# Patient Record
Sex: FEMALE | Race: WHITE | Employment: OTHER | URBAN - NONMETROPOLITAN AREA
[De-identification: names, ages, dates, MRNs, and addresses within clinical notes are randomized per-mention and may not be internally consistent; named-entity substitution may affect disease eponyms.]

---

## 2016-10-21 LAB — HBA1C MFR BLD: 6 %

## 2017-04-17 VITALS
BODY MASS INDEX: 25.95 KG/M2 | SYSTOLIC BLOOD PRESSURE: 120 MMHG | WEIGHT: 152 LBS | HEIGHT: 64 IN | HEART RATE: 80 BPM | DIASTOLIC BLOOD PRESSURE: 78 MMHG

## 2017-04-17 RX ORDER — BUDESONIDE AND FORMOTEROL FUMARATE DIHYDRATE 160; 4.5 UG/1; UG/1
2 AEROSOL RESPIRATORY (INHALATION) 2 TIMES DAILY
COMMUNITY
End: 2017-09-07 | Stop reason: ALTCHOICE

## 2017-04-17 RX ORDER — SIMVASTATIN 40 MG
40 TABLET ORAL NIGHTLY
COMMUNITY
End: 2017-10-03 | Stop reason: SDUPTHER

## 2017-04-18 VITALS
HEIGHT: 64 IN | WEIGHT: 152 LBS | DIASTOLIC BLOOD PRESSURE: 78 MMHG | SYSTOLIC BLOOD PRESSURE: 120 MMHG | BODY MASS INDEX: 25.95 KG/M2 | HEART RATE: 80 BPM

## 2017-04-19 ENCOUNTER — OFFICE VISIT (OUTPATIENT)
Dept: FAMILY MEDICINE CLINIC | Age: 77
End: 2017-04-19
Payer: MEDICARE

## 2017-04-19 VITALS
WEIGHT: 153 LBS | HEIGHT: 64 IN | HEART RATE: 80 BPM | DIASTOLIC BLOOD PRESSURE: 90 MMHG | BODY MASS INDEX: 26.12 KG/M2 | SYSTOLIC BLOOD PRESSURE: 130 MMHG

## 2017-04-19 DIAGNOSIS — K51.90 ULCERATIVE COLITIS WITHOUT COMPLICATIONS, UNSPECIFIED LOCATION (HCC): ICD-10-CM

## 2017-04-19 DIAGNOSIS — L98.9 SKIN LESION OF FACE: ICD-10-CM

## 2017-04-19 DIAGNOSIS — G47.34 NOCTURNAL HYPOXEMIA: ICD-10-CM

## 2017-04-19 DIAGNOSIS — G47.30 SLEEP APNEA, UNSPECIFIED TYPE: ICD-10-CM

## 2017-04-19 DIAGNOSIS — Z78.0 POSTMENOPAUSAL: ICD-10-CM

## 2017-04-19 DIAGNOSIS — E78.2 MIXED HYPERLIPIDEMIA: ICD-10-CM

## 2017-04-19 DIAGNOSIS — K57.31 DIVERTICULOSIS LARGE INTESTINE W/O PERFORATION OR ABSCESS W/BLEEDING: ICD-10-CM

## 2017-04-19 DIAGNOSIS — Z01.818 PREOPERATIVE CLEARANCE: Primary | ICD-10-CM

## 2017-04-19 DIAGNOSIS — J41.0 SIMPLE CHRONIC BRONCHITIS (HCC): ICD-10-CM

## 2017-04-19 DIAGNOSIS — F17.210 CIGARETTE NICOTINE DEPENDENCE WITHOUT COMPLICATION: ICD-10-CM

## 2017-04-19 PROBLEM — H25.10 NUCLEAR SCLEROTIC CATARACT: Status: ACTIVE | Noted: 2017-02-08

## 2017-04-19 PROBLEM — H35.3190 NONEXUDATIVE AGE-RELATED MACULAR DEGENERATION: Status: ACTIVE | Noted: 2017-02-08

## 2017-04-19 PROCEDURE — 4040F PNEUMOC VAC/ADMIN/RCVD: CPT | Performed by: FAMILY MEDICINE

## 2017-04-19 PROCEDURE — 3023F SPIROM DOC REV: CPT | Performed by: FAMILY MEDICINE

## 2017-04-19 PROCEDURE — 1036F TOBACCO NON-USER: CPT | Performed by: FAMILY MEDICINE

## 2017-04-19 PROCEDURE — 1090F PRES/ABSN URINE INCON ASSESS: CPT | Performed by: FAMILY MEDICINE

## 2017-04-19 PROCEDURE — 99214 OFFICE O/P EST MOD 30 MIN: CPT | Performed by: FAMILY MEDICINE

## 2017-04-19 PROCEDURE — G8926 SPIRO NO PERF OR DOC: HCPCS | Performed by: FAMILY MEDICINE

## 2017-04-19 PROCEDURE — G8420 CALC BMI NORM PARAMETERS: HCPCS | Performed by: FAMILY MEDICINE

## 2017-04-19 PROCEDURE — G8427 DOCREV CUR MEDS BY ELIG CLIN: HCPCS | Performed by: FAMILY MEDICINE

## 2017-04-19 RX ORDER — LANOLIN ALCOHOL/MO/W.PET/CERES
1 CREAM (GRAM) TOPICAL
COMMUNITY
End: 2017-09-07

## 2017-04-19 RX ORDER — ASPIRIN 81 MG/1
81 TABLET, CHEWABLE ORAL
COMMUNITY
End: 2018-05-11 | Stop reason: SDUPTHER

## 2017-04-19 ASSESSMENT — ENCOUNTER SYMPTOMS
DIARRHEA: 0
CHEST TIGHTNESS: 0
BLOOD IN STOOL: 0
ABDOMINAL PAIN: 0
WHEEZING: 0
CONSTIPATION: 0
SHORTNESS OF BREATH: 0

## 2017-09-01 LAB
ALT SERPL-CCNC: 30 UNITS/L
AST SERPL-CCNC: 23 UNITS/L
CHOLESTEROL/HDL RATIO: 3.8 RATIO
CHOLESTEROL: 169 MG/DL
HDL, DIRECT: 45 MG/DL
LDL CHOLESTEROL CALCULATED: 91.6 MG/DL
TRIGL SERPL-MCNC: 162 MG/DL
VLDLC SERPL CALC-MCNC: 32 MG/DL

## 2017-09-07 ENCOUNTER — OFFICE VISIT (OUTPATIENT)
Dept: FAMILY MEDICINE CLINIC | Age: 77
End: 2017-09-07
Payer: MEDICARE

## 2017-09-07 VITALS
BODY MASS INDEX: 25.58 KG/M2 | HEART RATE: 88 BPM | WEIGHT: 149 LBS | SYSTOLIC BLOOD PRESSURE: 110 MMHG | DIASTOLIC BLOOD PRESSURE: 80 MMHG

## 2017-09-07 DIAGNOSIS — M16.0 PRIMARY OSTEOARTHRITIS OF BOTH HIPS: ICD-10-CM

## 2017-09-07 DIAGNOSIS — Z23 NEED FOR 23-POLYVALENT PNEUMOCOCCAL POLYSACCHARIDE VACCINE: ICD-10-CM

## 2017-09-07 DIAGNOSIS — G47.34 NOCTURNAL HYPOXEMIA: ICD-10-CM

## 2017-09-07 DIAGNOSIS — E78.2 MIXED HYPERLIPIDEMIA: ICD-10-CM

## 2017-09-07 DIAGNOSIS — Z23 NEED FOR PROPHYLACTIC VACCINATION AND INOCULATION AGAINST INFLUENZA: ICD-10-CM

## 2017-09-07 DIAGNOSIS — K57.31 DIVERTICULOSIS LARGE INTESTINE W/O PERFORATION OR ABSCESS W/BLEEDING: ICD-10-CM

## 2017-09-07 DIAGNOSIS — F17.210 CIGARETTE NICOTINE DEPENDENCE WITHOUT COMPLICATION: ICD-10-CM

## 2017-09-07 DIAGNOSIS — M81.0 AGE-RELATED OSTEOPOROSIS WITHOUT CURRENT PATHOLOGICAL FRACTURE: ICD-10-CM

## 2017-09-07 DIAGNOSIS — J41.0 SIMPLE CHRONIC BRONCHITIS (HCC): Primary | ICD-10-CM

## 2017-09-07 DIAGNOSIS — K51.90 ULCERATIVE COLITIS WITHOUT COMPLICATIONS, UNSPECIFIED LOCATION (HCC): ICD-10-CM

## 2017-09-07 DIAGNOSIS — Z12.31 SCREENING MAMMOGRAM, ENCOUNTER FOR: ICD-10-CM

## 2017-09-07 LAB — CALCIUM SERPL-MCNC: 9.9 MG/DL

## 2017-09-07 PROCEDURE — 3023F SPIROM DOC REV: CPT | Performed by: FAMILY MEDICINE

## 2017-09-07 PROCEDURE — G8419 CALC BMI OUT NRM PARAM NOF/U: HCPCS | Performed by: FAMILY MEDICINE

## 2017-09-07 PROCEDURE — G8400 PT W/DXA NO RESULTS DOC: HCPCS | Performed by: FAMILY MEDICINE

## 2017-09-07 PROCEDURE — 90732 PPSV23 VACC 2 YRS+ SUBQ/IM: CPT | Performed by: FAMILY MEDICINE

## 2017-09-07 PROCEDURE — 99214 OFFICE O/P EST MOD 30 MIN: CPT | Performed by: FAMILY MEDICINE

## 2017-09-07 PROCEDURE — 4040F PNEUMOC VAC/ADMIN/RCVD: CPT | Performed by: FAMILY MEDICINE

## 2017-09-07 PROCEDURE — 1123F ACP DISCUSS/DSCN MKR DOCD: CPT | Performed by: FAMILY MEDICINE

## 2017-09-07 PROCEDURE — G0009 ADMIN PNEUMOCOCCAL VACCINE: HCPCS | Performed by: FAMILY MEDICINE

## 2017-09-07 PROCEDURE — G0008 ADMIN INFLUENZA VIRUS VAC: HCPCS | Performed by: FAMILY MEDICINE

## 2017-09-07 PROCEDURE — 1036F TOBACCO NON-USER: CPT | Performed by: FAMILY MEDICINE

## 2017-09-07 PROCEDURE — 4005F PHARM THX FOR OP RXD: CPT | Performed by: FAMILY MEDICINE

## 2017-09-07 PROCEDURE — 1090F PRES/ABSN URINE INCON ASSESS: CPT | Performed by: FAMILY MEDICINE

## 2017-09-07 PROCEDURE — 90662 IIV NO PRSV INCREASED AG IM: CPT | Performed by: FAMILY MEDICINE

## 2017-09-07 PROCEDURE — G8926 SPIRO NO PERF OR DOC: HCPCS | Performed by: FAMILY MEDICINE

## 2017-09-07 PROCEDURE — G8427 DOCREV CUR MEDS BY ELIG CLIN: HCPCS | Performed by: FAMILY MEDICINE

## 2017-09-07 ASSESSMENT — ENCOUNTER SYMPTOMS
SHORTNESS OF BREATH: 0
SORE THROAT: 0
BLOOD IN STOOL: 0
ABDOMINAL PAIN: 0
CONSTIPATION: 0
DIARRHEA: 0
WHEEZING: 0

## 2017-09-07 ASSESSMENT — PATIENT HEALTH QUESTIONNAIRE - PHQ9
SUM OF ALL RESPONSES TO PHQ QUESTIONS 1-9: 0
1. LITTLE INTEREST OR PLEASURE IN DOING THINGS: 0
2. FEELING DOWN, DEPRESSED OR HOPELESS: 0
SUM OF ALL RESPONSES TO PHQ9 QUESTIONS 1 & 2: 0

## 2017-09-13 ENCOUNTER — TELEPHONE (OUTPATIENT)
Dept: FAMILY MEDICINE CLINIC | Age: 77
End: 2017-09-13

## 2017-09-13 DIAGNOSIS — K51.90 ULCERATIVE COLITIS WITHOUT COMPLICATIONS, UNSPECIFIED LOCATION (HCC): Primary | ICD-10-CM

## 2017-09-13 RX ORDER — PREDNISONE 20 MG/1
20 TABLET ORAL DAILY
Qty: 7 TABLET | Refills: 0 | Status: SHIPPED | OUTPATIENT
Start: 2017-09-13 | End: 2017-09-20

## 2017-09-13 RX ORDER — MESALAMINE 500 MG/1
500 CAPSULE, EXTENDED RELEASE ORAL 4 TIMES DAILY
Qty: 120 CAPSULE | Refills: 3 | Status: SHIPPED | OUTPATIENT
Start: 2017-09-13 | End: 2018-05-11 | Stop reason: ALTCHOICE

## 2017-09-27 ENCOUNTER — OFFICE VISIT (OUTPATIENT)
Dept: FAMILY MEDICINE CLINIC | Age: 77
End: 2017-09-27
Payer: MEDICARE

## 2017-09-27 VITALS
DIASTOLIC BLOOD PRESSURE: 70 MMHG | HEART RATE: 68 BPM | WEIGHT: 146 LBS | SYSTOLIC BLOOD PRESSURE: 132 MMHG | BODY MASS INDEX: 25.06 KG/M2

## 2017-09-27 DIAGNOSIS — K51.90 ULCERATIVE COLITIS WITHOUT COMPLICATIONS, UNSPECIFIED LOCATION (HCC): Primary | ICD-10-CM

## 2017-09-27 DIAGNOSIS — M81.0 AGE-RELATED OSTEOPOROSIS WITHOUT CURRENT PATHOLOGICAL FRACTURE: ICD-10-CM

## 2017-09-27 DIAGNOSIS — K57.31 DIVERTICULOSIS LARGE INTESTINE W/O PERFORATION OR ABSCESS W/BLEEDING: ICD-10-CM

## 2017-09-27 PROCEDURE — 4040F PNEUMOC VAC/ADMIN/RCVD: CPT | Performed by: FAMILY MEDICINE

## 2017-09-27 PROCEDURE — 99213 OFFICE O/P EST LOW 20 MIN: CPT | Performed by: FAMILY MEDICINE

## 2017-09-27 PROCEDURE — G8417 CALC BMI ABV UP PARAM F/U: HCPCS | Performed by: FAMILY MEDICINE

## 2017-09-27 PROCEDURE — 4005F PHARM THX FOR OP RXD: CPT | Performed by: FAMILY MEDICINE

## 2017-09-27 PROCEDURE — 1036F TOBACCO NON-USER: CPT | Performed by: FAMILY MEDICINE

## 2017-09-27 PROCEDURE — G8400 PT W/DXA NO RESULTS DOC: HCPCS | Performed by: FAMILY MEDICINE

## 2017-09-27 PROCEDURE — G8427 DOCREV CUR MEDS BY ELIG CLIN: HCPCS | Performed by: FAMILY MEDICINE

## 2017-09-27 PROCEDURE — 1123F ACP DISCUSS/DSCN MKR DOCD: CPT | Performed by: FAMILY MEDICINE

## 2017-09-27 PROCEDURE — 1090F PRES/ABSN URINE INCON ASSESS: CPT | Performed by: FAMILY MEDICINE

## 2017-09-27 RX ORDER — MESALAMINE 400 MG/1
CAPSULE, DELAYED RELEASE ORAL
Qty: 90 CAPSULE | Refills: 3 | Status: SHIPPED | OUTPATIENT
Start: 2017-09-27 | End: 2018-05-03 | Stop reason: CLARIF

## 2017-09-28 PROBLEM — M81.0 AGE-RELATED OSTEOPOROSIS WITHOUT CURRENT PATHOLOGICAL FRACTURE: Status: ACTIVE | Noted: 2017-09-28

## 2017-09-28 ASSESSMENT — ENCOUNTER SYMPTOMS
WHEEZING: 0
DIARRHEA: 0
BLOOD IN STOOL: 0
CONSTIPATION: 0
SHORTNESS OF BREATH: 0
SORE THROAT: 0

## 2017-10-03 RX ORDER — SIMVASTATIN 40 MG
TABLET ORAL
Qty: 90 TABLET | Refills: 3 | Status: SHIPPED | OUTPATIENT
Start: 2017-10-03 | End: 2019-02-01 | Stop reason: SDUPTHER

## 2017-10-04 DIAGNOSIS — K51.90 ULCERATIVE COLITIS WITHOUT COMPLICATIONS, UNSPECIFIED LOCATION (HCC): Primary | ICD-10-CM

## 2017-10-04 RX ORDER — MESALAMINE 1.2 G/1
1200 TABLET, DELAYED RELEASE ORAL
Qty: 30 TABLET | Refills: 5 | COMMUNITY
End: 2018-03-12 | Stop reason: CLARIF

## 2017-10-06 ENCOUNTER — OFFICE VISIT (OUTPATIENT)
Dept: FAMILY MEDICINE CLINIC | Age: 77
End: 2017-10-06
Payer: MEDICARE

## 2017-10-06 VITALS — DIASTOLIC BLOOD PRESSURE: 80 MMHG | SYSTOLIC BLOOD PRESSURE: 110 MMHG | HEART RATE: 84 BPM

## 2017-10-06 DIAGNOSIS — K51.90 ULCERATIVE COLITIS WITHOUT COMPLICATIONS, UNSPECIFIED LOCATION (HCC): Primary | ICD-10-CM

## 2017-10-06 DIAGNOSIS — I65.23 BILATERAL CAROTID ARTERY STENOSIS: ICD-10-CM

## 2017-10-06 PROCEDURE — G8400 PT W/DXA NO RESULTS DOC: HCPCS | Performed by: FAMILY MEDICINE

## 2017-10-06 PROCEDURE — 99213 OFFICE O/P EST LOW 20 MIN: CPT | Performed by: FAMILY MEDICINE

## 2017-10-06 PROCEDURE — G8484 FLU IMMUNIZE NO ADMIN: HCPCS | Performed by: FAMILY MEDICINE

## 2017-10-06 PROCEDURE — G8598 ASA/ANTIPLAT THER USED: HCPCS | Performed by: FAMILY MEDICINE

## 2017-10-06 PROCEDURE — 1036F TOBACCO NON-USER: CPT | Performed by: FAMILY MEDICINE

## 2017-10-06 PROCEDURE — 4040F PNEUMOC VAC/ADMIN/RCVD: CPT | Performed by: FAMILY MEDICINE

## 2017-10-06 PROCEDURE — G8427 DOCREV CUR MEDS BY ELIG CLIN: HCPCS | Performed by: FAMILY MEDICINE

## 2017-10-06 PROCEDURE — 1123F ACP DISCUSS/DSCN MKR DOCD: CPT | Performed by: FAMILY MEDICINE

## 2017-10-06 PROCEDURE — G8417 CALC BMI ABV UP PARAM F/U: HCPCS | Performed by: FAMILY MEDICINE

## 2017-10-06 PROCEDURE — 1090F PRES/ABSN URINE INCON ASSESS: CPT | Performed by: FAMILY MEDICINE

## 2017-10-06 RX ORDER — MESALAMINE 0.38 G/1
1.5 CAPSULE, EXTENDED RELEASE ORAL DAILY
Qty: 120 CAPSULE | Refills: 5 | Status: SHIPPED | OUTPATIENT
Start: 2017-10-06 | End: 2018-03-12 | Stop reason: CLARIF

## 2017-10-06 RX ORDER — MESALAMINE 1.2 G/1
1200 TABLET, DELAYED RELEASE ORAL
Qty: 30 TABLET | Refills: 3 | Status: SHIPPED | OUTPATIENT
Start: 2017-10-06 | End: 2018-05-03 | Stop reason: CLARIF

## 2017-10-06 NOTE — PROGRESS NOTES
Cigarettes    Smokeless tobacco: Not on file    Alcohol use No        Current Outpatient Prescriptions   Medication Sig Dispense Refill    Multiple Vitamins-Minerals (MULTIVITAMIN ADULTS PO) Take 1 tablet by mouth daily      Probiotic Product ([address]) Take 1 tablet by mouth daily      mesalamine (APRISO) 0.375 g extended release capsule Take 4 capsules by mouth daily 120 capsule 5    mesalamine (PENTASA) 250 MG extended release capsule Take 1 capsule by mouth 4 times daily 120 capsule 3    mesalamine (LIALDA) 1.2 g EC tablet Take 1 tablet by mouth daily (with breakfast) 30 tablet 3    simvastatin (ZOCOR) 40 MG tablet TAKE ONE TABLET BY MOUTH ONCE DAILY 90 tablet 3    aspirin 81 MG chewable tablet Take 81 mg by mouth      mesalamine (LIALDA) 1.2 g EC tablet Take 1 tablet by mouth daily (with breakfast) 30 tablet 5    mesalamine (DELZICOL) 400 MG CPDR delayed release capsule 1 capsule three times daily when needed 90 capsule 3    mesalamine (PENTASA) 500 MG extended release capsule Take 1 capsule by mouth 4 times daily 120 capsule 3    denosumab (PROLIA) 60 MG/ML SOLN SC injection Inject 1 mL into the skin once for 1 dose 1 mL 0     No current facility-administered medications for this visit. Allergies   Allergen Reactions    Alcohol     Azulfidine [Sulfasalazine] Diarrhea    Sulfa Antibiotics        Health Maintenance   Topic Date Due    Zostavax vaccine  10/04/2000    Lipid screen  09/01/2022    DTaP/Tdap/Td vaccine (2 - Td) 02/05/2026    DEXA (modify frequency per FRAX score)  Completed    Flu vaccine  Completed    Pneumococcal low/med risk  Completed       Subjective:      Review of Systems   Constitutional: Negative for activity change, chills, diaphoresis and fever. HENT: Negative. Respiratory: Negative. Cardiovascular: Negative. Gastrointestinal: Positive for diarrhea.  Negative for abdominal distention, abdominal pain, blood in stool, constipation, nausea and vomiting. Genitourinary: Negative for dysuria. Musculoskeletal: Negative for arthralgias, back pain and joint swelling. Objective:     /80   Pulse 84     Physical Exam   Constitutional: She appears well-developed and well-nourished. HENT:   Mouth/Throat: No posterior oropharyngeal erythema. Neck: Carotid bruit is not present. Cardiovascular: Normal rate, regular rhythm, S1 normal and S2 normal.    No murmur heard. Pulmonary/Chest: Breath sounds normal. She has no wheezes. She has no rhonchi. She has no rales. She exhibits no tenderness. Abdominal: Soft. There is no hepatosplenomegaly. There is no tenderness. Vitals reviewed. Assessment:     1. Ulcerative colitis without complications, unspecified location (HCC)  Multiple Vitamins-Minerals (MULTIVITAMIN ADULTS PO)    Probiotic Product ([employer] PO)    mesalamine (APRISO) 0.375 g extended release capsule    mesalamine (PENTASA) 250 MG extended release capsule    mesalamine (LIALDA) 1.2 g EC tablet   2. Bilateral carotid artery stenosis  US CAROTID ARTERY BILATERAL            POC Testing Results (If Applicable):  No results found for this visit on 10/06/17. Plan:    We have received communication from pharmacy and have prescribed \"preferred\" medication  However, suspect though they are preferred, coverage is still limited  So prescriptions for all the preferred medications were sent to pharmacy to see if they could find the best alternative    However, patient should find a medication and take it faithfully for a month and if symptoms do not jesusita we will need to consider other diagnoses    Needs repeat carotid screen ; order written    Orders Given:  Orders Placed This Encounter   Procedures    US CAROTID ARTERY BILATERAL     Standing Status:   Future     Standing Expiration Date:   10/6/2018     Prescriptions:    Orders Placed This Encounter   Medications    mesalamine (APRISO) 0.375 g extended release capsule Sig: Take 4 capsules by mouth daily     Dispense:  120 capsule     Refill:  5    mesalamine (PENTASA) 250 MG extended release capsule     Sig: Take 1 capsule by mouth 4 times daily     Dispense:  120 capsule     Refill:  3    mesalamine (LIALDA) 1.2 g EC tablet     Sig: Take 1 tablet by mouth daily (with breakfast)     Dispense:  30 tablet     Refill:  3        No Follow-up on file. Electronically signed by Maribel Duff MD on 10/7/2017.

## 2017-10-07 ASSESSMENT — ENCOUNTER SYMPTOMS
BLOOD IN STOOL: 0
VOMITING: 0
DIARRHEA: 1
CONSTIPATION: 0
ABDOMINAL DISTENTION: 0
RESPIRATORY NEGATIVE: 1
ABDOMINAL PAIN: 0
BACK PAIN: 0
NAUSEA: 0

## 2018-03-02 DIAGNOSIS — M81.0 AGE-RELATED OSTEOPOROSIS WITHOUT CURRENT PATHOLOGICAL FRACTURE: ICD-10-CM

## 2018-03-12 ENCOUNTER — OFFICE VISIT (OUTPATIENT)
Dept: FAMILY MEDICINE CLINIC | Age: 78
End: 2018-03-12
Payer: MEDICARE

## 2018-03-12 VITALS
DIASTOLIC BLOOD PRESSURE: 80 MMHG | HEIGHT: 64 IN | BODY MASS INDEX: 24.75 KG/M2 | WEIGHT: 145 LBS | SYSTOLIC BLOOD PRESSURE: 124 MMHG | HEART RATE: 68 BPM

## 2018-03-12 DIAGNOSIS — K51.919 ULCERATIVE COLITIS WITH COMPLICATION, UNSPECIFIED LOCATION (HCC): ICD-10-CM

## 2018-03-12 DIAGNOSIS — F17.210 CIGARETTE NICOTINE DEPENDENCE WITHOUT COMPLICATION: ICD-10-CM

## 2018-03-12 DIAGNOSIS — M81.0 AGE-RELATED OSTEOPOROSIS WITHOUT CURRENT PATHOLOGICAL FRACTURE: ICD-10-CM

## 2018-03-12 DIAGNOSIS — G47.34 NOCTURNAL HYPOXEMIA: ICD-10-CM

## 2018-03-12 DIAGNOSIS — K51.90 ULCERATIVE COLITIS WITHOUT COMPLICATIONS, UNSPECIFIED LOCATION (HCC): ICD-10-CM

## 2018-03-12 DIAGNOSIS — E78.2 MIXED HYPERLIPIDEMIA: Primary | ICD-10-CM

## 2018-03-12 DIAGNOSIS — J42 CHRONIC BRONCHITIS, UNSPECIFIED CHRONIC BRONCHITIS TYPE (HCC): ICD-10-CM

## 2018-03-12 LAB — CALCIUM SERPL-MCNC: 10.6 MG/DL

## 2018-03-12 PROCEDURE — 1123F ACP DISCUSS/DSCN MKR DOCD: CPT | Performed by: FAMILY MEDICINE

## 2018-03-12 PROCEDURE — G8420 CALC BMI NORM PARAMETERS: HCPCS | Performed by: FAMILY MEDICINE

## 2018-03-12 PROCEDURE — 4040F PNEUMOC VAC/ADMIN/RCVD: CPT | Performed by: FAMILY MEDICINE

## 2018-03-12 PROCEDURE — G8926 SPIRO NO PERF OR DOC: HCPCS | Performed by: FAMILY MEDICINE

## 2018-03-12 PROCEDURE — 1036F TOBACCO NON-USER: CPT | Performed by: FAMILY MEDICINE

## 2018-03-12 PROCEDURE — 99214 OFFICE O/P EST MOD 30 MIN: CPT | Performed by: FAMILY MEDICINE

## 2018-03-12 PROCEDURE — G8427 DOCREV CUR MEDS BY ELIG CLIN: HCPCS | Performed by: FAMILY MEDICINE

## 2018-03-12 PROCEDURE — G8400 PT W/DXA NO RESULTS DOC: HCPCS | Performed by: FAMILY MEDICINE

## 2018-03-12 PROCEDURE — 3023F SPIROM DOC REV: CPT | Performed by: FAMILY MEDICINE

## 2018-03-12 PROCEDURE — G8482 FLU IMMUNIZE ORDER/ADMIN: HCPCS | Performed by: FAMILY MEDICINE

## 2018-03-12 PROCEDURE — 1090F PRES/ABSN URINE INCON ASSESS: CPT | Performed by: FAMILY MEDICINE

## 2018-03-12 ASSESSMENT — ENCOUNTER SYMPTOMS
CONSTIPATION: 0
SORE THROAT: 0
BLOOD IN STOOL: 0
WHEEZING: 0
ABDOMINAL PAIN: 0
DIARRHEA: 0
SHORTNESS OF BREATH: 0

## 2018-03-12 NOTE — PROGRESS NOTES
History:   Diagnosis Date    Colitis     Colon polyps     Diverticulosis large intestine w/o perforation or abscess w/bleeding     Fibroid, uterine     Hyperlipidemia     Nicotine dependence     Nocturnal hypoxemia     Postmenopausal     Postmenopausal     Sleep apnea     Ulcerative colitis ([address]     with rectal bleeding       Past Surgical History:   Procedure Laterality Date    BREAST BIOPSY  1988    right breast biopsy     BREAST BIOPSY Right 03/2011    stereotactic biopsy    COLONOSCOPY  2006    Dr Arlet Bynum  2010    [facility]  [facility]    Dr London Rosado interminate colitis    COLONOSCOPY  03/03/2014    Dr Tierra Schneider Bilateral 2011 2012     Family History   Problem Relation Age of Onset    Diabetes Mother     High Blood Pressure Mother     Coronary Art Dis Father      Social History   Substance Use Topics    Smoking status: Former Smoker     Types: Cigarettes    Smokeless tobacco: Never Used    Alcohol use No        Current Outpatient Prescriptions   Medication Sig Dispense Refill    denosumab (PROLIA) 60 MG/ML SOLN SC injection Inject 1 mL into the skin once for 1 dose 1 mL 0    Multiple Vitamins-Minerals (MULTIVITAMIN ADULTS PO) Take 1 tablet by mouth daily      Probiotic Product ([employer] [facility] PO) Take 1 tablet by mouth daily      simvastatin (ZOCOR) 40 MG tablet TAKE ONE TABLET BY MOUTH ONCE DAILY 90 tablet 3    aspirin 81 MG chewable tablet Take 81 mg by mouth      mesalamine (LIALDA) 1.2 g EC tablet Take 1 tablet by mouth daily (with breakfast) 30 tablet 3    mesalamine (DELZICOL) 400 MG CPDR delayed release capsule 1 capsule three times daily when needed 90 capsule 3    mesalamine (PENTASA) 500 MG extended release capsule Take 1 capsule by mouth 4 times daily 120 capsule 3     No current facility-administered medications for this visit.       Allergies   Allergen Reactions    Alcohol     Azulfidine [Sulfasalazine] Diarrhea    Sulfa Antibiotics        Health Maintenance   Topic Date Due    Shingles Vaccine (1 of 2 - 2 Dose Series) 10/04/1990    Lipid screen  09/01/2022    DTaP/Tdap/Td vaccine (2 - Td) 02/05/2026    DEXA (modify frequency per FRAX score)  Completed    Flu vaccine  Completed    Pneumococcal low/med risk  Completed       Subjective:      Review of Systems   Constitutional: Negative for appetite change, chills and fever. HENT: Negative for sore throat. Respiratory: Negative for shortness of breath and wheezing. Cardiovascular: Negative for chest pain, palpitations and leg swelling. Gastrointestinal: Negative for abdominal pain, blood in stool, constipation and diarrhea. Genitourinary: Negative for dysuria, hematuria and urgency. Hematological: Negative for adenopathy. Objective:     /80   Pulse 68   Ht 5' 4\" (1.626 m)   Wt 145 lb (65.8 kg)   BMI 24.89 kg/m²     Physical Exam   Constitutional: She appears well-developed and well-nourished. HENT:   Head: Normocephalic. Right Ear: Tympanic membrane normal.   Left Ear: Tympanic membrane normal.   Nose: Nose normal.   Mouth/Throat: No oropharyngeal exudate or posterior oropharyngeal erythema. Neck: Neck supple. Carotid bruit is not present. No thyromegaly present. Cardiovascular: Normal rate, regular rhythm, S1 normal and S2 normal.    No murmur heard. Pulmonary/Chest: Breath sounds normal. She has no wheezes. She has no rhonchi. She has no rales. She exhibits no tenderness. Abdominal: Soft. There is no hepatosplenomegaly. There is no tenderness. Musculoskeletal: She exhibits no edema. Lymphadenopathy:     She has no cervical adenopathy. She has no axillary adenopathy. Neurological: No cranial nerve deficit. Vitals reviewed. Assessment:     1. Mixed hyperlipidemia     2. Cigarette nicotine dependence without complication     3. Nocturnal hypoxemia      negative pulse oximetry in 2016   4.

## 2018-04-12 ENCOUNTER — TELEPHONE (OUTPATIENT)
Dept: FAMILY MEDICINE CLINIC | Age: 78
End: 2018-04-12

## 2018-05-03 ENCOUNTER — OFFICE VISIT (OUTPATIENT)
Dept: FAMILY MEDICINE CLINIC | Age: 78
End: 2018-05-03
Payer: MEDICARE

## 2018-05-03 VITALS
BODY MASS INDEX: 24.55 KG/M2 | SYSTOLIC BLOOD PRESSURE: 120 MMHG | DIASTOLIC BLOOD PRESSURE: 80 MMHG | HEART RATE: 76 BPM | WEIGHT: 143 LBS

## 2018-05-03 DIAGNOSIS — M81.0 AGE-RELATED OSTEOPOROSIS WITHOUT CURRENT PATHOLOGICAL FRACTURE: ICD-10-CM

## 2018-05-03 DIAGNOSIS — M19.012 OSTEOARTHRITIS OF LEFT SHOULDER, UNSPECIFIED OSTEOARTHRITIS TYPE: ICD-10-CM

## 2018-05-03 DIAGNOSIS — I49.9 IRREGULAR HEARTBEAT: Primary | ICD-10-CM

## 2018-05-03 DIAGNOSIS — K51.919 ULCERATIVE COLITIS WITH COMPLICATION, UNSPECIFIED LOCATION (HCC): ICD-10-CM

## 2018-05-03 DIAGNOSIS — R94.31 ABNORMAL EKG: ICD-10-CM

## 2018-05-03 DIAGNOSIS — M16.0 PRIMARY OSTEOARTHRITIS OF BOTH HIPS: ICD-10-CM

## 2018-05-03 DIAGNOSIS — K62.5 BRIGHT RED RECTAL BLEEDING: ICD-10-CM

## 2018-05-03 LAB
A/G RATIO: 1.4 RATIO
AGE FOR GFR: 77
ALBUMIN: 3.9 G/DL
ALK PHOSPHATASE: 79 UNITS/L
ALT SERPL-CCNC: 27 UNITS/L
ANION GAP SERPL CALCULATED.3IONS-SCNC: 16 MMOL/L
AST SERPL-CCNC: 25 UNITS/L
BASOPHILS # BLD: 0.08 THOU/MM3
BILIRUB SERPL-MCNC: 0.6 MG/DL
BUN BLDV-MCNC: 20 MG/DL
CALCIUM SERPL-MCNC: 10.1 MG/DL
CHLORIDE BLD-SCNC: 101 MMOL/L
CO2: 34 MMOL/L
CREAT SERPL-MCNC: 0.9 MG/DL
DIFFERENTIAL: AUTOMATED DIFF
EGFR BF: 73 ML/MIN/1.73 M2
EGFR BM: 99 ML/MIN/1.73 M2
EGFR WF: 61 ML/MIN/1.73 M2
EGFR WM: 82 ML/MIN/1.73 M2
EOSINOPHIL # BLD: 0.25 THOU/MM3
GLOBULIN: 2.8 G/DL
GLUCOSE: 94 MG/DL
HCT VFR BLD CALC: 43.5 %
HEMOGLOBIN: 13.7 G/DL
LYMPHOCYTES # BLD: 1.94 THOU/MM3
MCH RBC QN AUTO: 29.8 PG
MCHC RBC AUTO-ENTMCNC: 31.6 G/DL
MCV RBC AUTO: 94.4 FL
MONOCYTES # BLD: 0.53 THOU/MM3
NEUTROPHILS: 5.99 THOU/MM3
PDW BLD-RTO: 12.7 %
PLATELET # BLD: 288 THOU/MM3
PMV BLD AUTO: 7.5 FL
POTASSIUM SERPL-SCNC: 3.9 MMOL/L
RBC # BLD: 4.6 M/UL
SEDIMENTATION RATE, ERYTHROCYTE: 68 MM/HR
SODIUM BLD-SCNC: 147 MMOL/L
TOTAL PROTEIN: 6.7 G/DL
WBC # BLD: 8.8 THOU/ML3

## 2018-05-03 PROCEDURE — 4040F PNEUMOC VAC/ADMIN/RCVD: CPT | Performed by: FAMILY MEDICINE

## 2018-05-03 PROCEDURE — G8420 CALC BMI NORM PARAMETERS: HCPCS | Performed by: FAMILY MEDICINE

## 2018-05-03 PROCEDURE — 1123F ACP DISCUSS/DSCN MKR DOCD: CPT | Performed by: FAMILY MEDICINE

## 2018-05-03 PROCEDURE — G8400 PT W/DXA NO RESULTS DOC: HCPCS | Performed by: FAMILY MEDICINE

## 2018-05-03 PROCEDURE — G8427 DOCREV CUR MEDS BY ELIG CLIN: HCPCS | Performed by: FAMILY MEDICINE

## 2018-05-03 PROCEDURE — 93000 ELECTROCARDIOGRAM COMPLETE: CPT | Performed by: FAMILY MEDICINE

## 2018-05-03 PROCEDURE — 1090F PRES/ABSN URINE INCON ASSESS: CPT | Performed by: FAMILY MEDICINE

## 2018-05-03 PROCEDURE — 99215 OFFICE O/P EST HI 40 MIN: CPT | Performed by: FAMILY MEDICINE

## 2018-05-03 PROCEDURE — 1036F TOBACCO NON-USER: CPT | Performed by: FAMILY MEDICINE

## 2018-05-03 RX ORDER — ASPIRIN 81 MG/1
81 TABLET ORAL DAILY
Qty: 30 TABLET | Refills: 3 | Status: SHIPPED | OUTPATIENT
Start: 2018-05-03 | End: 2019-07-25 | Stop reason: ALTCHOICE

## 2018-05-03 RX ORDER — PREDNISONE 20 MG/1
20 TABLET ORAL DAILY
Qty: 10 TABLET | Refills: 0 | Status: SHIPPED | OUTPATIENT
Start: 2018-05-03 | End: 2018-05-13

## 2018-05-06 ASSESSMENT — ENCOUNTER SYMPTOMS
CHOKING: 0
SHORTNESS OF BREATH: 0
WHEEZING: 0
VOMITING: 0
CHEST TIGHTNESS: 0
NAUSEA: 0
BACK PAIN: 0
DIARRHEA: 1
CONSTIPATION: 0
RECTAL PAIN: 0
ABDOMINAL PAIN: 0
SORE THROAT: 0
ANAL BLEEDING: 1
COUGH: 1
ABDOMINAL DISTENTION: 0
BLOOD IN STOOL: 1

## 2018-05-08 DIAGNOSIS — I49.9 IRREGULAR HEARTBEAT: ICD-10-CM

## 2018-05-08 DIAGNOSIS — R94.31 ABNORMAL EKG: ICD-10-CM

## 2018-05-10 DIAGNOSIS — R94.31 ABNORMAL EKG: ICD-10-CM

## 2018-05-10 DIAGNOSIS — I49.9 IRREGULAR HEARTBEAT: ICD-10-CM

## 2018-05-11 ENCOUNTER — OFFICE VISIT (OUTPATIENT)
Dept: CARDIOLOGY CLINIC | Age: 78
End: 2018-05-11
Payer: MEDICARE

## 2018-05-11 VITALS
BODY MASS INDEX: 24.07 KG/M2 | WEIGHT: 141 LBS | SYSTOLIC BLOOD PRESSURE: 148 MMHG | DIASTOLIC BLOOD PRESSURE: 82 MMHG | HEART RATE: 80 BPM | HEIGHT: 64 IN

## 2018-05-11 DIAGNOSIS — R94.39 ABNORMAL STRESS TEST: Primary | ICD-10-CM

## 2018-05-11 PROCEDURE — 99203 OFFICE O/P NEW LOW 30 MIN: CPT | Performed by: INTERNAL MEDICINE

## 2018-05-11 RX ORDER — NICOTINE 21 MG/24HR
1 PATCH, TRANSDERMAL 24 HOURS TRANSDERMAL EVERY 24 HOURS
COMMUNITY
End: 2019-01-08 | Stop reason: ALTCHOICE

## 2018-05-21 ENCOUNTER — OFFICE VISIT (OUTPATIENT)
Dept: FAMILY MEDICINE CLINIC | Age: 78
End: 2018-05-21
Payer: MEDICARE

## 2018-05-21 VITALS
SYSTOLIC BLOOD PRESSURE: 162 MMHG | DIASTOLIC BLOOD PRESSURE: 90 MMHG | WEIGHT: 143 LBS | BODY MASS INDEX: 24.55 KG/M2 | HEART RATE: 76 BPM

## 2018-05-21 DIAGNOSIS — I25.119 CORONARY ARTERY DISEASE INVOLVING NATIVE HEART WITH ANGINA PECTORIS, UNSPECIFIED VESSEL OR LESION TYPE (HCC): ICD-10-CM

## 2018-05-21 DIAGNOSIS — K51.90 ULCERATIVE COLITIS WITHOUT COMPLICATIONS, UNSPECIFIED LOCATION (HCC): Primary | ICD-10-CM

## 2018-05-21 DIAGNOSIS — E78.2 MIXED HYPERLIPIDEMIA: ICD-10-CM

## 2018-05-21 DIAGNOSIS — I49.9 IRREGULAR HEARTBEAT: ICD-10-CM

## 2018-05-21 DIAGNOSIS — G47.30 SLEEP APNEA, UNSPECIFIED TYPE: ICD-10-CM

## 2018-05-21 LAB
BASOPHILS # BLD: 0.06 THOU/MM3
DIFFERENTIAL: AUTOMATED DIFF
EOSINOPHIL # BLD: 0.36 THOU/MM3
HCT VFR BLD CALC: 42.3 %
HEMOGLOBIN: 13.6 G/DL
LYMPHOCYTES # BLD: 1.85 THOU/MM3
MCH RBC QN AUTO: 30.1 PG
MCHC RBC AUTO-ENTMCNC: 32.2 G/DL
MCV RBC AUTO: 93.7 FL
MONOCYTES # BLD: 0.67 THOU/MM3
NEUTROPHILS: 7.12 THOU/MM3
PDW BLD-RTO: 12.6 %
PLATELET # BLD: 232 THOU/MM3
PMV BLD AUTO: 8.2 FL
RBC # BLD: 4.52 M/UL
WBC # BLD: 10.05 THOU/ML3

## 2018-05-21 PROCEDURE — 1090F PRES/ABSN URINE INCON ASSESS: CPT | Performed by: FAMILY MEDICINE

## 2018-05-21 PROCEDURE — 4040F PNEUMOC VAC/ADMIN/RCVD: CPT | Performed by: FAMILY MEDICINE

## 2018-05-21 PROCEDURE — G8400 PT W/DXA NO RESULTS DOC: HCPCS | Performed by: FAMILY MEDICINE

## 2018-05-21 PROCEDURE — G8598 ASA/ANTIPLAT THER USED: HCPCS | Performed by: FAMILY MEDICINE

## 2018-05-21 PROCEDURE — 99214 OFFICE O/P EST MOD 30 MIN: CPT | Performed by: FAMILY MEDICINE

## 2018-05-21 PROCEDURE — 1036F TOBACCO NON-USER: CPT | Performed by: FAMILY MEDICINE

## 2018-05-21 PROCEDURE — G8420 CALC BMI NORM PARAMETERS: HCPCS | Performed by: FAMILY MEDICINE

## 2018-05-21 PROCEDURE — G8427 DOCREV CUR MEDS BY ELIG CLIN: HCPCS | Performed by: FAMILY MEDICINE

## 2018-05-21 PROCEDURE — 1123F ACP DISCUSS/DSCN MKR DOCD: CPT | Performed by: FAMILY MEDICINE

## 2018-05-21 RX ORDER — NITROGLYCERIN 0.4 MG/1
TABLET SUBLINGUAL
Qty: 25 TABLET | Refills: 3 | Status: SHIPPED | OUTPATIENT
Start: 2018-05-21 | End: 2018-06-18 | Stop reason: WASHOUT

## 2018-05-21 ASSESSMENT — ENCOUNTER SYMPTOMS
BLOOD IN STOOL: 1
VOMITING: 0
ABDOMINAL PAIN: 0
DIARRHEA: 1
SORE THROAT: 0
RECTAL PAIN: 0
CONSTIPATION: 0
COUGH: 0
ANAL BLEEDING: 1
CHEST TIGHTNESS: 0
RHINORRHEA: 0
SHORTNESS OF BREATH: 1
WHEEZING: 0

## 2018-05-22 DIAGNOSIS — K51.911 ULCERATIVE COLITIS WITH RECTAL BLEEDING, UNSPECIFIED LOCATION (HCC): Primary | ICD-10-CM

## 2018-05-22 LAB
CULTURE, STOOL: NORMAL
STOOL FOR WBC: NORMAL

## 2018-05-22 RX ORDER — BUDESONIDE 3 MG/1
9 CAPSULE, COATED PELLETS ORAL EVERY MORNING
Qty: 90 CAPSULE | Refills: 3 | Status: SHIPPED | OUTPATIENT
Start: 2018-05-22 | End: 2018-06-21

## 2018-05-24 ENCOUNTER — TELEPHONE (OUTPATIENT)
Dept: CARDIOLOGY | Age: 78
End: 2018-05-24

## 2018-05-24 RX ORDER — METOPROLOL TARTRATE 100 MG/1
100 TABLET ORAL 2 TIMES DAILY
Qty: 180 TABLET | Refills: 3 | Status: SHIPPED | OUTPATIENT
Start: 2018-05-24 | End: 2018-08-20 | Stop reason: DRUGHIGH

## 2018-05-29 DIAGNOSIS — K62.5 BRIGHT RED RECTAL BLEEDING: ICD-10-CM

## 2018-06-01 ENCOUNTER — OFFICE VISIT (OUTPATIENT)
Dept: CARDIOLOGY CLINIC | Age: 78
End: 2018-06-01
Payer: MEDICARE

## 2018-06-01 VITALS
SYSTOLIC BLOOD PRESSURE: 148 MMHG | BODY MASS INDEX: 24.68 KG/M2 | DIASTOLIC BLOOD PRESSURE: 82 MMHG | WEIGHT: 143.8 LBS | HEART RATE: 60 BPM

## 2018-06-01 DIAGNOSIS — R07.89 OTHER CHEST PAIN: ICD-10-CM

## 2018-06-01 DIAGNOSIS — R53.83 FATIGUE, UNSPECIFIED TYPE: ICD-10-CM

## 2018-06-01 DIAGNOSIS — R94.39 ABNORMAL NUCLEAR STRESS TEST: Primary | ICD-10-CM

## 2018-06-01 DIAGNOSIS — E78.2 MIXED HYPERLIPIDEMIA: ICD-10-CM

## 2018-06-01 DIAGNOSIS — F17.210 CIGARETTE NICOTINE DEPENDENCE WITHOUT COMPLICATION: ICD-10-CM

## 2018-06-01 PROCEDURE — 99212 OFFICE O/P EST SF 10 MIN: CPT | Performed by: INTERNAL MEDICINE

## 2018-06-01 RX ORDER — LISINOPRIL 5 MG/1
5 TABLET ORAL DAILY
Qty: 30 TABLET | Refills: 3 | Status: SHIPPED | OUTPATIENT
Start: 2018-06-01 | End: 2018-08-06 | Stop reason: SDUPTHER

## 2018-06-12 DIAGNOSIS — I49.9 IRREGULAR HEARTBEAT: ICD-10-CM

## 2018-06-18 ENCOUNTER — OFFICE VISIT (OUTPATIENT)
Dept: FAMILY MEDICINE CLINIC | Age: 78
End: 2018-06-18
Payer: MEDICARE

## 2018-06-18 VITALS
DIASTOLIC BLOOD PRESSURE: 76 MMHG | HEART RATE: 61 BPM | OXYGEN SATURATION: 96 % | SYSTOLIC BLOOD PRESSURE: 130 MMHG | BODY MASS INDEX: 24.37 KG/M2 | WEIGHT: 142 LBS

## 2018-06-18 DIAGNOSIS — I49.9 IRREGULAR HEARTBEAT: Primary | ICD-10-CM

## 2018-06-18 DIAGNOSIS — K62.5 BRIGHT RED RECTAL BLEEDING: ICD-10-CM

## 2018-06-18 DIAGNOSIS — K51.911 ULCERATIVE COLITIS WITH RECTAL BLEEDING, UNSPECIFIED LOCATION (HCC): ICD-10-CM

## 2018-06-18 DIAGNOSIS — J42 CHRONIC BRONCHITIS, UNSPECIFIED CHRONIC BRONCHITIS TYPE (HCC): ICD-10-CM

## 2018-06-18 DIAGNOSIS — F17.210 CIGARETTE NICOTINE DEPENDENCE WITHOUT COMPLICATION: ICD-10-CM

## 2018-06-18 PROCEDURE — 4040F PNEUMOC VAC/ADMIN/RCVD: CPT | Performed by: FAMILY MEDICINE

## 2018-06-18 PROCEDURE — G8420 CALC BMI NORM PARAMETERS: HCPCS | Performed by: FAMILY MEDICINE

## 2018-06-18 PROCEDURE — 3023F SPIROM DOC REV: CPT | Performed by: FAMILY MEDICINE

## 2018-06-18 PROCEDURE — 1036F TOBACCO NON-USER: CPT | Performed by: FAMILY MEDICINE

## 2018-06-18 PROCEDURE — G8428 CUR MEDS NOT DOCUMENT: HCPCS | Performed by: FAMILY MEDICINE

## 2018-06-18 PROCEDURE — G8926 SPIRO NO PERF OR DOC: HCPCS | Performed by: FAMILY MEDICINE

## 2018-06-18 PROCEDURE — 99214 OFFICE O/P EST MOD 30 MIN: CPT | Performed by: FAMILY MEDICINE

## 2018-06-18 PROCEDURE — 1090F PRES/ABSN URINE INCON ASSESS: CPT | Performed by: FAMILY MEDICINE

## 2018-06-18 PROCEDURE — G8598 ASA/ANTIPLAT THER USED: HCPCS | Performed by: FAMILY MEDICINE

## 2018-06-18 PROCEDURE — 1123F ACP DISCUSS/DSCN MKR DOCD: CPT | Performed by: FAMILY MEDICINE

## 2018-06-18 PROCEDURE — G8400 PT W/DXA NO RESULTS DOC: HCPCS | Performed by: FAMILY MEDICINE

## 2018-06-18 ASSESSMENT — ENCOUNTER SYMPTOMS
SORE THROAT: 0
DIARRHEA: 0
ABDOMINAL PAIN: 0
WHEEZING: 0
BLOOD IN STOOL: 0
SHORTNESS OF BREATH: 0
CONSTIPATION: 0

## 2018-06-26 ENCOUNTER — OFFICE VISIT (OUTPATIENT)
Dept: CARDIOLOGY CLINIC | Age: 78
End: 2018-06-26
Payer: MEDICARE

## 2018-06-26 VITALS
WEIGHT: 144 LBS | BODY MASS INDEX: 24.59 KG/M2 | SYSTOLIC BLOOD PRESSURE: 118 MMHG | HEIGHT: 64 IN | HEART RATE: 62 BPM | DIASTOLIC BLOOD PRESSURE: 64 MMHG

## 2018-06-26 DIAGNOSIS — R07.89 OTHER CHEST PAIN: Primary | ICD-10-CM

## 2018-06-26 PROCEDURE — 99212 OFFICE O/P EST SF 10 MIN: CPT | Performed by: INTERNAL MEDICINE

## 2018-06-26 RX ORDER — NITROGLYCERIN 0.4 MG/1
1 TABLET SUBLINGUAL PRN
COMMUNITY
Start: 2018-05-21 | End: 2019-09-12

## 2018-08-06 RX ORDER — LISINOPRIL 5 MG/1
5 TABLET ORAL DAILY
Qty: 90 TABLET | Refills: 3 | Status: SHIPPED | OUTPATIENT
Start: 2018-08-06 | End: 2019-07-29 | Stop reason: SDUPTHER

## 2018-08-20 ENCOUNTER — OFFICE VISIT (OUTPATIENT)
Dept: FAMILY MEDICINE CLINIC | Age: 78
End: 2018-08-20
Payer: MEDICARE

## 2018-08-20 VITALS
DIASTOLIC BLOOD PRESSURE: 80 MMHG | BODY MASS INDEX: 24.72 KG/M2 | WEIGHT: 144 LBS | HEART RATE: 71 BPM | OXYGEN SATURATION: 92 % | SYSTOLIC BLOOD PRESSURE: 118 MMHG

## 2018-08-20 DIAGNOSIS — M81.0 AGE-RELATED OSTEOPOROSIS WITHOUT CURRENT PATHOLOGICAL FRACTURE: ICD-10-CM

## 2018-08-20 DIAGNOSIS — J42 CHRONIC BRONCHITIS, UNSPECIFIED CHRONIC BRONCHITIS TYPE (HCC): ICD-10-CM

## 2018-08-20 DIAGNOSIS — K51.911 ULCERATIVE COLITIS WITH RECTAL BLEEDING, UNSPECIFIED LOCATION (HCC): Primary | ICD-10-CM

## 2018-08-20 PROCEDURE — 3023F SPIROM DOC REV: CPT | Performed by: FAMILY MEDICINE

## 2018-08-20 PROCEDURE — G8427 DOCREV CUR MEDS BY ELIG CLIN: HCPCS | Performed by: FAMILY MEDICINE

## 2018-08-20 PROCEDURE — 1101F PT FALLS ASSESS-DOCD LE1/YR: CPT | Performed by: FAMILY MEDICINE

## 2018-08-20 PROCEDURE — G8420 CALC BMI NORM PARAMETERS: HCPCS | Performed by: FAMILY MEDICINE

## 2018-08-20 PROCEDURE — G8598 ASA/ANTIPLAT THER USED: HCPCS | Performed by: FAMILY MEDICINE

## 2018-08-20 PROCEDURE — 4040F PNEUMOC VAC/ADMIN/RCVD: CPT | Performed by: FAMILY MEDICINE

## 2018-08-20 PROCEDURE — 1090F PRES/ABSN URINE INCON ASSESS: CPT | Performed by: FAMILY MEDICINE

## 2018-08-20 PROCEDURE — 1123F ACP DISCUSS/DSCN MKR DOCD: CPT | Performed by: FAMILY MEDICINE

## 2018-08-20 PROCEDURE — G8400 PT W/DXA NO RESULTS DOC: HCPCS | Performed by: FAMILY MEDICINE

## 2018-08-20 PROCEDURE — 1036F TOBACCO NON-USER: CPT | Performed by: FAMILY MEDICINE

## 2018-08-20 PROCEDURE — G8926 SPIRO NO PERF OR DOC: HCPCS | Performed by: FAMILY MEDICINE

## 2018-08-20 PROCEDURE — 99214 OFFICE O/P EST MOD 30 MIN: CPT | Performed by: FAMILY MEDICINE

## 2018-08-20 RX ORDER — BUDESONIDE 3 MG/1
CAPSULE, COATED PELLETS ORAL
COMMUNITY
Start: 2018-07-13 | End: 2018-08-20 | Stop reason: SDUPTHER

## 2018-08-20 RX ORDER — BUDESONIDE 3 MG/1
3 CAPSULE, COATED PELLETS ORAL EVERY MORNING
Qty: 30 CAPSULE | Refills: 5 | Status: SHIPPED | OUTPATIENT
Start: 2018-08-20 | End: 2018-09-17 | Stop reason: SDUPTHER

## 2018-08-20 RX ORDER — MESALAMINE 1.2 G/1
1200 TABLET, DELAYED RELEASE ORAL
COMMUNITY
End: 2018-08-20 | Stop reason: SDUPTHER

## 2018-08-20 RX ORDER — MESALAMINE 1.2 G/1
1200 TABLET, DELAYED RELEASE ORAL
Qty: 30 TABLET | Refills: 5 | Status: SHIPPED | OUTPATIENT
Start: 2018-08-20 | End: 2018-09-17

## 2018-08-23 NOTE — PROGRESS NOTES
[address] [facility]  [address]  Dept: [phone]  Dept Fax: [phone]    Nanette Lott is a 68 y.o. female who presents today for her medical conditions/complaints as noted below. Nanette Lott is c/o of Discuss Medications (pt would like to reevaluate medication mainly her budesonide. c/o pain and not sure if in back or lungs. )      HPI:     The patient is a 66-year-old white female who comes to the office to discuss a couple things. Crohn's disease  The patient continues to be bothered by abdominal complaints. Exact diagnosis is probably unknown. She has had multiple colonoscopies; some suggesting she has a colitis, some not. She is seeing Dr. Riki Griffin for this problem. She was supposed to get some stool studies done for him dating back to May but stool specimen was not adequate. She has not gone back. Her complaint is that she is continually bleeding per rectum. She is vague about the bleeding. She states that she has to wear a pad daily. She can tell that it is blood but not bright red blood and now it seems to be rather minor. She is not having particularly a lot of pain. It turns out she remains on budesonide orally and mesalamine orally. It is my understanding that she was just going to be taking the budesonide and at last check seemed to be well controlled. There is no fevers. She has no chills. No heartburn. Really very minor with any abdominal pain. She also mentions that she feels short of breath. It started a couple weeks ago. It really is dyspnea on exertion. She does not think that she is wheezing. She is not coughing. She says she does not smoke but she mooches cigarettes a couple a day somehow. She is not having chest pain or chest pressure. No orthopnea or paroxysmal nocturnal dyspnea. No hemoptysis. No frequent coughing though occasionally she has a morning cough. She wants to know about this by mouth daily (with breakfast) 30 tablet 5    budesonide (ENTOCORT EC) 3 MG extended release capsule Take 1 capsule by mouth every morning 30 capsule 5    umeclidinium-vilanterol (ANORO ELLIPTA) 62.5-25 MCG/INH AEPB inhaler Inhale 1 puff into the lungs daily 2 each 0    lisinopril (PRINIVIL;ZESTRIL) 5 MG tablet Take 1 tablet by mouth daily 90 tablet 3    nitroGLYCERIN (NITROSTAT) 0.4 MG SL tablet Place 1 tablet under the tongue as needed      Multiple Vitamins-Minerals (CENTRUM SILVER 50+WOMEN PO) Take by mouth      nicotine (NICODERM CQ) 21 MG/24HR Place 1 patch onto the skin every 24 hours      aspirin EC 81 MG EC tablet Take 1 tablet by mouth daily 30 tablet 3    Probiotic Product ("[employer] PO) Take 1 tablet by mouth daily      simvastatin (ZOCOR) 40 MG tablet TAKE ONE TABLET BY MOUTH ONCE DAILY 90 tablet 3    denosumab (PROLIA) 60 MG/ML SOLN SC injection Inject 1 mL into the skin once for 1 dose 1 mL 0     No current facility-administered medications for this visit.       Allergies   Allergen Reactions    Alcohol     Azulfidine [Sulfasalazine] Diarrhea    Sulfa Antibiotics        Health Maintenance   Topic Date Due    Shingles Vaccine (1 of 2 - 2 Dose Series) 10/04/1990    Flu vaccine (1) 09/01/2018    Potassium monitoring  05/24/2019    Creatinine monitoring  05/24/2019    DTaP/Tdap/Td vaccine (2 - Td) 02/05/2026    DEXA (modify frequency per FRAX score)  Completed    Pneumococcal low/med risk  Completed       Lab Results   Component Value Date    K 3.9 05/24/2018    CREATININE 0.8 05/24/2018    AST 22 05/24/2018    ALT 29 05/24/2018    TSH 0.17 05/24/2018    HCT 41.0 05/24/2018    LABA1C 6.0 10/21/2016    GLUCOSE neg 05/24/2018    GLUCOSE 128 05/24/2018    CALCIUM 9.4 05/24/2018      Lab Results   Component Value Date    CHOL 169 09/01/2017    TRIG 162 09/01/2017       Review of Systems:      Review of Systems   Constitutional: Negative for activity change, chills, diaphoresis, Applicable):  No results found for this visit on 08/20/18. Plan: At this time, she is to stop taking both budesonide and mesalamine. She would like refills of those medications so that she can decide which one she really wants. Prescriptions are sent to local pharmacy. It really would be a good idea if she followed up with Dr. Suki Navarro again. I decided not to check a CBC at this time. In regards to her shortness of breath, she is given 2 samples of Anoro. Demonstrated usage myself. She is to recheck with me when she returns from vacation in [place]. Orders Given:  No orders of the defined types were placed in this encounter. Prescriptions:    Orders Placed This Encounter   Medications    mesalamine (LIALDA) 1.2 g EC tablet     Sig: Take 1 tablet by mouth daily (with breakfast)     Dispense:  30 tablet     Refill:  5    budesonide (ENTOCORT EC) 3 MG extended release capsule     Sig: Take 1 capsule by mouth every morning     Dispense:  30 capsule     Refill:  5    umeclidinium-vilanterol (ANORO ELLIPTA) 62.5-25 MCG/INH AEPB inhaler     Sig: Inhale 1 puff into the lungs daily     Dispense:  2 each     Refill:  0        Return in about 4 weeks (around 9/17/2018).         Dee Dee Galindo am scribing for Nitesh Cortez MD 8/24/2018 at 6:52 AM.

## 2018-08-24 ASSESSMENT — ENCOUNTER SYMPTOMS
CHEST TIGHTNESS: 0
SHORTNESS OF BREATH: 1
BLOOD IN STOOL: 1
CONSTIPATION: 0
COUGH: 0
DIARRHEA: 0
VOMITING: 0
RECTAL PAIN: 0
ABDOMINAL PAIN: 0
WHEEZING: 0
ABDOMINAL DISTENTION: 0

## 2018-09-13 DIAGNOSIS — K51.911 ULCERATIVE COLITIS WITH RECTAL BLEEDING, UNSPECIFIED LOCATION (HCC): Primary | ICD-10-CM

## 2018-09-13 NOTE — TELEPHONE ENCOUNTER
Need for Clarification  [address]    Budesonide 3mg/24hr cap  QTY: 30  Take 1 capsule by mouth in the morning. Note: This was called in for 1 QD, patient says she takes 3 QD. Please Verify.

## 2018-09-17 ENCOUNTER — OFFICE VISIT (OUTPATIENT)
Dept: FAMILY MEDICINE CLINIC | Age: 78
End: 2018-09-17
Payer: MEDICARE

## 2018-09-17 VITALS
SYSTOLIC BLOOD PRESSURE: 136 MMHG | BODY MASS INDEX: 24.24 KG/M2 | DIASTOLIC BLOOD PRESSURE: 76 MMHG | HEART RATE: 80 BPM | HEIGHT: 64 IN | WEIGHT: 142 LBS

## 2018-09-17 DIAGNOSIS — K51.911 ULCERATIVE COLITIS WITH RECTAL BLEEDING, UNSPECIFIED LOCATION (HCC): Primary | ICD-10-CM

## 2018-09-17 DIAGNOSIS — J42 CHRONIC BRONCHITIS, UNSPECIFIED CHRONIC BRONCHITIS TYPE (HCC): ICD-10-CM

## 2018-09-17 DIAGNOSIS — E78.2 MIXED HYPERLIPIDEMIA: ICD-10-CM

## 2018-09-17 DIAGNOSIS — F17.210 CIGARETTE NICOTINE DEPENDENCE WITHOUT COMPLICATION: ICD-10-CM

## 2018-09-17 DIAGNOSIS — S22.32XA CLOSED FRACTURE OF ONE RIB OF LEFT SIDE, INITIAL ENCOUNTER: ICD-10-CM

## 2018-09-17 DIAGNOSIS — I10 HYPERTENSION, UNSPECIFIED TYPE: ICD-10-CM

## 2018-09-17 PROCEDURE — 1090F PRES/ABSN URINE INCON ASSESS: CPT | Performed by: FAMILY MEDICINE

## 2018-09-17 PROCEDURE — G8400 PT W/DXA NO RESULTS DOC: HCPCS | Performed by: FAMILY MEDICINE

## 2018-09-17 PROCEDURE — 3023F SPIROM DOC REV: CPT | Performed by: FAMILY MEDICINE

## 2018-09-17 PROCEDURE — 1036F TOBACCO NON-USER: CPT | Performed by: FAMILY MEDICINE

## 2018-09-17 PROCEDURE — 1101F PT FALLS ASSESS-DOCD LE1/YR: CPT | Performed by: FAMILY MEDICINE

## 2018-09-17 PROCEDURE — 99214 OFFICE O/P EST MOD 30 MIN: CPT | Performed by: FAMILY MEDICINE

## 2018-09-17 PROCEDURE — G8926 SPIRO NO PERF OR DOC: HCPCS | Performed by: FAMILY MEDICINE

## 2018-09-17 PROCEDURE — G8427 DOCREV CUR MEDS BY ELIG CLIN: HCPCS | Performed by: FAMILY MEDICINE

## 2018-09-17 PROCEDURE — G8598 ASA/ANTIPLAT THER USED: HCPCS | Performed by: FAMILY MEDICINE

## 2018-09-17 PROCEDURE — 4040F PNEUMOC VAC/ADMIN/RCVD: CPT | Performed by: FAMILY MEDICINE

## 2018-09-17 PROCEDURE — 1123F ACP DISCUSS/DSCN MKR DOCD: CPT | Performed by: FAMILY MEDICINE

## 2018-09-17 PROCEDURE — G8420 CALC BMI NORM PARAMETERS: HCPCS | Performed by: FAMILY MEDICINE

## 2018-09-17 RX ORDER — BUDESONIDE 3 MG/1
9 CAPSULE, COATED PELLETS ORAL EVERY MORNING
Qty: 30 CAPSULE | Refills: 5 | Status: CANCELLED | OUTPATIENT
Start: 2018-09-17

## 2018-09-17 RX ORDER — BUDESONIDE 3 MG/1
CAPSULE, COATED PELLETS ORAL
Qty: 90 CAPSULE | Refills: 5 | Status: SHIPPED | OUTPATIENT
Start: 2018-09-17 | End: 2019-01-08 | Stop reason: ALTCHOICE

## 2018-09-17 ASSESSMENT — PATIENT HEALTH QUESTIONNAIRE - PHQ9
SUM OF ALL RESPONSES TO PHQ9 QUESTIONS 1 & 2: 0
SUM OF ALL RESPONSES TO PHQ QUESTIONS 1-9: 0
1. LITTLE INTEREST OR PLEASURE IN DOING THINGS: 0
2. FEELING DOWN, DEPRESSED OR HOPELESS: 0
SUM OF ALL RESPONSES TO PHQ QUESTIONS 1-9: 0

## 2018-09-17 NOTE — TELEPHONE ENCOUNTER
S/w Zoe Client, stated that the her last script back in May was for 3 capsules by mouth every morning    Back in May you odered the 9 mg ER which probably ran into problems with insurance, then a script for the 3mg was sent back in about the same time for 3 po every morning
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
## 2018-09-19 ASSESSMENT — ENCOUNTER SYMPTOMS
COUGH: 0
CONSTIPATION: 0
SHORTNESS OF BREATH: 1
BACK PAIN: 1
CHEST TIGHTNESS: 0
WHEEZING: 0
DIARRHEA: 0
ABDOMINAL DISTENTION: 0
ABDOMINAL PAIN: 0
RECTAL PAIN: 0
VOMITING: 0
BLOOD IN STOOL: 1

## 2018-09-20 LAB — CALCIUM SERPL-MCNC: 9.1 MG/DL

## 2018-10-01 LAB
BASOPHILS # BLD: 0.08 THOU/MM3
DIFFERENTIAL: AUTOMATED DIFF
EOSINOPHIL # BLD: 0.09 THOU/MM3
HCT VFR BLD CALC: 45.3 %
HEMOGLOBIN: 14.6 G/DL
LYMPHOCYTES # BLD: 1.82 THOU/MM3
MCH RBC QN AUTO: 31.1 PG
MCHC RBC AUTO-ENTMCNC: 32.2 G/DL
MCV RBC AUTO: 96.4 FL
MONOCYTES # BLD: 0.64 THOU/MM3
NEUTROPHILS: 5.66 THOU/MM3
PDW BLD-RTO: 12.3 %
PLATELET # BLD: 304 THOU/MM3
PMV BLD AUTO: 7.1 FL
RBC # BLD: 4.69 M/UL
WBC # BLD: 8.29 THOU/ML3

## 2018-10-30 ENCOUNTER — OFFICE VISIT (OUTPATIENT)
Dept: FAMILY MEDICINE CLINIC | Age: 78
End: 2018-10-30
Payer: MEDICARE

## 2018-10-30 VITALS
HEART RATE: 88 BPM | SYSTOLIC BLOOD PRESSURE: 130 MMHG | BODY MASS INDEX: 24.55 KG/M2 | DIASTOLIC BLOOD PRESSURE: 88 MMHG | WEIGHT: 143 LBS | OXYGEN SATURATION: 90 %

## 2018-10-30 DIAGNOSIS — M51.36 DEGENERATIVE DISC DISEASE, LUMBAR: ICD-10-CM

## 2018-10-30 DIAGNOSIS — E78.2 MIXED HYPERLIPIDEMIA: ICD-10-CM

## 2018-10-30 DIAGNOSIS — S22.32XA CLOSED FRACTURE OF ONE RIB OF LEFT SIDE, INITIAL ENCOUNTER: ICD-10-CM

## 2018-10-30 DIAGNOSIS — Z23 NEED FOR PROPHYLACTIC VACCINATION AND INOCULATION AGAINST INFLUENZA: ICD-10-CM

## 2018-10-30 DIAGNOSIS — F17.210 CIGARETTE NICOTINE DEPENDENCE WITHOUT COMPLICATION: ICD-10-CM

## 2018-10-30 DIAGNOSIS — K51.911 ULCERATIVE COLITIS WITH RECTAL BLEEDING, UNSPECIFIED LOCATION (HCC): Primary | ICD-10-CM

## 2018-10-30 DIAGNOSIS — M51.34 DEGENERATIVE DISC DISEASE, THORACIC: ICD-10-CM

## 2018-10-30 PROCEDURE — G8427 DOCREV CUR MEDS BY ELIG CLIN: HCPCS | Performed by: FAMILY MEDICINE

## 2018-10-30 PROCEDURE — 1101F PT FALLS ASSESS-DOCD LE1/YR: CPT | Performed by: FAMILY MEDICINE

## 2018-10-30 PROCEDURE — G0008 ADMIN INFLUENZA VIRUS VAC: HCPCS | Performed by: FAMILY MEDICINE

## 2018-10-30 PROCEDURE — 90662 IIV NO PRSV INCREASED AG IM: CPT | Performed by: FAMILY MEDICINE

## 2018-10-30 PROCEDURE — G8420 CALC BMI NORM PARAMETERS: HCPCS | Performed by: FAMILY MEDICINE

## 2018-10-30 PROCEDURE — 1036F TOBACCO NON-USER: CPT | Performed by: FAMILY MEDICINE

## 2018-10-30 PROCEDURE — 4040F PNEUMOC VAC/ADMIN/RCVD: CPT | Performed by: FAMILY MEDICINE

## 2018-10-30 PROCEDURE — G8482 FLU IMMUNIZE ORDER/ADMIN: HCPCS | Performed by: FAMILY MEDICINE

## 2018-10-30 PROCEDURE — G8598 ASA/ANTIPLAT THER USED: HCPCS | Performed by: FAMILY MEDICINE

## 2018-10-30 PROCEDURE — 1090F PRES/ABSN URINE INCON ASSESS: CPT | Performed by: FAMILY MEDICINE

## 2018-10-30 PROCEDURE — 99214 OFFICE O/P EST MOD 30 MIN: CPT | Performed by: FAMILY MEDICINE

## 2018-10-30 PROCEDURE — G8400 PT W/DXA NO RESULTS DOC: HCPCS | Performed by: FAMILY MEDICINE

## 2018-10-30 PROCEDURE — 1123F ACP DISCUSS/DSCN MKR DOCD: CPT | Performed by: FAMILY MEDICINE

## 2018-11-01 LAB
% SATURATION: 24 %
BASOPHILS # BLD: 0.07 THOU/MM3
DIFFERENTIAL: AUTOMATED DIFF
EOSINOPHIL # BLD: 0.15 THOU/MM3
HCT VFR BLD CALC: 41.5 %
HEMOGLOBIN: 14 G/DL
IRON: 72 MG/DL
LYMPHOCYTES # BLD: 1.31 THOU/MM3
MCH RBC QN AUTO: 31.7 PG
MCHC RBC AUTO-ENTMCNC: 33.7 G/DL
MCV RBC AUTO: 94 FL
MONOCYTES # BLD: 0.54 THOU/MM3
NEUTROPHILS: 7.03 THOU/MM3
PDW BLD-RTO: 12.4 %
PLATELET # BLD: 247 THOU/MM3
PMV BLD AUTO: 7.7 FL
RBC # BLD: 4.42 M/UL
SEDIMENTATION RATE, ERYTHROCYTE: 28 MM/HR
TOTAL IRON BINDING CAPACITY: 296 MG/DL
WBC # BLD: 9.1 THOU/ML3

## 2018-11-04 ASSESSMENT — ENCOUNTER SYMPTOMS
SHORTNESS OF BREATH: 1
BACK PAIN: 1
BLOOD IN STOOL: 1
WHEEZING: 0
CHEST TIGHTNESS: 0
CONSTIPATION: 0
VOMITING: 0
NAUSEA: 0
ABDOMINAL PAIN: 0
RECTAL PAIN: 0
ABDOMINAL DISTENTION: 0
COUGH: 0
DIARRHEA: 0

## 2018-11-05 ENCOUNTER — OFFICE VISIT (OUTPATIENT)
Dept: FAMILY MEDICINE CLINIC | Age: 78
End: 2018-11-05
Payer: MEDICARE

## 2018-11-05 VITALS — HEART RATE: 90 BPM | OXYGEN SATURATION: 97 % | DIASTOLIC BLOOD PRESSURE: 70 MMHG | SYSTOLIC BLOOD PRESSURE: 120 MMHG

## 2018-11-05 DIAGNOSIS — H61.21 IMPACTED CERUMEN, RIGHT EAR: Primary | ICD-10-CM

## 2018-11-05 PROCEDURE — 69209 REMOVE IMPACTED EAR WAX UNI: CPT | Performed by: FAMILY MEDICINE

## 2018-11-05 RX ORDER — CIPROFLOXACIN AND DEXAMETHASONE 3; 1 MG/ML; MG/ML
4 SUSPENSION/ DROPS AURICULAR (OTIC) 2 TIMES DAILY
Qty: 7.5 ML | Refills: 0 | Status: SHIPPED | OUTPATIENT
Start: 2018-11-05 | End: 2018-11-12

## 2018-11-05 ASSESSMENT — ENCOUNTER SYMPTOMS
RHINORRHEA: 0
SORE THROAT: 0
RESPIRATORY NEGATIVE: 1

## 2018-11-12 ENCOUNTER — OFFICE VISIT (OUTPATIENT)
Dept: FAMILY MEDICINE CLINIC | Age: 78
End: 2018-11-12
Payer: MEDICARE

## 2018-11-12 VITALS — OXYGEN SATURATION: 96 % | SYSTOLIC BLOOD PRESSURE: 114 MMHG | HEART RATE: 69 BPM | DIASTOLIC BLOOD PRESSURE: 64 MMHG

## 2018-11-12 DIAGNOSIS — H61.23 IMPACTED CERUMEN OF BOTH EARS: Primary | ICD-10-CM

## 2018-11-12 PROCEDURE — 69209 REMOVE IMPACTED EAR WAX UNI: CPT | Performed by: FAMILY MEDICINE

## 2018-11-12 ASSESSMENT — ENCOUNTER SYMPTOMS: RESPIRATORY NEGATIVE: 1

## 2019-01-08 ENCOUNTER — OFFICE VISIT (OUTPATIENT)
Dept: CARDIOLOGY CLINIC | Age: 79
End: 2019-01-08
Payer: MEDICARE

## 2019-01-08 VITALS
RESPIRATION RATE: 28 BRPM | BODY MASS INDEX: 22.82 KG/M2 | WEIGHT: 137 LBS | DIASTOLIC BLOOD PRESSURE: 80 MMHG | HEIGHT: 65 IN | SYSTOLIC BLOOD PRESSURE: 148 MMHG | HEART RATE: 68 BPM

## 2019-01-08 DIAGNOSIS — E78.2 MIXED HYPERLIPIDEMIA: ICD-10-CM

## 2019-01-08 DIAGNOSIS — Z98.890 S/P CARDIAC CATH: ICD-10-CM

## 2019-01-08 DIAGNOSIS — G47.30 SLEEP APNEA, UNSPECIFIED TYPE: Primary | ICD-10-CM

## 2019-01-08 DIAGNOSIS — I25.10 CORONARY ARTERY DISEASE INVOLVING NATIVE CORONARY ARTERY OF NATIVE HEART WITHOUT ANGINA PECTORIS: ICD-10-CM

## 2019-01-08 DIAGNOSIS — J41.0 SIMPLE CHRONIC BRONCHITIS (HCC): ICD-10-CM

## 2019-01-08 DIAGNOSIS — R94.39 ABNORMAL NUCLEAR STRESS TEST: ICD-10-CM

## 2019-01-08 PROCEDURE — 3023F SPIROM DOC REV: CPT | Performed by: INTERNAL MEDICINE

## 2019-01-08 PROCEDURE — 1090F PRES/ABSN URINE INCON ASSESS: CPT | Performed by: INTERNAL MEDICINE

## 2019-01-08 PROCEDURE — 1101F PT FALLS ASSESS-DOCD LE1/YR: CPT | Performed by: INTERNAL MEDICINE

## 2019-01-08 PROCEDURE — 4040F PNEUMOC VAC/ADMIN/RCVD: CPT | Performed by: INTERNAL MEDICINE

## 2019-01-08 PROCEDURE — G8482 FLU IMMUNIZE ORDER/ADMIN: HCPCS | Performed by: INTERNAL MEDICINE

## 2019-01-08 PROCEDURE — 1036F TOBACCO NON-USER: CPT | Performed by: INTERNAL MEDICINE

## 2019-01-08 PROCEDURE — 1123F ACP DISCUSS/DSCN MKR DOCD: CPT | Performed by: INTERNAL MEDICINE

## 2019-01-08 PROCEDURE — G8926 SPIRO NO PERF OR DOC: HCPCS | Performed by: INTERNAL MEDICINE

## 2019-01-08 PROCEDURE — G8400 PT W/DXA NO RESULTS DOC: HCPCS | Performed by: INTERNAL MEDICINE

## 2019-01-08 PROCEDURE — G8427 DOCREV CUR MEDS BY ELIG CLIN: HCPCS | Performed by: INTERNAL MEDICINE

## 2019-01-08 PROCEDURE — G8420 CALC BMI NORM PARAMETERS: HCPCS | Performed by: INTERNAL MEDICINE

## 2019-01-08 PROCEDURE — 99213 OFFICE O/P EST LOW 20 MIN: CPT | Performed by: INTERNAL MEDICINE

## 2019-01-08 PROCEDURE — G8599 NO ASA/ANTIPLAT THER USE RNG: HCPCS | Performed by: INTERNAL MEDICINE

## 2019-01-08 RX ORDER — BENZONATATE 100 MG/1
100 CAPSULE ORAL 3 TIMES DAILY PRN
COMMUNITY
End: 2019-02-04

## 2019-01-08 RX ORDER — PREDNISONE 50 MG/1
TABLET ORAL
COMMUNITY
Start: 2019-01-04 | End: 2019-02-04

## 2019-01-08 RX ORDER — LEVOFLOXACIN 500 MG/1
TABLET, FILM COATED ORAL
COMMUNITY
Start: 2019-01-04 | End: 2019-02-04

## 2019-01-08 ASSESSMENT — PATIENT HEALTH QUESTIONNAIRE - PHQ9
SUM OF ALL RESPONSES TO PHQ QUESTIONS 1-9: 0
2. FEELING DOWN, DEPRESSED OR HOPELESS: 0
SUM OF ALL RESPONSES TO PHQ QUESTIONS 1-9: 0
1. LITTLE INTEREST OR PLEASURE IN DOING THINGS: 0
SUM OF ALL RESPONSES TO PHQ9 QUESTIONS 1 & 2: 0

## 2019-02-02 RX ORDER — SIMVASTATIN 40 MG
TABLET ORAL
Qty: 90 TABLET | Refills: 3 | Status: SHIPPED | OUTPATIENT
Start: 2019-02-02 | End: 2020-03-12 | Stop reason: SDUPTHER

## 2019-02-04 ENCOUNTER — OFFICE VISIT (OUTPATIENT)
Dept: FAMILY MEDICINE CLINIC | Age: 79
End: 2019-02-04
Payer: MEDICARE

## 2019-02-04 VITALS
DIASTOLIC BLOOD PRESSURE: 64 MMHG | OXYGEN SATURATION: 92 % | WEIGHT: 142 LBS | SYSTOLIC BLOOD PRESSURE: 130 MMHG | HEART RATE: 85 BPM | BODY MASS INDEX: 23.63 KG/M2

## 2019-02-04 DIAGNOSIS — M51.34 DEGENERATIVE DISC DISEASE, THORACIC: ICD-10-CM

## 2019-02-04 DIAGNOSIS — J41.0 SIMPLE CHRONIC BRONCHITIS (HCC): ICD-10-CM

## 2019-02-04 DIAGNOSIS — M51.36 DEGENERATIVE DISC DISEASE, LUMBAR: ICD-10-CM

## 2019-02-04 DIAGNOSIS — Z78.0 ASYMPTOMATIC MENOPAUSAL STATE: ICD-10-CM

## 2019-02-04 DIAGNOSIS — Z12.31 SCREENING MAMMOGRAM, ENCOUNTER FOR: ICD-10-CM

## 2019-02-04 DIAGNOSIS — I25.10 CORONARY ARTERY DISEASE INVOLVING NATIVE CORONARY ARTERY OF NATIVE HEART WITHOUT ANGINA PECTORIS: Primary | ICD-10-CM

## 2019-02-04 DIAGNOSIS — M81.0 AGE-RELATED OSTEOPOROSIS WITHOUT CURRENT PATHOLOGICAL FRACTURE: ICD-10-CM

## 2019-02-04 DIAGNOSIS — G47.34 NOCTURNAL HYPOXEMIA: ICD-10-CM

## 2019-02-04 DIAGNOSIS — E78.2 MIXED HYPERLIPIDEMIA: ICD-10-CM

## 2019-02-04 DIAGNOSIS — K51.911 ULCERATIVE COLITIS WITH RECTAL BLEEDING, UNSPECIFIED LOCATION (HCC): ICD-10-CM

## 2019-02-04 DIAGNOSIS — G47.30 SLEEP APNEA, UNSPECIFIED TYPE: ICD-10-CM

## 2019-02-04 DIAGNOSIS — I65.23 OCCLUSION AND STENOSIS OF BILATERAL CAROTID ARTERIES: ICD-10-CM

## 2019-02-04 DIAGNOSIS — F17.210 CIGARETTE NICOTINE DEPENDENCE WITHOUT COMPLICATION: ICD-10-CM

## 2019-02-04 PROBLEM — R94.39 ABNORMAL NUCLEAR STRESS TEST: Status: RESOLVED | Noted: 2018-06-01 | Resolved: 2019-02-04

## 2019-02-04 LAB
HCT VFR BLD CALC: 41.9 %
HEMOGLOBIN: 13.3 G/DL
MCH RBC QN AUTO: 30.2 PG
MCHC RBC AUTO-ENTMCNC: 31.7 G/DL
MCV RBC AUTO: 95.2 FL
PDW BLD-RTO: 12.7 %
PLATELET # BLD: 332 THOU/MM3
PMV BLD AUTO: 6.7 FL
RBC # BLD: 4.4 M/UL
WBC # BLD: 9.86 THOU/ML3

## 2019-02-04 PROCEDURE — G8420 CALC BMI NORM PARAMETERS: HCPCS | Performed by: FAMILY MEDICINE

## 2019-02-04 PROCEDURE — 1123F ACP DISCUSS/DSCN MKR DOCD: CPT | Performed by: FAMILY MEDICINE

## 2019-02-04 PROCEDURE — 1036F TOBACCO NON-USER: CPT | Performed by: FAMILY MEDICINE

## 2019-02-04 PROCEDURE — G8482 FLU IMMUNIZE ORDER/ADMIN: HCPCS | Performed by: FAMILY MEDICINE

## 2019-02-04 PROCEDURE — G8926 SPIRO NO PERF OR DOC: HCPCS | Performed by: FAMILY MEDICINE

## 2019-02-04 PROCEDURE — 1101F PT FALLS ASSESS-DOCD LE1/YR: CPT | Performed by: FAMILY MEDICINE

## 2019-02-04 PROCEDURE — G8400 PT W/DXA NO RESULTS DOC: HCPCS | Performed by: FAMILY MEDICINE

## 2019-02-04 PROCEDURE — 1090F PRES/ABSN URINE INCON ASSESS: CPT | Performed by: FAMILY MEDICINE

## 2019-02-04 PROCEDURE — 4040F PNEUMOC VAC/ADMIN/RCVD: CPT | Performed by: FAMILY MEDICINE

## 2019-02-04 PROCEDURE — 99214 OFFICE O/P EST MOD 30 MIN: CPT | Performed by: FAMILY MEDICINE

## 2019-02-04 PROCEDURE — 3023F SPIROM DOC REV: CPT | Performed by: FAMILY MEDICINE

## 2019-02-04 PROCEDURE — G8599 NO ASA/ANTIPLAT THER USE RNG: HCPCS | Performed by: FAMILY MEDICINE

## 2019-02-04 PROCEDURE — G8427 DOCREV CUR MEDS BY ELIG CLIN: HCPCS | Performed by: FAMILY MEDICINE

## 2019-02-04 ASSESSMENT — PATIENT HEALTH QUESTIONNAIRE - PHQ9
SUM OF ALL RESPONSES TO PHQ QUESTIONS 1-9: 0
SUM OF ALL RESPONSES TO PHQ QUESTIONS 1-9: 0
1. LITTLE INTEREST OR PLEASURE IN DOING THINGS: 0
SUM OF ALL RESPONSES TO PHQ9 QUESTIONS 1 & 2: 0
2. FEELING DOWN, DEPRESSED OR HOPELESS: 0

## 2019-02-10 ASSESSMENT — ENCOUNTER SYMPTOMS
ABDOMINAL PAIN: 0
CONSTIPATION: 0
ABDOMINAL DISTENTION: 0
WHEEZING: 0
CHEST TIGHTNESS: 0
VOMITING: 0
SHORTNESS OF BREATH: 1
BLOOD IN STOOL: 1
NAUSEA: 0
RECTAL PAIN: 0
COUGH: 0
BACK PAIN: 1
DIARRHEA: 0

## 2019-02-21 ENCOUNTER — OFFICE VISIT (OUTPATIENT)
Dept: FAMILY MEDICINE CLINIC | Age: 79
End: 2019-02-21
Payer: MEDICARE

## 2019-02-21 VITALS
SYSTOLIC BLOOD PRESSURE: 106 MMHG | DIASTOLIC BLOOD PRESSURE: 74 MMHG | OXYGEN SATURATION: 95 % | BODY MASS INDEX: 24.13 KG/M2 | HEART RATE: 90 BPM | WEIGHT: 145 LBS

## 2019-02-21 DIAGNOSIS — K51.911 ULCERATIVE COLITIS WITH RECTAL BLEEDING, UNSPECIFIED LOCATION (HCC): ICD-10-CM

## 2019-02-21 DIAGNOSIS — R19.7 DIARRHEA, UNSPECIFIED TYPE: Primary | ICD-10-CM

## 2019-02-21 PROCEDURE — G8420 CALC BMI NORM PARAMETERS: HCPCS | Performed by: FAMILY MEDICINE

## 2019-02-21 PROCEDURE — 1090F PRES/ABSN URINE INCON ASSESS: CPT | Performed by: FAMILY MEDICINE

## 2019-02-21 PROCEDURE — 1123F ACP DISCUSS/DSCN MKR DOCD: CPT | Performed by: FAMILY MEDICINE

## 2019-02-21 PROCEDURE — G8427 DOCREV CUR MEDS BY ELIG CLIN: HCPCS | Performed by: FAMILY MEDICINE

## 2019-02-21 PROCEDURE — 1036F TOBACCO NON-USER: CPT | Performed by: FAMILY MEDICINE

## 2019-02-21 PROCEDURE — G8599 NO ASA/ANTIPLAT THER USE RNG: HCPCS | Performed by: FAMILY MEDICINE

## 2019-02-21 PROCEDURE — G8400 PT W/DXA NO RESULTS DOC: HCPCS | Performed by: FAMILY MEDICINE

## 2019-02-21 PROCEDURE — 99213 OFFICE O/P EST LOW 20 MIN: CPT | Performed by: FAMILY MEDICINE

## 2019-02-21 PROCEDURE — G8482 FLU IMMUNIZE ORDER/ADMIN: HCPCS | Performed by: FAMILY MEDICINE

## 2019-02-21 PROCEDURE — 1101F PT FALLS ASSESS-DOCD LE1/YR: CPT | Performed by: FAMILY MEDICINE

## 2019-02-21 PROCEDURE — 4040F PNEUMOC VAC/ADMIN/RCVD: CPT | Performed by: FAMILY MEDICINE

## 2019-03-03 ASSESSMENT — ENCOUNTER SYMPTOMS
SHORTNESS OF BREATH: 0
RECTAL PAIN: 0
VOMITING: 0
WHEEZING: 0
DIARRHEA: 1
BLOOD IN STOOL: 0
CHEST TIGHTNESS: 0
ABDOMINAL PAIN: 0
COUGH: 0
CONSTIPATION: 0
ABDOMINAL DISTENTION: 0
NAUSEA: 0
BACK PAIN: 1

## 2019-03-27 LAB — CALCIUM SERPL-MCNC: 9.8 MG/DL (ref 8.4–10.2)

## 2019-04-14 DIAGNOSIS — I10 HYPERTENSION, UNSPECIFIED TYPE: ICD-10-CM

## 2019-04-30 ENCOUNTER — OFFICE VISIT (OUTPATIENT)
Dept: FAMILY MEDICINE CLINIC | Age: 79
End: 2019-04-30
Payer: MEDICARE

## 2019-04-30 VITALS
SYSTOLIC BLOOD PRESSURE: 134 MMHG | WEIGHT: 145 LBS | HEART RATE: 76 BPM | DIASTOLIC BLOOD PRESSURE: 84 MMHG | BODY MASS INDEX: 24.13 KG/M2 | OXYGEN SATURATION: 95 %

## 2019-04-30 DIAGNOSIS — R29.898 WEAKNESS OF BOTH LOWER EXTREMITIES: Primary | ICD-10-CM

## 2019-04-30 LAB
FOLATE: >20 NG/ML (ref 2.8–20)
VITAMIN B-12: 481 PG/ML (ref 239–931)

## 2019-04-30 PROCEDURE — 4040F PNEUMOC VAC/ADMIN/RCVD: CPT | Performed by: FAMILY MEDICINE

## 2019-04-30 PROCEDURE — G8420 CALC BMI NORM PARAMETERS: HCPCS | Performed by: FAMILY MEDICINE

## 2019-04-30 PROCEDURE — 99213 OFFICE O/P EST LOW 20 MIN: CPT | Performed by: FAMILY MEDICINE

## 2019-04-30 PROCEDURE — G8427 DOCREV CUR MEDS BY ELIG CLIN: HCPCS | Performed by: FAMILY MEDICINE

## 2019-04-30 PROCEDURE — 1090F PRES/ABSN URINE INCON ASSESS: CPT | Performed by: FAMILY MEDICINE

## 2019-04-30 PROCEDURE — G8400 PT W/DXA NO RESULTS DOC: HCPCS | Performed by: FAMILY MEDICINE

## 2019-04-30 PROCEDURE — G8599 NO ASA/ANTIPLAT THER USE RNG: HCPCS | Performed by: FAMILY MEDICINE

## 2019-04-30 PROCEDURE — 1123F ACP DISCUSS/DSCN MKR DOCD: CPT | Performed by: FAMILY MEDICINE

## 2019-04-30 PROCEDURE — 1036F TOBACCO NON-USER: CPT | Performed by: FAMILY MEDICINE

## 2019-04-30 RX ORDER — MESALAMINE 1000 MG/1
SUPPOSITORY RECTAL
Refills: 0 | COMMUNITY
Start: 2019-04-03 | End: 2019-08-07 | Stop reason: ALTCHOICE

## 2019-04-30 RX ORDER — BUDESONIDE 3 MG/1
6 CAPSULE, COATED PELLETS ORAL
COMMUNITY
End: 2019-07-25 | Stop reason: ALTCHOICE

## 2019-04-30 ASSESSMENT — ENCOUNTER SYMPTOMS
SHORTNESS OF BREATH: 0
ABDOMINAL DISTENTION: 0
ABDOMINAL PAIN: 0
CHEST TIGHTNESS: 0
BLOOD IN STOOL: 0
COUGH: 0
BACK PAIN: 0
WHEEZING: 0

## 2019-04-30 NOTE — PROGRESS NOTES
[address] [address] [address]  Dept: [phone]  DeptFax: [phone]    Caro Solorzano is a66 y.o. female who presents today for her medical conditions/complaints as noted below. Caro Solorzano is c/o of Leg Pain (c/o legs feeling weak, has had two falls in a week and a half, states she had marking on face. needs to hold onto something to help brace her when getting up from sitting position)      HPI:     HPI   Patient comes a office complaining of lower extremity weakness and difficulty with her gait. This is been going on for a couple years. Gradually getting worse. She became more concerned because she's had a couple falls in last few weeks. She finds it difficult to up off the ground when she falls. She has no loss of bowel or bladder control. She is worried about parkinsonism. She does not have tremor however. no numbness or tingling. She really does not complain of back pain per se.             BP Readings from Last 3 Encounters:   04/30/19 134/84   02/21/19 106/74   02/04/19 130/64            (goal 120/80)    Past Medical History:   Diagnosis Date    Abnormal nuclear stress test 6/1/2018    Colitis     Colon polyps     Diverticulosis large intestine w/o perforation or abscess w/bleeding     Fibroid, uterine     Hyperlipidemia     Nicotine dependence     Nocturnal hypoxemia     Postmenopausal     Sleep apnea     Ulcerative colitis ([facility] [address]     with rectal bleeding       Past Surgical History:   Procedure Laterality Date    BREAST BIOPSY  1988    right breast biopsy     BREAST BIOPSY Right 03/2011    stereotactic biopsy    COLONOSCOPY  2006    Dr Miri Vo  2010    Dr Minda Ibrahim  2010    Dr Lashonda Roche interminate colitis    COLONOSCOPY  03/03/2014    Dr Koby Puentes Bilateral 2011 2012     Family History   Problem Relation Age of Onset    01/04/2019    TSH 0.17 05/24/2018    HCT 41.9 02/04/2019    LABA1C 6.0 10/21/2016    GLUCOSE 122 01/04/2019    CALCIUM 9.8 03/27/2019    [id number] 481 04/30/2019    TIBC 296 11/01/2018    IRON 72 11/01/2018      Lab Results   Component Value Date    CHOL 169 09/01/2017    TRIG 162 09/01/2017       Subjective:      Review of Systems   Constitutional: Negative for activity change, chills, diaphoresis, fever and unexpected weight change. HENT: Negative. Respiratory: Negative for cough, chest tightness, shortness of breath and wheezing. Cardiovascular: Negative for chest pain, palpitations and leg swelling. Gastrointestinal: Negative for abdominal distention, abdominal pain and blood in stool. Genitourinary: Negative. Negative for dysuria, frequency, hematuria and menstrual problem. Musculoskeletal: Positive for gait problem. Negative for back pain and joint swelling. Skin: Negative for rash. Hematological: Negative for adenopathy. Does not bruise/bleed easily. Objective:     /84   Pulse 76   Wt 145 lb (65.8 kg)   SpO2 95%   BMI 24.13 kg/m²     Physical Exam   Constitutional: She appears well-developed and well-nourished. She does not appear ill. HENT:   Mouth/Throat: Oropharynx is clear and moist.   Neck: Neck supple. Carotid bruit is not present. No thyromegaly present. Cardiovascular: Normal rate, regular rhythm, S1 normal and S2 normal.   No murmur heard. Pulmonary/Chest: Effort normal and breath sounds normal. She has no decreased breath sounds. She has no rhonchi. Abdominal: Soft. Musculoskeletal: She exhibits no edema. Neurological: Gait abnormal.   Reflex Scores:       Patellar reflexes are 0 on the right side and 0 on the left side. Achilles reflexes are 0 on the right side and 0 on the left side. 4+ strength of hip flexors and leg extension  5/5 strength of dorsi and plantar flexion  Positive rhomberg  No cogwheeling    Vitals reviewed.       Assessment:

## 2019-05-24 DIAGNOSIS — R29.898 WEAKNESS OF BOTH LOWER EXTREMITIES: ICD-10-CM

## 2019-05-28 ENCOUNTER — OFFICE VISIT (OUTPATIENT)
Dept: FAMILY MEDICINE CLINIC | Age: 79
End: 2019-05-28
Payer: MEDICARE

## 2019-05-28 VITALS
BODY MASS INDEX: 23.8 KG/M2 | WEIGHT: 143 LBS | SYSTOLIC BLOOD PRESSURE: 110 MMHG | OXYGEN SATURATION: 94 % | HEART RATE: 80 BPM | DIASTOLIC BLOOD PRESSURE: 74 MMHG

## 2019-05-28 DIAGNOSIS — M51.36 DEGENERATIVE DISC DISEASE, LUMBAR: ICD-10-CM

## 2019-05-28 DIAGNOSIS — M48.07 SPINAL STENOSIS OF LUMBOSACRAL REGION: ICD-10-CM

## 2019-05-28 DIAGNOSIS — R29.898 WEAKNESS OF BOTH LOWER EXTREMITIES: Primary | ICD-10-CM

## 2019-05-28 PROCEDURE — G8400 PT W/DXA NO RESULTS DOC: HCPCS | Performed by: FAMILY MEDICINE

## 2019-05-28 PROCEDURE — 1036F TOBACCO NON-USER: CPT | Performed by: FAMILY MEDICINE

## 2019-05-28 PROCEDURE — 1123F ACP DISCUSS/DSCN MKR DOCD: CPT | Performed by: FAMILY MEDICINE

## 2019-05-28 PROCEDURE — G8427 DOCREV CUR MEDS BY ELIG CLIN: HCPCS | Performed by: FAMILY MEDICINE

## 2019-05-28 PROCEDURE — 4040F PNEUMOC VAC/ADMIN/RCVD: CPT | Performed by: FAMILY MEDICINE

## 2019-05-28 PROCEDURE — G8420 CALC BMI NORM PARAMETERS: HCPCS | Performed by: FAMILY MEDICINE

## 2019-05-28 PROCEDURE — 1090F PRES/ABSN URINE INCON ASSESS: CPT | Performed by: FAMILY MEDICINE

## 2019-05-28 PROCEDURE — 99213 OFFICE O/P EST LOW 20 MIN: CPT | Performed by: FAMILY MEDICINE

## 2019-05-28 PROCEDURE — G8599 NO ASA/ANTIPLAT THER USE RNG: HCPCS | Performed by: FAMILY MEDICINE

## 2019-05-28 RX ORDER — PREDNISONE 2.5 MG
TABLET ORAL
COMMUNITY
Start: 2019-05-15 | End: 2019-09-12

## 2019-05-28 RX ORDER — CHOLESTYRAMINE 4 G/5.5G
POWDER, FOR SUSPENSION ORAL
COMMUNITY
Start: 2019-05-15 | End: 2020-01-13

## 2019-05-28 RX ORDER — CHOLESTYRAMINE 4 G/9G
1 POWDER, FOR SUSPENSION ORAL
COMMUNITY
Start: 2019-05-15 | End: 2019-08-07

## 2019-05-28 ASSESSMENT — ENCOUNTER SYMPTOMS
CHEST TIGHTNESS: 0
WHEEZING: 0
SHORTNESS OF BREATH: 0
ABDOMINAL DISTENTION: 0
BACK PAIN: 0
COUGH: 0
ABDOMINAL PAIN: 0
BLOOD IN STOOL: 0

## 2019-05-28 NOTE — PROGRESS NOTES
[address] [facility]  [address]  Dept: [phone]  DeptFax: [phone]    Denita Scherer is a66 y.o. female who presents today for her medical conditions/complaints as noted below. Denita Scherer is c/o of 1 Month Follow-Up (pt here to f/u on Weakness of both lower extremities. pt here to review results of MRI and was also sent to Physical Therapy. states started therapy and then never resched. says she is thinking about waiting until she returns from vacation. says doing well otherwise but has no energy)      HPI:     HPI  Patient returns to the office in follow-up. This is for lower extremity weakness. Gait difficulty. She has gone to a couple physical therapy sessions. She is dubious that they are helping. He has had 3 falls since her last visit here. States that she usually falls when she tries to hurry. But she definitely has lower extremity weakness. She does not have lower extremity pain either neurogenic or vascular claudication. .  She cannot walk far enough to provoke those  symptoms. He has no loss of bowel or bladder control. Is complaining of back pain. She denies numbness or tingling in her feet. Her workup included    Her family has obtained a cane and walker for her but she doesn't like to use either one.       EMG  Dr Brynn Prader         Her MRI showed          BP Readings from Last 3 Encounters:   05/28/19 110/74   04/30/19 134/84   02/21/19 106/74            (goal 120/80)    Past Medical History:   Diagnosis Date    Abnormal nuclear stress test 6/1/2018    Colitis     Colon polyps     Diverticulosis large intestine w/o perforation or abscess w/bleeding     Fibroid, uterine     Hyperlipidemia     Nicotine dependence     Nocturnal hypoxemia     Postmenopausal     Sleep apnea     Ulcerative colitis ([facility] [address]     with rectal bleeding       Past Surgical History:   Procedure Laterality Date   [facility] right breast biopsy     BREAST BIOPSY Right 2011    stereotactic biopsy    COLONOSCOPY  2006    Dr Damien Freedman  2010    Dr Mal Mcdonnell      Dr Keegan Llanes interminate colitis    COLONOSCOPY  2014    Dr Jade Watkins Bilateral 2011     Family History   Problem Relation Age of Onset    Diabetes Mother     High Blood Pressure Mother     Coronary Art Dis Father      Social History     Tobacco Use    Smoking status: Former Smoker     Packs/day: 1.00     Years: 40.00     Pack years: 40.00     Types: Cigarettes     Last attempt to quit: 2018     Years since quittin.1    Smokeless tobacco: Never Used   Substance Use Topics    Alcohol use: No        Current Outpatient Medications   Medication Sig Dispense Refill    cholestyramine (QUESTRAN) 4 GM/DOSE powder Take 1 packet by mouth      PREVALITE 4 g packet       predniSONE (DELTASONE) 2.5 MG tablet       mesalamine (CANASA) 1000 MG suppository   0    Glucosamine-Chondroitin 166.7-133.3 MG CAPS Take by mouth      budesonide (ENTOCORT EC) 3 MG extended release capsule Take 6 mg by mouth      metoprolol tartrate (LOPRESSOR) 25 MG tablet TAKE 1 TABLET BY MOUTH TWICE DAILY 60 tablet 5    simvastatin (ZOCOR) 40 MG tablet TAKE ONE TABLET BY MOUTH ONCE DAILY 90 tablet 3    lisinopril (PRINIVIL;ZESTRIL) 5 MG tablet Take 1 tablet by mouth daily 90 tablet 3    nitroGLYCERIN (NITROSTAT) 0.4 MG SL tablet Place 1 tablet under the tongue as needed      Multiple Vitamins-Minerals (CENTRUM SILVER 50+WOMEN PO) Take by mouth      aspirin EC 81 MG EC tablet Take 1 tablet by mouth daily 30 tablet 3    Probiotic Product (Aito BV PO) Take 1 tablet by mouth daily      denosumab (PROLIA) 60 MG/ML SOLN SC injection Inject 1 mL into the skin once for 1 dose 1 mL 0     No current facility-administered medications for this visit.       Allergies   Allergen Reactions    heard.  Pulmonary/Chest: Effort normal and breath sounds normal. She has no decreased breath sounds. She has no rhonchi. Abdominal: Soft. Musculoskeletal: She exhibits no edema. Neurological: Gait abnormal.   Reflex Scores:       Bicep reflexes are 1+ on the right side and 1+ on the left side. Patellar reflexes are 0 on the right side and 0 on the left side. Achilles reflexes are 0 on the right side and 0 on the left side. 5/5 strength bilateral hip flexors  4/5 strength knee extension  romberg unsteady but doesn't fall  Negative plantars   Intact sensation to 10 gram monofilament      Vitals reviewed. Assessment:      Diagnosis Orders   1. Weakness of both lower extremities  Benita Madrigal MD, Neurology, [facility]   2. Degenerative disc disease, lumbar     3. Spinal stenosis of lumbosacral region  Benita Madrigal MD, Neurology, [facility]            POC Testing Results (If Applicable):  No results found for this visit on 05/28/19. Plan:   Recommend continuing PT/OT   Recommend use of cane or walker  Referral to neurology   RTO as scheduled       Orders Given:  Orders Placed This Encounter   Procedures   Benita Madrigal MD, Neurology, [facility]     Referral Priority:   Routine     Referral Type:   Eval and Treat     Referral Reason:   Specialty Services Required     Referred to Provider:   Grupo Singh MD     Requested Specialty:   Neurology     Number of Visits Requested:   1     Prescriptions:    No orders of the defined types were placed in this encounter. Return in about 2 months (around 8/6/2019). Electronically signed by Daphney Horne MD on5/28/2019. **This report has been created using voice recognition software. It may contain minor errors which are inherent in voice recognition technology. **

## 2019-06-04 ENCOUNTER — TELEPHONE (OUTPATIENT)
Dept: FAMILY MEDICINE CLINIC | Age: 79
End: 2019-06-04

## 2019-06-04 NOTE — TELEPHONE ENCOUNTER
Spoke with Zaynab Sainz she would like a script for a cane, states if I can have a script from the doctor medicare will pay for it. Ok to address Thursday upon [facility] return.

## 2019-07-25 ENCOUNTER — OFFICE VISIT (OUTPATIENT)
Dept: NEUROLOGY | Age: 79
End: 2019-07-25
Payer: MEDICARE

## 2019-07-25 VITALS
HEART RATE: 80 BPM | BODY MASS INDEX: 23.63 KG/M2 | HEIGHT: 65 IN | SYSTOLIC BLOOD PRESSURE: 141 MMHG | WEIGHT: 141.8 LBS | DIASTOLIC BLOOD PRESSURE: 72 MMHG

## 2019-07-25 DIAGNOSIS — R73.03 PREDIABETES: ICD-10-CM

## 2019-07-25 DIAGNOSIS — G60.8 POLYNEUROPATHY, PERIPHERAL SENSORIMOTOR AXONAL: Primary | ICD-10-CM

## 2019-07-25 DIAGNOSIS — R26.81 GAIT INSTABILITY: ICD-10-CM

## 2019-07-25 DIAGNOSIS — I25.10 ATHEROSCLEROSIS OF NATIVE CORONARY ARTERY OF NATIVE HEART WITHOUT ANGINA PECTORIS: ICD-10-CM

## 2019-07-25 PROCEDURE — 99204 OFFICE O/P NEW MOD 45 MIN: CPT | Performed by: PSYCHIATRY & NEUROLOGY

## 2019-07-25 PROCEDURE — 1090F PRES/ABSN URINE INCON ASSESS: CPT | Performed by: PSYCHIATRY & NEUROLOGY

## 2019-07-25 PROCEDURE — G8427 DOCREV CUR MEDS BY ELIG CLIN: HCPCS | Performed by: PSYCHIATRY & NEUROLOGY

## 2019-07-25 PROCEDURE — G8420 CALC BMI NORM PARAMETERS: HCPCS | Performed by: PSYCHIATRY & NEUROLOGY

## 2019-07-25 NOTE — PROGRESS NOTES
Alcohol use: No    Drug use: Never    Sexual activity: Not Currently   Lifestyle    Physical activity:     Days per week: Not on file     Minutes per session: Not on file    Stress: Not on file   Relationships    Social connections:     Talks on phone: Not on file     Gets together: Not on file     Attends Sikhism service: Not on file     Active member of club or organization: Not on file     Attends meetings of clubs or organizations: Not on file     Relationship status: Not on file    Intimate partner violence:     Fear of current or ex partner: Not on file     Emotionally abused: Not on file     Physically abused: Not on file     Forced sexual activity: Not on file   Other Topics Concern    Not on file   Social History Narrative    Not on file       CURRENT MEDICATIONS:     Current Outpatient Medications   Medication Sig Dispense Refill    nitroGLYCERIN (NITROSTAT) 0.4 MG SL tablet Place 1 tablet under the tongue as needed      denosumab (PROLIA) 60 MG/ML SOLN SC injection Inject 1 mL into the skin once for 1 dose 1 mL 0    cholestyramine (QUESTRAN) 4 GM/DOSE powder Take 1 packet by mouth      PREVALITE 4 g packet       predniSONE (DELTASONE) 2.5 MG tablet       mesalamine (CANASA) 1000 MG suppository   0    Glucosamine-Chondroitin 166.7-133.3 MG CAPS Take by mouth      simvastatin (ZOCOR) 40 MG tablet TAKE ONE TABLET BY MOUTH ONCE DAILY 90 tablet 3    lisinopril (PRINIVIL;ZESTRIL) 5 MG tablet Take 1 tablet by mouth daily 90 tablet 3    Multiple Vitamins-Minerals (CENTRUM SILVER 50+WOMEN PO) Take by mouth      Probiotic Product ([employer] HEALTH PO) Take 1 tablet by mouth daily       No current facility-administered medications for this visit.          ALLERGIES:     Allergies   Allergen Reactions    Alcohol     Azulfidine [Sulfasalazine] Diarrhea    Sulfa Antibiotics          REVIEW OF SYSTEMS:      CONSTITUTIONAL Weight change: absent, Appetite change: absent, Fatigue: absent weakness in the bilateral lower extremities that is symmetric, reflexes down but equal.  Romberg positive, indicating significant impairment of proprioception. Prior EMG showed severe sensorimotor polyneuropathy, which can explain her symptoms. We will proceed with lab work to look for an etiology for her neuropathy. She already finished physical therapy recently, and denies any significant improvement of her symptoms. We will reassess her again in 8 to 10 weeks. Signed: Lorenzo Escalera MD  Neurology and Sleep Medicine  [facility].    Please note that this chart was generated using voice recognition Dragon dictation software. Although every effort was made to ensure the accuracy of this automated transcription, some errors in transcription may have occurred.

## 2019-07-29 ENCOUNTER — TELEPHONE (OUTPATIENT)
Dept: CARDIOLOGY CLINIC | Age: 79
End: 2019-07-29

## 2019-07-29 RX ORDER — LISINOPRIL 5 MG/1
TABLET ORAL
Qty: 90 TABLET | Refills: 0 | Status: SHIPPED | OUTPATIENT
Start: 2019-07-29 | End: 2020-07-21 | Stop reason: DRUGHIGH

## 2019-08-01 DIAGNOSIS — Z78.0 ASYMPTOMATIC MENOPAUSAL STATE: ICD-10-CM

## 2019-08-01 LAB
ABNORMAL PROTEIN BAND 1: NORMAL
ABNORMAL PROTEIN BAND 2: NORMAL
ABNORMAL PROTEIN BAND 3: NORMAL
ADDITIONAL TESTING: NORMAL
ALBUMIN SERPL-MCNC: NORMAL G/DL
ALPHA 1 GLOBULIN: NORMAL
ALPHA 2 GLOBULIN: NORMAL
ANA SCREEN: NORMAL
BETA GLOBULIN: NORMAL
CREATININE URINE: NORMAL
FOLATE: >20 NG/ML (ref 2.8–20)
GAMMA GLOBULIN: NORMAL
HBA1C MFR BLD: 6 % (ref 4.4–6.4)
HOMOCYSTEINE: NORMAL
INTERPRETATION: NORMAL
Lab: NORMAL
METHYLMALONIC ACID: NORMAL
PROTEIN, URINE, RANDOM: NORMAL
PROTEIN/CREAT RATIO: NORMAL
TSH REFLEX FT4: 0.91 MIU/ML (ref 0.49–4.6)
VITAMIN B-12: 368 PG/ML (ref 239–931)

## 2019-08-07 ENCOUNTER — OFFICE VISIT (OUTPATIENT)
Dept: FAMILY MEDICINE CLINIC | Age: 79
End: 2019-08-07
Payer: MEDICARE

## 2019-08-07 VITALS
SYSTOLIC BLOOD PRESSURE: 108 MMHG | WEIGHT: 139 LBS | DIASTOLIC BLOOD PRESSURE: 52 MMHG | OXYGEN SATURATION: 96 % | HEIGHT: 65 IN | BODY MASS INDEX: 23.16 KG/M2 | HEART RATE: 64 BPM

## 2019-08-07 DIAGNOSIS — M51.36 DEGENERATIVE DISC DISEASE, LUMBAR: ICD-10-CM

## 2019-08-07 DIAGNOSIS — I25.10 ATHEROSCLEROSIS OF NATIVE CORONARY ARTERY OF NATIVE HEART WITHOUT ANGINA PECTORIS: ICD-10-CM

## 2019-08-07 DIAGNOSIS — G47.34 NOCTURNAL HYPOXEMIA: ICD-10-CM

## 2019-08-07 DIAGNOSIS — E53.8 B12 DEFICIENCY: ICD-10-CM

## 2019-08-07 DIAGNOSIS — E78.2 MIXED HYPERLIPIDEMIA: ICD-10-CM

## 2019-08-07 DIAGNOSIS — R73.03 PREDIABETES: ICD-10-CM

## 2019-08-07 DIAGNOSIS — F17.210 CIGARETTE NICOTINE DEPENDENCE WITHOUT COMPLICATION: ICD-10-CM

## 2019-08-07 DIAGNOSIS — M48.07 SPINAL STENOSIS OF LUMBOSACRAL REGION: ICD-10-CM

## 2019-08-07 DIAGNOSIS — R29.898 WEAKNESS OF BOTH LOWER EXTREMITIES: ICD-10-CM

## 2019-08-07 DIAGNOSIS — Z00.00 ROUTINE GENERAL MEDICAL EXAMINATION AT A HEALTH CARE FACILITY: Primary | ICD-10-CM

## 2019-08-07 DIAGNOSIS — J41.0 SIMPLE CHRONIC BRONCHITIS (HCC): ICD-10-CM

## 2019-08-07 DIAGNOSIS — G60.8 POLYNEUROPATHY, PERIPHERAL SENSORIMOTOR AXONAL: ICD-10-CM

## 2019-08-07 DIAGNOSIS — I10 HYPERTENSION, UNSPECIFIED TYPE: ICD-10-CM

## 2019-08-07 DIAGNOSIS — K51.911 ULCERATIVE COLITIS WITH RECTAL BLEEDING, UNSPECIFIED LOCATION (HCC): ICD-10-CM

## 2019-08-07 DIAGNOSIS — R73.01 IFG (IMPAIRED FASTING GLUCOSE): ICD-10-CM

## 2019-08-07 PROCEDURE — G8926 SPIRO NO PERF OR DOC: HCPCS | Performed by: FAMILY MEDICINE

## 2019-08-07 PROCEDURE — G8420 CALC BMI NORM PARAMETERS: HCPCS | Performed by: FAMILY MEDICINE

## 2019-08-07 PROCEDURE — 4040F PNEUMOC VAC/ADMIN/RCVD: CPT | Performed by: FAMILY MEDICINE

## 2019-08-07 PROCEDURE — 1123F ACP DISCUSS/DSCN MKR DOCD: CPT | Performed by: FAMILY MEDICINE

## 2019-08-07 PROCEDURE — 1090F PRES/ABSN URINE INCON ASSESS: CPT | Performed by: FAMILY MEDICINE

## 2019-08-07 PROCEDURE — 1036F TOBACCO NON-USER: CPT | Performed by: FAMILY MEDICINE

## 2019-08-07 PROCEDURE — G8427 DOCREV CUR MEDS BY ELIG CLIN: HCPCS | Performed by: FAMILY MEDICINE

## 2019-08-07 PROCEDURE — 3023F SPIROM DOC REV: CPT | Performed by: FAMILY MEDICINE

## 2019-08-07 PROCEDURE — G0439 PPPS, SUBSEQ VISIT: HCPCS | Performed by: FAMILY MEDICINE

## 2019-08-07 PROCEDURE — G8598 ASA/ANTIPLAT THER USED: HCPCS | Performed by: FAMILY MEDICINE

## 2019-08-07 PROCEDURE — 99213 OFFICE O/P EST LOW 20 MIN: CPT | Performed by: FAMILY MEDICINE

## 2019-08-07 PROCEDURE — G8400 PT W/DXA NO RESULTS DOC: HCPCS | Performed by: FAMILY MEDICINE

## 2019-08-07 RX ORDER — LANOLIN ALCOHOL/MO/W.PET/CERES
1000 CREAM (GRAM) TOPICAL DAILY
Qty: 30 TABLET | Refills: 3 | COMMUNITY
Start: 2019-08-07

## 2019-08-07 ASSESSMENT — ENCOUNTER SYMPTOMS
COUGH: 0
BLOOD IN STOOL: 0
ABDOMINAL PAIN: 0
BACK PAIN: 0
SHORTNESS OF BREATH: 1
ABDOMINAL DISTENTION: 0
CHEST TIGHTNESS: 0
WHEEZING: 0

## 2019-08-07 ASSESSMENT — PATIENT HEALTH QUESTIONNAIRE - PHQ9
SUM OF ALL RESPONSES TO PHQ QUESTIONS 1-9: 1
SUM OF ALL RESPONSES TO PHQ QUESTIONS 1-9: 1

## 2019-08-07 ASSESSMENT — LIFESTYLE VARIABLES: HOW OFTEN DO YOU HAVE A DRINK CONTAINING ALCOHOL: 0

## 2019-08-07 NOTE — PROGRESS NOTES
Diverticulosis large intestine w/o perforation or abscess w/bleeding     Fibroid, uterine     Hearing loss     Hyperlipidemia     Nicotine dependence     Nocturnal hypoxemia     Postmenopausal     Sleep apnea     Ulcerative colitis ([facility] [address]     with rectal bleeding      Past Surgical History:   Procedure Laterality Date    BREAST BIOPSY  1988    right breast biopsy     BREAST BIOPSY Right 03/2011    stereotactic biopsy    COLONOSCOPY  2006    Dr Rolanda Reynolds  2010    Dr Irais Vasquez  2010    Dr Luis Miguel Vázquez interminate colitis    COLONOSCOPY  03/03/2014    Dr Katie An Bilateral 2011 2012     Family History   Problem Relation Age of Onset    Diabetes Mother     High Blood Pressure Mother     Heart Disease Mother     Coronary Art Dis Father     Heart Disease Father        CareTeam (Including outside providers/suppliers regularly involved in providing care):   Patient Care Team:  Mary Henderson MD as PCP - General (Family Medicine)  Mary Henderson MD as PCP - [facility] Empaneled Provider    Wt Readings from Last 3 Encounters:   08/07/19 139 lb (63 kg)   07/25/19 141 lb 12.8 oz (64.3 kg)   05/28/19 143 lb (64.9 kg)     Vitals:    08/07/19 1041   BP: (!) 108/52   Site: Right Upper Arm   Position: Sitting   Cuff Size: Small Adult   Pulse: 64   SpO2: 96%   Weight: 139 lb (63 kg)   Height: 5' 5\" (1.651 m)     Body mass index is 23.13 kg/m². Based upon direct observation of the patient, evaluation of cognition reveals recent and remote memory intact. Patient's complete Health Risk Assessment and screening values have been reviewed and are found in Flowsheets. The following problems were reviewed today and where indicated follow up appointments were made and/or referrals ordered. Positive Risk Factor Screenings with Interventions:     Fall Risk:  Timed Up and Go Test > 12 seconds?  (Complete if either Fall Risk answers are Yes): no  2 or
Social Isolation, Stress or Anger?: (!) New or Increased Fatigue  Do you get the social and emotional support that you need?: (!) No  Do you have a Living Will?: Yes  General Health Risk Interventions:  · Poor self-assessment of health status: psyiatry referral ordered    Health Habits/Nutrition:  Health Habits/Nutrition  Do you exercise for at least 20 minutes 2-3 times per week?: (!) No  Have you lost any weight without trying in the past 3 months?: No  Do you eat fewer than 2 meals per day?: No  Have you seen a dentist within the past year?: (!) No  Body mass index is 23.13 kg/m².   Health Habits/Nutrition Interventions:  · Inadequate physical activity:  patient is not ready to increase his/her physical activity level at this time    Hearing/Vision:  No exam data present  Hearing/Vision  Do you or your family notice any trouble with your hearing?: (!) Yes  Do you have difficulty driving, watching TV, or doing any of your daily activities because of your eyesight?: No  Have you had an eye exam within the past year?: Yes  Hearing/Vision Interventions:  · Hearing concerns:  patient declines any further evaluation/treatment for hearing issues    Personalized Preventive Plan   Current Health Maintenance Status  Immunization History   Administered Date(s) Administered    Influenza Vaccine, unspecified formulation 12/28/2006, 12/28/2006, 09/23/2008, 01/23/2014, 10/28/2014, 10/07/2015, 11/16/2016    Influenza, High Dose (Fluzone 65 yrs and older) 09/07/2017, 10/30/2018    Pneumococcal Conjugate 13-valent (Njuqudi10) 10/07/2015    Pneumococcal Polysaccharide (Mpzndudwm01) 12/28/2006, 09/07/2017    Tdap (Boostrix, Adacel) 02/04/2016        Health Maintenance   Topic Date Due    Shingles Vaccine (1 of 2) 10/04/1990    Annual Wellness Visit (AWV)  10/04/2003    Flu vaccine (1) 09/01/2019    Potassium monitoring  01/04/2020    Creatinine monitoring  01/04/2020    DTaP/Tdap/Td vaccine (2 - Td) 02/04/2026    DEXA

## 2019-09-12 ENCOUNTER — OFFICE VISIT (OUTPATIENT)
Dept: FAMILY MEDICINE CLINIC | Age: 79
End: 2019-09-12
Payer: MEDICARE

## 2019-09-12 VITALS
HEIGHT: 63 IN | HEART RATE: 76 BPM | SYSTOLIC BLOOD PRESSURE: 118 MMHG | WEIGHT: 138 LBS | RESPIRATION RATE: 14 BRPM | DIASTOLIC BLOOD PRESSURE: 72 MMHG | BODY MASS INDEX: 24.45 KG/M2

## 2019-09-12 DIAGNOSIS — M79.89 RIGHT LEG SWELLING: ICD-10-CM

## 2019-09-12 DIAGNOSIS — I10 HYPERTENSION, UNSPECIFIED TYPE: ICD-10-CM

## 2019-09-12 DIAGNOSIS — G89.29 CHRONIC PAIN OF BOTH SHOULDERS: ICD-10-CM

## 2019-09-12 DIAGNOSIS — M25.561 ACUTE PAIN OF RIGHT KNEE: ICD-10-CM

## 2019-09-12 DIAGNOSIS — M25.512 CHRONIC PAIN OF BOTH SHOULDERS: ICD-10-CM

## 2019-09-12 DIAGNOSIS — M25.511 CHRONIC PAIN OF BOTH SHOULDERS: ICD-10-CM

## 2019-09-12 DIAGNOSIS — R29.898 BILATERAL LEG WEAKNESS: ICD-10-CM

## 2019-09-12 DIAGNOSIS — Z00.01 ENCOUNTER FOR WELL ADULT EXAM WITH ABNORMAL FINDINGS: Primary | ICD-10-CM

## 2019-09-12 PROCEDURE — 1090F PRES/ABSN URINE INCON ASSESS: CPT | Performed by: NURSE PRACTITIONER

## 2019-09-12 PROCEDURE — G8420 CALC BMI NORM PARAMETERS: HCPCS | Performed by: NURSE PRACTITIONER

## 2019-09-12 PROCEDURE — G8427 DOCREV CUR MEDS BY ELIG CLIN: HCPCS | Performed by: NURSE PRACTITIONER

## 2019-09-12 PROCEDURE — G8598 ASA/ANTIPLAT THER USED: HCPCS | Performed by: NURSE PRACTITIONER

## 2019-09-12 PROCEDURE — 4040F PNEUMOC VAC/ADMIN/RCVD: CPT | Performed by: NURSE PRACTITIONER

## 2019-09-12 PROCEDURE — 1123F ACP DISCUSS/DSCN MKR DOCD: CPT | Performed by: NURSE PRACTITIONER

## 2019-09-12 PROCEDURE — G8400 PT W/DXA NO RESULTS DOC: HCPCS | Performed by: NURSE PRACTITIONER

## 2019-09-12 PROCEDURE — 1036F TOBACCO NON-USER: CPT | Performed by: NURSE PRACTITIONER

## 2019-09-12 PROCEDURE — 99205 OFFICE O/P NEW HI 60 MIN: CPT | Performed by: NURSE PRACTITIONER

## 2019-09-12 RX ORDER — IBUPROFEN 200 MG
200 TABLET ORAL EVERY 6 HOURS PRN
COMMUNITY
End: 2019-11-20

## 2019-09-12 ASSESSMENT — ENCOUNTER SYMPTOMS
BLOOD IN STOOL: 0
RHINORRHEA: 1
WHEEZING: 0
DIARRHEA: 0
COLOR CHANGE: 0
COUGH: 1
EYE DISCHARGE: 0
ABDOMINAL PAIN: 0
BACK PAIN: 0
SHORTNESS OF BREATH: 1
EYE ITCHING: 0
EYE PAIN: 0
CONSTIPATION: 1
SINUS PRESSURE: 0

## 2019-09-12 NOTE — PROGRESS NOTES
Doppler scheduled 9/13/2019 [facility], please arrive 9:00am.
Judgment and thought content normal.   Nursing note and vitals reviewed. Labs Reviewed 9/12/2019:    Lab Results   Component Value Date    WBC 9.86 02/04/2019    HGB 13.3 02/04/2019    HCT 41.9 02/04/2019     02/04/2019    CHOL 169 09/01/2017    TRIG 162 09/01/2017    ALT 17 01/04/2019    AST 29 01/04/2019     01/04/2019    K 4.3 01/04/2019     01/04/2019    CREATININE 0.9 01/04/2019    BUN 22 (H) 01/04/2019    CO2 29 01/04/2019    TSH 0.17 (L) 05/24/2018    INR 1.0 01/04/2019    LABA1C 6.0 08/01/2019       Assessment/Plan      1. Encounter for well adult exam with abnormal findings  - Age-appropriate education provided. - Recommended following up with specialties as previously discussed  - Recommended continuing current plans as previously discussed with specialties  - Available lab work reviewed and discussed; will order annual lab work in January with next appt    2. Bilateral leg weakness  - [facility] Therapy - [facility]'s  - Follow up with neurology    3. Right leg swelling  - VL DUP LOWER EXTREMITY VENOUS RIGHT, STAT; Future    4. Acute pain of right knee  - XR KNEE RIGHT (MIN 4 VIEWS); Future    5. Hypertension, unspecified type  - metoprolol tartrate (LOPRESSOR) 25 MG tablet; Take 1 tablet by mouth daily  Dispense: 90 tablet; Refill: 3    6. Chronic pain of both shoulders  - OTC tylenol and/or ibuprofen as needed  - Patient to call office if unable to obtain relief      Return in about 4 months (around 1/12/2020), or if symptoms worsen or fail to improve. Patient given educational materials - see patient instructions. Discussed use, benefit, and side effects of prescribed medications. All patient questions answered. Pt voiced understanding. Reviewed health maintenance. I personally spent 70 minutes face to face in the room with the patient reviewing, planning, and implementing her plan of care. Education also provided by myself.     Electronically signed by Luis Nelson

## 2019-09-13 ENCOUNTER — HOSPITAL ENCOUNTER (OUTPATIENT)
Age: 79
Discharge: HOME | End: 2019-09-13
Payer: MEDICARE

## 2019-09-13 ENCOUNTER — TELEPHONE (OUTPATIENT)
Dept: FAMILY MEDICINE CLINIC | Age: 79
End: 2019-09-13

## 2019-09-13 ENCOUNTER — HOSPITAL ENCOUNTER (OUTPATIENT)
Dept: INTERVENTIONAL RADIOLOGY/VASCULAR | Age: 79
Discharge: HOME | End: 2019-09-13
Payer: MEDICARE

## 2019-09-13 ENCOUNTER — HOSPITAL ENCOUNTER (OUTPATIENT)
Dept: GENERAL RADIOLOGY | Age: 79
Discharge: HOME | End: 2019-09-13
Payer: MEDICARE

## 2019-09-13 DIAGNOSIS — M25.561 ACUTE PAIN OF RIGHT KNEE: ICD-10-CM

## 2019-09-13 DIAGNOSIS — M79.89 RIGHT LEG SWELLING: ICD-10-CM

## 2019-09-13 PROCEDURE — 93971 EXTREMITY STUDY: CPT

## 2019-09-13 PROCEDURE — 73564 X-RAY EXAM KNEE 4 OR MORE: CPT

## 2019-09-17 ENCOUNTER — TELEPHONE (OUTPATIENT)
Dept: FAMILY MEDICINE CLINIC | Age: 79
End: 2019-09-17

## 2019-09-17 DIAGNOSIS — G89.29 CHRONIC PAIN OF BOTH SHOULDERS: Primary | ICD-10-CM

## 2019-09-17 DIAGNOSIS — M25.511 CHRONIC PAIN OF BOTH SHOULDERS: Primary | ICD-10-CM

## 2019-09-17 DIAGNOSIS — M25.512 CHRONIC PAIN OF BOTH SHOULDERS: Primary | ICD-10-CM

## 2019-09-17 RX ORDER — ETODOLAC 400 MG/1
400 TABLET, FILM COATED ORAL 2 TIMES DAILY
Qty: 28 TABLET | Refills: 0 | Status: SHIPPED | OUTPATIENT
Start: 2019-09-17 | End: 2019-09-18 | Stop reason: SDUPTHER

## 2019-09-17 NOTE — TELEPHONE ENCOUNTER
Patient's daughter Kourtney Salazar called. She states that patient's pain is not any better on the Tylenol. In fact, she says that it is just getting worse. Is there something else that she could try? She also asked about massage therapy or a chiropractor and wanted to know if there is anyone that we refer to. She uses [facility] on [address] [facility] [address].   Call her back at [phone]

## 2019-09-18 DIAGNOSIS — G89.29 CHRONIC PAIN OF BOTH SHOULDERS: ICD-10-CM

## 2019-09-18 DIAGNOSIS — M25.561 ACUTE PAIN OF RIGHT KNEE: Primary | ICD-10-CM

## 2019-09-18 DIAGNOSIS — M25.511 CHRONIC PAIN OF BOTH SHOULDERS: ICD-10-CM

## 2019-09-18 DIAGNOSIS — M25.512 CHRONIC PAIN OF BOTH SHOULDERS: ICD-10-CM

## 2019-09-18 RX ORDER — ETODOLAC 400 MG/1
400 TABLET, FILM COATED ORAL 2 TIMES DAILY
Qty: 28 TABLET | Refills: 0 | Status: SHIPPED | OUTPATIENT
Start: 2019-09-18 | End: 2019-11-20

## 2019-09-18 NOTE — TELEPHONE ENCOUNTER
Spoke to pt, daughters VM is full and not on HIPAA. Per pt, ok to talk to daughter Elaine Maki. Pt not sure she wants to see ortho at this time, feels her shoulders need to be taken care of first.  Doesn't know who she wants to see for massage therapy, will talk to daughter and call us back. Mario Demarco, APRN - CNP     9/17/19 4:38 PM   Note      Massage therapy order placed and lodine ep'ed to [facility]. May start with half tablet initially. If no improvement in pain, may increase to full tablet. Notified her rx was sent to the pharmacy for shoulder pain, to start with half tablet initially and if no improvement in pain, may increase to full tablet.

## 2019-09-18 NOTE — TELEPHONE ENCOUNTER
Noted. Medication ep'ed to requested pharmacy. PT order placed during patient's last visit. Can modify location as necessary. Thank you.

## 2019-09-18 NOTE — TELEPHONE ENCOUNTER
Talked to daughter, she will update HIPAA form asap    They are wanting to hold off on ortho referral for now, (referral cancelled in epic) her knee pain is better, it is the weakness she is concerned with. She would like to have her referred to PT for the lower extremity weakness, she is going to call back to let us know where she wants referred to. She will find a massage therapist and schedule her mother. She picked up the rx but after getting it she realized her mom is in [place] and needs to get it there. She is aware that she will have to pay out of pocket for the second rx but is willing to do so that mom can have the medicine. Lizzie Hanna for referral to PT? Can you send another rx to [facility]?

## 2019-09-25 ENCOUNTER — HOSPITAL ENCOUNTER (OUTPATIENT)
Dept: PHYSICAL THERAPY | Age: 79
Setting detail: THERAPIES SERIES
Discharge: HOME | End: 2019-09-25
Payer: MEDICARE

## 2019-09-25 PROCEDURE — 97162 PT EVAL MOD COMPLEX 30 MIN: CPT

## 2019-09-25 PROCEDURE — 97110 THERAPEUTIC EXERCISES: CPT

## 2019-09-25 ASSESSMENT — PAIN DESCRIPTION - LOCATION: LOCATION: NECK

## 2019-09-25 ASSESSMENT — PAIN SCALES - GENERAL: PAINLEVEL_OUTOF10: 6

## 2019-09-25 NOTE — FLOWSHEET NOTE
** PLEASE SIGN, DATE AND TIME CERTIFICATION BELOW AND RETURN TO [facility] OUTPATIENT REHABILITATION (FAX #: [phone]). ATTEST/CO-SIGN IF ACCESSING VIA INNeedium. THANK YOU.**    I certify that I have examined the patient below and determined that Physical Medicine and Rehabilitation service is necessary and that I approve the established plan of care for up to 90 days or as specifically noted. Attestation, signature or co-signature of physician indicates approval of certification requirements.    ________________________ ____________ __________  Physician Signature   Date   Time       [address]     Time In: 447  Time Out: 9018  Minutes: 60  Timed Code Treatment Minutes: 15 Minutes           Debbi Chacon     Date: 2019  Patient Name: Alondra Leon,  Gender:  female        CSN: [id number]   : 1940  (66 y.o.)        Referring Practitioner: Asim Cameron CNP      Diagnosis: Biltaeral leg weakness  Treatment Diagnosis: difficulty with ambulation, leg weakness, balance deficits  Additional Pertinent Hx: No restrictions from doctor. Hx: No history noted on sheet. General:  PT Visit Information  Onset Date: 19  PT Insurance Information: Medicare - unlimited visits. Secondary AARP. Aquatics and modalities except Ionto and HP/CP covered. Total # of Visits to Date: 1  Plan of Care/Certification Expiration Date: 19  Progress Note Counter: 1/10         See Medical History Questionnaire for information related to allergies and medications. Subjective:  Chart Reviewed: Yes  Patient assessed for rehabilitation services?: Yes  Family / Caregiver Present: No  Comments: Follow up with CNP in a few months. Subjective: Patient reports about 6 months ago started having problems with her legs and having them give out. States has had blood work done and nothing has shown up so far.  States started

## 2019-09-26 ENCOUNTER — HOSPITAL ENCOUNTER (OUTPATIENT)
Dept: PHYSICAL THERAPY | Age: 79
Setting detail: THERAPIES SERIES
Discharge: HOME | End: 2019-09-26
Payer: MEDICARE

## 2019-09-26 PROCEDURE — 97110 THERAPEUTIC EXERCISES: CPT

## 2019-09-26 PROCEDURE — 97112 NEUROMUSCULAR REEDUCATION: CPT

## 2019-09-26 ASSESSMENT — PAIN SCALES - GENERAL: PAINLEVEL_OUTOF10: 4

## 2019-09-30 ENCOUNTER — HOSPITAL ENCOUNTER (OUTPATIENT)
Dept: PHYSICAL THERAPY | Age: 79
Setting detail: THERAPIES SERIES
Discharge: HOME | End: 2019-09-30
Payer: MEDICARE

## 2019-09-30 ASSESSMENT — PAIN SCALES - GENERAL: PAINLEVEL_OUTOF10: 2

## 2019-10-02 ENCOUNTER — HOSPITAL ENCOUNTER (OUTPATIENT)
Dept: PHYSICAL THERAPY | Age: 79
Setting detail: THERAPIES SERIES
Discharge: HOME | End: 2019-10-02
Payer: MEDICARE

## 2019-10-02 PROCEDURE — 97110 THERAPEUTIC EXERCISES: CPT

## 2019-10-02 PROCEDURE — 97112 NEUROMUSCULAR REEDUCATION: CPT

## 2019-10-02 ASSESSMENT — PAIN SCALES - GENERAL: PAINLEVEL_OUTOF10: 0

## 2019-10-04 ENCOUNTER — APPOINTMENT (OUTPATIENT)
Dept: PHYSICAL THERAPY | Age: 79
End: 2019-10-04
Payer: MEDICARE

## 2019-10-04 ENCOUNTER — HOSPITAL ENCOUNTER (OUTPATIENT)
Dept: PHYSICAL THERAPY | Age: 79
Setting detail: THERAPIES SERIES
Discharge: HOME | End: 2019-10-04
Payer: MEDICARE

## 2019-10-04 PROCEDURE — 97112 NEUROMUSCULAR REEDUCATION: CPT

## 2019-10-04 PROCEDURE — 97116 GAIT TRAINING THERAPY: CPT

## 2019-10-07 ENCOUNTER — HOSPITAL ENCOUNTER (OUTPATIENT)
Dept: PHYSICAL THERAPY | Age: 79
Setting detail: THERAPIES SERIES
Discharge: HOME | End: 2019-10-07
Payer: MEDICARE

## 2019-10-07 PROCEDURE — 97530 THERAPEUTIC ACTIVITIES: CPT

## 2019-10-07 PROCEDURE — 97110 THERAPEUTIC EXERCISES: CPT

## 2019-10-07 ASSESSMENT — PAIN SCALES - GENERAL: PAINLEVEL_OUTOF10: 6

## 2019-10-07 ASSESSMENT — PAIN DESCRIPTION - LOCATION: LOCATION: NECK

## 2019-10-08 ENCOUNTER — HOSPITAL ENCOUNTER (OUTPATIENT)
Dept: PHYSICAL THERAPY | Age: 79
Setting detail: THERAPIES SERIES
Discharge: HOME | End: 2019-10-08
Payer: MEDICARE

## 2019-10-08 PROCEDURE — 97110 THERAPEUTIC EXERCISES: CPT

## 2019-10-08 PROCEDURE — 97530 THERAPEUTIC ACTIVITIES: CPT

## 2019-10-08 PROCEDURE — 97112 NEUROMUSCULAR REEDUCATION: CPT

## 2019-10-09 ENCOUNTER — OFFICE VISIT (OUTPATIENT)
Dept: FAMILY MEDICINE CLINIC | Age: 79
End: 2019-10-09
Payer: MEDICARE

## 2019-10-09 ENCOUNTER — HOSPITAL ENCOUNTER (OUTPATIENT)
Age: 79
Setting detail: SPECIMEN
Discharge: HOME | End: 2019-10-09
Payer: MEDICARE

## 2019-10-09 VITALS
WEIGHT: 132 LBS | BODY MASS INDEX: 23.76 KG/M2 | DIASTOLIC BLOOD PRESSURE: 80 MMHG | HEART RATE: 77 BPM | SYSTOLIC BLOOD PRESSURE: 114 MMHG | OXYGEN SATURATION: 94 %

## 2019-10-09 DIAGNOSIS — H61.23 BILATERAL IMPACTED CERUMEN: ICD-10-CM

## 2019-10-09 DIAGNOSIS — M54.2 CERVICALGIA: ICD-10-CM

## 2019-10-09 DIAGNOSIS — R29.898 WEAKNESS OF BOTH LOWER EXTREMITIES: ICD-10-CM

## 2019-10-09 DIAGNOSIS — M51.36 DEGENERATIVE DISC DISEASE, LUMBAR: ICD-10-CM

## 2019-10-09 DIAGNOSIS — M81.0 AGE-RELATED OSTEOPOROSIS WITHOUT CURRENT PATHOLOGICAL FRACTURE: ICD-10-CM

## 2019-10-09 DIAGNOSIS — R29.818 IMPAIRED PROPRIOCEPTION: ICD-10-CM

## 2019-10-09 DIAGNOSIS — R29.898 BILATERAL LEG WEAKNESS: ICD-10-CM

## 2019-10-09 DIAGNOSIS — E53.8 B12 DEFICIENCY: ICD-10-CM

## 2019-10-09 DIAGNOSIS — Z23 NEED FOR PROPHYLACTIC VACCINATION AND INOCULATION AGAINST INFLUENZA: Primary | ICD-10-CM

## 2019-10-09 PROCEDURE — 99214 OFFICE O/P EST MOD 30 MIN: CPT | Performed by: FAMILY MEDICINE

## 2019-10-09 PROCEDURE — G8482 FLU IMMUNIZE ORDER/ADMIN: HCPCS | Performed by: FAMILY MEDICINE

## 2019-10-09 PROCEDURE — 1090F PRES/ABSN URINE INCON ASSESS: CPT | Performed by: FAMILY MEDICINE

## 2019-10-09 PROCEDURE — 1123F ACP DISCUSS/DSCN MKR DOCD: CPT | Performed by: FAMILY MEDICINE

## 2019-10-09 PROCEDURE — G0008 ADMIN INFLUENZA VIRUS VAC: HCPCS | Performed by: FAMILY MEDICINE

## 2019-10-09 PROCEDURE — 69209 REMOVE IMPACTED EAR WAX UNI: CPT | Performed by: FAMILY MEDICINE

## 2019-10-09 PROCEDURE — G8598 ASA/ANTIPLAT THER USED: HCPCS | Performed by: FAMILY MEDICINE

## 2019-10-09 PROCEDURE — 90653 IIV ADJUVANT VACCINE IM: CPT | Performed by: FAMILY MEDICINE

## 2019-10-09 PROCEDURE — G8400 PT W/DXA NO RESULTS DOC: HCPCS | Performed by: FAMILY MEDICINE

## 2019-10-09 PROCEDURE — G8427 DOCREV CUR MEDS BY ELIG CLIN: HCPCS | Performed by: FAMILY MEDICINE

## 2019-10-09 PROCEDURE — 36415 COLL VENOUS BLD VENIPUNCTURE: CPT

## 2019-10-09 PROCEDURE — 4040F PNEUMOC VAC/ADMIN/RCVD: CPT | Performed by: FAMILY MEDICINE

## 2019-10-09 PROCEDURE — 1036F TOBACCO NON-USER: CPT | Performed by: FAMILY MEDICINE

## 2019-10-09 PROCEDURE — G8420 CALC BMI NORM PARAMETERS: HCPCS | Performed by: FAMILY MEDICINE

## 2019-10-09 PROCEDURE — 83921 ORGANIC ACID SINGLE QUANT: CPT

## 2019-10-09 ASSESSMENT — ENCOUNTER SYMPTOMS
WHEEZING: 0
SHORTNESS OF BREATH: 0
ABDOMINAL PAIN: 0
BLOOD IN STOOL: 0
COUGH: 0
BACK PAIN: 1

## 2019-10-10 ENCOUNTER — APPOINTMENT (OUTPATIENT)
Dept: PHYSICAL THERAPY | Age: 79
End: 2019-10-10
Payer: MEDICARE

## 2019-10-10 ENCOUNTER — HOSPITAL ENCOUNTER (OUTPATIENT)
Dept: PHYSICAL THERAPY | Age: 79
Setting detail: THERAPIES SERIES
Discharge: HOME | End: 2019-10-10
Payer: MEDICARE

## 2019-10-10 PROCEDURE — 97110 THERAPEUTIC EXERCISES: CPT

## 2019-10-11 ENCOUNTER — APPOINTMENT (OUTPATIENT)
Dept: PHYSICAL THERAPY | Age: 79
End: 2019-10-11
Payer: MEDICARE

## 2019-10-11 ENCOUNTER — HOSPITAL ENCOUNTER (OUTPATIENT)
Dept: PHYSICAL THERAPY | Age: 79
Setting detail: THERAPIES SERIES
Discharge: HOME | End: 2019-10-11
Payer: MEDICARE

## 2019-10-11 PROCEDURE — 97110 THERAPEUTIC EXERCISES: CPT

## 2019-10-12 LAB — METHYLMALONIC ACID: 0.53 UMOL/L (ref 0–0.4)

## 2019-10-14 ENCOUNTER — HOSPITAL ENCOUNTER (OUTPATIENT)
Dept: PHYSICAL THERAPY | Age: 79
Setting detail: THERAPIES SERIES
Discharge: HOME | End: 2019-10-14
Payer: MEDICARE

## 2019-10-14 ENCOUNTER — APPOINTMENT (OUTPATIENT)
Dept: PHYSICAL THERAPY | Age: 79
End: 2019-10-14
Payer: MEDICARE

## 2019-10-14 PROCEDURE — 97110 THERAPEUTIC EXERCISES: CPT

## 2019-10-16 ENCOUNTER — HOSPITAL ENCOUNTER (OUTPATIENT)
Dept: PHYSICAL THERAPY | Age: 79
Setting detail: THERAPIES SERIES
Discharge: HOME | End: 2019-10-16
Payer: MEDICARE

## 2019-10-16 PROCEDURE — 97110 THERAPEUTIC EXERCISES: CPT

## 2019-10-23 ENCOUNTER — HOSPITAL ENCOUNTER (OUTPATIENT)
Dept: PHYSICAL THERAPY | Age: 79
Setting detail: THERAPIES SERIES
Discharge: HOME | End: 2019-10-23
Payer: MEDICARE

## 2019-10-23 PROCEDURE — 97110 THERAPEUTIC EXERCISES: CPT

## 2019-10-25 ENCOUNTER — HOSPITAL ENCOUNTER (OUTPATIENT)
Dept: PHYSICAL THERAPY | Age: 79
Setting detail: THERAPIES SERIES
Discharge: HOME | End: 2019-10-25
Payer: MEDICARE

## 2019-10-25 PROCEDURE — 97110 THERAPEUTIC EXERCISES: CPT

## 2019-10-25 PROCEDURE — 97112 NEUROMUSCULAR REEDUCATION: CPT

## 2019-10-25 ASSESSMENT — PAIN SCALES - GENERAL: PAINLEVEL_OUTOF10: 5

## 2019-10-28 ENCOUNTER — HOSPITAL ENCOUNTER (OUTPATIENT)
Dept: PHYSICAL THERAPY | Age: 79
Setting detail: THERAPIES SERIES
Discharge: HOME | End: 2019-10-28
Payer: MEDICARE

## 2019-10-28 PROCEDURE — 97110 THERAPEUTIC EXERCISES: CPT

## 2019-10-28 PROCEDURE — 97112 NEUROMUSCULAR REEDUCATION: CPT

## 2019-10-28 ASSESSMENT — PAIN SCALES - GENERAL: PAINLEVEL_OUTOF10: 5

## 2019-10-31 ENCOUNTER — HOSPITAL ENCOUNTER (OUTPATIENT)
Dept: PHYSICAL THERAPY | Age: 79
Setting detail: THERAPIES SERIES
Discharge: HOME | End: 2019-10-31
Payer: MEDICARE

## 2019-10-31 PROCEDURE — 97110 THERAPEUTIC EXERCISES: CPT

## 2019-11-01 ENCOUNTER — HOSPITAL ENCOUNTER (OUTPATIENT)
Dept: PHYSICAL THERAPY | Age: 79
Setting detail: THERAPIES SERIES
Discharge: HOME | End: 2019-11-01
Payer: MEDICARE

## 2019-11-01 PROCEDURE — 97110 THERAPEUTIC EXERCISES: CPT

## 2019-11-07 ENCOUNTER — INITIAL CONSULT (OUTPATIENT)
Dept: PHYSICAL MEDICINE AND REHAB | Age: 79
End: 2019-11-07
Payer: MEDICARE

## 2019-11-07 VITALS
TEMPERATURE: 97.4 F | BODY MASS INDEX: 21.56 KG/M2 | WEIGHT: 129.4 LBS | HEART RATE: 106 BPM | HEIGHT: 65 IN | SYSTOLIC BLOOD PRESSURE: 130 MMHG | DIASTOLIC BLOOD PRESSURE: 84 MMHG

## 2019-11-07 DIAGNOSIS — G60.8 MIXED SENSORY-MOTOR POLYNEUROPATHY: ICD-10-CM

## 2019-11-07 DIAGNOSIS — R29.898 LEG WEAKNESS, BILATERAL: Primary | ICD-10-CM

## 2019-11-07 PROCEDURE — 99204 OFFICE O/P NEW MOD 45 MIN: CPT | Performed by: PHYSICAL MEDICINE & REHABILITATION

## 2019-11-07 PROCEDURE — G8420 CALC BMI NORM PARAMETERS: HCPCS | Performed by: PHYSICAL MEDICINE & REHABILITATION

## 2019-11-07 PROCEDURE — 1090F PRES/ABSN URINE INCON ASSESS: CPT | Performed by: PHYSICAL MEDICINE & REHABILITATION

## 2019-11-07 PROCEDURE — G8598 ASA/ANTIPLAT THER USED: HCPCS | Performed by: PHYSICAL MEDICINE & REHABILITATION

## 2019-11-07 PROCEDURE — G8482 FLU IMMUNIZE ORDER/ADMIN: HCPCS | Performed by: PHYSICAL MEDICINE & REHABILITATION

## 2019-11-07 PROCEDURE — 4040F PNEUMOC VAC/ADMIN/RCVD: CPT | Performed by: PHYSICAL MEDICINE & REHABILITATION

## 2019-11-07 PROCEDURE — 1123F ACP DISCUSS/DSCN MKR DOCD: CPT | Performed by: PHYSICAL MEDICINE & REHABILITATION

## 2019-11-07 PROCEDURE — G8400 PT W/DXA NO RESULTS DOC: HCPCS | Performed by: PHYSICAL MEDICINE & REHABILITATION

## 2019-11-07 PROCEDURE — 1036F TOBACCO NON-USER: CPT | Performed by: PHYSICAL MEDICINE & REHABILITATION

## 2019-11-07 PROCEDURE — G8427 DOCREV CUR MEDS BY ELIG CLIN: HCPCS | Performed by: PHYSICAL MEDICINE & REHABILITATION

## 2019-11-07 RX ORDER — METHYLPREDNISOLONE 4 MG/1
TABLET ORAL
Qty: 1 KIT | Refills: 1 | Status: SHIPPED | OUTPATIENT
Start: 2019-11-07 | End: 2019-11-13

## 2019-11-12 ENCOUNTER — OFFICE VISIT (OUTPATIENT)
Dept: NEUROLOGY | Age: 79
End: 2019-11-12
Payer: MEDICARE

## 2019-11-12 ENCOUNTER — HOSPITAL ENCOUNTER (OUTPATIENT)
Age: 79
Setting detail: SPECIMEN
Discharge: HOME | End: 2019-11-12
Payer: MEDICARE

## 2019-11-12 VITALS
DIASTOLIC BLOOD PRESSURE: 86 MMHG | HEART RATE: 98 BPM | WEIGHT: 128.4 LBS | BODY MASS INDEX: 21.92 KG/M2 | SYSTOLIC BLOOD PRESSURE: 153 MMHG | HEIGHT: 64 IN

## 2019-11-12 DIAGNOSIS — R73.03 PREDIABETES: ICD-10-CM

## 2019-11-12 DIAGNOSIS — R26.81 GAIT INSTABILITY: Primary | ICD-10-CM

## 2019-11-12 DIAGNOSIS — G60.8 POLYNEUROPATHY, PERIPHERAL SENSORIMOTOR AXONAL: ICD-10-CM

## 2019-11-12 LAB
ESTIMATED AVERAGE GLUCOSE: 143 MG/DL
HBA1C MFR BLD: 6.6 % (ref 4–6)

## 2019-11-12 PROCEDURE — G8482 FLU IMMUNIZE ORDER/ADMIN: HCPCS | Performed by: PSYCHIATRY & NEUROLOGY

## 2019-11-12 PROCEDURE — 4040F PNEUMOC VAC/ADMIN/RCVD: CPT | Performed by: PSYCHIATRY & NEUROLOGY

## 2019-11-12 PROCEDURE — G8427 DOCREV CUR MEDS BY ELIG CLIN: HCPCS | Performed by: PSYCHIATRY & NEUROLOGY

## 2019-11-12 PROCEDURE — G8400 PT W/DXA NO RESULTS DOC: HCPCS | Performed by: PSYCHIATRY & NEUROLOGY

## 2019-11-12 PROCEDURE — 1090F PRES/ABSN URINE INCON ASSESS: CPT | Performed by: PSYCHIATRY & NEUROLOGY

## 2019-11-12 PROCEDURE — 1123F ACP DISCUSS/DSCN MKR DOCD: CPT | Performed by: PSYCHIATRY & NEUROLOGY

## 2019-11-12 PROCEDURE — G8420 CALC BMI NORM PARAMETERS: HCPCS | Performed by: PSYCHIATRY & NEUROLOGY

## 2019-11-12 PROCEDURE — 1036F TOBACCO NON-USER: CPT | Performed by: PSYCHIATRY & NEUROLOGY

## 2019-11-12 PROCEDURE — G8598 ASA/ANTIPLAT THER USED: HCPCS | Performed by: PSYCHIATRY & NEUROLOGY

## 2019-11-12 PROCEDURE — 99214 OFFICE O/P EST MOD 30 MIN: CPT | Performed by: PSYCHIATRY & NEUROLOGY

## 2019-11-20 ENCOUNTER — OFFICE VISIT (OUTPATIENT)
Dept: FAMILY MEDICINE CLINIC | Age: 79
End: 2019-11-20
Payer: MEDICARE

## 2019-11-20 VITALS
DIASTOLIC BLOOD PRESSURE: 60 MMHG | SYSTOLIC BLOOD PRESSURE: 128 MMHG | OXYGEN SATURATION: 91 % | BODY MASS INDEX: 21.46 KG/M2 | HEART RATE: 115 BPM | WEIGHT: 125 LBS

## 2019-11-20 DIAGNOSIS — I10 HYPERTENSION, UNSPECIFIED TYPE: ICD-10-CM

## 2019-11-20 DIAGNOSIS — M81.0 AGE-RELATED OSTEOPOROSIS WITHOUT CURRENT PATHOLOGICAL FRACTURE: ICD-10-CM

## 2019-11-20 DIAGNOSIS — J41.0 SIMPLE CHRONIC BRONCHITIS (HCC): ICD-10-CM

## 2019-11-20 DIAGNOSIS — K51.911 ULCERATIVE COLITIS WITH RECTAL BLEEDING, UNSPECIFIED LOCATION (HCC): ICD-10-CM

## 2019-11-20 DIAGNOSIS — F17.210 CIGARETTE NICOTINE DEPENDENCE WITHOUT COMPLICATION: ICD-10-CM

## 2019-11-20 DIAGNOSIS — E78.2 MIXED HYPERLIPIDEMIA: ICD-10-CM

## 2019-11-20 DIAGNOSIS — E53.8 B12 DEFICIENCY: ICD-10-CM

## 2019-11-20 DIAGNOSIS — M51.36 DEGENERATIVE DISC DISEASE, LUMBAR: ICD-10-CM

## 2019-11-20 DIAGNOSIS — E11.42 DIABETIC SENSORIMOTOR POLYNEUROPATHY (HCC): Primary | ICD-10-CM

## 2019-11-20 DIAGNOSIS — E11.42 TYPE 2 DIABETES MELLITUS WITH DIABETIC POLYNEUROPATHY, WITHOUT LONG-TERM CURRENT USE OF INSULIN (HCC): ICD-10-CM

## 2019-11-20 DIAGNOSIS — G47.34 NOCTURNAL HYPOXEMIA: ICD-10-CM

## 2019-11-20 PROCEDURE — G8420 CALC BMI NORM PARAMETERS: HCPCS | Performed by: FAMILY MEDICINE

## 2019-11-20 PROCEDURE — 99215 OFFICE O/P EST HI 40 MIN: CPT | Performed by: FAMILY MEDICINE

## 2019-11-20 PROCEDURE — G8482 FLU IMMUNIZE ORDER/ADMIN: HCPCS | Performed by: FAMILY MEDICINE

## 2019-11-20 PROCEDURE — G8598 ASA/ANTIPLAT THER USED: HCPCS | Performed by: FAMILY MEDICINE

## 2019-11-20 PROCEDURE — G8400 PT W/DXA NO RESULTS DOC: HCPCS | Performed by: FAMILY MEDICINE

## 2019-11-20 PROCEDURE — G8926 SPIRO NO PERF OR DOC: HCPCS | Performed by: FAMILY MEDICINE

## 2019-11-20 PROCEDURE — 3023F SPIROM DOC REV: CPT | Performed by: FAMILY MEDICINE

## 2019-11-20 PROCEDURE — 1123F ACP DISCUSS/DSCN MKR DOCD: CPT | Performed by: FAMILY MEDICINE

## 2019-11-20 PROCEDURE — 1036F TOBACCO NON-USER: CPT | Performed by: FAMILY MEDICINE

## 2019-11-20 PROCEDURE — 1090F PRES/ABSN URINE INCON ASSESS: CPT | Performed by: FAMILY MEDICINE

## 2019-11-20 PROCEDURE — G8427 DOCREV CUR MEDS BY ELIG CLIN: HCPCS | Performed by: FAMILY MEDICINE

## 2019-11-20 PROCEDURE — 4040F PNEUMOC VAC/ADMIN/RCVD: CPT | Performed by: FAMILY MEDICINE

## 2019-11-20 ASSESSMENT — ENCOUNTER SYMPTOMS
SHORTNESS OF BREATH: 0
WHEEZING: 0
ABDOMINAL PAIN: 0
COUGH: 0
BACK PAIN: 1
BLOOD IN STOOL: 0

## 2019-11-21 PROBLEM — E11.42 TYPE 2 DIABETES MELLITUS WITH DIABETIC POLYNEUROPATHY, WITHOUT LONG-TERM CURRENT USE OF INSULIN (HCC): Status: ACTIVE | Noted: 2019-11-21

## 2019-11-25 ASSESSMENT — ENCOUNTER SYMPTOMS: CHANGE IN BOWEL HABIT: 0

## 2019-12-20 ENCOUNTER — OFFICE VISIT (OUTPATIENT)
Dept: FAMILY MEDICINE CLINIC | Age: 79
End: 2019-12-20
Payer: MEDICARE

## 2019-12-20 VITALS
HEART RATE: 72 BPM | DIASTOLIC BLOOD PRESSURE: 82 MMHG | RESPIRATION RATE: 14 BRPM | WEIGHT: 121 LBS | BODY MASS INDEX: 20.77 KG/M2 | SYSTOLIC BLOOD PRESSURE: 130 MMHG

## 2019-12-20 DIAGNOSIS — J40 BRONCHITIS: Primary | ICD-10-CM

## 2019-12-20 DIAGNOSIS — E11.42 TYPE 2 DIABETES MELLITUS WITH DIABETIC POLYNEUROPATHY, WITHOUT LONG-TERM CURRENT USE OF INSULIN (HCC): ICD-10-CM

## 2019-12-20 PROCEDURE — 1090F PRES/ABSN URINE INCON ASSESS: CPT | Performed by: NURSE PRACTITIONER

## 2019-12-20 PROCEDURE — G8420 CALC BMI NORM PARAMETERS: HCPCS | Performed by: NURSE PRACTITIONER

## 2019-12-20 PROCEDURE — 4040F PNEUMOC VAC/ADMIN/RCVD: CPT | Performed by: NURSE PRACTITIONER

## 2019-12-20 PROCEDURE — 1123F ACP DISCUSS/DSCN MKR DOCD: CPT | Performed by: NURSE PRACTITIONER

## 2019-12-20 PROCEDURE — G8400 PT W/DXA NO RESULTS DOC: HCPCS | Performed by: NURSE PRACTITIONER

## 2019-12-20 PROCEDURE — G8598 ASA/ANTIPLAT THER USED: HCPCS | Performed by: NURSE PRACTITIONER

## 2019-12-20 PROCEDURE — 1036F TOBACCO NON-USER: CPT | Performed by: NURSE PRACTITIONER

## 2019-12-20 PROCEDURE — G8482 FLU IMMUNIZE ORDER/ADMIN: HCPCS | Performed by: NURSE PRACTITIONER

## 2019-12-20 PROCEDURE — G8427 DOCREV CUR MEDS BY ELIG CLIN: HCPCS | Performed by: NURSE PRACTITIONER

## 2019-12-20 PROCEDURE — 99213 OFFICE O/P EST LOW 20 MIN: CPT | Performed by: NURSE PRACTITIONER

## 2019-12-20 RX ORDER — AZITHROMYCIN 250 MG/1
250 TABLET, FILM COATED ORAL DAILY
Qty: 1 PACKET | Refills: 0 | Status: SHIPPED | OUTPATIENT
Start: 2019-12-20 | End: 2020-01-08

## 2019-12-20 SDOH — ECONOMIC STABILITY: INCOME INSECURITY: HOW HARD IS IT FOR YOU TO PAY FOR THE VERY BASICS LIKE FOOD, HOUSING, MEDICAL CARE, AND HEATING?: NOT HARD AT ALL

## 2019-12-20 SDOH — ECONOMIC STABILITY: FOOD INSECURITY: WITHIN THE PAST 12 MONTHS, THE FOOD YOU BOUGHT JUST DIDN'T LAST AND YOU DIDN'T HAVE MONEY TO GET MORE.: NEVER TRUE

## 2019-12-20 SDOH — ECONOMIC STABILITY: TRANSPORTATION INSECURITY
IN THE PAST 12 MONTHS, HAS LACK OF TRANSPORTATION KEPT YOU FROM MEETINGS, WORK, OR FROM GETTING THINGS NEEDED FOR DAILY LIVING?: NO

## 2019-12-20 SDOH — ECONOMIC STABILITY: TRANSPORTATION INSECURITY
IN THE PAST 12 MONTHS, HAS THE LACK OF TRANSPORTATION KEPT YOU FROM MEDICAL APPOINTMENTS OR FROM GETTING MEDICATIONS?: NO

## 2019-12-20 SDOH — ECONOMIC STABILITY: FOOD INSECURITY: WITHIN THE PAST 12 MONTHS, YOU WORRIED THAT YOUR FOOD WOULD RUN OUT BEFORE YOU GOT MONEY TO BUY MORE.: NEVER TRUE

## 2019-12-20 ASSESSMENT — ENCOUNTER SYMPTOMS
COUGH: 1
SORE THROAT: 0
RHINORRHEA: 1
SHORTNESS OF BREATH: 0

## 2020-01-02 ENCOUNTER — APPOINTMENT (OUTPATIENT)
Dept: GENERAL RADIOLOGY | Age: 80
End: 2020-01-02
Payer: MEDICARE

## 2020-01-02 ENCOUNTER — HOSPITAL ENCOUNTER (EMERGENCY)
Age: 80
Discharge: HOME | End: 2020-01-02
Payer: MEDICARE

## 2020-01-02 VITALS
TEMPERATURE: 97.4 F | RESPIRATION RATE: 16 BRPM | HEART RATE: 77 BPM | SYSTOLIC BLOOD PRESSURE: 157 MMHG | OXYGEN SATURATION: 98 % | DIASTOLIC BLOOD PRESSURE: 67 MMHG

## 2020-01-02 PROCEDURE — 73130 X-RAY EXAM OF HAND: CPT

## 2020-01-02 PROCEDURE — 90471 IMMUNIZATION ADMIN: CPT | Performed by: STUDENT IN AN ORGANIZED HEALTH CARE EDUCATION/TRAINING PROGRAM

## 2020-01-02 PROCEDURE — 6360000002 HC RX W HCPCS: Performed by: STUDENT IN AN ORGANIZED HEALTH CARE EDUCATION/TRAINING PROGRAM

## 2020-01-02 PROCEDURE — 90715 TDAP VACCINE 7 YRS/> IM: CPT | Performed by: STUDENT IN AN ORGANIZED HEALTH CARE EDUCATION/TRAINING PROGRAM

## 2020-01-02 PROCEDURE — 12004 RPR S/N/AX/GEN/TRK7.6-12.5CM: CPT

## 2020-01-02 PROCEDURE — 99283 EMERGENCY DEPT VISIT LOW MDM: CPT

## 2020-01-02 PROCEDURE — 2709999900 HC NON-CHARGEABLE SUPPLY

## 2020-01-02 RX ADMIN — TETANUS TOXOID, REDUCED DIPHTHERIA TOXOID AND ACELLULAR PERTUSSIS VACCINE, ADSORBED 0.5 ML: 5; 2.5; 8; 8; 2.5 SUSPENSION INTRAMUSCULAR at 13:52

## 2020-01-02 ASSESSMENT — ENCOUNTER SYMPTOMS
WHEEZING: 0
NAUSEA: 0
ABDOMINAL PAIN: 0
VOMITING: 0
COUGH: 0
BACK PAIN: 0
SHORTNESS OF BREATH: 0

## 2020-01-02 ASSESSMENT — PAIN SCALES - GENERAL: PAINLEVEL_OUTOF10: 0

## 2020-01-02 NOTE — ED NOTES
Pt fell this morning on concrete and is bleeding from left hand. Denies pain at this time. Daughter is present with patient.       [facility]  01/02/20 [address] [place] [address]  01/02/20 125

## 2020-01-02 NOTE — ED PROVIDER NOTES
[facility]  eMERGENCY dEPARTMENT eNCOUnter          CHIEF COMPLAINT       Chief Complaint   Patient presents with    Laceration     left hand       Nurses Notes reviewed and I agree except as noted in the HPI. HISTORY OF PRESENT ILLNESS    Minor Silva is a 78 y.o. female who presents to the Emergency Department for the evaluation of skin tear to the left hand. The patient's injury is located to the dorsal aspect of the left hand and was bandaged. The patient reported she fell, but denied any triggering incident prior to the fall. The patient denied light headedness, dizziness, chest pain, and shortness of breath. The patient's daughter was bedside and denied LOC. The patient reported she was on blood thinners, but was unable to recall the name of the medication. The patient stated her last fall was in September. The patient reported the incident occurred around 10:15 AM today (1/2). The patient stated she was unsure when her last tetanus shot was, but noted it has been a long time. The patient has medical history of hearing loss, COPD, and abnormal nuclear stress test. The patient has surgical history of foot surgery, breast biopsy, and colonoscopy. The patient is a former smoker. The patient has no further acute complaints at this time. Location/Symptom: skin tear to the left hand  Timing/Onset: 10:15 AM today (1/2)  Context/Setting: Pt fell with no triggering event. Injury to left hand. Blood thinners. REVIEW OF SYSTEMS     Review of Systems   Constitutional: Negative for appetite change, chills, fatigue and fever. Eyes: Negative for visual disturbance. Respiratory: Negative for cough, shortness of breath and wheezing. Cardiovascular: Negative for chest pain, palpitations and leg swelling. Gastrointestinal: Negative for abdominal pain, nausea and vomiting. Genitourinary: Negative for difficulty urinating.    Musculoskeletal: Negative for arthralgias, back pain, joint swelling and neck pain. Skin: Positive for wound (left hand. ). Negative for pallor and rash. Neurological: Negative for dizziness, syncope, weakness, light-headedness, numbness and headaches. Hematological: Negative for adenopathy. Psychiatric/Behavioral: Negative for confusion. PAST MEDICAL HISTORY    has a past medical history of Abnormal nuclear stress test, Cataract, Colon polyps, COPD (chronic obstructive pulmonary disease) ([facility] [address] Diverticulosis large intestine w/o perforation or abscess w/bleeding, Fibroid, uterine, Hearing loss, Hyperlipidemia, Nicotine dependence, Nocturnal hypoxemia, Postmenopausal, Sleep apnea, and Ulcerative colitis ([facility] [address] SURGICAL HISTORY      has a past surgical history that includes Foot surgery; Breast biopsy (1988); Colonoscopy (2006); Colonoscopy (2010); Colonoscopy (2010); Colonoscopy (03/03/2014); Breast biopsy (Right, 03/2011); and Foot surgery (Bilateral, 2011). CURRENT MEDICATIONS       Discharge Medication List as of 1/2/2020  2:02 PM      CONTINUE these medications which have NOT CHANGED    Details   azithromycin (ZITHROMAX) 250 MG tablet Take 1 tablet by mouth daily Day 1 take 2 tablets.  Days 2-5 take 1 tablet., Disp-1 packet, R-0Normal      metoprolol tartrate (LOPRESSOR) 25 MG tablet Take 1 tablet by mouth 2 times daily, Disp-180 tablet, R-3Normal      denosumab (PROLIA) 60 MG/ML SOSY SC injection Inject 1 mL into the skin once for 1 dose, Disp-1 mL, R-0Print      vitamin B-12 (CYANOCOBALAMIN) 1000 MCG tablet Take 1 tablet by mouth daily, Disp-30 tablet, R-3OTC      lisinopril (PRINIVIL;ZESTRIL) 5 MG tablet TAKE 1 TABLET BY MOUTH ONCE DAILY, Disp-90 tablet, R-0Normal      PREVALITE 4 g packet DAWHistorical Med      Glucosamine-Chondroitin 166.7-133.3 MG CAPS Take by mouthHistorical Med      simvastatin (ZOCOR) 40 MG tablet TAKE ONE TABLET BY MOUTH ONCE DAILY, Disp-90 tablet, R-3Normal      Multiple Vitamins-Minerals (CENTRUM SILVER 50+WOMEN PO) Take by mouthHistorical Med      Probiotic Product (Bilibot PO) Take 1 tablet by mouth dailyHistorical Med             ALLERGIES     is allergic to alcohol; azulfidine [sulfasalazine]; and sulfa antibiotics. FAMILY HISTORY     She indicated that her mother is . She indicated that her father is . family history includes Coronary Art Dis in her father; Diabetes in her mother; Heart Disease in her father and mother; High Blood Pressure in her mother. SOCIAL HISTORY      reports that she quit smoking about 5 months ago. Her smoking use included cigarettes. She has a 40.00 pack-year smoking history. She has never used smokeless tobacco. She reports that she does not drink alcohol or use drugs. PHYSICAL EXAM     INITIAL VITALS:  oral temperature is 97.4 °F (36.3 °C). Her blood pressure is 157/67 (abnormal) and her pulse is 77. Her respiration is 16 and oxygen saturation is 98%. Physical Exam  Vitals signs and nursing note reviewed. Constitutional:       Appearance: She is well-developed. She is not diaphoretic. HENT:      Head: Normocephalic and atraumatic. Right Ear: External ear normal.      Left Ear: External ear normal.   Eyes:      General: No scleral icterus. Right eye: No discharge. Left eye: No discharge. Conjunctiva/sclera: Conjunctivae normal.   Neck:      Musculoskeletal: Normal range of motion and neck supple. Vascular: No JVD. Pulmonary:      Effort: Pulmonary effort is normal. No respiratory distress. Breath sounds: No stridor. Abdominal:      General: There is no distension. Palpations: Abdomen is soft. Musculoskeletal: Normal range of motion. Skin:     General: Skin is warm and dry. Findings: Abrasion present. No erythema. Comments: 8 cm skin tear to dorsal aspect of left hand. Hemostasis achieved. Full ROM. No presence of bone.     Neurological:      Mental Status: She is alert and oriented to person, place, (cm): 9. Laceration depth: skin tear, 1 mm. Repair type:     Repair type:  Simple  Pre-procedure details:     Preparation:  Imaging obtained to evaluate for foreign bodies  Exploration:     Hemostasis achieved with:  Direct pressure    Wound exploration: wound explored through full range of motion and entire depth of wound probed and visualized      Wound extent: areolar tissue violated      Wound extent: no fascia violation noted, no foreign bodies/material noted, no muscle damage noted, no nerve damage noted, no tendon damage noted, no underlying fracture noted and no vascular damage noted      Contaminated: yes    Treatment:     Wound cleansed with: chlorhexidine. Amount of cleaning:  Standard  Skin repair:     Repair method:  Tissue adhesive and Steri-Strips    Number of Steri-Strips:  3  Approximation:     Approximation:  Close  Post-procedure details:     Dressing:  Open (no dressing)    Patient tolerance of procedure: Tolerated well, no immediate complications          FINAL IMPRESSION      1. Skin tear of left hand without complication, initial encounter          DISPOSITION/PLAN   discharge    PATIENT REFERREDTO:  PETEY Perera - CNP  [address]  [phone]    Go to   For wound re-check in 3-4 days      DISCHARGE MEDICATIONS:  Discharge Medication List as of 1/2/2020  2:02 PM          (Please note that portions of this note were completed with a voice recognition program.  Efforts were made to edit the dictations but occasionally words are mis-transcribed.)    The patient was given an opportunity to see the Emergency Attending. The patient voiced understanding that I was a Mid-Level Provider and was in agreement with being seen independently by myself.      Scribe:  Robert Solares 1/2/20 12:58 PM Scribing for and in the presence of Annetta Bradley.     Signed by: Perez Villa, 01/04/20 2:34 PM    Provider:  I personally performed the services described in the documentation, reviewed and edited the documentation which was dictated to the scribe in my presence, and it accurately records my words and actions.     Emperatriz West PA-C 1/2/20 2:34 PM    MERCED Cabral PA-C  01/04/20 [id number]

## 2020-01-06 ENCOUNTER — TELEPHONE (OUTPATIENT)
Dept: FAMILY MEDICINE CLINIC | Age: 80
End: 2020-01-06

## 2020-01-08 ENCOUNTER — OFFICE VISIT (OUTPATIENT)
Dept: FAMILY MEDICINE CLINIC | Age: 80
End: 2020-01-08
Payer: MEDICARE

## 2020-01-08 VITALS
SYSTOLIC BLOOD PRESSURE: 102 MMHG | WEIGHT: 124 LBS | BODY MASS INDEX: 21.28 KG/M2 | DIASTOLIC BLOOD PRESSURE: 50 MMHG | HEART RATE: 62 BPM | RESPIRATION RATE: 16 BRPM

## 2020-01-08 PROCEDURE — G8400 PT W/DXA NO RESULTS DOC: HCPCS | Performed by: NURSE PRACTITIONER

## 2020-01-08 PROCEDURE — 4040F PNEUMOC VAC/ADMIN/RCVD: CPT | Performed by: NURSE PRACTITIONER

## 2020-01-08 PROCEDURE — 1123F ACP DISCUSS/DSCN MKR DOCD: CPT | Performed by: NURSE PRACTITIONER

## 2020-01-08 PROCEDURE — 1036F TOBACCO NON-USER: CPT | Performed by: NURSE PRACTITIONER

## 2020-01-08 PROCEDURE — G8427 DOCREV CUR MEDS BY ELIG CLIN: HCPCS | Performed by: NURSE PRACTITIONER

## 2020-01-08 PROCEDURE — 99214 OFFICE O/P EST MOD 30 MIN: CPT | Performed by: NURSE PRACTITIONER

## 2020-01-08 PROCEDURE — 1090F PRES/ABSN URINE INCON ASSESS: CPT | Performed by: NURSE PRACTITIONER

## 2020-01-08 PROCEDURE — G8482 FLU IMMUNIZE ORDER/ADMIN: HCPCS | Performed by: NURSE PRACTITIONER

## 2020-01-08 PROCEDURE — G8420 CALC BMI NORM PARAMETERS: HCPCS | Performed by: NURSE PRACTITIONER

## 2020-01-08 RX ORDER — CEPHALEXIN 500 MG/1
500 TABLET ORAL 4 TIMES DAILY
Qty: 28 TABLET | Refills: 0 | Status: SHIPPED | OUTPATIENT
Start: 2020-01-08 | End: 2020-01-15

## 2020-01-08 ASSESSMENT — ENCOUNTER SYMPTOMS
DIARRHEA: 0
CONSTIPATION: 0
COUGH: 0
ABDOMINAL PAIN: 0
EYE PAIN: 0
EYE DISCHARGE: 0
BACK PAIN: 1
COLOR CHANGE: 0
SINUS PRESSURE: 0
EYE ITCHING: 0
SHORTNESS OF BREATH: 0
WHEEZING: 0

## 2020-01-08 ASSESSMENT — PATIENT HEALTH QUESTIONNAIRE - PHQ9
SUM OF ALL RESPONSES TO PHQ QUESTIONS 1-9: 0
SUM OF ALL RESPONSES TO PHQ9 QUESTIONS 1 & 2: 0
SUM OF ALL RESPONSES TO PHQ QUESTIONS 1-9: 0
2. FEELING DOWN, DEPRESSED OR HOPELESS: 0
1. LITTLE INTEREST OR PLEASURE IN DOING THINGS: 0

## 2020-01-08 NOTE — PROGRESS NOTES
[address] [facility]  Dept: [phone]  Dept Fax: [phone]: [phone]     Visit Date:  1/8/2020    Provider: PETEY Mistry CNP        Patient:  Fifi Miller  YOB: 1940    HPI:     Chief Complaint   Patient presents with    Follow-up     ER       HPI    Fifi Miller is being seen today for follow up after fall. Body mass index is 21.28 kg/m². reviewed with patient. Resident of [place]. Presents alone. Poor historian. Current c/o left hand laceration and lower back pain. Patient fell on concrete from standing on 01/2/2020. Was turning around with her walker and fell on her side with her left hand hitting the ground first. Skin tear to posterior portion of left hand. Was treated in the ED with steri-strips and dry dressing. Today, laceration has thick yellow drainage. Started yesterday. Has had daily dry dressings applied to area. Chronic back pain. Was evaluated by PT yesterday. Will begin therapy at [facility] in the near future.     Medications    Current Outpatient Medications:     Cephalexin 500 MG TABS, Take 500 mg by mouth 4 times daily for 7 days, Disp: 28 tablet, Rfl: 0    metoprolol tartrate (LOPRESSOR) 25 MG tablet, Take 1 tablet by mouth 2 times daily, Disp: 180 tablet, Rfl: 3    vitamin B-12 (CYANOCOBALAMIN) 1000 MCG tablet, Take 1 tablet by mouth daily, Disp: 30 tablet, Rfl: 3    lisinopril (PRINIVIL;ZESTRIL) 5 MG tablet, TAKE 1 TABLET BY MOUTH ONCE DAILY, Disp: 90 tablet, Rfl: 0    PREVALITE 4 g packet, , Disp: , Rfl:     Glucosamine-Chondroitin 166.7-133.3 MG CAPS, Take by mouth, Disp: , Rfl:     simvastatin (ZOCOR) 40 MG tablet, TAKE ONE TABLET BY MOUTH ONCE DAILY, Disp: 90 tablet, Rfl: 3    Multiple Vitamins-Minerals (CENTRUM SILVER 50+WOMEN PO), Take by mouth, Disp: , Rfl:     Probiotic Product ([address]), Take 1 tablet by mouth daily, Disp: , Rfl:     Allergies:  is allergic to alcohol; azulfidine [sulfasalazine]; and sulfa antibiotics. Past Medical History   has a past medical history of Abnormal nuclear stress test, Cataract, Colon polyps, COPD (chronic obstructive pulmonary disease) ([facility] [address] Diverticulosis large intestine w/o perforation or abscess w/bleeding, Fibroid, uterine, Hearing loss, Hyperlipidemia, Nicotine dependence, Nocturnal hypoxemia, Postmenopausal, Sleep apnea, and Ulcerative colitis ([address] Subjective:      Review of Systems   Constitutional: Negative for chills, fatigue and fever. HENT: Negative for congestion, ear pain, sinus pressure and sneezing. Eyes: Negative for pain, discharge and itching. Respiratory: Negative for cough, shortness of breath and wheezing. Cardiovascular: Negative for chest pain, palpitations and leg swelling. Gastrointestinal: Negative for abdominal pain, constipation and diarrhea. Endocrine: Negative for cold intolerance, polydipsia, polyphagia and polyuria. Genitourinary: Negative for difficulty urinating, flank pain and hematuria. Musculoskeletal: Positive for back pain (lower back) and gait problem (falls- using a walker). Negative for arthralgias and neck pain. Skin: Positive for wound (x2). Negative for color change, pallor and rash. Allergic/Immunologic: Negative for environmental allergies, food allergies and immunocompromised state. Neurological: Negative for dizziness, light-headedness, numbness and headaches. Hematological: Negative for adenopathy. Does not bruise/bleed easily. Psychiatric/Behavioral: Negative for agitation and confusion. The patient is not nervous/anxious. Objective:     BP (!) 102/50   Pulse 62   Resp 16   Wt 124 lb (56.2 kg)   BMI 21.28 kg/m²     Physical Exam  Vitals signs and nursing note reviewed. Constitutional:       Appearance: She is well-developed. HENT:      Head: Normocephalic.       Right Ear: Hearing, tympanic membrane, ear canal and external ear normal.      Left Ear: Hearing, tympanic membrane, ear canal and external ear normal.      Nose:      Right Sinus: No maxillary sinus tenderness or frontal sinus tenderness. Left Sinus: No maxillary sinus tenderness or frontal sinus tenderness. Mouth/Throat:      Mouth: Mucous membranes are moist.      Tongue: No lesions. Pharynx: No posterior oropharyngeal erythema. Eyes:      General:         Right eye: No discharge. Left eye: No discharge. Conjunctiva/sclera: Conjunctivae normal.      Pupils: Pupils are equal, round, and reactive to light. Neck:      Musculoskeletal: Normal range of motion. Vascular: No JVD. Cardiovascular:      Rate and Rhythm: Normal rate and regular rhythm. Heart sounds: Normal heart sounds. No murmur. Pulmonary:      Effort: Pulmonary effort is normal. No tachypnea. Breath sounds: Normal breath sounds. No stridor. No wheezing. Abdominal:      General: Bowel sounds are normal. There is no distension. Palpations: Abdomen is soft. Tenderness: There is no tenderness. Musculoskeletal:         General: No deformity. Comments: ROM in all extremities WNL. No deformities. Lymphadenopathy:      Head:      Right side of head: No submental or submandibular adenopathy. Left side of head: No submental or submandibular adenopathy. Skin:     General: Skin is warm and dry. Capillary Refill: Capillary refill takes less than 2 seconds. Findings: Erythema (minimal; surrounding left hand laceration) and laceration (x2) present. No rash or wound. Neurological:      Mental Status: She is alert and oriented to person, place, and time. Coordination: Coordination normal.   Psychiatric:         Mood and Affect: Mood normal.         Behavior: Behavior normal.         Thought Content:  Thought content normal.         Judgment: Judgment normal.         Assessment/Plan:

## 2020-01-08 NOTE — PATIENT INSTRUCTIONS
other piece of furniture that is close to you. 2. Roll onto your side and rest. Roll by turning your head in the direction you want to roll, move your shoulder and arm, then hip and leg in the same direction. 3. Lie still for a moment to let your blood pressure adjust.  4. Slowly push your upper body up, lift your head, and take a moment to rest.  5. Slowly get up on your hands and knees, and crawl to the chair or other stable piece of furniture. 6. Put your hands on the chair. 7. Move one foot forward, and place it flat on the floor. Your other leg should be bent with the knee on the floor. 8. Rise slowly, turn your body, and sit in the chair. Stay seated for a bit and think about how you feel. Call for help. Even if you feel okay, let someone know what happened to you. You might not know that you have a serious injury. If you cannot get up  1. If you think you are injured after a fall or you cannot get up, try not to panic. 2. Call out for help. 3. If you have a phone within reach or you have an emergency call device, use it to call for help. 4. If you do not have a phone within reach, try to slide yourself toward it. If you cannot get to the phone, try to slide toward a door or window or a place where you think you can be heard. 5. [place] or use an object to make noise so someone might hear you. 6. If you can reach something that you can use for a pillow, place it under your head. Try to stay warm by covering yourself with a blanket or clothing while you wait for help. When should you call for help? Call 911 anytime you think you may need emergency care.  For example, call if:    · You passed out (lost consciousness).     · You cannot get up after a fall.     · You have severe pain.    Call your doctor now or seek immediate medical care if:    · You have new or worse pain.     · You are dizzy or lightheaded.     · You hit your head.    Watch closely for changes in your health, and be sure to contact your doctor if:    · You do not get better as expected. Where can you learn more? Go to [email or web address]://[email or web address].[email or web address] org and sign in to your [employer] account. Enter L102 in the [facility] box to learn more about \"How to Get Up Safely After a Fall: Care Instructions. \"     If you do not have an account, please click on the \"Sign Up Now\" link. Current as of: August 6, 2019  Content Version: 12.3  © 8569-9610 Healthwise, [employer]. Care instructions adapted under license by [facility] ([facility]). If you have questions about a medical condition or this instruction, always ask your healthcare professional. [place] [address] any warranty or liability for your use of this information. Patient Education        Cellulitis: Care Instructions  Your Care Instructions    Cellulitis is a skin infection caused by bacteria, most often strep or staph. It often occurs after a break in the skin from a scrape, cut, bite, or puncture, or after a rash. Cellulitis may be treated without doing tests to find out what caused it. But your doctor may do tests, if needed, to look for a specific bacteria, like methicillin-resistant Staphylococcus aureus (MRSA). The doctor has checked you carefully, but problems can develop later. If you notice any problems or new symptoms, get medical treatment right away. Follow-up care is a key part of your treatment and safety. Be sure to make and go to all appointments, and call your doctor if you are having problems. It's also a good idea to know your test results and keep a list of the medicines you take. How can you care for yourself at home? · Take your antibiotics as directed. Do not stop taking them just because you feel better. You need to take the full course of antibiotics. · Prop up the infected area on pillows to reduce pain and swelling. Try to keep the area above the level of your heart as often as you can.   · If your doctor told you how to care account. Enter O764 in the [facility] box to learn more about \"Cellulitis: Care Instructions. \"     If you do not have an account, please click on the \"Sign Up Now\" link. Current as of: April 1, 2019  Content Version: 12.3  © 3878-3769 Healthwise, Incorporated. Care instructions adapted under license by [facility] ([facility]). If you have questions about a medical condition or this instruction, always ask your healthcare professional. [address] any warranty or liability for your use of this information.

## 2020-01-13 ENCOUNTER — OFFICE VISIT (OUTPATIENT)
Dept: INTERNAL MEDICINE CLINIC | Age: 80
End: 2020-01-13
Payer: MEDICARE

## 2020-01-13 ENCOUNTER — TELEPHONE (OUTPATIENT)
Dept: INTERNAL MEDICINE CLINIC | Age: 80
End: 2020-01-13

## 2020-01-13 VITALS
BODY MASS INDEX: 21.42 KG/M2 | SYSTOLIC BLOOD PRESSURE: 136 MMHG | HEART RATE: 76 BPM | WEIGHT: 124.8 LBS | DIASTOLIC BLOOD PRESSURE: 72 MMHG

## 2020-01-13 PROCEDURE — G0108 DIAB MANAGE TRN  PER INDIV: HCPCS | Performed by: INTERNAL MEDICINE

## 2020-01-13 NOTE — PATIENT INSTRUCTIONS
1. Make appointment to get your eyes checked--you are due  2. We want you to have your blood sugar checked 3 days per week by [facility] staff                   [id number] morning              Blood sugar goal   3. Do your therapy 2 days per week and your therapy exercise 2 times per day on the other 3 days  4. 3 meals each day           You can have 2 or 3 servings of carbohydrate at each meal, plus protein and low carb vegetables  5. Stop drinking sweet tea and any fruit juice--these are loaded with sugar  Try to write down everything you are eating and how much.  Bring this to your next appointment

## 2020-01-13 NOTE — PROGRESS NOTES
Blood sugar goal   3. Do your therapy 2 days per week and your therapy exercise 2 times per day on the other 3 days  4. 3 meals each day           You can have 2 or 3 servings of carbohydrate at each meal, plus protein and low carb vegetables  5. Stop drinking sweet tea and any fruit juice--these are loaded with sugar  Try to write down everything you are eating and how much. Bring this to your next appointment  Leisa Gilbert voices understanding in the above instructions and willingness to participate in the above plan of care. Time spent in direct contact with patient 60 minutes.

## 2020-01-15 ENCOUNTER — TELEPHONE (OUTPATIENT)
Dept: FAMILY MEDICINE CLINIC | Age: 80
End: 2020-01-15

## 2020-01-15 NOTE — TELEPHONE ENCOUNTER
Clindamycin 450 mg PO TID x7 days. Daily dressing changes following cleansing with soap and water. Antibiotic ointment with dressing changes.

## 2020-01-23 ENCOUNTER — OFFICE VISIT (OUTPATIENT)
Dept: PHYSICAL MEDICINE AND REHAB | Age: 80
End: 2020-01-23
Payer: MEDICARE

## 2020-01-23 VITALS
SYSTOLIC BLOOD PRESSURE: 99 MMHG | BODY MASS INDEX: 20.66 KG/M2 | HEART RATE: 87 BPM | HEIGHT: 65 IN | DIASTOLIC BLOOD PRESSURE: 73 MMHG | WEIGHT: 124 LBS | TEMPERATURE: 97 F

## 2020-01-23 PROCEDURE — 99214 OFFICE O/P EST MOD 30 MIN: CPT | Performed by: PHYSICAL MEDICINE & REHABILITATION

## 2020-01-23 PROCEDURE — G8420 CALC BMI NORM PARAMETERS: HCPCS | Performed by: PHYSICAL MEDICINE & REHABILITATION

## 2020-01-23 PROCEDURE — 1036F TOBACCO NON-USER: CPT | Performed by: PHYSICAL MEDICINE & REHABILITATION

## 2020-01-23 PROCEDURE — 1090F PRES/ABSN URINE INCON ASSESS: CPT | Performed by: PHYSICAL MEDICINE & REHABILITATION

## 2020-01-23 PROCEDURE — G8427 DOCREV CUR MEDS BY ELIG CLIN: HCPCS | Performed by: PHYSICAL MEDICINE & REHABILITATION

## 2020-01-23 PROCEDURE — G8482 FLU IMMUNIZE ORDER/ADMIN: HCPCS | Performed by: PHYSICAL MEDICINE & REHABILITATION

## 2020-01-23 PROCEDURE — G8400 PT W/DXA NO RESULTS DOC: HCPCS | Performed by: PHYSICAL MEDICINE & REHABILITATION

## 2020-01-23 PROCEDURE — 4040F PNEUMOC VAC/ADMIN/RCVD: CPT | Performed by: PHYSICAL MEDICINE & REHABILITATION

## 2020-01-23 PROCEDURE — 1123F ACP DISCUSS/DSCN MKR DOCD: CPT | Performed by: PHYSICAL MEDICINE & REHABILITATION

## 2020-01-23 RX ORDER — PREDNISONE 10 MG/1
10 TABLET ORAL DAILY
Qty: 30 TABLET | Refills: 3 | Status: SHIPPED | OUTPATIENT
Start: 2020-01-23 | End: 2020-05-22

## 2020-01-27 ENCOUNTER — TELEPHONE (OUTPATIENT)
Dept: PHYSICAL MEDICINE AND REHAB | Age: 80
End: 2020-01-27

## 2020-01-28 ENCOUNTER — OFFICE VISIT (OUTPATIENT)
Dept: INTERNAL MEDICINE CLINIC | Age: 80
End: 2020-01-28
Payer: MEDICARE

## 2020-01-28 VITALS — WEIGHT: 125 LBS | BODY MASS INDEX: 20.83 KG/M2 | HEIGHT: 65 IN

## 2020-01-28 PROCEDURE — 97802 MEDICAL NUTRITION INDIV IN: CPT | Performed by: DIETITIAN, REGISTERED

## 2020-01-28 NOTE — PROGRESS NOTES
portion and count as 1 carb serving.    5.)  Drink adequate water. Have at least 5-6 cups water/day    6.)  Good job keeping active with your physical therapy. Thanks for the food log. Bring again to next dietitian appt. -Nutrition prescription: 1400 calories/day, 135 g carbs/day. Comprehension verified using teachback method. Monitoring/Evaluation:   -Followup visit: 7 weeks with dietitian.   -Receptiveness to education/goals: Agreeable.  -Evaluation of education: Needs reinforcement.  -Readiness to change: precontemplative- identifying her carb foods presented to her at meal times at the [address] [place] [address] [employer] dining settings and stay btw 2-3 carb serving/meal.  -Expected compliance: fair. Thank you for your referral of this patient. Total time involved in direct patient education: 45 minutes for follow-up MNT visit.

## 2020-01-28 NOTE — PATIENT INSTRUCTIONS
1.)  You can have 2-3 carb servings/meal  - Add protein  - Add non-starchy veggies    2.)  1/2 cup most of the time = 1 carb serving  - Carb foods are grains/starches, fruit and milk and yogurt    3.)  Follow the picture of the plate for balanced meal planning.    4.)  When having a dessert have a small portion and count as 1 carb serving.    5.)  Drink adequate water. Have at least 5-6 cups water/day    6.)  Good job keeping active with your physical therapy. Thanks for the food log. Bring again to next dietitian appt.

## 2020-01-29 NOTE — PROGRESS NOTES
[facility] [place] [facility]YSICAL [facility]ICINE & REHABILITATION  [address] [facility]  [address] [facility]  [address] [address]  Dept: [phone]  Dept Fax: [phone]    Outpatient Followup Note    Ismael Hernandez, 78 y.o., female, presents for follow up c/o of Follow-up (Lower extremity weakness)  . 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 She reports significant improvement in her weakness and function when she was taking prednisone but states symptoms have returned to baseline since she completed prednisone. She has completed 15 visits of therapy and moved into assisted living facility in [address] [place] [address]. She notes some improvement in her mobility after therapy. Past Medical History:   Diagnosis Date    Abnormal nuclear stress test 6/1/2018    Cataract     Colon polyps     COPD (chronic obstructive pulmonary disease) (HCC)     Diverticulosis large intestine w/o perforation or abscess w/bleeding     Fibroid, uterine     Hearing loss     Hyperlipidemia     Nicotine dependence     Nocturnal hypoxemia     Postmenopausal     Sleep apnea     Ulcerative colitis ([address]     with rectal bleeding       Past Surgical History:   Procedure Laterality Date    BREAST BIOPSY  1988    right breast biopsy     BREAST BIOPSY Right 03/2011    stereotactic biopsy    COLONOSCOPY  2006    Dr Katie Ochoa  2010    Dr Bob Bull  2010    Dr Chery Boys interminate colitis    COLONOSCOPY  03/03/2014    Dr Cantu Ankur Bilateral 2011 2012     Family History   Problem Relation Age of Onset    Diabetes Mother     High Blood Pressure Mother     Heart Disease Mother     Coronary Art Dis Father     Heart Disease Father      Social History     Socioeconomic History    Marital status:       Spouse name: None    Number of children: None    Years of education: None    Highest education level: None   Occupational History    None   Social Needs    Financial resource strain: Not hard at all   [employer] insecurity:     Worry: Never true     Inability: Never true   [employer] needs:     Medical: No     Non-medical: No   Tobacco Use    Smoking status: Former Smoker     Packs/day: 1.00     Years: 40.00     Pack years: 40.00     Types: Cigarettes     Last attempt to quit: 2019     Years since quittin.5    Smokeless tobacco: Never Used   Substance and Sexual Activity    Alcohol use: No    Drug use: Never    Sexual activity: Not Currently   Lifestyle    Physical activity:     Days per week: None     Minutes per session: None    Stress: None   Relationships    Social connections:     Talks on phone: None     Gets together: None     Attends Anabaptist service: None     Active member of club or organization: None     Attends meetings of clubs or organizations: None     Relationship status: None    Intimate partner violence:     Fear of current or ex partner: None     Emotionally abused: None     Physically abused: None     Forced sexual activity: None   Other Topics Concern    None   Social History Narrative    None       Current Outpatient Medications   Medication Sig Dispense Refill    predniSONE (DELTASONE) 10 MG tablet Take 1 tablet by mouth daily 30 tablet 3    metoprolol tartrate (LOPRESSOR) 25 MG tablet Take 1 tablet by mouth 2 times daily 180 tablet 3    vitamin B-12 (CYANOCOBALAMIN) 1000 MCG tablet Take 1 tablet by mouth daily (Patient taking differently: Take 1,000 mcg by mouth 2 times daily ) 30 tablet 3    lisinopril (PRINIVIL;ZESTRIL) 5 MG tablet TAKE 1 TABLET BY MOUTH ONCE DAILY 90 tablet 0    Glucosamine-Chondroitin 166.7-133.3 MG CAPS Take by mouth      simvastatin (ZOCOR) 40 MG tablet TAKE ONE TABLET BY MOUTH ONCE DAILY 90 tablet 3    Multiple Vitamins-Minerals (CENTRUM SILVER 50+WOMEN PO) Take by mouth      Probiotic Product (Atmosferiq PO) Take 1 tablet by mouth daily       No current facility-administered medications for this visit. Allergies   Allergen Reactions    Alcohol     Azulfidine [Sulfasalazine] Diarrhea    Sulfa Antibiotics        Subjective:      Review of Systems  Constitutional: Negative for fever, chills and unexpected weight change. HENT: Negative for trouble swallowing. Respiratory: Negative for cough and shortness of breath. Cardiovascular: Negative for chest pain. Endocrine: Negative for polyuria. Genitourinary: Negative for dysuria, urgency, frequency, incontinence and difficulty urinating. Gastrointestinal: Negative for constipation or diarrhea. Musculoskeletal: Positive for arthralgias. Neurological: Negative for headaches, numbness, or tingling. Psychiatric: Negative for depressed mood or anxiety. Objective:     Physical Exam  BP 99/73   Pulse 87   Temp 97 °F (36.1 °C) (Oral)   Ht 5' 5\" (1.651 m)   Wt 124 lb (56.2 kg)   BMI 20.63 kg/m²   Constitutional: She is oriented to person, place,and time. She appears well-developed and well-nourished. HEENT: NCAT, PERRL, EOMI. Mucous membranes pink and moist.  Pulmonary/Chest: Respirations WNL and unlabored. Neurological: She is alert and oriented to person, place, and time. She has normal reflexes. MSK: Has to use her arms to perform sit-to-stand transfers. Functional ROM BUE and BLEs. Strength 4/5 key muscles BLEs, 4+/5 key muscles BUEs. Skin: Skin is warm dry and intact with good turgor. Psychiatric: She has a normal mood and affect. Her behavior is normal. Thought content normal.   Nursing note and vitals reviewed. Imaging: None new    Assessment:       Diagnosis Orders   1. Leg weakness, bilateral  Sedimentation Rate        Plan:      Discussed case with Dr. Fawad Etienne today.  Given patient's significant functional improvement and relief of symptoms on prednisone will start low dose prednisone after checking ESR and monitor. She will check glucose levels three times weekly to monitor. She has follow up with Dr. Juan Pablo Galindo in May. Orders Placed This Encounter   Procedures    Sedimentation Rate     Standing Status:   Future     Standing Expiration Date:   1/23/2021     Orders Placed This Encounter   Medications    predniSONE (DELTASONE) 10 MG tablet     Sig: Take 1 tablet by mouth daily     Dispense:  30 tablet     Refill:  3     Return in about 4 months (around 5/20/2020). Electronically signedby Shanelle Westfall MD on 1/28/2020 at 9:50 PM.     Please note that this chartwas generated using voice recognition Dragon dictation software. Although everyeffort was made to ensure the accuracy of this automated transcription, some errorsin transcription may have occurred.

## 2020-02-04 LAB — SEDIMENTATION RATE, ERYTHROCYTE: 25

## 2020-02-26 ENCOUNTER — TELEPHONE (OUTPATIENT)
Dept: FAMILY MEDICINE CLINIC | Age: 80
End: 2020-02-26

## 2020-02-26 ENCOUNTER — OFFICE VISIT (OUTPATIENT)
Dept: FAMILY MEDICINE CLINIC | Age: 80
End: 2020-02-26
Payer: MEDICARE

## 2020-02-26 ENCOUNTER — HOSPITAL ENCOUNTER (OUTPATIENT)
Age: 80
Setting detail: SPECIMEN
Discharge: HOME | End: 2020-02-26
Payer: MEDICARE

## 2020-02-26 VITALS
HEART RATE: 74 BPM | WEIGHT: 116 LBS | OXYGEN SATURATION: 97 % | BODY MASS INDEX: 19.3 KG/M2 | SYSTOLIC BLOOD PRESSURE: 124 MMHG | DIASTOLIC BLOOD PRESSURE: 70 MMHG

## 2020-02-26 LAB
ABSOLUTE EOS #: <0.03 K/UL (ref 0–0.44)
ABSOLUTE IMMATURE GRANULOCYTE: 0.04 K/UL (ref 0–0.3)
ABSOLUTE LYMPH #: 0.89 K/UL (ref 1.1–3.7)
ABSOLUTE MONO #: 0.21 K/UL (ref 0.1–1.2)
ALBUMIN SERPL-MCNC: 4.1 G/DL (ref 3.5–5.2)
ALBUMIN/GLOBULIN RATIO: 1.4 (ref 1–2.5)
ALP BLD-CCNC: 76 U/L (ref 35–104)
ALT SERPL-CCNC: 20 U/L (ref 5–33)
ANION GAP SERPL CALCULATED.3IONS-SCNC: 13 MMOL/L (ref 9–17)
AST SERPL-CCNC: 21 U/L
BASOPHILS # BLD: 0 % (ref 0–2)
BASOPHILS ABSOLUTE: 0.03 K/UL (ref 0–0.2)
BILIRUB SERPL-MCNC: 0.61 MG/DL (ref 0.3–1.2)
BUN BLDV-MCNC: 21 MG/DL (ref 8–23)
BUN/CREAT BLD: 27 (ref 9–20)
CALCIUM SERPL-MCNC: 10.9 MG/DL (ref 8.6–10.4)
CHLORIDE BLD-SCNC: 100 MMOL/L (ref 98–107)
CHOLESTEROL/HDL RATIO: 4
CHOLESTEROL: 194 MG/DL
CO2: 29 MMOL/L (ref 20–31)
CREAT SERPL-MCNC: 0.78 MG/DL (ref 0.5–0.9)
DIFFERENTIAL TYPE: ABNORMAL
EOSINOPHILS RELATIVE PERCENT: 0 % (ref 1–4)
GFR AFRICAN AMERICAN: >60 ML/MIN
GFR NON-AFRICAN AMERICAN: >60 ML/MIN
GFR SERPL CREATININE-BSD FRML MDRD: ABNORMAL ML/MIN/{1.73_M2}
GFR SERPL CREATININE-BSD FRML MDRD: ABNORMAL ML/MIN/{1.73_M2}
GLUCOSE BLD-MCNC: 154 MG/DL (ref 70–99)
HBA1C MFR BLD: 6.4 %
HCT VFR BLD CALC: 47.2 % (ref 36.3–47.1)
HDLC SERPL-MCNC: 49 MG/DL
HEMOGLOBIN: 14.9 G/DL (ref 11.9–15.1)
IMMATURE GRANULOCYTES: 0 %
LDL CHOLESTEROL: 110 MG/DL (ref 0–130)
LYMPHOCYTES # BLD: 9 % (ref 24–43)
MCH RBC QN AUTO: 29.9 PG (ref 25.2–33.5)
MCHC RBC AUTO-ENTMCNC: 31.6 G/DL (ref 25.2–33.5)
MCV RBC AUTO: 94.6 FL (ref 82.6–102.9)
MONOCYTES # BLD: 2 % (ref 3–12)
NRBC AUTOMATED: 0 PER 100 WBC
PDW BLD-RTO: 14.6 % (ref 11.8–14.4)
PLATELET # BLD: 306 K/UL (ref 138–453)
PLATELET ESTIMATE: ABNORMAL
PMV BLD AUTO: 11 FL (ref 8.1–13.5)
POTASSIUM SERPL-SCNC: 4.6 MMOL/L (ref 3.7–5.3)
RBC # BLD: 4.99 M/UL (ref 3.95–5.11)
RBC # BLD: ABNORMAL 10*6/UL
SEG NEUTROPHILS: 88 % (ref 36–65)
SEGMENTED NEUTROPHILS ABSOLUTE COUNT: 9 K/UL (ref 1.5–8.1)
SODIUM BLD-SCNC: 142 MMOL/L (ref 135–144)
TOTAL PROTEIN: 7.1 G/DL (ref 6.4–8.3)
TRIGL SERPL-MCNC: 175 MG/DL
VLDLC SERPL CALC-MCNC: ABNORMAL MG/DL (ref 1–30)
WBC # BLD: 10.2 K/UL (ref 3.5–11.3)
WBC # BLD: ABNORMAL 10*3/UL

## 2020-02-26 PROCEDURE — 85025 COMPLETE CBC W/AUTO DIFF WBC: CPT

## 2020-02-26 PROCEDURE — 99214 OFFICE O/P EST MOD 30 MIN: CPT | Performed by: FAMILY MEDICINE

## 2020-02-26 PROCEDURE — 80053 COMPREHEN METABOLIC PANEL: CPT

## 2020-02-26 PROCEDURE — 4040F PNEUMOC VAC/ADMIN/RCVD: CPT | Performed by: FAMILY MEDICINE

## 2020-02-26 PROCEDURE — 1036F TOBACCO NON-USER: CPT | Performed by: FAMILY MEDICINE

## 2020-02-26 PROCEDURE — 83036 HEMOGLOBIN GLYCOSYLATED A1C: CPT | Performed by: FAMILY MEDICINE

## 2020-02-26 PROCEDURE — 1090F PRES/ABSN URINE INCON ASSESS: CPT | Performed by: FAMILY MEDICINE

## 2020-02-26 PROCEDURE — G8427 DOCREV CUR MEDS BY ELIG CLIN: HCPCS | Performed by: FAMILY MEDICINE

## 2020-02-26 PROCEDURE — 3023F SPIROM DOC REV: CPT | Performed by: FAMILY MEDICINE

## 2020-02-26 PROCEDURE — G8420 CALC BMI NORM PARAMETERS: HCPCS | Performed by: FAMILY MEDICINE

## 2020-02-26 PROCEDURE — 80061 LIPID PANEL: CPT

## 2020-02-26 PROCEDURE — 1123F ACP DISCUSS/DSCN MKR DOCD: CPT | Performed by: FAMILY MEDICINE

## 2020-02-26 PROCEDURE — 36415 COLL VENOUS BLD VENIPUNCTURE: CPT

## 2020-02-26 PROCEDURE — G8926 SPIRO NO PERF OR DOC: HCPCS | Performed by: FAMILY MEDICINE

## 2020-02-26 PROCEDURE — G8482 FLU IMMUNIZE ORDER/ADMIN: HCPCS | Performed by: FAMILY MEDICINE

## 2020-02-26 PROCEDURE — G8400 PT W/DXA NO RESULTS DOC: HCPCS | Performed by: FAMILY MEDICINE

## 2020-02-26 RX ORDER — ALBUTEROL SULFATE 90 UG/1
2 AEROSOL, METERED RESPIRATORY (INHALATION) EVERY 6 HOURS PRN
Qty: 1 INHALER | Refills: 3 | Status: SHIPPED | OUTPATIENT
Start: 2020-02-26 | End: 2021-07-08 | Stop reason: SDUPTHER

## 2020-02-26 ASSESSMENT — ENCOUNTER SYMPTOMS
ABDOMINAL PAIN: 0
DIARRHEA: 0
WHEEZING: 0
RHINORRHEA: 0
SHORTNESS OF BREATH: 0
ABDOMINAL DISTENTION: 0
SORE THROAT: 0
BLOOD IN STOOL: 0
COUGH: 1

## 2020-02-26 NOTE — PROGRESS NOTES
months or so. I am not able to elicit any alarming symptoms. Denies fevers chills or night sweats. No hemoptysis. No swollen glands. He does not have stomach pain. She does carry a diagnosis of ulcerative colitis. But does say that her stomach is much better. No blood per rectum. Patient is also wondering about the use of prednisone. He remains on prednisone 10 mg daily. It appears that this was begun for possible diagnosis of polymyalgia rheumatica. She has been under  the care of Dr. Jonatan Avila. She believes that he started the medication. Started to because of stiffness and difficulty getting up and out of furniture. She does feel better. Even her bowels are better.   She wonders how long she has to take the medication      BP Readings from Last 3 Encounters:   02/26/20 124/70   01/23/20 99/73   01/13/20 136/72            (goal 120/80)    Past Medical History:   Diagnosis Date    Abnormal nuclear stress test 6/1/2018    Cataract     Colon polyps     COPD (chronic obstructive pulmonary disease) (HCC)     Diverticulosis large intestine w/o perforation or abscess w/bleeding     Fibroid, uterine     Hearing loss     Hyperlipidemia     Nicotine dependence     Nocturnal hypoxemia     Postmenopausal     Sleep apnea     Ulcerative colitis ([address]     with rectal bleeding       Past Surgical History:   Procedure Laterality Date    BREAST BIOPSY  1988    right breast biopsy     BREAST BIOPSY Right 03/2011    stereotactic biopsy    COLONOSCOPY  2006    Dr Paul Anglin  2010    Dr Richie Perez  2010    Dr Ant Mccormack interminate colitis    COLONOSCOPY  03/03/2014    Dr Modesta Kulkarni Bilateral 2011 2012     Family History   Problem Relation Age of Onset    Diabetes Mother     High Blood Pressure Mother     Heart Disease Mother     Coronary Art Dis Father     Heart Disease Father      Social History     Tobacco Use    Smoking Results   Component Value Date    K 4.3 01/04/2019    CREATININE 0.9 01/04/2019    AST 29 01/04/2019    ALT 17 01/04/2019    TSH 0.17 05/24/2018    HCT 41.9 02/04/2019    LABA1C 6.4 02/26/2020    GLUCOSE 122 01/04/2019    CALCIUM 9.8 03/27/2019    DPMPOHWB95 368 08/01/2019    TIBC 296 11/01/2018    IRON 72 11/01/2018      Lab Results   Component Value Date    CHOL 169 09/01/2017    TRIG 162 09/01/2017       Subjective:      Review of Systems   Constitutional: Positive for unexpected weight change. Negative for appetite change, chills and fever. HENT: Negative for congestion, postnasal drip, rhinorrhea and sore throat. Respiratory: Positive for cough. Negative for shortness of breath (no increasing ) and wheezing. Cardiovascular: Negative for chest pain, palpitations and leg swelling. Gastrointestinal: Negative for abdominal distention, abdominal pain, blood in stool and diarrhea. Genitourinary: Negative. Musculoskeletal: Positive for gait problem. Negative for arthralgias, joint swelling and neck stiffness. Neurological: Positive for numbness. Psychiatric/Behavioral: Negative. Objective:     /70   Pulse 74   Wt 116 lb (52.6 kg)   SpO2 97%   BMI 19.30 kg/m²     Physical Exam  Vitals signs and nursing note reviewed. Constitutional:       Appearance: She is well-developed. HENT:      Right Ear: Tympanic membrane normal.      Left Ear: Tympanic membrane normal.      Mouth/Throat:      Mouth: Mucous membranes are moist.      Pharynx: Oropharynx is clear. Neck:      Musculoskeletal: Normal range of motion. Vascular: No JVD. Cardiovascular:      Rate and Rhythm: Normal rate and regular rhythm. Heart sounds: Normal heart sounds. No murmur. Pulmonary:      Effort: Pulmonary effort is normal. No tachypnea. Breath sounds: Normal breath sounds. No stridor. No wheezing. Abdominal:      General: Bowel sounds are normal. There is no distension.       Palpations: Abdomen is soft. Tenderness: There is no abdominal tenderness. Musculoskeletal:         General: No deformity. Comments: .   Skin:     General: Skin is warm and dry. Capillary Refill: Capillary refill takes less than 2 seconds. Findings: No rash. Neurological:      Mental Status: She is alert and oriented to person, place, and time. Coordination: Coordination normal.   Psychiatric:         Mood and Affect: Mood normal.         Behavior: Behavior normal.         Thought Content: Thought content normal.         Judgment: Judgment normal.         Assessment:      Diagnosis Orders   1. Type 2 diabetes mellitus with diabetic polyneuropathy, without long-term current use of insulin (HCC)  POCT glycosylated hemoglobin (Hb A1C)   2. Mixed hyperlipidemia     3. Diabetic sensorimotor polyneuropathy ([address]     4. Degenerative disc disease, lumbar     5. Hypertension, unspecified type     6. Simple chronic bronchitis (HCC)  XR CHEST STANDARD (2 VW)    albuterol sulfate  (90 Base) MCG/ACT inhaler   7. Ulcerative colitis with rectal bleeding, unspecified location ([address]     8. Cigarette nicotine dependence without complication              POC Testing Results (If Applicable):  Results for POC orders placed in visit on 02/26/20   POCT glycosylated hemoglobin (Hb A1C)   Result Value Ref Range    Hemoglobin A1C 6.4 %       Plan:     Get her routine labs today CBC CMP and lipids  She needs to stay on a prednisone. Since it does appear that diagnosis is entertained diagnosis is polymyalgia rheumatica.   And obviously follow-up with Dr. Reyes Ren  Albuterol inhaler sent to pharmacy  Chest x-ray today and looking for any infiltrate or bronchitis  Check in 2 months sooner if any problems    Orders Given:  Orders Placed This Encounter   Procedures    XR CHEST STANDARD (2 VW)     Standing Status:   Future     Standing Expiration Date:   2/25/2021    POCT glycosylated hemoglobin (Hb A1C)     Prescriptions: Orders Placed This Encounter   Medications    albuterol sulfate  (90 Base) MCG/ACT inhaler     Sig: Inhale 2 puffs into the lungs every 6 hours as needed for Wheezing     Dispense:  1 Inhaler     Refill:  3        Return in about 2 months (around 4/26/2020). Electronically signed by Linh Chris MD on2/26/2020. **This report has been created using voice recognition software. It may contain minor errors which are inherent in voice recognition technology. **

## 2020-02-26 NOTE — TELEPHONE ENCOUNTER
----- Message from PETEY Tovar CNP sent at 2/26/2020  3:27 PM EST -----  Over the last 3 months, a1c has improved from 6.6 to 6.4. Continue with current dietary changes. No changes at this time.

## 2020-03-12 RX ORDER — SIMVASTATIN 40 MG
40 TABLET ORAL NIGHTLY
Qty: 90 TABLET | Refills: 3 | Status: SHIPPED | OUTPATIENT
Start: 2020-03-12 | End: 2022-06-15 | Stop reason: SDUPTHER

## 2020-07-21 ENCOUNTER — OFFICE VISIT (OUTPATIENT)
Dept: FAMILY MEDICINE CLINIC | Age: 80
End: 2020-07-21
Payer: MEDICARE

## 2020-07-21 ENCOUNTER — TELEPHONE (OUTPATIENT)
Dept: FAMILY MEDICINE CLINIC | Age: 80
End: 2020-07-21

## 2020-07-21 VITALS
RESPIRATION RATE: 20 BRPM | HEART RATE: 72 BPM | SYSTOLIC BLOOD PRESSURE: 120 MMHG | DIASTOLIC BLOOD PRESSURE: 50 MMHG | BODY MASS INDEX: 21.33 KG/M2 | WEIGHT: 128.2 LBS | TEMPERATURE: 97 F | OXYGEN SATURATION: 93 %

## 2020-07-21 PROCEDURE — 1111F DSCHRG MED/CURRENT MED MERGE: CPT | Performed by: NURSE PRACTITIONER

## 2020-07-21 PROCEDURE — 99214 OFFICE O/P EST MOD 30 MIN: CPT | Performed by: NURSE PRACTITIONER

## 2020-07-21 RX ORDER — ACETAMINOPHEN 325 MG/1
650 TABLET ORAL EVERY 6 HOURS PRN
COMMUNITY
End: 2021-11-17 | Stop reason: SDUPTHER

## 2020-07-21 RX ORDER — SODIUM PHOSPHATE,MONO-DIBASIC 19G-7G/118
ENEMA (ML) RECTAL DAILY
COMMUNITY

## 2020-07-21 RX ORDER — LISINOPRIL 20 MG/1
20 TABLET ORAL DAILY
COMMUNITY
End: 2022-06-15 | Stop reason: SDUPTHER

## 2020-07-21 RX ORDER — LANOLIN ALCOHOL/MO/W.PET/CERES
3 CREAM (GRAM) TOPICAL DAILY
COMMUNITY
End: 2020-12-22 | Stop reason: ALTCHOICE

## 2020-07-21 NOTE — PATIENT INSTRUCTIONS
Patient Education        Surgery to Repair a Hip Fracture: What to Expect at Home  Your Recovery     Surgery for a hip fracture repairs a broken hip bone. When you leave the hospital after surgery, you will probably be walking with crutches or a walker. You may be able to climb a few stairs and get in and out of bed and chairs. But you will need someone to help you at home for the next few weeks or until you have more energy and can move around better. If there is no one to help you at home, you may go to a rehabilitation center or long-term care center. You will go home with a bandage and stitches or staples. You can remove the bandage when your doctor tells you to. Your doctor will remove your stitches or staples 10 days to 3 weeks after your surgery. You may still have some mild pain, and the area may be swollen for 3 to 4 months after surgery. Your doctor will give you medicine for the pain. You will continue the rehabilitation program (rehab) you started in the hospital. The better you do with your rehab exercises, the quicker you will get your strength and movement back. Most people are able to return to work 4 weeks to 4 months after surgery. But it may take 6 months to 1 year for you to fully recover. Some people, especially older people, are never able to move quite as well as they used to. You heal best when you take good care of yourself. Eat a variety of healthy foods, and don't smoke. This care sheet gives you a general idea about how long it will take for you to recover. But each person recovers at a different pace. Follow the steps below to get better as quickly as possible. How can you care for yourself at home? Activity  · Rest when you feel tired. You may take a nap, but do not stay in bed all day. · Work with your physical therapist to learn the best way to exercise. You may be able to take frequent, short walks using crutches or a walker.  You will probably have to use crutches or a walker for at least 4 to 6 weeks. After that, you may need to use a cane to help you walk. · Do not sit for longer than 30 to 45 minutes at a time. When you sit, use chairs with arms, and do not sit in low chairs. · Sleep on your back with your legs slightly apart or on your side with a pillow between your knees for about 6 weeks or as your doctor tells you. Do not sleep on your stomach or affected hip. · You may need to take sponge baths until your stitches or staples have been removed. You will probably be able to shower 24 hours after they are removed. Ask your doctor when it is okay to bathe or shower. · Ask your doctor when you can drive again. · Most people are able to return to work 4 weeks to 4 months after surgery. · Ask your doctor when it is okay for you to have sex. · Do not lift anything that would make you strain. This may include heavy grocery bags and milk containers, a heavy briefcase or backpack, cat litter or dog food bags, a vacuum , or a child. Diet  · By the time you leave the hospital, you will probably be eating your normal diet. If your stomach is upset, try bland, low-fat foods like plain rice, broiled chicken, toast, and yogurt. Your doctor may recommend that you take iron and vitamin supplements. · Continue to drink plenty of fluids. · Eat healthy foods, and watch your portion sizes. Try to stay at your ideal weight. Too much weight puts more stress on your hip joint. · You may notice that your bowel movements are not regular right after your surgery. This is common. Try to avoid constipation and straining with bowel movements. You may want to take a fiber supplement every day. If you have not had a bowel movement after a couple of days, ask your doctor about taking a mild laxative. · Your doctor may want you to take calcium supplements and eat foods high in calcium, such as milk, cheese, ice cream, and salmon with bones. These help stop bone loss.  Orange juice and soy pins and plates are in the correct place. · Stitch or staple the incisions closed. What can you expect after surgery for a hip fracture ? You will probably stay in the hospital for 2 to 4 days after surgery. Your rehabilitation program (rehab) will start at this time. If you do not have someone to help you at home, you may go from the hospital to a short-term rehabilitation center or a long-term care center. For several months, you may need the help of a walker or crutches. After that, you may need to walk with a cane. At first, you may need help with daily activities such as bathing, dressing, and cooking. Rehab will help you get back to your regular activities, but it will probably take at least 3 months to return to your normal routine. It may take 6 months to 1 year for you to fully recover. Some people, especially older people, are never able to move as well as they used to. · You will slowly return to most of your activities. ? You may be able to walk on your own in 4 to 6 weeks. Until then, you will need crutches or a walker. After that, you may need to walk with a cane. ? Ask your doctor when you can drive again. ? You may be able to return to work in 4 weeks to 4 months, depending on your job. ? Your doctor will tell you when you can walk, swim, dance, golf, or bicycle. Ask your doctor about other activities you would like to do.  ? Your doctor may advise you to avoid more strenuous activities, such as running or tennis, or those where a fall is possible, such as horseback riding or skiing. Follow-up care is a key part of your treatment and safety. Be sure to make and go to all appointments, and call your doctor if you are having problems. It's also a good idea to know your test results and keep a list of the medicines you take. Where can you learn more? Go to https://yobany.RxEye. org and sign in to your [employer] account.  Enter Q545 in the [employer] box to learn more about \"Learning About Surgery to Repair a Hip Fracture. \"     If you do not have an account, please click on the \"Sign Up Now\" link. Current as of: March 2, 2020               Content Version: 12.5  © 2006-2020 Healthwise, Incorporated. Care instructions adapted under license by [facility] ([facility]). If you have questions about a medical condition or this instruction, always ask your healthcare professional. [address] any warranty or liability for your use of this information.

## 2020-07-21 NOTE — PROGRESS NOTES
Post-Discharge Transitional Care Management Services or Hospital Follow Up      Queen Victorino   YOB: 1940    Date of Office Visit:  7/21/2020  Date of Hospital Admission: 7/13/2020  Date of Hospital Discharge: 7/16/2020    Care management risk score Rising risk (score 2-5) and Complex Care (Scores >=6): 3     Non face to face  following discharge, date last encounter closed (first attempt may have been earlier): 7/21/2020 10:45 AM 7/21/2020 10:45 AM    Call initiated 2 business days of discharge: Yes    Patient Active Problem List   Diagnosis    Nonexudative age-related macular degeneration    Nuclear sclerotic cataract    Ulcerative colitis ([facility]    Sleep apnea    Postmenopausal    Nocturnal hypoxemia    Nicotine dependence    Hyperlipidemia    Diverticulosis large intestine w/o perforation or abscess w/bleeding    Simple chronic bronchitis ([facility]    Age-related osteoporosis without current pathological fracture    Coronary artery disease involving native coronary artery of native heart without angina pectoris    Type 2 diabetes mellitus with diabetic polyneuropathy, without long-term current use of insulin ([facility])       Allergies   Allergen Reactions    Alcohol      Other reaction(s): Unknown    Azulfidine [Sulfasalazine] Diarrhea    Mesalamine Other (See Comments)    Sulfa Antibiotics        Medications listed as ordered at the time of discharge from hospital  yes    Medications marked \"taking\" at this time  Outpatient Medications Marked as Taking for the 7/21/20 encounter (Office Visit) with PETEY Kay CNP   Medication Sig Dispense Refill    apixaban (ELIQUIS) 5 MG TABS tablet Take by mouth 2 times daily      lisinopril (PRINIVIL;ZESTRIL) 20 MG tablet Take 20 mg by mouth daily      melatonin 3 MG TABS tablet Take 3 mg by mouth daily      acetaminophen (TYLENOL) 325 MG tablet Take 650 mg by mouth every 6 hours as needed for Pain      OXYGEN Please discontinue Encounters:   07/21/20 (!) 120/50   02/26/20 124/70   01/23/20 99/73       A comprehensive review of systems was negative except for what was noted in the HPI. Physical Exam:  General Appearance: alert, well developed and well- nourished, in no acute distress  Skin: warm and dry, no rash or erythema  Head: normocephalic and atraumatic  Eyes: pupils equal, round, and reactive to light, extraocular eye movements intact, conjunctivae normal  ENT: tympanic membrane, external ear and ear canal normal bilaterally, nose without deformity, nasal mucosa and turbinates normal without polyps  Neck: supple and non-tender without mass, no thyromegaly or thyroid nodules, no cervical lymphadenopathy  Pulmonary/Chest: clear to auscultation bilaterally- no wheezes, rales or rhonchi, normal air movement, no respiratory distress  Cardiovascular: normal rate, regular rhythm, normal S1 and S2, no murmurs, rubs, clicks, or gallops, distal pulses intact,  Abdomen: soft, non-tender, non-distended, normal bowel sounds, no masses or organomegaly  Extremities: no cyanosis, clubbing or edema  Musculoskeletal: normal range of motion, no joint swelling, deformity or tenderness  Neurologic: reflexes normal and symmetric, no cranial nerve deficit, gait, coordination and speech normal  Skin- Healed incision noted to left lateral upper leg. No evidence of infection    Assessment/Plan:  1. Closed fracture of neck of left femur with routine healing, subsequent encounter  - Continue current medications  - Awaiting records from [facility] for [facility] MED LIST    2. Chronic obstructive pulmonary disease, unspecified COPD type ([facility]  - Discontinue O2 order  - OXYGEN; Please discontinue oxygen therapy d/t improved condition- Dx COPD  Dispense: 1 Units;  Refill: 0        Medical Decision Making: moderate complexity

## 2020-07-21 NOTE — TELEPHONE ENCOUNTER
[facility] Transitions Initial Follow Up Call    Outreach made within 2 business days of discharge: Yes    Patient: Kalyani Meng Patient : 1940   MRN: [id number]  Reason for Admission: There are no discharge diagnoses documented for the most recent discharge. Discharge Date: 20       Tried calling [facility] but no answer, unable to leave a message.

## 2020-07-21 NOTE — TELEPHONE ENCOUNTER
Daphney [facility] Transitions Initial Follow Up Call    Outreach made within 2 business days of discharge: Yes    Patient: Chinmay Bryant Patient : 1940   MRN: [id number]  Reason for Admission: There are no discharge diagnoses documented for the most recent discharge. Discharge Date: 20       Spoke with: Unable to reach pt's daughter.

## 2020-08-31 ENCOUNTER — TELEPHONE (OUTPATIENT)
Dept: FAMILY MEDICINE CLINIC | Age: 80
End: 2020-08-31

## 2020-08-31 NOTE — TELEPHONE ENCOUNTER
Aileen from [facility] called as a courtesy to let laura Patel know that they will be discharging Clair Cornelius form [facility] next week. She stated she has made little to no progress as she is not retaining  Or following through on anything she is taught due to her cognitive issues. [facility]  may also be calling to change her Tylenol order to Advil as she has been taking that on her own, and cannot without it being on her MAR.

## 2020-09-08 ENCOUNTER — OFFICE VISIT (OUTPATIENT)
Dept: NEUROLOGY | Age: 80
End: 2020-09-08
Payer: MEDICARE

## 2020-09-08 VITALS
DIASTOLIC BLOOD PRESSURE: 71 MMHG | BODY MASS INDEX: 20.09 KG/M2 | SYSTOLIC BLOOD PRESSURE: 134 MMHG | HEART RATE: 80 BPM | WEIGHT: 125 LBS | HEIGHT: 66 IN

## 2020-09-08 PROCEDURE — G8427 DOCREV CUR MEDS BY ELIG CLIN: HCPCS | Performed by: PSYCHIATRY & NEUROLOGY

## 2020-09-08 PROCEDURE — 99214 OFFICE O/P EST MOD 30 MIN: CPT | Performed by: PSYCHIATRY & NEUROLOGY

## 2020-09-08 PROCEDURE — 1090F PRES/ABSN URINE INCON ASSESS: CPT | Performed by: PSYCHIATRY & NEUROLOGY

## 2020-09-08 PROCEDURE — 1123F ACP DISCUSS/DSCN MKR DOCD: CPT | Performed by: PSYCHIATRY & NEUROLOGY

## 2020-09-08 PROCEDURE — G8400 PT W/DXA NO RESULTS DOC: HCPCS | Performed by: PSYCHIATRY & NEUROLOGY

## 2020-09-08 PROCEDURE — 4004F PT TOBACCO SCREEN RCVD TLK: CPT | Performed by: PSYCHIATRY & NEUROLOGY

## 2020-09-08 PROCEDURE — G8420 CALC BMI NORM PARAMETERS: HCPCS | Performed by: PSYCHIATRY & NEUROLOGY

## 2020-09-08 PROCEDURE — 4040F PNEUMOC VAC/ADMIN/RCVD: CPT | Performed by: PSYCHIATRY & NEUROLOGY

## 2020-09-08 RX ORDER — PREDNISONE 1 MG/1
5 TABLET ORAL DAILY
Qty: 30 TABLET | Refills: 3 | Status: SHIPPED | OUTPATIENT
Start: 2020-09-08 | End: 2020-09-23 | Stop reason: SDUPTHER

## 2020-09-08 RX ORDER — IBUPROFEN 400 MG/1
TABLET ORAL
COMMUNITY
Start: 2020-08-31 | End: 2020-09-23

## 2020-09-08 RX ORDER — BETHANECHOL CHLORIDE 25 MG/1
TABLET ORAL
COMMUNITY
Start: 2020-08-01 | End: 2020-12-22 | Stop reason: ALTCHOICE

## 2020-09-08 NOTE — PROGRESS NOTES
[facility]  Offices: [address], [place], [address] [facility]  [address] [facility], [address], [place], [address] [facility]  [address]  Phone: [phone]  Fax: [phone]    MD Sera Yu MD Ahmed B. Annita Miguel, MD Theodis Shipper, MD Jodeen Hollow, MD Lizzie Elms, CNP    9/8/2020      HISTORY OF PRESENT ILLNESS:       I had the pleasure of seeing Natalia Armendariz, who returns for continuing neurologic care for balance problems, possibly secondary to severe mixed sensorimotor polyneuropathy. She is accompanied today by her son. Since her last visit, patient son reports she injured her left hip in June after tripping over a step at her home. She had to have surgery and had a few weeks of home health physical therapy. Patient son reports she has done well, she is back to ambulating with a walker at home with no difficulty and has not had any falls since then. She uses a wheelchair outdoors and with longer distances. Of note, she was started on prednisone by Dr. Yang Arana earlier in the year, with significant improvement of her muscle aches, joint pains and energy level. The prednisone was unfortunately stopped when she was admitted for her hip surgery. Her son reports the patient was doing much better when she was on prednisone and he saw the night and day difference. Her last A1c in February was 6.4. Her son reports she was doing well with her diet up until COVID started, and he has noticed that she started eating candy again. Patient denies any other new symptoms, denies any other significant changes in her medications. She does take Eliquis long-term. Prior testing reviewed:  EMG 5/23/2019: Severe mixed motor sensory neuropathy  MRI lumbar spine without contrast 5/23/2019:  1.  Broad-based disc herniation at L4-L5 and moderate spinal canal stenosis.    2.  Multilevel degenerative spondylosis and neural foraminal narrowing with S-shaped scoliosis as described. 3.  Old mild compression fracture at T12.         Lab Results   Component Value Date    LABA1C 6.4 2020     Lab Results   Component Value Date    TSHFT4 0.91 2019    TSH 0.17 (L) 2018     PAST MEDICAL HISTORY:         Diagnosis Date    Abnormal nuclear stress test 2018    Cataract     Colon polyps     COPD (chronic obstructive pulmonary disease) (HCC)     Diverticulosis large intestine w/o perforation or abscess w/bleeding     Fibroid, uterine     Hearing loss     Hyperlipidemia     Nicotine dependence     Nocturnal hypoxemia     Postmenopausal     Sleep apnea     Ulcerative colitis ([address]     with rectal bleeding         PAST SURGICAL HISTORY:         Procedure Laterality Date    BREAST BIOPSY  1988    right breast biopsy     BREAST BIOPSY Right 2011    stereotactic biopsy    COLONOSCOPY      Dr Julissa Ward      Dr Candy Alvarez      Dr Antionette Rhodes interminate colitis    COLONOSCOPY  2014    Dr Marybeth Martin Bilateral 2011    HIP SURGERY  2020    left        SOCIAL HISTORY:     Social History     Socioeconomic History    Marital status:       Spouse name: Not on file    Number of children: Not on file    Years of education: Not on file    Highest education level: Not on file   Occupational History    Not on file   Social Needs    Financial resource strain: Not hard at all    Food insecurity     Worry: Never true     Inability: Never true   [employer] needs     Medical: No     Non-medical: No   Tobacco Use    Smoking status: Current Some Day Smoker     Packs/day: 1.00     Years: 40.00     Pack years: 40.00     Types: Cigarettes     Last attempt to quit: 2019     Years since quittin.1    Smokeless tobacco: Never Used   Substance and Sexual Activity    Alcohol use: No    Drug use: Never  Sexual activity: Not Currently   Lifestyle    Physical activity     Days per week: Not on file     Minutes per session: Not on file    Stress: Not on file   Relationships    Social connections     Talks on phone: Not on file     Gets together: Not on file     Attends Cheondoism service: Not on file     Active member of club or organization: Not on file     Attends meetings of clubs or organizations: Not on file     Relationship status: Not on file    Intimate partner violence     Fear of current or ex partner: Not on file     Emotionally abused: Not on file     Physically abused: Not on file     Forced sexual activity: Not on file   Other Topics Concern    Not on file   Social History Narrative    Not on file       CURRENT MEDICATIONS:     Current Outpatient Medications   Medication Sig Dispense Refill    ibuprofen (ADVIL;MOTRIN) 400 MG tablet       apixaban (ELIQUIS) 5 MG TABS tablet Take by mouth 2 times daily      lisinopril (PRINIVIL;ZESTRIL) 20 MG tablet Take 20 mg by mouth daily      melatonin 3 MG TABS tablet Take 3 mg by mouth daily      acetaminophen (TYLENOL) 325 MG tablet Take 650 mg by mouth every 6 hours as needed for Pain      Glucosamine-Chondroitin 500-250 MG CAPS Take by mouth daily      metoprolol tartrate (LOPRESSOR) 25 MG tablet Take 1 tablet by mouth 2 times daily 180 tablet 3    simvastatin (ZOCOR) 40 MG tablet Take 1 tablet by mouth nightly 90 tablet 3    albuterol sulfate  (90 Base) MCG/ACT inhaler Inhale 2 puffs into the lungs every 6 hours as needed for Wheezing (Patient taking differently: Inhale 2 puffs into the lungs every 4 hours as needed for Wheezing ) 1 Inhaler 3    vitamin B-12 (CYANOCOBALAMIN) 1000 MCG tablet Take 1 tablet by mouth daily (Patient taking differently: Take 1,000 mcg by mouth 2 times daily ) 30 tablet 3    Multiple Vitamins-Minerals (CENTRUM SILVER 50+WOMEN PO) Take by mouth      bethanechol (URECHOLINE) 25 MG tablet       OXYGEN Please b/l LE, symmetric. Negative Babinski      Gait                   ambulates w/ wheelchair              ASSESSMENT AND PLAN:       This is a 78 y.o. female who comes in for follow up for gait instability secondary to severe sensorimotor polyneuropathy from prediabetes. She recently fell and broke her left hip, has recovered well from surgery. Of note, she had significant improvement of her joint pains and energy level when she was started on prednisone by Dr. Hunter  earlier in the year, but this fell off her medication list after she had hip surgery. Will restart prednisone at a low dose of 5 mg daily since patient and son report significant improvement with it, we will continue to monitor her A1c and repeat in 3 months. She also has a follow-up appointment coming up with Dr. Hunter  later this month. Again counseled patient about minimizing sugar and carbohydrate intake to improve glycemic control. We will reassess her in 3 months. Lawyer Alicia MD  Neurology and Sleep Medicine  [facility]    Please note that this chart was generated using voice recognition Dragon dictation software. Although every effort was made to ensure the accuracy of this automated transcription, some errors in transcription may have occurred.

## 2020-09-23 ENCOUNTER — OFFICE VISIT (OUTPATIENT)
Dept: PHYSICAL MEDICINE AND REHAB | Age: 80
End: 2020-09-23
Payer: MEDICARE

## 2020-09-23 VITALS
HEIGHT: 65 IN | TEMPERATURE: 97 F | BODY MASS INDEX: 21.26 KG/M2 | WEIGHT: 127.6 LBS | DIASTOLIC BLOOD PRESSURE: 53 MMHG | HEART RATE: 85 BPM | SYSTOLIC BLOOD PRESSURE: 74 MMHG

## 2020-09-23 PROCEDURE — 4040F PNEUMOC VAC/ADMIN/RCVD: CPT | Performed by: PHYSICAL MEDICINE & REHABILITATION

## 2020-09-23 PROCEDURE — 99213 OFFICE O/P EST LOW 20 MIN: CPT | Performed by: PHYSICAL MEDICINE & REHABILITATION

## 2020-09-23 PROCEDURE — G8420 CALC BMI NORM PARAMETERS: HCPCS | Performed by: PHYSICAL MEDICINE & REHABILITATION

## 2020-09-23 PROCEDURE — 1090F PRES/ABSN URINE INCON ASSESS: CPT | Performed by: PHYSICAL MEDICINE & REHABILITATION

## 2020-09-23 PROCEDURE — 4004F PT TOBACCO SCREEN RCVD TLK: CPT | Performed by: PHYSICAL MEDICINE & REHABILITATION

## 2020-09-23 PROCEDURE — G8427 DOCREV CUR MEDS BY ELIG CLIN: HCPCS | Performed by: PHYSICAL MEDICINE & REHABILITATION

## 2020-09-23 PROCEDURE — 1123F ACP DISCUSS/DSCN MKR DOCD: CPT | Performed by: PHYSICAL MEDICINE & REHABILITATION

## 2020-09-23 PROCEDURE — G8400 PT W/DXA NO RESULTS DOC: HCPCS | Performed by: PHYSICAL MEDICINE & REHABILITATION

## 2020-09-23 RX ORDER — LIDOCAINE 50 MG/G
PATCH TOPICAL
Qty: 90 PATCH | Refills: 3 | Status: SHIPPED | OUTPATIENT
Start: 2020-09-23 | End: 2020-09-28 | Stop reason: SDUPTHER

## 2020-09-23 RX ORDER — PREDNISONE 1 MG/1
7.5 TABLET ORAL DAILY
Qty: 45 TABLET | Refills: 3 | Status: SHIPPED | OUTPATIENT
Start: 2020-09-23 | End: 2021-01-21

## 2020-09-23 NOTE — PROGRESS NOTES
 High Blood Pressure Mother     Heart Disease Mother     Coronary Art Dis Father     Heart Disease Father      Social History     Socioeconomic History    Marital status:       Spouse name: None    Number of children: None    Years of education: None    Highest education level: None   Occupational History    None   Social Needs    Financial resource strain: Not hard at all   Le-[place]ван insecurity     Worry: Never true     Inability: Never true   [employer]arm[employer] needs     Medical: No     Non-medical: No   Tobacco Use    Smoking status: Current Some Day Smoker     Packs/day: 1.00     Years: 40.00     Pack years: 40.00     Types: Cigarettes     Last attempt to quit: 2019     Years since quittin.2    Smokeless tobacco: Never Used   Substance and Sexual Activity    Alcohol use: No    Drug use: Never    Sexual activity: Not Currently   Lifestyle    Physical activity     Days per week: None     Minutes per session: None    Stress: None   Relationships    Social connections     Talks on phone: None     Gets together: None     Attends U[employer] service: None     Active member of club or organization: None     Attends meetings of clubs or organizations: None     Relationship status: None    Intimate partner violence     Fear of current or ex partner: None     Emotionally abused: None     Physically abused: None     Forced sexual activity: None   Other Topics Concern    None   Social History Narrative    None       Current Outpatient Medications   Medication Sig Dispense Refill    predniSONE (DELTASONE) 5 MG tablet Take 1.5 tablets by mouth daily 45 tablet 3    bethanechol (URECHOLINE) 25 MG tablet       apixaban (ELIQUIS) 5 MG TABS tablet Take by mouth 2 times daily      lisinopril (PRINIVIL;ZESTRIL) 20 MG tablet Take 20 mg by mouth daily      melatonin 3 MG TABS tablet Take 3 mg by mouth daily      acetaminophen (TYLENOL) 325 MG tablet Take 650 mg by mouth every 6 hours as needed for Pain      OXYGEN Please discontinue oxygen therapy d/t improved condition- Dx COPD 1 Units 0    Glucosamine-Chondroitin 500-250 MG CAPS Take by mouth daily      metoprolol tartrate (LOPRESSOR) 25 MG tablet Take 1 tablet by mouth 2 times daily 180 tablet 3    simvastatin (ZOCOR) 40 MG tablet Take 1 tablet by mouth nightly 90 tablet 3    albuterol sulfate  (90 Base) MCG/ACT inhaler Inhale 2 puffs into the lungs every 6 hours as needed for Wheezing (Patient taking differently: Inhale 2 puffs into the lungs every 4 hours as needed for Wheezing ) 1 Inhaler 3    vitamin B-12 (CYANOCOBALAMIN) 1000 MCG tablet Take 1 tablet by mouth daily (Patient taking differently: Take 1,000 mcg by mouth 2 times daily ) 30 tablet 3    Multiple Vitamins-Minerals (CENTRUM SILVER 50+WOMEN PO) Take by mouth      Probiotic Product ([employer] HEALTH PO) Take 1 tablet by mouth daily      lidocaine (LIDODERM) 5 % Apply to painful hip daily for max 12 hours 90 patch 3     No current facility-administered medications for this visit. Allergies   Allergen Reactions    Alcohol      Other reaction(s): Unknown    Azulfidine [Sulfasalazine] Diarrhea    Mesalamine Other (See Comments)    Sulfa Antibiotics        Subjective:      Review of Systems  Constitutional: Negative for fever, chills and unexpected weight change. HENT: Negative for trouble swallowing. Respiratory: Negative for cough and shortness of breath. Cardiovascular: Negative for chest pain. Endocrine: Negative for polyuria. Genitourinary: Negative for dysuria, urgency, frequency, incontinence and difficulty urinating. Gastrointestinal: Negative for constipation or diarrhea. Musculoskeletal: Positive for arthralgias. Neurological: Negative for headaches. Positive for numbness ttingling. Psychiatric: Negative for depressed mood or anxiety.        Objective:     Physical Exam  BP (!) 74/53   Pulse 85   Temp 97 °F (36.1 °C) (Temporal)   Ht 5' 5\" (1.651 m)   Wt 127 lb 9.6 oz (57.9 kg)   BMI 21.23 kg/m²   Constitutional: She appears well-developed and well-nourished. In no distress. HEENT: NCAT, PERRL, EOMI. Mucous membranes pink and moist.  Pulmonary/Chest: Respirations WNL and unlabored. MSK: Functional ROM BUEs. Weakness limits ROM B hips. Strength 4+/5 key muscles BUEs. B hip flexion 4-/5, R hip extension 4-/5. B knee extension 4/5. Neurological: She is alert and oriented to person, place, and time. DTRs 2+ and symmetric   Skin: Skin is warm dry and intact with good turgor. Psychiatric: She has a normal mood and affect. Her behavior is normal. Thought content normal.   Nursing note and vitals reviewed. Imaging: None new    Assessment:       Diagnosis Orders   1. Mixed sensory-motor polyneuropathy     2. Type 2 diabetes mellitus with diabetic polyneuropathy, without long-term current use of insulin ([facility])  POCT Glucose        Plan:      Agreed to trial of small increase in prednisone to 7.5 mg with AL nurses checking her glucose levels weekly. She can also try OTC Lidoderm patches for pain. Orders Placed This Encounter   Procedures    POCT Glucose     Weekly glucose test while on prednisone     Orders Placed This Encounter   Medications    predniSONE (DELTASONE) 5 MG tablet     Sig: Take 1.5 tablets by mouth daily     Dispense:  45 tablet     Refill:  3    DISCONTD: lidocaine (LIDODERM) 5 %     Sig: Apply to painful hip daily for max 12 hours     Dispense:  90 patch     Refill:  3     Return in about 3 months (around 1/5/2021). Electronically signedby Vianey Lehman MD on 10/7/2020 at 9:44 PM.     Please note that this chartwas generated using voice recognition Dragon dictation software. Although everyeffort was made to ensure the accuracy of this automated transcription, some errorsin transcription may have occurred.

## 2020-09-28 RX ORDER — LIDOCAINE 50 MG/G
PATCH TOPICAL
Qty: 90 PATCH | Refills: 3 | Status: SHIPPED | OUTPATIENT
Start: 2020-09-28 | End: 2020-12-22 | Stop reason: ALTCHOICE

## 2020-10-06 ENCOUNTER — TELEPHONE (OUTPATIENT)
Dept: FAMILY MEDICINE CLINIC | Age: 80
End: 2020-10-06

## 2020-10-06 NOTE — TELEPHONE ENCOUNTER
Elías Noemí states that her mother was seeing PT @ [facility] but she feel she was getting stronger and her mother didn't really mesh well with the PT. They would like to go to [facility] PT. Order placed. They will contact Jazmine to schedule.

## 2020-10-06 NOTE — TELEPHONE ENCOUNTER
If she is not already undergoing PT, okay for order.  Media from 9/23/2020 appears to show she is already participating in PT.

## 2020-10-06 NOTE — TELEPHONE ENCOUNTER
Pt's daughter left a voicemail stating her mother broke her hip on 6/13/2020 and was @ [facility] for a period of time. She doesn't feel her mother is as mobile and wonders if TR can order PT? Call Jazmine rodriguez @ [phone].

## 2020-10-07 LAB
CHP ED QC CHECK: NORMAL
GLUCOSE BLD-MCNC: 92 MG/DL

## 2020-10-15 ENCOUNTER — HOSPITAL ENCOUNTER (OUTPATIENT)
Dept: PHYSICAL THERAPY | Age: 80
Setting detail: THERAPIES SERIES
Discharge: HOME | End: 2020-10-15
Payer: MEDICARE

## 2020-10-15 PROCEDURE — 97161 PT EVAL LOW COMPLEX 20 MIN: CPT

## 2020-10-15 PROCEDURE — 97110 THERAPEUTIC EXERCISES: CPT

## 2020-10-15 NOTE — PROGRESS NOTES
** PLEASE SIGN, DATE AND TIME CERTIFICATION BELOW AND RETURN TO [facility] OUTPATIENT REHABILITATION (FAX #: [phone]). ATTEST/CO-SIGN IF ACCESSING VIA INVFA. THANK YOU.**    I certify that I have examined the patient below and determined that Physical Medicine and Rehabilitation service is necessary and that I approve the established plan of care for up to 90 days or as specifically noted.   Attestation, signature or co-signature of physician indicates approval of certification requirements.    ________________________ ____________ __________  Physician Signature   Date   Time  [address] [facility]  PHYSICAL THERAPY  [x] EVALUATION  [] DAILY NOTE (LAND) [] DAILY NOTE (AQUATIC ) [] PROGRESS NOTE [] DISCHARGE NOTE    [x] [address] [facility]   [] [place] [address]    [] [facility]   [] Rachel Denge    Date: 10/15/2020  Patient Name:  Juan Diego Metz  : 1940  MRN: [id number]    Referring Practitioner PETEY Mcneill*   Diagnosis Pain in left hip [M25.552]  Fracture of unspecified part of neck of left femur, subsequent encounter for closed fracture with routine healing [S72.002D]    Treatment Diagnosis M54.5 LBP, bilateral LE weakness   Date of Evaluation 10/15/20    Additional Pertinent History SOB, OA      Functional Outcome Measure Used LEFS, 30 second sit to stand test, Tinetti   Functional Outcome Score 36/80 LEFS (10/15/20)       Insurance: Primary: Payor: Anais Palomo /  /  / ,   Secondary: [facility]   Authorization Information: Unlimited visits based on med nec, no maintenance/preventative, aquatics and modalities covered (except ionto and hot packs/cold packs)   Visit # 1, 1/10 for progress note   Visits Allowed: Unlimited   Recertification Date:    Physician Follow-Up: Pending PT results   Physician Orders:    History of Present Illness: [de-identified] y/o female with history of L hip pain which began around 2020 when patient was walking without a walker and fell and broke her hip. Since then she went through home health rehab as well as continues to perform exercises regularly at home. [facility] a [facility] at this time however owns a home in [place] she would like to get back to living at. Overall, though, her hips don't bother her too much unless she is up doing a lot of working and walking. She and her daughter are primarily more concerned with leg weakness and poor balance overall. She notes at times it is difficult to get out of a chair or the couch d/t weakness in the legs. SUBJECTIVE: Doing well today, minimal pain at this time however her prednisone was just increased. Social/Functional History and Current Status:  Medications and Allergies have been reviewed and are listed on Medical History Questionnaire. Juan Diego Metz lives pen[facility]entiary community in a single story home with a level surface to enter.     Task Previous Current   ADLs  Independent Modified Independent   IADL's Independent Modified Independent   Ambulation Modified Independent Assistance Required   Transfers Independent Assistance Required   Recreation Assistance Required Assistance Required   Community Integration Modified Independent Assistance Required   Driving Drives with self-imposed restrictions Does not drive   Work Retired  Retired     Objective:    ROM  ° A/P [employer] TESTS (+/-) Left Right Not Testd    Left Right Left Right SCOUR - -    Hip Flex 125 125 4- 4- TITI - -    Ext 0A Lacking 4A 4- 4- Piriformis      ER 40P 40P 4- 4- Parveen's - -    IR 40P 40P 4- 4- Nobley Compression - -    ABD   3+ 3+ LAD      ADD   3+ 3+ FADIR - -    Knee Flex   4- 4-       Ext   4- 4-       Ankle DF   4- 4-       PF   4- 4-       INV           EVER                          OBSERVATION No Deficit Deficit Not Tested Comments   Posture       Lordosis [] [] []    Lateral Shift [] [] []    Scoliosis [] [] []    Iliac Crest [x] [] [] pain and abnormal gait  Prognosis: fair    GOALS:  Patient Goal: Feel and move better    STG: (to be met in 6 weeks)  1. ? Pain: Patient to report decreased bilateral hip pain to <3/10 within 4 weeks for improved activity tolerance  2. ? ROM: Patient to demonstrate improved bilateral hip AROM to >5 degrees extension, and >45 degrees IR/ER within 4 weeks for improved ability to perform ADL's and community ambulation  3. ? Strength: Patient will demonstrate improved bilateral hip strength to 4/5 within 4 weeks for improved ability to control LE and decrease substitution. 4. Independent with Home Exercise Programs  5. Demonstrate Knowledge of fall prevention    LTG: (to be met in 12 weeks)  1. Patient will demonstrate improved bilateral hip strength to 4+/5 within 8 weeks for improved ability to negotiate uneven terrain and ambulate in community without abnormality. 2. Patient to demonstrate improved bilateral hip AROM to >10 degrees extension, >45 degrees IR, and >50 degrees ER within 12 weeks for symmetry bilaterally. 3. Patient to demonstrate improved LEFS score to >60/80 within 12 weeks for improved function  4. To demonstrate improved 30 second sit to stand test to >15 repetitions within 12 weeks for increased strength and endurance  5. To demonstrate improved Tinetti balance index to >24/28 for improved balance and stability on feet for safety in upright      Patient Education:   [x]  HEP/Education Completed: Plan of Care, Goals  []  No new Education completed  []  Reviewed Prior HEP      []  Patient verbalized and/or demonstrated understanding of education provided. []  Patient unable to verbalize and/or demonstrate understanding of education provided. Will continue education.   [x]  Barriers to learning: None    PLAN:  Treatment Recommendations: Strengthening, Range of Motion, Balance Training, Transfer Training, Gait Training, Stair Training, Neuromuscular Re-education, Manual Therapy - Soft Tissue

## 2020-10-19 ENCOUNTER — HOSPITAL ENCOUNTER (OUTPATIENT)
Dept: PHYSICAL THERAPY | Age: 80
Setting detail: THERAPIES SERIES
Discharge: HOME | End: 2020-10-19
Payer: MEDICARE

## 2020-10-19 PROCEDURE — 97110 THERAPEUTIC EXERCISES: CPT

## 2020-10-19 NOTE — PROGRESS NOTES
[address] [facility]  PHYSICAL THERAPY  [] EVALUATION  [x] DAILY NOTE (LAND) [] DAILY NOTE (AQUATIC ) [] PROGRESS NOTE [] DISCHARGE NOTE    [x] OUTPATIENT REHABILITATION CENTER [facility]   [] [place] [address]    [] [facility]   [] Diallo Eason    Date: 10/19/2020  Patient Name:  Mariluz Thakur  : 1940  MRN: [id number]    Referring Practitioner PETEY Morris*   Diagnosis Pain in left hip [M25.552]  Fracture of unspecified part of neck of left femur, subsequent encounter for closed fracture with routine healing [S72.002D]    Treatment Diagnosis M54.5 LBP, bilateral LE weakness   Date of Evaluation 10/15/20    Additional Pertinent History SOB, OA      Functional Outcome Measure Used LEFS, 30 second sit to stand test, Tinetti   Functional Outcome Score 36/80 LEFS (10/15/20)       Insurance: Primary: Payor: Marly Verdin /  /  / ,   Secondary: [facility]   Authorization Information: Unlimited visits based on med nec, no maintenance/preventative, aquatics and modalities covered (except ionto and hot packs/cold packs)   Visit # 2, 2/10 for progress note   Visits Allowed: Unlimited   Recertification Date:    Physician Follow-Up: Pending PT results   Physician Orders:    History of Present Illness: [de-identified] y/o female with history of L hip pain which began around 2020 when patient was walking without a walker and fell and broke her hip. Since then she went through home health rehab as well as continues to perform exercises regularly at home. She lives at a [facility] community at this time however owns a home in [place] she would like to get back to living at. Overall, though, her hips don't bother her too much unless she is up doing a lot of working and walking. She and her daughter are primarily more concerned with leg weakness and poor balance overall.   She notes at times it is difficult to get out of a chair or the couch d/t weakness in the legs.     SUBJECTIVE: Patient presents to therapy with her walker. Patient denies any pain this afternoon but reports that she had 5/10 pain in her left hip this morning. TREATMENT   Precautions:    Pain: Denies, 5/10 left hip this morning. X in shaded column indicates activity completed today   Modalities Parameters/  Location  Notes                     Manual Therapy Time/Technique  Notes                     Exercise/Intervention   Notes   NuStep 5 minutes  L5 X Seat 9/ arms 9          4 inch Step Ups - anterior and lateral x10 reps each   X With gait belt, 1-2 UE support, unable to complete with no UE. 3 way hip kicks, marches  x10 reps each  X Bilaterally    Total Gym Squats/Heel Raises x10 reps each   X Level J   SLS 2 sets  15 seconds  X LLE -had to utilized BUE support   Step stance (bilateral lead) 1 set each 15 seconds X No UE support   Hydro hip  x10 reps each  Level 2 X Bilaterally, than LLE                  Sit to stands - weighted    Unable to complete   Sit to stands x10 reps   X Had to utilize BUE to push self up   Hip flexor stretching 3 sets  15 sec X Left lower extremity                   Specific Interventions Next Treatment: Progress patient regarding both functional strengthening and balance exercises towards goals of ambulating without AD and improving sit to stand strength    Activity/Treatment Tolerance:  [x]  Patient tolerated treatment well  []  Patient limited by fatigue  []  Patient limited by pain   []  Patient limited by medical complications  []  Other:      Assessment: Added Nu-step, step ups anterior/lateral, 3 way hip kicks, marches, total gym squats/heel raises, sit to stands, hip flexor stretch at step, SLS, step stance bilateral lead, and hydro hip. Patient required demonstration and cues on proper technique with added therapeutic exercises. Patient unable to complete weighted sit to stands this date and required use of BUE to complete sit to stands.  Patient has a tendency to veer to her left side when ambulating with her walker. Patient had to utilize UE support to complete step ups. GOALS:  Patient Goal: Feel and move better    STG: (to be met in 6 weeks)  1. ? Pain: Patient to report decreased bilateral hip pain to <3/10 within 4 weeks for improved activity tolerance  2. ? ROM: Patient to demonstrate improved bilateral hip AROM to >5 degrees extension, and >45 degrees IR/ER within 4 weeks for improved ability to perform ADL's and community ambulation  3. ? Strength: Patient will demonstrate improved bilateral hip strength to 4/5 within 4 weeks for improved ability to control LE and decrease substitution. 4. Independent with Home Exercise Programs  5. Demonstrate Knowledge of fall prevention    LTG: (to be met in 12 weeks)  1. Patient will demonstrate improved bilateral hip strength to 4+/5 within 8 weeks for improved ability to negotiate uneven terrain and ambulate in community without abnormality. 2. Patient to demonstrate improved bilateral hip AROM to >10 degrees extension, >45 degrees IR, and >50 degrees ER within 12 weeks for symmetry bilaterally. 3. Patient to demonstrate improved LEFS score to >60/80 within 12 weeks for improved function  4. To demonstrate improved 30 second sit to stand test to >15 repetitions within 12 weeks for increased strength and endurance  5. To demonstrate improved Tinetti balance index to >24/28 for improved balance and stability on feet for safety in upright      Patient Education:   [x]  HEP/Education Completed: Educated on added Nu-step, step ups anterior/lateral, 3 way hip kicks, marches, total gym squats/heel raises, sit to stands, hip flexor stretch at step, SLS, step stance bilateral lead and hydro hip. []  No new Education completed  []  Reviewed Prior HEP      [x]  Patient verbalized and/or demonstrated understanding of education provided.   []  Patient unable to verbalize and/or demonstrate understanding of education provided. Will continue education. [x]  Barriers to learning: None    PLAN:  []  Plan of care initiated. Plan to see patient 2 times per week for 8-12 weeks to address the treatment planned outlined above.   [x]  Continue with current plan of care  []  Modify plan of care as follows:    []  Hold pending physician visit  []  Discharge    Time In 1510   Time Out 1555   Timed Code Minutes:  45 min   Total Treatment Time: 45 min     Electronically Signed by: Jerry García

## 2020-10-22 ENCOUNTER — HOSPITAL ENCOUNTER (OUTPATIENT)
Dept: PHYSICAL THERAPY | Age: 80
Setting detail: THERAPIES SERIES
Discharge: HOME | End: 2020-10-22
Payer: MEDICARE

## 2020-10-22 PROCEDURE — 97110 THERAPEUTIC EXERCISES: CPT

## 2020-10-22 PROCEDURE — 97112 NEUROMUSCULAR REEDUCATION: CPT

## 2020-10-22 NOTE — PROGRESS NOTES
[address] [facility]  PHYSICAL THERAPY  [] EVALUATION  [x] DAILY NOTE (LAND) [] DAILY NOTE (AQUATIC ) [] PROGRESS NOTE [] DISCHARGE NOTE    [x] OUTPATIENT REHABILITATION CENTER [facility]   [] [place] [address]    [] [facility]   [] Marlon Obandoas    Date: 10/22/2020  Patient Name:  Ximena Mariscal  : 1940  MRN: [id number]    Referring Practitioner PETEY Gonzalez*   Diagnosis Pain in left hip [M25.552]  Fracture of unspecified part of neck of left femur, subsequent encounter for closed fracture with routine healing [S72.002D]    Treatment Diagnosis M54.5 LBP, bilateral LE weakness   Date of Evaluation 10/15/20    Additional Pertinent History SOB, OA      Functional Outcome Measure Used LEFS, 30 second sit to stand test, Tinetti   Functional Outcome Score 36/80 LEFS (10/15/20)       Insurance: Primary: Payor: Alberto Naidu /  /  / ,   Secondary: [facility]   Authorization Information: Unlimited visits based on med nec, no maintenance/preventative, aquatics and modalities covered (except ionto and hot packs/cold packs)   Visit # 3, 3/10 for progress note   Visits Allowed: Unlimited   Recertification Date:    Physician Follow-Up: Pending PT results   Physician Orders:    History of Present Illness: [de-identified] y/o female with history of L hip pain which began around 2020 when patient was walking without a walker and fell and broke her hip. Since then she went through home health rehab as well as continues to perform exercises regularly at home. She lives at a senior living community at this time however owns a home in [place] she would like to get back to living at. Overall, though, her hips don't bother her too much unless she is up doing a lot of working and walking. She and her daughter are primarily more concerned with leg weakness and poor balance overall.   She notes at times it is difficult to get out of a chair or the couch d/t weakness in the legs.     SUBJECTIVE: Notes wasn't too sore after last session but she was pleased with the exercises she did. Feeling \"okay\" today. TREATMENT   Precautions:    Pain: Denies, 5/10 left hip this morning. X in shaded column indicates activity completed today   Modalities Parameters/  Location  Notes                     Manual Therapy Time/Technique  Notes                     Exercise/Intervention   Notes   NuStep 5 minutes  L5 X Seat 9/ arms 9          4 inch Step Ups - anterior and lateral - 6'' next x15 reps each   x With gait belt, 1-2 UE support, unable to complete with no UE. 3 way hip 10x ea orange x Bilaterally - VCing throughout for technique   Total Gym Squats/Heel Raises 3x10  x Level J   SLS 2 sets  15 seconds  x LLE -had to utilized BUE support          Hydro hip  x10 reps each  Level 2 x Bilaterally, than LLE           LAQ 2x15 3# x    Seated marching 20x ea 3# x           Sit to stands 3x10  x Had to utilize BUE to push self up          Back larios walking - gait belt - no AD - walking, side stepping 2laps ea  x No UE usage - VCing to increase step length, stability, narrow LAINE          Mat       Supine bridges 25x  x    Supine hip flexor stretching 3x20''  x    Supine clamshells 25x orange x    Supine hip abduction 10x ea orange x Band around ankles     Specific Interventions Next Treatment: Progress patient regarding both functional strengthening and balance exercises towards goals of ambulating without AD and improving sit to stand strength    Activity/Treatment Tolerance:  [x]  Patient tolerated treatment well  []  Patient limited by fatigue  []  Patient limited by pain   []  Patient limited by medical complications  []  Other:      Assessment: Progressed this visit to include increases in resistances, sets, and repetitions throughout program to work towards fatiguing patient's LE musculature. Also performed dynamic balance activities this date with fair tolerance.   Verbal cueing throughout treatment to maintain on task, for proper technique, and to decrease speed during repetitions for adequate muscle activation and contraction with fair follow through. Will continue to progress program to include strengthening and balance activities per goals. GOALS:  Patient Goal: Feel and move better    STG: (to be met in 6 weeks)  1. ? Pain: Patient to report decreased bilateral hip pain to <3/10 within 4 weeks for improved activity tolerance  2. ? ROM: Patient to demonstrate improved bilateral hip AROM to >5 degrees extension, and >45 degrees IR/ER within 4 weeks for improved ability to perform ADL's and community ambulation  3. ? Strength: Patient will demonstrate improved bilateral hip strength to 4/5 within 4 weeks for improved ability to control LE and decrease substitution. 4. Independent with Home Exercise Programs  5. Demonstrate Knowledge of fall prevention    LTG: (to be met in 12 weeks)  1. Patient will demonstrate improved bilateral hip strength to 4+/5 within 8 weeks for improved ability to negotiate uneven terrain and ambulate in community without abnormality. 2. Patient to demonstrate improved bilateral hip AROM to >10 degrees extension, >45 degrees IR, and >50 degrees ER within 12 weeks for symmetry bilaterally. 3. Patient to demonstrate improved LEFS score to >60/80 within 12 weeks for improved function  4. To demonstrate improved 30 second sit to stand test to >15 repetitions within 12 weeks for increased strength and endurance  5. To demonstrate improved Tinetti balance index to >24/28 for improved balance and stability on feet for safety in upright      Patient Education:   [x]  HEP/Education Completed: Educated on added Nu-step, step ups anterior/lateral, 3 way hip kicks, marches, total gym squats/heel raises, sit to stands, hip flexor stretch at step, SLS, step stance bilateral lead and hydro hip.    []  No new Education completed  []  Reviewed Prior HEP      [x]  Patient verbalized and/or demonstrated understanding of education provided. []  Patient unable to verbalize and/or demonstrate understanding of education provided. Will continue education. [x]  Barriers to learning: None    PLAN:  []  Plan of care initiated. Plan to see patient 2 times per week for 8-12 weeks to address the treatment planned outlined above.   [x]  Continue with current plan of care  []  Modify plan of care as follows:    []  Hold pending physician visit  []  Discharge    Time In 1255   Time Out 1345   Timed Code Minutes:  45 min   Total Treatment Time: 45 min     Electronically Signed by: Lizbeth Martin

## 2020-10-26 ENCOUNTER — HOSPITAL ENCOUNTER (OUTPATIENT)
Dept: PHYSICAL THERAPY | Age: 80
Setting detail: THERAPIES SERIES
Discharge: HOME | End: 2020-10-26
Payer: MEDICARE

## 2020-10-26 PROCEDURE — 97110 THERAPEUTIC EXERCISES: CPT

## 2020-10-26 NOTE — PROGRESS NOTES
[address] [facility]  PHYSICAL THERAPY  [] EVALUATION  [x] DAILY NOTE (LAND) [] DAILY NOTE (AQUATIC ) [] PROGRESS NOTE [] DISCHARGE NOTE    [x] OUTPATIENT REHABILITATION CENTER [facility]   [] [place] [address]    [] [facility]   [] Marlon Rey    Date: 10/26/2020  Patient Name:  Ximena Mariscal  : 1940  MRN: [id number]    Referring Practitioner PETEY Gonzalez*   Diagnosis Pain in left hip [M25.552]  Fracture of unspecified part of neck of left femur, subsequent encounter for closed fracture with routine healing [S72.002D]    Treatment Diagnosis M54.5 LBP, bilateral LE weakness   Date of Evaluation 10/15/20    Additional Pertinent History SOB, OA      Functional Outcome Measure Used LEFS, 30 second sit to stand test, Tinetti   Functional Outcome Score 36/80 LEFS (10/15/20)       Insurance: Primary: Payor: Alberto Naidu /  /  / ,   Secondary: [facility]   Authorization Information: Unlimited visits based on med nec, no maintenance/preventative, aquatics and modalities covered (except ionto and hot packs/cold packs)   Visit # 4, 4/10 for progress note   Visits Allowed: Unlimited   Recertification Date:    Physician Follow-Up: Pending PT results   Physician Orders:    History of Present Illness: [de-identified] y/o female with history of L hip pain which began around 2020 when patient was walking without a walker and fell and broke her hip. Since then she went through home health rehab as well as continues to perform exercises regularly at home. She lives at a group home community at this time however owns a home in [place] she would like to get back to living at. Overall, though, her hips don't bother her too much unless she is up doing a lot of working and walking. She and her daughter are primarily more concerned with leg weakness and poor balance overall.   She notes at times it is difficult to get out of a chair or the couch d/t weakness in the legs.     SUBJECTIVE: Patient denies pain and reporting no other complains at this time. TREATMENT   Precautions:    Pain: Denies    X in shaded column indicates activity completed today   Modalities Parameters/  Location  Notes                     Manual Therapy Time/Technique  Notes                     Exercise/Intervention   Notes   NuStep 5 minutes  L5  Seat 9/ arms 9           anterior and lateral - 6'' 10   x With gait belt, 1-2 UE support, unable to complete with no UE. 3 way hip 15 ea orange x Bilaterally - VCing throughout for technique   Total Gym Squats/Heel Raises 3x10  x Level J   SLS 2 sets  15 seconds   LLE -had to utilized BUE support   Heel/toe marching  15  x    Hydro hip  2x10 reps each  Level 2 x Bilaterally, than LLE           LAQ 2x15 3#     Seated marching 20x ea 3#            Sit to stands 10  x Had to utilize BUE to push self up          Back larios walking - gait belt - no AD - walking, side stepping 2laps ea  x No UE usage - VCing to increase step length, stability, narrow LAINE          Mat       Supine bridges 25x  x    Supine hip flexor stretching 3x20''  x    Supine clamshells 25x orange x    Supine hip abduction 10x ea orange x Band around ankles     Specific Interventions Next Treatment: Progress patient regarding both functional strengthening and balance exercises towards goals of ambulating without AD and improving sit to stand strength    Activity/Treatment Tolerance:  [x]  Patient tolerated treatment well  []  Patient limited by fatigue  []  Patient limited by pain   []  Patient limited by medical complications  []  Other:      Assessment: Continued to make progressions as noted with patient having good tolerance. Patient gets a little SOB during session but having no complains at end of session.     GOALS:  Patient Goal: Feel and move better    STG: (to be met in 6 weeks)  1. ? Pain: Patient to report decreased bilateral hip pain to <3/10 within 4 weeks for improved activity tolerance  2. ? ROM: Patient to demonstrate improved bilateral hip AROM to >5 degrees extension, and >45 degrees IR/ER within 4 weeks for improved ability to perform ADL's and community ambulation  3. ? Strength: Patient will demonstrate improved bilateral hip strength to 4/5 within 4 weeks for improved ability to control LE and decrease substitution. 4. Independent with Home Exercise Programs  5. Demonstrate Knowledge of fall prevention    LTG: (to be met in 12 weeks)  1. Patient will demonstrate improved bilateral hip strength to 4+/5 within 8 weeks for improved ability to negotiate uneven terrain and ambulate in community without abnormality. 2. Patient to demonstrate improved bilateral hip AROM to >10 degrees extension, >45 degrees IR, and >50 degrees ER within 12 weeks for symmetry bilaterally. 3. Patient to demonstrate improved LEFS score to >60/80 within 12 weeks for improved function  4. To demonstrate improved 30 second sit to stand test to >15 repetitions within 12 weeks for increased strength and endurance  5. To demonstrate improved Tinetti balance index to >24/28 for improved balance and stability on feet for safety in upright      Patient Education:   [x]  HEP/Education Completed: hand out given for hook lying hip abd and supine hip abd  []  No new Education completed  []  Reviewed Prior HEP      [x]  Patient verbalized and/or demonstrated understanding of education provided. []  Patient unable to verbalize and/or demonstrate understanding of education provided. Will continue education. [x]  Barriers to learning: None    PLAN:    Plan to see patient 2 times per week for 8-12 weeks to address the treatment planned outlined above.   [x]  Continue with current plan of care  []  Modify plan of care as follows:    []  Hold pending physician visit  []  Discharge    Time In 1350   Time Out 1427   Timed Code Minutes:  37 min   Total Treatment Time: 37 min     Electronically Signed by: Sondra Ayala Paralee Sans

## 2020-10-29 ENCOUNTER — HOSPITAL ENCOUNTER (OUTPATIENT)
Dept: PHYSICAL THERAPY | Age: 80
Setting detail: THERAPIES SERIES
Discharge: HOME | End: 2020-10-29
Payer: MEDICARE

## 2020-10-29 PROCEDURE — 97110 THERAPEUTIC EXERCISES: CPT

## 2020-10-29 NOTE — PROGRESS NOTES
[address] [facility]  PHYSICAL THERAPY  [x] DAILY NOTE (LAND) [] DAILY NOTE (AQUATIC ) [] PROGRESS NOTE [x] DISCHARGE NOTE    [x] OUTPATIENT REHABILITATION CENTER [facility]   [] [place] [address]    [] [facility]   [] Ivan Mcarthur    Date: 10/29/2020  Patient Name:  Dwayne Mak  : 1940  MRN: [id number]    Referring Practitioner PETEY Peña*   Diagnosis Pain in left hip [M25.552]  Fracture of unspecified part of neck of left femur, subsequent encounter for closed fracture with routine healing [S72.002D]    Treatment Diagnosis M54.5 LBP, bilateral LE weakness   Date of Evaluation 10/15/20    Additional Pertinent History SOB, OA      Functional Outcome Measure Used LEFS, 30 second sit to stand test, Tinetti   Functional Outcome Score 36/80 LEFS (10/15/20)       Insurance: Primary: Payor: Patricia Rahman /  /  / ,   Secondary: [facility]   Authorization Information: Unlimited visits based on med nec, no maintenance/preventative, aquatics and modalities covered (except ionto and hot packs/cold packs)   Visit # 5, 5/10 for progress note   Visits Allowed: Unlimited   Recertification Date:    Physician Follow-Up: Pending PT results   Physician Orders:    History of Present Illness: [de-identified] y/o female with history of L hip pain which began around 2020 when patient was walking without a walker and fell and broke her hip. Since then she went through home health rehab as well as continues to perform exercises regularly at home. [facility] time however owns a home in [place] she would like to get back to living at. Overall, though, her hips don't bother her too much unless she is up doing a lot of working and walking. She and her daughter are primarily more concerned with leg weakness and poor balance overall. She notes at times it is difficult to get out of a chair or the couch d/t weakness in the legs. SUBJECTIVE: Patient reporting having a little bit of hip pain today but did not give a pain level. TREATMENT   Precautions:    Pain: Yes to hip pain but did not give a pain level. X in shaded column indicates activity completed today   Modalities Parameters/  Location  Notes                     Manual Therapy Time/Technique  Notes                     Exercise/Intervention   Notes   NuStep 5 minutes  L5  Seat 9/ arms 9           anterior and lateral - 6'' 10   x With gait belt, 1-2 UE support, unable to complete with no UE. 3 way hip 15 ea orange x Bilaterally - VCing throughout for technique   Total Gym Squats/Heel Raises 3x10  x Level J   SLS 2 sets  15 seconds   LLE -had to utilized BUE support   Squats  15  x    Heel/toe marching  15  x    Hydro hip  2x15 reps each  Level 2 x Bilaterally, than LLE           NK table 2.5#  15  x Cues for slow controlled motion with poor follow through   Seated marching 20x ea 3#            Sit to stands 10   Had to utilize BUE to push self up          Back larios walking - gait belt - no AD - walking, side stepping 2laps ea   No UE usage - VCing to increase step length, stability, narrow LAINE          Mat       Supine bridges 25x  x    Supine hip flexor stretching 3x20''      Supine clamshells 25x orange x    Supine hip abduction 10x ea orange x Band around ankles     Specific Interventions Next Treatment: Progress patient regarding both functional strengthening and balance exercises towards goals of ambulating without AD and improving sit to stand strength    Activity/Treatment Tolerance:  [x]  Patient tolerated treatment well  []  Patient limited by fatigue  []  Patient limited by pain   []  Patient limited by medical complications  []  Other:      Assessment: Added NK table with patient having difficulty controlling motion. Did also add more reps to hydro hip with good tolerance. Patient reporting hip feeling better at end of session.  Patient needing min assist with hip stabilization when doing side lying clam shells. GOALS:  Patient Goal: Feel and move better    STG: (to be met in 6 weeks)  1. ? Pain: Patient to report decreased bilateral hip pain to <3/10 within 4 weeks for improved activity tolerance  2. ? ROM: Patient to demonstrate improved bilateral hip AROM to >5 degrees extension, and >45 degrees IR/ER within 4 weeks for improved ability to perform ADL's and community ambulation  3. ? Strength: Patient will demonstrate improved bilateral hip strength to 4/5 within 4 weeks for improved ability to control LE and decrease substitution. 4. Independent with Home Exercise Programs  5. Demonstrate Knowledge of fall prevention    LTG: (to be met in 12 weeks)  1. Patient will demonstrate improved bilateral hip strength to 4+/5 within 8 weeks for improved ability to negotiate uneven terrain and ambulate in community without abnormality. 2. Patient to demonstrate improved bilateral hip AROM to >10 degrees extension, >45 degrees IR, and >50 degrees ER within 12 weeks for symmetry bilaterally. 3. Patient to demonstrate improved LEFS score to >60/80 within 12 weeks for improved function  4. To demonstrate improved 30 second sit to stand test to >15 repetitions within 12 weeks for increased strength and endurance  5. To demonstrate improved Tinetti balance index to >24/28 for improved balance and stability on feet for safety in upright      Patient Education:   [x]  HEP/Education Completed: hand out given for hook lying hip abd and supine hip abd  []  No new Education completed  []  Reviewed Prior HEP      [x]  Patient verbalized and/or demonstrated understanding of education provided. []  Patient unable to verbalize and/or demonstrate understanding of education provided. Will continue education. [x]  Barriers to learning: None    PLAN:    Plan to see patient 2 times per week for 8-12 weeks to address the treatment planned outlined above.   [x]  Continue with current plan of care  []  Modify plan of care as follows:    []  Hold pending physician visit  []  Discharge    Time In 1352   Time Out 1426   Timed Code Minutes:  34 min   Total Treatment Time: 34 min     Electronically Signed by: Emily Paredes

## 2020-11-16 ENCOUNTER — HOSPITAL ENCOUNTER (OUTPATIENT)
Dept: PHYSICAL THERAPY | Age: 80
Setting detail: THERAPIES SERIES
Discharge: HOME | End: 2020-11-16
Payer: MEDICARE

## 2020-12-03 ENCOUNTER — HOSPITAL ENCOUNTER (OUTPATIENT)
Dept: PHYSICAL THERAPY | Age: 80
Setting detail: THERAPIES SERIES
Discharge: HOME | End: 2020-12-03
Payer: MEDICARE

## 2020-12-03 PROCEDURE — 97110 THERAPEUTIC EXERCISES: CPT

## 2020-12-03 PROCEDURE — 97164 PT RE-EVAL EST PLAN CARE: CPT

## 2020-12-03 NOTE — PROGRESS NOTES
[address] [facility]  PHYSICAL THERAPY  [] DAILY NOTE (LAND) [] DAILY NOTE (AQUATIC ) [x] PROGRESS NOTE [] DISCHARGE NOTE    [x] OUTPATIENT REHABILITATION CENTER - [place]   [] [place] [address]    [] [facility]   [] Ilsa Omeros    Date: 12/3/2020  Patient Name:  Antonio Deng  : 1940  MRN: [id number]    Referring Practitioner PETEY Hayes*   Diagnosis Pain in left hip [M25.552]  Fracture of unspecified part of neck of left femur, subsequent encounter for closed fracture with routine healing [S72.002D]    Treatment Diagnosis M54.5 LBP, bilateral LE weakness   Date of Evaluation 10/15/20    Additional Pertinent History SOB, OA      Functional Outcome Measure Used LEFS, 30 second sit to stand test, Tinetti   Functional Outcome Score 36/80 LEFS (10/15/20) Eval  52/80 LEFS (12/3/20) PN      Insurance: Primary: Payor: Ely Hewitt /  /  / ,   Secondary: [facility]   Authorization Information: Unlimited visits based on med nec, no maintenance/preventative, aquatics and modalities covered (except ionto and hot packs/cold packs)   Visit # 6, 0/10 for progress note   Visits Allowed: Unlimited   Recertification Date: 4455   Physician Follow-Up: Pending PT results   Physician Orders:    History of Present Illness: [de-identified] y/o female with history of L hip pain which began around 2020 when patient was walking without a walker and fell and broke her hip. Since then she went through home health rehab as well as continues to perform exercises regularly at home. She lives at a custodial community at this time however owns a home in [place] she would like to get back to living at. Overall, though, her hips don't bother her too much unless she is up doing a lot of working and walking. She and her daughter are primarily more concerned with leg weakness and poor balance overall.   She notes at times it is difficult to get out of a chair or the couch d/t assisted living facility. Will re-implement LE strengthening program to continue to progress towards goals. GOALS:  Patient Goal: Feel and move better    STG: (to be met in 6 weeks)  1. ? Pain: Patient to report decreased bilateral hip pain to <3/10 within 4 weeks for improved activity tolerance  GOAL MET:  0/10 pain reported today. Discontinue Goal    2. ? ROM: Patient to demonstrate improved bilateral hip AROM to >5 degrees extension, and >45 degrees IR/ER within 4 weeks for improved ability to perform ADL's and community ambulation   GOAL MET:  5 hip extension bilaterally noted this date. Discontinue Goal  3. ? Strength: Patient will demonstrate improved bilateral hip strength to 4/5 within 4 weeks for improved ability to control LE and decrease substitution. GOAL NOT MET: 4-/5 grossly hip abductors and hip extensors this date. All other LE 4/5. Continue Goal  4. Independent with Home Exercise Programs  GOAL NOT MET:  has been quarantined in room and hasn't performed exercises. Continue Goal  5. Demonstrate Knowledge of fall prevention  GOAL MET:   got rid of rugs and continues to use 4ww for safety. Continue Goal    LTG: (to be met in 12 weeks)  1. Patient will demonstrate improved bilateral hip strength to 4+/5 within 8 weeks for improved ability to negotiate uneven terrain and ambulate in community without abnormality. GOAL NOT MET: see above. Continue Goal  2. Patient to demonstrate improved bilateral hip AROM to >10 degrees extension, >45 degrees IR, and >50 degrees ER within 12 weeks for symmetry bilaterally. GOAL NOT MET: see above. Continue Goal  3. Patient to demonstrate improved LEFS score to >60/80 within 12 weeks for improved function  GOAL NOT MET: 52/80 this date. Continue Goal  4.  To demonstrate improved 30 second sit to stand test to >15 repetitions within 12 weeks for increased strength and endurance  GOAL NOT MET: Not performed this date secondary to fatigue noted throughout session. Continue Goal  5. To demonstrate improved Tinetti balance index to >24/28 for improved balance and stability on feet for safety in upright  GOAL NOT MET: 20/28 this date. Continue Goal      Patient Education:   [x]  HEP/Education Completed: hand out given for hook lying hip abd and supine hip abd  []  No new Education completed  []  Reviewed Prior HEP      [x]  Patient verbalized and/or demonstrated understanding of education provided. []  Patient unable to verbalize and/or demonstrate understanding of education provided. Will continue education. [x]  Barriers to learning: None    PLAN:    Plan to see patient 2 times per week for 8-12 weeks to address the treatment planned outlined above.   [x]  Continue with current plan of care  []  Modify plan of care as follows:    []  Hold pending physician visit  []  Discharge    Time In 1340   Time Out 1425   Timed Code Minutes: 32 min   Total Treatment Time: 45 min     Electronically Signed by: Topher Smith

## 2020-12-07 ENCOUNTER — HOSPITAL ENCOUNTER (OUTPATIENT)
Dept: PHYSICAL THERAPY | Age: 80
Setting detail: THERAPIES SERIES
Discharge: HOME | End: 2020-12-07
Payer: MEDICARE

## 2020-12-07 PROCEDURE — 97110 THERAPEUTIC EXERCISES: CPT

## 2020-12-07 NOTE — PROGRESS NOTES
[address] [facility]  PHYSICAL THERAPY  [x] DAILY NOTE (LAND) [] DAILY NOTE (AQUATIC ) [] PROGRESS NOTE [] DISCHARGE NOTE    [x] OUTPATIENT REHABILITATION CENTER [facility]   [] [place] [address]    [] [facility]   [] Ivan Mcarthur    Date: 2020  Patient Name:  Dwayne Mak  : 1940  MRN: [id number]    Referring Practitioner PETEY Peña*   Diagnosis Pain in left hip [M25.552]  Fracture of unspecified part of neck of left femur, subsequent encounter for closed fracture with routine healing [S72.002D]    Treatment Diagnosis M54.5 LBP, bilateral LE weakness   Date of Evaluation 10/15/20    Additional Pertinent History SOB, OA      Functional Outcome Measure Used LEFS, 30 second sit to stand test, Tinetti   Functional Outcome Score 36/80 LEFS (10/15/20) Eval  52/80 LEFS (12/3/20) PN      Insurance: Primary: Payor: Patricia Rahman /  /  / ,   Secondary: [facility]   Authorization Information: Unlimited visits based on med nec, no maintenance/preventative, aquatics and modalities covered (except ionto and hot packs/cold packs)   Visit # 7, 1/10 for progress note   Visits Allowed: Unlimited   Recertification Date:    Physician Follow-Up: Pending PT results   Physician Orders:    History of Present Illness: [de-identified] y/o female with history of L hip pain which began around 2020 when patient was walking without a walker and fell and broke her hip. Since then she went through home health rehab as well as continues to perform exercises regularly at home. She lives at a jail community at this time however owns a home in [place] she would like to get back to living at. Overall, though, her hips don't bother her too much unless she is up doing a lot of working and walking. She and her daughter are primarily more concerned with leg weakness and poor balance overall.   She notes at times it is difficult to get out of a chair or the couch d/t weakness in the legs. SUBJECTIVE:  Patient states she is sore in the hip but did not rate pain. Reports she is working on some exercises at home but doesn't have access to do all things she wants to do like riding the bike. TREATMENT   Precautions:    Pain: Yes to hip pain but did not give a pain level. X in shaded column indicates activity completed today   Modalities Parameters/  Location  Notes         Manual Therapy Time/Technique  Notes         Exercise/Intervention   Notes   NuStep 6 minutes  Level 5 X Seat 9/ arms 9          Anterior and lateral - 6'' 10    With gait belt, 1-2 UE support, unable to complete with no UE.     3 way hip 15 ea orange  Bilaterally - VCing throughout for technique   Total Gym Squats/Heel Raises 3x10  X Top level   SLS 2 sets  15 seconds   LLE -had to utilized BUE support   Squats  2x10  X    Heel/toe marching  10x ea  X    Hydro hip  2x15 reps bilateral   15x unilateral Level 4 X           NK table 5# - both flexion and extension 20x each 3 seconds X Cues for slow controlled motion - improved follow through with increased weight and cues to hold for 3 seconds   Sit to stands 2X10  X Utilizing bilateral UE on knees           Back larios walking - gait belt - no AD - walking, side stepping 2laps ea   No UE usage - VCing to increase step length, stability, narrow LAINE          Mat       Supine bridges 25x      Supine hip flexor stretching 3x20''      Supine clamshells 25x orange     Supine hip abduction 10x ea orange  Band around ankles     Specific Interventions Next Treatment: Progress patient regarding both functional strengthening and balance exercises towards goals of ambulating without AD and improving sit to stand strength    Activity/Treatment Tolerance:  [x]  Patient tolerated treatment well  []  Patient limited by fatigue  []  Patient limited by pain   []  Patient limited by medical complications  []  Other:      Assessment: Limited activities today due to patient having difficulty with dizziness. Patient getting very dizzy at times and needing to sit for rest breaks. No abnormalities noted with blood pressure or O2. GOALS:  Patient Goal: Feel and move better    STG: (to be met in 6 weeks)    1. Increase Strength: Patient will demonstrate improved bilateral hip strength to 4/5 within 4 weeks for improved ability to control LE and decrease substitution. 2. Independent with Home Exercise Programs  3. Demonstrate Knowledge of fall prevention      LTG: (to be met in 12 weeks)Patient will demonstrate improved bilateral hip strength to 4+/5 within 8 weeks for improved ability to negotiate uneven terrain and ambulate in community without abnormality. 1. Patient to demonstrate improved bilateral hip AROM to >10 degrees extension, >45 degrees IR, and >50 degrees ER within 12 weeks for symmetry bilaterally. 2. Patient to demonstrate improved LEFS score to >60/80 within 12 weeks for improved function    3. To demonstrate improved 30 second sit to stand test to >15 repetitions within 12 weeks for increased strength and endurance    4. To demonstrate improved Tinetti balance index to >24/28 for improved balance and stability on feet for safety in upright      Patient Education:   []  HEP/Education Completed: hand out given for hook lying hip abd and supine hip abd  [x]  No new Education completed  []  Reviewed Prior HEP      [x]  Patient verbalized and/or demonstrated understanding of education provided. []  Patient unable to verbalize and/or demonstrate understanding of education provided. Will continue education. [x]  Barriers to learning: None    PLAN:    Plan to see patient 2 times per week for 8-12 weeks to address the treatment planned outlined above.   [x]  Continue with current plan of care  []  Modify plan of care as follows:    []  Hold pending physician visit  []  Discharge    Time In 1345   Time Out 1430   Timed Code Minutes: 45 min   Total Treatment Time: 45 min Electronically Signed by: Catia Yu

## 2020-12-10 ENCOUNTER — HOSPITAL ENCOUNTER (OUTPATIENT)
Dept: PHYSICAL THERAPY | Age: 80
Setting detail: THERAPIES SERIES
Discharge: HOME | End: 2020-12-10
Payer: MEDICARE

## 2020-12-10 PROCEDURE — 97110 THERAPEUTIC EXERCISES: CPT

## 2020-12-10 NOTE — PROGRESS NOTES
[address] [facility]  PHYSICAL THERAPY  [x] DAILY NOTE (LAND) [] DAILY NOTE (AQUATIC ) [] PROGRESS NOTE [] DISCHARGE NOTE    [x] OUTPATIENT REHABILITATION CENTER [facility]   [] [facility]    [] [facility]   [] Alexia Opitz    Date: 12/10/2020  Patient Name:  Robert Apple  : 1940  MRN: [id number]    Referring Practitioner PETEY Anguiano*   Diagnosis Pain in left hip [M25.552]  Fracture of unspecified part of neck of left femur, subsequent encounter for closed fracture with routine healing [S72.002D]    Treatment Diagnosis M54.5 LBP, bilateral LE weakness   Date of Evaluation 10/15/20    Additional Pertinent History SOB, OA      Functional Outcome Measure Used LEFS, 30 second sit to stand test, Tinetti   Functional Outcome Score 36/80 LEFS (10/15/20) Eval  52/80 LEFS (12/3/20) PN      Insurance: Primary: [facility] /  /  / ,   Secondary: [facility]   Authorization Information: Unlimited visits based on med nec, no maintenance/preventative, aquatics and modalities covered (except ionto and hot packs/cold packs)   Visit # 8, 2/10 for progress note   Visits Allowed: Unlimited   Recertification Date:    Physician Follow-Up: Pending PT results   Physician Orders:    History of Present Illness: [de-identified] y/o female with history of L hip pain which began around 2020 when patient was walking without a walker and fell and broke her hip. Since then she went through home health rehab as well as continues to perform exercises regularly at home. She lives at a jail community at this time however owns a home in [place] she would like to get back to living at. Overall, though, her hips don't bother her too much unless she is up doing a lot of working and walking. She and her daughter are primarily more concerned with leg weakness and poor balance overall.   She notes at times it is difficult to get out of a chair or the couch d/t weakness in the legs. SUBJECTIVE:  Patient reports minimal pain levels today. Has been trying to do some exercises at home. TREATMENT   Precautions:    Pain: Yes to hip pain but did not give a pain level. X in shaded column indicates activity completed today   Modalities Parameters/  Location  Notes         Manual Therapy Time/Technique  Notes         Exercise/Intervention   Notes   NuStep 6 minutes  Level 5 X Seat 9/ arms 9          Anterior and lateral - 6'' 10   x With gait belt, 1-2 UE support, unable to complete with no UE. 3 way hip 15 ea orange  Bilaterally - VCing throughout for technique   Total Gym Squats/Heel Raises 3x10  X Top level   SLS 2 sets  15 seconds   LLE -had to utilized BUE support   Squats  2x10  X    Heel/toe marching  10x ea  X    Hydro hip  2x15 reps bilateral   15x unilateral Level 4 X           NK table 5# - both flexion and extension 20x each 3 seconds X Cues for slow controlled motion - improved follow through with increased weight and cues to hold for 3 seconds   Sit to stands 2X10  X Utilizing bilateral UE on knees           Back larios walking - gait belt - no AD - walking, side stepping 2laps ea   No UE usage - VCing to increase step length, stability, narrow LAINE          Mat       Supine bridges 25x      Supine hip flexor stretching 3x20''      Supine clamshells 25x orange     Supine hip abduction 10x ea orange  Band around ankles     Specific Interventions Next Treatment: Progress patient regarding both functional strengthening and balance exercises towards goals of ambulating without AD and improving sit to stand strength    Activity/Treatment Tolerance:  [x]  Patient tolerated treatment well  []  Patient limited by fatigue  []  Patient limited by pain   []  Patient limited by medical complications  []  Other:      Assessment: BP taken seated at end of session at 92/65, standing immediately after at 80/52. Patient reported no lightheadedness while taking BP. Patient reported lightheadedness 2x during session both which self resolved after 5-10'' of seated rest breaks. Muscular fatigue noted throughout session this date as we attempted to resume full program.      GOALS:  Patient Goal: Feel and move better    STG: (to be met in 6 weeks)    1. Increase Strength: Patient will demonstrate improved bilateral hip strength to 4/5 within 4 weeks for improved ability to control LE and decrease substitution. 2. Independent with Home Exercise Programs  3. Demonstrate Knowledge of fall prevention      LTG: (to be met in 12 weeks)Patient will demonstrate improved bilateral hip strength to 4+/5 within 8 weeks for improved ability to negotiate uneven terrain and ambulate in community without abnormality. 1. Patient to demonstrate improved bilateral hip AROM to >10 degrees extension, >45 degrees IR, and >50 degrees ER within 12 weeks for symmetry bilaterally. 2. Patient to demonstrate improved LEFS score to >60/80 within 12 weeks for improved function    3. To demonstrate improved 30 second sit to stand test to >15 repetitions within 12 weeks for increased strength and endurance    4. To demonstrate improved Tinetti balance index to >24/28 for improved balance and stability on feet for safety in upright      Patient Education:   []  HEP/Education Completed: hand out given for hook lying hip abd and supine hip abd  [x]  No new Education completed  []  Reviewed Prior HEP      [x]  Patient verbalized and/or demonstrated understanding of education provided. []  Patient unable to verbalize and/or demonstrate understanding of education provided. Will continue education. [x]  Barriers to learning: None    PLAN:    Plan to see patient 2 times per week for 8-12 weeks to address the treatment planned outlined above.   [x]  Continue with current plan of care  []  Modify plan of care as follows:    []  Hold pending physician visit  []  Discharge    Time In 1345   Time Out 1425   Timed Code Minutes: 40 min   Total Treatment Time: 40 min     Electronically Signed by: Fiona Mauricio

## 2020-12-14 ENCOUNTER — HOSPITAL ENCOUNTER (OUTPATIENT)
Dept: PHYSICAL THERAPY | Age: 80
Setting detail: THERAPIES SERIES
Discharge: HOME | End: 2020-12-14
Payer: MEDICARE

## 2020-12-14 PROCEDURE — 97110 THERAPEUTIC EXERCISES: CPT

## 2020-12-14 NOTE — PROGRESS NOTES
Horacio  PHYSICAL THERAPY  [x] DAILY NOTE (LAND) [] DAILY NOTE (AQUATIC ) [] PROGRESS NOTE [] DISCHARGE NOTE    [x] OUTPATIENT REHABILITATION CENTER [facility]   [] [place] [address]    [] [facility]   [] Renetta Simms    Date: 2020  Patient Name:  Emmanuel Pepe  : 1940  MRN: [id number]    Referring Practitioner PETEY Pace*   Diagnosis Pain in left hip [M25.552]  Fracture of unspecified part of neck of left femur, subsequent encounter for closed fracture with routine healing [S72.002D]    Treatment Diagnosis M54.5 LBP, bilateral LE weakness   Date of Evaluation 10/15/20    Additional Pertinent History SOB, OA      Functional Outcome Measure Used LEFS, 30 second sit to stand test, Tinetti   Functional Outcome Score 36/80 LEFS (10/15/20) Eval  52/80 LEFS (12/3/20) PN      Insurance: Primary: Payor: Kit Kowalski /  /  / ,   Secondary: [facility]   Authorization Information: Unlimited visits based on med nec, no maintenance/preventative, aquatics and modalities covered (except ionto and hot packs/cold packs)   Visit # 9, 3/10 for progress note   Visits Allowed: Unlimited   Recertification Date:    Physician Follow-Up: Pending PT results   Physician Orders:    History of Present Illness: [de-identified] y/o female with history of L hip pain which began around 2020 when patient was walking without a walker and fell and broke her hip. Since then she went through home health rehab as well as continues to perform exercises regularly at home. She lives at a detention community at this time however owns a home in [place] she would like to get back to living at. Overall, though, her hips don't bother her too much unless she is up doing a lot of working and walking. She and her daughter are primarily more concerned with leg weakness and poor balance overall.   She notes at times it is difficult to get out of a chair or the couch d/t weakness in the legs. SUBJECTIVE:  Notes minimal pain at this time. \"I'm tired today. \"    TREATMENT   Precautions:    Pain: Yes to hip pain but did not give a pain level. X in shaded column indicates activity completed today   Modalities Parameters/  Location  Notes         Manual Therapy Time/Technique  Notes         Exercise/Intervention   Notes   NuStep 6 minutes  Level 5 X Seat 9/ arms 9          Anterior and lateral - 6'' 15x  x With gait belt, 1-2 UE support, unable to complete with no UE. 3 way hip 15 ea orange x Bilaterally - VCing throughout for technique   Total Gym Squats/Heel Raises 3x10  x Top level   SLS 2 sets  15 seconds  x LLE -had to utilized BUE support   Squats  2x10  x    Heel/toe marching  10x ea  x    Hydro hip  2x15 reps bilateral   15x unilateral Level 4 x           NK table 5# - both flexion and extension 20x each 3 seconds x Cues for slow controlled motion - improved follow through with increased weight and cues to hold for 3 seconds   Sit to stands 2X10  x Utilizing bilateral UE on knees           Back larios walking - gait belt - no AD - walking, side stepping 2laps ea   No UE usage - VCing to increase step length, stability, narrow LAINE          Mat       Supine bridges 25x  x    Supine hip flexor stretching 3x20''      Supine clamshells 25x orange     Supine hip abduction 10x ea orange  Band around ankles     Specific Interventions Next Treatment: Progress patient regarding both functional strengthening and balance exercises towards goals of ambulating without AD and improving sit to stand strength    Activity/Treatment Tolerance:  [x]  Patient tolerated treatment well  []  Patient limited by fatigue  []  Patient limited by pain   []  Patient limited by medical complications  []  Other:      Assessment: Verbal cueing throughout session for slow, controlled exercises instead of just hap-hazardly performing exercises with fair follow-through.   VCing to prevent QL compensations with hip abduction strengthening exercises. GOALS:  Patient Goal: Feel and move better    STG: (to be met in 6 weeks)    1. Increase Strength: Patient will demonstrate improved bilateral hip strength to 4/5 within 4 weeks for improved ability to control LE and decrease substitution. 2. Independent with Home Exercise Programs  3. Demonstrate Knowledge of fall prevention      LTG: (to be met in 12 weeks)Patient will demonstrate improved bilateral hip strength to 4+/5 within 8 weeks for improved ability to negotiate uneven terrain and ambulate in community without abnormality. 1. Patient to demonstrate improved bilateral hip AROM to >10 degrees extension, >45 degrees IR, and >50 degrees ER within 12 weeks for symmetry bilaterally. 2. Patient to demonstrate improved LEFS score to >60/80 within 12 weeks for improved function    3. To demonstrate improved 30 second sit to stand test to >15 repetitions within 12 weeks for increased strength and endurance    4. To demonstrate improved Tinetti balance index to >24/28 for improved balance and stability on feet for safety in upright      Patient Education:   []  HEP/Education Completed: hand out given for hook lying hip abd and supine hip abd  [x]  No new Education completed  []  Reviewed Prior HEP      [x]  Patient verbalized and/or demonstrated understanding of education provided. []  Patient unable to verbalize and/or demonstrate understanding of education provided. Will continue education. [x]  Barriers to learning: None    PLAN:    Plan to see patient 2 times per week for 8-12 weeks to address the treatment planned outlined above.   [x]  Continue with current plan of care  []  Modify plan of care as follows:    []  Hold pending physician visit  []  Discharge    Time In 1345   Time Out 1425   Timed Code Minutes: 40 min   Total Treatment Time: 40 min     Electronically Signed by: Robson Berry

## 2020-12-17 ENCOUNTER — HOSPITAL ENCOUNTER (OUTPATIENT)
Dept: PHYSICAL THERAPY | Age: 80
Setting detail: THERAPIES SERIES
Discharge: HOME | End: 2020-12-17
Payer: MEDICARE

## 2020-12-17 PROCEDURE — 97110 THERAPEUTIC EXERCISES: CPT

## 2020-12-17 NOTE — PROGRESS NOTES
[facility] [facility]  PHYSICAL THERAPY  [x] DAILY NOTE (LAND) [] DAILY NOTE (AQUATIC ) [] PROGRESS NOTE [] DISCHARGE NOTE    [x] OUTPATIENT REHABILITATION CENTER [facility]   [] [place] [address]    [] [facility]   [] Rachel Annex    Date: 2020  Patient Name:  Juan Diego Metz  : 1940  MRN: [id number]    Referring Practitioner PETEY Mcneill*   Diagnosis Pain in left hip [M25.552]  Fracture of unspecified part of neck of left femur, subsequent encounter for closed fracture with routine healing [S72.002D]    Treatment Diagnosis M54.5 LBP, bilateral LE weakness   Date of Evaluation 10/15/20    Additional Pertinent History SOB, OA      Functional Outcome Measure Used LEFS, 30 second sit to stand test, Tinetti   Functional Outcome Score 36/80 LEFS (10/15/20) Eval  52/80 LEFS (12/3/20) PN      Insurance: Primary: Payor: Anais Palomo /  /  / ,   Secondary: [facility]   Authorization Information: Unlimited visits based on med nec, no maintenance/preventative, aquatics and modalities covered (except ionto and hot packs/cold packs)   Visit # 10, 4/10 for progress note   Visits Allowed: Unlimited   Recertification Date:    Physician Follow-Up: Pending PT results   Physician Orders:    History of Present Illness: [de-identified] y/o female with history of L hip pain which began around 2020 when patient was walking without a walker and fell and broke her hip. Since then she went through home health rehab as well as continues to perform exercises regularly at home. She lives at a assisted community at this time however owns a home in [place] she would like to get back to living at. Overall, though, her hips don't bother her too much unless she is up doing a lot of working and walking. She and her daughter are primarily more concerned with leg weakness and poor balance overall.   She notes at times it is difficult to get out of a chair or the couch d/t weakness in the legs. SUBJECTIVE:  States she's irritated today because her  from [employer] was later than normal.    TREATMENT   Precautions:    Pain: Not rated this afternoon    X in shaded column indicates activity completed today   Modalities Parameters/  Location  Notes         Manual Therapy Time/Technique  Notes         Exercise/Intervention   Notes   NuStep 6 minutes  Level 5 X Seat 9/ arms 9          Anterior and lateral - 6'' 15x  x With gait belt, 1-2 UE support, unable to complete with no UE. 3 way hip 15 ea orange x Bilaterally - VCing throughout for technique   Total Gym Squats/Heel Raises 3x10  x Top level   SLS 2 sets  15 seconds   LLE -had to utilized BUE support   Squats  2x10  x    Heel/toe marching  10x ea  x    Hydro hip  2x15 reps bilateral   15x unilateral Level 4 x           NK table 5# - both flexion and extension 25x each 3 seconds x Cues for slow controlled motion - improved follow through with increased weight and cues to hold for 3 seconds   Sit to stands 2X10   Utilizing bilateral UE on knees           Back larios walking - gait belt - no AD - walking, side stepping 2laps ea   No UE usage - VCing to increase step length, stability, narrow LAINE            Specific Interventions Next Treatment: Progress patient regarding both functional strengthening and balance exercises towards goals of ambulating without AD and improving sit to stand strength    Activity/Treatment Tolerance:  [x]  Patient tolerated treatment well  []  Patient limited by fatigue  []  Patient limited by pain   []  Patient limited by medical complications  []  Other:      Assessment: Continued verbal cueing required throughout session to have patient don mask in correct fashion as well as for correct technique through every exercise. Patient wants to be very quick with exercises with minimal control - only poor to fair follow-through with verbal cueing for improvement of technique.        GOALS:  Patient Goal: Feel and move better    STG: (to be met in 6 weeks)    1. Increase Strength: Patient will demonstrate improved bilateral hip strength to 4/5 within 4 weeks for improved ability to control LE and decrease substitution. 2. Independent with Home Exercise Programs  3. Demonstrate Knowledge of fall prevention      LTG: (to be met in 12 weeks)Patient will demonstrate improved bilateral hip strength to 4+/5 within 8 weeks for improved ability to negotiate uneven terrain and ambulate in community without abnormality. 1. Patient to demonstrate improved bilateral hip AROM to >10 degrees extension, >45 degrees IR, and >50 degrees ER within 12 weeks for symmetry bilaterally. 2. Patient to demonstrate improved LEFS score to >60/80 within 12 weeks for improved function    3. To demonstrate improved 30 second sit to stand test to >15 repetitions within 12 weeks for increased strength and endurance    4. To demonstrate improved Tinetti balance index to >24/28 for improved balance and stability on feet for safety in upright      Patient Education:   []  HEP/Education Completed: hand out given for hook lying hip abd and supine hip abd  [x]  No new Education completed  []  Reviewed Prior HEP      [x]  Patient verbalized and/or demonstrated understanding of education provided. []  Patient unable to verbalize and/or demonstrate understanding of education provided. Will continue education. [x]  Barriers to learning: None    PLAN:    Plan to see patient 2 times per week for 8-12 weeks to address the treatment planned outlined above.   [x]  Continue with current plan of care  []  Modify plan of care as follows:    []  Hold pending physician visit  []  Discharge    Time In 1350   Time Out 1430   Timed Code Minutes: 40 min   Total Treatment Time: 40 min     Electronically Signed by: Emily Tony

## 2020-12-21 ENCOUNTER — HOSPITAL ENCOUNTER (OUTPATIENT)
Dept: PHYSICAL THERAPY | Age: 80
Setting detail: THERAPIES SERIES
Discharge: HOME | End: 2020-12-21
Payer: MEDICARE

## 2020-12-21 PROCEDURE — 97110 THERAPEUTIC EXERCISES: CPT

## 2020-12-21 NOTE — PROGRESS NOTES
[address] [facility]  PHYSICAL THERAPY  [x] DAILY NOTE (LAND) [] DAILY NOTE (AQUATIC ) [] PROGRESS NOTE [] DISCHARGE NOTE    [x] OUTPATIENT REHABILITATION CENTER [facility]   [] [place] [address]    [] [facility]   [] Rachel Santel    Date: 2020  Patient Name:  Juan Diego Metz  : 1940  MRN: [id number]    Referring Practitioner PETEY Mcneill*   Diagnosis Pain in left hip [M25.552]  Fracture of unspecified part of neck of left femur, subsequent encounter for closed fracture with routine healing [S72.002D]    Treatment Diagnosis M54.5 LBP, bilateral LE weakness   Date of Evaluation 10/15/20    Additional Pertinent History SOB, OA      Functional Outcome Measure Used LEFS, 30 second sit to stand test, Tinetti   Functional Outcome Score 36/80 LEFS (10/15/20) Eval  52/80 LEFS (12/3/20) PN      Insurance: Primary: Payor: Anais Palomo /  /  / ,   Secondary: [facility]   Authorization Information: Unlimited visits based on med nec, no maintenance/preventative, aquatics and modalities covered (except ionto and hot packs/cold packs)   Visit # 6, 5/10 for progress note   Visits Allowed: Unlimited   Recertification Date:    Physician Follow-Up: Pending PT results   Physician Orders:    History of Present Illness: [de-identified] y/o female with history of L hip pain which began around 2020 when patient was walking without a walker and fell and broke her hip. Since then she went through home health rehab as well as continues to perform exercises regularly at home. She lives at a alf community at this time however owns a home in [place] she would like to get back to living at. Overall, though, her hips don't bother her too much unless she is up doing a lot of working and walking. She and her daughter are primarily more concerned with leg weakness and poor balance overall.   She notes at times it is difficult to get out of a chair or the couch d/t weakness in the legs. SUBJECTIVE:  Patient states she is having frequent dizzy spells every day. Reports it depends on how she turns her head. TREATMENT   Precautions:    Pain: Not rated this afternoon    X in shaded column indicates activity completed today   Modalities Parameters/  Location  Notes         Manual Therapy Time/Technique  Notes         Exercise/Intervention   Notes   NuStep 6 minutes  Level 5 X Seat 9/ arms 9          Anterior and lateral - 6'' 15x   With gait belt, 1-2 UE support, unable to complete with no UE. 3 way hip 15 ea orange  Bilaterally - VCing throughout for technique   Total Gym Squats/Heel Raises 3x10 Level J X With SBA to light CGA for safety on/off machine   SLS 2 sets  15 seconds   LLE -had to utilized BUE support   Squats  2x10  X    Heel/toe marching  10x ea  X    Hydro hip  2x15 reps bilateral   15x unilateral Level 4 X           NK table 5# - both flexion and extension 25x each 3 seconds X Cues for slow controlled motion - improved follow through with increased weight and cues to hold for 3 seconds   Sit to stands 2X10   Utilizing bilateral UE on knees           Back larios walking - gait belt - no AD - walking, side stepping 2 laps ea   No UE usage - VCing to increase step length, stability, narrow LANIE            Specific Interventions Next Treatment: Progress patient regarding both functional strengthening and balance exercises towards goals of ambulating without AD and improving sit to stand strength    Activity/Treatment Tolerance:  [x]  Patient tolerated treatment well  []  Patient limited by fatigue  []  Patient limited by pain   []  Patient limited by medical complications  []  Other:      Assessment: Patient with mild shortness of breath during session needing to take a few breaks pulling mask down. Cues needed for technique and safety. Patient frequently pushing walker to the side and walking short distances to equipment.  Cued patient to keep walker with her for safety. Verbalized to patient the importance of slowing down with activities for control and max benefit. GOALS:  Patient Goal: Feel and move better    STG: (to be met in 6 weeks)    1. Increase Strength: Patient will demonstrate improved bilateral hip strength to 4/5 within 4 weeks for improved ability to control LE and decrease substitution. 2. Independent with Home Exercise Programs  3. Demonstrate Knowledge of fall prevention      LTG: (to be met in 12 weeks)Patient will demonstrate improved bilateral hip strength to 4+/5 within 8 weeks for improved ability to negotiate uneven terrain and ambulate in community without abnormality. 1. Patient to demonstrate improved bilateral hip AROM to >10 degrees extension, >45 degrees IR, and >50 degrees ER within 12 weeks for symmetry bilaterally. 2. Patient to demonstrate improved LEFS score to >60/80 within 12 weeks for improved function    3. To demonstrate improved 30 second sit to stand test to >15 repetitions within 12 weeks for increased strength and endurance    4. To demonstrate improved Tinetti balance index to >24/28 for improved balance and stability on feet for safety in upright      Patient Education:   []  HEP/Education Completed: hand out given for hook lying hip abd and supine hip abd  [x]  No new Education completed  []  Reviewed Prior HEP      [x]  Patient verbalized and/or demonstrated understanding of education provided. []  Patient unable to verbalize and/or demonstrate understanding of education provided. Will continue education. [x]  Barriers to learning: None    PLAN:    Plan to see patient 2 times per week for 8-12 weeks to address the treatment planned outlined above.   [x]  Continue with current plan of care  []  Modify plan of care as follows:    []  Hold pending physician visit  []  Discharge    Time In 1345   Time Out 1430   Timed Code Minutes: 45 min   Total Treatment Time: 45 min     Electronically Signed by: Everardo Maguire Barbara Murray

## 2020-12-22 ENCOUNTER — OFFICE VISIT (OUTPATIENT)
Dept: FAMILY MEDICINE CLINIC | Age: 80
End: 2020-12-22
Payer: MEDICARE

## 2020-12-22 VITALS
SYSTOLIC BLOOD PRESSURE: 110 MMHG | RESPIRATION RATE: 16 BRPM | HEART RATE: 88 BPM | BODY MASS INDEX: 21.89 KG/M2 | WEIGHT: 128.2 LBS | DIASTOLIC BLOOD PRESSURE: 72 MMHG | HEIGHT: 64 IN | TEMPERATURE: 96.8 F

## 2020-12-22 LAB — HBA1C MFR BLD: 6.4 % (ref 4.3–5.7)

## 2020-12-22 PROCEDURE — 4004F PT TOBACCO SCREEN RCVD TLK: CPT | Performed by: NURSE PRACTITIONER

## 2020-12-22 PROCEDURE — 83036 HEMOGLOBIN GLYCOSYLATED A1C: CPT | Performed by: NURSE PRACTITIONER

## 2020-12-22 PROCEDURE — G8420 CALC BMI NORM PARAMETERS: HCPCS | Performed by: NURSE PRACTITIONER

## 2020-12-22 PROCEDURE — G8484 FLU IMMUNIZE NO ADMIN: HCPCS | Performed by: NURSE PRACTITIONER

## 2020-12-22 PROCEDURE — 1123F ACP DISCUSS/DSCN MKR DOCD: CPT | Performed by: NURSE PRACTITIONER

## 2020-12-22 PROCEDURE — G8427 DOCREV CUR MEDS BY ELIG CLIN: HCPCS | Performed by: NURSE PRACTITIONER

## 2020-12-22 PROCEDURE — 4040F PNEUMOC VAC/ADMIN/RCVD: CPT | Performed by: NURSE PRACTITIONER

## 2020-12-22 PROCEDURE — 99213 OFFICE O/P EST LOW 20 MIN: CPT | Performed by: NURSE PRACTITIONER

## 2020-12-22 PROCEDURE — 1090F PRES/ABSN URINE INCON ASSESS: CPT | Performed by: NURSE PRACTITIONER

## 2020-12-22 PROCEDURE — G8400 PT W/DXA NO RESULTS DOC: HCPCS | Performed by: NURSE PRACTITIONER

## 2020-12-22 PROCEDURE — G0439 PPPS, SUBSEQ VISIT: HCPCS | Performed by: NURSE PRACTITIONER

## 2020-12-22 ASSESSMENT — PATIENT HEALTH QUESTIONNAIRE - PHQ9
2. FEELING DOWN, DEPRESSED OR HOPELESS: 0
SUM OF ALL RESPONSES TO PHQ QUESTIONS 1-9: 0
1. LITTLE INTEREST OR PLEASURE IN DOING THINGS: 0
SUM OF ALL RESPONSES TO PHQ9 QUESTIONS 1 & 2: 0
SUM OF ALL RESPONSES TO PHQ QUESTIONS 1-9: 0
SUM OF ALL RESPONSES TO PHQ QUESTIONS 1-9: 0

## 2020-12-22 ASSESSMENT — ENCOUNTER SYMPTOMS
EYE REDNESS: 0
SHORTNESS OF BREATH: 0
DIARRHEA: 0
SINUS PRESSURE: 0
CONSTIPATION: 0
BACK PAIN: 0
EYE DISCHARGE: 0
COLOR CHANGE: 0
ABDOMINAL PAIN: 0
WHEEZING: 0
BLOOD IN STOOL: 0
COUGH: 0
EYE ITCHING: 0
EYE PAIN: 0

## 2020-12-22 ASSESSMENT — LIFESTYLE VARIABLES: HOW OFTEN DO YOU HAVE A DRINK CONTAINING ALCOHOL: 0

## 2020-12-22 NOTE — PATIENT INSTRUCTIONS
Personalized Preventive Plan for Gordo Caldwell - 12/22/2020  Medicare offers a range of preventive health benefits. Some of the tests and screenings are paid in full while other may be subject to a deductible, co-insurance, and/or copay. Some of these benefits include a comprehensive review of your medical history including lifestyle, illnesses that may run in your family, and various assessments and screenings as appropriate. After reviewing your medical record and screening and assessments performed today your provider may have ordered immunizations, labs, imaging, and/or referrals for you. A list of these orders (if applicable) as well as your Preventive Care list are included within your After Visit Summary for your review. Other Preventive Recommendations:    · A preventive eye exam performed by an eye specialist is recommended every 1-2 years to screen for glaucoma; cataracts, macular degeneration, and other eye disorders. · A preventive dental visit is recommended every 6 months. · Try to get at least 150 minutes of exercise per week or 10,000 steps per day on a pedometer . · Order or download the FREE \"Exercise & Physical Activity: Your Everyday Guide\" from [employer]. Call 1-466.626.5241 or search [employer] online. · You need 6033-5767 mg of calcium and 9175-6439 IU of vitamin D per day. It is possible to meet your calcium requirement with diet alone, but a vitamin D supplement is usually necessary to meet this goal.  · When exposed to the sun, use a sunscreen that protects against both UVA and UVB radiation with an SPF of 30 or greater. Reapply every 2 to 3 hours or after sweating, drying off with a towel, or swimming. · Always wear a seat belt when traveling in a car. Always wear a helmet when riding a bicycle or motorcycle.   Patient Education        Nutrition for Older Adults: Care Instructions  Your Care Instructions     Good nutrition is important at any age. But it is especially important for older adults. Eating a healthy diet helps keep your body strong. And it can help lower your risk for disease. As you get older, your nutrition needs change. Your body needs more of certain nutrients. These include vitamin B12, calcium, and vitamin D. But it may be harder for you to get these and other important nutrients. This could be for many reasons. You may not feel as hungry as you used to. Or you could have problems with your teeth or mouth that make it hard to chew. Or you may not enjoy planning and preparing meals, especially if you live alone. Now that you need to get all your nutrients from less food, it is important to plan what you eat. The suggestions below can help you get the nutrition you need. If you still need help, talk with your doctor. He or she may recommend that you work with a dietitian. A dietitian can help you plan meals. Follow-up care is a key part of your treatment and safety. Be sure to make and go to all appointments, and call your doctor if you are having problems. It's also a good idea to know your test results and keep a list of the medicines you take. How can you care for yourself at home? To stay healthy  Eat a variety of foods. The more you vary the foods you eat, the more vitamins, minerals, and other nutrients you get. Take a multivitamin every day. Choose one with about 100% of the daily value (DV) for vitamins and minerals. Do not take more than 100% of the daily value for any vitamin or mineral unless your doctor tells you to. Talk with your doctor if you are not sure which multivitamin is right for you. Eat lots of fruits and vegetables. Fresh, frozen, or no-salt canned vegetables and fruits in their own juice or light syrup are good choices. Include foods that are high in vitamin B12 in your diet.  Good choices are fortified breakfast cereal, nonfat or low-fat milk and other dairy products, meat, poultry, Meals on Wheels. If your appetite is poor  Try eating smaller amounts of food more often. For example, eat 4 or 5 small meals a day instead of 1 or 2 large meals. Eat with family and friends. Or take part in group meal programs offered through volunteer programs. Eating with others may help your appetite. And it helps you be more social.  Ask your doctor if your medicines could cause appetite or taste problems. If so, ask about changing medicines. Add spices and herbs to increase the flavor of food. If you think you are depressed, ask your doctor for help. Depression can affect your appetite. And it can make it hard to do everyday activities like grocery shopping and making meals. Treatment can help. When should you call for help? Watch closely for changes in your health, and be sure to contact your doctor if you have any problems. Where can you learn more? Go to https://Spark Labspepiceweb.OkBuy.com. org and sign in to your [employer] account. Enter L838 in the [employer] box to learn more about \"Nutrition for Older Adults: Care Instructions. \"     If you do not have an account, please click on the \"Sign Up Now\" link. Current as of: August 22, 2019               Content Version: 12.6  © 1896-0523 [employer]. Care instructions adapted under license by [facility] ([facility]). If you have questions about a medical condition or this instruction, always ask your healthcare professional. [place] [address] any warranty or liability for your use of this information. Patient Education        Well Visit, Over 72: Care Instructions  Your Care Instructions     Physical exams can help you stay healthy. Your doctor has checked your overall health and may have suggested ways to take good care of yourself. He or she also may have recommended tests. At home, you can help prevent illness with healthy eating, regular exercise, and other steps.   Follow-up care is a key part of your treatment and safety. Be sure to make and go to all appointments, and call your doctor if you are having problems. It's also a good idea to know your test results and keep a list of the medicines you take. How can you care for yourself at home? Reach and stay at a healthy weight. This will lower your risk for many problems, such as obesity, diabetes, heart disease, and high blood pressure. Get at least 30 minutes of exercise on most days of the week. Walking is a good choice. You also may want to do other activities, such as running, swimming, cycling, or playing tennis or team sports. Do not smoke. Smoking can make health problems worse. If you need help quitting, talk to your doctor about stop-smoking programs and medicines. These can increase your chances of quitting for good. Protect your skin from too much sun. When you're outdoors from 10 a.m. to 4 p.m., stay in the shade or cover up with clothing and a hat with a wide brim. Wear sunglasses that block UV rays. Even when it's cloudy, put broad-spectrum sunscreen (SPF 30 or higher) on any exposed skin. See a dentist one or two times a year for checkups and to have your teeth cleaned. Wear a seat belt in the car. Follow your doctor's advice about when to have certain tests. These tests can spot problems early. For men and women  Cholesterol. Your doctor will tell you how often to have this done based on your overall health and other things that can increase your risk for heart attack and stroke. Blood pressure. Have your blood pressure checked during a routine doctor visit. Your doctor will tell you how often to check your blood pressure based on your age, your blood pressure results, and other factors. Diabetes. Ask your doctor whether you should have tests for diabetes. Vision. Experts recommend that you have yearly exams for glaucoma and other age-related eye problems. Hearing. Tell your doctor if you notice any change in your hearing.  You can have tests to find out how well you hear. Colon cancer tests. Keep having colon cancer tests as your doctor recommends. You can have one of several types of tests. Heart attack and stroke risk. At least every 4 to 6 years, you should have your risk for heart attack and stroke assessed. Your doctor uses factors such as your age, blood pressure, cholesterol, and whether you smoke or have diabetes to show what your risk for a heart attack or stroke is over the next 10 years. Osteoporosis. Talk to your doctor about whether you should have a bone density test to find out whether you have thinning bones. Ask your doctor if you need to take a calcium plus vitamin D supplement. You may be able to get enough calcium and vitamin D through your diet. For women  Pap test and pelvic exam. You may no longer need a Pap test. Talk with your doctor about whether to stop or continue to have Pap tests. Breast exam and mammogram. Ask how often you should have a mammogram, which is an X-ray of your breasts. A mammogram can spot breast cancer before it can be felt and when it is easiest to treat. Thyroid disease. Talk to your doctor about whether to have your thyroid checked as part of a regular physical exam. Women have an increased chance of a thyroid problem. For men  Prostate exam. Talk to your doctor about whether you should have a blood test (called a PSA test) for prostate cancer. Experts recommend that you discuss the benefits and risks of the test with your doctor before you decide whether to have this test. Some experts say that men ages 79 and older no longer need testing. Abdominal aortic aneurysm. Ask your doctor whether you should have a test to check for an aneurysm. You may need a test if you ever smoked or if your parent, brother, sister, or child has had an aneurysm. When should you call for help?   Watch closely for changes in your health, and be sure to contact your doctor if you have any problems or symptoms that concern you. Where can you learn more? Go to https://chpepiceweb.healthQ.branch. org and sign in to your [employer] account. Enter Y098 in the [facility] box to learn more about \"Well Visit, Over 65: Care Instructions. \"     If you do not have an account, please click on the \"Sign Up Now\" link. Current as of: May 27, 2020               Content Version: 12.6  © 2006-2020 [employer]. Care instructions adapted under license by [facility] ([facility]). If you have questions about a medical condition or this instruction, always ask your healthcare professional. [address] any warranty or liability for your use of this information. Patient Education        Learning About Diabetes Food Guidelines  Your Care Instructions     Meal planning is important to manage diabetes. It helps keep your blood sugar at a target level (which you set with your doctor). You don't have to eat special foods. You can eat what your family eats, including sweets once in a while. But you do have to pay attention to how often you eat and how much you eat of certain foods. You may want to work with a dietitian or a certified diabetes educator (CDE) to help you plan meals and snacks. A dietitian or CDE can also help you lose weight if that is one of your goals. What should you know about eating carbs? Managing the amount of carbohydrate (carbs) you eat is an important part of healthy meals when you have diabetes. Carbohydrate is found in many foods. Learn which foods have carbs. And learn the amounts of carbs in different foods. Bread, cereal, pasta, and rice have about 15 grams of carbs in a serving. A serving is 1 slice of bread (1 ounce), ½ cup of cooked cereal, or 1/3 cup of cooked pasta or rice. Fruits have 15 grams of carbs in a serving.  A serving is 1 small fresh fruit, such as an apple or orange; ½ of a banana; ½ cup of cooked or canned fruit; ½ cup of fruit juice; 1 cup of melon or raspberries; or 2 tablespoons of dried fruit. Milk and no-sugar-added yogurt have 15 grams of carbs in a serving. A serving is 1 cup of milk or 2/3 cup of no-sugar-added yogurt. Starchy vegetables have 15 grams of carbs in a serving. A serving is ½ cup of mashed potatoes or sweet potato; 1 cup winter squash; ½ of a small baked potato; ½ cup of cooked beans; or ½ cup cooked corn or green peas. Learn how much carbs to eat each day and at each meal. A dietitian or CDE can teach you how to keep track of the amount of carbs you eat. This is called carbohydrate counting. If you are not sure how to count carbohydrate grams, use the Plate Method to plan meals. It is a good, quick way to make sure that you have a balanced meal. It also helps you spread carbs throughout the day. Divide your plate by types of foods. Put non-starchy vegetables on half the plate, meat or other protein food on one-quarter of the plate, and a grain or starchy vegetable in the final quarter of the plate. To this you can add a small piece of fruit and 1 cup of milk or yogurt, depending on how many carbs you are supposed to eat at a meal.  Try to eat about the same amount of carbs at each meal. Do not \"save up\" your daily allowance of carbs to eat at one meal.  Proteins have very little or no carbs per serving. Examples of proteins are beef, chicken, turkey, fish, eggs, tofu, cheese, cottage cheese, and peanut butter. A serving size of meat is 3 ounces, which is about the size of a deck of cards. Examples of meat substitute serving sizes (equal to 1 ounce of meat) are 1/4 cup of cottage cheese, 1 egg, 1 tablespoon of peanut butter, and ½ cup of tofu. How can you eat out and still eat healthy? Learn to estimate the serving sizes of foods that have carbohydrate. If you measure food at home, it will be easier to estimate the amount in a serving of restaurant food.   If the meal you order has too much carbohydrate (such as potatoes, corn, or baked beans), ask to have a low-carbohydrate food instead. Ask for a salad or green vegetables. If you use insulin, check your blood sugar before and after eating out to help you plan how much to eat in the future. If you eat more carbohydrate at a meal than you had planned, take a walk or do other exercise. This will help lower your blood sugar. What else should you know? Limit saturated fat, such as the fat from meat and dairy products. This is a healthy choice because people who have diabetes are at higher risk of heart disease. So choose lean cuts of meat and nonfat or low-fat dairy products. Use olive or canola oil instead of butter or shortening when cooking. Don't skip meals. Your blood sugar may drop too low if you skip meals and take insulin or certain medicines for diabetes. Check with your doctor before you drink alcohol. Alcohol can cause your blood sugar to drop too low. Alcohol can also cause a bad reaction if you take certain diabetes medicines. Follow-up care is a key part of your treatment and safety. Be sure to make and go to all appointments, and call your doctor if you are having problems. It's also a good idea to know your test results and keep a list of the medicines you take. Where can you learn more? Go to https://All Access TelecompeSayNow.RAZ Mobile. org and sign in to your [employer] account. Enter B656 in the [facility] box to learn more about \"Learning About Diabetes Food Guidelines. \"     If you do not have an account, please click on the \"Sign Up Now\" link. Current as of: December 20, 2019               Content Version: 12.6  © 9480-4857 [employer], Incorporated. Care instructions adapted under license by [facility] ([facility]). If you have questions about a medical condition or this instruction, always ask your healthcare professional. [place] [address] any warranty or liability for your use of this information.          Patient Education        Learning About Deep Vein Thrombosis  What is a deep vein thrombosis (DVT)? A deep vein thrombosis (DVT) is a blood clot (thrombus) in a deep vein, usually in the legs. A DVT can be dangerous because it can break loose and travel through the bloodstream to the lungs. There it can block blood flow in the lungs (pulmonary embolism). This can be life-threatening. What are the symptoms? Deep vein thrombosis often doesn't cause symptoms. Or it may cause only minor ones. When symptoms happen, they include:  Swelling in the affected area. Redness and warmth in the affected area. Pain or tenderness. You may have pain only when you touch the affected area or when you stand or walk. Sometimes a pulmonary embolism is the first sign that you have DVT. If your doctor thinks you may have DVT, you will probably have an ultrasound test. You may have other tests as well. What causes deep vein thrombosis (DVT)? Causes of a blood clot in a deep vein (DVT) include:  Slowed blood flow. This can happen when you're not active for long periods of time. For example, clots can form if you are paralyzed, are confined to bed, or must sit while on a long flight or car trip. Abnormal clotting problems that make the blood clot too easily or too quickly. For example, some people have blood that clots too easily, a problem that may run in families. Surgery or an injury to the blood vessels. Blood is more likely to clot in veins shortly after they are injured. Cancer. How can you prevent DVT? Exercise your lower leg muscles to help blood flow in your legs. Point your toes up toward your head so the calves of your legs are stretched, then relax and repeat. This is a good exercise to do when you are sitting for long periods of time. Get out of bed as soon as you can after an illness or surgery. If you need to stay in bed, do the leg exercise noted above every hour when you are awake. Use special stockings called compression stockings.  These stockings are tight at the feet with a gradually looser fit on the leg. Many doctors recommend that you wear compression stockings during a journey longer than 8 hours. Take breaks when you are on long trips. Stop the car and walk around. On long airplane flights, walk up and down the aisle hourly, and flex and point your feet every 20 minutes while sitting. Take blood-thinning medicines after some types of surgery if your doctor recommends it. Blood thinners also may be used if you are likely to develop clots. How is DVT treated? Treatment for DVT usually involves taking blood thinners. These medicines are given through a vein (intravenously, or IV) or as a pill. Talk with your doctor about which medicine is right for you. Your doctor also may suggest that you prop up or elevate your leg when possible, take walks, and wear compression stockings. These measures may help reduce the pain and swelling that can happen with DVT. Follow-up care is a key part of your treatment and safety. Be sure to make and go to all appointments, and call your doctor if you are having problems. It's also a good idea to know your test results and keep a list of the medicines you take. Where can you learn more? Go to https://Stream Global Servicespeicomasoft.CalAmp. org and sign in to your [employer] account. Enter E805 in the [employer] box to learn more about \"Learning About Deep Vein Thrombosis. \"     If you do not have an account, please click on the \"Sign Up Now\" link. Current as of: March 4, 2020               Content Version: 12.6  © 2006-2020 [employer] Incorporated. Care instructions adapted under license by [facility] ([facility]). If you have questions about a medical condition or this instruction, always ask your healthcare professional. [place] [address] any warranty or liability for your use of this information.          Patient Education        Earwax Blockage: Care Instructions  Your Care Instructions     Earwax is a natural substance that protects the ear canal. Normally, earwax drains from the ears and does not cause problems. Sometimes earwax builds up and hardens. Earwax blockage (also called cerumen impaction) can cause some loss of hearing and pain. When wax is tightly packed, you will need to have your doctor remove it. Follow-up care is a key part of your treatment and safety. Be sure to make and go to all appointments, and call your doctor if you are having problems. It's also a good idea to know your test results and keep a list of the medicines you take. How can you care for yourself at home? Do not try to remove earwax with cotton swabs, fingers, or other objects. This can make the blockage worse and damage the eardrum. If your doctor recommends that you try to remove earwax at home:  Soften and loosen the earwax with warm mineral oil. You also can try hydrogen peroxide mixed with an equal amount of room temperature water. Place 2 drops of the fluid, warmed to body temperature, in the ear two times a day for up to 5 days. Once the wax is loose and soft, all that is usually needed to remove it from the ear canal is a gentle, warm shower. Direct the water into the ear, then tip your head to let the earwax drain out. Dry your ear thoroughly with a hair dryer set on low. Hold the dryer several inches from your ear. If the warm mineral oil and shower do not work, use an over-the-counter wax softener. Read and follow all instructions on the label. After using the wax softener, use an ear syringe to gently flush the ear. Make sure the flushing solution is body temperature. Cool or hot fluids in the ear can cause dizziness. When should you call for help? Call your doctor now or seek immediate medical care if:    Pus or blood drains from your ear.     Your ears are ringing or feel full.     You have a loss of hearing.    Watch closely for changes in your health, and be sure to contact your doctor if:    You have pain or reduced hearing after 1 week of home treatment.     You have any new symptoms, such as nausea or balance problems. Where can you learn more? Go to https://Mojo Mobilitypepiceweb.LittleFoot Energy Finance. org and sign in to your [employer] account. Enter S549 in the [employer] box to learn more about \"Earwax Blockage: Care Instructions. \"     If you do not have an account, please click on the \"Sign Up Now\" link. Current as of: June 26, 2019               Content Version: 12.6  © 6375-6053 [employer], [employer]orporated. Care instructions adapted under license by [facility] ([facility]). If you have questions about a medical condition or this instruction, always ask your healthcare professional. [address] any warranty or liability for your use of this information.

## 2020-12-22 NOTE — PROGRESS NOTES
PO) Take by mouth Yes Historical Provider, MD   Probiotic Product ([address]) Take 1 tablet by mouth daily Yes Historical Provider, MD         Past Medical History:   Diagnosis Date    Abnormal nuclear stress test 06/01/2018    Cataract     Colon polyps     COPD [facility] disease) [facility]     Diverticulosis large intestine w/o perforation or abscess w/bleeding     DVT, recurrent, lower extremity, acute, left (HCC)     Fibroid, uterine     Hearing loss     Hyperlipidemia     Nicotine dependence     Nocturnal hypoxemia     Postmenopausal     Sleep apnea     Ulcerative colitis ([facility]     with rectal bleeding        Past Surgical History:   Procedure Laterality Date    BREAST BIOPSY  1988    right breast biopsy     BREAST BIOPSY Right 03/2011    stereotactic biopsy    COLONOSCOPY  2006    Dr Geovani Shaw  2010    Dr Gilbert Paula  2010    Dr Meredith Granda interminate colitis    COLONOSCOPY  03/03/2014    Dr Bo Pineda Bilateral 2011 2012    HIP SURGERY  06/13/2020    left         Family History   Problem Relation Age of Onset    Diabetes Mother     High Blood Pressure Mother     Heart Disease Mother     Coronary Art Dis Father     Heart Disease Father        CareTeam (Including outside providers/suppliers regularly involved in providing care):   Patient Care Team:  PETEY Waller CNP as PCP - General (Nurse Practitioner)  PETEY Waller CNP as PCP - [address] [place], [place] [address]sofía Chandlerled Provider    Wt Readings from Last 3 Encounters:   12/22/20 128 lb 3.2 oz (58.2 kg)   09/23/20 127 lb 9.6 oz (57.9 kg)   09/08/20 125 lb (56.7 kg)     Vitals:    12/22/20 1342   BP: 110/72   Pulse: 88   Resp: 16   Temp: 96.8 °F (36 °C)   TempSrc: Infrared   Weight: 128 lb 3.2 oz (58.2 kg)   Height: 5' 3.5\" (1.613 m)     Body mass index is 22.35 kg/m².     Based upon direct observation of the patient, evaluation of cognition reveals global memory impairment noted. Patient's complete Health Risk Assessment and screening values have been reviewed and are found in Flowsheets. The following problems were reviewed today and where indicated follow up appointments were made and/or referrals ordered. Positive Risk Factor Screenings with Interventions:     Fall Risk:  Timed Up and Go Test > 12 seconds? (Complete if either Fall Risk answers are Yes): no  2 or more falls in past year?: (!) yes  Fall with injury in past year?: (!) yes  Fall Risk Interventions:    · Home safety tips provided    Cognitive: Words recalled: 2 Words Recalled  Clock Drawing Test (CDT) Score: (!) Abnormal  Total Score Interpretation: Positive Mini-Cog  Cognitive Impairment Interventions:  · Patient declines any further evaluation/treatment for cognitive impairment      Substance History:  Social History     Tobacco History     Smoking Status  Current Some Day Smoker Last attempt to quit  7/12/2019 Smoking Frequency  1 pack/day for 40 years (40 pk yrs) Smoking Tobacco Type  Cigarettes    Smokeless Tobacco Use  Never Used          Alcohol History     Alcohol Use Status  No          Drug Use     Drug Use Status  Never          Sexual Activity     Sexually Active  Not Currently               Alcohol Screening:       A score of 8 or more is associated with harmful or hazardous drinking. A score of 13 or more in women, and 15 or more in men, is likely to indicate alcohol dependence. Substance Abuse Interventions:  · Tobacco abuse:  patient is not ready to work toward tobacco cessation at this time    [facility] and ACP:  General  In general, how would you say your health is?: Good  In the past 7 days, have you experienced any of the following?  New or Increased Pain, New or Increased Fatigue, Loneliness, Social Isolation, Stress or Anger?: None of These  Do you get the social and emotional support that you need?: Yes  Do you have a Living Will?: Yes  Advance Directives Power of [facility] Living Will ACP-Advance Directive ACP-Power of     Not on File Not on File Not on File Not on File      General Health Risk Interventions:  · No Living Will: ACP documents already completed- patient asked to provide copy to the office    Health Habits/Nutrition:  Health Habits/Nutrition  Do you exercise for at least 20 minutes 2-3 times per week?: Yes  Have you lost any weight without trying in the past 3 months?: No  Do you eat fewer than 2 meals per day?: No  Have you seen a dentist within the past year?: (!) No  Body mass index: 22.35  Health Habits/Nutrition Interventions:  · Dental exam overdue:  patient declines dental evaluation    Hearing/Vision:  No exam data present  Hearing/Vision  Do you or your family notice any trouble with your hearing?: (!) Yes  Do you have difficulty driving, watching TV, or doing any of your daily activities because of your eyesight?: No  Have you had an eye exam within the past year?: (!) No  Hearing/Vision Interventions:  · Hearing concerns:  patient declines any further evaluation/treatment for hearing issues  · Vision concerns:  patient encouraged to make appointment with his/her eye specialist    Safety:  Safety  Do you have working smoke detectors?: Yes  Have all throw rugs been removed or fastened?: (!) No  Do you have non-slip mats or surfaces in all bathtubs/showers?: Yes  Do all of your stairways have a railing or banister?: Yes  Are your doorways, halls and stairs free of clutter?: Yes  Do you always fasten your seatbelt when you are in a car?: Yes  Safety Interventions:  · living in nursing home    ADL:  ADLs  In the past 7 days, did you need help from others to perform any of the following everyday activities? Eating, dressing, grooming, bathing, toileting, or walking/balance?: None  In the past 7 days, did you need help from others to take care of any of the following?  Laundry, housekeeping, banking/finances, shopping, telephone use, food preparation, transportation, or taking medications?: (!) Taking Medications, Transportation, Laundry, Housekeeping  ADL Interventions:  · Patient declines any further evaluation/treatment for this issue  · alright being taken care of at nursing home    Personalized Preventive Plan   Current Health Maintenance Status  Immunization History   Administered Date(s) Administered    Influenza Vaccine, unspecified formulation 12/28/2006, 12/28/2006, 09/23/2008, 01/23/2014, 10/28/2014, 10/07/2015, 11/16/2016    Influenza, High Dose (Fluzone 65 yrs and older) 09/07/2017, 10/30/2018    Influenza, Triv, inactivated, subunit, adjuvanted, IM (Fluad 65 yrs and older) 10/09/2019    Pneumococcal Conjugate 13-valent ([id number]) 10/07/2015    Pneumococcal Polysaccharide ([id number]) 12/28/2006, 09/07/2017    Tdap (Boostrix, Adacel) 02/04/2016, 01/02/2020    Zoster Recombinant (Shingrix) 11/05/2019, 02/09/2020        Health Maintenance   Topic Date Due    Diabetic retinal exam  10/04/1950    Annual Wellness Visit (AWV)  05/29/2019    Flu vaccine (1) 09/01/2020    Lipid screen  02/26/2021    Potassium monitoring  06/16/2021    Creatinine monitoring  06/16/2021    A1C test (Diabetic or Prediabetic)  06/22/2021    Diabetic foot exam  12/22/2021    DTaP/Tdap/Td vaccine (3 - Td) 01/02/2030    DEXA (modify frequency per FRAX score)  Completed    Shingles Vaccine  Completed    Pneumococcal 65+ years Vaccine  Completed    Hepatitis A vaccine  Aged Out    Hib vaccine  Aged Out    Meningococcal (ACWY) vaccine  Aged Out     Recommendations for [employer] Due: see orders and patient instructions/AVS.  . Recommended screening schedule for the next 5-10 years is provided to the patient in written form: see Patient Instructions/AVS.    Tessy Simms was seen today for medicare awv.     Diagnoses and all orders for this visit:    Routine general medical examination at a health care facility  - Age-appropriate education Rfl: 3    vitamin B-12 (CYANOCOBALAMIN) 1000 MCG tablet, Take 1 tablet by mouth daily (Patient taking differently: Take 1,000 mcg by mouth 2 times daily ), Disp: 30 tablet, Rfl: 3    Multiple Vitamins-Minerals (CENTRUM SILVER 50+WOMEN PO), Take by mouth, Disp: , Rfl:     Probiotic Product ([address]), Take 1 tablet by mouth daily, Disp: , Rfl:     The patient is allergic to alcohol; azulfidine [sulfasalazine]; mesalamine; and sulfa antibiotics. Past Medical History  Tammy Connell  has a past medical history of Abnormal nuclear stress test, Cataract, Colon polyps, COPD (chronic obstructive pulmonary disease) ([address] Diverticulosis large intestine w/o perforation or abscess w/bleeding, DVT, recurrent, lower extremity, acute, left ([address] Fibroid, uterine, Hearing loss, Hyperlipidemia, Nicotine dependence, Nocturnal hypoxemia, Postmenopausal, Sleep apnea, and Ulcerative colitis ([address] Past Surgical History  The patient  has a past surgical history that includes Foot surgery; Breast biopsy (1988); Colonoscopy (2006); Colonoscopy (2010); Colonoscopy (2010); Colonoscopy (03/03/2014); Breast biopsy (Right, 03/2011); Foot surgery (Bilateral, 2011); and hip surgery (06/13/2020). Family History  This patient's family history includes Coronary Art Dis in her father; Diabetes in her mother; Heart Disease in her father and mother; High Blood Pressure in her mother. Social History  Tammy Connell  reports that she has been smoking cigarettes. She has a 40.00 pack-year smoking history. She has never used smokeless tobacco. She reports that she does not drink alcohol or use drugs. Health Maintenance:    Health maintenance reviewed.    Health Maintenance   Topic Date Due    Diabetic retinal exam  10/04/1950    Annual Wellness Visit (AWV)  05/29/2019    Flu vaccine (1) 09/01/2020    Lipid screen  02/26/2021    Potassium monitoring  06/16/2021    Creatinine monitoring  06/16/2021    A1C test (Diabetic or Prediabetic)  06/22/2021    Diabetic foot exam  12/22/2021    DTaP/Tdap/Td vaccine (3 - Td) 01/02/2030    DEXA (modify frequency per FRAX score)  Completed    Shingles Vaccine  Completed    Pneumococcal 65+ years Vaccine  Completed    Hepatitis A vaccine  Aged Out    Hib vaccine  Aged Out    Meningococcal (ACWY) vaccine  Aged Out       Subjective:      Review of Systems   Constitutional: Negative for chills, fatigue and fever. HENT: Negative for congestion, ear pain, sinus pressure and sneezing. Eyes: Negative for pain, discharge, redness and itching. Respiratory: Negative for cough, shortness of breath and wheezing. Cardiovascular: Negative for chest pain, palpitations and leg swelling. Gastrointestinal: Negative for abdominal pain, blood in stool, constipation and diarrhea. Endocrine: Negative for polydipsia, polyphagia and polyuria. Genitourinary: Negative for difficulty urinating and hematuria. Musculoskeletal: Negative for arthralgias, back pain and neck pain. Skin: Negative for color change, pallor and rash. Allergic/Immunologic: Negative for environmental allergies and food allergies. Neurological: Negative for dizziness, light-headedness, numbness and headaches. Psychiatric/Behavioral: Negative for agitation and confusion. The patient is not nervous/anxious. Objective:     /72   Pulse 88   Temp 96.8 °F (36 °C) (Infrared)   Resp 16   Ht 5' 3.5\" (1.613 m)   Wt 128 lb 3.2 oz (58.2 kg)   BMI 22.35 kg/m²     Physical Exam  Vitals signs and nursing note reviewed. Constitutional:       Appearance: She is well-developed. HENT:      Head: Normocephalic. Right Ear: Hearing, tympanic membrane, ear canal and external ear normal.      Left Ear: Hearing, tympanic membrane, ear canal and external ear normal.      Nose:      Right Sinus: No maxillary sinus tenderness or frontal sinus tenderness.       Left Sinus: No maxillary sinus tenderness or frontal sinus tenderness. Mouth/Throat:      Mouth: Mucous membranes are moist.      Tongue: No lesions. Pharynx: No posterior oropharyngeal erythema. Eyes:      General:         Right eye: No discharge. Left eye: No discharge. Conjunctiva/sclera: Conjunctivae normal.      Pupils: Pupils are equal, round, and reactive to light. Neck:      Musculoskeletal: Normal range of motion. Vascular: No JVD. Cardiovascular:      Rate and Rhythm: Normal rate and regular rhythm. Heart sounds: Normal heart sounds. No murmur. Pulmonary:      Effort: Pulmonary effort is normal. No tachypnea. Breath sounds: Normal breath sounds. No stridor. No wheezing. Abdominal:      General: Bowel sounds are normal. There is no distension. Palpations: Abdomen is soft. Tenderness: There is no abdominal tenderness. Musculoskeletal:         General: No deformity. Comments: ROM in all extremities WNL. No deformities. Lymphadenopathy:      Head:      Right side of head: No submental or submandibular adenopathy. Left side of head: No submental or submandibular adenopathy. Skin:     General: Skin is warm and dry. Capillary Refill: Capillary refill takes less than 2 seconds. Findings: No rash. Neurological:      Mental Status: She is alert and oriented to person, place, and time. Coordination: Coordination normal.   Psychiatric:         Mood and Affect: Mood normal.         Behavior: Behavior normal.         Thought Content:  Thought content normal.         Judgment: Judgment normal.         Labs Reviewed 12/22/2020:    Lab Results   Component Value Date    WBC 15.0 (H) 06/16/2020    HGB 12.6 06/16/2020    HCT 38.6 06/16/2020     06/16/2020    CHOL 194 02/26/2020    TRIG 175 (H) 02/26/2020    HDL 49 02/26/2020    ALT 19 06/15/2020    AST 22 06/15/2020     06/16/2020    K 3.6 06/16/2020     06/16/2020    CREATININE 0.65 06/16/2020    BUN 10 06/16/2020    CO2 29 06/16/2020    TSH 0.17 (L) 05/24/2018    INR 1.02 06/16/2020    LABA1C 6.4 (H) 12/22/2020     Assessment/Plan      1. Routine general medical examination at a health care facility  - Age-appropriate education provided. - Health maintenance reviewed. - Lab work reviewed  - Encouraged healthy diet and regular exercise. 2. Chronic deep vein thrombosis (DVT) of femoral vein of right lower extremity (HCC)  - If negative, will d/c eliquis  - VL DUP LOWER EXTREMITY VENOUS RIGHT; Future    3. Impacted cerumen, bilateral  - Ear wax removal    4. 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, unspecified whether native or transplanted heart ([facility])  - Chronic, stable  - Denies chest pain, SOB, referred pain    5. Type 2 diabetes mellitus with diabetic polyneuropathy, without long-term current use of insulin ([facility])  - Chronic, stable  - Low carb diet, exercise encouraged  - POCT glycosylated hemoglobin (Hb A1C)  -  DIABETES FOOT EXAM  - Hemoglobin A1C; Future    6. Age-related osteoporosis without current pathological fracture  - DME Order for Walker as OP    7. Frequent falls  - DME Order for Walker as OP  - Continue with PT    Return in 6 months (on 6/22/2021) for Chronic conditions. Patient given educational materials - see patient instructions. Discussed use, benefit, and side effects of prescribed medications. All patient questions answered. Pt voiced understanding. Reviewed health maintenance.        Electronically signed by PETEY Mccullough CNP on 12/22/2020 at 2:04 PM EST

## 2020-12-22 NOTE — PROGRESS NOTES
Doppler scheduled for 12/29/2020 @ 1030. Arrive @ 1000 - 2nd [facility] heart center.

## 2020-12-28 ENCOUNTER — HOSPITAL ENCOUNTER (OUTPATIENT)
Dept: PHYSICAL THERAPY | Age: 80
Setting detail: THERAPIES SERIES
Discharge: HOME | End: 2020-12-28
Payer: MEDICARE

## 2020-12-28 PROCEDURE — 97110 THERAPEUTIC EXERCISES: CPT

## 2020-12-28 NOTE — PROGRESS NOTES
[address] [facility]  PHYSICAL THERAPY  [x] DAILY NOTE (LAND) [] DAILY NOTE (AQUATIC ) [] PROGRESS NOTE [] DISCHARGE NOTE    [x] OUTPATIENT REHABILITATION CENTER [facility]   [] [place] [address]    [] [facility]   [] Amber Middleton    Date: 2020  Patient Name:  Matias Ramirez  : 1940  MRN: [id number]    Referring Practitioner PETEY Mckeon*   Diagnosis Pain in left hip [M25.552]  Fracture of unspecified part of neck of left femur, subsequent encounter for closed fracture with routine healing [S72.002D]    Treatment Diagnosis M54.5 LBP, bilateral LE weakness   Date of Evaluation 10/15/20    Additional Pertinent History SOB, OA      Functional Outcome Measure Used LEFS, 30 second sit to stand test, Tinetti   Functional Outcome Score 36/80 LEFS (10/15/20) Eval  52/80 LEFS (12/3/20) PN      Insurance: Primary: Payor: Ruthie Medel /  /  / ,   Secondary: [facility]   Authorization Information: Unlimited visits based on med nec, no maintenance/preventative, aquatics and modalities covered (except ionto and hot packs/cold packs)   Visit # 12, 6/10 for progress note   Visits Allowed: Unlimited   Recertification Date: [id number]   Physician Follow-Up: Pending PT results   Physician Orders:    History of Present Illness: [de-identified] y/o female with history of L hip pain which began around 2020 when patient was walking without a walker and fell and broke her hip. Since then she went through home health rehab as well as continues to perform exercises regularly at home. She lives at a nursing home community at this time however owns a home in [place] she would like to get back to living at. Overall, though, her hips don't bother her too much unless she is up doing a lot of working and walking. She and her daughter are primarily more concerned with leg weakness and poor balance overall.   She notes at times it is difficult to get out of a chair or the couch d/t weakness in the legs. SUBJECTIVE:  She states the dizziness has seemed to get better the last couple of days and she's doing okay today. TREATMENT   Precautions:    Pain: Not rated this afternoon    X in shaded column indicates activity completed today   Modalities Parameters/  Location  Notes         Manual Therapy Time/Technique  Notes         Exercise/Intervention   Notes   NuStep 6 minutes  Level 6 X Seat 9/ arms 9          Anterior and lateral - 6'' 15x ea  x With gait belt, 1-2 UE support, unable to complete with no UE. 3 way hip 15 ea orange x Bilaterally - VCing throughout for technique   Total Gym Squats/Heel Raises 3x10 Level J  With SBA to light CGA for safety on/off machine   SLS 2 sets  15 seconds  x LLE -had to utilized BUE support   Squats  2x10  x    Calf raises, hamstring curls, marches with 2# weights 20x ea  x // bars   Hydro hip  2x15 reps bilateral   15x unilateral Level 5 x           NK table 5# - both flexion and extension 25x each 3 seconds  Cues for slow controlled motion - improved follow through with increased weight and cues to hold for 3 seconds   Sit to stands 2X10  x Utilizing bilateral UE on knees           Back larios walking - gait belt - no AD - walking, side stepping 2 laps ea   No UE usage - VCing to increase step length, stability, narrow LAINE            Specific Interventions Next Treatment: Progress patient regarding both functional strengthening and balance exercises towards goals of ambulating without AD and improving sit to stand strength    Activity/Treatment Tolerance:  [x]  Patient tolerated treatment well  []  Patient limited by fatigue  []  Patient limited by pain   []  Patient limited by medical complications  []  Other:      Assessment: Will plan to give patient HEP for regular performance and d/c next visit. Cueing throughout to decrease velocity of movement and improve eccentric control with fair follow through noted this date.       GOALS:  Patient Goal: Feel and move better    STG: (to be met in 6 weeks)    1. Increase Strength: Patient will demonstrate improved bilateral hip strength to 4/5 within 4 weeks for improved ability to control LE and decrease substitution. 2. Independent with Home Exercise Programs  3. Demonstrate Knowledge of fall prevention      LTG: (to be met in 12 weeks)Patient will demonstrate improved bilateral hip strength to 4+/5 within 8 weeks for improved ability to negotiate uneven terrain and ambulate in community without abnormality. 1. Patient to demonstrate improved bilateral hip AROM to >10 degrees extension, >45 degrees IR, and >50 degrees ER within 12 weeks for symmetry bilaterally. 2. Patient to demonstrate improved LEFS score to >60/80 within 12 weeks for improved function    3. To demonstrate improved 30 second sit to stand test to >15 repetitions within 12 weeks for increased strength and endurance    4. To demonstrate improved Tinetti balance index to >24/28 for improved balance and stability on feet for safety in upright      Patient Education:   []  HEP/Education Completed: hand out given for hook lying hip abd and supine hip abd  [x]  No new Education completed  []  Reviewed Prior HEP      [x]  Patient verbalized and/or demonstrated understanding of education provided. []  Patient unable to verbalize and/or demonstrate understanding of education provided. Will continue education. [x]  Barriers to learning: None    PLAN:    Plan to see patient 2 times per week for 8-12 weeks to address the treatment planned outlined above.   [x]  Continue with current plan of care  []  Modify plan of care as follows:    []  Hold pending physician visit  []  Discharge    Time In 1345   Time Out 1428   Timed Code Minutes: 43 min   Total Treatment Time: 43 min     Electronically Signed by: Radames Mei

## 2020-12-29 ENCOUNTER — TELEPHONE (OUTPATIENT)
Dept: FAMILY MEDICINE CLINIC | Age: 80
End: 2020-12-29

## 2020-12-29 ENCOUNTER — HOSPITAL ENCOUNTER (OUTPATIENT)
Dept: INTERVENTIONAL RADIOLOGY/VASCULAR | Age: 80
Discharge: HOME | End: 2020-12-29
Payer: MEDICARE

## 2020-12-29 PROCEDURE — 93971 EXTREMITY STUDY: CPT

## 2020-12-29 NOTE — TELEPHONE ENCOUNTER
----- Message from PETEY Hopper CNP sent at 12/29/2020  1:11 PM EST -----  No DVT seen on ultrasound. May discontinue eliquis.

## 2020-12-30 ENCOUNTER — HOSPITAL ENCOUNTER (OUTPATIENT)
Dept: PHYSICAL THERAPY | Age: 80
Setting detail: THERAPIES SERIES
Discharge: HOME | End: 2020-12-30
Payer: MEDICARE

## 2020-12-30 PROCEDURE — 97110 THERAPEUTIC EXERCISES: CPT

## 2020-12-30 NOTE — DISCHARGE SUMMARY
[address] [facility]  PHYSICAL THERAPY  [] DAILY NOTE (LAND) [] DAILY NOTE (AQUATIC ) [] PROGRESS NOTE [x] DISCHARGE NOTE    [x] OUTPATIENT REHABILITATION CENTER [facility]   [] [place] [address]    [] [facility]   [] Ilsa Omeros    Date: 2020  Patient Name:  Antonio Deng  : 1940  MRN: [id number]    Referring Practitioner PETEY Hayes*   Diagnosis Pain in left hip [M25.552]  Fracture of unspecified part of neck of left femur, subsequent encounter for closed fracture with routine healing [S72.002D]    Treatment Diagnosis M54.5 LBP, bilateral LE weakness   Date of Evaluation 10/15/20    Additional Pertinent History SOB, OA      Functional Outcome Measure Used LEFS, 30 second sit to stand test, Tinetti   Functional Outcome Score 36/80 LEFS (10/15/20) Eval  52/80 LEFS (12/3/20) PN  60/80 LEFS (20) DC      Insurance: Primary: Payor: MEDICARE /  /  / ,   Secondary: [facility]   Authorization Information: Unlimited visits based on med nec, no maintenance/preventative, aquatics and modalities covered (except ionto and hot packs/cold packs)   Visit # 15, 7/10 for progress note   Visits Allowed: Unlimited   Recertification Date:    Physician Follow-Up: Pending PT results   Physician Orders:    History of Present Illness: [de-identified] y/o female with history of L hip pain which began around 2020 when patient was walking without a walker and fell and broke her hip. Since then she went through home health rehab as well as continues to perform exercises regularly at home. She lives at a [facility] community at this time however owns a home in [place] she would like to get back to living at. Overall, though, her hips don't bother her too much unless she is up doing a lot of working and walking. She and her daughter are primarily more concerned with leg weakness and poor balance overall.   She notes at times it is difficult to get out of a chair or the couch d/t weakness in the legs. SUBJECTIVE:  States she has been doing pretty well overall. TREATMENT   Precautions:    Pain: Not rated this afternoon    X in shaded column indicates activity completed today   Modalities Parameters/  Location  Notes         Manual Therapy Time/Technique  Notes         Exercise/Intervention   Notes   NuStep 6 minutes  Level 6 X Seat 9/ arms 9          Anterior and lateral - 6'' 15x ea  x With gait belt, 1-2 UE support, unable to complete with no UE. 3 way hip 15 ea orange x Bilaterally - VCing throughout for technique   Total Gym Squats/Heel Raises 3x10 Level J x With SBA to light CGA for safety on/off machine   SLS 2 sets  15 seconds  x LLE -had to utilized BUE support   Squats  2x10  x    Calf raises, hamstring curls, marches with 2# weights 20x ea  x // bars   Hydro hip  2x15 reps bilateral   15x unilateral Level 5 x           NK table 5# - both flexion and extension 25x each 3 seconds x Cues for slow controlled motion - improved follow through with increased weight and cues to hold for 3 seconds   Sit to stands 2X10  x Utilizing bilateral UE on knees           Back lairos walking - gait belt - no AD - walking, side stepping 2 laps ea   No UE usage - VCing to increase step length, stability, narrow LAINE            Specific Interventions Next Treatment: Progress patient regarding both functional strengthening and balance exercises towards goals of ambulating without AD and improving sit to stand strength    Activity/Treatment Tolerance:  [x]  Patient tolerated treatment well  []  Patient limited by fatigue  []  Patient limited by pain   []  Patient limited by medical complications  []  Other:      Assessment: Gave patient handout with HEP for home to continue to perform regularly. Verbalized understanding of performance. Patient has met goals for safe discharge from physical therapy at this time.       GOALS:  Patient Goal: Feel and move better    STG: (to be Modify plan of care as follows:    []  Hold pending physician visit  []  Discharge    Time In 1342   Time Out 1425   Timed Code Minutes: 42 min   Total Treatment Time: 42 min     Electronically Signed by: Steve Gunter

## 2021-01-06 ENCOUNTER — OFFICE VISIT (OUTPATIENT)
Dept: PHYSICAL MEDICINE AND REHAB | Age: 81
End: 2021-01-06
Payer: MEDICARE

## 2021-01-06 VITALS
HEART RATE: 81 BPM | BODY MASS INDEX: 22.49 KG/M2 | TEMPERATURE: 97.3 F | DIASTOLIC BLOOD PRESSURE: 52 MMHG | SYSTOLIC BLOOD PRESSURE: 97 MMHG | WEIGHT: 129 LBS

## 2021-01-06 DIAGNOSIS — R29.898 LEG WEAKNESS, BILATERAL: ICD-10-CM

## 2021-01-06 DIAGNOSIS — G60.8 MIXED SENSORY-MOTOR POLYNEUROPATHY: Primary | ICD-10-CM

## 2021-01-06 PROCEDURE — G8427 DOCREV CUR MEDS BY ELIG CLIN: HCPCS | Performed by: PHYSICAL MEDICINE & REHABILITATION

## 2021-01-06 PROCEDURE — G8400 PT W/DXA NO RESULTS DOC: HCPCS | Performed by: PHYSICAL MEDICINE & REHABILITATION

## 2021-01-06 PROCEDURE — 99213 OFFICE O/P EST LOW 20 MIN: CPT | Performed by: PHYSICAL MEDICINE & REHABILITATION

## 2021-01-06 PROCEDURE — 4004F PT TOBACCO SCREEN RCVD TLK: CPT | Performed by: PHYSICAL MEDICINE & REHABILITATION

## 2021-01-06 PROCEDURE — 1090F PRES/ABSN URINE INCON ASSESS: CPT | Performed by: PHYSICAL MEDICINE & REHABILITATION

## 2021-01-06 PROCEDURE — G8420 CALC BMI NORM PARAMETERS: HCPCS | Performed by: PHYSICAL MEDICINE & REHABILITATION

## 2021-01-06 PROCEDURE — 4040F PNEUMOC VAC/ADMIN/RCVD: CPT | Performed by: PHYSICAL MEDICINE & REHABILITATION

## 2021-01-06 PROCEDURE — G8484 FLU IMMUNIZE NO ADMIN: HCPCS | Performed by: PHYSICAL MEDICINE & REHABILITATION

## 2021-01-06 PROCEDURE — 1123F ACP DISCUSS/DSCN MKR DOCD: CPT | Performed by: PHYSICAL MEDICINE & REHABILITATION

## 2021-01-06 NOTE — PROGRESS NOTES
[facility] PHYSICAL MEDICINE & REHABILITATION  [address] [place]  [address] [address]  Dept: [phone]  Dept Fax: [phone]    Outpatient Followup Note    Alvin Giles, [de-identified] y.o., female, presents for follow up c/o of Follow-up (bilateral hip pain )  . HPI:     HPI  Patient with intermittent aching pain B hips which is mild-moderate. Pain is stable and improved on prednisone. Her mobility is also significantly improved since our first visit. She is tolerating the current dose of prednisone at 7.5 mg without adverse effects. Reviewed at length potential long-term effects of steroid use today with her and her son and discussed risk vs benefit of her current treatment. She participated in physical therapy and noted her mobility improved with that as well. She has not seen her neurologist Dr. Lillian Jay since our last office visit in September. Her son is rescheduling her follow up appointment for Dr. Lillian Jay.     Past Medical History:   Diagnosis Date    Abnormal nuclear stress test 06/01/2018    Cataract     Colon polyps     COPD (chronic obstructive pulmonary disease) (HCC)     Diverticulosis large intestine w/o perforation or abscess w/bleeding     DVT, recurrent, lower extremity, acute, left (HCC)     Fibroid, uterine     Hearing loss     Hyperlipidemia     Nicotine dependence     Nocturnal hypoxemia     Postmenopausal     Sleep apnea     Ulcerative colitis ([address]     with rectal bleeding       Past Surgical History:   Procedure Laterality Date    BREAST BIOPSY  1988    right breast biopsy     BREAST BIOPSY Right 03/2011    stereotactic biopsy    COLONOSCOPY  2006    Dr Sb Lee  2010    Dr Jorge Bruce  2010    Dr Judith Brown interminate colitis    COLONOSCOPY  03/03/2014    Dr [facility] Bilateral 2011 2012   [facility]  06/13/2020    left     Family History Problem Relation Age of Onset    Diabetes Mother     High Blood Pressure Mother     Heart Disease Mother     Coronary Art Dis Father     Heart Disease Father      Social History     Socioeconomic History    Marital status:       Spouse name: None    Number of children: None    Years of education: None    Highest education level: None   Occupational History    None   Social Needs    Financial resource strain: Not hard at all   [place] insecurity     Worry: Never true     Inability: Never true   [employer] needs     Medical: No     Non-medical: No   Tobacco Use    Smoking status: Current Some Day Smoker     Packs/day: 0.25     Years: 40.00     Pack years: 10.00     Types: Cigarettes     Last attempt to quit: 2019     Years since quittin.4    Smokeless tobacco: Never Used    Tobacco comment: 3-4 cigs a day only   Substance and Sexual Activity    Alcohol use: No    Drug use: Never    Sexual activity: Not Currently   Lifestyle    Physical activity     Days per week: None     Minutes per session: None    Stress: None   Relationships    Social connections     Talks on phone: None     Gets together: None     Attends Scientology service: None     Active member of club or organization: None     Attends meetings of clubs or organizations: None     Relationship status: None    Intimate partner violence     Fear of current or ex partner: None     Emotionally abused: None     Physically abused: None     Forced sexual activity: None   Other Topics Concern    None   Social History Narrative    None       Current Outpatient Medications   Medication Sig Dispense Refill    predniSONE (DELTASONE) 5 MG tablet Take 1.5 tablets by mouth daily 45 tablet 3    lisinopril (PRINIVIL;ZESTRIL) 20 MG tablet Take 20 mg by mouth daily      acetaminophen (TYLENOL) 325 MG tablet Take 650 mg by mouth every 6 hours as needed for Pain      Glucosamine-Chondroitin 500-250 MG CAPS Take by mouth daily      metoprolol tartrate (LOPRESSOR) 25 MG tablet Take 1 tablet by mouth 2 times daily 180 tablet 3    simvastatin (ZOCOR) 40 MG tablet Take 1 tablet by mouth nightly 90 tablet 3    albuterol sulfate  (90 Base) MCG/ACT inhaler Inhale 2 puffs into the lungs every 6 hours as needed for Wheezing (Patient taking differently: Inhale 2 puffs into the lungs every 4 hours as needed for Wheezing ) 1 Inhaler 3    vitamin B-12 (CYANOCOBALAMIN) 1000 MCG tablet Take 1 tablet by mouth daily (Patient taking differently: Take 1,000 mcg by mouth 2 times daily ) 30 tablet 3    Multiple Vitamins-Minerals (CENTRUM SILVER 50+WOMEN PO) Take by mouth      Probiotic Product (Zopim PO) Take 1 tablet by mouth daily       No current facility-administered medications for this visit. Allergies   Allergen Reactions    Azulfidine [Sulfasalazine] Diarrhea    Mesalamine Other (See Comments)    Sulfa Antibiotics     Alcohol Nausea And Vomiting     Drinking alcohol makes her sick and gives her headaches       Subjective:      Review of Systems  Constitutional: Negative for fever, chills and unexpected weight change. HENT: Negative for trouble swallowing. Respiratory: Negative for cough and shortness of breath. Cardiovascular: Negative for chest pain. Endocrine: Negative for polyuria. Genitourinary: Negative for dysuria, urgency, frequency, incontinence and difficulty urinating. Gastrointestinal: Negative for constipation or diarrhea. Musculoskeletal: Positive for arthralgias. Neurological: Negative for headaches, numbness, or tingling. Psychiatric: Negative for depressed mood or anxiety. Objective:     Physical Exam  BP (!) 97/52   Pulse 81   Temp 97.3 °F (36.3 °C)   Wt 129 lb (58.5 kg)   BMI 22.49 kg/m²   Constitutional: She appears well-developed and well-nourished. In no distress. HEENT: NCAT, PERRL, EOMI.  Mucous membranes pink and moist.  Pulmonary/Chest: Respirations WNL and unlabored. MSK: Functional ROM all extremities. Strength 5/5 key muscles including B hip flexion and extension. Sit to stand independently using rails on wheelchair. Neurological: She is alert and oriented to person, place, and time. DTRs 2+ and symmetric. Impaired sensation to light touch distal BLEs. Skin: Skin is warm dry and intact with good turgor. Psychiatric: She has a normal mood and affect. Her behavior is normal. Thought content normal.   Nursing note and vitals reviewed. Imaging: none new    Assessment:       Diagnosis Orders   1. Mixed sensory-motor polyneuropathy     2. Leg weakness, bilateral          Plan:      Her function and mobility are both significantly improved since our initial visit. Discussed her progress since that time and encouraged her to continue current treatment. Did review that we can trial taper of prednisone at any time given her concerns about long-term use but I would anticipate a probable decline in functional mobility. She should continue to have glucose monitored routinely while on prednisone and use Tylenol prn for pain. Will have staff contact her [facility]  to obtain glucose readings as they are not available at today's appointment. Will update her PCP as well for monitoring of long-term prednisone use. No orders of the defined types were placed in this encounter. No orders of the defined types were placed in this encounter. Return in about 6 months (around 7/6/2021). Electronically signedby James Ching MD on 1/6/2021 at 2:38 PM.     Please note that this chartwas generated using voice recognition Dragon dictation software. Although everyeffort was made to ensure the accuracy of this automated transcription, some errorsin transcription may have occurred.

## 2021-01-06 NOTE — LETTER
[address] \[facility].\"" [address]  [address] [address]  Phone: [phone]  Fax: [phone]    Emory Deutsch MD        January 10, 2021     Cruz Haney, APRN - CNP  [address] [facility] [address] [address]    Patient: Rangel Levy  MR Number: [id number]  YOB: 1940  Date of Visit: 1/6/2021    Dear Dr. Cruz Haney:    Thank you for the request for consultation for Rangel Levy to me for the evaluation of BLE weakness and B hip pain. Below are the relevant portions of my assessment and plan of care. Please see attached visit summary for Gabi Reyes. She is doing well on current dose of prednisone. I wanted to make you aware that we are having her assisted living monitor glucose levels but will defer any other lab monitoring for long-term prednisone use to you. If you have questions, please do not hesitate to call me. I look forward to following Rylee Freeman along with you.     Sincerely,        Emory Deutsch MD

## 2021-01-08 ENCOUNTER — TELEPHONE (OUTPATIENT)
Dept: FAMILY MEDICINE CLINIC | Age: 81
End: 2021-01-08

## 2021-01-08 DIAGNOSIS — R41.3 MEMORY LOSS: ICD-10-CM

## 2021-01-08 DIAGNOSIS — I82.512 CHRONIC DEEP VEIN THROMBOSIS (DVT) OF FEMORAL VEIN OF LEFT LOWER EXTREMITY (HCC): Primary | ICD-10-CM

## 2021-01-08 DIAGNOSIS — F03.90 DEMENTIA WITHOUT BEHAVIORAL DISTURBANCE, UNSPECIFIED DEMENTIA TYPE: ICD-10-CM

## 2021-01-08 NOTE — TELEPHONE ENCOUNTER
Tried calling [facility] and no answer, will try again. Spoke with [facility] and was notified of appt time and date of the doppler. They were also notified that we will be faxing over  some blood work orders as well. Labs and doppler order faxed to [facility]. TR:  Pt's daughter [place] notified of this information. She would like a phone call regarding the results of the blood work and what memory medication TR will start his mother on.

## 2021-01-08 NOTE — LETTER
[address] [address]  Phone: [phone]  Fax: [phone]    PETEY Curiel CNP        January 8, 2021     Patient: Jairon Kaba   YOB: 1940   Date of Visit: 1/8/2021       To Whom It May Concern:    Patient is to discontinue use of eliquis follow resolution of LLE DVT. If you have any questions or concerns, please don't hesitate to call.     Sincerely,        PETEY Curiel CNP

## 2021-01-08 NOTE — TELEPHONE ENCOUNTER
[facility] notified and states they are still giving the pt the blood thinner. Spoke with pt's daughter Antonella Wilson and was notified that we scanned the wrong leg and will need to get LLE doppler. She is agreeable. We will have to coordinate this through Mark Serra. Jazmine also wanted to mention that @ her mother's last appt, they discussed with TR about her mother's memory issues and nothing was addressed. She wanted to know if TR could start her on a medication? I asked if her mother knows the day and time and she replied, \"yes\" but just has a hard time remembering some other things. Uses the mail away [facility]. Will need to give this order to [facility].

## 2021-01-08 NOTE — LETTER
[address] [address]  Phone: [phone]  Fax: [phone]    PETEY Hernandez CNP        January 18, 2021     Patient: Felix Rincon   YOB: 1940   Date of Visit: 1/8/2021       To Whom It May Concern: It is my medical opinion that Felix Rincon stop taking eliquis, begin taking aricept for dementia. If you have any questions or concerns, please don't hesitate to call.     Sincerely,          PETEY Hernandez CNP

## 2021-01-08 NOTE — TELEPHONE ENCOUNTER
Dru Pantoja with [facility] left a voicemail on [employer] stating that the resident told them that TR d/c'ed her blood thinner and they are needing a phone call/order that it was d/c'ed or something in writing faxed to them @ [phone]. P: [phone].

## 2021-01-08 NOTE — TELEPHONE ENCOUNTER
MAI venous doppler scheduled @ [facility] for 1/11/2021 @ 2pm.  Arrive @ 1:30pm - 2nd floor heart center.

## 2021-01-11 ENCOUNTER — HOSPITAL ENCOUNTER (OUTPATIENT)
Dept: INTERVENTIONAL RADIOLOGY/VASCULAR | Age: 81
Discharge: HOME | End: 2021-01-11
Payer: MEDICARE

## 2021-01-11 DIAGNOSIS — I82.512 CHRONIC DEEP VEIN THROMBOSIS (DVT) OF FEMORAL VEIN OF LEFT LOWER EXTREMITY (HCC): ICD-10-CM

## 2021-01-11 PROCEDURE — 93971 EXTREMITY STUDY: CPT

## 2021-01-12 ENCOUNTER — TELEPHONE (OUTPATIENT)
Dept: FAMILY MEDICINE CLINIC | Age: 81
End: 2021-01-12

## 2021-01-12 NOTE — TELEPHONE ENCOUNTER
----- Message from PETEY Irvin CNP sent at 1/12/2021  9:17 AM EST -----  No evidence of DVT in left leg. Resolved. Will d/c eliquis. Still awaiting lab work results? Did she have her labs drawn?       Pt notified, she voiced understanding, states she had BW drawn this morning.

## 2021-01-18 ENCOUNTER — TELEPHONE (OUTPATIENT)
Dept: FAMILY MEDICINE CLINIC | Age: 81
End: 2021-01-18

## 2021-01-18 DIAGNOSIS — E11.42 TYPE 2 DIABETES MELLITUS WITH DIABETIC POLYNEUROPATHY, WITHOUT LONG-TERM CURRENT USE OF INSULIN (HCC): ICD-10-CM

## 2021-01-18 DIAGNOSIS — E78.3 HYPERCHYLOMICRONEMIA: ICD-10-CM

## 2021-01-18 DIAGNOSIS — I25.10 CORONARY ARTERY DISEASE INVOLVING NATIVE CORONARY ARTERY OF NATIVE HEART WITHOUT ANGINA PECTORIS: Primary | ICD-10-CM

## 2021-01-18 DIAGNOSIS — R41.3 MEMORY LOSS: ICD-10-CM

## 2021-01-18 RX ORDER — DONEPEZIL HYDROCHLORIDE 5 MG/1
5 TABLET, FILM COATED ORAL NIGHTLY
Qty: 90 TABLET | Refills: 3 | Status: SHIPPED | OUTPATIENT
Start: 2021-01-18 | End: 2021-03-10 | Stop reason: SDUPTHER

## 2021-01-18 NOTE — TELEPHONE ENCOUNTER
----- Message from PETEY Rodríguez CNP sent at 1/14/2021  3:42 PM EST -----  Awaiting results of [facility]. Completed lab work is okay. n/a

## 2021-01-18 NOTE — TELEPHONE ENCOUNTER
Lab work all negative for potential causes for confusion, memory loss. Start aricept nightly. Note also included to stop eliqui d/t resolution of dvt. Please fax to [facility].

## 2021-01-19 NOTE — TELEPHONE ENCOUNTER
Spoke with [facility], informed her to DC eliquis and start aricept 5 mg nightly. Please send Rx to [facility] in [place], [phone]. Letter to BEATRIS granados faxed to [phone].

## 2021-01-20 ENCOUNTER — TELEPHONE (OUTPATIENT)
Dept: FAMILY MEDICINE CLINIC | Age: 81
End: 2021-01-20

## 2021-01-20 NOTE — TELEPHONE ENCOUNTER
Pt states she received first dose of the covid vaccine on 1/15/2021 and is scheduled for her second one, at [facility].

## 2021-03-09 ENCOUNTER — OFFICE VISIT (OUTPATIENT)
Dept: FAMILY MEDICINE CLINIC | Age: 81
End: 2021-03-09
Payer: MEDICARE

## 2021-03-09 VITALS
TEMPERATURE: 97 F | BODY MASS INDEX: 22.49 KG/M2 | DIASTOLIC BLOOD PRESSURE: 62 MMHG | RESPIRATION RATE: 18 BRPM | SYSTOLIC BLOOD PRESSURE: 110 MMHG | WEIGHT: 129 LBS | HEART RATE: 74 BPM

## 2021-03-09 DIAGNOSIS — K51.911 ULCERATIVE COLITIS WITH RECTAL BLEEDING, UNSPECIFIED LOCATION (HCC): ICD-10-CM

## 2021-03-09 DIAGNOSIS — K13.0 DRY LIPS: ICD-10-CM

## 2021-03-09 DIAGNOSIS — I25.119 ATHEROSCLEROSIS OF NATIVE CORONARY ARTERY WITH ANGINA PECTORIS, UNSPECIFIED WHETHER NATIVE OR TRANSPLANTED HEART (HCC): ICD-10-CM

## 2021-03-09 DIAGNOSIS — R32 INCONTINENCE IN FEMALE: ICD-10-CM

## 2021-03-09 DIAGNOSIS — E11.42 TYPE 2 DIABETES MELLITUS WITH DIABETIC POLYNEUROPATHY, WITHOUT LONG-TERM CURRENT USE OF INSULIN (HCC): ICD-10-CM

## 2021-03-09 DIAGNOSIS — I82.512 CHRONIC DEEP VEIN THROMBOSIS (DVT) OF FEMORAL VEIN OF LEFT LOWER EXTREMITY (HCC): ICD-10-CM

## 2021-03-09 DIAGNOSIS — M16.0 BILATERAL HIP JOINT ARTHRITIS: Primary | ICD-10-CM

## 2021-03-09 DIAGNOSIS — J44.9 CHRONIC OBSTRUCTIVE PULMONARY DISEASE, UNSPECIFIED COPD TYPE (HCC): ICD-10-CM

## 2021-03-09 PROBLEM — I82.511 CHRONIC DEEP VEIN THROMBOSIS (DVT) OF FEMORAL VEIN OF RIGHT LOWER EXTREMITY (HCC): Status: ACTIVE | Noted: 2021-03-09

## 2021-03-09 PROCEDURE — 3023F SPIROM DOC REV: CPT | Performed by: NURSE PRACTITIONER

## 2021-03-09 PROCEDURE — 1090F PRES/ABSN URINE INCON ASSESS: CPT | Performed by: NURSE PRACTITIONER

## 2021-03-09 PROCEDURE — 4040F PNEUMOC VAC/ADMIN/RCVD: CPT | Performed by: NURSE PRACTITIONER

## 2021-03-09 PROCEDURE — G8484 FLU IMMUNIZE NO ADMIN: HCPCS | Performed by: NURSE PRACTITIONER

## 2021-03-09 PROCEDURE — G8400 PT W/DXA NO RESULTS DOC: HCPCS | Performed by: NURSE PRACTITIONER

## 2021-03-09 PROCEDURE — G8420 CALC BMI NORM PARAMETERS: HCPCS | Performed by: NURSE PRACTITIONER

## 2021-03-09 PROCEDURE — 0509F URINE INCON PLAN DOCD: CPT | Performed by: NURSE PRACTITIONER

## 2021-03-09 PROCEDURE — G8427 DOCREV CUR MEDS BY ELIG CLIN: HCPCS | Performed by: NURSE PRACTITIONER

## 2021-03-09 PROCEDURE — 99214 OFFICE O/P EST MOD 30 MIN: CPT | Performed by: NURSE PRACTITIONER

## 2021-03-09 PROCEDURE — 1123F ACP DISCUSS/DSCN MKR DOCD: CPT | Performed by: NURSE PRACTITIONER

## 2021-03-09 PROCEDURE — 4004F PT TOBACCO SCREEN RCVD TLK: CPT | Performed by: NURSE PRACTITIONER

## 2021-03-09 PROCEDURE — G8926 SPIRO NO PERF OR DOC: HCPCS | Performed by: NURSE PRACTITIONER

## 2021-03-09 RX ORDER — PSYLLIUM HUSK 0.4 G
1 CAPSULE ORAL DAILY
Qty: 90 CAPSULE | Refills: 3 | Status: SHIPPED | OUTPATIENT
Start: 2021-03-09

## 2021-03-09 RX ORDER — APIXABAN 5 MG/1
TABLET, FILM COATED ORAL
COMMUNITY
Start: 2021-01-01 | End: 2021-03-09 | Stop reason: ALTCHOICE

## 2021-03-09 ASSESSMENT — ENCOUNTER SYMPTOMS
COUGH: 0
ABDOMINAL PAIN: 0
SHORTNESS OF BREATH: 0
BLOOD IN STOOL: 0
EYE ITCHING: 0
DIARRHEA: 0
WHEEZING: 0
EYE PAIN: 0
EYE DISCHARGE: 0
EYE REDNESS: 0
CONSTIPATION: 0
SINUS PRESSURE: 0
BACK PAIN: 0
COLOR CHANGE: 0

## 2021-03-09 ASSESSMENT — PATIENT HEALTH QUESTIONNAIRE - PHQ9
SUM OF ALL RESPONSES TO PHQ9 QUESTIONS 1 & 2: 0
SUM OF ALL RESPONSES TO PHQ QUESTIONS 1-9: 0

## 2021-03-09 NOTE — PROGRESS NOTES
[address] [address]  Dept: [phone]  Dept Fax: [phone]: [phone]     Visit Date:  3/9/2021    Patient:  Sherin Driscoll  YOB: 1940  Age: [de-identified] y.o. Gender: female  BMI: Body mass index is 22.49 kg/m². Sherin Driscoll, Established patient, is being seen today for   Chief Complaint   Patient presents with   Geri Vazquez     incontinencewith urinary and bowels, steriod shot for hip pain, peeling lips    . Assessment/Plan      1. Bilateral hip joint arthritis  - Chronic, worsening  - R>L  - Ortho referral placed for potential joint injections  - [facility] - Ry Zuniga MD, Orthopedic Surgery, [facility]    2. Incontinence in female  - Chronic  - Urinary and stool  - Scheduled bathroom visits, pads for incontinence  - Start stool bulking supplement in effort to regulate BM's  - Psyllium Fiber 0.52 g CAPS; Take 1 capsule by mouth daily  Dispense: 90 capsule; Refill: 3    3. Chronic obstructive pulmonary disease, unspecified COPD type (HCC)  - Chronic, stable  - Denies SOB, cough    4. Ulcerative colitis with rectal bleeding, unspecified location (HCC)  - Chronic, stable  - Denies bowel pain, bleeding    5. Type 2 diabetes mellitus with diabetic polyneuropathy, without long-term current use of insulin (HCC)  - Chronic, stable  - Last a1c is 6.6  - Carb control diet, exercise as able    6. Chronic deep vein thrombosis (DVT) of femoral vein of left lower extremity (HCC)  - Resolved  - Discontinued eliquis January 12, 2021 following resolution of dvt per US    7. 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, unspecified whether native or transplanted heart (HCC)  - Chronic, stable  - Denies chest pain, SOB  - Continue medication regimen     8. Dry lips  - Vaseline based moisturizer    Return in about 3 months (around 6/9/2021) for chronic conditions.     HPI:     Poor historian. Bowel urgency, incontinence. Ongoing for approximately 6 months. Reports urgency of BM's. Formed stool. Stress urinary incontinence. 2to 3 times daily. Denies needing to wear pads. Bilateral hip pain. Right worse than left. Previously received hip injections q6 months but stopped approximately 2 years ago. Reports pain, stiffness. Symptoms at their worse in the morning prior to getting out of bed. Symptoms improve with movement. Medications    Current Outpatient Medications:     Psyllium Fiber 0.52 g CAPS, Take 1 capsule by mouth daily, Disp: 90 capsule, Rfl: 3    donepezil (ARICEPT) 5 MG tablet, Take 1 tablet by mouth nightly, Disp: 90 tablet, Rfl: 3    lisinopril (PRINIVIL;ZESTRIL) 20 MG tablet, Take 20 mg by mouth daily, Disp: , Rfl:     acetaminophen (TYLENOL) 325 MG tablet, Take 650 mg by mouth every 6 hours as needed for Pain, Disp: , Rfl:     Glucosamine-Chondroitin 500-250 MG CAPS, Take by mouth daily, Disp: , Rfl:     metoprolol tartrate (LOPRESSOR) 25 MG tablet, Take 1 tablet by mouth 2 times daily, Disp: 180 tablet, Rfl: 3    simvastatin (ZOCOR) 40 MG tablet, Take 1 tablet by mouth nightly, Disp: 90 tablet, Rfl: 3    albuterol sulfate  (90 Base) MCG/ACT inhaler, Inhale 2 puffs into the lungs every 6 hours as needed for Wheezing (Patient taking differently: Inhale 2 puffs into the lungs every 4 hours as needed for Wheezing ), Disp: 1 Inhaler, Rfl: 3    vitamin B-12 (CYANOCOBALAMIN) 1000 MCG tablet, Take 1 tablet by mouth daily (Patient taking differently: Take 1,000 mcg by mouth 2 times daily ), Disp: 30 tablet, Rfl: 3    Multiple Vitamins-Minerals (CENTRUM SILVER 50+WOMEN PO), Take by mouth, Disp: , Rfl:     Probiotic Product ([address]), Take 1 tablet by mouth daily, Disp: , Rfl:     The patient is allergic to azulfidine [sulfasalazine]; mesalamine; sulfa antibiotics; and alcohol.     Past Medical History  Lina Coronado  has a past medical history of Abnormal nuclear stress test, Cataract, Colon polyps, COPD (chronic obstructive pulmonary disease) ([address] Diverticulosis large intestine w/o perforation or abscess w/bleeding, DVT, recurrent, lower extremity, acute, left (HCC), Fibroid, uterine, Hearing loss, Hyperlipidemia, Nicotine dependence, Nocturnal hypoxemia, Postmenopausal, Sleep apnea, and Ulcerative colitis ([address] Past Surgical History  The patient  has a past surgical history that includes Foot surgery; Breast biopsy (1988); Colonoscopy (2006); Colonoscopy (2010); Colonoscopy (2010); Colonoscopy (03/03/2014); Breast biopsy (Right, 03/2011); Foot surgery (Bilateral, 2011); and hip surgery (06/13/2020). Family History  This patient's family history includes Coronary Art Dis in her father; Diabetes in her mother; Heart Disease in her father and mother; High Blood Pressure in her mother. Social History  Ming Busby  reports that she has been smoking cigarettes. She has a 10.00 pack-year smoking history. She has never used smokeless tobacco. She reports that she does not drink alcohol or use drugs. Health Maintenance:    Health maintenance reviewed. Health Maintenance   Topic Date Due    Diabetic retinal exam  Never done    Flu vaccine (1) 09/01/2020    COVID-19 Vaccine (2 of 2 - Pfizer series) 02/05/2021    Lipid screen  02/26/2021    A1C test (Diabetic or Prediabetic)  07/14/2021    Diabetic foot exam  12/22/2021    Annual Wellness Visit (AWV)  12/23/2021    Potassium monitoring  01/14/2022    Creatinine monitoring  01/14/2022    DTaP/Tdap/Td vaccine (3 - Td) 01/02/2030    DEXA (modify frequency per FRAX score)  Completed    Shingles Vaccine  Completed    Pneumococcal 65+ years Vaccine  Completed    Hepatitis A vaccine  Aged Out    Hib vaccine  Aged Out    Meningococcal (ACWY) vaccine  Aged Out       Subjective/Objective:      Review of Systems   Constitutional: Negative for chills, fatigue and fever.    HENT: Negative for congestion, ear pain, sinus pressure and sneezing. Eyes: Negative for pain, discharge, redness and itching. Respiratory: Negative for cough, shortness of breath and wheezing. Cardiovascular: Negative for chest pain, palpitations and leg swelling. Gastrointestinal: Negative for abdominal pain, blood in stool, constipation and diarrhea. Incontinence   Endocrine: Negative for polydipsia, polyphagia and polyuria. Genitourinary: Positive for urgency. Negative for difficulty urinating and hematuria. Incontinence   Musculoskeletal: Positive for arthralgias. Negative for back pain and neck pain. Skin: Negative for color change, pallor and rash. Allergic/Immunologic: Negative for environmental allergies and food allergies. Neurological: Negative for dizziness, light-headedness, numbness and headaches. Psychiatric/Behavioral: Negative for agitation and confusion. The patient is not nervous/anxious. /62   Pulse 74   Temp 97 °F (36.1 °C)   Resp 18   Wt 129 lb (58.5 kg)   BMI 22.49 kg/m²     Physical Exam  Vitals signs and nursing note reviewed. Constitutional:       Appearance: She is well-developed. HENT:      Head: Normocephalic. Right Ear: Hearing, tympanic membrane, ear canal and external ear normal.      Left Ear: Hearing, tympanic membrane, ear canal and external ear normal.      Nose:      Right Sinus: No maxillary sinus tenderness or frontal sinus tenderness. Left Sinus: No maxillary sinus tenderness or frontal sinus tenderness. Mouth/Throat:      Lips: Pink. No lesions. Mouth: Mucous membranes are moist.      Tongue: No lesions. Pharynx: No posterior oropharyngeal erythema. Eyes:      General:         Right eye: No discharge. Left eye: No discharge. Conjunctiva/sclera: Conjunctivae normal.      Pupils: Pupils are equal, round, and reactive to light. Neck:      Musculoskeletal: Normal range of motion.       Vascular: No instructions. Discussed use, benefit, and side effects of prescribed medications. All patient questions answered. Pt voiced understanding. Reviewed health maintenance.        Electronically signed by PETEY Linares CNP on 3/9/2021 at 1:52 PM EST

## 2021-03-09 NOTE — PATIENT INSTRUCTIONS
Patient Education        Hip Arthritis: Care Instructions  Your Care Instructions     Arthritis, also called osteoarthritis, is a breakdown of the tissue (cartilage) that cushions your joints. Many people have some arthritis as they age. When the cartilage in your hip joints wears down, your hip bone rubs against the hip socket. This causes pain and stiffness. Work with your doctor to find the right mix of treatments for your arthritis. There are things you can do at home to protect your hip joints, ease your pain, and help you stay active. But if your arthritis becomes so bad that you cannot walk, you may need surgery to replace the hip joint. Follow-up care is a key part of your treatment and safety. Be sure to make and go to all appointments, and call your doctor if you are having problems. It's also a good idea to know your test results and keep a list of the medicines you take. How can you care for yourself at home? · Stay at a healthy weight. Being overweight puts extra strain on your hip joints. · Talk to your doctor or physical therapist about exercises that will help ease hip pain. These tips may help. ? Stretch to help prevent stiffness and to prevent injury before you exercise. You may enjoy gentle forms of yoga to help keep your joints and muscles flexible. ? Walk instead of jog. Other types of exercise that are less stressful on the joints include riding a bike, swimming, and doing water exercise. ? Lift weights. Strong muscles help reduce stress on your joints. Stronger thigh muscles, for example, take some of the stress off of the knees and hips. Learn the right way to lift weights so you do not make joint pain worse. · Take pain medicines exactly as directed. ? If the doctor gave you a prescription medicine for pain, take it as prescribed. ? If you are not taking a prescription pain medicine, ask your doctor if you can take an over-the-counter medicine.   · Use a cane, crutch, walker, or another device if you need help to get around. These can help rest your hips. You also can use other things to make life easier, such as a higher toilet seat. · Do not sit in low chairs, which can make it painful to get up. · Put heat or cold on your sore hips as needed. Use whichever helps you most. You also can go back and forth between hot and cold packs. ? Apply heat 2 or 3 times a day for 20 to 30 minutes--using a heating pad, hot shower, or hot pack--to relieve pain and stiffness. ? Put ice or a cold pack on your sore hips for 10 to 20 minutes at a time to numb the area. Put a thin cloth between the ice and your skin. · Think about talking to your doctor about using capsaicin, a cream you apply to the skin for pain relief. When should you call for help? Call your doctor now or seek immediate medical care if:    · You have sudden swelling, warmth, or pain in any joint.     · You have joint pain and a fever or rash.     · You have such bad pain that you cannot use the joint. Watch closely for changes in your health, and be sure to contact your doctor if:    · You have mild joint symptoms that continue even with more than 6 weeks of care at home.     · You do not get better as expected.     · You have stomach pain or other problems with your medicine. Where can you learn more? Go to [email or web address]://[email or web address] [email or web address]. org and sign in to your [employer] account. Enter I581 in the [facility] box to learn more about \"Hip Arthritis: Care Instructions. \"     If you do not have an account, please click on the \"Sign Up Now\" link. Current as of: December 9, 2019               Content Version: 12.6  © 6132-4847 [employer]. Care instructions adapted under license by [facility] ([facility]).  If you have questions about a medical condition or this instruction, always ask your healthcare professional. [address] any warranty or liability for your use of this information. Patient Education        Kegel Exercises: Care Instructions  Your Care Instructions     Kegel exercises strengthen muscles around the bladder. These muscles control the flow of urine. Kegel exercises are sometime called \"pelvic floor\" exercises. They can help prevent urine leakage and keep the pelvic organs in place. A woman who just had a baby might want to try Kegel exercises. They can strengthen pelvic muscles that have been weakened by pregnancy and childbirth. A man or woman may use Kegel exercises to treat urine leakage. You do Kegel exercises by tightening the muscles you use when you urinate. You will likely need to do these exercises for several weeks to get better. Follow-up care is a key part of your treatment and safety. Be sure to make and go to all appointments, and call your doctor if you are having problems. It's also a good idea to know your test results and keep a list of the medicines you take. How can you care for yourself at home? · Do Kegel exercises. ? Find the muscles you need to strengthen. To do this, tighten the muscles that stop your urine while you are going to the bathroom. These are the same muscles you squeeze during Kegel exercises. ? Squeeze the muscles as hard as you can. Your belly and thighs should not move. ? Hold the squeeze for 3 seconds. Then relax for 3 seconds. ? Start with 3 seconds, and then add 1 second each week until you are able to squeeze for 10 seconds. ? Repeat the exercise 10 to 15 times for each session. Do three or more sessions each day. · You can check to see if you are using the right muscles. Place a finger in your vagina and squeeze around it. You are doing them right when you feel pressure around your finger. Your doctor may also suggest that you put special weights in your vagina while you do the exercises. · Do not smoke. It can irritate the bladder.  If you need help quitting, talk to your doctor about stop-smoking

## 2021-03-10 ENCOUNTER — TELEPHONE (OUTPATIENT)
Dept: FAMILY MEDICINE CLINIC | Age: 81
End: 2021-03-10

## 2021-03-10 DIAGNOSIS — R32 INCONTINENCE IN FEMALE: ICD-10-CM

## 2021-03-10 DIAGNOSIS — F03.90 DEMENTIA WITHOUT BEHAVIORAL DISTURBANCE, UNSPECIFIED DEMENTIA TYPE: ICD-10-CM

## 2021-03-10 DIAGNOSIS — R41.3 MEMORY LOSS: ICD-10-CM

## 2021-03-10 RX ORDER — PREDNISONE 1 MG/1
7.5 TABLET ORAL DAILY
COMMUNITY
End: 2022-04-24

## 2021-03-10 RX ORDER — DONEPEZIL HYDROCHLORIDE 10 MG/1
10 TABLET, FILM COATED ORAL NIGHTLY
Qty: 90 TABLET | Refills: 3 | Status: SHIPPED | OUTPATIENT
Start: 2021-03-10 | End: 2021-04-14 | Stop reason: SDUPTHER

## 2021-03-10 NOTE — TELEPHONE ENCOUNTER
Received updated med list from [facility], meds are updated in system. Spoke to daughter, she is agreeable to ortho consult. Will send referral and chart notes to [facility]- will have them contact [facility] to set up appointment. Did ask daughter to start attending appointments with mother and she will start. Daughter also stated she has not noticed much of a difference with the Aricept 5 mg-instructed her that TR may increase it and she is agreeable.

## 2021-03-10 NOTE — TELEPHONE ENCOUNTER
Noted. Dose of aricept increased from 5 to 10 mg. Rx printed. New rx written for psyllium fiber capsule 0.4 g capsule daily.

## 2021-04-14 ENCOUNTER — OFFICE VISIT (OUTPATIENT)
Dept: FAMILY MEDICINE CLINIC | Age: 81
End: 2021-04-14
Payer: MEDICARE

## 2021-04-14 ENCOUNTER — TELEPHONE (OUTPATIENT)
Dept: FAMILY MEDICINE CLINIC | Age: 81
End: 2021-04-14

## 2021-04-14 VITALS
RESPIRATION RATE: 18 BRPM | HEART RATE: 71 BPM | WEIGHT: 125 LBS | DIASTOLIC BLOOD PRESSURE: 58 MMHG | BODY MASS INDEX: 21.8 KG/M2 | SYSTOLIC BLOOD PRESSURE: 130 MMHG

## 2021-04-14 DIAGNOSIS — R41.3 MEMORY LOSS: ICD-10-CM

## 2021-04-14 DIAGNOSIS — R06.02 SOB (SHORTNESS OF BREATH) ON EXERTION: ICD-10-CM

## 2021-04-14 DIAGNOSIS — R53.83 FATIGUE, UNSPECIFIED TYPE: Primary | ICD-10-CM

## 2021-04-14 DIAGNOSIS — M16.0 PRIMARY OSTEOARTHRITIS OF BOTH HIPS: ICD-10-CM

## 2021-04-14 DIAGNOSIS — F03.90 DEMENTIA WITHOUT BEHAVIORAL DISTURBANCE, UNSPECIFIED DEMENTIA TYPE: ICD-10-CM

## 2021-04-14 DIAGNOSIS — K59.1 FUNCTIONAL DIARRHEA: ICD-10-CM

## 2021-04-14 DIAGNOSIS — E78.3 HYPERCHYLOMICRONEMIA: Primary | ICD-10-CM

## 2021-04-14 PROBLEM — K51.90 ULCERATIVE COLITIS (HCC): Status: ACTIVE | Noted: 2019-04-03

## 2021-04-14 PROCEDURE — 99214 OFFICE O/P EST MOD 30 MIN: CPT | Performed by: NURSE PRACTITIONER

## 2021-04-14 PROCEDURE — G8420 CALC BMI NORM PARAMETERS: HCPCS | Performed by: NURSE PRACTITIONER

## 2021-04-14 PROCEDURE — 4004F PT TOBACCO SCREEN RCVD TLK: CPT | Performed by: NURSE PRACTITIONER

## 2021-04-14 PROCEDURE — 1090F PRES/ABSN URINE INCON ASSESS: CPT | Performed by: NURSE PRACTITIONER

## 2021-04-14 PROCEDURE — 4040F PNEUMOC VAC/ADMIN/RCVD: CPT | Performed by: NURSE PRACTITIONER

## 2021-04-14 PROCEDURE — G8400 PT W/DXA NO RESULTS DOC: HCPCS | Performed by: NURSE PRACTITIONER

## 2021-04-14 PROCEDURE — G8427 DOCREV CUR MEDS BY ELIG CLIN: HCPCS | Performed by: NURSE PRACTITIONER

## 2021-04-14 PROCEDURE — 1123F ACP DISCUSS/DSCN MKR DOCD: CPT | Performed by: NURSE PRACTITIONER

## 2021-04-14 RX ORDER — DONEPEZIL HYDROCHLORIDE 10 MG/1
10 TABLET, FILM COATED ORAL NIGHTLY
Qty: 90 TABLET | Refills: 0 | Status: SHIPPED | OUTPATIENT
Start: 2021-04-14 | End: 2022-06-15 | Stop reason: SDUPTHER

## 2021-04-14 ASSESSMENT — ENCOUNTER SYMPTOMS
EYE REDNESS: 0
BACK PAIN: 0
EYE ITCHING: 0
COLOR CHANGE: 0
EYE DISCHARGE: 0
SHORTNESS OF BREATH: 1
DIARRHEA: 0
EYE PAIN: 0
WHEEZING: 0
ABDOMINAL PAIN: 0
BLOOD IN STOOL: 0
CONSTIPATION: 0
COUGH: 0
SINUS PRESSURE: 0

## 2021-04-14 NOTE — TELEPHONE ENCOUNTER
Faxed over orders for patient to be drawn next Tuesday. Spoke to Simon at [facility] and asked the nursing staff if the patient could be offered her inhaler more often, patient was having more of an increase of shortness of breath. [place] will bring that to their attention.

## 2021-04-14 NOTE — PROGRESS NOTES
Chayito Branch (:  1940) is a [de-identified] y.o. female,Established patient, here for evaluation of the following chief complaint(s):  Check-Up (SOB, diarrhea, fatigue, went to [facility] and got injections )      ASSESSMENT/PLAN:  1. Fatigue, unspecified type  - Reevaluated blood work. Added additional labs  - Likely related to deconditioning  - Physical therapy likely to be beneficial  -     Vitamin D 25 Hydroxy; Future  -     CBC Auto Differential; Future    2. Primary osteoarthritis of both hips  - Chronic, unstable  - Notify [facility] of lack of improvement in pain  - Physical therapy  -     [facility]    3. SOB (shortness of breath) on exertion  - Chronic, stable  - Deconditioning vs COPD  - Increase activity  - Albuterol prn    4. Functional diarrhea  - Chronic, stable  - Improved with fiber supplement    5. Dementia without behavioral disturbance, unspecified dementia type (HCC)  - Chronic, unstable  - Continue aricept, consider increase in medication in 3 months  -     donepezil (ARICEPT) 10 MG tablet; Take 1 tablet by mouth nightly, Disp-90 tablet, R-0Normal    6. Memory loss  - Chronic, unstable  - Continue aricept, consider increase in medication in 3 months  -     donepezil (ARICEPT) 10 MG tablet; Take 1 tablet by mouth nightly, Disp-90 tablet, R-0Normal      Return in about 3 months (around 2021), or if symptoms worsen or fail to improve. SUBJECTIVE/OBJECTIVE:  Presents with patient's daughter. Multiple concerns. Continued hip pain. Received steroid injection by [facility] in both hips one month ago. Reports this did not improve her pain. She did not contact [facility] to follow up. SOB with exertion. Has experienced SOB for \"years. \" Not improving or worsening. Reports she is not receiving albuterol while in [place]. Worsening fatigue since January. Lab work drawn at that time was normal. Reports minimal activity. \"All I do is lay around. \"    Reports she is having diarrhea that is not resolving. Upon clarification, patient states she is having 1-2 BM's daily. Formed. Review of Systems   Constitutional: Positive for fatigue. Negative for chills and fever. HENT: Negative for congestion, ear pain, sinus pressure and sneezing. Eyes: Negative for pain, discharge, redness and itching. Respiratory: Positive for shortness of breath. Negative for cough and wheezing. Cardiovascular: Negative for chest pain, palpitations and leg swelling. Gastrointestinal: Negative for abdominal pain, blood in stool, constipation and diarrhea. Endocrine: Negative for polydipsia, polyphagia and polyuria. Genitourinary: Negative for difficulty urinating and hematuria. Musculoskeletal: Positive for arthralgias. Negative for back pain and neck pain. Skin: Negative for color change, pallor and rash. Allergic/Immunologic: Negative for environmental allergies and food allergies. Neurological: Negative for dizziness, light-headedness, numbness and headaches. Psychiatric/Behavioral: Negative for agitation and confusion. The patient is not nervous/anxious. Physical Exam  Vitals signs and nursing note reviewed. Constitutional:       Appearance: She is well-developed. HENT:      Head: Normocephalic and atraumatic. Right Ear: Hearing, tympanic membrane, ear canal and external ear normal.      Left Ear: Hearing, tympanic membrane, ear canal and external ear normal.      Nose:      Right Sinus: No maxillary sinus tenderness or frontal sinus tenderness. Left Sinus: No maxillary sinus tenderness or frontal sinus tenderness. Eyes:      General:         Right eye: No discharge. Left eye: No discharge. Conjunctiva/sclera: Conjunctivae normal.      Pupils: Pupils are equal, round, and reactive to light. Neck:      Musculoskeletal: Normal range of motion. Vascular: No JVD. Cardiovascular:      Rate and Rhythm: Normal rate and regular rhythm.       Heart sounds: Normal

## 2021-04-14 NOTE — PATIENT INSTRUCTIONS
make life easier, such as a higher toilet seat and padded handles on kitchen utensils. · Do not sit in low chairs. They can make it hard to get up. · Put heat or cold on your sore joints as needed. Use whichever helps you most. You can also switch between hot and cold packs. ? Apply heat 2 or 3 times a day for 20 to 30 minutes--using a heating pad, hot shower, or hot pack--to relieve pain and stiffness. But don't use heat on a swollen joint. ? Put ice or a cold pack on your sore joint for 10 to 20 minutes at a time. Put a thin cloth between the ice and your skin. When should you call for help? Call your doctor now or seek immediate medical care if:    · You have sudden swelling, warmth, or pain in any joint.     · You have joint pain and a fever or rash.     · You have such bad pain that you cannot use a joint. Watch closely for changes in your health, and be sure to contact your doctor if:    · You have mild joint symptoms that continue even with more than 6 weeks of care at home.     · You have stomach pain or other problems with your medicine. Where can you learn more? Go to https://Genability.OpenText. org and sign in to your iMeigu account. Enter V828 in the [facility] box to learn more about \"Arthritis: Care Instructions. \"     If you do not have an account, please click on the \"Sign Up Now\" link. Current as of: August 5, 2020               Content Version: 12.8  © 2006-2021 [employer]. Care instructions adapted under license by [facility] ([facility]). If you have questions about a medical condition or this instruction, always ask your healthcare professional. [place] [address] any warranty or liability for your use of this information. Patient Education        Fatigue: Care Instructions  Your Care Instructions     Fatigue is a feeling of tiredness, exhaustion, or lack of energy. You may feel fatigue because of too much or not enough activity. It can also come from stress, lack of sleep, boredom, and poor diet. Many medical problems, such as viral infections, can cause fatigue. Emotional problems, especially depression, are often the cause of fatigue. Fatigue is most often a symptom of another problem. Treatment for fatigue depends on the cause. For example, if you have fatigue because you have a certain health problem, treating this problem also treats your fatigue. If depression or anxiety is the cause, treatment may help. Follow-up care is a key part of your treatment and safety. Be sure to make and go to all appointments, and call your doctor if you are having problems. It's also a good idea to know your test results and keep a list of the medicines you take. How can you care for yourself at home? · Get regular exercise. But don't overdo it. Go back and forth between rest and exercise. · Get plenty of rest.  · Eat a healthy diet. Do not skip meals, especially breakfast.  · Reduce your use of caffeine, tobacco, and alcohol. Caffeine is most often found in coffee, tea, cola drinks, and chocolate. · Limit medicines that can cause fatigue. This includes tranquilizers and cold and allergy medicines. When should you call for help? Watch closely for changes in your health, and be sure to contact your doctor if:    · You have new symptoms such as fever or a rash.     · Your fatigue gets worse.     · You have been feeling down, depressed, or hopeless. Or you may have lost interest in things that you usually enjoy.     · You are not getting better as expected. Where can you learn more? Go to https://Bookingabus.comkennEDMdesigner.Theranostics [email or web address] org and sign in to your [employer] account. Enter O734 in the [employer] box to learn more about \"Fatigue: Care Instructions. \"     If you do not have an account, please click on the \"Sign Up Now\" link. Current as of: February 26, 2020               Content Version: 12.8  © 1630-5457 Healthwise, [facility].    Care instructions adapted under license by [facility] ([facility]). If you have questions about a medical condition or this instruction, always ask your healthcare professional. [place] [address] any warranty or liability for your use of this information. Patient Education        Hip Arthritis: Exercises  Introduction  Here are some examples of exercises for you to try. The exercises may be suggested for a condition or for rehabilitation. Start each exercise slowly. Ease off the exercises if you start to have pain. You will be told when to start these exercises and which ones will work best for you. How to do the exercises  Straight-leg raises to the outside   1. Lie on your side, with your affected hip on top. 2. Tighten the front thigh muscles of your top leg to keep your knee straight. 3. Keep your hip and your leg straight in line with the rest of your body, and keep your knee pointing forward. Do not drop your hip back. 4. Lift your top leg straight up toward the ceiling, about 12 inches off the floor. Hold for about 6 seconds, then slowly lower your leg. 5. Repeat 8 to 12 times. 6. Switch legs and repeat steps 1 through 5, even if only one hip is sore. Straight-leg raises to the inside   1. Lie on your side with your affected hip on the floor. 2. You can either prop up your other leg on a chair, or you can bend that knee and put that foot in front of your other knee. Do not drop your hip back. 3. Tighten the muscles on the front thigh of your bottom leg to straighten that knee. 4. Keep your kneecap pointing forward and your leg straight, and lift your bottom leg up toward the ceiling about 6 inches. Hold for about 6 seconds, then lower slowly. 5. Repeat 8 to 12 times. 6. Switch legs and repeat steps 1 through 5, even if only one hip is sore. Hip hike   1. Stand sideways on the bottom step of a staircase, and hold on to the banister or wall.   2. Keeping both knees straight, lift your 1. Lie on your back with both knees bent and your feet flat on the floor. 2. Put the ankle of your affected leg on your opposite thigh near your knee. 3. Use your hand to gently push your knee away from your body until you feel a gentle stretch around your hip. 4. Hold the stretch for 15 to 30 seconds. 5. Repeat 2 to 4 times. 6. Repeat steps 1 through 5, but this time use your hand to gently pull your knee toward your opposite shoulder. 7. Switch legs and repeat steps 1 through 6, even if only one hip is sore. Knee-to-chest   1. Lie on your back with your knees bent and your feet flat on the floor. 2. Bring your affected leg to your chest, keeping the other foot flat on the floor (or keeping the other leg straight, whichever feels better on your lower back). 3. Keep your lower back pressed to the floor. Hold for at least 15 to 30 seconds. 4. Relax, and lower the knee to the starting position. 5. Repeat 2 to 4 times. 6. Switch legs and repeat steps 1 through 5, even if only one hip is sore. 7. To get more stretch, put your other leg flat on the floor while pulling your knee to your chest.    Clamshell   1. Lie on your side, with your affected hip on top. Keep your feet and knees together and your knees bent. 2. Raise your top knee, but keep your feet together. Do not let your hips roll back. Your legs should open up like a clamshell. 3. Hold for 6 seconds. 4. Slowly lower your knee back down. Rest for 10 seconds. 5. Repeat 8 to 12 times. 6. Switch legs and repeat steps 1 through 5, even if only one hip is sore. Follow-up care is a key part of your treatment and safety. Be sure to make and go to all appointments, and call your doctor if you are having problems. It's also a good idea to know your test results and keep a list of the medicines you take. Where can you learn more? Go to https://daisyeb.Mango. org and sign in to your Whim account.  Enter F605 in the [address] [facility] box to learn more about \"Hip Arthritis: Exercises. \"     If you do not have an account, please click on the \"Sign Up Now\" link. Current as of: November 16, 2020               Content Version: 12.8  © 7478-6300 Healthwise, [employer]. Care instructions adapted under license by [facility] ([facility]). If you have questions about a medical condition or this instruction, always ask your healthcare professional. [address] any warranty or liability for your use of this information.

## 2021-04-20 LAB
CHOLESTEROL, TOTAL: 187 MG/DL
CHOLESTEROL/HDL RATIO: NORMAL
HDLC SERPL-MCNC: 58 MG/DL (ref 35–70)
LDL CHOLESTEROL CALCULATED: 87 MG/DL (ref 0–160)
NONHDLC SERPL-MCNC: NORMAL MG/DL
TRIGL SERPL-MCNC: 212 MG/DL
VLDLC SERPL CALC-MCNC: 42 MG/DL

## 2021-04-21 ENCOUNTER — TELEPHONE (OUTPATIENT)
Dept: FAMILY MEDICINE CLINIC | Age: 81
End: 2021-04-21

## 2021-04-21 LAB
BASOPHILS ABSOLUTE: 0.1 /ΜL
BASOPHILS RELATIVE PERCENT: 0.6 %
EOSINOPHILS ABSOLUTE: 0.1 /ΜL
EOSINOPHILS RELATIVE PERCENT: 1.2 %
HCT VFR BLD CALC: 46.4 % (ref 36–46)
HEMOGLOBIN: 14.9 G/DL (ref 12–16)
LYMPHOCYTES ABSOLUTE: 2.7 /ΜL
LYMPHOCYTES RELATIVE PERCENT: 29.3 %
MCH RBC QN AUTO: 31.1 PG
MCHC RBC AUTO-ENTMCNC: 32 G/DL
MCV RBC AUTO: 97.2 FL
MONOCYTES ABSOLUTE: 0.7 /ΜL
MONOCYTES RELATIVE PERCENT: 7.2 %
NEUTROPHILS ABSOLUTE: 5.6 /ΜL
NEUTROPHILS RELATIVE PERCENT: 61.7 %
PDW BLD-RTO: 14.9 %
PLATELET # BLD: 248 K/ΜL
PMV BLD AUTO: 9.4 FL
RBC # BLD: 4.78 10^6/ΜL
WBC # BLD: 9.1 10^3/ML

## 2021-04-21 NOTE — TELEPHONE ENCOUNTER
Called and left a detailed voicemail for patient with normal results. Asked patient to call the office back with any questions or concerns.

## 2021-04-21 NOTE — TELEPHONE ENCOUNTER
Pt calling back and was notified of results. She did mention that she was told by another doctor that she was diabetic. She wanted to know more information about this since we haven't told her this before. I do see an a1c of 6.6 on 1/14/21. Repeat a1c in 6 months? I dont see that she is taking any DM meds, continue present? Call pt back.

## 2021-04-22 NOTE — TELEPHONE ENCOUNTER
This was actually discussed with her and her daughter during her last visit. Checking blood sugars at the nursing home. A1c was performed 5 months ago and was at goal. Last a1c was 6.4. Recheck q6 months. No need for medications.

## 2021-04-23 ENCOUNTER — HOSPITAL ENCOUNTER (OUTPATIENT)
Dept: PHYSICAL THERAPY | Age: 81
Setting detail: THERAPIES SERIES
Discharge: HOME | End: 2021-04-23
Payer: MEDICARE

## 2021-04-23 PROCEDURE — 97161 PT EVAL LOW COMPLEX 20 MIN: CPT

## 2021-04-23 NOTE — PROGRESS NOTES
years. Pt went through PT at end of 2020 but didn't see any results. SUBJECTIVE: Pt takes Tylenol/aspirin for pain but minimal control. Pt uses pain patches but having difficulty keeping them on. Pt denies falls. Pain aggravated with walking, standing for ADLs; alleviated with sitting. Social/Functional History and Current Status:  Medications and Allergies have been reviewed and are listed on Medical History Questionnaire. Beck Galindo lives alone in an assisted living facility with a level surface to enter.      Task Previous Current   ADLs  Modified Independent Modified Independent   IADL's Modified Independent Modified Independent- goes down for all meals, can do light cooking if needed   Ambulation Modified Independent Modified Independent   Transfers Modified Independent Modified Independent   Recreation Not Applicable Not Applicable   Community Integration Not Applicable Not Applicable   Driving Does not drive Does not drive   Work Retired  Retired     Objective:    OBSERVATION Comments   Pain 7-8/10 in both hips, lateral   Posture Rounded shoulders, forward flexed trunk   Palpation Tender left greater trochanter and distal ITB   Sensation intact   Observation    Edema      LOWER EXTREMITY RANGE OF MOTION    Left Right COMMENTS   Hip Flexion [facility] WFL End range pain   Hip Extension      Hip ABDuction      Hip ADDuction      Hip Internal Rotation Limited 50% WFL    Hip External Rotation Limited 75% WFL    Hip Range of Motion is [facility]  []      Knee Flexion      Knee Extension      Knee Range of Motion is [facility]  [x] tight hamstrings       LE STRENGTH Left Right   Hip Flexion 3/5 3/5   Hip Extension     Hip Abduction     Hip Adduction     Knee Flexion 4-/5 4-/5   Knee Extension 3+/5 3+/5   Ankle DF 5/5 5/5   Ankle PF         SPECIAL TESTS (+/-) Left Right Not Tested   SCOUR   []   TITI vu negative []   FADIR positive negative []   Kellie Serra   []   Parveen's   []   Fiona Bio   []   Thakur Compression   [] report reduced pain to <6/10 to tolerate standing and walking longer durations. Patient will demonstrate good core engagement and postural awareness during therapy session with minimal cues  to carry over to functional activities, lifting, and housework. Patient will report minimal lumbar/hip tightness with stretches for ease bending over and completing ADLs. Patient will improve B hip and knee strength to 4/5 for stabilization when walking, lifting, and cleaning. Patient will be able to complete 7 sit to stand transfers in 30 seconds with UE assist to improve functional strength. Long Term Goals: 12 weeks  Patient will improve Tinetti score from 14/28 to 18/28 to decrease fall risk and improve gait. Patient will be independent and compliant with HEP to achieve above goals. Patient Education:   [x]  HEP/Education Completed: Plan of Care, Goals   [address] Access Code:  []  No new Education completed  []  Reviewed Prior HEP      [x]  Patient verbalized and/or demonstrated understanding of education provided. []  Patient unable to verbalize and/or demonstrate understanding of education provided. Will continue education. []  Barriers to learning: None    PLAN:  Treatment Recommendations: Strengthening, Range of Motion, Balance Training, Functional Mobility Training, Gait Training, Manual Therapy - Soft Tissue Mobilization, Pain Management, Home Exercise Program, Patient Education, Aquatics and Modalities    [x]  Plan of care initiated. Plan to see patient 2 times per week for 12 weeks to address the treatment planned outlined above.   []  Continue with current plan of care  []  Modify plan of care as follows:    []  Hold pending physician visit  []  Discharge    Time In 1410   Time Out 1445   Timed Code Minutes: 0 min   Total Treatment Time: 35 min     Electronically Signed by: Sascha Salcido

## 2021-04-27 ENCOUNTER — HOSPITAL ENCOUNTER (OUTPATIENT)
Dept: PHYSICAL THERAPY | Age: 81
Setting detail: THERAPIES SERIES
Discharge: HOME | End: 2021-04-27
Payer: MEDICARE

## 2021-04-27 PROCEDURE — 97113 AQUATIC THERAPY/EXERCISES: CPT

## 2021-04-27 NOTE — PROGRESS NOTES
[address] [facility]  PHYSICAL THERAPY  [] DAILY NOTE (LAND) [x] DAILY NOTE (AQUATIC ) [] PROGRESS NOTE [] DISCHARGE NOTE    [x] [facility] [place]   [] [place] [address]    [] [facility]   [] [facility]    Date: 2021  Patient Name:  Yvonne Trevizo  : 1940  MRN: [id number]  CSN: [id number]    Referring Practitioner PETEY Rosen*   Diagnosis Bilateral primary osteoarthritis of hip [M16.0]    Treatment Diagnosis Chronic B hip pain, core and hip weakness, tight hips and ITB, difficulty ambulating   Date of Evaluation 21    Additional Pertinent History Arthritis, SOB (gradually worsening), incontinence. Functional Outcome Measure Used Tinetti   Functional Outcome Score  (21)       Insurance: Primary: Payor: Satish Gutierrez /  /  / ,   Secondary: [facility]   Authorization Information: Aquatics and modalities covered except ionto, HP/CP, telehealth covered   Visit # 2, 2/10 for progress note   Visits Allowed: Unlimited based on medical necessity   Recertification Date:    Physician Follow-Up: None scheduled   Physician Orders:    History of Present Illness: Pt presents with B hip pain, which flared up after receiving covid vaccine; pt unsure if correlated. Pt has had chronic hip pain for at least a couple years. Pt went through PT at end of  but didn't see any results. SUBJECTIVE: Patient reporting pain level 2/10 today in both hips. Did have to put waterproof bandage on right arm and leg before session started. AQUATICS TREATMENT   Precautions: Noticed that patient was a little anxious in the pool. Asked patient if she was and pt admitting this. Patient stating she has not been in a pool in years.     Pain:     X in shaded column indicates activity completed today   Exercise/Intervention Sets/Sec  Notes   Walk Forward X 1 lap  x    Walk Backward X 1 lap  x    Walk Sideways X 1 lap  x Lower Extremity Exercises: All in // bars   Heel/Toe Raises 10  x    Marches 10  x    Squats 10  x    3 Way Hip 10  x    Hamstring Curls 10  x    Lunges       Step-Ups              Lower Extremity Stretches:              Seated Exercises:              Upper Extremity Exercises:       Shoulder Flexion       Shoulder ABD/ADD       Shoulder Horizontal ABD/ADD       Shoulder IR/ER       Shoulder Circles       Shoulder Shrugs       Rows       Bicep Curls              Upper Extremity Stretches:              Balance:              Dynamic Gait:              Deep Water:       Hang       Bicycle       Hip ABD/ADD       Hip Flex/Ext       Specific Interventions Next Treatment: aquatics for core/LE strengthening, hip stretches, deep water, balance, sit to stand transfers    Activity/Treatment Tolerance:  [x]  Patient tolerated treatment well  []  Patient limited by fatigue  []  Patient limited by pain   []  Patient limited by medical complications  []  Other:     Assessment: Patient slow moving in pool due to being anxious but motivated to try exercises. Patient always needing to be holding onto railing for comfort/balance. Patient denies having any pain at end of session. GOALS:  Patient Goal: Decrease hip pain    Short Term Goals: 6 weeks  Patient will report reduced pain to <6/10 to tolerate standing and walking longer durations. Patient will demonstrate good core engagement and postural awareness during therapy session with minimal cues  to carry over to functional activities, lifting, and housework. Patient will report minimal lumbar/hip tightness with stretches for ease bending over and completing ADLs. Patient will improve B hip and knee strength to 4/5 for stabilization when walking, lifting, and cleaning. Patient will be able to complete 7 sit to stand transfers in 30 seconds with UE assist to improve functional strength.      Long Term Goals: 12 weeks  Patient will improve Tinetti score from 14/28 to

## 2021-04-28 ENCOUNTER — TELEPHONE (OUTPATIENT)
Dept: FAMILY MEDICINE CLINIC | Age: 81
End: 2021-04-28

## 2021-04-28 DIAGNOSIS — R15.2 FECAL URGENCY: Primary | ICD-10-CM

## 2021-04-28 DIAGNOSIS — R39.15 URINARY URGENCY: ICD-10-CM

## 2021-04-28 NOTE — TELEPHONE ENCOUNTER
[facility] left a voicemail stating that the pt is requesting a referral to GI for diarrhea. They did not mention who she wanted to see. Stacey Lo for referral?  Please call [facility] back @ [phone].

## 2021-04-28 NOTE — TELEPHONE ENCOUNTER
Okay to place referral for fecal urgency. Have John Wes complete UA with reflex for urinary urgency.

## 2021-04-29 ENCOUNTER — APPOINTMENT (OUTPATIENT)
Dept: PHYSICAL THERAPY | Age: 81
End: 2021-04-29
Payer: MEDICARE

## 2021-05-03 ENCOUNTER — HOSPITAL ENCOUNTER (OUTPATIENT)
Dept: PHYSICAL THERAPY | Age: 81
Setting detail: THERAPIES SERIES
Discharge: HOME | End: 2021-05-03
Payer: MEDICARE

## 2021-05-03 ENCOUNTER — APPOINTMENT (OUTPATIENT)
Dept: PHYSICAL THERAPY | Age: 81
End: 2021-05-03
Payer: MEDICARE

## 2021-05-03 PROCEDURE — 97113 AQUATIC THERAPY/EXERCISES: CPT

## 2021-05-03 NOTE — PROGRESS NOTES
sideways position          Lower Extremity Exercises: All in //bars   Heel/Toe Raises 10x  X    Marches  1 minute X    Squats 10x  X    3 Way Hip 10x bilateral  X    Hamstring Curls 10x bilateral  X    Lunges       Step-Ups              Lower Extremity Stretches:              Seated Exercises:              Upper Extremity Exercises:       Shoulder Flexion  10x  X Normal stance, arm movement to challenge balance   Shoulder ABD/ADD 10x  X Normal stance, arm movement to challenge balance   Shoulder Horizontal ABD/ADD 10x  X Normal stance, arm movement to challenge balance   Shoulder IR/ER       Shoulder Circles       Shoulder Shrugs       Rows       Bicep Curls              Upper Extremity Stretches:              Balance:    In //bars        Narrow stance, Step stance  1 minute X No UE; Wider LAINE with step stance          Dynamic Gait:              Deep Water:       Hang       Bicycle       Hip ABD/ADD       Hip Flex/Ext       Specific Interventions Next Treatment: aquatics for core/LE strengthening, hip stretches, deep water, balance, sit to stand transfers    Activity/Treatment Tolerance:  [x]  Patient tolerated treatment well  []  Patient limited by fatigue  []  Patient limited by pain   []  Patient limited by medical complications  []  Other:     Assessment: Patient is slow with movement and anxious in the pool. Frequent cues for posture correction and technique to activate correct musculature. Patient tends to move her feet to a wider base of support position with balance activities. Patient denies pain at end of session. GOALS:  Patient Goal: Decrease hip pain    Short Term Goals: 6 weeks  Patient will report reduced pain to <6/10 to tolerate standing and walking longer durations. Patient will demonstrate good core engagement and postural awareness during therapy session with minimal cues  to carry over to functional activities, lifting, and housework.    Patient will report minimal lumbar/hip tightness

## 2021-05-05 ENCOUNTER — TELEPHONE (OUTPATIENT)
Dept: FAMILY MEDICINE CLINIC | Age: 81
End: 2021-05-05

## 2021-05-05 NOTE — TELEPHONE ENCOUNTER
----- Message from PETEY Mckeon CNP sent at 5/5/2021 11:16 AM EDT -----  UA revealed evidence of a kidney stone. Is she having any new flank pain, decreased urine output, fever?

## 2021-05-07 ENCOUNTER — HOSPITAL ENCOUNTER (OUTPATIENT)
Dept: PHYSICAL THERAPY | Age: 81
Setting detail: THERAPIES SERIES
Discharge: HOME | End: 2021-05-07
Payer: MEDICARE

## 2021-05-07 PROCEDURE — 97113 AQUATIC THERAPY/EXERCISES: CPT

## 2021-05-07 NOTE — PROGRESS NOTES
[facility] [facility]  PHYSICAL THERAPY  [] DAILY NOTE (LAND) [x] DAILY NOTE (AQUATIC ) [] PROGRESS NOTE [] DISCHARGE NOTE    [x] [facility] [place]   [] [facility]    [] [facility]   [] Wali Brendahugo    Date: 2021  Patient Name:  Feng Chirinos  : 1940  MRN: [id number]  CSN: [id number]    Referring Practitioner PETEY Dominguez*   Diagnosis Bilateral primary osteoarthritis of hip [M16.0]    Treatment Diagnosis Chronic B hip pain, core and hip weakness, tight hips and ITB, difficulty ambulating   Date of Evaluation 21    Additional Pertinent History Arthritis, COPD, SOB (gradually worsening), incontinence. Functional Outcome Measure Used Tinetti   Functional Outcome Score  (21)       Insurance: Primary: Payor: Arabella Johnson /  /  / ,   Secondary: [facility]   Authorization Information: Aquatics and modalities covered except ionto, HP/CP, telehealth covered   Visit # 4, 4/10 for progress note   Visits Allowed: Unlimited based on medical necessity   Recertification Date:    Physician Follow-Up: None scheduled   Physician Orders:    History of Present Illness: Pt presents with B hip pain, which flared up after receiving covid vaccine; pt unsure if correlated. Pt has had chronic hip pain for at least a couple years. Pt went through PT at end of  but didn't see any results. SUBJECTIVE: Patient 10 min late getting into pool area, reports her transportation made her late. Pt reports hips are feeling pretty good today; unsure if its from aquatic sessions, since she's only done 2. Pt reports she is SOB from rushing around from being late. AQUATICS TREATMENT   Precautions: Patient slightly anxious in the pool and admits she isn't completely comfortable in water.   COPD/SOB   Pain: 2/10 Bilateral hips    X in shaded column indicates activity completed today   Exercise/Intervention Sets/Sec  Notes Walk Forward x lap  X Cues for posture. Tends to want to hold on to rail with both UEs   Walk Backward x1 lap  X    Walk Sideways x1 lap  X Cues to keep sideways position          Lower Extremity Exercises: All in //bars   Heel/Toe Raises 10x  X    Marches  1 minute X    Squats 10x  X    3 Way Hip 10x bilateral  X    Hamstring Curls 10x bilateral  X    Lunges       Step-Ups              Lower Extremity Stretches:              Seated Exercises:       LAQ, march, hip abduction 10  x On bench                 Upper Extremity Exercises:       Shoulder Flexion  10x  X Normal stance, arm movement to challenge balance, one arm at a time   Shoulder ABD/ADD 10x  X Normal stance, arm movement to challenge balance, one arm at a night   Shoulder Horizontal ABD/ADD 10x  X Normal stance, arm movement to challenge balance, one arm at a time   Shoulder IR/ER       Shoulder Circles       Shoulder Shrugs       Rows       Bicep Curls              Upper Extremity Stretches:              Balance:            Narrow stance, Step stance  1 minute X No UE; Wider LAINE with step stance          Dynamic Gait:              Deep Water:       Hang       Bicycle       Hip ABD/ADD       Hip Flex/Ext       Specific Interventions Next Treatment: aquatics for core/LE strengthening, hip stretches, deep water, balance, sit to stand transfers    Activity/Treatment Tolerance:  [x]  Patient tolerated treatment well  []  Patient limited by fatigue  []  Patient limited by pain   []  Patient limited by medical complications  []  Other:     Assessment: Patient demos increased SOB during session, requiring multiple short stand rest breaks. Pt unable to take deep breaths; breathing is shallow and fast. Pt very slow moving and anxious in the water, unable to let go of railing. Pt performed UE exercises unilaterally instead of bilaterally d/t feeling off balance without UE support. Introduced seated exercises with good tolerance.  No change in hip pain at end of session. Attempted sit to stands but patient unable to keep feet from floating up without pulling on railing. GOALS:  Patient Goal: Decrease hip pain    Short Term Goals: 6 weeks  Patient will report reduced pain to <6/10 to tolerate standing and walking longer durations. Patient will demonstrate good core engagement and postural awareness during therapy session with minimal cues  to carry over to functional activities, lifting, and housework. Patient will report minimal lumbar/hip tightness with stretches for ease bending over and completing ADLs. Patient will improve B hip and knee strength to 4/5 for stabilization when walking, lifting, and cleaning. Patient will be able to complete 7 sit to stand transfers in 30 seconds with UE assist to improve functional strength. Long Term Goals: 12 weeks  Patient will improve Tinetti score from 14/28 to 18/28 to decrease fall risk and improve gait. Patient will be independent and compliant with HEP to achieve above goals. Patient Education:   []  HEP/Education Completed: monitor how she feels after the pool; Added balance activities.  [employer] Access Code:  [x]  No new Education completed  []  Reviewed Prior HEP      []  Patient verbalized and/or demonstrated understanding of education provided. []  Patient unable to verbalize and/or demonstrate understanding of education provided. Will continue education. []  Barriers to learning: None    PLAN:2 times per week for 12 weeks.   Treatment Recommendations: Strengthening, Range of Motion, Balance Training, Functional Mobility Training, Gait Training, Manual Therapy - Soft Tissue Mobilization, Pain Management, Home Exercise Program, Patient Education, Aquatics and Modalities    [x]  Continue with current plan of care  []  Modify plan of care as follows:    []  Hold pending physician visit  []  Discharge    Time In 1355   Time Out 1435   Timed Code Minutes: 40 min   Total Treatment Time:   40 min

## 2021-05-10 ENCOUNTER — HOSPITAL ENCOUNTER (OUTPATIENT)
Dept: PHYSICAL THERAPY | Age: 81
Setting detail: THERAPIES SERIES
Discharge: HOME | End: 2021-05-10
Payer: MEDICARE

## 2021-05-10 PROCEDURE — 97113 AQUATIC THERAPY/EXERCISES: CPT

## 2021-05-10 NOTE — PROGRESS NOTES
[address] [facility]  PHYSICAL THERAPY  [] DAILY NOTE (LAND) [x] DAILY NOTE (AQUATIC ) [] PROGRESS NOTE [] DISCHARGE NOTE    [x] [facility] [place]   [] [place] [address]    [] [facility]   [] Salbador Gant    Date: 5/10/2021  Patient Name:  Casey Mathews  : 1940  MRN: [id number]  CSN: [id number]    Referring Practitioner PETEY Foreman*   Diagnosis Bilateral primary osteoarthritis of hip [M16.0]    Treatment Diagnosis Chronic B hip pain, core and hip weakness, tight hips and ITB, difficulty ambulating   Date of Evaluation 21    Additional Pertinent History Arthritis, COPD, SOB (gradually worsening), incontinence. Functional Outcome Measure Used Tinetti   Functional Outcome Score  (21)       Insurance: Primary: Payor: Adelaida Ambriz /  /  / ,   Secondary: [facility]   Authorization Information: Aquatics and modalities covered except ionto, HP/CP, telehealth covered   Visit # 5, 5/10 for progress note   Visits Allowed: Unlimited based on medical necessity   Recertification Date:    Physician Follow-Up: None scheduled   Physician Orders:    History of Present Illness: Pt presents with B hip pain, which flared up after receiving covid vaccine; pt unsure if correlated. Pt has had chronic hip pain for at least a couple years. Pt went through PT at end of  but didn't see any results. SUBJECTIVE: Patient has 2 cuts on left lateral lower leg that occurred this morning. Pt reports she is on aspirin so pain isn't too bad. AQUATICS TREATMENT   Precautions: Patient slightly anxious in the pool and admits she isn't completely comfortable in water. COPD/SOB   Pain: 2/10 Bilateral hips    X in shaded column indicates activity completed today   Exercise/Intervention Sets/Sec  Notes   Walk Forward x lap  X Cues for posture.  Tends to want to hold on to rail with both UEs   Walk Backward x1 lap  X    Walk Sideways x1 lap  X Cues to keep sideways position          Lower Extremity Exercises: All in //bars   Heel/Toe Raises 12x  X    Marches  1 minute X    Squats 12x  X    3 Way Hip 12x bilateral  X    Hamstring Curls 12x bilateral  X    Lunges       Step-Ups              Lower Extremity Stretches:              Seated Exercises:       LAQ, march, hip abduction 10  x On bench                 Upper Extremity Exercises:       Shoulder Flexion  10x  X Normal stance, arm movement to challenge balance, one arm at a time   Shoulder ABD/ADD 10x  X Normal stance, arm movement to challenge balance, one arm at a night   Shoulder Horizontal ABD/ADD 10x  X Normal stance, arm movement to challenge balance, one arm at a time   Shoulder IR/ER       Shoulder Circles       Shoulder Shrugs       Rows       Bicep Curls              Upper Extremity Stretches:              Balance:            Narrow stance, Step stance  1 minute  No UE; Wider LAINE with step stance          Dynamic Gait:              Deep Water:       Hang       Bicycle       Hip ABD/ADD       Hip Flex/Ext       Specific Interventions Next Treatment: aquatics for core/LE strengthening, hip stretches, deep water, balance, sit to stand transfers    Activity/Treatment Tolerance:  [x]  Patient tolerated treatment well  []  Patient limited by fatigue  []  Patient limited by pain   []  Patient limited by medical complications  []  Other:     Assessment: Tegaderm applied to wounds on left leg prior to entering pool. Progressed to 12 reps with all exercises with good tolerance. No additional complaints at end of session. Pt very hard of hearing. GOALS:  Patient Goal: Decrease hip pain    Short Term Goals: 6 weeks  Patient will report reduced pain to <6/10 to tolerate standing and walking longer durations.   Patient will demonstrate good core engagement and postural awareness during therapy session with minimal cues  to carry over to functional activities, lifting, and

## 2021-05-13 ENCOUNTER — HOSPITAL ENCOUNTER (OUTPATIENT)
Dept: PHYSICAL THERAPY | Age: 81
Setting detail: THERAPIES SERIES
Discharge: HOME | End: 2021-05-13
Payer: MEDICARE

## 2021-05-13 PROCEDURE — 97113 AQUATIC THERAPY/EXERCISES: CPT

## 2021-05-17 ENCOUNTER — HOSPITAL ENCOUNTER (OUTPATIENT)
Dept: PHYSICAL THERAPY | Age: 81
Setting detail: THERAPIES SERIES
End: 2021-05-17
Payer: MEDICARE

## 2021-05-17 NOTE — DISCHARGE SUMMARY
[address] [facility]    Patient Name: Yasmany Hayes        CSN: [id number]   YOB: 1940  Gender: female  Gerald Higuera, APRN - C*,    Bilateral primary osteoarthritis of hip [M16.0] ,      Patient is discharged from Physical Therapy services at this time. See last note for details related to results of therapy and goal achievement. Reason for discharge: Patient requested to be discharged. Pt reports she is seeing the doctor and wants discharged.        Margoth Saini DPT, #[id number]

## 2021-05-19 ENCOUNTER — HOSPITAL ENCOUNTER (OUTPATIENT)
Dept: PHYSICAL THERAPY | Age: 81
Setting detail: THERAPIES SERIES
Discharge: HOME | End: 2021-05-19
Payer: MEDICARE

## 2021-06-22 LAB
ALBUMIN SERPL-MCNC: 3.8 G/DL
ALP BLD-CCNC: 57 U/L
ALT SERPL-CCNC: 18 U/L
ANION GAP SERPL CALCULATED.3IONS-SCNC: 1.5 MMOL/L
AST SERPL-CCNC: 22 U/L
AVERAGE GLUCOSE: NORMAL
BILIRUB SERPL-MCNC: 0.6 MG/DL (ref 0.1–1.4)
BUN BLDV-MCNC: 19 MG/DL
CALCIUM SERPL-MCNC: 10.1 MG/DL
CHLORIDE BLD-SCNC: 102 MMOL/L
CO2: 34 MMOL/L
CREAT SERPL-MCNC: 0.8 MG/DL
GFR CALCULATED: 69
GLUCOSE BLD-MCNC: 148 MG/DL
HBA1C MFR BLD: 7 %
POTASSIUM SERPL-SCNC: 4.4 MMOL/L
SODIUM BLD-SCNC: 142 MMOL/L
TOTAL PROTEIN: 6.3

## 2021-06-28 ENCOUNTER — TELEPHONE (OUTPATIENT)
Dept: FAMILY MEDICINE CLINIC | Age: 81
End: 2021-06-28

## 2021-06-28 NOTE — TELEPHONE ENCOUNTER
Labs are okay. A1c slightly elevated but still at goal.    ----- Message -----   From: Pantera Osorio   Sent: 6/23/2021   2:02 PM EDT   To: PETEY Gibbs CNP     Pt notified and verbalized understanding.

## 2021-07-08 DIAGNOSIS — J41.0 SIMPLE CHRONIC BRONCHITIS (HCC): ICD-10-CM

## 2021-07-08 RX ORDER — ALBUTEROL SULFATE 90 UG/1
2 AEROSOL, METERED RESPIRATORY (INHALATION) EVERY 6 HOURS PRN
Qty: 2 INHALER | Refills: 5 | Status: SHIPPED | OUTPATIENT
Start: 2021-07-08

## 2021-07-21 ENCOUNTER — OFFICE VISIT (OUTPATIENT)
Dept: PHYSICAL MEDICINE AND REHAB | Age: 81
End: 2021-07-21
Payer: MEDICARE

## 2021-07-21 VITALS
HEIGHT: 65 IN | SYSTOLIC BLOOD PRESSURE: 126 MMHG | BODY MASS INDEX: 20.73 KG/M2 | HEART RATE: 80 BPM | WEIGHT: 124.4 LBS | DIASTOLIC BLOOD PRESSURE: 70 MMHG | TEMPERATURE: 97.7 F

## 2021-07-21 DIAGNOSIS — R29.898 LEG WEAKNESS, BILATERAL: ICD-10-CM

## 2021-07-21 DIAGNOSIS — G60.8 MIXED SENSORY-MOTOR POLYNEUROPATHY: Primary | ICD-10-CM

## 2021-07-21 PROCEDURE — 1090F PRES/ABSN URINE INCON ASSESS: CPT | Performed by: PHYSICAL MEDICINE & REHABILITATION

## 2021-07-21 PROCEDURE — G8400 PT W/DXA NO RESULTS DOC: HCPCS | Performed by: PHYSICAL MEDICINE & REHABILITATION

## 2021-07-21 PROCEDURE — 4040F PNEUMOC VAC/ADMIN/RCVD: CPT | Performed by: PHYSICAL MEDICINE & REHABILITATION

## 2021-07-21 PROCEDURE — 1123F ACP DISCUSS/DSCN MKR DOCD: CPT | Performed by: PHYSICAL MEDICINE & REHABILITATION

## 2021-07-21 PROCEDURE — 4004F PT TOBACCO SCREEN RCVD TLK: CPT | Performed by: PHYSICAL MEDICINE & REHABILITATION

## 2021-07-21 PROCEDURE — 99212 OFFICE O/P EST SF 10 MIN: CPT | Performed by: PHYSICAL MEDICINE & REHABILITATION

## 2021-07-21 PROCEDURE — G8427 DOCREV CUR MEDS BY ELIG CLIN: HCPCS | Performed by: PHYSICAL MEDICINE & REHABILITATION

## 2021-07-21 PROCEDURE — G8420 CALC BMI NORM PARAMETERS: HCPCS | Performed by: PHYSICAL MEDICINE & REHABILITATION

## 2021-07-21 NOTE — PROGRESS NOTES
Socioeconomic History    Marital status:      Spouse name: Not on file    Number of children: Not on file    Years of education: Not on file    Highest education level: Not on file   Occupational History    Not on file   Tobacco Use    Smoking status: Current Some Day Smoker     Packs/day: 0.25     Years: 40.00     Pack years: 10.00     Types: Cigarettes     Last attempt to quit: 2019     Years since quittin.0    Smokeless tobacco: Never Used    Tobacco comment: 3-4 cigs a day only   Vaping Use    Vaping Use: Never used   Substance and Sexual Activity    Alcohol use: No    Drug use: Never    Sexual activity: Not Currently   Other Topics Concern    Not on file   Social History Narrative    Not on file     Social Determinants of Health     Financial Resource Strain:     Difficulty of Paying Living Expenses:    Food Insecurity:     Worried About Running Out of Food in the Last Year:     [address] in the Last Year:    Transportation Needs:     Lack of Transportation (Medical):      Lack of Transportation (Non-Medical):    Physical Activity:     Days of Exercise per Week:     Minutes of Exercise per Session:    Stress:     Feeling of Stress :    Social Connections:     Frequency of Communication with Friends and Family:     Frequency of Social Gatherings with Friends and Family:     Attends Anabaptism Services:     Active Member of Clubs or Organizations:     Attends Club or Organization Meetings:     Marital Status:    Intimate Partner Violence:     Fear of Current or Ex-Partner:     Emotionally Abused:     Physically Abused:     Sexually Abused:        Current Outpatient Medications   Medication Sig Dispense Refill    albuterol sulfate  (90 Base) MCG/ACT inhaler Inhale 2 puffs into the lungs every 6 hours as needed for Wheezing 2 Inhaler 5    donepezil (ARICEPT) 10 MG tablet Take 1 tablet by mouth nightly 90 tablet 0    predniSONE (DELTASONE) 5 MG tablet Respirations WNL and unlabored. MSK: Functional ROM all extremities. Strength 4+/5 key muscles B hip flexion, knee extension. Performs sit to stand from chair with minimal push off from arms. Ambulates short distance in exam room without device. Neurological: She is alert and oriented to person, place, and time. Skin: Skin is warm dry and intact with good turgor. Scattered areas ecchymosis on limbs. Psychiatric: She has a normal mood and affect. Her behavior is normal. Thought content normal.   Nursing note and vitals reviewed. Diagnostic Studies:  None new    Assessment:       Diagnosis Orders   1. Mixed sensory-motor polyneuropathy     2. Leg weakness, bilateral          Plan:      Patient has been stable on 7.5 mg daily prednisone. She failed previous tapering trial to 5 mg with return of her pain and impaired mobility. She can be re-evaluated for this if appropriate clinically in the future. Her PCP can take over prescription of prednisone but if PCP would like me to continue, would need to see patient annually. No orders of the defined types were placed in this encounter. No orders of the defined types were placed in this encounter. Return if symptoms worsen or fail to improve. Electronically signedby Max Carney MD on 7/21/2021 at 2:59 PM.     Please note that this chartwas generated using voice recognition Dragon dictation software. Although everyeffort was made to ensure the accuracy of this automated transcription, some errorsin transcription may have occurred.

## 2021-07-29 ENCOUNTER — OFFICE VISIT (OUTPATIENT)
Dept: FAMILY MEDICINE CLINIC | Age: 81
End: 2021-07-29
Payer: MEDICARE

## 2021-07-29 VITALS
HEART RATE: 85 BPM | SYSTOLIC BLOOD PRESSURE: 106 MMHG | DIASTOLIC BLOOD PRESSURE: 70 MMHG | OXYGEN SATURATION: 90 % | BODY MASS INDEX: 20.6 KG/M2 | WEIGHT: 123.8 LBS | RESPIRATION RATE: 16 BRPM | TEMPERATURE: 97.8 F

## 2021-07-29 DIAGNOSIS — B36.9 DERMATITIS FUNGAL: ICD-10-CM

## 2021-07-29 DIAGNOSIS — J44.9 CHRONIC OBSTRUCTIVE PULMONARY DISEASE, UNSPECIFIED COPD TYPE (HCC): Primary | ICD-10-CM

## 2021-07-29 PROCEDURE — 99214 OFFICE O/P EST MOD 30 MIN: CPT | Performed by: NURSE PRACTITIONER

## 2021-07-29 PROCEDURE — G8420 CALC BMI NORM PARAMETERS: HCPCS | Performed by: NURSE PRACTITIONER

## 2021-07-29 PROCEDURE — 3023F SPIROM DOC REV: CPT | Performed by: NURSE PRACTITIONER

## 2021-07-29 PROCEDURE — 1090F PRES/ABSN URINE INCON ASSESS: CPT | Performed by: NURSE PRACTITIONER

## 2021-07-29 PROCEDURE — G8427 DOCREV CUR MEDS BY ELIG CLIN: HCPCS | Performed by: NURSE PRACTITIONER

## 2021-07-29 PROCEDURE — G8926 SPIRO NO PERF OR DOC: HCPCS | Performed by: NURSE PRACTITIONER

## 2021-07-29 PROCEDURE — G8400 PT W/DXA NO RESULTS DOC: HCPCS | Performed by: NURSE PRACTITIONER

## 2021-07-29 PROCEDURE — 1123F ACP DISCUSS/DSCN MKR DOCD: CPT | Performed by: NURSE PRACTITIONER

## 2021-07-29 PROCEDURE — 4040F PNEUMOC VAC/ADMIN/RCVD: CPT | Performed by: NURSE PRACTITIONER

## 2021-07-29 PROCEDURE — 4004F PT TOBACCO SCREEN RCVD TLK: CPT | Performed by: NURSE PRACTITIONER

## 2021-07-29 RX ORDER — KETOCONAZOLE 20 MG/G
CREAM TOPICAL
Qty: 1 TUBE | Refills: 0 | Status: SHIPPED | OUTPATIENT
Start: 2021-07-29 | End: 2022-01-12 | Stop reason: ALTCHOICE

## 2021-07-29 SDOH — ECONOMIC STABILITY: FOOD INSECURITY: WITHIN THE PAST 12 MONTHS, THE FOOD YOU BOUGHT JUST DIDN'T LAST AND YOU DIDN'T HAVE MONEY TO GET MORE.: NEVER TRUE

## 2021-07-29 SDOH — ECONOMIC STABILITY: FOOD INSECURITY: WITHIN THE PAST 12 MONTHS, YOU WORRIED THAT YOUR FOOD WOULD RUN OUT BEFORE YOU GOT MONEY TO BUY MORE.: NEVER TRUE

## 2021-07-29 ASSESSMENT — PATIENT HEALTH QUESTIONNAIRE - PHQ9
SUM OF ALL RESPONSES TO PHQ QUESTIONS 1-9: 0
1. LITTLE INTEREST OR PLEASURE IN DOING THINGS: 0
SUM OF ALL RESPONSES TO PHQ QUESTIONS 1-9: 0
SUM OF ALL RESPONSES TO PHQ QUESTIONS 1-9: 0
SUM OF ALL RESPONSES TO PHQ9 QUESTIONS 1 & 2: 0
2. FEELING DOWN, DEPRESSED OR HOPELESS: 0

## 2021-07-29 ASSESSMENT — ENCOUNTER SYMPTOMS: SHORTNESS OF BREATH: 1

## 2021-07-29 ASSESSMENT — SOCIAL DETERMINANTS OF HEALTH (SDOH): HOW HARD IS IT FOR YOU TO PAY FOR THE VERY BASICS LIKE FOOD, HOUSING, MEDICAL CARE, AND HEATING?: NOT HARD AT ALL

## 2021-07-29 NOTE — PATIENT INSTRUCTIONS
Patient Education        Chronic Obstructive Pulmonary Disease (COPD): Care Instructions  Your Care Instructions     Chronic obstructive pulmonary disease (COPD) is a general term for a group of lung diseases, including emphysema and chronic bronchitis. People with COPD have decreased airflow in and out of the lungs, which makes it hard to breathe. The airways also can get clogged with thick mucus. Cigarette smoking is a major cause of COPD. Although there is no cure for COPD, you can slow its progress. Following your treatment plan and taking care of yourself can help you feel better and live longer. Follow-up care is a key part of your treatment and safety. Be sure to make and go to all appointments, and call your doctor if you are having problems. It's also a good idea to know your test results and keep a list of the medicines you take. How can you care for yourself at home? Staying healthy    · Do not smoke. This is the most important step you can take to prevent more damage to your lungs. If you need help quitting, talk to your doctor about stop-smoking programs and medicines. These can increase your chances of quitting for good.     · Avoid colds and flu. Get a pneumococcal vaccine shot. If you have had one before, ask your doctor whether you need a second dose. Get the flu vaccine every fall. If you must be around people with colds or the flu, wash your hands often.     · Avoid secondhand smoke, air pollution, and high altitudes. Also avoid cold, dry air and hot, humid air. Stay at home with your windows closed when air pollution is bad. Medicines and oxygen therapy    · Take your medicines exactly as prescribed. Call your doctor if you think you are having a problem with your medicine. You may be taking medicines such as:  ? Bronchodilators. These help open your airways and make breathing easier. They are either short-acting (work for 6 to 9 hours) or long-acting (work for 24 hours).  You inhale most bronchodilators, so they start to act quickly. Always carry your quick-relief inhaler with you in case you need it while you are away from home. ? Corticosteroids (prednisone, budesonide). These reduce airway inflammation. They come in pill or inhaled form. You must take these medicines every day for them to work well.     · Ask your doctor or pharmacist if a spacer is right for you. A spacer may help you get more inhaled medicine to your lungs. If you use one, ask how to use it properly.     · Do not take any vitamins, over-the-counter medicine, or herbal products without talking to your doctor first.     · If your doctor prescribed antibiotics, take them as directed. Do not stop taking them just because you feel better. You need to take the full course of antibiotics.     · If you use oxygen therapy, use the flow rate your doctor has recommended. Don't change it without talking to your doctor first. Oxygen therapy boosts the amount of oxygen in your blood and helps you breathe easier. Activity    · Get regular exercise. Walking is an easy way to get exercise. Start out slowly, and walk a little more each day.     · Pay attention to your breathing. You are exercising too hard if you can't talk while you exercise.     · Take short rest breaks when doing household chores and other activities.     · Learn breathing methods--such as breathing through pursed lips--to help you become less short of breath.     · If your doctor has not set you up with a pulmonary rehabilitation program, ask if rehab is right for you. Rehab includes exercise programs, education about your disease and how to manage it, help with diet and other changes, and emotional support. Diet    · Eat regular, healthy meals. Use bronchodilators about 1 hour before you eat to make it easier to eat. Eat several small meals instead of three large ones.  Drink beverages at the end of the meal. Avoid foods that are hard to chew.     · Eat foods that contain protein so you don't lose muscle mass.     · Talk with your doctor if you gain too much weight or if you lose weight without trying. Mental health    · Talk to your family, friends, or a therapist about your feelings. Some people feel frightened, angry, hopeless, helpless, and even guilty. Talking openly about bad feelings can help you cope. If these feelings last, talk to your doctor. When should you call for help? Call 911 anytime you think you may need emergency care. For example, call if:    · You have severe trouble breathing. Call your doctor now or seek immediate medical care if:    · You have new or worse trouble breathing.     · You cough up blood.     · You have a fever. Watch closely for changes in your health, and be sure to contact your doctor if:    · You cough more deeply or more often, especially if you notice more mucus or a change in the color of your mucus.     · You have new or worse swelling in your legs or belly.     · You are not getting better as expected. Where can you learn more? Go to https://Epoq.Sennari. org and sign in to your Enodo Software account. Enter L978 in the iScience Interventional box to learn more about \"Chronic Obstructive Pulmonary Disease (COPD): Care Instructions. \"     If you do not have an account, please click on the \"Sign Up Now\" link. Current as of: October 26, 2020               Content Version: 12.9  © 2006-2021 [employer]ob. Care instructions adapted under license by [facility] ([facility]). If you have questions about a medical condition or this instruction, always ask your healthcare professional. [place] [address] any warranty or liability for your use of this information. Patient Education        Breathing Techniques for COPD: Care Instructions  Your Care Instructions     Breathing is hard when you have chronic obstructive pulmonary disease (COPD). You may take quick, short breaths.  Breathing this way makes it harder to get air into your lungs. Learning new ways to control your breathing may help. You may feel better and be able to do more. You can try three basic ways to control your breathing. They are pursed-lip breathing, diaphragmatic breathing, and breathing while bending. Use these methods when you are more short of breath than normal. Practice them often so you can do them well. Follow-up care is a key part of your treatment and safety. Be sure to make and go to all appointments, and call your doctor if you are having problems. It's also a good idea to know your test results and keep a list of the medicines you take. How can you care for yourself at home? · Pursed-lip breathing helps you breathe more air out so that your next breath can be deeper. For this type of breathing, you breathe in through your nose and out through your mouth while almost closing your lips. Breathe in for about 2 seconds, and breathe out for 4 to 6 seconds. Pursed-lip breathing decreases shortness of breath and improves your ability to exercise. · Diaphragmatic breathing helps your lungs expand so that they take in more air. ? Lie on your back, or prop yourself up on several pillows. ? Put one hand on your belly and the other on your chest. When you breathe in, push your belly out as far as possible. You should feel the hand on your belly move out, while the hand on your chest does not move. ? When you breathe out, you should feel the hand on your belly move in. When you can do this type of breathing well while lying down, learn to do it while sitting or standing. Many people with COPD find this breathing method helpful. ? Practice diaphragmatic breathing for 20 minutes, 2 or 3 times a day. · Breathing while bending forward at the waist may make breathing easier. It can reduce shortness of breath while you exercise or rest. It helps the diaphragm move more easily.  The diaphragm is a large muscle that separates your lungs from your belly. It helps draw air into your lungs as you breathe. When should you call for help? Call your doctor now or seek immediate medical care if:    · Your breathing methods do not help.     · Your shortness of breath gets worse.     · You cough up blood.     · You have swelling in your belly and legs.     · You have severe chest pain. Watch closely for changes in your health, and be sure to contact your doctor if you have any problems. Where can you learn more? Go to https://SolarBuddy.[employer]. org and sign in to your ADR Software account. Enter I973 in the Curiously box to learn more about \"Breathing Techniques for COPD: Care Instructions. \"     If you do not have an account, please click on the \"Sign Up Now\" link. Current as of: October 26, 2020               Content Version: 12.9  © 2006-2021 Near[employer]se. Care instructions adapted under license by [facility] ([facility]). If you have questions about a medical condition or this instruction, always ask your healthcare professional. [address] any warranty or liability for your use of this information. Patient Education        Chronic Obstructive Pulmonary Disease (COPD) Flare-Ups: Care Instructions  Your Care Instructions     Chronic obstructive pulmonary disease (COPD) is a lung disease that makes it hard to breathe. It is caused by damage to the lungs over many years, usually from smoking. COPD is often a mix of two diseases:  · Chronic bronchitis: The airways that carry air to the lungs (bronchial tubes) get inflamed and make a lot of mucus. This can narrow or block the airways. · Emphysema: In a healthy person, the tiny air sacs in the lungs are like balloons. As you breathe in and out, they get bigger and smaller to move air through your lungs. But with emphysema, these air sacs are damaged and lose their stretch. Less air gets in and out of the lungs.   Many people with COPD have attacks called flare-ups or exacerbations. This is when your usual symptoms quickly get worse and stay worse. The doctor has checked you carefully. But problems can develop later. If you notice any problems or new symptoms, get medical treatment right away. Follow-up care is a key part of your treatment and safety. Be sure to make and go to all appointments, and call your doctor if you are having problems. It's also a good idea to know your test results and keep a list of the medicines you take. How can you care for yourself at home? · Be safe with medicines. Take your medicines exactly as prescribed. Call your doctor if you think you are having a problem with your medicine. You may be taking medicines such as:  ? Bronchodilators. These help open your airways and make breathing easier. ? Corticosteroids. These reduce airway inflammation. They may be given as pills, in a vein, or in an inhaled form. You may go home with pills in addition to an inhaler that you already use. · A spacer may help you get more inhaled medicine to your lungs. Ask your doctor or pharmacist if a spacer is right for you. If it is, ask how to use it properly. · If your doctor prescribed antibiotics, take them as directed. Do not stop taking them just because you feel better. You need to take the full course of antibiotics. · If your doctor prescribed oxygen, use the flow rate your doctor has recommended. Do not change it without talking to your doctor first.  · Do not smoke. Smoking makes COPD worse. If you need help quitting, talk to your doctor about stop-smoking programs and medicines. These can increase your chances of quitting for good. When should you call for help? Call 911 anytime you think you may need emergency care. For example, call if:    · You have severe trouble breathing. Call your doctor now or seek immediate medical care if:    · You have new or worse trouble breathing.     · Your coughing or wheezing gets worse.   · You cough up dark brown or bloody mucus (sputum).     · You have a new or higher fever. Watch closely for changes in your health, and be sure to contact your doctor if:    · You notice more mucus or a change in the color of your mucus.     · You need to use your antibiotic or steroid pills.     · You do not get better as expected. Where can you learn more? Go to https://Monkey Puzzle Media.[employer]. org and sign in to your InGameNow account. Enter T621 in the SourceYourCity box to learn more about \"Chronic Obstructive Pulmonary Disease (COPD) Flare-Ups: Care Instructions. \"     If you do not have an account, please click on the \"Sign Up Now\" link. Current as of: October 26, 2020               Content Version: 12.9  © 2006-2021 Healthwise, Incorporated. Care instructions adapted under license by [facility] ([facility]). If you have questions about a medical condition or this instruction, always ask your healthcare professional. [place] [address] any warranty or liability for your use of this information.

## 2021-07-29 NOTE — PROGRESS NOTES
Hazel Waters (:  1940) is a [de-identified] y.o. female,Established patient, here for evaluation of the following chief complaint(s):  Rash (3 month medication check, dizziness)         ASSESSMENT/PLAN:  1. Chronic obstructive pulmonary disease, unspecified COPD type ([facility] [address]  - Chronic, unstable  - Start salmeterol BID, albuterol as needed  - Previous xray revealed chronic bronchitis  - Daily and prn spO2 montoring  - No evidence of current infection  -     salmeterol (SEREVENT) 50 MCG/DOSE diskus inhaler; Inhale 1 puff into the lungs 2 times daily, Disp-1 each, R-5Normal    2. Dermatitis fungal  - Acute  - Start antifungal cream  - Notify office if symptoms not improving  -     ketoconazole (NIZORAL) 2 % cream; Apply topically twice daily. , Disp-1 Tube, R-0, Normal      Return if symptoms worsen or fail to improve. Subjective   SUBJECTIVE/OBJECTIVE:  3 month follow up. Rash under left breast. Started 1-2 weeks ago. Getting bigger since starting. Has had similar rash before. Nonpainful. Non pruritic. Dizziness prior to standing. Started less than one week ago. Occurring at least once daily. Lasts less than 5 seconds. Hx of COPD. Review of Systems   Constitutional: Negative for fatigue. Respiratory: Positive for shortness of breath (with exertion). Musculoskeletal: Positive for arthralgias. Neurological: Positive for dizziness and light-headedness. Objective   Physical Exam  Vitals and nursing note reviewed. Constitutional:       Appearance: She is well-developed. She is not ill-appearing. HENT:      Head: Normocephalic and atraumatic. Right Ear: Hearing, tympanic membrane, ear canal and external ear normal.      Left Ear: Hearing, tympanic membrane, ear canal and external ear normal.      Nose:      Right Sinus: No maxillary sinus tenderness or frontal sinus tenderness. Left Sinus: No maxillary sinus tenderness or frontal sinus tenderness.    Eyes:      General: Right eye: No discharge. Left eye: No discharge. Conjunctiva/sclera: Conjunctivae normal.      Pupils: Pupils are equal, round, and reactive to light. Neck:      Vascular: No JVD. Cardiovascular:      Rate and Rhythm: Normal rate and regular rhythm. Heart sounds: Normal heart sounds. No murmur heard. Pulmonary:      Effort: Pulmonary effort is normal. Tachypnea present. Breath sounds: Normal breath sounds. Decreased air movement present. No stridor. No wheezing. Abdominal:      General: There is no distension. Tenderness: There is no abdominal tenderness. Musculoskeletal:         General: No deformity. Cervical back: Normal range of motion. Comments: ROM in all extremities WNL. No deformities. Lymphadenopathy:      Head:      Right side of head: No submental or submandibular adenopathy. Left side of head: No submental or submandibular adenopathy. Skin:     General: Skin is warm and dry. Capillary Refill: Capillary refill takes less than 2 seconds. Findings: Rash present. Neurological:      Mental Status: She is alert and oriented to person, place, and time. Coordination: Coordination normal.   Psychiatric:         Mood and Affect: Mood normal.         Behavior: Behavior normal.         Thought Content: Thought content normal.         Judgment: Judgment normal.                  An electronic signature was used to authenticate this note.     --PETEY Mccullough - CNP

## 2021-08-09 ENCOUNTER — TELEPHONE (OUTPATIENT)
Dept: FAMILY MEDICINE CLINIC | Age: 81
End: 2021-08-09

## 2021-08-09 NOTE — TELEPHONE ENCOUNTER
----- Message from Suzy Mathias sent at 8/6/2021  2:44 PM EDT -----  Subject: Message to Provider    QUESTIONS  Information for Provider? Patient called to inquire about the salve she   was supposed to get that never came through, uses [facility], [place], [address] [place] [address] phone number [phone]  ---------------------------------------------------------------------------  --------------  Junior KRUEGER  What is the best way for the office to contact you? OK to leave message on   voicemail  Preferred Call Back Phone Number? [phone]  ---------------------------------------------------------------------------  --------------  SCRIPT ANSWERS  Relationship to Patient?  Self

## 2021-08-13 ENCOUNTER — HOSPITAL ENCOUNTER (EMERGENCY)
Age: 81
Discharge: HOME | End: 2021-08-13
Payer: MEDICARE

## 2021-08-13 ENCOUNTER — NURSE TRIAGE (OUTPATIENT)
Dept: OTHER | Facility: CLINIC | Age: 81
End: 2021-08-13

## 2021-08-13 VITALS
TEMPERATURE: 98.3 F | HEART RATE: 73 BPM | RESPIRATION RATE: 18 BRPM | OXYGEN SATURATION: 91 % | BODY MASS INDEX: 19.97 KG/M2 | WEIGHT: 120 LBS | SYSTOLIC BLOOD PRESSURE: 158 MMHG | DIASTOLIC BLOOD PRESSURE: 69 MMHG

## 2021-08-13 DIAGNOSIS — S80.811A INFECTED ABRASION OF RIGHT LOWER EXTREMITY, INITIAL ENCOUNTER: Primary | ICD-10-CM

## 2021-08-13 DIAGNOSIS — L08.9 INFECTED ABRASION OF RIGHT LOWER EXTREMITY, INITIAL ENCOUNTER: Primary | ICD-10-CM

## 2021-08-13 PROCEDURE — 6370000000 HC RX 637 (ALT 250 FOR IP): Performed by: NURSE PRACTITIONER

## 2021-08-13 PROCEDURE — 99213 OFFICE O/P EST LOW 20 MIN: CPT

## 2021-08-13 PROCEDURE — 99213 OFFICE O/P EST LOW 20 MIN: CPT | Performed by: NURSE PRACTITIONER

## 2021-08-13 RX ORDER — DOXYCYCLINE HYCLATE 100 MG
100 TABLET ORAL 2 TIMES DAILY
Qty: 20 TABLET | Refills: 0 | Status: SHIPPED | OUTPATIENT
Start: 2021-08-13 | End: 2021-08-23

## 2021-08-13 RX ORDER — DIAPER,BRIEF,INFANT-TODD,DISP
EACH MISCELLANEOUS ONCE
Status: COMPLETED | OUTPATIENT
Start: 2021-08-13 | End: 2021-08-13

## 2021-08-13 RX ADMIN — BACITRACIN ZINC: 500 OINTMENT TOPICAL at 12:43

## 2021-08-13 ASSESSMENT — ENCOUNTER SYMPTOMS: SHORTNESS OF BREATH: 0

## 2021-08-13 NOTE — ED PROVIDER NOTES
[id number]       Chief Complaint   Patient presents with    Abrasion     RLL       Nurses Notes reviewed and I agree except as noted in the HPI. HISTORY OF PRESENT ILLNESS   Vince Buchanan is a [de-identified] y.o. female who presents with complaints of abrasion to right lower leg. Injury 4 days ago after she struck her leg on her walker. She complains of increased pain, swelling, redness. No associated yellow drainage at times. No fever, chest pain, shortness of breath. No improvement with peroxide. REVIEW OF SYSTEMS     Review of Systems   Constitutional: Negative for fatigue and fever. Respiratory: Negative for shortness of breath. Cardiovascular: Negative for chest pain. Musculoskeletal: Positive for arthralgias and joint swelling. Negative for neck pain and neck stiffness. Skin: Positive for wound. Neurological: Negative for weakness and numbness. Hematological: Bruises/bleeds easily. PAST MEDICAL HISTORY         Diagnosis Date    Abnormal nuclear stress test 06/01/2018    Cataract     Colon polyps     COPD (chronic obstructive pulmonary disease) (HCC)     Diverticulosis large intestine w/o perforation or abscess w/bleeding     DVT, recurrent, lower extremity, acute, left (HCC)     Fibroid, uterine     Hearing loss     Hyperlipidemia     Nicotine dependence     Nocturnal hypoxemia     Postmenopausal     Sleep apnea     Ulcerative colitis ([facility] [address]     with rectal bleeding        SURGICAL HISTORY     Patient  has a past surgical history that includes Foot surgery; Breast biopsy (1988); Colonoscopy (2006); Colonoscopy (2010); Colonoscopy (2010); Colonoscopy (03/03/2014); Breast biopsy (Right, 03/2011); Foot surgery (Bilateral, 2011); and hip surgery (06/13/2020).     CURRENT MEDICATIONS       Previous Medications    ACETAMINOPHEN (TYLENOL) 325 MG TABLET    Take 650 mg by mouth every 6 hours as needed for Pain ALBUTEROL SULFATE  (90 BASE) MCG/ACT INHALER    Inhale 2 puffs into the lungs every 6 hours as needed for Wheezing    DONEPEZIL (ARICEPT) 10 MG TABLET    Take 1 tablet by mouth nightly    GLUCOSAMINE-CHONDROITIN 500-250 MG CAPS    Take by mouth daily    KETOCONAZOLE (NIZORAL) 2 % CREAM    Apply topically twice daily. LISINOPRIL (PRINIVIL;ZESTRIL) 20 MG TABLET    Take 20 mg by mouth daily    METOPROLOL TARTRATE (LOPRESSOR) 25 MG TABLET    Take 1 tablet by mouth 2 times daily    MULTIPLE VITAMINS-MINERALS (CENTRUM SILVER 50+WOMEN PO)    Take by mouth    PREDNISONE (DELTASONE) 5 MG TABLET    Take 7.5 mg by mouth daily Take one and a half po qd    PROBIOTIC PRODUCT (Kazaana PO)    Take 1 tablet by mouth daily    PSYLLIUM FIBER 0.52 G CAPS    Take 1 capsule by mouth daily    SALMETEROL (SEREVENT) 50 MCG/DOSE DISKUS INHALER    Inhale 1 puff into the lungs 2 times daily    SIMVASTATIN (ZOCOR) 40 MG TABLET    Take 1 tablet by mouth nightly    VITAMIN B-12 (CYANOCOBALAMIN) 1000 MCG TABLET    Take 1 tablet by mouth daily       ALLERGIES     Patient is is allergic to azulfidine [sulfasalazine], mesalamine, sulfa antibiotics, and alcohol. FAMILY HISTORY     Patient'sfamily history includes Coronary Art Dis in her father; Diabetes in her mother; Heart Disease in her father and mother; High Blood Pressure in her mother. SOCIAL HISTORY     Patient  reports that she has been smoking cigarettes. She has a 10.00 pack-year smoking history. She has never used smokeless tobacco. She reports that she does not drink alcohol and does not use drugs. PHYSICAL EXAM     ED TRIAGE VITALS  BP: (!) 158/69, Temp: 98.3 °F (36.8 °C), Pulse: 73, Resp: 18, SpO2: 91 %  Physical Exam  Vitals and nursing note reviewed. Constitutional:       General: She is not in acute distress. Appearance: Normal appearance. HENT:      Head: Normocephalic and atraumatic.       Right Ear: External ear normal.      Left Ear: External ear normal.      Nose: No congestion. Eyes:      General: No scleral icterus. Conjunctiva/sclera: Conjunctivae normal.   Cardiovascular:      Pulses:           Posterior tibial pulses are 2+ on the right side and 2+ on the left side. Pulmonary:      Effort: Pulmonary effort is normal. No respiratory distress. Musculoskeletal:      Cervical back: Normal range of motion. Right lower leg: Laceration (abrasion w/ infection. No abscess) and tenderness present. No bony tenderness. Edema present. Left lower leg: Normal.   Skin:     General: Skin is warm and dry. Capillary Refill: Capillary refill takes less than 2 seconds. Coloration: Skin is not jaundiced. Findings: Abrasion and erythema present. No abscess or rash. Comments: Infected abrasion right lower extremity without abscess   Neurological:      Mental Status: She is alert and oriented to person, place, and time. Sensory: Sensation is intact. Psychiatric:         Mood and Affect: Mood normal.         Behavior: Behavior normal. Behavior is cooperative. DIAGNOSTIC RESULTS   Labs: No results found for this visit on 08/13/21. IMAGING:  No orders to display     URGENT CARE COURSE:     Vitals:    08/13/21 1155 08/13/21 1157   BP:  (!) 158/69   Pulse: 73    Resp: 18    Temp: 98.3 °F (36.8 °C)    TempSrc: Temporal    SpO2: 91%    Weight: 120 lb (54.4 kg)        Medications   bacitracin zinc ointment (has no administration in time range)     PROCEDURES:  None  FINALIMPRESSION      1. Infected abrasion of right lower extremity, initial encounter        DISPOSITION/PLAN   DISPOSITION Decision To Discharge 08/13/2021 12:16:55 PM  Exam consistent with abrasion with infection. Bacitracin and nonstick dressing applied by RN, tolerated well. Doxycycline as prescribed. Discussed follow-up with PCP in 1 week for wound check. If worsening go to ER.   PATIENT REFERRED TO:  PETEY Mulligan - CNP  [address] [facility]  [address] [place] [address] [id number]  [phone]      Call for follow-up wound check for next week. Medication as prescribed. Daily antibiotic ointment, leave open to air. If symptoms worsen go to ER.     DISCHARGE MEDICATIONS:  New Prescriptions    DOXYCYCLINE HYCLATE (VIBRA-TABS) 100 MG TABLET    Take 1 tablet by mouth 2 times daily for 10 days     Current Discharge Medication List          [id number], APRN - CNP  08/13/21 1221

## 2021-08-13 NOTE — ED NOTES
Pt. Released in stable condition, ambulated per self to private car. Instructed pt to follow-up with family doctor as needed for recheck or go directly to the emergency department for any concerns/worsening conditions. Pt. Verbalized understanding of instructions. No questions at this time. RX in hand.   Bacitracin and dressing applied to outer right posterior leg,       Satnam Hercules, MELISA  08/13/21 8936

## 2021-08-13 NOTE — TELEPHONE ENCOUNTER
Received call from meghan at [facility] with [employer]. Brief description of triage: wound to right leg with infection she is under the care of a nursing facility advised we are not able to triage over other licensed professionals at this time schedule appointment     Triage indicates for patient to under the care of healthcare facility with nursing team advised to have the nurses there assess and call MD per facility protocol and schedule appointment as patient wants     Care advice provided, patient verbalizes understanding; denies any other questions or concerns; instructed to call back for any new or worsening symptoms. Attention Provider: Thank you for allowing me to participate in the care of your patient. The patient was connected to triage in response to information provided to the ECC. Please do not respond through this encounter as the response is not directed to a shared pool. Reason for Disposition  Manda Curl Caller has already spoken with another triager or PCP (or office), and has further questions and triager able to answer questions.     Protocols used: NO CONTACT OR DUPLICATE CONTACT CALL-ADULT-[place]

## 2021-08-13 NOTE — ED TRIAGE NOTES
Patient to room with c/o abrasion to right, lower leg beginning four to five days ago. Redness and edema noted to area. Yellow drainage noted. Patient states she hit her leg on her walker.

## 2021-08-16 ENCOUNTER — TELEPHONE (OUTPATIENT)
Dept: FAMILY MEDICINE CLINIC | Age: 81
End: 2021-08-16

## 2021-08-16 NOTE — LETTER
.. [address] [place] [address]  Phone: [phone]  Fax: [phone]    August 16, 2021    Harsh Feliz  [address]  [address] [place] [phone]    Dear Gregg Choi,    Thank you for choosing our [facility] on 8/13/21. Dr. Ana Rosa Hansen wanted to make sure that you understand your discharge instructions and that you were able to fill any prescriptions that may have been ordered for you. Please contact the office at the above phone number if advised you to follow up with Dr. Ana Rosa Hansen, or if you have any further questions or needs. Also, did you know -                             [facility]) practices can often offer you an appointment on the same day that you call for acute issues. *We have some [address] [facility] offices that offer Walk-in appointments; check our website for availability in your community, www. [email or web address].      *Evisits are now available for patients through [facility]. If you do not have [facility] and are interested, please contact the office and a staff member may assist you or go to [email or web address].     Sincerely,     Ana Rosa Jade, APRN - CNP and your [facility]

## 2021-08-18 ENCOUNTER — OFFICE VISIT (OUTPATIENT)
Dept: FAMILY MEDICINE CLINIC | Age: 81
End: 2021-08-18
Payer: MEDICARE

## 2021-08-18 VITALS
WEIGHT: 127 LBS | BODY MASS INDEX: 21.13 KG/M2 | SYSTOLIC BLOOD PRESSURE: 126 MMHG | DIASTOLIC BLOOD PRESSURE: 70 MMHG | OXYGEN SATURATION: 94 % | RESPIRATION RATE: 16 BRPM | HEART RATE: 72 BPM

## 2021-08-18 DIAGNOSIS — L98.9 BACK SKIN LESION: ICD-10-CM

## 2021-08-18 DIAGNOSIS — L03.115 CELLULITIS OF RIGHT LOWER EXTREMITY: Primary | ICD-10-CM

## 2021-08-18 PROCEDURE — 4004F PT TOBACCO SCREEN RCVD TLK: CPT | Performed by: NURSE PRACTITIONER

## 2021-08-18 PROCEDURE — G8420 CALC BMI NORM PARAMETERS: HCPCS | Performed by: NURSE PRACTITIONER

## 2021-08-18 PROCEDURE — 4040F PNEUMOC VAC/ADMIN/RCVD: CPT | Performed by: NURSE PRACTITIONER

## 2021-08-18 PROCEDURE — 1090F PRES/ABSN URINE INCON ASSESS: CPT | Performed by: NURSE PRACTITIONER

## 2021-08-18 PROCEDURE — 99213 OFFICE O/P EST LOW 20 MIN: CPT | Performed by: NURSE PRACTITIONER

## 2021-08-18 PROCEDURE — 1123F ACP DISCUSS/DSCN MKR DOCD: CPT | Performed by: NURSE PRACTITIONER

## 2021-08-18 PROCEDURE — G8427 DOCREV CUR MEDS BY ELIG CLIN: HCPCS | Performed by: NURSE PRACTITIONER

## 2021-08-18 PROCEDURE — G8400 PT W/DXA NO RESULTS DOC: HCPCS | Performed by: NURSE PRACTITIONER

## 2021-08-18 ASSESSMENT — ENCOUNTER SYMPTOMS: COLOR CHANGE: 1

## 2021-08-18 NOTE — PATIENT INSTRUCTIONS
Patient Education        Cellulitis: Care Instructions  Your Care Instructions     Cellulitis is a skin infection caused by bacteria, most often strep or staph. It often occurs after a break in the skin from a scrape, cut, bite, or puncture, or after a rash. Cellulitis may be treated without doing tests to find out what caused it. But your doctor may do tests, if needed, to look for a specific bacteria, like methicillin-resistant Staphylococcus aureus (MRSA). The doctor has checked you carefully, but problems can develop later. If you notice any problems or new symptoms, get medical treatment right away. Follow-up care is a key part of your treatment and safety. Be sure to make and go to all appointments, and call your doctor if you are having problems. It's also a good idea to know your test results and keep a list of the medicines you take. How can you care for yourself at home? · Take your antibiotics as directed. Do not stop taking them just because you feel better. You need to take the full course of antibiotics. · Prop up the infected area on pillows to reduce pain and swelling. Try to keep the area above the level of your heart as often as you can. · If your doctor told you how to care for your wound, follow your doctor's instructions. If you did not get instructions, follow this general advice:  ? Wash the wound with clean water 2 times a day. Don't use hydrogen peroxide or alcohol, which can slow healing. ? You may cover the wound with a thin layer of petroleum jelly, such as Vaseline, and a nonstick bandage. ? Apply more petroleum jelly and replace the bandage as needed. · Be safe with medicines. Take pain medicines exactly as directed. ? If the doctor gave you a prescription medicine for pain, take it as prescribed. ? If you are not taking a prescription pain medicine, ask your doctor if you can take an over-the-counter medicine.   To prevent cellulitis in the future  · Try to prevent cuts, scrapes, or other injuries to your skin. Cellulitis most often occurs where there is a break in the skin. · If you get a scrape, cut, mild burn, or bite, wash the wound with clean water as soon as you can to help avoid infection. Don't use hydrogen peroxide or alcohol, which can slow healing. · If you have swelling in your legs (edema), support stockings and good skin care may help prevent leg sores and cellulitis. · Take care of your feet, especially if you have diabetes or other conditions that increase the risk of infection. Wear shoes and socks. Do not go barefoot. If you have athlete's foot or other skin problems on your feet, talk to your doctor about how to treat them. When should you call for help? Call your doctor now or seek immediate medical care if:    · You have signs that your infection is getting worse, such as:  ? Increased pain, swelling, warmth, or redness. ? Red streaks leading from the area. ? Pus draining from the area. ? A fever.     · You get a rash. Watch closely for changes in your health, and be sure to contact your doctor if:    · You do not get better as expected. Where can you learn more? Go to https://"[employer]. org and sign in to your [employer] account. Enter P835 in the [facility] box to learn more about \"Cellulitis: Care Instructions. \"     If you do not have an account, please click on the \"Sign Up Now\" link. Current as of: March 3, 2021               Content Version: 12.9  © 2006-2021 [employer]wise[employer] Inc[employer]. Care instructions adapted under license by [facility] ([facility]). If you have questions about a medical condition or this instruction, always ask your healthcare professional. [place] [address] any warranty or liability for your use of this information.

## 2021-08-18 NOTE — PROGRESS NOTES
Frank Murillo (:  1940) is a [de-identified] y.o. female,Established patient, here for evaluation of the following chief complaint(s): Wound Check (injury on right leg )         ASSESSMENT/PLAN:  1. Cellulitis of right lower extremity  - Acute  - Continue current antibiotic as wound is improving  - Notify office if symptoms do not resolve    2. Back skin lesion  - Start topical hydrocortisone  - Notify office if no improvement in lesions  - Likely will require dermatology referral    Return if symptoms worsen or fail to improve. Subjective   SUBJECTIVE/OBJECTIVE:  Right leg wound. Bumped leg on the walker. 2 wounds to right lateral lower leg. Was treated at urgent care with doxycycline. Catalina Venita states the wound is improving. Redness is diminishing. Has 5 days of doxycycline remaining. No fever. 2 concerning skin lesions. One to left anterior chest. One to right upper back/shoulder. Nonpainful. Catalina Venita is unsure how long they have been present. Review of Systems   Constitutional: Negative for fever. Skin: Positive for color change and wound. Objective   Physical Exam  Vitals and nursing note reviewed. Constitutional:       Appearance: She is well-developed. HENT:      Head: Normocephalic and atraumatic. Right Ear: Hearing, tympanic membrane, ear canal and external ear normal.      Left Ear: Hearing, tympanic membrane, ear canal and external ear normal.      Nose:      Right Sinus: No maxillary sinus tenderness or frontal sinus tenderness. Left Sinus: No maxillary sinus tenderness or frontal sinus tenderness. Eyes:      General:         Right eye: No discharge. Left eye: No discharge. Conjunctiva/sclera: Conjunctivae normal.      Pupils: Pupils are equal, round, and reactive to light. Neck:      Vascular: No JVD. Cardiovascular:      Rate and Rhythm: Normal rate and regular rhythm. Heart sounds: Normal heart sounds. No murmur heard.      Pulmonary: Effort: Pulmonary effort is normal. No tachypnea. Breath sounds: Normal breath sounds. No stridor. No wheezing. Abdominal:      General: There is no distension. Tenderness: There is no abdominal tenderness. Musculoskeletal:         General: No deformity. Cervical back: Normal range of motion. Comments: ROM in all extremities WNL. No deformities. Lymphadenopathy:      Head:      Right side of head: No submental or submandibular adenopathy. Left side of head: No submental or submandibular adenopathy. Skin:     General: Skin is warm and dry. Capillary Refill: Capillary refill takes less than 2 seconds. Findings: Wound present. No rash. Neurological:      Mental Status: She is alert and oriented to person, place, and time. Coordination: Coordination normal.   Psychiatric:         Mood and Affect: Mood normal.         Behavior: Behavior normal.         Thought Content: Thought content normal.         Judgment: Judgment normal.                  An electronic signature was used to authenticate this note.     --Jesse Dye, APRN - CNP

## 2021-08-31 ENCOUNTER — TELEPHONE (OUTPATIENT)
Dept: FAMILY MEDICINE CLINIC | Age: 81
End: 2021-08-31

## 2021-08-31 DIAGNOSIS — R60.0 BILATERAL LOWER EXTREMITY EDEMA: Primary | ICD-10-CM

## 2021-08-31 NOTE — TELEPHONE ENCOUNTER
----- Message from PETEY Godoy CNP sent at 8/31/2021  7:41 AM EDT -----  Regarding: [facility] order  Please fax order for compression stockings to [facility].

## 2021-10-26 NOTE — PROGRESS NOTES
May have early light breakfast  Bring insurance info and drivers license  Wear comfortable clean clothing  Do not bring jewelry  Shower night before and morning of surgery with a liquid antibacterial soap  Bring list of medications with dosage and how often taken  Follow all instructions given by your physician   needed at discharge  Call [phone] for any questions

## 2021-11-02 ENCOUNTER — HOSPITAL ENCOUNTER (OUTPATIENT)
Age: 81
Setting detail: OUTPATIENT SURGERY
Discharge: HOME | End: 2021-11-02
Attending: PODIATRIST | Admitting: PODIATRIST
Payer: MEDICARE

## 2021-11-02 VITALS
WEIGHT: 122 LBS | SYSTOLIC BLOOD PRESSURE: 148 MMHG | RESPIRATION RATE: 16 BRPM | DIASTOLIC BLOOD PRESSURE: 78 MMHG | OXYGEN SATURATION: 93 % | HEIGHT: 65 IN | HEART RATE: 85 BPM | BODY MASS INDEX: 20.33 KG/M2 | TEMPERATURE: 98.1 F

## 2021-11-02 DIAGNOSIS — G89.18 POSTOPERATIVE PAIN: Primary | ICD-10-CM

## 2021-11-02 PROCEDURE — 7100000011 HC PHASE II RECOVERY - ADDTL 15 MIN: Performed by: PODIATRIST

## 2021-11-02 PROCEDURE — 7100000010 HC PHASE II RECOVERY - FIRST 15 MIN: Performed by: PODIATRIST

## 2021-11-02 PROCEDURE — 2709999900 HC NON-CHARGEABLE SUPPLY: Performed by: PODIATRIST

## 2021-11-02 PROCEDURE — 2500000003 HC RX 250 WO HCPCS: Performed by: PODIATRIST

## 2021-11-02 PROCEDURE — 3600000014 HC SURGERY LEVEL 4 ADDTL 15MIN: Performed by: PODIATRIST

## 2021-11-02 PROCEDURE — 3600000004 HC SURGERY LEVEL 4 BASE: Performed by: PODIATRIST

## 2021-11-02 PROCEDURE — C1889 IMPLANT/INSERT DEVICE, NOC: HCPCS | Performed by: PODIATRIST

## 2021-11-02 DEVICE — GRAFT HUM TISS W2XL4CM CRYOPRESERVED PLCNTA TISS UMB AMNION: Type: IMPLANTABLE DEVICE | Site: FOOT | Status: FUNCTIONAL

## 2021-11-02 RX ORDER — SODIUM CHLORIDE 0.9 % (FLUSH) 0.9 %
10 SYRINGE (ML) INJECTION PRN
Status: DISCONTINUED | OUTPATIENT
Start: 2021-11-02 | End: 2021-11-02 | Stop reason: HOSPADM

## 2021-11-02 RX ORDER — SODIUM CHLORIDE 9 MG/ML
25 INJECTION, SOLUTION INTRAVENOUS PRN
Status: DISCONTINUED | OUTPATIENT
Start: 2021-11-02 | End: 2021-11-02 | Stop reason: HOSPADM

## 2021-11-02 RX ORDER — ONDANSETRON 2 MG/ML
4 INJECTION INTRAMUSCULAR; INTRAVENOUS EVERY 6 HOURS PRN
Status: DISCONTINUED | OUTPATIENT
Start: 2021-11-02 | End: 2021-11-02 | Stop reason: HOSPADM

## 2021-11-02 RX ORDER — BUPIVACAINE HYDROCHLORIDE 5 MG/ML
INJECTION, SOLUTION EPIDURAL; INTRACAUDAL PRN
Status: DISCONTINUED | OUTPATIENT
Start: 2021-11-02 | End: 2021-11-02 | Stop reason: ALTCHOICE

## 2021-11-02 RX ORDER — SODIUM CHLORIDE 0.9 % (FLUSH) 0.9 %
10 SYRINGE (ML) INJECTION EVERY 12 HOURS SCHEDULED
Status: DISCONTINUED | OUTPATIENT
Start: 2021-11-02 | End: 2021-11-02 | Stop reason: HOSPADM

## 2021-11-02 RX ORDER — TRAMADOL HYDROCHLORIDE 50 MG/1
50 TABLET, COATED ORAL EVERY 6 HOURS PRN
Qty: 20 TABLET | Refills: 0 | Status: SHIPPED | OUTPATIENT
Start: 2021-11-02 | End: 2021-11-07

## 2021-11-02 RX ORDER — CEFAZOLIN SODIUM 2 G/100ML
2000 INJECTION, SOLUTION INTRAVENOUS
Status: DISCONTINUED | OUTPATIENT
Start: 2021-11-02 | End: 2021-11-02 | Stop reason: HOSPADM

## 2021-11-02 RX ORDER — TRAMADOL HYDROCHLORIDE 50 MG/1
50 TABLET ORAL EVERY 6 HOURS PRN
Status: DISCONTINUED | OUTPATIENT
Start: 2021-11-02 | End: 2021-11-02 | Stop reason: HOSPADM

## 2021-11-02 RX ORDER — ONDANSETRON 4 MG/1
4 TABLET, ORALLY DISINTEGRATING ORAL EVERY 8 HOURS PRN
Status: DISCONTINUED | OUTPATIENT
Start: 2021-11-02 | End: 2021-11-02 | Stop reason: HOSPADM

## 2021-11-02 RX ORDER — TRAMADOL HYDROCHLORIDE 50 MG/1
100 TABLET ORAL EVERY 6 HOURS PRN
Status: DISCONTINUED | OUTPATIENT
Start: 2021-11-02 | End: 2021-11-02 | Stop reason: HOSPADM

## 2021-11-02 ASSESSMENT — PAIN - FUNCTIONAL ASSESSMENT: PAIN_FUNCTIONAL_ASSESSMENT: 0-10

## 2021-11-02 NOTE — OP NOTE
Operative Note  Patient: Steve Campos  YOB: 1940  MRN: [id number]    Date of Procedure: 11/2/2021    Pre-Op Diagnosis: LEFT TAILORS BUNION, CALLUS, LEFT FOOT PAIN    Post-Op Diagnosis: Same       Procedure(s):  1) LEFT 5TH METATARSAL HEAD RESECTION   2) EXCISION SOFT TISSUE LESION 1.2 CM, LEFT FOOT    Surgeon(s):  Rj Dacosta DPM    Assistant:  Resident: Keenan Hodgkins, DPM    Anesthesia: Local    Estimated Blood Loss (mL): Minimal    Complications: None    Specimens:   * No specimens in log *    Implants:  Implant Name Type Inv. Item Serial No.  Lot No. LRB No. Used Action   GRAFT HUM TISS [id number] CRYOPRESERVED PLCNTA TISS [employer] - [id number]  GRAFT HUM TISS [id number] CRYOPRESERVED PLCNTA TISS [employer]ION [id number] [employer]- [id number] Left 1 Implanted         Drains: * No LDAs found *    Injectables: 20cc 0.5% marcaine plain    Suture: 3-0 vicryl, 4-0 monocryl    Findings: Consistent with diagnosis  Indications: Patient is an 80 y.o. female with a chief complaint of tailor's bunion left foot, pain,  who is being seen by Dr. Willie Bosch and being treated for this condition. At this time all conservative options have been exhausted and surgical intervention is necessary. The procedure has been explained to the patient and they understand the risks, benefits and possible complications including delayed skin healing, infection, need for further surgery, loss of limb, loss of life. No promises have been made as to surgical outcome. Procedure: The patient was transported from the pre-op holding to the operating room and placed in a semi-brennan's position. A pneumatic tourniquet was not applied to the left leg. A pre-operative injection of 20cc of 0.5% marcaine plain was injected into the left foot in a mini-arzate block. The left foot was then prepped and draped in the normal aspetic manner. Attention was then directed to the left fifth metatarsal head.   A 3:1 ellipse was created over the benign skin lesion overlying the fifth metatarsal head, with linear extension proximally. This is incision was deepened to the subcutaneous level. The soft tissue lesion was removed in its entirety. An ellipse was then taken out of the capsular tissues of the fifth metatarsophalangeal joint. The fifth metatarsal head was exposed in its entirety. A sagittal saw was used to create an osteotomy at the surgical neck of the fifth metatarsal from proximal plantar lateral to distal dorsal medial.  Fifth metatarsal head was then resected. Following this, a Stri-Dex graft was obtained. The graft was folded together and tagged with 3-0 Vicryl. The 3-0 Vicryl was then passed through the plantar aspect of skin, pulling the graft into the dead space where the fifth metatarsal head was. The incision was flushed with copious amounts of sterile saline. The subcutaneous tissues were re-appoximated with 3-0 Vicryl. The skin was closed using 4-0 Monocryl. The incision was dressed with mepitel, 4x4's, kerlix, etc.   A post-op shoe was then applied. The patient was transported to the PACU with VSS and NVS intact to all digits of the left foot. No complications were noted throughout the procedure. The patient is to be discharged per PACU protocol. The patient is to follow-up with Dr. Izzy Rodriguez in the office.     Dr. Izzy Rodriguez DPM    Electronically signed by Lobito Tineo DPM on 11/2/2021 at 1:06 PM

## 2021-11-02 NOTE — PROGRESS NOTES
1307: Patient arrived to Phase II recovery via chair. Awake and alert. Son at bedside. Report received from [facility].   1309: VSS, patient denies pain. Dressing to LLE remains clean, dry and intact. Surgical shoe on. Snack provided and call light placed within reach. 1340: Discharge instructions reviewed with patient and patient's son. AVS and script for Ultram given to patient's son. Patient dressed with this RN's assistance. 1350: Discharged home in stable condition with son. 1355: Report called to Humera Dillon LPN at [facility].

## 2021-11-03 DIAGNOSIS — R29.6 MULTIPLE FALLS: ICD-10-CM

## 2021-11-03 DIAGNOSIS — F03.90 DEMENTIA WITHOUT BEHAVIORAL DISTURBANCE, UNSPECIFIED DEMENTIA TYPE: Primary | ICD-10-CM

## 2021-11-03 NOTE — H&P
[address] [address]  History and Physical Update    Pt Name: Julisa Olivo  MRN: [id number]  YOB: 1940  Date of evaluation: 11/3/2021    I have examined the patient and reviewed the H&P/Consult and there are no changes to the patient or plans.       Yissel Dacosta DPM,FACFAS  Electronically signed 11/3/2021 at 4:27 PM

## 2021-11-15 ENCOUNTER — TELEPHONE (OUTPATIENT)
Dept: FAMILY MEDICINE CLINIC | Age: 81
End: 2021-11-15

## 2021-11-15 NOTE — TELEPHONE ENCOUNTER
----- Message from Maty Pool sent at 11/15/2021  1:16 PM EST -----  Subject: Message to Provider    QUESTIONS  Information for Provider? ECC received a call for Pt Rochelle Rascon:   Caller? Barrington Leosmicheal; Daughter of Patient Reason? Jazmine wants the office   to call her about the appt request Pt made earlier today, 11/15/21, if   they cannot reach Pt herself so that it is scheduled soon. Mark Alexandra is on   Pt's HIPAA. Mark Alexandra has nothing further to add at this time. ---------------------------------------------------------------------------  --------------  Porter Fearing INFO  What is the best way for the office to contact you? OK to leave message on   voicemail  Preferred Call Back Phone Number? [phone]  ---------------------------------------------------------------------------  --------------  SCRIPT ANSWERS  Relationship to Patient? Other  Representative Name? Barrington Rodriguez - Daughter  Is the Representative on the appropriate HIPAA document in Epic?  Yes

## 2021-11-17 ENCOUNTER — OFFICE VISIT (OUTPATIENT)
Dept: FAMILY MEDICINE CLINIC | Age: 81
End: 2021-11-17
Payer: MEDICARE

## 2021-11-17 VITALS
OXYGEN SATURATION: 93 % | SYSTOLIC BLOOD PRESSURE: 122 MMHG | WEIGHT: 124 LBS | BODY MASS INDEX: 20.63 KG/M2 | HEART RATE: 78 BPM | DIASTOLIC BLOOD PRESSURE: 58 MMHG

## 2021-11-17 DIAGNOSIS — N39.490 OVERFLOW INCONTINENCE OF URINE: ICD-10-CM

## 2021-11-17 DIAGNOSIS — J44.9 CHRONIC OBSTRUCTIVE PULMONARY DISEASE, UNSPECIFIED COPD TYPE (HCC): Primary | ICD-10-CM

## 2021-11-17 DIAGNOSIS — M54.9 MID BACK PAIN: ICD-10-CM

## 2021-11-17 DIAGNOSIS — G47.00 INSOMNIA, UNSPECIFIED TYPE: ICD-10-CM

## 2021-11-17 DIAGNOSIS — R15.9 INCONTINENCE OF FECES, UNSPECIFIED FECAL INCONTINENCE TYPE: ICD-10-CM

## 2021-11-17 DIAGNOSIS — M85.89 OSTEOPENIA OF MULTIPLE SITES: ICD-10-CM

## 2021-11-17 PROCEDURE — G8427 DOCREV CUR MEDS BY ELIG CLIN: HCPCS | Performed by: NURSE PRACTITIONER

## 2021-11-17 PROCEDURE — G8420 CALC BMI NORM PARAMETERS: HCPCS | Performed by: NURSE PRACTITIONER

## 2021-11-17 PROCEDURE — 4004F PT TOBACCO SCREEN RCVD TLK: CPT | Performed by: NURSE PRACTITIONER

## 2021-11-17 PROCEDURE — 0509F URINE INCON PLAN DOCD: CPT | Performed by: NURSE PRACTITIONER

## 2021-11-17 PROCEDURE — 99215 OFFICE O/P EST HI 40 MIN: CPT | Performed by: NURSE PRACTITIONER

## 2021-11-17 PROCEDURE — 1123F ACP DISCUSS/DSCN MKR DOCD: CPT | Performed by: NURSE PRACTITIONER

## 2021-11-17 PROCEDURE — 1090F PRES/ABSN URINE INCON ASSESS: CPT | Performed by: NURSE PRACTITIONER

## 2021-11-17 PROCEDURE — G8400 PT W/DXA NO RESULTS DOC: HCPCS | Performed by: NURSE PRACTITIONER

## 2021-11-17 PROCEDURE — G8926 SPIRO NO PERF OR DOC: HCPCS | Performed by: NURSE PRACTITIONER

## 2021-11-17 PROCEDURE — G8482 FLU IMMUNIZE ORDER/ADMIN: HCPCS | Performed by: NURSE PRACTITIONER

## 2021-11-17 PROCEDURE — 3023F SPIROM DOC REV: CPT | Performed by: NURSE PRACTITIONER

## 2021-11-17 PROCEDURE — 4040F PNEUMOC VAC/ADMIN/RCVD: CPT | Performed by: NURSE PRACTITIONER

## 2021-11-17 RX ORDER — ACETAMINOPHEN 500 MG
1000 TABLET ORAL 3 TIMES DAILY PRN
Qty: 100 TABLET | Refills: 1 | Status: SHIPPED | OUTPATIENT
Start: 2021-11-17

## 2021-11-17 RX ORDER — TIZANIDINE 2 MG/1
2 TABLET ORAL EVERY 6 HOURS PRN
Qty: 30 TABLET | Refills: 0 | Status: SHIPPED | OUTPATIENT
Start: 2021-11-17 | End: 2021-12-15 | Stop reason: SDUPTHER

## 2021-11-17 ASSESSMENT — ENCOUNTER SYMPTOMS
DIARRHEA: 1
EYE PAIN: 0
BLOOD IN STOOL: 0
SINUS PRESSURE: 0
COUGH: 0
EYE REDNESS: 0
WHEEZING: 0
SHORTNESS OF BREATH: 1
CONSTIPATION: 0
EYE DISCHARGE: 0
BACK PAIN: 1
EYE ITCHING: 0
ABDOMINAL PAIN: 0
COLOR CHANGE: 0

## 2021-11-17 NOTE — PATIENT INSTRUCTIONS
Patient Education        Kegel Exercises: Care Instructions  Overview     Kegel exercises strengthen muscles around the bladder. These muscles control the flow of urine. Kegel exercises are sometime called \"pelvic floor\" exercises. They can help prevent urine leakage and keep the pelvic organs in place. Kegel exercises can strengthen pelvic muscles that have been weakened by age, pregnancy, childbirth, and surgery. They may help prevent or treat urine leakage. You do Kegel exercises by tightening the muscles you use when you urinate. You will likely need to do these exercises for several weeks to get better. Follow-up care is a key part of your treatment and safety. Be sure to make and go to all appointments, and call your doctor if you are having problems. It's also a good idea to know your test results and keep a list of the medicines you take. How can you care for yourself at home? · Do Kegel exercises. ? Find the muscles you need to strengthen. To do this, tighten the muscles that stop your urine while you are going to the bathroom. These are the same muscles you squeeze during Kegel exercises. ? Squeeze the muscles as hard as you can. Your belly and thighs should not move. ? Hold the squeeze for 3 seconds. Then relax for 3 seconds. ? Start with 3 seconds, and then add 1 second each week until you are able to squeeze for 10 seconds. ? Repeat the exercise 10 to 15 times for each session. Do three or more sessions each day. · You can check to see if you are using the right muscles. ? Tighten the muscles that help you stop passing gas or keep you from urinating. Make sure you aren't using your stomach, leg, or buttock muscles. ? Place a finger in your vagina and squeeze around it. You are doing them right when you feel pressure around your finger. Your doctor may also suggest that you put special weights in your vagina while you do the exercises.   · Check with your doctor if you don't notice a difference after trying these exercises for several weeks. Your doctor may suggest getting help from a physical therapist or recommend other treatment. Where can you learn more? Go to https://chpeabieweb.GoToTags. org and sign in to your SmartThings account. Enter M195 in the Xpresso box to learn more about \"Kegel Exercises: Care Instructions. \"     If you do not have an account, please click on the \"Sign Up Now\" link. Current as of: February 10, 2021               Content Version: 13.0  © 5484-0179 [employer]. Care instructions adapted under license by [facility] ([facility]). If you have questions about a medical condition or this instruction, always ask your healthcare professional. [address] any warranty or liability for your use of this information. Patient Education        Diet for Fecal Incontinence: Care Instructions  Overview     Fecal incontinence is the loss of normal control of your bowels. You may not be able to reach the toilet in time for a bowel movement. Or stool may leak from your anus. This problem can be caused by constipation or diarrhea. Anxiety or other emotional stress can be a cause too. It can also result from nerve injury, muscle damage (especially from childbirth), lack of exercise, or poor diet. What you eat can help you manage fecal incontinence. Which foods you eat or avoid may depend on why you have the problem. Follow-up care is a key part of your treatment and safety. Be sure to make and go to all appointments, and call your doctor if you are having problems. It's also a good idea to know your test results and keep a list of the medicines you take. How can you care for yourself at home? · Keep a food diary of what you eat. This can help you find out which foods may help or cause this problem. · Eat small, frequent meals.   · If you and your doctor feel that constipation is part of the problem:  ? Include fruits, vegetables, beans, and whole grains in your diet each day. These foods are high in fiber. ? Drink plenty of fluids. If you have kidney, heart, or liver disease and have to limit fluids, talk with your doctor before you increase the amount of fluids you drink. ? Get some exercise every day. Build up slowly to 30 to 60 minutes a day on 5 or more days of the week. ? Take a fiber supplement, such as Citrucel or Metamucil, every day. Read and follow all instructions on the label. · If you and your doctor feel that diarrhea is part of the problem, try to avoid:  ? Alcohol. ? Caffeine. This is found in coffee, tea, cola drinks, and chocolate. ? Nicotine, from smoking or chewing tobacco.  ? Gas-producing foods. These include beans, broccoli, cabbage, and apples. ? Dairy products that contain lactose (milk sugar). Examples are ice cream, milk, cheese, and sour cream.  ? Foods and drinks high in sugar, especially fruit juice, soda, candy, and other packaged sweets (such as cookies). ? Foods high in fat. These include meng, sausage, butter, oils, and anything deep-fried. ? Sorbitol and xylitol. These artificial sweeteners are found in some sugarless candies and chewing gum. Where can you learn more? Go to https://chpepiceweb.Go800. org and sign in to your Live Life 360 account. Enter Y083 in the [facility] box to learn more about \"Diet for Fecal Incontinence: Care Instructions. \"     If you do not have an account, please click on the \"Sign Up Now\" link. Current as of: December 17, 2020               Content Version: 13.0  © 5716-2845 [employer]. Care instructions adapted under license by [facility] ([facility]). If you have questions about a medical condition or this instruction, always ask your healthcare professional. [address] any warranty or liability for your use of this information.          Patient Education        Learning About Sleeping Well  What does sleeping well mean? Sleeping well means getting enough sleep. How much sleep is enough varies among people. The number of hours you sleep is not as important as how you feel when you wake up. If you do not feel refreshed, you probably need more sleep. Another sign of not getting enough sleep is feeling tired during the day. The average total nightly sleep time is 7½ to 8 hours. Healthy adults may need a little more or a little less than this. Why is getting enough sleep important? Getting enough quality sleep is a basic part of good health. When your sleep suffers, your mood and your thoughts can suffer too. You may find yourself feeling more grumpy or stressed. Not getting enough sleep also can lead to serious problems, including injury, accidents, anxiety, and depression. What might cause poor sleeping? Many things can cause sleep problems, including:  · Stress. Stress can be caused by fear about a single event, such as giving a speech. Or you may have ongoing stress, such as worry about work or school. · Depression, anxiety, and other mental or emotional conditions. · Changes in your sleep habits or surroundings. This includes changes that happen where you sleep, such as noise, light, or sleeping in a different bed. It also includes changes in your sleep pattern, such as having jet lag or working a late shift. · Health problems, such as pain, breathing problems, and restless legs syndrome. · Lack of regular exercise. How can you help yourself? Here are some tips that may help you sleep more soundly and wake up feeling more refreshed. Your sleeping area   · Use your bedroom only for sleeping and sex. A bit of light reading may help you fall asleep. But if it doesn't, do your reading elsewhere in the house. Don't watch TV in bed. · Be sure your bed is big enough to stretch out comfortably, especially if you have a sleep partner. · Keep your bedroom quiet, dark, and cool.  Use curtains, blinds, or a sleep mask to block out light. To block out noise, use earplugs, soothing music, or a \"white noise\" machine. Your evening and bedtime routine   · Create a relaxing bedtime routine. You might want to take a warm shower or bath, listen to soothing music, or drink a cup of noncaffeinated tea. · Go to bed at the same time every night. And get up at the same time every morning, even if you feel tired. What to avoid   · Limit caffeine (coffee, tea, caffeinated sodas) during the day, and don't have any for at least 4 to 6 hours before bedtime. · Don't drink alcohol before bedtime. Alcohol can cause you to wake up more often during the night. · Don't smoke or use tobacco, especially in the evening. Nicotine can keep you awake. · Don't take naps during the day, especially close to bedtime. · Don't lie in bed awake for too long. If you can't fall asleep, or if you wake up in the middle of the night and can't get back to sleep within 15 minutes or so, get out of bed and go to another room until you feel sleepy. · Don't take medicine right before bed that may keep you awake or make you feel hyper or energized. Your doctor can tell you if your medicine may do this and if you can take it earlier in the day. If you can't sleep   · Imagine yourself in a peaceful, pleasant scene. Focus on the details and feelings of being in a place that is relaxing. · Get up and do a quiet or boring activity until you feel sleepy. · Don't drink any liquids after 6 p.m. if you wake up often because you have to go to the bathroom. Where can you learn more? Go to https://RoboCV.Current Communications Group. org and sign in to your [employer] account. Enter C756 in the [employer] box to learn more about \"Learning About Sleeping Well. \"     If you do not have an account, please click on the \"Sign Up Now\" link. Current as of: June 16, 2021               Content Version: 13.0  © 0090-1454 Healthwise, Incorporated.    Care instructions adapted under license by [facility] ([facility]). If you have questions about a medical condition or this instruction, always ask your healthcare professional. [facility] [address] any warranty or liability for your use of this information. Patient Education        Preventing Osteoporosis: Care Instructions  Your Care Instructions     Osteoporosis means the bones are weak and thin enough that they can break easily. The older you are, the more likely you are to get osteoporosis. But with plenty of calcium, vitamin D, and exercise, you can help prevent osteoporosis. The preteen and teen years are a key time for bone building. With the help of calcium, vitamin D, and exercise in those early years and beyond, the bones reach their peak density and strength by age 27. After age 27, your bones naturally start to thin and weaken. The stronger your bones are at around age 27, the lower your risk for osteoporosis. But no matter what your age and risk are, your bones still need calcium, vitamin D, and exercise to stay strong. Also avoid smoking, and limit alcohol. Smoking and heavy alcohol use can make your bones thinner. Talk to your doctor about any special risks you might have, such as having a close relative with osteoporosis or taking a medicine that can weaken bones. Your doctor can tell you the best ways to protect your bones from thinning. Follow-up care is a key part of your treatment and safety. Be sure to make and go to all appointments, and call your doctor if you are having problems. It's also a good idea to know your test results and keep a list of the medicines you take. How can you care for yourself at home? · Get enough calcium and vitamin D.  ? Eat foods rich in calcium, like yogurt, cheese, milk, and dark green vegetables. ? Eat foods rich in vitamin D, like eggs, fatty fish, cereal, and fortified milk. ? Get some sunshine.  Your body uses sunshine to make its own vitamin D.  ? Talk to your doctor about taking a calcium plus vitamin D supplement if you don't think you are getting enough through your diet and sunshine. · Get regular bone-building exercise. Walking, jogging, dancing, and lifting weights can make your bones stronger. · Limit alcohol. Drink no more than 1 alcohol drink a day if you are a woman. Drink no more than 2 alcohol drinks a day if you are a man. · Do not smoke. Smoking can make bones thin faster. If you need help quitting, talk to your doctor about stop-smoking programs and medicines. These can increase your chances of quitting for good. When should you call for help? Watch closely for changes in your health, and be sure to contact your doctor if you have any problems. Where can you learn more? Go to https://AccessPay.digiSchool. org and sign in to your sougou account. Enter L443 in the PenBlade box to learn more about \"Preventing Osteoporosis: Care Instructions. \"     If you do not have an account, please click on the \"Sign Up Now\" link. Current as of: December 7, 2020               Content Version: 13.0  © 1207-0617 [employer]. Care instructions adapted under license by [facility] ([facility]). If you have questions about a medical condition or this instruction, always ask your healthcare professional. [place] [address] any warranty or liability for your use of this information. Patient Education        Insomnia: Care Instructions  Overview     Insomnia is the inability to sleep well. Insomnia may make it hard for you to get to sleep, stay asleep, or sleep as long as you need to. This can make you tired and grouchy during the day. It can also make you forgetful, less effective at work, and unhappy. Insomnia can be linked to many things. These include health problems, medicines, and stressful events. Treatment may include treating problems that may be linked with your insomnia.  Treatment also includes behavior and lifestyle changes. This may include cognitive-behavioral therapy for insomnia (CBT-I). CBT-I uses different ways to help you change your thoughts and behaviors that may interfere with sleep. Your doctor can recommend specific things you can try. Examples include doing relaxation exercises, keeping regular bedtimes and wake times, limiting alcohol, and making healthy sleep habits. Some people decide to take medicine for a while to help with sleep. Follow-up care is a key part of your treatment and safety. Be sure to make and go to all appointments, and call your doctor if you are having problems. It's also a good idea to know your test results and keep a list of the medicines you take. How can you care for yourself at home? Cognitive-behavioral therapy for insomnia (CBT-I)  · If your doctor recommends CBT-I, follow your treatment plan. Your doctor will give you instructions that are unique for you. · Your plan will likely include a few things that you can try at home. For example:  ? Try meditation or other relaxation techniques before you go to bed. ? Go to bed at the same time every night, and wake up at the same time every morning. Do not take naps during the day. ? Do not stay in bed awake for too long. If you can't fall asleep, or if you wake up in the middle of the night and can't get back to sleep within about 15 to 20 minutes, get out of bed and go to another room until you feel sleepy. ? If watching the clock makes you anxious, turn it facing away from you so you cannot see the time. ? If you worry when you lie down, start a worry book. Well before bedtime, write down your worries, and then set the book and your concerns aside. Healthy sleep habits  · If your doctor recommends it, try making healthy sleep habits. For example:  ? Keep your bedroom quiet, dark, and cool. ? Do not have drinks with caffeine, such as coffee or black tea, for 8 hours before bed.   ? Do not smoke or use other types of tobacco near bedtime. Nicotine is a stimulant and can keep you awake. ? Avoid drinking alcohol late in the evening, because it can cause you to wake in the middle of the night. ? Do not eat a big meal close to bedtime. If you are hungry, eat a light snack. ? Do not drink a lot of water close to bedtime, because the need to urinate may wake you up during the night. ? Do not read, watch TV, or use your phone in bed. Use the bed only for sleeping and sex. Medicine  · Be safe with medicines. Take your medicines exactly as prescribed. Call your doctor if you think you are having a problem with your medicine. · Talk with your doctor before you try an over-the-counter medicine, herbal product, or supplement to try to improve your sleep. Your doctor can recommend how much to take and when to take it. Make sure your doctor knows all of the medicines, vitamins, herbal products, and supplements you take. · You will get more details on the specific medicines your doctor prescribes. When should you call for help? Watch closely for changes in your health, and be sure to contact your doctor if:    · Your efforts to improve your sleep do not work.     · Your insomnia gets worse.     · You have been feeling down, depressed, or hopeless or have lost interest in things that you usually enjoy. Where can you learn more? Go to https://Centripetal Software.OpenLabel. org and sign in to your [employer]One account. Enter P513 in the [facility] box to learn more about \"Insomnia: Care Instructions. \"     If you do not have an account, please click on the \"Sign Up Now\" link. Current as of: June 16, 2021               Content Version: 13.0  © 6000-0155 Healthwise, Incorporated. Care instructions adapted under license by [facility] ([facility]).  If you have questions about a medical condition or this instruction, always ask your healthcare professional. Derrick Luis any warranty or liability for your use of this information. Patient Education        Fecal Incontinence: Care Instructions  Your Care Instructions  Fecal incontinence is the loss of normal control of your bowels. You may not be able to reach the toilet in time for a bowel movement, or stool may leak from your anus. Fecal incontinence can be caused by constipation, diarrhea, or anxiety or other emotional stress. It can also result from nerve injury, muscle damage (especially from childbirth), lack of exercise, or poor diet. Treatment of fecal incontinence depends on what caused it and how bad it is. It may include changes to your diet, medicine, bowel training, or surgery. More than one treatment may be needed. Loss of bowel control can be hard to deal with. You may feel ashamed or embarrassed, and you may not want to leave the house because you fear that you might have an accident in public. But treatment can help you better control your bowels and manage your incontinence. Follow-up care is a key part of your treatment and safety. Be sure to make and go to all appointments, and call your doctor if you are having problems. It's also a good idea to know your test results and keep a list of the medicines you take. How can you care for yourself at home? · Keep a food diary of what you eat. This will help you learn which foods make your incontinence worse. · Eat small, frequent meals. Large meals may cause diarrhea. · Avoid constipation:  ? Include fruits, vegetables, beans, and whole grains in your diet each day. These foods are high in fiber. ? Drink plenty of fluids. If you have kidney, heart, or liver disease and have to limit fluids, talk with your doctor before you increase the amount of fluids you drink. ? Get some exercise every day. Build up slowly to 30 to 60 minutes a day on 5 or more days of the week. ? Take a fiber supplement, such as Citrucel or Metamucil, every day if needed. Read and follow all instructions on the label.   ? Schedule time each day for a bowel movement. Having a daily routine may help. Take your time and do not strain when having a bowel movement. · Take your medicines exactly as prescribed. Call your doctor if you think you are having a problem with your medicine. You will get more details on the specific medicines your doctor prescribes. · Do pelvic floor (Kegel) exercises, which tighten and strengthen the pelvic muscles. To do Kegel exercises:  ? Squeeze the same muscles you would use to stop your urine. Your belly and thighs should not move. ? Hold the squeeze for 3 seconds, and then relax for 3 seconds. ? Start with 3 seconds. Then add 1 second each week until you are able to squeeze for 10 seconds. ? Repeat the exercise 10 to 15 times a session. Do three or more sessions a day. When should you call for help? Call your doctor now or seek immediate medical care if:    · You have new or worse pain.     · You have new or worse bleeding from the rectum. Watch closely for changes in your health, and be sure to contact your doctor if:    · You cannot pass stools or gas.     · You do not get better as expected. Where can you learn more? Go to https://[facility] ([facility]).VoltServer. org and sign in to your [employer] Yinku network account. Enter Q927 in the [facility] box to learn more about \"Fecal Incontinence: Care Instructions. \"     If you do not have an account, please click on the \"Sign Up Now\" link. Current as of: February 10, 2021               Content Version: 13.0  © 2006-2021 [employer]. Care instructions adapted under license by [place] [employer] [facility] ([facility]). If you have questions about a medical condition or this instruction, always ask your healthcare professional. [place] [address] any warranty or liability for your use of this information.          Patient Education        Chronic Obstructive Pulmonary Disease (COPD): Care Instructions  Overview     Chronic obstructive pulmonary disease (COPD) is a lung disease that makes it hard to breathe. With COPD, the airways that lead to the lungs are narrowed, and the tiny air sacs in the lungs are damaged and lose their stretch. People with COPD have decreased airflow in and out of the lungs, which makes it hard to breathe. The airways also can get clogged with thick mucus. Cigarette smoking is a major cause of COPD. Although there is no cure for COPD, you can slow its progress. Following your treatment plan and taking care of yourself can help you feel better and live longer. Follow-up care is a key part of your treatment and safety. Be sure to make and go to all appointments, and call your doctor if you are having problems. It's also a good idea to know your test results and keep a list of the medicines you take. How can you care for yourself at home? Staying healthy    · Do not smoke. This is the most important step you can take to prevent more damage to your lungs. If you need help quitting, talk to your doctor about stop-smoking programs and medicines. These can increase your chances of quitting for good.     · Avoid colds and other infections. Get the pneumococcal and whooping cough (pertussis) vaccines. If you have had these vaccines before, ask your doctor if you need another dose. Get the flu vaccine every fall. If you must be around people with colds or the flu, wash your hands often.     · Avoid secondhand smoke and air pollution. Try to stay inside with your windows closed when air pollution is bad. Medicines and oxygen therapy    · Take your medicines exactly as prescribed. Call your doctor if you think you are having a problem with your medicine. You may be taking medicines such as:  ? Bronchodilators. These help open your airways and make breathing easier. They are either short-acting (work for 4 to 9 hours) or long-acting (work for 12 to 24 hours). You inhale most bronchodilators, so they start to act quickly.  Always carry your quick-relief inhaler with you in case you need it. ? Corticosteroids (prednisone, budesonide). These reduce airway inflammation. They come in inhaled or pill form.     · Ask your doctor or pharmacist if you are using each type of inhaler correctly. With correct use, the medicine is more likely to get to your lungs.     · See if a spacer is right for you. A spacer may also help you get more inhaled medicine to your lungs. If you use one, ask how to use it properly.     · Do not take any vitamins, over-the-counter medicine, or herbal products without talking to your doctor first.     · If your doctor prescribed antibiotics, take them as directed. Do not stop taking them just because you feel better. You need to take the full course of antibiotics.     · If you use oxygen therapy, use the flow rate your doctor has recommended. Don't change it without talking to your doctor first. Oxygen therapy boosts the amount of oxygen in your blood and helps you breathe easier. Activity    · Get regular exercise. Walking is an easy way to get exercise. Start out slowly, and walk a little more each day.     · Pay attention to your breathing. You are exercising too hard if you can't talk while you exercise.     · Take short rest breaks when doing household chores and other activities.     · Learn breathing methods--such as breathing through pursed lips--to help you become less short of breath.     · If your doctor has not set you up with a pulmonary rehabilitation program, ask if rehab is right for you. Rehab includes exercise programs, education about your disease and how to manage it, help with diet and other changes, and emotional support. Diet    · Eat regular, healthy meals.     · Use bronchodilators about 1 hour before you eat to make it easier to eat.     · Eat several smaller, frequent meals to prevent getting too full.  A full stomach can push on the muscle that helps you breathe (your diaphragm) and make it harder to breathe.     · Drink beverages at the end of the meal.     · Avoid foods that are hard to chew.     · Eat foods that contain protein so you don't lose muscle mass.     · Talk with your doctor if you gain too much weight or if you lose weight without trying. Mental health    · Talk to your family, friends, or a therapist about your feelings. Some people feel frightened, angry, hopeless, helpless, and even guilty. Talking openly about feelings may help you cope. If these feelings last, talk to your doctor. When should you call for help? Call 911 anytime you think you may need emergency care. For example, call if:    · You have severe trouble breathing.     · You are having chest pain that is different or worse than usual.   Call your doctor now or seek immediate medical care if:    · You have new or worse trouble breathing.     · You cough up blood.     · You have a fever.     · You have feelings of anxiety or depression. Watch closely for changes in your health, and be sure to contact your doctor if:    · You cough more deeply or more often, especially if you notice more mucus or a change in the color of your mucus.     · You have new or worse swelling in your legs or belly.     · You are not getting better as expected. Where can you learn more? Go to https://ePARpeAngelfisheb.Bizzabo. org and sign in to your [employer] account. Enter I023 in the [facility] box to learn more about \"Chronic Obstructive Pulmonary Disease (COPD): Care Instructions. \"     If you do not have an account, please click on the \"Sign Up Now\" link. Current as of: July 6, 2021               Content Version: 13.0  © 3560-8245 Healthwise, Incorporated. Care instructions adapted under license by [facility] ([facility]). If you have questions about a medical condition or this instruction, always ask your healthcare professional. [place] [address] any warranty or liability for your use of this information.          Patient Education Using a Metered-Dose Inhaler: Care Instructions  Overview     A metered-dose inhaler lets you breathe medicine into your lungs quickly. Inhaled medicine works faster than the same medicine in a pill. An inhaler allows you to take less medicine than you would need if you took it as a pill. \"Metered-dose\" means that the inhaler gives a measured amount of medicine each time you use it. A metered-dose inhaler gives medicine in the form of a liquid mist.  Your doctor may want you to use a spacer with your inhaler. A spacer is a chamber that you attach to the inhaler. The chamber holds the medicine before you inhale it. That way, you can inhale the medicine in as many breaths as you need. Doctors recommend using a spacer with most metered-dose inhalers. This is even more important when using corticosteroid medicines. Follow-up care is a key part of your treatment and safety. Be sure to make and go to all appointments, and call your doctor if you are having problems. It's also a good idea to know your test results and keep a list of the medicines you take. How can you care for yourself at home? To get started  · Talk with your health care provider to be sure you are using your inhaler the right way. It might help if you practice using it in front of a mirror. Use the inhaler exactly as prescribed. · Check that you have the correct medicine. If you use more than one inhaler, put a label on each one. This will let you know which one to use at the right time. · Keep track of how much medicine is in the inhaler. Check the label to see how many doses are in the container. If you know how many puffs you can take, you can replace the inhaler before you run out. Ask your health care provider how you can keep track of how much medicine is left. · Talk to your health care provider about using a spacer with your inhaler. Spacers make it easier to get the medicine into your lungs.  You may need a spacer if you are using corticosteroid medicines. A spacer can also help if you have problems pressing the inhaler and breathing in at the same time. · If you are using a corticosteroid inhaler, gargle and rinse out your mouth with water after use. Do not swallow the water. Swallowing the water will increase the chance that the medicine will get into your bloodstream. This may make it more likely that you will have side effects. To use a spacer with an inhaler  1. Shake the inhaler. Remove the inhaler cap, and place the mouthpiece of the inhaler into the spacer. Check the inhaler instructions to see if you need to prime your inhaler before you use it. If it needs priming, follow the instructions on how to prime your inhaler. 2. Remove the cap from the spacer. 3. Hold the inhaler upright with the mouthpiece at the bottom. 4. Tilt your head back a little, and breathe out slowly and completely. 5. Place the spacer's mouthpiece in your mouth. 6. Press down on the inhaler to spray one puff of medicine into the spacer, and then start breathing in slowly. Wait to inhale until after you have pressed down on the inhaler. Some spacers have a whistle. If you hear it, you should breathe in more slowly. 7. Hold your breath for 10 seconds. This will let the medicine settle in your lungs. 8. If you need to take a second dose, wait 30 to 60 seconds to allow the inhaler valve to refill. To use an inhaler without a spacer  1. Shake the inhaler as directed. Remove the cap. Check the instructions to see if you need to prime your inhaler before you use it. If it needs priming, follow the instructions on how to prime your inhaler. 2. Hold the inhaler upright with the mouthpiece at the bottom. 3. Tilt your head back a little, and breathe out slowly and completely. 4. Position the inhaler in one of two ways:  ? You can place the inhaler in your mouth. This is easier for most people.  And it lowers the risk that any of the medicine will get into your eyes.  ? Or you can place the inhaler 1 to 2 inches in front of your open mouth, without closing your lips over it. Try to open your mouth as wide as you can. Placing the inhaler in front of your open mouth may be better for getting the medicine into your lungs. But some people may find this too hard to do. 5. Start taking slow, even breaths through your mouth. Press down on the inhaler once, then inhale fully. 6. Hold your breath for 10 seconds. This will let the medicine settle in your lungs. 7. If you need to take a second dose, wait 30 to 60 seconds to allow the inhaler valve to refill. Where can you learn more? Go to https://IdentyxpeNanoNordeweb.[employer]. org and sign in to your [employer] account. Enter K111 in the [employer] box to learn more about \"Using a Metered-Dose Inhaler: Care Instructions. \"     If you do not have an account, please click on the \"Sign Up Now\" link. Current as of: July 6, 2021               Content Version: 13.0  © 2006-2021 Healthwise, Incorporated. Care instructions adapted under license by [facility] ([facility]). If you have questions about a medical condition or this instruction, always ask your healthcare professional. [address] any warranty or liability for your use of this information.

## 2021-11-17 NOTE — PROGRESS NOTES
George Ward (:  1940) is a 80 y.o. female,Established patient, here for evaluation of the following chief complaint(s):  Check-Up (diarrhea possibility with high frutose, oils and seeds, inhaler script change, incontinence, weakness/fatigue, back pain-middle back, prolia injections )         ASSESSMENT/PLAN:  1. Chronic obstructive pulmonary disease, unspecified COPD type ([facility]  - Chronic, unstable  - Stop serevent, start stioloto  - Albuterol twice daily and prn  - Pulmonology referral if no improvement in symptoms  -     tiotropium-olodaterol (STIOLTO) 2.5-2.5 MCG/ACT AERS; Inhale 2 puffs into the lungs daily, Disp-1 each, R-5Normal    2. Overflow incontinence of urine  - Pelvic exercises encouraged  - Recommend timed urinations throughout the day. Start with urination q2 hours, increase or decrease frequency as needed  - Consider oxybutynin if no improvement in symptoms    3. Incontinence of feces, unspecified fecal incontinence type  - Dietary changes noted in letter to [place]  - Avoid foods likely to cause diarrhea  - Continue fiber supplement    4. Mid back pain  - Acute on chronic  - Continue tylenol, start zanaflex and ibuprofen as needed  -     tiZANidine (ZANAFLEX) 2 MG tablet; Take 1 tablet by mouth every 6 hours as needed (back pain), Disp-30 tablet, R-0Normal  -     acetaminophen (TYLENOL) 500 MG tablet; Take 2 tablets by mouth 3 times daily as needed for Pain, Disp-100 tablet, R-1Normal    5. Insomnia, unspecified type  - Sleep maintenance strategies discussed and encouraged  - Avoid naps during the day  - Increase activity    6. Osteopenia of multiple sites  - Chronic  - Restart denosumab treatments q6 months  - Encourage weight bearing exercises  -     denosumab (PROLIA) 60 MG/ML SOSY SC injection; Inject 1 mL into the skin every 6 months, Disp-1 mL, R-1Normal      Return in about 6 months (around 2022) for Chronic Conditions.          Subjective SUBJECTIVE/OBJECTIVE:  Presents with daughter. Has multiple concerns including ongoing diarrhea, fecal incontinence, urinary incontinence, back pain, osteopenia, difficulty sleeping and copd. Diarrhea and fecal incontinence are ongoing. Worsened after moving to [place]. Currently taking fiber supplement. Daughter is concerned it is related to diet. Is requesting letter describing potential changes to her diet. Urinary incontinence is ongoing for several months. Reports urinary urgency. Is unable to get to the toilet fast enough. Back pain has worsened over the last few weeks. Using tylenol as needed. Reports no improvement in pain after use. Pain is left sided to lower and mid back. Ghazala Lorenzo believes it is related to recent COVID-19 vaccination but daughter reports pain started before then. Denies pain is to recent injury. Would like to restart prolia injections for osteopenia. Stopped them 3-4 years ago due to cost. Reports difficulty sleeping. Reports recently waking at 12 am and not being able to fall asleep. Reports napping during the day. Denies caffeine intake. Has previously attempted melatonin without improvement in symptoms. COPD does not appear well controlled. Was initially started on albuterol with minimal improvement in symptoms. Was then started on Serevent which does not appear to be helping. Current some-day smoker. Review of Systems   Constitutional: Negative for chills, fatigue and fever. HENT: Negative for congestion, ear pain, sinus pressure and sneezing. Eyes: Negative for pain, discharge, redness and itching. Respiratory: Positive for shortness of breath. Negative for cough and wheezing. Cardiovascular: Negative for chest pain, palpitations and leg swelling. Gastrointestinal: Positive for diarrhea. Negative for abdominal pain, blood in stool and constipation. Incontinence   Endocrine: Negative for polydipsia, polyphagia and polyuria.    Genitourinary: Positive for urgency. Negative for difficulty urinating and hematuria. Incontinence   Musculoskeletal: Positive for back pain. Negative for arthralgias and neck pain. Skin: Negative for color change, pallor and rash. Allergic/Immunologic: Negative for environmental allergies and food allergies. Neurological: Negative for dizziness, light-headedness, numbness and headaches. Psychiatric/Behavioral: Positive for sleep disturbance. Negative for agitation and confusion. The patient is not nervous/anxious. Objective   Physical Exam  Vitals and nursing note reviewed. Constitutional:       Appearance: She is well-developed. HENT:      Head: Normocephalic and atraumatic. Right Ear: Hearing, tympanic membrane, ear canal and external ear normal.      Left Ear: Hearing, tympanic membrane, ear canal and external ear normal.      Nose:      Right Sinus: No maxillary sinus tenderness or frontal sinus tenderness. Left Sinus: No maxillary sinus tenderness or frontal sinus tenderness. Eyes:      General:         Right eye: No discharge. Left eye: No discharge. Conjunctiva/sclera: Conjunctivae normal.      Pupils: Pupils are equal, round, and reactive to light. Neck:      Vascular: No JVD. Cardiovascular:      Rate and Rhythm: Normal rate and regular rhythm. Heart sounds: Normal heart sounds. No murmur heard. Pulmonary:      Effort: Accessory muscle usage present. No tachypnea. Breath sounds: Decreased air movement present. No stridor. Examination of the right-upper field reveals decreased breath sounds. Examination of the left-upper field reveals decreased breath sounds. Examination of the right-middle field reveals decreased breath sounds. Examination of the left-middle field reveals decreased breath sounds. Examination of the right-lower field reveals decreased breath sounds. Examination of the left-lower field reveals decreased breath sounds.  Decreased breath sounds present. No wheezing. Abdominal:      General: There is no distension. Tenderness: There is no abdominal tenderness. Musculoskeletal:         General: No deformity. Cervical back: Normal range of motion. Comments: ROM in all extremities WNL. No deformities. Lymphadenopathy:      Head:      Right side of head: No submental or submandibular adenopathy. Left side of head: No submental or submandibular adenopathy. Skin:     General: Skin is warm and dry. Capillary Refill: Capillary refill takes less than 2 seconds. Findings: No rash. Neurological:      Mental Status: She is alert and oriented to person, place, and time. Coordination: Coordination normal.   Psychiatric:         Mood and Affect: Mood normal.         Behavior: Behavior normal.         Thought Content: Thought content normal.         Judgment: Judgment normal.            On this date 11/17/2021 I have spent 50 minutes reviewing previous notes, test results and face to face with the patient discussing the diagnosis and importance of compliance with the treatment plan as well as documenting on the day of the visit. An electronic signature was used to authenticate this note.     --PETEY Cole - CNP

## 2021-11-17 NOTE — LETTER
[address] [address]  Phone: [phone]  Fax: [phone], APRN - CNP        November 17, 2021     Patient: Erick Garcia   YOB: 1940   Date of Visit: 11/17/2021       To Whom it May Concern:    Erick Garcia was seen in my clinic on 11/17/2021. As you are able, please avoid use of seeds, high fructose corn syrup and vegetable oil while preparing meals for Startanae Chio. If you have any questions or concerns, please don't hesitate to call.     Sincerely,         Melissa Gutiérrez, APRN - CNP

## 2021-11-17 NOTE — LETTER
[address] [address]  Phone: [phone]  Fax: [phone]    PETEY Madera - DANELLE        2021      Please note the following changes:    - Increased dose of tylenol to 1000mg tid prn  - Start prn zanaflex 2 mg q6 hours as needed for back pain  - Start denosumab (PROLIA) 60 mg/ml subcutaneous injection q6 months  - Stop Serevent  - Please change albuterol to BID, and prn  - Please see note regarding requested dietary changes in an effort to improve patient's diarrhea    If you have any questions or concerns, please don't hesitate to call.     Sincerely,        PETEY Madera - CNP

## 2021-11-24 ENCOUNTER — TELEPHONE (OUTPATIENT)
Dept: FAMILY MEDICINE CLINIC | Age: 81
End: 2021-11-24

## 2021-11-24 DIAGNOSIS — J44.9 CHRONIC OBSTRUCTIVE PULMONARY DISEASE, UNSPECIFIED COPD TYPE (HCC): Primary | ICD-10-CM

## 2021-11-24 NOTE — TELEPHONE ENCOUNTER
Received PA request for Stiolto Inhaler; possible covered alternative Anro.  Please send to [facility]

## 2021-12-15 ENCOUNTER — OFFICE VISIT (OUTPATIENT)
Dept: FAMILY MEDICINE CLINIC | Age: 81
End: 2021-12-15
Payer: MEDICARE

## 2021-12-15 VITALS
BODY MASS INDEX: 23.21 KG/M2 | WEIGHT: 131 LBS | HEIGHT: 63 IN | DIASTOLIC BLOOD PRESSURE: 68 MMHG | SYSTOLIC BLOOD PRESSURE: 124 MMHG | RESPIRATION RATE: 24 BRPM | HEART RATE: 78 BPM

## 2021-12-15 DIAGNOSIS — M54.50 LOW BACK PAIN WITHOUT SCIATICA, UNSPECIFIED BACK PAIN LATERALITY, UNSPECIFIED CHRONICITY: Primary | ICD-10-CM

## 2021-12-15 LAB
BILIRUBIN URINE: NEGATIVE
BLOOD URINE, POC: NEGATIVE
CHARACTER, URINE: CLEAR
COLOR, URINE: YELLOW
GLUCOSE URINE: 100 MG/DL
KETONES, URINE: NEGATIVE
LEUKOCYTE CLUMPS, URINE: NEGATIVE
NITRITE, URINE: NEGATIVE
PH, URINE: 7.5 (ref 5–9)
PROTEIN, URINE: NEGATIVE MG/DL
SPECIFIC GRAVITY, URINE: 1.02 (ref 1–1.03)
UROBILINOGEN, URINE: 0.2 EU/DL (ref 0–1)

## 2021-12-15 PROCEDURE — G8400 PT W/DXA NO RESULTS DOC: HCPCS | Performed by: NURSE PRACTITIONER

## 2021-12-15 PROCEDURE — 1090F PRES/ABSN URINE INCON ASSESS: CPT | Performed by: NURSE PRACTITIONER

## 2021-12-15 PROCEDURE — 4004F PT TOBACCO SCREEN RCVD TLK: CPT | Performed by: NURSE PRACTITIONER

## 2021-12-15 PROCEDURE — G8482 FLU IMMUNIZE ORDER/ADMIN: HCPCS | Performed by: NURSE PRACTITIONER

## 2021-12-15 PROCEDURE — 99214 OFFICE O/P EST MOD 30 MIN: CPT | Performed by: NURSE PRACTITIONER

## 2021-12-15 PROCEDURE — 4040F PNEUMOC VAC/ADMIN/RCVD: CPT | Performed by: NURSE PRACTITIONER

## 2021-12-15 PROCEDURE — G8420 CALC BMI NORM PARAMETERS: HCPCS | Performed by: NURSE PRACTITIONER

## 2021-12-15 PROCEDURE — 81003 URINALYSIS AUTO W/O SCOPE: CPT | Performed by: NURSE PRACTITIONER

## 2021-12-15 PROCEDURE — G8427 DOCREV CUR MEDS BY ELIG CLIN: HCPCS | Performed by: NURSE PRACTITIONER

## 2021-12-15 PROCEDURE — 1123F ACP DISCUSS/DSCN MKR DOCD: CPT | Performed by: NURSE PRACTITIONER

## 2021-12-15 RX ORDER — TIZANIDINE 2 MG/1
2 TABLET ORAL EVERY 6 HOURS PRN
Qty: 90 TABLET | Refills: 0 | Status: SHIPPED | OUTPATIENT
Start: 2021-12-15 | End: 2022-03-28 | Stop reason: ALTCHOICE

## 2021-12-15 ASSESSMENT — ENCOUNTER SYMPTOMS: BACK PAIN: 1

## 2021-12-15 NOTE — LETTER
[address] [address]  Phone: [phone]  Fax: [phone]    PETEY Briones CNP        December 15, 2021     Patient: Keegan Pang   YOB: 1940   Date of Visit: 12/15/2021       To Whom It May Concern:    Patient was seen in the office today for low back pain. I have started her on diclofenac gel 1%. Please apply 2-3x daily prn. Continue po tizanidine 2 mg q6 hours as needed and 1,000 mg po acetaminophen TID prn. May use 400 mg po ibuprofen BID as needed when not using diclofenac. If you have any questions or concerns, please don't hesitate to call.     Sincerely,        PETEY Briones CNP

## 2021-12-15 NOTE — PATIENT INSTRUCTIONS
Patient Education        Learning About Low Back Pain  What is low back pain? Low back pain is pain that can occur anywhere below the ribs and above the legs. It is very common. Almost everyone has it at one time or another. Low back pain can be:  · Acute. This is new pain that can last a few days to a few weeks--at the most a few months. · Chronic. This pain can last for more than a few months. Sometimes it can last for years. What are some myths about low back pain? Here are some common myths about low back pain--and the facts:  Myth: \"I need to rest my back when I have back pain. \"   Fact: Staying active won't hurt you. It may help you get better faster. Myth: \"I need prescription pain medicine. \"   Fact: It's best to try to let time and being active heal your back. Opioid pain medicines--such as hydrocodone or oxycodone--usually don't work any better than over-the-counter medicines like ibuprofen or naproxen. And opioids can cause serious problems like opioid use disorder or overdose. Moderate to severe opioid use disorder is sometimes called addiction. Myth: \"I need a test like an X-ray or an MRI to diagnose my low back pain. \"   Fact: Getting a test right away won't help you get better faster. And it could lead you down a treatment path you may not need, since most people get better on their own. What causes low back pain? In most cases, there isn't a clear cause. This can be frustrating, because your back hurts and there's no obvious reason. Your back pain can be caused by:  Overuse or muscle strain. This can happen from playing sports, lifting heavy things, or not being physically fit. A herniated disc. This is a problem with the cushion between the bones in your back. Arthritis. With age, you may have changes in your bones that can narrow the space around your nerves. Other causes. In rare cases, the cause is a serious illness like an infection or cancer.  But there are usually other symptoms too. What are the symptoms? Back pain can come on quickly or over time. You may feel:  · Pain in your hips or buttock. · Leg pain, numbness, tingling, or weakness. When a nerve gets squeezed--such as from a disc problem or arthritis--you may have symptoms in your leg or foot. You can even have leg symptoms from a back problem without having any pain in your back. · Pain that's sharp or dull, sometimes with stiffness or muscle spasms. It may be in one small area or over a broad area. But even bad pain doesn't mean that it's caused by something serious. How is low back pain diagnosed? A physical exam is the main way to diagnose low back pain. Your doctor may examine your back, check your nerves by testing your reflexes, and make sure that your muscles are strong. Your doctor also will ask questions about your back and overall health. Most people don't need any tests right away. Tests often don't show the reason for your pain. If your pain lasts more than 6 weeks or you have symptoms that your doctor is more concerned about, then your doctor may order tests. These may include an X-ray, a CT scan, or an MRI. Sometimes other tests such as a bone scan or nerve conduction test may be done. How is low back pain treated? Most acute low back pain gets better on its own within a few weeks, no matter what the cause. Time and doing usual activities are all that most people need to feel better. Using heat or ice and taking over-the-counter pain medicine also can help while your body heals. If you aren't getting better on your own or your pain is very bad, your doctor may recommend:  · Physical therapy. · Spinal manipulation, such as by a chiropractor. · Acupuncture. · Massage. · Injections of steroid medicine in your back (especially for pain that involves your legs). If you have chronic low back pain, treatment will help you understand and manage your pain. Treatment may include:  · Staying active.  This Exercises  Introduction  Here are some examples of exercises for you to try. The exercises may be suggested for a condition or for rehabilitation. Start each exercise slowly. Ease off the exercises if you start to have pain. You will be told when to start these exercises and which ones will work best for you. How to do the exercises  Press-up    1. Lie on your stomach, supporting your body with your forearms. 2. Press your elbows down into the floor to raise your upper back. As you do this, relax your stomach muscles and allow your back to arch without using your back muscles. As your press up, do not let your hips or pelvis come off the floor. 3. Hold for 15 to 30 seconds, then relax. 4. Repeat 2 to 4 times. Alternate arm and leg (bird dog) exercise    Do this exercise slowly. Try to keep your body straight at all times, and do not let one hip drop lower than the other. 1. Start on the floor, on your hands and knees. 2. Tighten your belly muscles. 3. Raise one leg off the floor, and hold it straight out behind you. Be careful not to let your hip drop down, because that will twist your trunk. 4. Hold for about 6 seconds, then lower your leg and switch to the other leg. 5. Repeat 8 to 12 times on each leg. 6. Over time, work up to holding for 10 to 30 seconds each time. 7. If you feel stable and secure with your leg raised, try raising the opposite arm straight out in front of you at the same time. Knee-to-chest exercise    1. Lie on your back with your knees bent and your feet flat on the floor. 2. Bring one knee to your chest, keeping the other foot flat on the floor (or keeping the other leg straight, whichever feels better on your lower back). 3. Keep your lower back pressed to the floor. Hold for at least 15 to 30 seconds. 4. Relax, and lower the knee to the starting position. 5. Repeat with the other leg. Repeat 2 to 4 times with each leg.   6. To get more stretch, put your other leg flat on the floor while pulling your knee to your chest.  Curl-ups    1. Lie on the floor on your back with your knees bent at a 90-degree angle. Your feet should be flat on the floor, about 12 inches from your buttocks. 2. Cross your arms over your chest. If this bothers your neck, try putting your hands behind your neck (not your head), with your elbows spread apart. 3. Slowly tighten your belly muscles and raise your shoulder blades off the floor. 4. Keep your head in line with your body, and do not press your chin to your chest.  5. Hold this position for 1 or 2 seconds, then slowly lower yourself back down to the floor. 6. Repeat 8 to 12 times. Pelvic tilt exercise    1. Lie on your back with your knees bent. 2. \"Brace\" your stomach. This means to tighten your muscles by pulling in and imagining your belly button moving toward your spine. You should feel like your back is pressing to the floor and your hips and pelvis are rocking back. 3. Hold for about 6 seconds while you breathe smoothly. 4. Repeat 8 to 12 times. Heel dig bridging    1. Lie on your back with both knees bent and your ankles bent so that only your heels are digging into the floor. Your knees should be bent about 90 degrees. 2. Then push your heels into the floor, squeeze your buttocks, and lift your hips off the floor until your shoulders, hips, and knees are all in a straight line. 3. Hold for about 6 seconds as you continue to breathe normally, and then slowly lower your hips back down to the floor and rest for up to 10 seconds. 4. Do 8 to 12 repetitions. Hamstring stretch in doorway    1. Lie on your back in a doorway, with one leg through the open door. 2. Slide your leg up the wall to straighten your knee. You should feel a gentle stretch down the back of your leg. 3. Hold the stretch for at least 15 to 30 seconds. Do not arch your back, point your toes, or bend either knee.  Keep one heel touching the floor and the other heel touching the wall. 4. Repeat with your other leg. 5. Do 2 to 4 times for each leg. Hip flexor stretch    1. Kneel on the floor with one knee bent and one leg behind you. Place your forward knee over your foot. Keep your other knee touching the floor. 2. Slowly push your hips forward until you feel a stretch in the upper thigh of your rear leg. 3. Hold the stretch for at least 15 to 30 seconds. Repeat with your other leg. 4. Do 2 to 4 times on each side. Wall sit    1. Stand with your back 10 to 12 inches away from a wall. 2. Lean into the wall until your back is flat against it. 3. Slowly slide down until your knees are slightly bent, pressing your lower back into the wall. 4. Hold for about 6 seconds, then slide back up the wall. 5. Repeat 8 to 12 times. Follow-up care is a key part of your treatment and safety. Be sure to make and go to all appointments, and call your doctor if you are having problems. It's also a good idea to know your test results and keep a list of the medicines you take. Where can you learn more? Go to [email or web address].[email or web address] org and sign in to your [employer] account. Enter Q103 in the [facility] box to learn more about \"Low Back Pain: Exercises. \"     If you do not have an account, please click on the \"Sign Up Now\" link. Current as of: July 1, 2021               Content Version: 13.0  © 2006-2021 [employer]. Care instructions adapted under license by [facility] ([facility]). If you have questions about a medical condition or this instruction, always ask your healthcare professional. [place] [address] any warranty or liability for your use of this information.

## 2021-12-15 NOTE — PROGRESS NOTES
Fidencio Lew (:  1940) is a 80 y.o. female,Established patient, here for evaluation of the following chief complaint(s):  Back Pain (had COVID booster )         ASSESSMENT/PLAN:  1. Low back pain without sciatica, unspecified back pain laterality, unspecified chronicity  - Chronic, unstable  - Continue prednisone, prn tylenol, prn tizanidine  - Start prn diclofenac  - Encouraged use of ice/heat  - Back stretches and exercises included  - Continue chiropractic care  -     POCT Urinalysis No Micro (Auto)  -     diclofenac sodium (VOLTAREN) 1 % GEL; Apply 2 g topically 3 times daily prn, Topical, 3 TIMES DAILY Starting Wed 12/15/2021, Disp-100 g, R-1, Normal  -     tiZANidine (ZANAFLEX) 2 MG tablet; Take 1 tablet by mouth every 6 hours as needed (back pain), Disp-90 tablet, R-0Normal      Return if symptoms worsen or fail to improve. Results for POC orders placed in visit on 12/15/21   POCT Urinalysis No Micro (Auto)   Result Value Ref Range    Glucose, Ur 100 (A) NEGATIVE mg/dl    Bilirubin Urine Negative     Ketones, Urine Negative NEGATIVE    Specific Gravity, Urine 1.020 1.002 - 1.030    Blood, UA POC Negative NEGATIVE    pH, Urine 7.50 5.0 - 9.0    Protein, Urine Negative NEGATIVE mg/dl    Urobilinogen, Urine 0.20 0.0 - 1.0 eu/dl    Nitrite, Urine Negative NEGATIVE    Leukocyte Clumps, Urine Negative NEGATIVE    Color, Urine Yellow [id number]    Character, Urine Clear [id number]       Subjective   SUBJECTIVE/OBJECTIVE:  Back Pain  This is a new problem. The current episode started 1 to 4 weeks ago. The pain is present in the lumbar spine. The quality of the pain is described as stabbing. The pain does not radiate. The pain is at a severity of 5/10. She has tried heat for the symptoms. Review of Systems   Musculoskeletal: Positive for arthralgias, back pain and myalgias. Negative for gait problem. Objective   Physical Exam  Vitals and nursing note reviewed.    Constitutional: Appearance: She is well-developed. HENT:      Head: Normocephalic and atraumatic. Right Ear: Hearing, tympanic membrane, ear canal and external ear normal.      Left Ear: Hearing, tympanic membrane, ear canal and external ear normal.      Nose:      Right Sinus: No maxillary sinus tenderness or frontal sinus tenderness. Left Sinus: No maxillary sinus tenderness or frontal sinus tenderness. Eyes:      General:         Right eye: No discharge. Left eye: No discharge. Conjunctiva/sclera: Conjunctivae normal.      Pupils: Pupils are equal, round, and reactive to light. Neck:      Vascular: No JVD. Cardiovascular:      Rate and Rhythm: Normal rate and regular rhythm. Heart sounds: Normal heart sounds. No murmur heard. Pulmonary:      Effort: Pulmonary effort is normal. No tachypnea. Breath sounds: Normal breath sounds. No stridor. No wheezing. Abdominal:      General: There is no distension. Tenderness: There is no abdominal tenderness. Musculoskeletal:         General: No deformity. Cervical back: Normal range of motion. Lumbar back: Tenderness present. Back:       Comments: ROM in all extremities WNL. No deformities. Lymphadenopathy:      Head:      Right side of head: No submental or submandibular adenopathy. Left side of head: No submental or submandibular adenopathy. Skin:     General: Skin is warm and dry. Capillary Refill: Capillary refill takes less than 2 seconds. Findings: No rash. Neurological:      Mental Status: She is alert and oriented to person, place, and time. Coordination: Coordination normal.   Psychiatric:         Mood and Affect: Mood normal.         Behavior: Behavior normal.         Thought Content:  Thought content normal.         Judgment: Judgment normal.            On this date 12/15/2021 I have spent 30 minutes reviewing previous notes, test results and face to face with the patient discussing the diagnosis and importance of compliance with the treatment plan as well as documenting on the day of the visit. An electronic signature was used to authenticate this note.     --PETEY Laird - CNP

## 2021-12-20 DIAGNOSIS — L03.115 CELLULITIS OF RIGHT LOWER EXTREMITY: Primary | ICD-10-CM

## 2021-12-20 DIAGNOSIS — L03.115 CELLULITIS OF RIGHT LOWER EXTREMITY: ICD-10-CM

## 2021-12-20 RX ORDER — CEPHALEXIN 500 MG/1
500 CAPSULE ORAL 4 TIMES DAILY
Qty: 40 CAPSULE | Refills: 0 | Status: SHIPPED | OUTPATIENT
Start: 2021-12-20 | End: 2021-12-20 | Stop reason: SDUPTHER

## 2021-12-20 RX ORDER — CEPHALEXIN 500 MG/1
500 CAPSULE ORAL 4 TIMES DAILY
Qty: 40 CAPSULE | Refills: 0 | Status: SHIPPED | OUTPATIENT
Start: 2021-12-20 | End: 2021-12-30

## 2021-12-23 ENCOUNTER — TELEPHONE (OUTPATIENT)
Dept: FAMILY MEDICINE CLINIC | Age: 81
End: 2021-12-23

## 2021-12-23 RX ORDER — BENZONATATE 200 MG/1
200 CAPSULE ORAL 3 TIMES DAILY PRN
Qty: 30 CAPSULE | Refills: 0 | Status: SHIPPED | OUTPATIENT
Start: 2021-12-23 | End: 2021-12-30

## 2021-12-23 NOTE — TELEPHONE ENCOUNTER
Pt daughter called, states Pt is having cough and nasal congestion. She is requesting safe OTC cough/congestion medication to purchase. I suggested she discuss with pharmacist but she uses mail away and requested your opinion.  Please Advise

## 2022-03-28 ENCOUNTER — OFFICE VISIT (OUTPATIENT)
Dept: FAMILY MEDICINE CLINIC | Age: 82
End: 2022-03-28
Payer: MEDICARE

## 2022-03-28 ENCOUNTER — NURSE TRIAGE (OUTPATIENT)
Dept: OTHER | Facility: CLINIC | Age: 82
End: 2022-03-28

## 2022-03-28 VITALS
SYSTOLIC BLOOD PRESSURE: 120 MMHG | HEART RATE: 85 BPM | WEIGHT: 139 LBS | TEMPERATURE: 97 F | BODY MASS INDEX: 24.31 KG/M2 | OXYGEN SATURATION: 95 % | DIASTOLIC BLOOD PRESSURE: 66 MMHG | RESPIRATION RATE: 20 BRPM

## 2022-03-28 DIAGNOSIS — R39.15 URINARY URGENCY: ICD-10-CM

## 2022-03-28 DIAGNOSIS — J44.9 CHRONIC OBSTRUCTIVE PULMONARY DISEASE, UNSPECIFIED COPD TYPE (HCC): ICD-10-CM

## 2022-03-28 DIAGNOSIS — R44.1 VISUAL HALLUCINATIONS: Primary | ICD-10-CM

## 2022-03-28 LAB
ALBUMIN SERPL-MCNC: 4.1 G/DL (ref 3.5–5.1)
ALP BLD-CCNC: 65 U/L (ref 38–126)
ALT SERPL-CCNC: 17 U/L (ref 11–66)
ANION GAP SERPL CALCULATED.3IONS-SCNC: 11 MEQ/L (ref 8–16)
AST SERPL-CCNC: 22 U/L (ref 5–40)
BASOPHILS # BLD: 0.6 %
BASOPHILS ABSOLUTE: 0.1 THOU/MM3 (ref 0–0.1)
BILIRUB SERPL-MCNC: 0.3 MG/DL (ref 0.3–1.2)
BUN BLDV-MCNC: 29 MG/DL (ref 7–22)
C-REACTIVE PROTEIN: < 0.3 MG/DL (ref 0–1)
CALCIUM SERPL-MCNC: 9.9 MG/DL (ref 8.5–10.5)
CHLORIDE BLD-SCNC: 103 MEQ/L (ref 98–111)
CO2: 26 MEQ/L (ref 23–33)
CREAT SERPL-MCNC: 0.9 MG/DL (ref 0.4–1.2)
EOSINOPHIL # BLD: 0.1 %
EOSINOPHILS ABSOLUTE: 0 THOU/MM3 (ref 0–0.4)
ERYTHROCYTE [DISTWIDTH] IN BLOOD BY AUTOMATED COUNT: 13.5 % (ref 11.5–14.5)
ERYTHROCYTE [DISTWIDTH] IN BLOOD BY AUTOMATED COUNT: 49.9 FL (ref 35–45)
GFR SERPL CREATININE-BSD FRML MDRD: 60 ML/MIN/1.73M2
GLUCOSE BLD-MCNC: 121 MG/DL (ref 70–108)
HCT VFR BLD CALC: 45.2 % (ref 37–47)
HEMOGLOBIN: 13.8 GM/DL (ref 12–16)
IMMATURE GRANS (ABS): 0.02 THOU/MM3 (ref 0–0.07)
IMMATURE GRANULOCYTES: 0.2 %
LYMPHOCYTES # BLD: 11.1 %
LYMPHOCYTES ABSOLUTE: 1 THOU/MM3 (ref 1–4.8)
MCH RBC QN AUTO: 30.5 PG (ref 26–33)
MCHC RBC AUTO-ENTMCNC: 30.5 GM/DL (ref 32.2–35.5)
MCV RBC AUTO: 100 FL (ref 81–99)
MONOCYTES # BLD: 5.2 %
MONOCYTES ABSOLUTE: 0.5 THOU/MM3 (ref 0.4–1.3)
NUCLEATED RED BLOOD CELLS: 0 /100 WBC
PLATELET # BLD: 316 THOU/MM3 (ref 130–400)
PMV BLD AUTO: 11 FL (ref 9.4–12.4)
POTASSIUM SERPL-SCNC: 5.2 MEQ/L (ref 3.5–5.2)
RBC # BLD: 4.52 MILL/MM3 (ref 4.2–5.4)
SEG NEUTROPHILS: 82.8 %
SEGMENTED NEUTROPHILS ABSOLUTE COUNT: 7.2 THOU/MM3 (ref 1.8–7.7)
SODIUM BLD-SCNC: 140 MEQ/L (ref 135–145)
TOTAL PROTEIN: 6.4 G/DL (ref 6.1–8)
WBC # BLD: 8.7 THOU/MM3 (ref 4.8–10.8)

## 2022-03-28 PROCEDURE — 4004F PT TOBACCO SCREEN RCVD TLK: CPT | Performed by: FAMILY MEDICINE

## 2022-03-28 PROCEDURE — 1090F PRES/ABSN URINE INCON ASSESS: CPT | Performed by: FAMILY MEDICINE

## 2022-03-28 PROCEDURE — 99214 OFFICE O/P EST MOD 30 MIN: CPT | Performed by: FAMILY MEDICINE

## 2022-03-28 PROCEDURE — 3023F SPIROM DOC REV: CPT | Performed by: FAMILY MEDICINE

## 2022-03-28 PROCEDURE — G8400 PT W/DXA NO RESULTS DOC: HCPCS | Performed by: FAMILY MEDICINE

## 2022-03-28 PROCEDURE — 4040F PNEUMOC VAC/ADMIN/RCVD: CPT | Performed by: FAMILY MEDICINE

## 2022-03-28 PROCEDURE — G8482 FLU IMMUNIZE ORDER/ADMIN: HCPCS | Performed by: FAMILY MEDICINE

## 2022-03-28 PROCEDURE — 1123F ACP DISCUSS/DSCN MKR DOCD: CPT | Performed by: FAMILY MEDICINE

## 2022-03-28 PROCEDURE — G8420 CALC BMI NORM PARAMETERS: HCPCS | Performed by: FAMILY MEDICINE

## 2022-03-28 PROCEDURE — 81002 URINALYSIS NONAUTO W/O SCOPE: CPT | Performed by: FAMILY MEDICINE

## 2022-03-28 PROCEDURE — G8427 DOCREV CUR MEDS BY ELIG CLIN: HCPCS | Performed by: FAMILY MEDICINE

## 2022-03-28 ASSESSMENT — ENCOUNTER SYMPTOMS
BACK PAIN: 0
CONSTIPATION: 0
WHEEZING: 0
DIARRHEA: 0
EYE PAIN: 0
NAUSEA: 0
ABDOMINAL PAIN: 0
BLOOD IN STOOL: 0
VOMITING: 0
SORE THROAT: 0
RHINORRHEA: 0
SHORTNESS OF BREATH: 0
CHEST TIGHTNESS: 0
COUGH: 0

## 2022-03-28 NOTE — TELEPHONE ENCOUNTER
Limited triage due to adult not being present during call. Received call from Robert Pimentel at [facility] with [employer]. Subjective: Caller states \"Confusion\"     Current Symptoms: Seems to be more confused than usual. Hallucinating    Onset: a few days ago; worsening    Associated Symptoms: NA    Pain Severity: 0/10; N/A; none    Temperature: denies fever    What has been tried: None    LMP: NA Pregnant: NA    Recommended disposition: See in Office Today. ED as backup plan. Care advice provided, patient verbalizes understanding; denies any other questions or concerns; instructed to call back for any new or worsening symptoms. Left message on [facility] chat for scheduling     Attention Provider: Thank you for allowing me to participate in the care of your patient. The patient was connected to triage in response to information provided to the ECC/PSC. Please do not respond through this encounter as the response is not directed to a shared pool.       Reason for Disposition   Patient wants to be seen (or caregiver requests)    Protocols used: CONFUSION - DELIRIUM-ADULT-[place]

## 2022-03-28 NOTE — PROGRESS NOTES
Ramin Cousin (:  1940) is a 80 y.o. female,Established patient, here for evaluation of the following chief complaint(s):  Hallucinations         ASSESSMENT/PLAN:  1. Visual hallucinations  -     CBC with Auto Differential; Future  -     Comprehensive Metabolic Panel; Future  -     C-Reactive Protein; Future  -     POCT Urinalysis no Micro  -unknown diagnosis/prognosis, check urine and blood work, treat accordingly  2. Chronic obstructive pulmonary disease, unspecified COPD type ([address]  -stable, well controlled on anoro and albuterol  3. Urinary urgency  -     POCT Urinalysis no Micro  -unknown diagnosis/prognosis, No results found for this visit on 22. No follow-ups on file. Subjective   SUBJECTIVE/OBJECTIVE:  HPI  Patient here today for a check up. Reviewed BMI of 24, normal.  1 week of visual hallucinations. Things are moving. No med changes. Seems delayed today per daughter. Urinary urgency. Pmh reviewed, current smoker, . Review of Systems   Constitutional: Positive for fatigue. Negative for chills, fever and unexpected weight change. HENT: Negative for congestion, ear pain, rhinorrhea and sore throat. Eyes: Negative for pain and visual disturbance. Respiratory: Negative for cough, chest tightness, shortness of breath and wheezing. Cardiovascular: Negative for chest pain and palpitations. Gastrointestinal: Negative for abdominal pain, blood in stool, constipation, diarrhea, nausea and vomiting. Genitourinary: Positive for urgency. Negative for difficulty urinating, frequency and hematuria. Musculoskeletal: Negative for back pain, joint swelling, myalgias and neck pain. Skin: Negative for rash. Neurological: Negative for dizziness and headaches. Hematological: Negative for adenopathy. Does not bruise/bleed easily. Psychiatric/Behavioral: Positive for hallucinations. Negative for behavioral problems and sleep disturbance.  The patient is not nervous/anxious. Objective   Physical Exam  Vitals and nursing note reviewed. Constitutional:       Appearance: She is well-developed. HENT:      Head: Normocephalic and atraumatic. Right Ear: External ear normal.      Left Ear: External ear normal.      Nose: Nose normal.      Mouth/Throat:      Mouth: Mucous membranes are moist.   Eyes:      Pupils: Pupils are equal, round, and reactive to light. Neck:      Thyroid: No thyromegaly. Cardiovascular:      Rate and Rhythm: Normal rate and regular rhythm. Heart sounds: Normal heart sounds. Pulmonary:      Breath sounds: Normal breath sounds. No wheezing or rales. Abdominal:      General: Bowel sounds are normal.      Palpations: Abdomen is soft. Tenderness: There is no abdominal tenderness. There is no guarding or rebound. Musculoskeletal:         General: Normal range of motion. Cervical back: Neck supple. Lymphadenopathy:      Cervical: No cervical adenopathy. Skin:     General: Skin is warm and dry. Findings: No rash. Neurological:      Mental Status: She is alert and oriented to person, place, and time. Cranial Nerves: No cranial nerve deficit. Deep Tendon Reflexes: Reflexes are normal and symmetric. An electronic signature was used to authenticate this note.     --Gisel Rodriguez MD

## 2022-03-28 NOTE — PROGRESS NOTES
Blood work drawn today in the office, venous puncture, right arm, pt tolerated well. Patient was unable to provide a urine in the office today. Supplies given to patient to provide a urine sample and bring back.

## 2022-03-29 ENCOUNTER — NURSE ONLY (OUTPATIENT)
Dept: FAMILY MEDICINE CLINIC | Age: 82
End: 2022-03-29

## 2022-03-29 ENCOUNTER — TELEPHONE (OUTPATIENT)
Dept: FAMILY MEDICINE CLINIC | Age: 82
End: 2022-03-29

## 2022-03-29 LAB
BILIRUBIN URINE: NEGATIVE
BLOOD URINE, POC: NORMAL
CHARACTER, URINE: CLEAR
COLOR, URINE: YELLOW
GLUCOSE URINE: NEGATIVE MG/DL
KETONES, URINE: NEGATIVE
LEUKOCYTE CLUMPS, URINE: NEGATIVE
NITRITE, URINE: NEGATIVE
PH, URINE: 7 (ref 5–9)
PROTEIN, URINE: NEGATIVE MG/DL
SPECIFIC GRAVITY, URINE: 1.02 (ref 1–1.03)
UROBILINOGEN, URINE: 0.2 EU/DL (ref 0–1)

## 2022-03-29 NOTE — TELEPHONE ENCOUNTER
----- Message from Justin Vega MD sent at 3/29/2022  6:44 AM EDT -----  CMP is ok. CBC is ok. CRP is normal.  Labs are ok, were we able to get a urine sample?

## 2022-03-30 NOTE — TELEPHONE ENCOUNTER
Blood work, urine were ok. No infection. Urine was on the concentrated side. Verify pushing fluids, staying hydrated. No other specific tests, if remains with the hallucinations, not acting like herself, consider taking to ED for further evaluation.

## 2022-04-04 ENCOUNTER — TELEPHONE (OUTPATIENT)
Dept: FAMILY MEDICINE CLINIC | Age: 82
End: 2022-04-04

## 2022-04-04 NOTE — TELEPHONE ENCOUNTER
----- Message from Dorla Riedel sent at 4/1/2022  3:41 PM EDT -----  Subject: Medication Problem    QUESTIONS  Name of Medication? cephALEXin (KEFLEX) 500 MG capsule  Patient-reported dosage and instructions? 500Mg 4 times a day for 7 days   What question or problem do you have with the medication? Pt has a Allergy   to this RX, From [employer]. Contact [phone]  Preferred Pharmacy? [address] [facility], [address]  [facility]  Pharmacy phone number (if available)? [phone]  Additional Information for Provider?   ---------------------------------------------------------------------------  --------------  [address]  What is the best way for the office to contact you? OK to leave message on   voicemail  Preferred Call Back Phone Number? [phone]  ---------------------------------------------------------------------------  --------------  SCRIPT ANSWERS  Relationship to Patient? Third Party  Third Party Type? [address]? Representative Name?  Bin Vincent

## 2022-04-24 ENCOUNTER — HOSPITAL ENCOUNTER (EMERGENCY)
Age: 82
Discharge: HOME | End: 2022-04-24
Payer: MEDICARE

## 2022-04-24 VITALS
DIASTOLIC BLOOD PRESSURE: 66 MMHG | TEMPERATURE: 98.5 F | RESPIRATION RATE: 16 BRPM | HEART RATE: 63 BPM | SYSTOLIC BLOOD PRESSURE: 146 MMHG | OXYGEN SATURATION: 93 %

## 2022-04-24 DIAGNOSIS — M25.551 RIGHT HIP PAIN: Primary | ICD-10-CM

## 2022-04-24 PROCEDURE — 99213 OFFICE O/P EST LOW 20 MIN: CPT

## 2022-04-24 RX ORDER — PREDNISONE 10 MG/1
10 TABLET ORAL 2 TIMES DAILY
Qty: 10 TABLET | Refills: 0 | Status: SHIPPED | OUTPATIENT
Start: 2022-04-24 | End: 2022-04-29

## 2022-04-24 ASSESSMENT — ENCOUNTER SYMPTOMS
COUGH: 0
COLOR CHANGE: 0
BACK PAIN: 0
SHORTNESS OF BREATH: 0

## 2022-04-24 ASSESSMENT — PAIN - FUNCTIONAL ASSESSMENT: PAIN_FUNCTIONAL_ASSESSMENT: NONE - DENIES PAIN

## 2022-04-24 NOTE — ED NOTES
Patient discharge instructions given to pt and daughter and pt and daughter verbalized understanding, 2 px given, no other needs at this time, and pt left in stable condition.      Mayi Rice RN  04/24/22 1100

## 2022-04-24 NOTE — ED PROVIDER NOTES
[facility] 36  Urgent Care Encounter       CHIEF COMPLAINT       Chief Complaint   Patient presents with    Hip Pain     right hip pain onset about 5 dys ago, worse Friday        Nurses Notes reviewed and I agree except as noted in the HPI. HISTORY OF PRESENT ILLNESS   Matias Ramirez is a 80 y.o. female who presents for complaints of right hip pain. States this episode began approximately 5 days ago. She is able to ambulate on her right hip but with the pain. Denies any fall or injury. Patient and daughter states that she has had recurrent right hip bursitis. She states in her hometown she used to get steroid injections into the hip every 6 months. It has been more than a year since she has received these injections. This is the same type of pain that she has had with previous bursitis. Patient is currently living at assisted living. HPI    REVIEW OF SYSTEMS     Review of Systems   Constitutional: Negative for activity change, fatigue and fever. Respiratory: Negative for cough and shortness of breath. Musculoskeletal: Positive for arthralgias (right hip) and gait problem. Negative for back pain. Skin: Negative for color change. Neurological: Negative for dizziness and headaches. PAST MEDICAL HISTORY         Diagnosis Date    Abnormal nuclear stress test 06/01/2018    Cataract     Colon polyps     COPD (chronic obstructive pulmonary disease) (HCC)     Diverticulosis large intestine w/o perforation or abscess w/bleeding     DVT, recurrent, lower extremity, acute, left (HCC)     Fibroid, uterine     Hearing loss     Hx of blood clots     Hyperlipidemia     Nicotine dependence     Nocturnal hypoxemia     Postmenopausal     Sleep apnea     Ulcerative colitis ([facility] [address]     with rectal bleeding        SURGICALHISTORY     Patient  has a past surgical history that includes Foot surgery; Breast biopsy (1988); Colonoscopy (2006); Colonoscopy (2010);  Colonoscopy (2010); Colonoscopy (03/03/2014); Breast biopsy (Right, 03/2011); Foot surgery (Bilateral, 2011); hip surgery (06/13/2020); and osteotomy (Left, 11/2/2021). CURRENT MEDICATIONS       Discharge Medication List as of 4/24/2022 10:57 AM      CONTINUE these medications which have NOT CHANGED    Details   umeclidinium-vilanterol (ANORO ELLIPTA) 62.5-25 MCG/INH AEPB inhaler Inhale 1 puff into the lungs daily, Disp-1 each, R-5Normal      denosumab (PROLIA) 60 MG/ML SOSY SC injection Inject 1 mL into the skin every 6 months, Disp-1 mL, R-1Normal      acetaminophen (TYLENOL) 500 MG tablet Take 2 tablets by mouth 3 times daily as needed for Pain, Disp-100 tablet, R-1Normal      !! Misc. Devices MISC Disp-1 each, R-0, Print1 set of bed rails. !! Misc. Devices MISC Disp-2 Device, R-0, NormalPlease apply compression stockings (JOHANNE hose) to bilateral lower extremities for 12 hours daily. Remove nightly. albuterol sulfate  (90 Base) MCG/ACT inhaler Inhale 2 puffs into the lungs every 6 hours as needed for Wheezing, Disp-2 Inhaler, R-5Normal      donepezil (ARICEPT) 10 MG tablet Take 1 tablet by mouth nightly, Disp-90 tablet, R-0Normal      Psyllium Fiber 0.52 g CAPS Take 1 capsule by mouth daily, Disp-90 capsule, R-3Print      lisinopril (PRINIVIL;ZESTRIL) 20 MG tablet Take 20 mg by mouth dailyHistorical Med      Glucosamine-Chondroitin 500-250 MG CAPS Take by mouth dailyHistorical Med      metoprolol tartrate (LOPRESSOR) 25 MG tablet Take 1 tablet by mouth 2 times daily, Disp-180 tablet,R-3Normal      simvastatin (ZOCOR) 40 MG tablet Take 1 tablet by mouth nightly, Disp-90 tablet,R-3Normal      vitamin B-12 (CYANOCOBALAMIN) 1000 MCG tablet Take 1 tablet by mouth daily, Disp-30 tablet, R-3OTC      Probiotic Product (REMOTV PO) Take 1 tablet by mouth dailyHistorical Med       !! - Potential duplicate medications found. Please discuss with provider.           ALLERGIES     Patient is is allergic to azulfidine [sulfasalazine], keflex [cephalexin], mesalamine, sulfa antibiotics, and alcohol. Patients   Immunization History   Administered Date(s) Administered    COVID-19, Pfizer Purple top, DILUTE for use, 12+ yrs, 30mcg/0.3mL dose 01/08/2021, 01/08/2021, 01/08/2021, 02/19/2021, 02/19/2021, 11/11/2021    Influenza Vaccine, unspecified formulation 12/28/2006, 12/28/2006, 09/23/2008, 01/23/2014, 10/28/2014, 10/07/2015, 11/16/2016    Influenza, High Dose (Fluzone 65 yrs and older) 09/07/2017, 10/30/2018, 10/21/2021    Influenza, Quadv, adjuvanted, 65 yrs +, IM, PF (Fluad) 10/15/2020, 10/15/2020    Influenza, Triv, inactivated, subunit, adjuvanted, IM (Fluad 65 yrs and older) 10/09/2019    Pneumococcal Conjugate 13-valent ([id number]) 10/07/2015    Pneumococcal Polysaccharide ([id number]) 12/28/2006, 09/07/2017    Tdap (Boostrix, Adacel) 02/04/2016, 01/02/2020    Zoster Recombinant (Shingrix) 11/05/2019, 11/05/2019, 02/09/2020, 02/09/2020       FAMILY HISTORY     Patient's family history includes Coronary Art Dis in her father; Diabetes in her mother; Heart Disease in her father and mother; High Blood Pressure in her mother. SOCIAL HISTORY     Patient  reports that she has been smoking cigarettes. She has a 10.00 pack-year smoking history. She has never used smokeless tobacco. She reports that she does not drink alcohol and does not use drugs. PHYSICAL EXAM     ED TRIAGE VITALS  BP: (!) 146/66, Temp: 98.5 °F (36.9 °C), Pulse: 63, Resp: 16, SpO2: 93 %,Estimated body mass index is 24.31 kg/m² as calculated from the following:    Height as of 12/15/21: 5' 3.4\" (1.61 m). Weight as of 3/28/22: 139 lb (63 kg). ,No LMP recorded. Patient is postmenopausal.    Physical Exam  Constitutional:       Appearance: She is not ill-appearing. Cardiovascular:      Rate and Rhythm: Normal rate.    Pulmonary:      Effort: Pulmonary effort is normal.   Musculoskeletal:      Right hip: Tenderness and bony tenderness present. Legs:       Comments: Tenderness over the right great trochanter. Pelvis is stable without pain. Neurological:      Mental Status: She is alert. DIAGNOSTIC RESULTS     Labs:No results found for this visit on 04/24/22. IMAGING:    No orders to display         EKG:      URGENT CARE COURSE:     Vitals:    04/24/22 1037   BP: (!) 146/66   Pulse: 63   Resp: 16   Temp: 98.5 °F (36.9 °C)   SpO2: 93%       Medications - No data to display         PROCEDURES:  None    FINAL IMPRESSION      1. Right hip pain          DISPOSITION/ PLAN   Patient presents for right hip pain without injury. She is able to ambulate. This is likely bursitis. I offered x-rays and the patient and daughter declined. I will place patient on a short course of steroids and diclofenac gel for treatment of symptoms. Advised to call orthopedics as soon as possible for evaluation and treatment. Recommended ice, Tylenol in addition.         PATIENT REFERRED TO:  PETEY Mcleod CNP  [address] [facility] / [facility] [id number]      DISCHARGE MEDICATIONS:  Discharge Medication List as of 4/24/2022 10:57 AM          Discharge Medication List as of 4/24/2022 10:57 AM          Discharge Medication List as of 4/24/2022 10:57 AM      CONTINUE these medications which have CHANGED    Details   diclofenac sodium (VOLTAREN) 1 % GEL Apply 2 g topically 4 times daily, Topical, 4 TIMES DAILY Starting Sun 4/24/2022, Disp-100 g, R-0, Normal      predniSONE (DELTASONE) 10 MG tablet Take 1 tablet by mouth 2 times daily for 5 days, Disp-10 tablet, R-0Normal             PETEY Walls CNP    (Please note that portions of this note were completed with a voice recognition program. Efforts were made to edit the dictations but occasionally words are mis-transcribed.)          PETEY Walls CNP  04/24/22 1102

## 2022-04-24 NOTE — ED TRIAGE NOTES
Patient taken to room 6 for right hip pain, patient has a history of brusitis and has had steroids before and has helped.

## 2022-04-25 ENCOUNTER — APPOINTMENT (OUTPATIENT)
Dept: GENERAL RADIOLOGY | Age: 82
DRG: 558 | End: 2022-04-25
Payer: MEDICARE

## 2022-04-25 ENCOUNTER — HOSPITAL ENCOUNTER (INPATIENT)
Age: 82
LOS: 1 days | Discharge: HOME HEALTH | DRG: 558 | End: 2022-04-28
Attending: HOSPITALIST | Admitting: INTERNAL MEDICINE
Payer: MEDICARE

## 2022-04-25 DIAGNOSIS — R26.81 GAIT INSTABILITY: ICD-10-CM

## 2022-04-25 DIAGNOSIS — M70.61 TROCHANTERIC BURSITIS OF RIGHT HIP: Primary | ICD-10-CM

## 2022-04-25 PROBLEM — M70.60 TROCHANTERIC BURSITIS: Status: ACTIVE | Noted: 2022-04-25

## 2022-04-25 PROCEDURE — 6370000000 HC RX 637 (ALT 250 FOR IP)

## 2022-04-25 PROCEDURE — G0378 HOSPITAL OBSERVATION PER HR: HCPCS

## 2022-04-25 PROCEDURE — 99285 EMERGENCY DEPT VISIT HI MDM: CPT

## 2022-04-25 PROCEDURE — 73502 X-RAY EXAM HIP UNI 2-3 VIEWS: CPT

## 2022-04-25 PROCEDURE — 99220 PR INITIAL OBSERVATION CARE/DAY 70 MINUTES: CPT

## 2022-04-25 PROCEDURE — 6370000000 HC RX 637 (ALT 250 FOR IP): Performed by: NURSE PRACTITIONER

## 2022-04-25 PROCEDURE — 73552 X-RAY EXAM OF FEMUR 2/>: CPT

## 2022-04-25 RX ORDER — HYDROCODONE BITARTRATE AND ACETAMINOPHEN 5; 325 MG/1; MG/1
1 TABLET ORAL ONCE
Status: COMPLETED | OUTPATIENT
Start: 2022-04-25 | End: 2022-04-25

## 2022-04-25 RX ORDER — HYDROCODONE BITARTRATE AND ACETAMINOPHEN 5; 325 MG/1; MG/1
1 TABLET ORAL EVERY 4 HOURS PRN
Status: DISCONTINUED | OUTPATIENT
Start: 2022-04-25 | End: 2022-04-28 | Stop reason: HOSPADM

## 2022-04-25 RX ORDER — PREDNISONE 10 MG/1
10 TABLET ORAL 2 TIMES DAILY
Status: DISCONTINUED | OUTPATIENT
Start: 2022-04-25 | End: 2022-04-28 | Stop reason: HOSPADM

## 2022-04-25 RX ORDER — MAGNESIUM SULFATE IN WATER 40 MG/ML
2000 INJECTION, SOLUTION INTRAVENOUS PRN
Status: DISCONTINUED | OUTPATIENT
Start: 2022-04-25 | End: 2022-04-28 | Stop reason: HOSPADM

## 2022-04-25 RX ORDER — ALBUTEROL SULFATE 2.5 MG/3ML
2.5 SOLUTION RESPIRATORY (INHALATION) EVERY 6 HOURS PRN
COMMUNITY

## 2022-04-25 RX ORDER — SODIUM CHLORIDE 9 MG/ML
INJECTION, SOLUTION INTRAVENOUS PRN
Status: DISCONTINUED | OUTPATIENT
Start: 2022-04-25 | End: 2022-04-28 | Stop reason: HOSPADM

## 2022-04-25 RX ORDER — POLYETHYLENE GLYCOL 3350 17 G/17G
17 POWDER, FOR SOLUTION ORAL DAILY PRN
Status: DISCONTINUED | OUTPATIENT
Start: 2022-04-25 | End: 2022-04-28 | Stop reason: HOSPADM

## 2022-04-25 RX ORDER — ALBUTEROL SULFATE 90 UG/1
2 AEROSOL, METERED RESPIRATORY (INHALATION) EVERY 6 HOURS PRN
Status: DISCONTINUED | OUTPATIENT
Start: 2022-04-25 | End: 2022-04-28 | Stop reason: HOSPADM

## 2022-04-25 RX ORDER — ONDANSETRON 2 MG/ML
4 INJECTION INTRAMUSCULAR; INTRAVENOUS EVERY 6 HOURS PRN
Status: DISCONTINUED | OUTPATIENT
Start: 2022-04-25 | End: 2022-04-28 | Stop reason: HOSPADM

## 2022-04-25 RX ORDER — SODIUM CHLORIDE 0.9 % (FLUSH) 0.9 %
5-40 SYRINGE (ML) INJECTION EVERY 12 HOURS SCHEDULED
Status: DISCONTINUED | OUTPATIENT
Start: 2022-04-25 | End: 2022-04-28 | Stop reason: HOSPADM

## 2022-04-25 RX ORDER — ACETAMINOPHEN 500 MG
1000 TABLET ORAL EVERY 6 HOURS PRN
Status: DISCONTINUED | OUTPATIENT
Start: 2022-04-25 | End: 2022-04-28 | Stop reason: HOSPADM

## 2022-04-25 RX ORDER — NICOTINE 21 MG/24HR
1 PATCH, TRANSDERMAL 24 HOURS TRANSDERMAL DAILY
Status: DISCONTINUED | OUTPATIENT
Start: 2022-04-25 | End: 2022-04-26

## 2022-04-25 RX ORDER — POTASSIUM CHLORIDE 7.45 MG/ML
10 INJECTION INTRAVENOUS PRN
Status: DISCONTINUED | OUTPATIENT
Start: 2022-04-25 | End: 2022-04-28 | Stop reason: HOSPADM

## 2022-04-25 RX ORDER — LISINOPRIL 20 MG/1
20 TABLET ORAL DAILY
Status: DISCONTINUED | OUTPATIENT
Start: 2022-04-26 | End: 2022-04-28 | Stop reason: HOSPADM

## 2022-04-25 RX ORDER — ACETAMINOPHEN 650 MG/1
650 SUPPOSITORY RECTAL EVERY 6 HOURS PRN
Status: DISCONTINUED | OUTPATIENT
Start: 2022-04-25 | End: 2022-04-28 | Stop reason: HOSPADM

## 2022-04-25 RX ORDER — ATORVASTATIN CALCIUM 20 MG/1
20 TABLET, FILM COATED ORAL NIGHTLY
Status: DISCONTINUED | OUTPATIENT
Start: 2022-04-25 | End: 2022-04-28 | Stop reason: HOSPADM

## 2022-04-25 RX ORDER — DONEPEZIL HYDROCHLORIDE 10 MG/1
10 TABLET, FILM COATED ORAL NIGHTLY
Status: DISCONTINUED | OUTPATIENT
Start: 2022-04-25 | End: 2022-04-28 | Stop reason: HOSPADM

## 2022-04-25 RX ORDER — LIDOCAINE 4 G/G
1 PATCH TOPICAL DAILY
Status: DISCONTINUED | OUTPATIENT
Start: 2022-04-25 | End: 2022-04-28 | Stop reason: HOSPADM

## 2022-04-25 RX ORDER — SODIUM CHLORIDE 0.9 % (FLUSH) 0.9 %
5-40 SYRINGE (ML) INJECTION PRN
Status: DISCONTINUED | OUTPATIENT
Start: 2022-04-25 | End: 2022-04-28 | Stop reason: HOSPADM

## 2022-04-25 RX ORDER — ONDANSETRON 4 MG/1
4 TABLET, ORALLY DISINTEGRATING ORAL EVERY 8 HOURS PRN
Status: DISCONTINUED | OUTPATIENT
Start: 2022-04-25 | End: 2022-04-28 | Stop reason: HOSPADM

## 2022-04-25 RX ORDER — ASCORBIC ACID 500 MG
1000 TABLET ORAL DAILY
COMMUNITY

## 2022-04-25 RX ORDER — ZINC GLUCONATE 50 MG
50 TABLET ORAL DAILY
COMMUNITY

## 2022-04-25 RX ORDER — HYDROCODONE BITARTRATE AND ACETAMINOPHEN 5; 325 MG/1; MG/1
2 TABLET ORAL EVERY 4 HOURS PRN
Status: DISCONTINUED | OUTPATIENT
Start: 2022-04-25 | End: 2022-04-28 | Stop reason: HOSPADM

## 2022-04-25 RX ORDER — IBUPROFEN 400 MG/1
400 TABLET ORAL 2 TIMES DAILY PRN
COMMUNITY

## 2022-04-25 RX ORDER — ACETAMINOPHEN 325 MG/1
650 TABLET ORAL EVERY 6 HOURS PRN
Status: DISCONTINUED | OUTPATIENT
Start: 2022-04-25 | End: 2022-04-28 | Stop reason: HOSPADM

## 2022-04-25 RX ORDER — POTASSIUM CHLORIDE 20 MEQ/1
40 TABLET, EXTENDED RELEASE ORAL PRN
Status: DISCONTINUED | OUTPATIENT
Start: 2022-04-25 | End: 2022-04-28 | Stop reason: HOSPADM

## 2022-04-25 RX ADMIN — HYDROCODONE BITARTRATE AND ACETAMINOPHEN 1 TABLET: 5; 325 TABLET ORAL at 10:19

## 2022-04-25 RX ADMIN — PREDNISONE 10 MG: 10 TABLET ORAL at 20:45

## 2022-04-25 RX ADMIN — METOPROLOL TARTRATE 25 MG: 25 TABLET, FILM COATED ORAL at 20:45

## 2022-04-25 RX ADMIN — DONEPEZIL HYDROCHLORIDE 10 MG: 10 TABLET, FILM COATED ORAL at 20:46

## 2022-04-25 RX ADMIN — ATORVASTATIN CALCIUM 20 MG: 20 TABLET, FILM COATED ORAL at 20:46

## 2022-04-25 RX ADMIN — HYDROCODONE BITARTRATE AND ACETAMINOPHEN 1 TABLET: 5; 325 TABLET ORAL at 16:16

## 2022-04-25 RX ADMIN — PREDNISONE 10 MG: 10 TABLET ORAL at 16:10

## 2022-04-25 ASSESSMENT — PAIN SCALES - GENERAL
PAINLEVEL_OUTOF10: 5
PAINLEVEL_OUTOF10: 0

## 2022-04-25 ASSESSMENT — PAIN - FUNCTIONAL ASSESSMENT: PAIN_FUNCTIONAL_ASSESSMENT: NONE - DENIES PAIN

## 2022-04-25 ASSESSMENT — ENCOUNTER SYMPTOMS
BACK PAIN: 0
COLOR CHANGE: 0

## 2022-04-25 NOTE — ED PROVIDER NOTES
[facility] [address] [place] [address]       Chief Complaint   Patient presents with    Hip Pain     right       Nurses Notes reviewed and I agree except as noted in the HPI. HISTORY OF PRESENT ILLNESS    Wyatt Shelton is a 80 y.o. female who presents to the ED for evaluation of right hip pain. Patient notes right hip pain, been ongoing since last Wednesday. Notes a history of bursitis in the past, went to urgent care was prescribed prednisone and Voltaren gel, noted no improvement in pain. Now having difficulty walking. Notes a history of a left hip replacement in the past.  Denies any recent trauma to the right hip. Daughter at bedside reports she has been helping patient to the bathroom, using walker. Unable to walk to the car today due to pain. Has tried over-the-counter lidocaine patch as well. HPI was provided by the patient. REVIEW OF SYSTEMS     Review of Systems   Constitutional: Positive for activity change. Negative for chills, fatigue and fever. Musculoskeletal: Positive for arthralgias and gait problem. Negative for back pain. Skin: Negative for color change and wound. Allergic/Immunologic: Negative for immunocompromised state. Neurological: Negative for weakness and numbness. Hematological: Does not bruise/bleed easily.         PAST MEDICAL HISTORY     Past Medical History:   Diagnosis Date    Abnormal nuclear stress test 06/01/2018    Cataract     Colon polyps     COPD (chronic obstructive pulmonary disease) (HCC)     Diverticulosis large intestine w/o perforation or abscess w/bleeding     DVT, recurrent, lower extremity, acute, left (HCC)     Fibroid, uterine     Hearing loss     Hx of blood clots     Hyperlipidemia     Nicotine dependence     Nocturnal hypoxemia     Postmenopausal     Sleep apnea     Ulcerative colitis ([facility] [address]     with rectal bleeding        SURGICALHISTORY      has a past surgical history that includes Foot surgery; Breast biopsy (1988); Colonoscopy (2006); Colonoscopy (2010); Colonoscopy (2010); Colonoscopy (03/03/2014); Breast biopsy (Right, 03/2011); Foot surgery (Bilateral, 2011); hip surgery (06/13/2020); and osteotomy (Left, 11/2/2021). CURRENT MEDICATIONS       Current Discharge Medication List      CONTINUE these medications which have NOT CHANGED    Details   Multiple Vitamin (MULTIVITAMIN ADULT PO) Take 1 tablet by mouth daily      vitamin C (ASCORBIC ACID) 500 MG tablet Take 1,000 mg by mouth daily      zinc gluconate 50 MG tablet Take 50 mg by mouth daily      albuterol (PROVENTIL) (2.5 MG/3ML) 0.083% nebulizer solution Take 2.5 mg by nebulization every 6 hours as needed for Wheezing or Shortness of Breath      ibuprofen (ADVIL;MOTRIN) 400 MG tablet Take 400 mg by mouth 2 times daily as needed for Pain      ipratropium (ATROVENT) 0.02 % nebulizer solution Take 0.5 mg by nebulization every 6-8 hours as needed for Wheezing      diclofenac sodium (VOLTAREN) 1 % GEL Apply 2 g topically 4 times daily  Qty: 100 g, Refills: 0      predniSONE (DELTASONE) 10 MG tablet Take 1 tablet by mouth 2 times daily for 5 days  Qty: 10 tablet, Refills: 0      umeclidinium-vilanterol (ANORO ELLIPTA) 62.5-25 MCG/INH AEPB inhaler Inhale 1 puff into the lungs daily  Qty: 1 each, Refills: 5    Associated Diagnoses: Chronic obstructive pulmonary disease, unspecified COPD type (HCC)      denosumab (PROLIA) 60 MG/ML SOSY SC injection Inject 1 mL into the skin every 6 months  Qty: 1 mL, Refills: 1    Associated Diagnoses: Osteopenia of multiple sites      acetaminophen (TYLENOL) 500 MG tablet Take 2 tablets by mouth 3 times daily as needed for Pain  Qty: 100 tablet, Refills: 1    Associated Diagnoses: Mid back pain      !! Misc. Devices MISC 1 set of bed rails. Qty: 1 each, Refills: 0    Associated Diagnoses: Dementia without behavioral disturbance, unspecified dementia type ([address] Multiple falls      !! Misc.  Devices MISC Please apply compression stockings (JOHANNE hose) to bilateral lower extremities for 12 hours daily. Remove nightly. Qty: 2 Device, Refills: 0    Associated Diagnoses: Bilateral lower extremity edema      albuterol sulfate  (90 Base) MCG/ACT inhaler Inhale 2 puffs into the lungs every 6 hours as needed for Wheezing  Qty: 2 Inhaler, Refills: 5    Associated Diagnoses: Simple chronic bronchitis (HCC)      donepezil (ARICEPT) 10 MG tablet Take 1 tablet by mouth nightly  Qty: 90 tablet, Refills: 0    Associated Diagnoses: Dementia without behavioral disturbance, unspecified dementia type ([facility] [address] Memory loss      Psyllium Fiber 0.52 g CAPS Take 1 capsule by mouth daily  Qty: 90 capsule, Refills: 3    Associated Diagnoses: Incontinence in female      lisinopril (PRINIVIL;ZESTRIL) 20 MG tablet Take 20 mg by mouth daily      Glucosamine-Chondroitin 500-250 MG CAPS Take by mouth daily      metoprolol tartrate (LOPRESSOR) 25 MG tablet Take 1 tablet by mouth 2 times daily  Qty: 180 tablet, Refills: 3    Associated Diagnoses: Hypertension, unspecified type      simvastatin (ZOCOR) 40 MG tablet Take 1 tablet by mouth nightly  Qty: 90 tablet, Refills: 3    Associated Diagnoses: Coronary artery disease involving native coronary artery of native heart without angina pectoris; Mixed hyperlipidemia      vitamin B-12 (CYANOCOBALAMIN) 1000 MCG tablet Take 1 tablet by mouth daily  Qty: 30 tablet, Refills: 3    Associated Diagnoses: B12 deficiency      Probiotic Product ([employer] HEALTH PO) Take 1 tablet by mouth daily    Associated Diagnoses: Ulcerative colitis without complications, unspecified location [facility])       ! ! - Potential duplicate medications found. Please discuss with provider. ALLERGIES     is allergic to azulfidine [sulfasalazine], keflex [cephalexin], mesalamine, sulfa antibiotics, and alcohol. FAMILY HISTORY     She indicated that her mother is . She indicated that her father is .    family history includes Coronary Art Dis in her father; Diabetes in her mother; Heart Disease in her father and mother; High Blood Pressure in her mother. SOCIAL HISTORY       Social History     Socioeconomic History    Marital status:      Spouse name: Not on file    Number of children: Not on file    Years of education: Not on file    Highest education level: Not on file   Occupational History    Not on file   Tobacco Use    Smoking status: Current Some Day Smoker     Packs/day: 1.00     Years: 40.00     Pack years: 40.00     Types: Cigarettes    Smokeless tobacco: Never Used    Tobacco comment: 8-10 cigarettes / day   Vaping Use    Vaping Use: Never used   Substance and Sexual Activity    Alcohol use: No    Drug use: Never    Sexual activity: Not Currently   Other Topics Concern    Not on file   Social History Narrative    Not on file     Social Determinants of Health     Financial Resource Strain: Low Risk     Difficulty of Paying Living Expenses: Not hard at all   Food Insecurity: No Food Insecurity    Worried About Running Out of Food in the Last Year: Never true    Joshua of Food in the Last Year: Never true   Transportation Needs:     Lack of Transportation (Medical): Not on file    Lack of Transportation (Non-Medical):  Not on file   Physical Activity:     Days of Exercise per Week: Not on file    Minutes of Exercise per Session: Not on file   Stress:     Feeling of Stress : Not on file   Social Connections:     Frequency of Communication with Friends and Family: Not on file    Frequency of Social Gatherings with Friends and Family: Not on file    Attends Yarsanism Services: Not on file    Active Member of Clubs or Organizations: Not on file    Attends Club or Organization Meetings: Not on file    Marital Status: Not on file   Intimate Partner Violence:     Fear of Current or Ex-Partner: Not on file    Emotionally Abused: Not on file    Physically Abused: Not on file    Sexually Abused: Not on file   Housing Stability:     Unable to Pay for Housing in the Last Year: Not on file    Number of Places Lived in the Last Year: Not on file    Unstable Housing in the Last Year: Not on file       PHYSICAL EXAM     INITIAL VITALS:  height is 5' 5\" (1.651 m) and weight is 139 lb 3.2 oz (63.1 kg). Her oral temperature is 98.3 °F (36.8 °C). Her blood pressure is 104/82 and her pulse is 92. Her respiration is 18 and oxygen saturation is 94%. Physical Exam  Constitutional:       General: She is not in acute distress. Appearance: Normal appearance. She is normal weight. She is not ill-appearing. HENT:      Nose: Nose normal.      Mouth/Throat:      Mouth: Mucous membranes are moist.   Eyes:      Pupils: Pupils are equal, round, and reactive to light. Cardiovascular:      Rate and Rhythm: Normal rate. Pulses: Normal pulses. Pulmonary:      Effort: Pulmonary effort is normal.   Abdominal:      General: Abdomen is flat. Musculoskeletal:         General: Normal range of motion. Cervical back: Normal range of motion. Right hip: Tenderness (Over trochanter) and bony tenderness present. No deformity or crepitus. Normal range of motion. Normal strength. Skin:     General: Skin is warm. Capillary Refill: Capillary refill takes less than 2 seconds. Neurological:      General: No focal deficit present. Mental Status: She is alert and oriented to person, place, and time. Psychiatric:         Mood and Affect: Mood normal.         Behavior: Behavior normal.         DIFFERENTIAL DIAGNOSIS:   Bursitis, strain, contusion, lytic lesion    DIAGNOSTIC RESULTS     RADIOLOGY: non-plainfilm images(s) such as CT, Ultrasound and MRI are read by the radiologist.  Plain radiographic images are visualized and preliminarily interpreted by the emergency physician unless otherwise stated below. XR FEMUR RIGHT (MIN 2 VIEWS)   Final Result       1.  No lytic lesion identified in the visualized portion of the femur. Indeterminate whether the area in question was included on the radiographs. 2. Chondrocalcinosis of the knee as evidence for CPPD. **This report has been created using voice recognition software. It may contain minor errors which are inherent in voice recognition technology. **      Final report electronically signed by Dr. Marco A Latham MD on 4/25/2022 11:03 AM      XR HIP 2-3 VW W PELVIS RIGHT   Final Result   1. No acute findings. Mild degenerative changes right hip.   2. Questionable lytic process involving the mid femoral shaft, versus osteoporosis. Routine right femur x-rays recommended. **This report has been created using voice recognition software. It may contain minor errors which are inherent in voice recognition technology. **      Final report electronically signed by Dr. Onelia Shen on 4/25/2022 10:28 AM            LABS:   Labs Reviewed - No data to display    EMERGENCY DEPARTMENT COURSE:   Vitals:    Vitals:    04/25/22 0928 04/25/22 1104 04/25/22 1230 04/25/22 1602   BP: (!) 209/188 (!) 162/75 113/68 104/82   Pulse: 74 78 79 92   Resp: 16 16 18 18   Temp: 97.9 °F (36.6 °C)  97.8 °F (36.6 °C) 98.3 °F (36.8 °C)   TempSrc: Oral  Oral Oral   SpO2: 95% 94% 91% 94%   Weight: 140 lb (63.5 kg)  139 lb 3.2 oz (63.1 kg)    Height: 5' 5\" (1.651 m)  5' 5\" (1.651 m)        MDM    Patient was seen and evaluated in the emergency department, patient appeared to be in no acute distress, vital signs reviewed, hypertension noted. Physical exam completed, tenderness over the right trochanter, no significant decreased range of motion of the hip. X-rays were obtained, no acute findings noted, questionable lytic process in the mid femoral shaft, recommended 2 view of femur. These were obtained no significant findings noted.   Discussed my findings and plan of care with the patient and her daughter, they note the patient is unable to ambulate due to this pain, they request patient be admitted for further evaluation. Discussed the case with hospital service who accepts patient for admission.   Medications   sodium chloride flush 0.9 % injection 5-40 mL (has no administration in time range)   sodium chloride flush 0.9 % injection 5-40 mL (has no administration in time range)   0.9 % sodium chloride infusion (has no administration in time range)   ondansetron (ZOFRAN-ODT) disintegrating tablet 4 mg (has no administration in time range)     Or   ondansetron (ZOFRAN) injection 4 mg (has no administration in time range)   polyethylene glycol (GLYCOLAX) packet 17 g (has no administration in time range)   acetaminophen (TYLENOL) tablet 650 mg (has no administration in time range)     Or   acetaminophen (TYLENOL) suppository 650 mg (has no administration in time range)   magnesium sulfate 2000 mg in 50 mL IVPB premix (has no administration in time range)   potassium chloride 10 mEq/100 mL IVPB (Peripheral Line) (has no administration in time range)   potassium chloride (KLOR-CON M) extended release tablet 40 mEq (has no administration in time range)     Or   potassium bicarb-citric acid (EFFER-K) effervescent tablet 40 mEq (has no administration in time range)     Or   potassium chloride 10 mEq/100 mL IVPB (Peripheral Line) (has no administration in time range)   albuterol sulfate  (90 Base) MCG/ACT inhaler 2 puff (has no administration in time range)   donepezil (ARICEPT) tablet 10 mg (has no administration in time range)   lisinopril (PRINIVIL;ZESTRIL) tablet 20 mg (has no administration in time range)   metoprolol tartrate (LOPRESSOR) tablet 25 mg (has no administration in time range)   atorvastatin (LIPITOR) tablet 20 mg (has no administration in time range)   tiotropium-olodaterol (STIOLTO) 2.5-2.5 MCG/ACT inhaler 2 puff (has no administration in time range)   predniSONE (DELTASONE) tablet 10 mg (10 mg Oral Given 4/25/22 1610)   acetaminophen (TYLENOL) tablet 1,000 mg (has no administration in time range)   HYDROcodone-acetaminophen (NORCO) 5-325 MG per tablet 1 tablet (1 tablet Oral Given 4/25/22 1616)     Or   HYDROcodone-acetaminophen (NORCO) 5-325 MG per tablet 2 tablet ( Oral See Alternative 4/25/22 1616)   lidocaine 4 % external patch 1 patch (1 patch TransDERmal Patch Applied 4/25/22 1609)   nicotine (NICODERM CQ) 14 MG/24HR 1 patch (has no administration in time range)   HYDROcodone-acetaminophen (NORCO) 5-325 MG per tablet 1 tablet (1 tablet Oral Given 4/25/22 1019)       Patient was seenindependently by myself. The patient's final impression and disposition and plan was determined by myself. CRITICAL CARE:   None    CONSULTS:  Hospitalist    PROCEDURES:  None    FINAL IMPRESSION     1. Trochanteric bursitis of right hip    2. Gait instability          DISPOSITION/PLAN   Patient admitted    PATIENT REFERREDTO:  No follow-up provider specified. DISCHARGE MEDICATIONS:  Current Discharge Medication List          (Please note that portions of this note were completed with a voice recognition program.  Efforts were made to edit the dictations but occasionally words are mis-transcribed.)      Provider:  I personally performed the services described in the documentation,reviewed and edited the documentation which was dictated to the scribe in my presence, and it accurately records my words and actions.     Elvis Trevizo CNP 04/25/22 4:39 PM    Jennifer Trevizo, PETEY - DANELLE        [employer], PETEY - CNP  04/25/22 [id number]

## 2022-04-25 NOTE — ED NOTES
ED to inpatient nurses report    Chief Complaint   Patient presents with    Hip Pain     right      Present to ED from home  LOC: alert and orientated to name, place, date  Vital signs   Vitals:    04/25/22 0928 04/25/22 1104   BP: (!) 209/188 (!) 162/75   Pulse: 74 78   Resp: 16 16   Temp: 97.9 °F (36.6 °C)    TempSrc: Oral    SpO2: 95% 94%   Weight: 140 lb (63.5 kg)    Height: 5' 5\" (1.651 m)       Oxygen Baseline 94%    Current needs required RA Bipap/Cpap No  LDAs:    Mobility: Requires assistance * 1  Pending ED orders: NA  Present condition: stable      Electronically signed by Emmanuelle Barahona RN on 4/25/2022 at 11:56 AM       Emmanuelle Barahona RN  04/25/22 3059

## 2022-04-25 NOTE — ED NOTES
Patient presents to the ED via [address] for right hip pain. She states that her pain started on 4-20-22. She states that she only experiences the pain when she is walking or moving around but sitting still she has no pain. No known injury to the area. She has no other complaints at this time.       Nancy England, JULIEATN  54/96/89 [id number]

## 2022-04-25 NOTE — ED NOTES
8A notified of pt transport to 8A-10. Pt transported in stable condition.      Jean Carlos Rockwell  04/25/22 121

## 2022-04-25 NOTE — H&P
Hospitalist - History & Physical      Patient: Daniel Cosby    Unit/Bed:8A-10/010-A  YOB: 1940  MRN: [id number]   Acct: [de-identified]   PCP: PETEY Brown CNP    Date of Service: Pt seen/examined on 04/25/22  and Admitted to Observation with expected LOS less than two midnights due to medical therapy. Chief Complaint:  Right hip pain     Assessment and Plan:-  1. R Trochanteric Bursitis:  · Patient with history of recurrent right hip bursitis, she reports that she used to get steroid injections into the hip every 6 months, but that has been more than a year since her last injection. Patient is unable to ambulate secondary to the pain. Ortho was contacted from the ED and reported that they are able to do an injection during this admission. XR R hip without acute pathology. · Will admit patient for injection, consult placed to ortho. PT/OT eval and treat. Pain control with Norco pain panel, Lidoderm patch to R hip. Will consult as SW as well. 2. COPD without acute exacerbation:   · Stable at this time, will resume patient's home medications. 3. Essential HTN:   · Patient's BP elevated on admission, will resume home medications and adjust as needed. 4. H.o. Dementia:   · Resume home Aricept    History Of Present Illness: This is a 49-year-old female who presents to the hospital for right hip pain x5 days. Patient presented today urgent care yesterday for the same complaint, she was prescribed prednisone and Voltaren gel, patient notes no improvement in pain with these measures. Patient notes that it has gotten increasingly difficult for her to ambulate secondary to the pain, and that she is unable to ambulate at all today. 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  She denies any recent trauma or falls. Patient lives at an assisted living home. Patient denies any other symptoms at this time, no chest pain, abdominal pain, nausea, vomiting.   Ortho was contacted from the ED, report that they are able to provide an injection during this admission. Will admit for observation. Past Medical History:        Diagnosis Date    Abnormal nuclear stress test 06/01/2018    Cataract     Colon polyps     COPD (chronic obstructive pulmonary disease) (HCC)     Diverticulosis large intestine w/o perforation or abscess w/bleeding     DVT, recurrent, lower extremity, acute, left (HCC)     Fibroid, uterine     Hearing loss     Hx of blood clots     Hyperlipidemia     Nicotine dependence     Nocturnal hypoxemia     Postmenopausal     Sleep apnea     Ulcerative colitis ([address]     with rectal bleeding        Past Surgical History:        Procedure Laterality Date    BREAST BIOPSY  1988    right breast biopsy     BREAST BIOPSY Right 03/2011    stereotactic biopsy    COLONOSCOPY  2006    Dr Kevin Lawler  2010    Dr Yue Reardon  2010    Dr Arina Muniz interminate colitis    COLONOSCOPY  03/03/2014    Dr Jovana Pool Bilateral 2011 2012    [address]  06/13/2020    left fracture    OSTEOTOMY Left 11/2/2021    LEFT 5TH METATARSAL HEAD RESECTION EXCISION SOFT TISSUE LESION performed by Joanie Richardson DPM at [address] [place] [address] Medications:   No current facility-administered medications on file prior to encounter.      Current Outpatient Medications on File Prior to Encounter   Medication Sig Dispense Refill    diclofenac sodium (VOLTAREN) 1 % GEL Apply 2 g topically 4 times daily 100 g 0    predniSONE (DELTASONE) 10 MG tablet Take 1 tablet by mouth 2 times daily for 5 days 10 tablet 0    umeclidinium-vilanterol (ANORO ELLIPTA) 62.5-25 MCG/INH AEPB inhaler Inhale 1 puff into the lungs daily 1 each 5    denosumab (PROLIA) 60 MG/ML SOSY SC injection Inject 1 mL into the skin every 6 months 1 mL 1    acetaminophen (TYLENOL) 500 MG tablet Take 2 tablets by mouth 3 times daily as needed for Pain 100 tablet 1    Misc. Devices MISC 1 set of bed rails. 1 each 0    Misc. Devices MISC Please apply compression stockings (JOHANNE hose) to bilateral lower extremities for 12 hours daily. Remove nightly. 2 Device 0    albuterol sulfate  (90 Base) MCG/ACT inhaler Inhale 2 puffs into the lungs every 6 hours as needed for Wheezing 2 Inhaler 5    donepezil (ARICEPT) 10 MG tablet Take 1 tablet by mouth nightly 90 tablet 0    Psyllium Fiber 0.52 g CAPS Take 1 capsule by mouth daily 90 capsule 3    lisinopril (PRINIVIL;ZESTRIL) 20 MG tablet Take 20 mg by mouth daily      Glucosamine-Chondroitin 500-250 MG CAPS Take by mouth daily      metoprolol tartrate (LOPRESSOR) 25 MG tablet Take 1 tablet by mouth 2 times daily 180 tablet 3    simvastatin (ZOCOR) 40 MG tablet Take 1 tablet by mouth nightly 90 tablet 3    vitamin B-12 (CYANOCOBALAMIN) 1000 MCG tablet Take 1 tablet by mouth daily 30 tablet 3    Probiotic Product (Zwittle PO) Take 1 tablet by mouth daily         Allergies:    Azulfidine [sulfasalazine], Keflex [cephalexin], Mesalamine, Sulfa antibiotics, and Alcohol    Social History:    reports that she has been smoking cigarettes. She has a 40.00 pack-year smoking history. She has never used smokeless tobacco. She reports that she does not drink alcohol and does not use drugs. Family History:       Problem Relation Age of Onset    Diabetes Mother     High Blood Pressure Mother     Heart Disease Mother     Coronary Art Dis Father     Heart Disease Father        Diet:  No diet orders on file    Review of systems:   Pertinent positives as noted in the HPI. All other systems reviewed and negative. PHYSICAL EXAM:  BP (!) 162/75   Pulse 78   Temp 97.9 °F (36.6 °C) (Oral)   Resp 16   Ht 5' 5\" (1.651 m)   Wt 140 lb (63.5 kg)   SpO2 94%   BMI 23.30 kg/m²   General appearance: No apparent distress, appears stated age and cooperative.   HEENT: Normal cephalic, atraumatic without obvious deformity. Pupils equal, round, and reactive to light. Extra ocular muscles intact. Conjunctivae/corneas clear. Neck: Supple, with full range of motion. No jugular venous distention. Trachea midline. Respiratory:  Normal respiratory effort. Clear to auscultation, bilaterally without Rales/Wheezes/Rhonchi. Cardiovascular: Regular rate and rhythm with normal S1/S2 without murmurs, rubs or gallops. Abdomen: Soft, non-tender, non-distended with normal bowel sounds. Musculoskeletal: TTP over right trochanter   skin: Skin color, texture, turgor normal.  No rashes or lesions. Neurologic:  Neurovascularly intact without any focal sensory/motor deficits. Cranial nerves: II-XII intact, grossly non-focal.  Psychiatric: Alert and oriented, thought content appropriate, normal insight  Capillary Refill: Brisk,< 3 seconds   Peripheral Pulses: +2 palpable, equal bilaterally     Labs:   No results for input(s): WBC, HGB, HCT, PLT in the last 72 hours. No results for input(s): NA, K, CL, CO2, BUN, CREATININE, CALCIUM, PHOS in the last 72 hours. Invalid input(s): MAGNES  No results for input(s): AST, ALT, BILIDIR, BILITOT, ALKPHOS in the last 72 hours. No results for input(s): INR in the last 72 hours. No results for input(s): [place] in the last 72 hours. Urinalysis:    Lab Results   Component Value Date    NITRU Negative 03/29/2022    WBCUA OCCASIONAL 06/14/2020    WBCUA 0-4 05/24/2018    BACTERIA ABSENT 05/24/2018    RBCUA 1+ 06/14/2020    BLOODU Trace-intact 03/29/2022    GLUCOSEU Negative 03/29/2022       Radiology:   XR FEMUR RIGHT (MIN 2 VIEWS)   Final Result       1. No lytic lesion identified in the visualized portion of the femur. Indeterminate whether the area in question was included on the radiographs. 2. Chondrocalcinosis of the knee as evidence for CPPD. **This report has been created using voice recognition software.  It may contain minor errors which are inherent in voice recognition technology. **      Final report electronically signed by Dr. Velvet Velasco MD on 4/25/2022 11:03 AM      XR HIP 2-3 VW W PELVIS RIGHT   Final Result   1. No acute findings. Mild degenerative changes right hip.   2. Questionable lytic process involving the mid femoral shaft, versus osteoporosis. Routine right femur x-rays recommended. **This report has been created using voice recognition software. It may contain minor errors which are inherent in voice recognition technology. **      Final report electronically signed by Dr. Concepcion English on 4/25/2022 10:28 AM        XR FEMUR RIGHT (MIN 2 VIEWS)    Result Date: 4/25/2022  PROCEDURE: XR FEMUR RIGHT (MIN 2 VIEWS) CLINICAL INFORMATION: Questionable lytic lesion in the mid femur . COMPARISON: Right hip series from the same date at 10:13 AM TECHNIQUE: AP and lateral views of the distal right femur. FINDINGS: No lytic lesion is identified in the visualized portion of the femur although indeterminate whether the area of concern was included on the radiographs. There is chondrocalcinosis of the right knee. There are vascular calcifications. 1. No lytic lesion identified in the visualized portion of the femur. Indeterminate whether the area in question was included on the radiographs. 2. Chondrocalcinosis of the knee as evidence for CPPD. **This report has been created using voice recognition software. It may contain minor errors which are inherent in voice recognition technology. ** Final report electronically signed by Dr. Velvet Velasco MD on 4/25/2022 11:03 AM    XR HIP 2-3 VW W PELVIS RIGHT    Result Date: 4/25/2022  PROCEDURE: XR HIP 2-3 VW W PELVIS RIGHT CLINICAL INFORMATION: right hip pain COMPARISON: No prior study. TECHNIQUE: A single AP view of the pelvis was obtained in addition to AP and frog-leg views of the right hip. FINDINGS: Evidence for old fracture left hip with metallic hardware in place.  No fracture of the right hip is seen. Normal abduction of the right hip is demonstrated. Bones are diffusely demineralized, consistent with osteoporosis. Normal abduction of the right hip is demonstrated. There is a mild degenerative spurring of the right hip. Note that there is a focus of relative lucency in the mid femoral shaft, on the edge of the submitted films. This appearance may simply be related osteoporosis but cannot exclude a lytic process. 1. No acute findings. Mild degenerative changes right hip. 2. Questionable lytic process involving the mid femoral shaft, versus osteoporosis. Routine right femur x-rays recommended. **This report has been created using voice recognition software. It may contain minor errors which are inherent in voice recognition technology. ** Final report electronically signed by Dr. Farzana Osborn on 4/25/2022 10:28 AM    Electronically signed by Twan Rosario PA-C on 4/25/2022 at 12:37 PM

## 2022-04-25 NOTE — ED NOTES
In to assess the patient who appears to be resting comfortably on the cot. No distress noted. Respirations even and unlabored. Call light in reach. Daughter at bedside. Will monitor.       Vandy Burkitt, RN  04/25/22 4874

## 2022-04-26 LAB
ANION GAP SERPL CALCULATED.3IONS-SCNC: 11 MEQ/L (ref 8–16)
BASOPHILS # BLD: 0.2 %
BASOPHILS ABSOLUTE: 0 THOU/MM3 (ref 0–0.1)
BUN BLDV-MCNC: 26 MG/DL (ref 7–22)
CALCIUM SERPL-MCNC: 9.1 MG/DL (ref 8.5–10.5)
CHLORIDE BLD-SCNC: 104 MEQ/L (ref 98–111)
CO2: 23 MEQ/L (ref 23–33)
CREAT SERPL-MCNC: 0.7 MG/DL (ref 0.4–1.2)
EOSINOPHIL # BLD: 0.1 %
EOSINOPHILS ABSOLUTE: 0 THOU/MM3 (ref 0–0.4)
ERYTHROCYTE [DISTWIDTH] IN BLOOD BY AUTOMATED COUNT: 13.4 % (ref 11.5–14.5)
ERYTHROCYTE [DISTWIDTH] IN BLOOD BY AUTOMATED COUNT: 47.9 FL (ref 35–45)
GFR SERPL CREATININE-BSD FRML MDRD: 80 ML/MIN/1.73M2
GLUCOSE BLD-MCNC: 129 MG/DL (ref 70–108)
HCT VFR BLD CALC: 42.2 % (ref 37–47)
HEMOGLOBIN: 13.1 GM/DL (ref 12–16)
IMMATURE GRANS (ABS): 0.04 THOU/MM3 (ref 0–0.07)
IMMATURE GRANULOCYTES: 0.4 %
LYMPHOCYTES # BLD: 12.4 %
LYMPHOCYTES ABSOLUTE: 1.3 THOU/MM3 (ref 1–4.8)
MCH RBC QN AUTO: 30 PG (ref 26–33)
MCHC RBC AUTO-ENTMCNC: 31 GM/DL (ref 32.2–35.5)
MCV RBC AUTO: 96.8 FL (ref 81–99)
MONOCYTES # BLD: 5.7 %
MONOCYTES ABSOLUTE: 0.6 THOU/MM3 (ref 0.4–1.3)
NUCLEATED RED BLOOD CELLS: 0 /100 WBC
PLATELET # BLD: 271 THOU/MM3 (ref 130–400)
PMV BLD AUTO: 10.9 FL (ref 9.4–12.4)
POTASSIUM REFLEX MAGNESIUM: 3.9 MEQ/L (ref 3.5–5.2)
RBC # BLD: 4.36 MILL/MM3 (ref 4.2–5.4)
SEG NEUTROPHILS: 81.2 %
SEGMENTED NEUTROPHILS ABSOLUTE COUNT: 8.6 THOU/MM3 (ref 1.8–7.7)
SODIUM BLD-SCNC: 138 MEQ/L (ref 135–145)
URIC ACID: 4.7 MG/DL (ref 2.4–5.7)
WBC # BLD: 10.6 THOU/MM3 (ref 4.8–10.8)

## 2022-04-26 PROCEDURE — 97116 GAIT TRAINING THERAPY: CPT

## 2022-04-26 PROCEDURE — 6370000000 HC RX 637 (ALT 250 FOR IP)

## 2022-04-26 PROCEDURE — 36415 COLL VENOUS BLD VENIPUNCTURE: CPT

## 2022-04-26 PROCEDURE — 97166 OT EVAL MOD COMPLEX 45 MIN: CPT

## 2022-04-26 PROCEDURE — 2580000003 HC RX 258

## 2022-04-26 PROCEDURE — 80048 BASIC METABOLIC PNL TOTAL CA: CPT

## 2022-04-26 PROCEDURE — 97162 PT EVAL MOD COMPLEX 30 MIN: CPT

## 2022-04-26 PROCEDURE — 85025 COMPLETE CBC W/AUTO DIFF WBC: CPT

## 2022-04-26 PROCEDURE — G0378 HOSPITAL OBSERVATION PER HR: HCPCS

## 2022-04-26 PROCEDURE — 99225 PR SBSQ OBSERVATION CARE/DAY 25 MINUTES: CPT

## 2022-04-26 PROCEDURE — 84550 ASSAY OF BLOOD/URIC ACID: CPT

## 2022-04-26 PROCEDURE — 97535 SELF CARE MNGMENT TRAINING: CPT

## 2022-04-26 RX ORDER — METHYLPREDNISOLONE ACETATE 80 MG/ML
80 INJECTION, SUSPENSION INTRA-ARTICULAR; INTRALESIONAL; INTRAMUSCULAR; SOFT TISSUE ONCE
Status: DISCONTINUED | OUTPATIENT
Start: 2022-04-26 | End: 2022-04-28 | Stop reason: HOSPADM

## 2022-04-26 RX ORDER — NICOTINE 21 MG/24HR
1 PATCH, TRANSDERMAL 24 HOURS TRANSDERMAL DAILY
Status: DISCONTINUED | OUTPATIENT
Start: 2022-04-26 | End: 2022-04-28 | Stop reason: HOSPADM

## 2022-04-26 RX ORDER — LIDOCAINE HYDROCHLORIDE 10 MG/ML
5 INJECTION, SOLUTION EPIDURAL; INFILTRATION; INTRACAUDAL; PERINEURAL ONCE
Status: DISCONTINUED | OUTPATIENT
Start: 2022-04-26 | End: 2022-04-28 | Stop reason: HOSPADM

## 2022-04-26 RX ORDER — HYDRALAZINE HYDROCHLORIDE 20 MG/ML
10 INJECTION INTRAMUSCULAR; INTRAVENOUS EVERY 6 HOURS PRN
Status: DISCONTINUED | OUTPATIENT
Start: 2022-04-26 | End: 2022-04-28 | Stop reason: HOSPADM

## 2022-04-26 RX ADMIN — METOPROLOL TARTRATE 25 MG: 25 TABLET, FILM COATED ORAL at 10:09

## 2022-04-26 RX ADMIN — SODIUM CHLORIDE, PRESERVATIVE FREE 10 ML: 5 INJECTION INTRAVENOUS at 21:20

## 2022-04-26 RX ADMIN — METOPROLOL TARTRATE 25 MG: 25 TABLET, FILM COATED ORAL at 21:20

## 2022-04-26 RX ADMIN — ATORVASTATIN CALCIUM 20 MG: 20 TABLET, FILM COATED ORAL at 21:19

## 2022-04-26 RX ADMIN — SODIUM CHLORIDE, PRESERVATIVE FREE 10 ML: 5 INJECTION INTRAVENOUS at 10:19

## 2022-04-26 RX ADMIN — HYDROCODONE BITARTRATE AND ACETAMINOPHEN 2 TABLET: 5; 325 TABLET ORAL at 15:47

## 2022-04-26 RX ADMIN — DONEPEZIL HYDROCHLORIDE 10 MG: 10 TABLET, FILM COATED ORAL at 21:19

## 2022-04-26 RX ADMIN — PREDNISONE 10 MG: 10 TABLET ORAL at 10:07

## 2022-04-26 RX ADMIN — PREDNISONE 10 MG: 10 TABLET ORAL at 21:19

## 2022-04-26 RX ADMIN — HYDROCODONE BITARTRATE AND ACETAMINOPHEN 1 TABLET: 5; 325 TABLET ORAL at 10:04

## 2022-04-26 RX ADMIN — LISINOPRIL 20 MG: 20 TABLET ORAL at 10:07

## 2022-04-26 ASSESSMENT — PAIN DESCRIPTION - LOCATION
LOCATION: HIP
LOCATION: HIP

## 2022-04-26 ASSESSMENT — PAIN - FUNCTIONAL ASSESSMENT
PAIN_FUNCTIONAL_ASSESSMENT: PREVENTS OR INTERFERES SOME ACTIVE ACTIVITIES AND ADLS
PAIN_FUNCTIONAL_ASSESSMENT: PREVENTS OR INTERFERES SOME ACTIVE ACTIVITIES AND ADLS

## 2022-04-26 ASSESSMENT — PAIN SCALES - GENERAL
PAINLEVEL_OUTOF10: 0
PAINLEVEL_OUTOF10: 8
PAINLEVEL_OUTOF10: 3
PAINLEVEL_OUTOF10: 0

## 2022-04-26 ASSESSMENT — PAIN DESCRIPTION - ONSET
ONSET: ON-GOING
ONSET: ON-GOING

## 2022-04-26 ASSESSMENT — PAIN DESCRIPTION - ORIENTATION
ORIENTATION: RIGHT
ORIENTATION: RIGHT

## 2022-04-26 ASSESSMENT — PAIN DESCRIPTION - FREQUENCY
FREQUENCY: INTERMITTENT
FREQUENCY: INTERMITTENT

## 2022-04-26 ASSESSMENT — PAIN DESCRIPTION - DESCRIPTORS
DESCRIPTORS: ACHING
DESCRIPTORS: ACHING

## 2022-04-26 ASSESSMENT — PAIN DESCRIPTION - PAIN TYPE
TYPE: ACUTE PAIN
TYPE: ACUTE PAIN;CHRONIC PAIN

## 2022-04-26 NOTE — PLAN OF CARE
Problem: Discharge Planning  Goal: Discharge to home or other facility with appropriate resources  4/26/2022 0157 by Beverly Cummings RN  Outcome: Progressing  Note: To be determined as plan of care discussed with providers  4/26/2022 0155 by Beverly Cummings RN  Outcome: Progressing     Problem: Safety - Adult  Goal: Free from fall injury  4/26/2022 0157 by Beverly Cummings RN  Outcome: Progressing  Flowsheets (Taken 4/26/2022 0157)  Free From Fall Injury:   Instruct family/caregiver on patient safety   Based on caregiver fall risk screen, instruct family/caregiver to ask for assistance with transferring infant if caregiver noted to have fall risk factors  Note: Bed alarm, patient will be free from falls, call light within reach  4/26/2022 0155 by Beverly Cummings RN  Outcome: Progressing     Problem: Skin/Tissue Integrity  Goal: Absence of new skin breakdown  Description: 1. Monitor for areas of redness and/or skin breakdown  2. Assess vascular access sites hourly  3. Every 4-6 hours minimum:  Change oxygen saturation probe site  4. Every 4-6 hours:  If on nasal continuous positive airway pressure, respiratory therapy assess nares and determine need for appliance change or resting period.   4/26/2022 0157 by Beverly Cummings RN  Outcome: Progressing  Note: Turn as tolerated, frequent incontinence checks, bedpan use encouraged while awaiting PT/OT  4/26/2022 0155 by Beverly Cummings RN  Outcome: Progressing     Problem: Pain  Goal: Verbalizes/displays adequate comfort level or baseline comfort level  Outcome: Progressing  Flowsheets (Taken 4/26/2022 0157)  Verbalizes/displays adequate comfort level or baseline comfort level:   Encourage patient to monitor pain and request assistance   Assess pain using appropriate pain scale   Implement non-pharmacological measures as appropriate and evaluate response  Note: Monitor pain score per unit protocol, PRN meds available     Problem: Confusion  Goal: Confusion, delirium, dementia, or psychosis is improved or at baseline  Description: INTERVENTIONS:  1. Assess for possible contributors to thought disturbance, including medications, impaired vision or hearing, underlying metabolic abnormalities, dehydration, psychiatric diagnoses, and notify attending LIP  2. [place] high risk fall precautions, as indicated  3. Provide frequent short contacts to provide reality reorientation, refocusing and direction  4. Decrease environmental stimuli, including noise as appropriate  5. Monitor and intervene to maintain adequate nutrition, hydration, elimination, sleep and activity  6. If unable to ensure safety without constant attention obtain sitter and review sitter guidelines with assigned personnel  7.  Initiate Psychosocial CNS and Spiritual Care consult, as indicated  Outcome: Progressing  Flowsheets (Taken 4/26/2022 0157)  Effect of thought disturbance (confusion, delirium, dementia, or psychosis) are managed with adequate functional status:   Assess for contributors to thought disturbance, including medications, impaired vision or hearing, underlying metabolic abnormalities, dehydration, psychiatric diagnoses, notify [place] high risk fall precautions, as indicated   Decrease environmental stimuli, including noise as appropriate   Monitor and intervene to maintain adequate nutrition, hydration, elimination, sleep and activity  Note: Monitor mental status, reorient as needed

## 2022-04-26 NOTE — CARE COORDINATION
04/26/22 1122   Service Assessment   Patient Orientation Alert and Oriented   Cognition Alert   Primary Caregiver Other (Comment)  (A.L staff)   Support Systems Family Members   Patient's Healthcare Decision Maker is: Named in [address] [place] [address]   PCP Verified by CM Yes   Condition of Participation: Discharge Planning   The Plan for Transition of Care is related to the following treatment goals: SW following as patient is from [facility].     4/26/22, 11:26 AM EDT  DISCHARGE PLANNING EVALUATION:    Flor Jara       Admitted: 4/25/2022/ [facility] 8057 day: 0   Location: 8A-10/010-A Reason for admit: Gait instability [R26.81]  Trochanteric bursitis [M70.60]  Trochanteric bursitis of right hip [M70.61]   PMH:  has a past medical history of Abnormal nuclear stress test, Cataract, Colon polyps, COPD (chronic obstructive pulmonary disease) ([address] Diverticulosis large intestine w/o perforation or abscess w/bleeding, DVT, recurrent, lower extremity, acute, left (HCC), Fibroid, uterine, Hearing loss, Hx of blood clots, Hyperlipidemia, Nicotine dependence, Nocturnal hypoxemia, Postmenopausal, Sleep apnea, and Ulcerative colitis ([address] Procedure:   4/25bXR right femur: 1. No lytic lesion identified in the visualized portion of the femur. Indeterminate whether the area in question was included on the radiographs. 2. Chondrocalcinosis of the knee as evidence for CPPD.   4/25 XR pelvis: 1. No acute findings. Mild degenerative changes right hip. 2. Questionable lytic process involving the mid femoral shaft, versus osteoporosis. Routine right femur x-rays recommended. Barriers to Discharge:  Hospitalist and ortho following, admitted with trochanteric bursitis. PT/OT. Pain control. Solumedrol. PCP: PETEY Hayes - CNP   %    Patient Goals/Plan/Treatment Preferences: From [facility] SW following. Transportation/Food Security/Housekeeping Addressed:  No issues identified.

## 2022-04-26 NOTE — PROGRESS NOTES
Luz Marcial 60  INPATIENT OCCUPATIONAL THERAPY  STR MED SURG 8A  EVALUATION    Time:   Time In: 3751  Time Out: 1128  Timed Code Treatment Minutes: 23 Minutes  Minutes: 32          Date: 2022  Patient Name: Gayle Francis,   Gender: female      MRN: [id number]  : 1940  (80 y.o.)  Referring Practitioner: Brando Wong PA-C  Diagnosis: Gait instability  Additional Pertinent Hx: Per EMR \"This is a 60-year-old female who presents to the hospital for right hip pain x5 days. Patient presented today urgent care yesterday for the same complaint, she was prescribed prednisone and Voltaren gel, patient notes no improvement in pain with these measures. Patient notes that it has gotten increasingly difficult for her to ambulate secondary to the pain, and that she is unable to ambulate at all today. 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  She denies any recent trauma or falls. Patient lives at an assisted living home. Patient denies any other symptoms at this time, no chest pain, abdominal pain, nausea, vomiting. Ortho was contacted from the ED, report that they are able to provide an injection during this admission. Will admit for observation. \" No acute fx per chart. Pt has trochanteric bursitis, plans for injection. Restrictions/Precautions:  Restrictions/Precautions: Fall Risk,General Precautions,Weight Bearing  Right Lower Extremity Weight Bearing: Weight Bearing As Tolerated    Subjective  Chart Reviewed: Yes,Orders,Progress Notes  Patient assessed for rehabilitation services?: Yes  Response to previous treatment: Patient with no complaints from previous session  Family / Caregiver Present: No    Subjective: RN approved of OT session. Pt sitting in recliner and agreeable to therapy. Pt daugher and son present at beginning of session.     Pain: 8/10 right hip     Vitals: Vitals not assessed per clinical judgement, see nursing flowsheet    Social/Functional History:  Lives With: Alone  Type of Home: Assisted living ([facility])  Home Layout: One level  Home Access: Level entry  Home Equipment: Rollator,Grab bars   Bathroom Shower/Tub: Walk-in shower  Bathroom Toilet: Handicap height  Bathroom Equipment: Grab bars in shower,Grab bars around toilet  Bathroom Accessibility: Accessible    Receives Help From: Other (comment) (facility staff)  ADL Assistance: Independent  Homemaking Assistance: Needs assistance  Ambulation Assistance: Independent  Transfer Assistance: Independent    Active : No  Patient's  Info: family provides transportation services  Occupation: Retired  Additional Comments: Pt reports she is IND with ADL's, amb with 4ww and able to amb to and from the dining room IND. VISION:Corrected    HEARING:  slightly hard of hearing     COGNITION: Slow Processing, Decreased Insight, Decreased Problem Solving and Decreased Safety Awareness    RANGE OF MOTION:  Bilateral Upper Extremity:  WFL    STRENGTH:  Bilateral Lower Extremity: shoulder flex/extension 5/5; elbow flex/extension 5/5;  (F+)  Bilateral Upper Extremity:  shoulder flex/extension 5/5; elbow flex/extension 5/5;  (F+)    SENSATION:   WFL    ADL:   Grooming: Stand By Assistance. oral care, washing face, and hand hygiene standing at sink   Toileting: Stand By Assistance. for pericare while seated   Toilet Transfer: [address] onto [facility] positioned over toilet. cues for correct alignment prior to sitting   Lower extremity: maximum assistance to adjust bilateral socks     BALANCE:  Sitting Balance:  Stand By Assistance. Standing Balance: Contact Guard Assistance. with 1-2 UE release standing x5 minutes      BED MOBILITY:  Not Tested    TRANSFERS:  Sit to Stand:  Contact Guard Assistance. Stand to Sit: Contact Guard Assistance.  cues for safety (i.e. pt attempting to sit before correctly aligned with chair)    FUNCTIONAL MOBILITY:  Assistive Device: Rolling Walker  Assist Level:  Contact Guard Assistance. Distance: To and from bathroom  Very very slow pace with minimal ability to WB on RLE, cues to keep RW proximal to body        Activity Tolerance:  Patient tolerance of  treatment: fair - slightly self limiting behaviors and limited d/t pain       Assessment:  Assessment: Pt admitted to the hospital with trochanteric bursitis. Pt demo'ed performance deficits with requiring CGA to max assistance for ADL tasks, and requires CGA for functional mobility and transfers which effect ability to perform ADLs and IADLs. Pt displayed  decreased endurance, balance, safety awareness and ability to complete  ADL tasks and mobility at [facility]. Pt requires further skilled OT Services to improve performance in functional tasks and educate on safety awareness for more indep with ADL tasks and mobility to return  home. Performance deficits / Impairments: Decreased functional mobility ,Decreased ADL status,Decreased ROM,Decreased strength,Decreased endurance,Decreased balance,Decreased high-level IADLs  Prognosis: Good  REQUIRES OT FOLLOW-UP: Yes  Decision Making: Medium Complexity    Treatment Initiated: Treatment and education initiated within context of evaluation. Evaluation time included review of current medical information, gathering information related to past medical, social and functional history, completion of standardized testing, formal and informal observation of tasks, assessment of data and development of plan of care and goals. Treatment time included skilled education and facilitation of tasks to increase safety and independence with ADL's for improved functional independence and quality of life.     Discharge Recommendations:  Continue to assess pending progress,Subacute/Skilled Nursing Daren Rocha would benefit from continued therapy after discharge    Patient Education:     Patient Education  Education Given To: Patient  Education Provided: Role of Jack Ybarra of Care,ADL Adaptive Strategies,Transfer Training    Equipment Recommendations:  Equipment Needed:  (possibly a reacher)    Plan:  Times per Week: 5x  Times per Day: Daily  Current Treatment Recommendations: Strengthening,Balance training,Functional mobility training,Endurance training,Patient/Caregiver education & training,Safety education & training,Self-Care / ADL. See long-term goal time frame for expected duration of plan of care. If no long-term goals established, a short length of stay is anticipated. Goals:  Patient goals : return back to assisted living facility  Short Term Goals  Time Frame for Short term goals: by discharge  Short Term Goal 1: pt will complete functional mobility to/from bathroom, [facility] distances, with S to access ADLs  Short Term Goal 2: pt will complete LB ADLs with SBA using LHAE prn to increase indep with donning pants  Short Term Goal 3: pt will tolerate dynamic standing task x8 minutes with S and 1-2 UE release to increase ease with grooming tasks         Following session, patient left in safe position with all fall risk precautions in place.

## 2022-04-26 NOTE — PLAN OF CARE
Problem: Discharge Planning  Goal: Discharge to home or other facility with appropriate resources  4/26/2022 1440 by Prince Galaviz RN  Outcome: Progressing  4/26/2022 0157 by Ashely Escobar RN  Outcome: Progressing  Note: To be determined as plan of care discussed with providers  4/26/2022 0155 by Ashely Escobar RN  Outcome: Progressing     Problem: Safety - Adult  Goal: Free from fall injury  4/26/2022 1440 by Prince Galaviz RN  Outcome: Progressing  4/26/2022 0157 by Ashely Escobar RN  Outcome: Progressing  Flowsheets (Taken 4/26/2022 0157)  Free From Fall Injury:   Instruct family/caregiver on patient safety   Based on caregiver fall risk screen, instruct family/caregiver to ask for assistance with transferring infant if caregiver noted to have fall risk factors  Note: Bed alarm, patient will be free from falls, call light within reach  4/26/2022 0155 by Ashely Escobar RN  Outcome: Progressing     Problem: Skin/Tissue Integrity  Goal: Absence of new skin breakdown  Description: 1. Monitor for areas of redness and/or skin breakdown  2. Assess vascular access sites hourly  3. Every 4-6 hours minimum:  Change oxygen saturation probe site  4. Every 4-6 hours:  If on nasal continuous positive airway pressure, respiratory therapy assess nares and determine need for appliance change or resting period.   4/26/2022 1440 by Prince Galaviz RN  Outcome: Progressing  4/26/2022 0157 by Ashely Escobar RN  Outcome: Progressing  Note: Turn as tolerated, frequent incontinence checks, bedpan use encouraged while awaiting PT/OT  4/26/2022 0155 by Ashely Escobar RN  Outcome: Progressing     Problem: Pain  Goal: Verbalizes/displays adequate comfort level or baseline comfort level  4/26/2022 1440 by Prince Galaviz RN  Outcome: Progressing  4/26/2022 0157 by Ashely Escobar RN  Outcome: Progressing  Flowsheets (Taken 4/26/2022 0157)  Verbalizes/displays adequate comfort level or baseline comfort level:   Encourage patient to monitor pain and request assistance   Assess pain using appropriate pain scale   Implement non-pharmacological measures as appropriate and evaluate response  Note: Monitor pain score per unit protocol, PRN meds available     Problem: Confusion  Goal: Confusion, delirium, dementia, or psychosis is improved or at baseline  Description: INTERVENTIONS:  1. Assess for possible contributors to thought disturbance, including medications, impaired vision or hearing, underlying metabolic abnormalities, dehydration, psychiatric diagnoses, and notify attending LIP  2. Reeder high risk fall precautions, as indicated  3. Provide frequent short contacts to provide reality reorientation, refocusing and direction  4. Decrease environmental stimuli, including noise as appropriate  5. Monitor and intervene to maintain adequate nutrition, hydration, elimination, sleep and activity  6. If unable to ensure safety without constant attention obtain sitter and review sitter guidelines with assigned personnel  7.  Initiate Psychosocial CNS and Spiritual Care consult, as indicated  4/26/2022 1440 by Tod Murrell RN  Outcome: Progressing  4/26/2022 0157 by Adalgisa Álvarez RN  Outcome: Progressing  Flowsheets (Taken 4/26/2022 0157)  Effect of thought disturbance (confusion, delirium, dementia, or psychosis) are managed with adequate functional status:   Assess for contributors to thought disturbance, including medications, impaired vision or hearing, underlying metabolic abnormalities, dehydration, psychiatric diagnoses, notify [facility] high risk fall precautions, as indicated   Decrease environmental stimuli, including noise as appropriate   Monitor and intervene to maintain adequate nutrition, hydration, elimination, sleep and activity  Note: Monitor mental status, reorient as needed     Problem: Chronic Conditions and Co-morbidities  Goal: Patient's chronic conditions and co-morbidity symptoms are monitored and maintained or improved  Outcome: Progressing

## 2022-04-26 NOTE — FLOWSHEET NOTE
Spoke with patient and son, assessment completed. Patient lives at [facility]. She is independent, AL assists with laundry, light housekeeping and medication. Her plan is to return to AL, agreeable to therapy if needed. Gave private duty list is she needs additional services at [address] [place] [address].        04/26/22 9136   Service Assessment   Patient Orientation Alert and Oriented   Cognition Alert   History Provided By Patient; Child/Family   Primary Caregiver Self   Prior Functional Level Independent in ADLs/IADLs   Current Functional Level Independent in ADLs/IADLs   Can patient return to prior living arrangement Yes   Family able to assist with home care needs: Yes   Community Resources Assisted Living   Social/Functional History   Lives With Alone   Type of Home Assisted living  ([facility])   ADL Assistance Independent   Ambulation Assistance Independent   Transfer Assistance Independent   Discharge Planning   Type of Residence Assisted living   Patient expects to be discharged to: Assisted living   [address] Discharge Assisted living  [place])

## 2022-04-26 NOTE — CONSULTS
Orthopedic Consult    Requesting Physician: JONEL Foote PA-C    CHIEF COMPLAINT:  Right hip pain    HISTORY OF PRESENT ILLNESS:      The patient is a 80 y.o. medically comorbid female who presents with a 4 day history of progressive right hip pain and unable to bear weight. She denies known injury, f/c/n/v/sob/cp. She resides in a assisted living facility. She is ambulatory with a walker at baseline and can normally ambulate from her room to the dining room approximately 100 feet. Pain isolated to the greater troch area and does not radiate. She denies numbness and tingling in the leg. X-rays do not demonstrate fracture or dislocation.        Past Medical History:    Past Medical History:   Diagnosis Date    Abnormal nuclear stress test 06/01/2018    Cataract     Colon polyps     COPD (chronic obstructive pulmonary disease) (HCC)     Diverticulosis large intestine w/o perforation or abscess w/bleeding     DVT, recurrent, lower extremity, acute, left (HCC)     Fibroid, uterine     Hearing loss     Hx of blood clots     Hyperlipidemia     Nicotine dependence     Nocturnal hypoxemia     Postmenopausal     Sleep apnea     Ulcerative colitis ([facility] [address]     with rectal bleeding        Past Surgical History:    Past Surgical History:   Procedure Laterality Date    BREAST BIOPSY  1988    right breast biopsy     BREAST BIOPSY Right 03/2011    stereotactic biopsy    COLONOSCOPY  2006    Dr Jigna Lema  2010    Dr Dante Wilson  2010    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    COLONOSCOPY  03/03/2014    Dr Mague Cardozo Bilateral 2011 2012    [address]  06/13/2020    left fracture    OSTEOTOMY Left 11/2/2021    LEFT 5TH METATARSAL HEAD RESECTION EXCISION SOFT TISSUE LESION performed by Lucero Verdin DPM at [address]       Medications Prior to Admission:   Current Facility-Administered Medications   Medication Dose Route Frequency Provider Last Rate Last Admin    hydrALAZINE (APRESOLINE) injection 10 mg  10 mg IntraVENous Q6H PRN Radha Tineo PA-C        methylPREDNISolone acetate (DEPO-MEDROL) injection 80 mg  80 mg Intra-artICUlar Once Isidore Mile, APRN - CNP        lidocaine 1 % injection 5 mL  5 mL IntraDERmal Once Isidore Mile, APRN - CNP        sodium chloride flush 0.9 % injection 5-40 mL  5-40 mL IntraVENous 2 times per day Radha Tineo PA-C        sodium chloride flush 0.9 % injection 5-40 mL  5-40 mL IntraVENous PRN Radha Tineo PA-C        0.9 % sodium chloride infusion   IntraVENous PRN Radha Tineo PA-C        ondansetron (ZOFRAN-ODT) disintegrating tablet 4 mg  4 mg Oral Q8H PRN Radha Tineo PA-C        Or    ondansetron [facility]) injection 4 mg  4 mg IntraVENous Q6H PRN Radha Tineo PA-C        polyethylene glycol (GLYCOLAX) packet 17 g  17 g Oral Daily PRN Radha Tineo PA-C        acetaminophen (TYLENOL) tablet 650 mg  650 mg Oral Q6H PRN Radha Tineo PA-C        Or    acetaminophen (TYLENOL) suppository 650 mg  650 mg Rectal Q6H PRN Radha Tineo PA-C        magnesium sulfate 2000 mg in 50 mL IVPB premix  2,000 mg IntraVENous PRN Radha Tineo PA-C        potassium chloride 10 mEq/100 mL IVPB (Peripheral Line)  10 mEq IntraVENous PRN Radha Tineo PA-C        potassium chloride (KLOR-CON M) extended release tablet 40 mEq  40 mEq Oral PRN Radha Tineo PA-C        Or    potassium bicarb-citric acid (EFFER-K) effervescent tablet 40 mEq  40 mEq Oral PRN Radha Tineo PA-C        Or    potassium chloride 10 mEq/100 mL IVPB (Peripheral Line)  10 mEq IntraVENous PRN Radha Tineo PA-C        albuterol sulfate  (90 Base) MCG/ACT inhaler 2 puff  2 puff Inhalation Q6H PRN Radha Tineo PA-C        donepezil (ARICEPT) tablet 10 mg  10 mg Oral Nightly Radha Tineo PA-C   10 mg at 04/25/22 2046    lisinopril (PRINIVIL;ZESTRIL) tablet 20 mg  20 mg Oral Daily Radha Tineo PA-C        metoprolol tartrate (LOPRESSOR) tablet 25 mg  25 mg Oral BID Malva Sites, PA-C   25 mg at 04/25/22 2045    atorvastatin (LIPITOR) tablet 20 mg  20 mg Oral Nightly Malva Sites, PA-C   20 mg at 04/25/22 2046    tiotropium-olodaterol (STIOLTO) 2.5-2.5 MCG/ACT inhaler 2 puff  2 puff Inhalation Daily Malva Sites, PA-C        predniSONE (DELTASONE) tablet 10 mg  10 mg Oral BID Malva Sites, PA-C   10 mg at 04/25/22 2045    acetaminophen (TYLENOL) tablet 1,000 mg  1,000 mg Oral Q6H PRN Malva Sites, PA-C        HYDROcodone-acetaminophen (NORCO) 5-325 MG per tablet 1 tablet  1 tablet Oral Q4H PRN Malva Sites, PA-C   1 tablet at 04/25/22 1616    Or    HYDROcodone-acetaminophen (NORCO) 5-325 MG per tablet 2 tablet  2 tablet Oral Q4H PRN Malva Sites, PA-C        lidocaine 4 % external patch 1 patch  1 patch TransDERmal Daily Malva Sites, PA-C   1 patch at 04/25/22 1609    nicotine (NICODERM CQ) 14 MG/24HR 1 patch  1 patch TransDERmal Daily Malva Sites, PA-C   1 patch at 04/25/22 1803       Allergies:  Azulfidine [sulfasalazine], Keflex [cephalexin], Mesalamine, Sulfa antibiotics, and Alcohol    Social History:   Social History     Tobacco Use   Smoking Status Current Some Day Smoker    Packs/day: 1.00    Years: 40.00    Pack years: 40.00    Types: Cigarettes   Smokeless Tobacco Never Used   Tobacco Comment    8-10 cigarettes / day     Social History     Substance and Sexual Activity   Alcohol Use No     Social History     Substance and Sexual Activity   Drug Use Never       Family History:  Family History   Problem Relation Age of Onset    Diabetes Mother     High Blood Pressure Mother     Heart Disease Mother     Coronary Art Dis Father     Heart Disease Father        REVIEW OF SYSTEMS:  Constitutional: Denies any fever, chills. Derm: Denies any rash or skin color change. Eyes: Denies blurred or decreased in vision. Ent: Denies any tinnitus or vertigo.   Resp: Denies any cough or shortness of breath. CV: Denies any syncope, palpitations or chest pain. GI:  Denies any abdominal pain, nausea, vomiting, constipation or diarrhea. : Denies any hematuria, hesitancy, or dysuria. Heme/Lymph: Denies any bleeding. Musculoskeletal: Positive for right hip pain  Neuro: Denies any dizziness, paresthesia or weakness. PHYSICAL EXAM:  Patient Vitals for the past 24 hrs:   BP Temp Temp src Pulse Resp SpO2 Height Weight   04/26/22 0454 (!) 175/86 97.9 °F (36.6 °C) Oral 68 17 93 % -- --   04/26/22 0006 137/66 98.2 °F (36.8 °C) Oral 77 16 94 % -- --   04/25/22 2044 (!) 150/57 98.8 °F (37.1 °C) Oral 70 18 91 % -- --   04/25/22 1602 104/82 98.3 °F (36.8 °C) Oral 92 18 94 % -- --   04/25/22 1230 113/68 97.8 °F (36.6 °C) Oral 79 18 91 % 5' 5\" (1.651 m) 139 lb 3.2 oz (63.1 kg)   04/25/22 1104 (!) 162/75 -- -- 78 16 94 % -- --   04/25/22 0928 (!) 209/188 97.9 °F (36.6 °C) Oral 74 16 95 % 5' 5\" (1.651 m) 140 lb (63.5 kg)     General appearance:  Alert and oriented x 3. No apparent distress, appears stated age and cooperative. HEENT:  Normal cephalic, atraumatic without obvious deformity. Conjunctivae/corneas clear. Neck: Supple, with full range of motion. Respiratory:  Normal respiratory effort. No audible Wheezes or Rhonchi. Cardiovascular:  Regular rate and rhythm. Abdomen: Soft, non-tender, non-distended. Musculoskeletal:  Right hip skin intact, no obvious deformity. Hip motion intact and pain free. Log roll normal. Flex and extends toes and ankle. Exquisite TTP greater troch, mild swelling noted. DP 2+. GMS 5/5. SILT  Skin: Skin color, texture, turgor normal.  No rashes or lesions. Neurologic:  Neurovascularly intact without any focal sensory/motor deficits. Sensation intact.        DATA:  CBC:   Lab Results   Component Value Date    WBC 10.6 04/26/2022    HGB 13.1 04/26/2022     04/26/2022     BMP:    Lab Results   Component Value Date     04/26/2022    K 3.9 04/26/2022     04/26/2022 CO2 23 04/26/2022    BUN 26 04/26/2022    CREATININE 0.7 04/26/2022    CALCIUM 9.1 04/26/2022    GLUCOSE 129 04/26/2022    GLUCOSE 112 06/16/2020     PT/INR:    Lab Results   Component Value Date    PROTIME 11.0 06/16/2020    PROTIME 11.6 01/04/2019    INR 1.02 06/16/2020     Troponin:    Lab Results   Component Value Date    TROPONINI <0.012 01/04/2019     No results for input(s): LIPASE, AMYLASE in the last 72 hours. No results for input(s): AST, ALT, BILIDIR, BILITOT, ALKPHOS in the last 72 hours. Radiology:   XR FEMUR RIGHT (MIN 2 VIEWS)    Result Date: 4/25/2022  PROCEDURE: XR FEMUR RIGHT (MIN 2 VIEWS) CLINICAL INFORMATION: Questionable lytic lesion in the mid femur . COMPARISON: Right hip series from the same date at 10:13 AM TECHNIQUE: AP and lateral views of the distal right femur. FINDINGS: No lytic lesion is identified in the visualized portion of the femur although indeterminate whether the area of concern was included on the radiographs. There is chondrocalcinosis of the right knee. There are vascular calcifications. 1. No lytic lesion identified in the visualized portion of the femur. Indeterminate whether the area in question was included on the radiographs. 2. Chondrocalcinosis of the knee as evidence for CPPD. **This report has been created using voice recognition software. It may contain minor errors which are inherent in voice recognition technology. ** Final report electronically signed by Dr. Axel Bateman MD on 4/25/2022 11:03 AM    XR HIP 2-3 VW W PELVIS RIGHT    Result Date: 4/25/2022  PROCEDURE: XR HIP 2-3 VW W PELVIS RIGHT CLINICAL INFORMATION: right hip pain COMPARISON: No prior study. TECHNIQUE: A single AP view of the pelvis was obtained in addition to AP and frog-leg views of the right hip. FINDINGS: Evidence for old fracture left hip with metallic hardware in place. No fracture of the right hip is seen. Normal abduction of the right hip is demonstrated.  Bones are diffusely demineralized, consistent with osteoporosis. Normal abduction of the right hip is demonstrated. There is a mild degenerative spurring of the right hip. Note that there is a focus of relative lucency in the mid femoral shaft, on the edge of the submitted films. This appearance may simply be related osteoporosis but cannot exclude a lytic process. 1. No acute findings. Mild degenerative changes right hip. 2. Questionable lytic process involving the mid femoral shaft, versus osteoporosis. Routine right femur x-rays recommended. **This report has been created using voice recognition software. It may contain minor errors which are inherent in voice recognition technology. ** Final report electronically signed by Dr. Sarika Phillips on 4/25/2022 10:28 AM      ASSESSMENT:Principal Problem:    Trochanteric bursitis  Resolved Problems:    * No resolved hospital problems. *       PLAN as discussed with Dr. Nayan Schultz:  Cortisone injection today. Procedure: verbal consent received. The right greater troch was cleansed with alcohol. Using sterile technique, 80 mg of Depo-Medrol and 3 cc 1% Lidocaine was injected. Hemostasis obtained. Patient tolerated well. Continue WBAT RLE  Okay for ambulation to the BR. No need for aggressive therapy x1 week. Will increase in 1 week for strengthening and gait.        Electronically signed by PETEY Arguelles CNP on 4/26/2022 at 9:15 AM

## 2022-04-26 NOTE — PROGRESS NOTES
Hospitalist Progress Note      Patient:  Jodie Steward    Unit/Bed:8A-10/010-A  YOB: 1940  MRN: [id number]   Acct: [de-identified]   PCP: PETEY Turner CNP  Date of Admission: 4/25/2022    Disposition: Patient received injection into right hip today per Ortho. However patient continues have persistent pain and difficulty with ambulation. Continue to have PT/OT work with patient and assess for discharge planning. Assessment/Plan:    1. R Trochanteric Bursitis:  ? Patient with history of recurrent right hip bursitis, she reports that she used to get steroid injections into the hip every 6 months, but that has been more than a year since her last injection. Patient is unable to ambulate secondary to the pain. Ortho was contacted from the ED and reported that they are able to do an injection during this admission. XR R hip without acute pathology. ? Consult placed to ortho, appreciate recommendations. PT/OT eval and treat. Pain control with Norco pain panel, Lidoderm patch to R hip. Continue prednisone 10 mg twice daily. 2. COPD without acute exacerbation:   ? Stable at this time, will resume patient's home medications. 3. Essential HTN:   ? Patient's BP elevated on admission. Continue home medications and monitor closely  4. H.o. Dementia:   ? Continue home Aricept    Chief Complaint: right hip pain     Initial H and P:-    \"This is a 80-year-old female who presents to the hospital for right hip pain x5 days. Patient presented today urgent care yesterday for the same complaint, she was prescribed prednisone and Voltaren gel, patient notes no improvement in pain with these measures. Patient notes that it has gotten increasingly difficult for her to ambulate secondary to the pain, and that she is unable to ambulate at all today. 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  She denies any recent trauma or falls. Patient lives at an assisted living home. Patient denies any other symptoms at this time, no chest pain, abdominal pain, nausea, vomiting. Ortho was contacted from the ED, report that they are able to provide an injection during this admission. Will admit for observation. \"     Subjective (past 24 hours):   Patient resting in bed in no acute distress, see notes that she continues to have persistent right hip pain but that she was able to walk to the bathroom today. She notes that this is improvement as yesterday she was unable to walk at all. Ortho was consulted yesterday evening, will see today. Appreciate recommendations. Will continue prednisone 10 mg p.o. twice daily. PT/OT. Past medical history, family history, social history and allergies reviewed again and is unchanged since admission. ROS (All review of systems completed. Pertinent positives noted.  Otherwise All other systems reviewed and negative.)     Medications:  Reviewed    Infusion Medications    sodium chloride       Scheduled Medications    methylPREDNISolone acetate  80 mg Intra-artICUlar Once    lidocaine PF  5 mL IntraDERmal Once    sodium chloride flush  5-40 mL IntraVENous 2 times per day    donepezil  10 mg Oral Nightly    lisinopril  20 mg Oral Daily    metoprolol tartrate  25 mg Oral BID    atorvastatin  20 mg Oral Nightly    tiotropium-olodaterol  2 puff Inhalation Daily    predniSONE  10 mg Oral BID    lidocaine  1 patch TransDERmal Daily    nicotine  1 patch TransDERmal Daily     PRN Meds: hydrALAZINE, sodium chloride flush, sodium chloride, ondansetron **OR** ondansetron, polyethylene glycol, acetaminophen **OR** acetaminophen, magnesium sulfate, potassium chloride, potassium chloride **OR** potassium alternative oral replacement **OR** potassium chloride, albuterol sulfate HFA, acetaminophen, HYDROcodone 5 mg - acetaminophen **OR** HYDROcodone 5 mg - acetaminophen      Intake/Output Summary (Last 24 hours) at 4/26/2022 1233  Last data filed at 4/25/2022 1938  Gross per 24 hour   Intake 200 ml   Output --   Net 200 ml       Diet:  ADULT DIET; Regular    Exam:  BP (!) 151/65   Pulse 55   Temp 97.6 °F (36.4 °C) (Oral)   Resp 17   Ht 5' 5\" (1.651 m)   Wt 139 lb 3.2 oz (63.1 kg)   SpO2 96%   BMI 23.16 kg/m²   General appearance: No apparent distress, appears stated age and cooperative. HEENT: Pupils equal, round, and reactive to light. Conjunctivae/corneas clear. Neck: Supple, with full range of motion. No jugular venous distention. Trachea midline. Respiratory:  Normal respiratory effort. Clear to auscultation, bilaterally without Rales/Wheezes/Rhonchi. Cardiovascular: Regular rate and rhythm with normal S1/S2 without murmurs, rubs or gallops. Abdomen: Soft, non-tender, non-distended with normal bowel sounds. Musculoskeletal: TTP of right hip  Skin: Skin color, texture, turgor normal.  No rashes or lesions. Neurologic:  Neurovascularly intact without any focal sensory/motor deficits. Cranial nerves: II-XII intact, grossly non-focal.  Psychiatric: Alert and oriented, thought content appropriate, normal insight  Capillary Refill: Brisk,< 3 seconds   Peripheral Pulses: +2 palpable, equal bilaterally     Labs:   Recent Labs     04/26/22  0559   WBC 10.6   HGB 13.1   HCT 42.2        Recent Labs     04/26/22  0559      K 3.9      CO2 23   BUN 26*   CREATININE 0.7   CALCIUM 9.1     No results for input(s): AST, ALT, BILIDIR, BILITOT, ALKPHOS in the last 72 hours. No results for input(s): INR in the last 72 hours. No results for input(s): Shellia Bugler in the last 72 hours.     Microbiology:    Blood culture #1:   Lab Results   Component Value Date    BC Sent To Reference Lab - See Separate Report 01/04/2019       Blood culture #2:  Lab Results   Component Value Date    BLOODCULT2 SENT TO REFERENCE LAB, SEE SEPARATE REPORT 01/04/2019       Organism:No results found for: ORG    No results found for: LABGRAM    MRSA culture only:No results found for: [facility]    Urine culture: No results found for: LABURIN    Respiratory culture: No results found for: CULTRESP    Aerobic and Anaerobic :  No results found for: LABAERO  No results found for: LABANAE    Urinalysis:      Lab Results   Component Value Date    NITRU Negative 03/29/2022    WBCUA OCCASIONAL 06/14/2020    WBCUA 0-4 05/24/2018    BACTERIA ABSENT 05/24/2018    RBCUA 1+ 06/14/2020    BLOODU Trace-intact 03/29/2022    GLUCOSEU Negative 03/29/2022       Radiology:  XR FEMUR RIGHT (MIN 2 VIEWS)   Final Result       1. No lytic lesion identified in the visualized portion of the femur. Indeterminate whether the area in question was included on the radiographs. 2. Chondrocalcinosis of the knee as evidence for CPPD. **This report has been created using voice recognition software. It may contain minor errors which are inherent in voice recognition technology. **      Final report electronically signed by Dr. Elisha Rivas MD on 4/25/2022 11:03 AM      XR HIP 2-3 VW W PELVIS RIGHT   Final Result   1. No acute findings. Mild degenerative changes right hip.   2. Questionable lytic process involving the mid femoral shaft, versus osteoporosis. Routine right femur x-rays recommended. **This report has been created using voice recognition software. It may contain minor errors which are inherent in voice recognition technology. **      Final report electronically signed by Dr. Dl Pérez on 4/25/2022 10:28 AM        XR FEMUR RIGHT (MIN 2 VIEWS)    Result Date: 4/25/2022  PROCEDURE: XR FEMUR RIGHT (MIN 2 VIEWS) CLINICAL INFORMATION: Questionable lytic lesion in the mid femur . COMPARISON: Right hip series from the same date at 10:13 AM TECHNIQUE: AP and lateral views of the distal right femur.  FINDINGS: No lytic lesion is identified in the visualized portion of the femur although indeterminate whether the area of concern was included on the radiographs. There is chondrocalcinosis of the right knee. There are vascular calcifications. 1. No lytic lesion identified in the visualized portion of the femur. Indeterminate whether the area in question was included on the radiographs. 2. Chondrocalcinosis of the knee as evidence for CPPD. **This report has been created using voice recognition software. It may contain minor errors which are inherent in voice recognition technology. ** Final report electronically signed by Dr. Lemuel Cabrera MD on 4/25/2022 11:03 AM    XR HIP 2-3 VW W PELVIS RIGHT    Result Date: 4/25/2022  PROCEDURE: XR HIP 2-3 VW W PELVIS RIGHT CLINICAL INFORMATION: right hip pain COMPARISON: No prior study. TECHNIQUE: A single AP view of the pelvis was obtained in addition to AP and frog-leg views of the right hip. FINDINGS: Evidence for old fracture left hip with metallic hardware in place. No fracture of the right hip is seen. Normal abduction of the right hip is demonstrated. Bones are diffusely demineralized, consistent with osteoporosis. Normal abduction of the right hip is demonstrated. There is a mild degenerative spurring of the right hip. Note that there is a focus of relative lucency in the mid femoral shaft, on the edge of the submitted films. This appearance may simply be related osteoporosis but cannot exclude a lytic process. 1. No acute findings. Mild degenerative changes right hip. 2. Questionable lytic process involving the mid femoral shaft, versus osteoporosis. Routine right femur x-rays recommended. **This report has been created using voice recognition software. It may contain minor errors which are inherent in voice recognition technology. ** Final report electronically signed by Dr. Mamta Brown on 4/25/2022 10:28 AM      Electronically signed by Kenya Anne PA-C on 4/26/2022 at 12:33 PM

## 2022-04-26 NOTE — PROGRESS NOTES
[address] [address]  INPATIENT PHYSICAL THERAPY  EVALUATION  STR MED SURG 8A - 8A-10/010-A    Time In: 7056  Time Out: 9838  Timed Code Treatment Minutes: 18 Minutes  Minutes: 26          Date: 2022  Patient Name: Jorge Alexandre,  Gender:  female        MRN: [id number]  : 1940  (80 y.o.)      Referring Practitioner: Lucy Fisher PA-C  Diagnosis: gait instability  Additional Pertinent Hx: Per EMR \"This is a 51-year-old female who presents to the hospital for right hip pain x5 days. Patient presented today urgent care yesterday for the same complaint, she was prescribed prednisone and Voltaren gel, patient notes no improvement in pain with these measures. Patient notes that it has gotten increasingly difficult for her to ambulate secondary to the pain, and that she is unable to ambulate at all today. 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  She denies any recent trauma or falls. Patient lives at an assisted living home. Patient denies any other symptoms at this time, no chest pain, abdominal pain, nausea, vomiting. Ortho was contacted from the ED, report that they are able to provide an injection during this admission. Will admit for observation. \" No acute fx per chart. Pt has trochanteric bursitis, plans for injection. Restrictions/Precautions:  Restrictions/Precautions: Fall Risk,General Precautions    Subjective:  Chart Reviewed: Yes  Patient assessed for rehabilitation services?: Yes  Family / Caregiver Present: No  Subjective: OK to see pt per nursing. Pt amb to bathroom with tech when PT arrived, daughter present.  Pt agreeable to PT session, and willing to sit in bedside chair for <1 hour at most.    General:  Overall Orientation Status: Within Normal Limits    Vision Exceptions: Wears glasses for reading    Hearing: Exceptions to [facility]  Hearing Exceptions: Hard of hearing/hearing concerns         Pain: 0/10: with rest, 10/10 in R hip with mobility, plans for injection    Vitals: Vitals not assessed per clinical judgement, see nursing flowsheet  nursing in to assess vitals after session    Social/Functional History:    Lives With: Alone  Type of Home: Assisted living  Home Layout: One level  Home Access: Level entry  Home Equipment: Rollator,Grab bars     Bathroom Shower/Tub: Walk-in shower  Bathroom Toilet: Handicap height  Bathroom Equipment: Grab bars in shower,Grab bars around toilet  Bathroom Accessibility: Accessible    Receives Help From: Other (comment) (facility staff)  ADL Assistance: Independent  Homemaking Assistance: Needs assistance  Ambulation Assistance: Independent  Transfer Assistance: Independent    Active : No  Occupation: Retired  Additional Comments: Pt reports she is IND with ADL's, amb with 4ww and able to amb to and from the dining room IND. OBJECTIVE:  Range of Motion:  Bilateral Lower Extremity: WFL    Strength:  Bilateral Lower Extremity: Impaired - grossly deconditioned    Balance:  Static Sitting Balance:  Supervision sitting on toilet  Dynamic Sitting Balance: Stand By Assistance to complete ronnie care  Static Standing Balance: Contact Guard Assistance, X 1, RW for support in standing, WBOS for increased stability  Dynamic Standing Balance: Contact Guard Assistance, Minimal Assistance, X 1, with cues for safety, with verbal cues, donning and doffing brief, increased time and 1 UE support required for safety    Bed Mobility:  Not Tested    Transfers:  Sit to Stand: Contact Guard Assistance, Minimal Assistance, X 1, cues for hand placement, with verbal cues  Stand to Sit:Contact Guard Assistance, Minimal Assistance, X 1, cues for hand placement, with verbal cues  RW for support.  Pt completed transfers from various surfaces and heights during session  Ambulation:  Contact Guard Assistance, X 1, with cues for safety, with verbal cues , with increased time for completion  Distance: 10 feet, 6 feet, very slow to complete  Surface: Level Tile  Device:Rolling Walker  Gait Deviations: Forward Flexed Posture, Slow Amanda, Decreased Step Length Bilaterally, Decreased Weight Shift Bilaterally, Decreased Gait Speed and Wide Base of Support  Cues for maintaining RW in close proximity to self at all times, fair demo, manual assist required from PT as pt with RW far out in front, increasing risk for falls. Exercise:  nursing in for assessment, not able to complete    Functional Outcome Measures: Completed  Balance Score: 5  Gait Score: 4  Tinetti Total Score: 9  AM-PAC Inpatient Mobility without Stair Climbing Raw Score : 13  AM-PAC Inpatient without Stair Climbing T-Scale Score : 38.96    ASSESSMENT:  Activity Tolerance:  Patient tolerance of  treatment: fair. Increased pain      Treatment Initiated: Treatment and education initiated within context of evaluation. Evaluation time included review of current medical information, gathering information related to past medical, social and functional history, completion of standardized testing, formal and informal observation of tasks, assessment of data and development of plan of care and goals. Treatment time included skilled education and facilitation of tasks to increase safety and independence with functional mobility for improved independence and quality of life. Assessment: Body Structures, Functions, Activity Limitations Requiring Skilled Therapeutic Intervention: Decreased functional mobility ,Decreased cognition,Decreased endurance,Increased pain,Decreased balance,Decreased posture,Decreased strength,Decreased safe awareness,Decreased coordination  Assessment: Pt IND prior with use of 4ww for support. Pt requires 1 person assist for mobility tasks with RW, increased time for all tasks. Pt would benefit from skilled therapy stay prior to return to [facility] as pt unsafe to return alone, high risk for falls as noted by tinetti score and to reduce reliance on others for support.   Therapy Prognosis: Good    Requires PT Follow-Up: Yes    Discharge Recommendations:  Discharge Recommendations: Continue to assess pending progress,Subacute/Skilled Nursing Gianna Richards would benefit from continued therapy after discharge    Patient Education:   . Patient Education  Education Given To: Patient,Family  Education Provided: Role of Therapy,Plan of Care,Transfer [employer] Provided Comments: use of call light for assist from staff, not with daughter for safety concerns  Education Method: Demonstration,Verbal,Teach Back  Education Outcome: Verbalized understanding,Demonstrated understanding,Continued education needed      Equipment Recommendations:  Equipment Needed: No    Plan:  Current Treatment Recommendations: Strengthening,Gait training,Balance training,Equipment evaluation, education, & procurement,Functional mobility training,Transfer training,Neuromuscular re-education,Safety education & training,Patient/Caregiver education & training,Endurance training  Plan:  (5x GM)    Goals:  Patient goals : \"go home\"  Short Term Goals  Time Frame for Short term goals: by discharge  Short term goal 1: Pt will amb for >100 feet with RW for support and S for safety to progress with overall mobility. Short term goal 2: Pt will demo S for transfers with RW for support and good safety to progress with mobility. Short term goal 3: Pt will tolerate 10-20 reps of ther ex to increase overall mobility. Short term goal 4: Pt will demo improved tinetti score to >15/28 to increase safety and decrease risk for falls. Long Term Goals  Time Frame for Long term goals : NA due to short ELOS    Following session, patient left in safe position with all fall risk precautions in place. In chair, alarm on, nursing in room.

## 2022-04-27 PROCEDURE — 2580000003 HC RX 258

## 2022-04-27 PROCEDURE — 97110 THERAPEUTIC EXERCISES: CPT

## 2022-04-27 PROCEDURE — 97530 THERAPEUTIC ACTIVITIES: CPT

## 2022-04-27 PROCEDURE — 97535 SELF CARE MNGMENT TRAINING: CPT

## 2022-04-27 PROCEDURE — 99232 SBSQ HOSP IP/OBS MODERATE 35: CPT | Performed by: PHYSICIAN ASSISTANT

## 2022-04-27 PROCEDURE — G0378 HOSPITAL OBSERVATION PER HR: HCPCS

## 2022-04-27 PROCEDURE — 6370000000 HC RX 637 (ALT 250 FOR IP)

## 2022-04-27 PROCEDURE — 97116 GAIT TRAINING THERAPY: CPT

## 2022-04-27 RX ADMIN — TIOTROPIUM BROMIDE AND OLODATEROL 2 PUFF: 3.124; 2.736 SPRAY, METERED RESPIRATORY (INHALATION) at 09:13

## 2022-04-27 RX ADMIN — SODIUM CHLORIDE, PRESERVATIVE FREE 10 ML: 5 INJECTION INTRAVENOUS at 20:11

## 2022-04-27 RX ADMIN — METOPROLOL TARTRATE 25 MG: 25 TABLET, FILM COATED ORAL at 08:54

## 2022-04-27 RX ADMIN — DONEPEZIL HYDROCHLORIDE 10 MG: 10 TABLET, FILM COATED ORAL at 20:09

## 2022-04-27 RX ADMIN — PREDNISONE 10 MG: 10 TABLET ORAL at 20:09

## 2022-04-27 RX ADMIN — HYDROCODONE BITARTRATE AND ACETAMINOPHEN 1 TABLET: 5; 325 TABLET ORAL at 13:08

## 2022-04-27 RX ADMIN — ATORVASTATIN CALCIUM 20 MG: 20 TABLET, FILM COATED ORAL at 20:09

## 2022-04-27 RX ADMIN — HYDROCODONE BITARTRATE AND ACETAMINOPHEN 2 TABLET: 5; 325 TABLET ORAL at 21:49

## 2022-04-27 RX ADMIN — PREDNISONE 10 MG: 10 TABLET ORAL at 08:54

## 2022-04-27 RX ADMIN — SODIUM CHLORIDE, PRESERVATIVE FREE 10 ML: 5 INJECTION INTRAVENOUS at 08:56

## 2022-04-27 RX ADMIN — METOPROLOL TARTRATE 25 MG: 25 TABLET, FILM COATED ORAL at 20:10

## 2022-04-27 RX ADMIN — HYDROCODONE BITARTRATE AND ACETAMINOPHEN 1 TABLET: 5; 325 TABLET ORAL at 08:51

## 2022-04-27 RX ADMIN — LISINOPRIL 20 MG: 20 TABLET ORAL at 08:54

## 2022-04-27 RX ADMIN — HYDROCODONE BITARTRATE AND ACETAMINOPHEN 1 TABLET: 5; 325 TABLET ORAL at 17:23

## 2022-04-27 ASSESSMENT — PAIN DESCRIPTION - DESCRIPTORS
DESCRIPTORS: SHARP
DESCRIPTORS: ACHING;DISCOMFORT
DESCRIPTORS: ACHING;DISCOMFORT
DESCRIPTORS: SHARP

## 2022-04-27 ASSESSMENT — PAIN DESCRIPTION - FREQUENCY: FREQUENCY: INTERMITTENT

## 2022-04-27 ASSESSMENT — PAIN DESCRIPTION - LOCATION
LOCATION: HIP
LOCATION: LEG
LOCATION: HIP
LOCATION: HIP

## 2022-04-27 ASSESSMENT — PAIN SCALES - GENERAL
PAINLEVEL_OUTOF10: 7
PAINLEVEL_OUTOF10: 8
PAINLEVEL_OUTOF10: 0
PAINLEVEL_OUTOF10: 7
PAINLEVEL_OUTOF10: 4

## 2022-04-27 ASSESSMENT — PAIN DESCRIPTION - ORIENTATION
ORIENTATION: RIGHT

## 2022-04-27 ASSESSMENT — PAIN DESCRIPTION - PAIN TYPE
TYPE: CHRONIC PAIN
TYPE: ACUTE PAIN

## 2022-04-27 ASSESSMENT — PAIN DESCRIPTION - ONSET: ONSET: ON-GOING

## 2022-04-27 ASSESSMENT — PAIN - FUNCTIONAL ASSESSMENT: PAIN_FUNCTIONAL_ASSESSMENT: PREVENTS OR INTERFERES WITH MANY ACTIVE NOT PASSIVE ACTIVITIES

## 2022-04-27 NOTE — PROGRESS NOTES
[address] [place] [address]  Occupational Therapy  Daily Note  Time:   Time In: 1330  Time Out: 1353  Timed Code Treatment Minutes: 23 Minutes  Minutes: 23          Date: 2022  Patient Name: Shimon Ag,   Gender: female      Room: [facility]10/[facility]-A  MRN: [id number]  : 1940  (80 y.o.)  Referring Practitioner: Lyndon Blue PA-C  Diagnosis: Gait instability  Additional Pertinent Hx: Per EMR \"This is a 44-year-old female who presents to the hospital for right hip pain x5 days. Patient presented today urgent care yesterday for the same complaint, she was prescribed prednisone and Voltaren gel, patient notes no improvement in pain with these measures. Patient notes that it has gotten increasingly difficult for her to ambulate secondary to the pain, and that she is unable to ambulate at all today. 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  She denies any recent trauma or falls. Patient lives at an assisted living home. Patient denies any other symptoms at this time, no chest pain, abdominal pain, nausea, vomiting. Ortho was contacted from the ED, report that they are able to provide an injection during this admission. Will admit for observation. \" No acute fx per chart. Pt has trochanteric bursitis, plans for injection. Restrictions/Precautions:  Restrictions/Precautions: Fall Risk,General Precautions,Weight Bearing  Right Lower Extremity Weight Bearing: Weight Bearing As Tolerated     SUBJECTIVE: Pt sitting on toilet upon arrival, pt's daughter present in room, with daughter educated on gait belt usage, and pt agreeable to OT at this time. PAIN: 6/10: RLE with pt not due for pain medication at this time     Vitals: Nurse checked vitals prior to session    COGNITION: Slow Processing and Inattention    ADL:   Grooming: [employer].   with pt standing at the sink to wash hands with pt noted to be slightly shaky  Upper Extremity Dressing: Minimal Assistance. with unbuttoning gown, and for doffing around back  Toileting: Stand By Assistance. .  Toilet Transfer: [address] due to increased difficulty standing . BALANCE:  Standing Balance: Contact Guard Assistance. with pt standing at the sink for 3 minutes with rollator walker, and vc's for upright posture with BUE release    BED MOBILITY:  Sit to Supine: Stand By Assistance with HOB lowered, and pt then able to propel self up in bed with railing    TRANSFERS:  Sit to Stand:  Contact Guard Assistance. for safety  Stand to Sit: Contact Guard Assistance. back to the EOB    FUNCTIONAL MOBILITY:  Assistive Device: rollator walker  Assist Level:  Contact Guard Assistance. Distance: short distances, with pt noted to have very slow steady steps, with stopping 3 times for rest breaks. Pt given vc's for bigger steps and for walker navigation    ASSESSMENT:     Activity Tolerance:  Patient tolerance of  treatment: good. Discharge Recommendations: ECF with OT  Equipment Recommendations: Equipment Needed:  (possibly a reacher)  Plan: Times per Week: 5x  Times per Day: Daily  Current Treatment Recommendations: Strengthening,Balance training,Functional mobility training,Endurance training,Patient/Caregiver education & training,Safety education & training,Self-Care / ADL    Patient Education  Patient Education: Importance of Increasing Activity    Goals  Short Term Goals  Time Frame for Short term goals: by discharge  Short Term Goal 1: pt will complete functional mobility to/from bathroom, progressing within [facility] distances, with S to access ADLs  Short Term Goal 2: pt will complete LB ADLs with SBA using LHAE prn to increase indep with donning pants  Short Term Goal 3: pt will tolerate dynamic standing task x8 minutes with S and 1-2 UE release to increase ease with grooming tasks    Following session, patient left in safe position with all fall risk precautions in place.

## 2022-04-27 NOTE — PROGRESS NOTES
[employer]   Herbal and Nutritional Product Restrictions      The following herbal, alternative, and/or nutritional/dietary supplement product(s) has been discontinued per [employer] approved policy:      Arthocin 2 capsules PO daily    Please reorder upon discharge if appropriate.     Thank you,  Christi Alicea, [facility]  4/27/2022 6:13 PM

## 2022-04-27 NOTE — PROGRESS NOTES
[address] [place] [address]  INPATIENT PHYSICAL THERAPY  DAILY NOTE  [facility] SURG 8A - 8A-10010-A  Time In: 3298  Time Out: 1105  Timed Code Treatment Minutes: 26 Minutes  Minutes: 26          Date: 2022  Patient Name: Margo Valles,  Gender:  female        MRN: [id number]  : 1940  (80 y.o.)     Referring Practitioner: Chin Boyce PA-C  Diagnosis: gait instability  Additional Pertinent Hx: Per EMR \"This is a 80-year-old female who presents to the hospital for right hip pain x5 days. Patient presented today urgent care yesterday for the same complaint, she was prescribed prednisone and Voltaren gel, patient notes no improvement in pain with these measures. Patient notes that it has gotten increasingly difficult for her to ambulate secondary to the pain, and that she is unable to ambulate at all today. 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  She denies any recent trauma or falls. Patient lives at an assisted living home. Patient denies any other symptoms at this time, no chest pain, abdominal pain, nausea, vomiting. Ortho was contacted from the ED, report that they are able to provide an injection during this admission. Will admit for observation. \" No acute fx per chart. Pt has trochanteric bursitis, plans for injection.      Prior Level of Function:  Lives With: Alone  Type of Home: Assisted living ([facility])  Home Layout: One level  Home Access: Level entry  Home Equipment: Rollator,Grab bars   Bathroom Shower/Tub: Walk-in shower  Bathroom Toilet: Handicap height  Bathroom Equipment: Grab bars in shower,Grab bars around toilet  Bathroom Accessibility: Accessible    Receives Help From: Other (comment) (facility staff)  ADL Assistance: Independent  Homemaking Assistance: Needs assistance  Ambulation Assistance: Independent  Transfer Assistance: Independent  Active : No  Additional Comments: Pt reports she is IND with ADL's, amb with 4ww and able to amb to and from the dining room IND. Restrictions/Precautions:  Restrictions/Precautions: Fall Risk,General Precautions,Weight Bearing  Right Lower Extremity Weight Bearing: Weight Bearing As Tolerated     SUBJECTIVE: OK to see pt per nursing, daughter present. Pt in bed agreeable to PT session and to sit in bedside chair for lunch. Daughter reports pt had an injection in her hip yesterday. Pt confused this date, kept saying to PT Kasey Hernadez is on the welfare floor\" x2. Pt required re-orientation. PAIN: 8/10: R hip with mobility    Vitals: Vitals not assessed per clinical judgement, see nursing flowsheet    OBJECTIVE:  Bed Mobility:  Rolling to Right: Stand By Assistance, X 1, with head of bed raised, with rail   Supine to Sit: Contact Guard Assistance, X 1, with head of bed raised, with rail, with verbal cues , with increased time for completion    Transfers:  Sit to Stand: Contact Guard Assistance, X 1, with increased time for completion, cues for hand placement, with verbal cues  Stand to Sit:Minimal Assistance, X 1, with increased time for completion, cues for hand placement, with verbal cues  4ww for support, reduced control with descent, education on safety  Ambulation:  [employer], X 1, with cues for safety, with verbal cues , with increased time for completion  Distance: 80 feet, very slow to complete  Surface: Level Tile  Device:4 Wheeled Walker  Gait Deviations: Forward Flexed Posture, Slow Amanda, Decreased Step Length Bilaterally, Decreased Weight Shift Right and Decreased Gait Speed  Cues for safety with mobility and awareness of obstacles in room and in hallway, fair safety noted  Balance:  Static Sitting Balance:  Supervision  Dynamic Sitting Balance: Supervision, Stand By Assistance  Static Standing Balance: Stand By Assistance, Contact Guard Assistance, X 1  4ww for support in standing, no LOB noted.  Mild unsteadiness with initial standing  Exercise:  Patient was guided in 1 set(s) 15 reps of exercise to both lower extremities. Ankle pumps, Glut sets, Quad sets, Heelslides, Hip abduction/adduction and Straight leg raises. Exercises were completed for increased independence with functional mobility. VC for proper technique of exercises for completion with good demo    Functional Outcome Measures: Completed  AM-PAC Inpatient Mobility without Stair Climbing Raw Score : 15  AM-PAC Inpatient without Stair Climbing T-Scale Score : 43.03    ASSESSMENT:  Assessment: Patient progressing toward established goals. Improved gait distances this date  Activity Tolerance:  Patient tolerance of  treatment: fair. Equipment Recommendations:Equipment Needed: No  Discharge Recommendations: Subacte/Skilled Nursing Facility and Patient would benefit from continued PT at discharge  Plan: Current Treatment Recommendations: Strengthening,Gait training,Balance training,Equipment evaluation, education, & procurement,Functional mobility training,Transfer training,Neuromuscular re-education,Safety education & training,Patient/Caregiver education & training,Endurance training  Plan:  (5x GM)    Patient Education  Patient Education: Plan of Care, Family Education, [employer] Mobility, Equipment Education, Transfers, Gait, Use of [employer], Up in Chair for Lien Mendel,  - Patient Verbalized Understanding, - Patient Requires Continued Education    Goals:  Patient goals : \"go home\"  Short Term Goals  Time Frame for Short term goals: by discharge  Short term goal 1: Pt will amb for >100 feet with RW for support and S for safety to progress with overall mobility. Short term goal 2: Pt will demo S for transfers with RW for support and good safety to progress with mobility. Short term goal 3: Pt will tolerate 10-20 reps of ther ex to increase overall mobility. Short term goal 4: Pt will demo improved tinetti score to >15/28 to increase safety and decrease risk for falls.   Long Term Goals  Time Frame for Long term goals : NA due to short ROMEL    Following session, patient left in safe position with all fall risk precautions in place. In chair, alarm on.

## 2022-04-27 NOTE — PROGRESS NOTES
Hospitalist Progress Note      Patient:  Antonio Deng    Unit/Bed:8A-10/010-A  YOB: 1940  MRN: [id number]   Acct: [de-identified]   PCP: PETEY Stringer CNP  Date of Admission: 4/25/2022    Disposition: Plan for tomorrow discharge back to [facility]. Assessment/Plan:    1. R Trochanteric Bursitis:  ? Patient with history of recurrent right hip bursitis, she reports that she used to get steroid injections into the hip every 6 months, but that has been more than a year since her last injection. Patient is unable to ambulate secondary to the pain. Ortho was contacted from the ED and reported that they are able to do an injection during this admission. XR R hip without acute pathology. ? Ortho consulted; pt had hip steroid injection. PT/OT eval and treat- recommending SNF; pt going back to [place]. Pain control with Norco pain panel, Lidoderm patch to R hip. Continue prednisone 10 mg twice daily. 2. COPD without acute exacerbation:   ? Stable at this time, will resume patient's home medications. 3. Essential HTN:   ? Patient's BP elevated on admission. Continue home medications and monitor closely  4. H.o. Dementia:   ? Continue home Aricept    Chief Complaint: right hip pain     Initial H and P:-    \"This is a 80-year-old female who presents to the hospital for right hip pain x5 days. Patient presented today urgent care yesterday for the same complaint, she was prescribed prednisone and Voltaren gel, patient notes no improvement in pain with these measures. Patient notes that it has gotten increasingly difficult for her to ambulate secondary to the pain, and that she is unable to ambulate at all today. 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  She denies any recent trauma or falls. Patient lives at an assisted living home.   Patient denies any other symptoms at this time, no chest pain, abdominal pain, nausea, vomiting. Ortho was contacted from the ED, report that they are able to provide an injection during this admission. Will admit for observation. \"     4/26: Patient resting in bed in no acute distress, see notes that she continues to have persistent right hip pain but that she was able to walk to the bathroom today. She notes that this is improvement as yesterday she was unable to walk at all. Ortho was consulted yesterday evening, will see today. Appreciate recommendations. Will continue prednisone 10 mg p.o. twice daily. PT/OT. Subjective (past 24 hours):   No acute events overnight. Pt states that her pain is relatively controlled when she is laying in bed. Pt has not walked much du to pain however. Pt has no issues or concerns at this time. Past medical history, family history, social history and allergies reviewed again and is unchanged since admission. ROS (All review of systems completed. Pertinent positives noted.  Otherwise All other systems reviewed and negative.)     Medications:  Reviewed    Infusion Medications    sodium chloride       Scheduled Medications    methylPREDNISolone acetate  80 mg Intra-artICUlar Once    lidocaine PF  5 mL IntraDERmal Once    nicotine  1 patch TransDERmal Daily    sodium chloride flush  5-40 mL IntraVENous 2 times per day    donepezil  10 mg Oral Nightly    lisinopril  20 mg Oral Daily    metoprolol tartrate  25 mg Oral BID    atorvastatin  20 mg Oral Nightly    tiotropium-olodaterol  2 puff Inhalation Daily    predniSONE  10 mg Oral BID    lidocaine  1 patch TransDERmal Daily     PRN Meds: hydrALAZINE, sodium chloride flush, sodium chloride, ondansetron **OR** ondansetron, polyethylene glycol, acetaminophen **OR** acetaminophen, magnesium sulfate, potassium chloride, potassium chloride **OR** potassium alternative oral replacement **OR** potassium chloride, albuterol sulfate HFA, acetaminophen, HYDROcodone 5 mg - acetaminophen **OR** HYDROcodone 5 mg - acetaminophen      Intake/Output Summary (Last 24 hours) at 4/27/2022 1210  Last data filed at 4/27/2022 0315  Gross per 24 hour   Intake 730 ml   Output 750 ml   Net -20 ml       Diet:  ADULT DIET; Regular    Exam:  BP (!) 121/58   Pulse 54   Temp 98 °F (36.7 °C) (Oral)   Resp 16   Ht 5' 5\" (1.651 m)   Wt 138 lb 14.4 oz (63 kg)   SpO2 90%   BMI 23.11 kg/m²   General appearance: No apparent distress, appears stated age and cooperative. HEENT: Pupils equal, round, and reactive to light. Conjunctivae/corneas clear. Neck: Supple, with full range of motion. No jugular venous distention. Trachea midline. Respiratory:  Normal respiratory effort. Clear to auscultation, bilaterally without Rales/Wheezes/Rhonchi. Cardiovascular: Regular rate and rhythm with normal S1/S2 without murmurs, rubs or gallops. Abdomen: Soft, non-tender, non-distended with normal bowel sounds. Musculoskeletal: TTP of right hip  Skin: Skin color, texture, turgor normal.  No rashes or lesions. Neurologic:  Neurovascularly intact without any focal sensory/motor deficits. Cranial nerves: II-XII intact, grossly non-focal.  Psychiatric: Alert and oriented, thought content appropriate, normal insight  Capillary Refill: Brisk,< 3 seconds   Peripheral Pulses: +2 palpable, equal bilaterally     Labs:   Recent Labs     04/26/22  0559   WBC 10.6   HGB 13.1   HCT 42.2        Recent Labs     04/26/22  0559      K 3.9      CO2 23   BUN 26*   CREATININE 0.7   CALCIUM 9.1     No results for input(s): AST, ALT, BILIDIR, BILITOT, ALKPHOS in the last 72 hours. No results for input(s): INR in the last 72 hours. No results for input(s): [place] in the last 72 hours.     Microbiology:    Blood culture #1:   Lab Results   Component Value Date    BC Sent To Reference Lab - See Separate Report 01/04/2019       Blood culture #2:  Lab Results   Component Value Date    BLOODCULT2 SENT TO REFERENCE LAB, SEE SEPARATE REPORT 01/04/2019     Urinalysis:      Lab Results   Component Value Date    NITRU Negative 03/29/2022    WBCUA OCCASIONAL 06/14/2020    WBCUA 0-4 05/24/2018    BACTERIA ABSENT 05/24/2018    RBCUA 1+ 06/14/2020    BLOODU Trace-intact 03/29/2022    GLUCOSEU Negative 03/29/2022       Radiology:  XR FEMUR RIGHT (MIN 2 VIEWS)   Final Result       1. No lytic lesion identified in the visualized portion of the femur. Indeterminate whether the area in question was included on the radiographs. 2. Chondrocalcinosis of the knee as evidence for CPPD. **This report has been created using voice recognition software. It may contain minor errors which are inherent in voice recognition technology. **      Final report electronically signed by Dr. Juliet Sosa MD on 4/25/2022 11:03 AM      XR HIP 2-3 VW W PELVIS RIGHT   Final Result   1. No acute findings. Mild degenerative changes right hip.   2. Questionable lytic process involving the mid femoral shaft, versus osteoporosis. Routine right femur x-rays recommended. **This report has been created using voice recognition software. It may contain minor errors which are inherent in voice recognition technology. **      Final report electronically signed by Dr. Lucila Singer on 4/25/2022 10:28 AM        Electronically signed by GLENN Brown on 4/27/2022 at 12:10 PM

## 2022-04-27 NOTE — CARE COORDINATION
4/27/22, 1:38 PM EDT    DISCHARGE ON GOING [address] day: 0  Location: 8A-10/010-A Reason for admit: Gait instability [R26.81]  Trochanteric bursitis [M70.60]  Trochanteric bursitis of right hip [M70.61]   Procedure:   4/25bXR right femur: 1. No lytic lesion identified in the visualized portion of the femur. Indeterminate whether the area in question was included on the radiographs. 2. Chondrocalcinosis of the knee as evidence for CPPD.   4/25 XR pelvis: 1. No acute findings. Mild degenerative changes right hip. 2. Questionable lytic process involving the mid femoral shaft, versus osteoporosis. Routine right femur x-rays recommended.    4/26 Cortisone injection  Barriers to Discharge: Pain control. Continue working with PT/OT. PCP: PETEY uTrner - CNP   %  Patient Goals/Plan/Treatment Preferences: Return to [facility].

## 2022-04-28 VITALS
TEMPERATURE: 97.6 F | HEART RATE: 61 BPM | HEIGHT: 65 IN | BODY MASS INDEX: 23.14 KG/M2 | SYSTOLIC BLOOD PRESSURE: 135 MMHG | DIASTOLIC BLOOD PRESSURE: 68 MMHG | WEIGHT: 138.9 LBS | OXYGEN SATURATION: 94 % | RESPIRATION RATE: 16 BRPM

## 2022-04-28 PROBLEM — R53.1 GENERALIZED WEAKNESS: Status: ACTIVE | Noted: 2022-04-28

## 2022-04-28 PROCEDURE — 6370000000 HC RX 637 (ALT 250 FOR IP)

## 2022-04-28 PROCEDURE — 97535 SELF CARE MNGMENT TRAINING: CPT

## 2022-04-28 PROCEDURE — 1200000000 HC SEMI PRIVATE

## 2022-04-28 PROCEDURE — 99239 HOSP IP/OBS DSCHRG MGMT >30: CPT | Performed by: PHYSICIAN ASSISTANT

## 2022-04-28 PROCEDURE — G0378 HOSPITAL OBSERVATION PER HR: HCPCS

## 2022-04-28 PROCEDURE — 94640 AIRWAY INHALATION TREATMENT: CPT

## 2022-04-28 RX ORDER — HYDROCODONE BITARTRATE AND ACETAMINOPHEN 5; 325 MG/1; MG/1
1 TABLET ORAL EVERY 4 HOURS PRN
Qty: 22 TABLET | Refills: 0 | Status: SHIPPED | OUTPATIENT
Start: 2022-04-28 | End: 2022-05-03

## 2022-04-28 RX ADMIN — TIOTROPIUM BROMIDE AND OLODATEROL 2 PUFF: 3.124; 2.736 SPRAY, METERED RESPIRATORY (INHALATION) at 08:34

## 2022-04-28 RX ADMIN — HYDROCODONE BITARTRATE AND ACETAMINOPHEN 2 TABLET: 5; 325 TABLET ORAL at 09:00

## 2022-04-28 RX ADMIN — HYDROCODONE BITARTRATE AND ACETAMINOPHEN 2 TABLET: 5; 325 TABLET ORAL at 13:34

## 2022-04-28 RX ADMIN — LISINOPRIL 20 MG: 20 TABLET ORAL at 08:28

## 2022-04-28 RX ADMIN — PREDNISONE 10 MG: 10 TABLET ORAL at 08:27

## 2022-04-28 RX ADMIN — HYDROCODONE BITARTRATE AND ACETAMINOPHEN 2 TABLET: 5; 325 TABLET ORAL at 04:43

## 2022-04-28 RX ADMIN — METOPROLOL TARTRATE 25 MG: 25 TABLET, FILM COATED ORAL at 08:27

## 2022-04-28 ASSESSMENT — PAIN DESCRIPTION - ORIENTATION
ORIENTATION: RIGHT

## 2022-04-28 ASSESSMENT — PAIN DESCRIPTION - LOCATION
LOCATION: HIP

## 2022-04-28 ASSESSMENT — PAIN SCALES - GENERAL
PAINLEVEL_OUTOF10: 7

## 2022-04-28 ASSESSMENT — PAIN DESCRIPTION - DESCRIPTORS
DESCRIPTORS: ACHING
DESCRIPTORS: SHARP

## 2022-04-28 ASSESSMENT — PAIN - FUNCTIONAL ASSESSMENT: PAIN_FUNCTIONAL_ASSESSMENT: PREVENTS OR INTERFERES WITH MANY ACTIVE NOT PASSIVE ACTIVITIES

## 2022-04-28 NOTE — CARE COORDINATION
4/28/22, 9:07 AM EDT    DISCHARGE PLANNING EVALUATION     Spoke with [facility] at [facility], assistant MCADAMS, informed of patient discharge today and new PT &OT orders. They would like AVS and orders faxed as well as report called to [facility]. 4/28/22, 9:06 AM EDT    Patient goals/plan/ treatment preferences discussed by  and . Patient goals/plan/ treatment preferences reviewed with patient/ family. Patient/ family verbalize understanding of discharge plan and are in agreement with goal/plan/treatment preferences. Understanding was demonstrated using the teach back method. AVS provided by RN at time of discharge, which includes all necessary medical information pertaining to the patients current course of illness, treatment, post-discharge goals of care, and treatment preferences. Services At/After Discharge: Home Health, OT and PT           [facility] of [facility]. New PT/OT ordered, they will set up. Will fax AVS and orders when completed, RN aware to call report. Family will transport.

## 2022-04-28 NOTE — DISCHARGE SUMMARY
Hospitalist Discharge Summary        Patient: Koki Stanton  YOB: 1940  MRN: [id number]   Acct: [de-identified]    Primary Care Physician: PETEY Palacio CNP    Admit date  4/25/2022    Discharge date: 4/28/2022    Chief Complaint on presentation :-  Right Hip Pain     Discharge Assessment and Plan:-   1. R Trochanteric Bursitis:  ? Patient with history of recurrent right hip bursitis, she reports that she used to get steroid injections into the hip every 6 months, but that has been more than a year since her last injection.  Patient is unable to ambulate secondary to the pain. Reji Funes was contacted from the ED and reported that they are able to do an injection during this admission. XR R hip without acute pathology.   ? Ortho consulted; pt had hip steroid injection. PT/OT eval and treat- recommending SNF; pt going back to [place] with HH PT/OT. Pain control with Norco- script sent with patient as an OP. Continue prednisone 10 mg twice daily. Pt will need to follow up with Ortho. 2. COPD without acute exacerbation:   ? Stable at this time, will resume patient's home medications. 3. Essential HTN:   ? Patient's BP elevated on admission. Continue home medications and monitor closely  4. H.o.  Dementia:   ? Continue home Aricept      Initial H and P and Hospital course:-  \"This is a 58-year-old female who presents to the hospital for right hip pain x5 days.  Patient presented today urgent care yesterday for the same complaint, she was prescribed prednisone and Voltaren gel, patient notes no improvement in pain with these measures.  Patient notes that it has gotten increasingly difficult for her to ambulate secondary to the pain, and that she is unable to ambulate at all today.  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  She denies any recent trauma or falls.  Patient lives at an assisted living home.  Patient denies any other symptoms at this time, no chest pain, abdominal pain, nausea, vomiting. Kulwant Garber was contacted from the ED, report that they are able to provide an injection during this admission.  Will admit for observation. \"      4/26: Patient resting in bed in no acute distress, see notes that she continues to have persistent right hip pain but that she was able to walk to the bathroom today. She notes that this is improvement as yesterday she was unable to walk at all. Ortho was consulted yesterday evening, will see today. Appreciate recommendations. Will continue prednisone 10 mg p.o. twice daily. PT/OT.    4/27: No acute events overnight. Pt states that her pain is relatively controlled when she is laying in bed. Pt has not walked much du to pain however. Pt has no issues or concerns at this time.        4/28: Pt walking in her room with little issue. Pt states that her pain is more controlled. .On the day of discharge, it was explained to the patient that it was very important to follow up with his PCP and Ortho to have continued care. Appointments were made and information was given. Physical Exam:-  General appearance: No apparent distress, appears stated age and cooperative. HEENT: Pupils equal, round, and reactive to light. Conjunctivae/corneas clear. Neck: Supple, with full range of motion. No jugular venous distention. Trachea midline. Respiratory:  Normal respiratory effort. Clear to auscultation, bilaterally without Rales/Wheezes/Rhonchi. Cardiovascular: Regular rate and rhythm with normal S1/S2 without murmurs, rubs or gallops. Abdomen: Soft, non-tender, non-distended with normal bowel sounds. Musculoskeletal: TTP of right hip  Skin: Skin color, texture, turgor normal.  No rashes or lesions. Neurologic:  Neurovascularly intact without any focal sensory/motor deficits.  Cranial nerves: II-XII intact, grossly non-focal.  Psychiatric: Alert and oriented, thought content appropriate, normal insight  Capillary Refill: Brisk,< 3 seconds   Peripheral Pulses: +2 palpable, equal bilaterally     Vitals:   Patient Vitals for the past 24 hrs:   BP Temp Temp src Pulse Resp SpO2   04/28/22 1147 135/68 97.6 °F (36.4 °C) Oral 61 -- 94 %   04/28/22 0815 117/85 97.7 °F (36.5 °C) Oral 65 -- 98 %   04/28/22 0328 126/62 97.7 °F (36.5 °C) Oral 64 16 94 %   04/27/22 1945 134/60 97.9 °F (36.6 °C) Oral 69 15 92 %   04/27/22 1530 (!) 123/59 98 °F (36.7 °C) Oral 67 16 91 %     Weight:   Weight: 138 lb 14.4 oz (63 kg)   24 hour intake/output:     Intake/Output Summary (Last 24 hours) at 4/28/2022 1223  Last data filed at 4/28/2022 1996  Gross per 24 hour   Intake 700 ml   Output --   Net 700 ml     Discharge Medications:-      Medication List      START taking these medications    HYDROcodone-acetaminophen 5-325 MG per tablet  Commonly known as: Norco  Take 1 tablet by mouth every 4 hours as needed for Pain for up to 5 days. Intended supply: 3 days.  Take lowest dose possible to manage pain        CHANGE how you take these medications    diclofenac sodium 1 % Gel  Commonly known as: VOLTAREN  Apply 2 g topically 4 times daily  What changed:   · when to take this  · reasons to take this     predniSONE 10 MG tablet  Commonly known as: DELTASONE  Take 1 tablet by mouth 2 times daily for 5 days  What changed:   · how much to take  · when to take this        CONTINUE taking these medications    acetaminophen 500 MG tablet  Commonly known as: TYLENOL  Take 2 tablets by mouth 3 times daily as needed for Pain     * albuterol (2.5 MG/3ML) 0.083% nebulizer solution  Commonly known as: PROVENTIL     * albuterol sulfate  (90 Base) MCG/ACT inhaler  Inhale 2 puffs into the lungs every 6 hours as needed for Wheezing     denosumab 60 MG/ML Sosy SC injection  Commonly known as: PROLIA  Inject 1 mL into the skin every 6 months     donepezil 10 MG tablet  Commonly known as: ARICEPT  Take 1 tablet by mouth nightly     Glucosamine-Chondroitin 500-250 MG Caps     ibuprofen 400 MG tablet  Commonly known as: ADVIL;MOTRIN     ipratropium 0.02 % nebulizer solution  Commonly known as: ATROVENT     lisinopril 20 MG tablet  Commonly known as: PRINIVIL;ZESTRIL     metoprolol tartrate 25 MG tablet  Commonly known as: LOPRESSOR  Take 1 tablet by mouth 2 times daily     * Misc. Devices Misc  Please apply compression stockings (JOHANNE hose) to bilateral lower extremities for 12 hours daily. Remove nightly. * Misc. Devices Misc  1 set of bed rails. MULTIVITAMIN ADULT PO     Unidesk PO     Psyllium Fiber 0.52 g Caps  Take 1 capsule by mouth daily     simvastatin 40 MG tablet  Commonly known as: ZOCOR  Take 1 tablet by mouth nightly     umeclidinium-vilanterol 62.5-25 MCG/INH Aepb inhaler  Commonly known as: ANORO ELLIPTA  Inhale 1 puff into the lungs daily     vitamin B-12 1000 MCG tablet  Commonly known as: CYANOCOBALAMIN  Take 1 tablet by mouth daily     vitamin C 500 MG tablet  Commonly known as: ASCORBIC ACID     zinc gluconate 50 MG tablet         * This list has 4 medication(s) that are the same as other medications prescribed for you. Read the directions carefully, and ask your doctor or other care provider to review them with you.                Where to Get Your Medications      You can get these medications from any pharmacy    Bring a paper prescription for each of these medications  · HYDROcodone-acetaminophen 5-325 MG per tablet          Labs :-  Recent Results (from the past 72 hour(s))   Basic Metabolic Panel w/ Reflex to MG    Collection Time: 04/26/22  5:59 AM   Result Value Ref Range    Sodium 138 135 - 145 meq/L    Potassium reflex Magnesium 3.9 3.5 - 5.2 meq/L    Chloride 104 98 - 111 meq/L    CO2 23 23 - 33 meq/L    Glucose 129 (H) 70 - 108 mg/dL    BUN 26 (H) 7 - 22 mg/dL    CREATININE 0.7 0.4 - 1.2 mg/dL    Calcium 9.1 8.5 - 10.5 mg/dL   CBC with Auto Differential    Collection Time: 04/26/22  5:59 AM   Result Value Ref Range    WBC 10.6 4.8 - 10.8 thou/mm3    RBC 4.36 4.20 - 5.40 mill/mm3    Hemoglobin 13.1 12.0 - 16.0 gm/dl    Hematocrit 42.2 37.0 - 47.0 %    MCV 96.8 81.0 - 99.0 fL    MCH 30.0 26.0 - 33.0 pg    MCHC 31.0 (L) 32.2 - 35.5 gm/dl    RDW-CV 13.4 11.5 - 14.5 %    RDW-SD 47.9 (H) 35.0 - 45.0 fL    Platelets 909 207 - 586 thou/mm3    MPV 10.9 9.4 - 12.4 fL    Seg Neutrophils 81.2 %    Lymphocytes 12.4 %    Monocytes 5.7 %    Eosinophils 0.1 %    Basophils 0.2 %    Immature Granulocytes 0.4 %    Segs Absolute 8.6 (H) 1.8 - 7.7 thou/mm3    Lymphocytes Absolute 1.3 1.0 - 4.8 thou/mm3    Monocytes Absolute 0.6 0.4 - 1.3 thou/mm3    Eosinophils Absolute 0.0 0.0 - 0.4 thou/mm3    Basophils Absolute 0.0 0.0 - 0.1 thou/mm3    Immature Grans (Abs) 0.04 0.00 - 0.07 thou/mm3    nRBC 0 /100 wbc   Anion Gap    Collection Time: 04/26/22  5:59 AM   Result Value Ref Range    Anion Gap 11.0 8.0 - 16.0 meq/L   Glomerular Filtration Rate, Estimated    Collection Time: 04/26/22  5:59 AM   Result Value Ref Range    Est, Glom Filt Rate 80 (A) ml/min/1.73m2   Uric Acid    Collection Time: 04/26/22  5:59 AM   Result Value Ref Range    Uric Acid 4.7 2.4 - 5.7 mg/dL        Microbiology:    Blood culture #1:   Lab Results   Component Value Date    BC Sent To Reference Lab - See Separate Report 01/04/2019       Blood culture #2:  Lab Results   Component Value Date    BLOODCULT2 SENT TO REFERENCE LAB, SEE SEPARATE REPORT 01/04/2019       Organism:  No results found for: LABGRAM    MRSA culture only:No results found for: [facility]    Urine culture: No results found for: LABURIN  No results found for: ORG     Respiratory culture: No results found for: CULTRESP    Aerobic and Anaerobic :  No results found for: LABAERO  No results found for: LABANAE    Urinalysis:      Lab Results   Component Value Date    NITRU Negative 03/29/2022    WBCUA OCCASIONAL 06/14/2020    WBCUA 0-4 05/24/2018    BACTERIA ABSENT 05/24/2018    RBCUA 1+ 06/14/2020    BLOODU Trace-intact 03/29/2022    GLUCOSEU Negative 03/29/2022 Radiology:-  XR FEMUR RIGHT (MIN 2 VIEWS)    Result Date: 4/25/2022  PROCEDURE: XR FEMUR RIGHT (MIN 2 VIEWS) CLINICAL INFORMATION: Questionable lytic lesion in the mid femur . COMPARISON: Right hip series from the same date at 10:13 AM TECHNIQUE: AP and lateral views of the distal right femur. FINDINGS: No lytic lesion is identified in the visualized portion of the femur although indeterminate whether the area of concern was included on the radiographs. There is chondrocalcinosis of the right knee. There are vascular calcifications. 1. No lytic lesion identified in the visualized portion of the femur. Indeterminate whether the area in question was included on the radiographs. 2. Chondrocalcinosis of the knee as evidence for CPPD. **This report has been created using voice recognition software. It may contain minor errors which are inherent in voice recognition technology. ** Final report electronically signed by Dr. Giuseppe Gonzáles MD on 4/25/2022 11:03 AM    XR HIP 2-3 VW W PELVIS RIGHT    Result Date: 4/25/2022  PROCEDURE: XR HIP 2-3 VW W PELVIS RIGHT CLINICAL INFORMATION: right hip pain COMPARISON: No prior study. TECHNIQUE: A single AP view of the pelvis was obtained in addition to AP and frog-leg views of the right hip. FINDINGS: Evidence for old fracture left hip with metallic hardware in place. No fracture of the right hip is seen. Normal abduction of the right hip is demonstrated. Bones are diffusely demineralized, consistent with osteoporosis. Normal abduction of the right hip is demonstrated. There is a mild degenerative spurring of the right hip. Note that there is a focus of relative lucency in the mid femoral shaft, on the edge of the submitted films. This appearance may simply be related osteoporosis but cannot exclude a lytic process. 1. No acute findings. Mild degenerative changes right hip. 2. Questionable lytic process involving the mid femoral shaft, versus osteoporosis.  Routine right femur x-rays recommended. **This report has been created using voice recognition software. It may contain minor errors which are inherent in voice recognition technology. ** Final report electronically signed by Dr. Bruno Both on 4/25/2022 10:28 AM       Follow-up scheduled after discharge :-    in the next few days with Amarilis Freeman APRN - CNP  in the next few weeks with Ortho     Consultations during this hospital stay:-  [] NONE [] Cardiology  [] Nephrology  [] Hemo onco   [] GI   [] ID  [] Endocrine  [] Pulm    [] Neuro    [] Psych   [] Urology  [] ENT   [] G SURGERY   [x]Ortho    []CV surg    [] Palliative  [] Hospice [] Pain management   []    []TCU   [] PT/OT  OTHERS:-    Disposition: Assisted Living   Condition at Discharge: Stable    Time Spent:- 40 minutes    Electronically signed by GLENN Cleveland on 4/28/22 at 12:23 PM EDT   Discharging Hospitalist

## 2022-04-28 NOTE — PROGRESS NOTES
[facility]  Occupational Therapy  Daily Note  Time:   Time In: 1050  Time Out: 1115  Timed Code Treatment Minutes: 25 Minutes  Minutes: 25          Date: 2022  Patient Name: Daniela Marrero,   Gender: female      Room: [facility]10/010-A  MRN: [id number]  : 1940  (80 y.o.)  Referring Practitioner: Deep Phillip PA-C  Diagnosis: Gait instability  Additional Pertinent Hx: Per EMR \"This is a 71-year-old female who presents to the hospital for right hip pain x5 days. Patient presented today urgent care yesterday for the same complaint, she was prescribed prednisone and Voltaren gel, patient notes no improvement in pain with these measures. Patient notes that it has gotten increasingly difficult for her to ambulate secondary to the pain, and that she is unable to ambulate at all today. 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  She denies any recent trauma or falls. Patient lives at an assisted living home. Patient denies any other symptoms at this time, no chest pain, abdominal pain, nausea, vomiting. Ortho was contacted from the ED, report that they are able to provide an injection during this admission. Will admit for observation. \" No acute fx per chart. Pt has trochanteric bursitis, plans for injection. Restrictions/Precautions:  Restrictions/Precautions: Fall Risk,General Precautions,Weight Bearing  Right Lower Extremity Weight Bearing: Weight Bearing As Tolerated     SUBJECTIVE: patient seated in bedside chair upon arrival getting dressed for d/c with daughter upon arrival.  Patient agreeable to this therapist to take over session; patient pleasant and cooperative throughout. PAIN: 0/10:     Vitals: Vitals not assessed per clinical judgement, see nursing flowsheet    COGNITION: Decreased Insight, Decreased Problem Solving and Decreased Safety Awareness    ADL:   Upper Extremity Dressing: Minimal Assistance.   dorothy agrcia patient able to elva bra and shirt seated EOB with Supervision  Lower Extremity Dressing: Maximal Assistance in order to elva marylin hose however patient able to elva B slippers with Supervision seated EOB    BALANCE:  Sitting Balance:  Supervision. Standing Balance: Contact Guard Assistance. rollator, no LOB    BED MOBILITY:  Not Tested    TRANSFERS:  Sit to Stand:  Contact Guard Assistance. Stand to Sit: Contact Guard Assistance. FUNCTIONAL MOBILITY:  Assistive Device: rollator  Assist Level:  Contact Guard Assistance. Distance: bedside chair to EOB  Slow pace, no LOB     ADDITIONAL ACTIVITIES:  Patient provided education regarding energy conservation techniques in order to increase independence with ADLs/IADLs; verbalizing understanding with all questions answered. ASSESSMENT:     Activity Tolerance:  Patient tolerance of  treatment: good.        Discharge Recommendations: Home with Home Health OT  Equipment Recommendations: Equipment Needed:  (possibly a reacher)  Plan: Times per Week: 5x  Times per Day: Daily  Current Treatment Recommendations: Strengthening,Balance training,Functional mobility training,Endurance training,Patient/Caregiver education & training,Safety education & training,Self-Care / ADL    Patient Education  Patient Education: Role of OT, Plan of Care, ADL's, IADL's, Energy Conservation, Family Education, Equipment Education, Home Safety, Importance of Increasing Activity and Assistive Device Safety    Goals  Short Term Goals  Time Frame for Short term goals: by discharge  Short Term Goal 1: pt will complete functional mobility to/from bathroom, progressing within [place] distances, with S to access ADLs  Short Term Goal 2: pt will complete LB ADLs with SBA using LHAE prn to increase indep with donning pants  Short Term Goal 3: pt will tolerate dynamic standing task x8 minutes with S and 1-2 UE release to increase ease with grooming tasks    Following session, patient left in safe position with all fall risk precautions in place.

## 2022-04-29 ENCOUNTER — CARE COORDINATION (OUTPATIENT)
Dept: CASE MANAGEMENT | Age: 82
End: 2022-04-29

## 2022-04-29 DIAGNOSIS — M70.61 TROCHANTERIC BURSITIS OF RIGHT HIP: Primary | ICD-10-CM

## 2022-04-29 PROCEDURE — 1111F DSCHRG MED/CURRENT MED MERGE: CPT | Performed by: NURSE PRACTITIONER

## 2022-04-29 NOTE — CARE COORDINATION
[facility] Transitions Initial Follow Up Call    Call within 2 business days of discharge: Yes    Patient: Leanord Skiff Patient : 1940    MRN: [id number]  Reason for Admission: Trochanteric bursitis    Discharge Date: 22 RARS: Readmission Risk Score: 13.9 ( )      Last Discharge [facility]       Complaint Diagnosis Description Type Department Provider    22 Hip Pain Trochanteric bursitis of right hip . .. ED to Hosp-Admission (Discharged) (ADMITTED) GLENN Bro; Manuel Delgado. .. Transitions of Care Initial Call:  Explained the role of Care Transition Nurse and the Transition program, patient is agreeable to follow up calls Post discharge from the [place] [address] Patient who resides at [facility]. Patient states she is still having pain in right hip. Takes Norco with some relief. Up with walker when she neds to. No PT or OT yet has started. Right hip not swollen per patient. Reviewed medications with Patient. She is taking prednisone as directed and norco for pain. Confirmed Patient obtained and is taking medications as directed. There are no questions concerning medications at this time. Stressed importance of medication compliance. Advised contacting Pharmacy/MD for refill needs. Expresses understanding. 1111F Medication Reconciliation completed. Routed to PCP. Reviewed appointments with patient. Has appointment with PCP 2022. Explained the Transition to Home Program to patient. Patient is called after discharge by CTN to make sure all needs are met and to see if patient has any questions or problems. Expresses understanding. Patient is aware of when to contact MD with any new or worsening symptoms. Advised to contact PCP  with any health concerns for early outpatient intervention in an effort to avoid hospitalization.    Report any worsening symptoms to PCP and/or Call 911 and/or GO TO  EMERGENCY ROOM if symptoms are severe. Expresses understanding. Will continue to follow. Lenin Bocanegra LPN    [phone]  [facility] / [place] [address] Coordinator    Was this an external facility discharge? No Discharge Facility: [address] [place] [address]    Challenges to be reviewed by the provider   Additional needs identified to be addressed with provider No  none             Method of communication with provider : none      Advance Care Planning:   Does patient have an Advance Directive:  reviewed and current. Was this a readmission? No   Patient stated reason for admission: Trochanteric bursitis    Patients top risk factors for readmission: lack of knowledge about disease  medical condition-vvTrochanteric bursitis  medication management    Care Transition Nurse (CTN) contacted the patient by telephone to perform post hospital discharge assessment. Verified name and  with patient as identifiers. Provided introduction to self, and explanation of the CTN role. CTN reviewed discharge instructions, medical action plan and red flags with patient who verbalized understanding. Patient given an opportunity to ask questions and does not have any further questions or concerns at this time. Were discharge instructions available to patient? Yes. Reviewed appropriate site of care based on symptoms and resources available to patient including: PCP  When to call 911. The patient agrees to contact the PCP office for questions related to their healthcare. Medication reconciliation was performed with patient, who verbalizes understanding of administration of home medications. Advised obtaining a 90-day supply of all daily and as-needed medications. Reviewed and educated patient on any new and changed medications related to discharge diagnosis     Was patient discharged with a pulse oximeter? No If Yes - Discussed and confirmed pulse oximeter discharge instructions and when to notify provider or seek emergency care.      [facility] provided contact information. Plan for follow-up call in 5-7 days based on severity of symptoms and risk factors.          Non-face-to-face services provided:  Obtained and reviewed discharge summary and/or continuity of care documents    Care Transitions 24 Hour Call    Schedule Follow Up Appointment with PCP: Completed  Do you have a copy of your discharge instructions?: Yes  Do you have all of your prescriptions and are they filled?: Yes  Have you been contacted by a [facility] Pharmacist?: No  Have you scheduled your follow up appointment?: Yes  How are you going to get to your appointment?: Car - family or friend to transport  Do you feel like you have everything you need to keep you well at home?: Yes  Care Transitions Interventions  No Identified Needs         Follow Up  Future Appointments   Date Time Provider Berry Mukherjee   5/4/2022  2:00 PM PETEY Preciado -  [facility]   6/15/2022  2:00 PM Katelyn Servin, [address]

## 2022-05-04 ENCOUNTER — OFFICE VISIT (OUTPATIENT)
Dept: FAMILY MEDICINE CLINIC | Age: 82
End: 2022-05-04
Payer: MEDICARE

## 2022-05-04 VITALS — DIASTOLIC BLOOD PRESSURE: 54 MMHG | RESPIRATION RATE: 22 BRPM | HEART RATE: 72 BPM | SYSTOLIC BLOOD PRESSURE: 122 MMHG

## 2022-05-04 DIAGNOSIS — Z09 HOSPITAL DISCHARGE FOLLOW-UP: Primary | ICD-10-CM

## 2022-05-04 DIAGNOSIS — M70.61 TROCHANTERIC BURSITIS OF RIGHT HIP: ICD-10-CM

## 2022-05-04 PROCEDURE — 1111F DSCHRG MED/CURRENT MED MERGE: CPT | Performed by: NURSE PRACTITIONER

## 2022-05-04 PROCEDURE — 99495 TRANSJ CARE MGMT MOD F2F 14D: CPT | Performed by: NURSE PRACTITIONER

## 2022-05-04 RX ORDER — PREDNISONE 10 MG/1
TABLET ORAL
COMMUNITY
Start: 2022-05-01 | End: 2022-05-04 | Stop reason: SDUPTHER

## 2022-05-04 RX ORDER — HYDROCODONE BITARTRATE AND ACETAMINOPHEN 5; 325 MG/1; MG/1
1 TABLET ORAL EVERY 6 HOURS PRN
Qty: 120 TABLET | Refills: 0 | Status: SHIPPED | OUTPATIENT
Start: 2022-05-04 | End: 2022-06-06 | Stop reason: SDUPTHER

## 2022-05-04 RX ORDER — HYDROCODONE BITARTRATE AND ACETAMINOPHEN 5; 325 MG/1; MG/1
1 TABLET ORAL EVERY 6 HOURS PRN
COMMUNITY
End: 2022-05-04 | Stop reason: SDUPTHER

## 2022-05-04 RX ORDER — PREDNISONE 10 MG/1
10 TABLET ORAL DAILY
Qty: 30 TABLET | Refills: 2 | Status: SHIPPED | OUTPATIENT
Start: 2022-05-04 | End: 2022-06-15 | Stop reason: SDUPTHER

## 2022-05-04 ASSESSMENT — ENCOUNTER SYMPTOMS
COLOR CHANGE: 0
CONSTIPATION: 0

## 2022-05-04 NOTE — LETTER
[address] [address]  Phone: [phone]  Fax: [phone]    PETEY Fall CNP        May 4, 2022     Patient: Kit Gilbert   YOB: 1940   Date of Visit: 5/4/2022       To Whom It May Concern:    Wyatt Harvey was seen in my office today for a hospital follow up visit. I have continued her HYDROcodone-acetaminophen (NORCO) 5-325 MG tablet q6 hours x30 days, as well as prednisone 10 mg tablet once daily x30 days. Rx sent to Pharmacy [facility]. If you have any questions or concerns, please don't hesitate to call.     Sincerely,        PETEY Fall CNP

## 2022-05-04 NOTE — PROGRESS NOTES
Post-Discharge Transitional Care  Follow Up      Barber Manning   YOB: 1940    Date of Office Visit:  5/4/2022  Date of Hospital Admission: 4/25/22  Date of Hospital Discharge: 4/28/22  Risk of hospital readmission (high >=14%. Medium >=10%) :Readmission Risk Score: 13.9 ( )      Care management risk score Rising risk (score 2-5) and Complex Care (Scores >=6): 3     Non face to face  following discharge, date last encounter closed (first attempt may have been earlier): 4/29/2022  9:51 AM    Call initiated 2 business days of discharge: Yes    ASSESSMENT/PLAN:   Hospital discharge follow-up  - Reviewed documentation related to recent hospital admission  - All questions answered  -     [place] DISCHARGE MEDS RECONCILED W/ CURRENT OUTPATIENT MED LIST    Trochanteric bursitis of right hip  - Chronic, unstable  - Continue norco 5-325mg q6 hours  - Continue prednisone at 10 mg daily. Previously treated with 7.5 mg, currently taking 20 mg daily  - Continue PT  - Follow up with ortho  -     [facility] - Fazal Callejas MD, Orthopedic Surgery, [place]  -     HYDROcodone-acetaminophen ([facility]) 5-325 MG per tablet; Take 1 tablet by mouth every 6 hours as needed for Pain for up to 30 days. , Disp-120 tablet, R-0Normal  -     predniSONE (DELTASONE) 10 MG tablet; Take 1 tablet by mouth daily, Disp-30 tablet, R-2Normal  -     [place] DISCHARGE MEDS RECONCILED W/ CURRENT OUTPATIENT MED LIST      Medical Decision Making: moderate complexity  Return in about 3 months (around 8/4/2022) for Chronic Conditions. Subjective:   HPI:  Follow up of Hospital problems/diagnosis(es): Admitted to [facility] for worsening right hip pain, making it increasingly difficult to ambulate. Inpatient course: Discharge summary reviewed- see chart. Interval history/Current status: Pain continues to right hip. 7-8/10 with walking. No pain at rest. Tolerating norco. Denies medication SE. Denies constipation.  Has not followed up with ortho since discharge. Completing PT daily. Patient Active Problem List   Diagnosis    Nonexudative age-related macular degeneration    Nuclear sclerotic cataract    Ulcerative colitis ([facility]    Sleep apnea    Postmenopausal    Nocturnal hypoxemia    Nicotine dependence    Hyperlipidemia    Diverticulosis large intestine w/o perforation or abscess w/bleeding    Simple chronic bronchitis (HCC)    Age-related osteoporosis without current pathological fracture    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 ([facility] [address]    Type 2 diabetes mellitus with diabetic polyneuropathy, without long-term current use of insulin (HCC)    Chronic deep vein thrombosis (DVT) of femoral vein of left lower extremity (HCC)    Trochanteric bursitis    Generalized weakness       Medications listed as ordered at the time of discharge from hospital     Medication List          Accurate as of May 4, 2022  2:41 PM. If you have any questions, ask your nurse or doctor.             CHANGE how you take these medications    diclofenac sodium 1 % Gel  Commonly known as: VOLTAREN  Apply 2 g topically 4 times daily  What changed:   · when to take this  · reasons to take this     predniSONE 10 MG tablet  Commonly known as: DELTASONE  Take 1 tablet by mouth daily  What changed:   · how much to take  · how to take this  · when to take this  Changed by: PETEY Avila CNP        CONTINUE taking these medications    acetaminophen 500 MG tablet  Commonly known as: TYLENOL  Take 2 tablets by mouth 3 times daily as needed for Pain     * albuterol (2.5 MG/3ML) 0.083% nebulizer solution  Commonly known as: PROVENTIL     * albuterol sulfate  (90 Base) MCG/ACT inhaler  Inhale 2 puffs into the lungs every 6 hours as needed for Wheezing     denosumab 60 MG/ML Sosy SC injection  Commonly known as: PROLIA  Inject 1 mL into the skin every 6 months     donepezil 10 MG tablet  Commonly known as: ARICEPT  Take 1 tablet by mouth nightly Glucosamine-Chondroitin 500-250 MG Caps     HYDROcodone-acetaminophen 5-325 MG per tablet  Commonly known as: NORCO  Take 1 tablet by mouth every 6 hours as needed for Pain for up to 30 days. ibuprofen 400 MG tablet  Commonly known as: ADVIL;MOTRIN     ipratropium 0.02 % nebulizer solution  Commonly known as: ATROVENT     lisinopril 20 MG tablet  Commonly known as: PRINIVIL;ZESTRIL     metoprolol tartrate 25 MG tablet  Commonly known as: LOPRESSOR  Take 1 tablet by mouth 2 times daily     * Misc. Devices Misc  Please apply compression stockings (JOHANNE hose) to bilateral lower extremities for 12 hours daily. Remove nightly. * Misc. Devices Misc  1 set of bed rails. MULTIVITAMIN ADULT PO     [employer] HEALTH PO     Psyllium Fiber 0.52 g Caps  Take 1 capsule by mouth daily     simvastatin 40 MG tablet  Commonly known as: ZOCOR  Take 1 tablet by mouth nightly     umeclidinium-vilanterol 62.5-25 MCG/INH Aepb inhaler  Commonly known as: ANORO ELLIPTA  Inhale 1 puff into the lungs daily     vitamin B-12 1000 MCG tablet  Commonly known as: CYANOCOBALAMIN  Take 1 tablet by mouth daily     vitamin C 500 MG tablet  Commonly known as: ASCORBIC ACID     zinc gluconate 50 MG tablet         * This list has 4 medication(s) that are the same as other medications prescribed for you. Read the directions carefully, and ask your doctor or other care provider to review them with you.                Where to Get Your Medications      These medications were sent to [address] [place] [address]    Phone: [phone]   · HYDROcodone-acetaminophen 5-325 MG per tablet  · predniSONE 10 MG tablet           Medications marked \"taking\" at this time  Outpatient Medications Marked as Taking for the 5/4/22 encounter (Office Visit) with PETEY Church CNP   Medication Sig Dispense Refill    HYDROcodone-acetaminophen ([address]) 5-325 MG per tablet Take 1 tablet by mouth every 6 hours as needed for Pain for up to 30 days. 120 tablet 0    predniSONE (DELTASONE) 10 MG tablet Take 1 tablet by mouth daily 30 tablet 2    Multiple Vitamin (MULTIVITAMIN ADULT PO) Take 1 tablet by mouth daily      vitamin C (ASCORBIC ACID) 500 MG tablet Take 1,000 mg by mouth daily      zinc gluconate 50 MG tablet Take 50 mg by mouth daily      albuterol (PROVENTIL) (2.5 MG/3ML) 0.083% nebulizer solution Take 2.5 mg by nebulization every 6 hours as needed for Wheezing or Shortness of Breath      ibuprofen (ADVIL;MOTRIN) 400 MG tablet Take 400 mg by mouth 2 times daily as needed for Pain      ipratropium (ATROVENT) 0.02 % nebulizer solution Take 0.5 mg by nebulization every 6-8 hours as needed for Wheezing      diclofenac sodium (VOLTAREN) 1 % GEL Apply 2 g topically 4 times daily (Patient taking differently: Apply 2 g topically 4 times daily as needed ) 100 g 0    umeclidinium-vilanterol (ANORO ELLIPTA) 62.5-25 MCG/INH AEPB inhaler Inhale 1 puff into the lungs daily 1 each 5    denosumab (PROLIA) 60 MG/ML SOSY SC injection Inject 1 mL into the skin every 6 months 1 mL 1    acetaminophen (TYLENOL) 500 MG tablet Take 2 tablets by mouth 3 times daily as needed for Pain 100 tablet 1    Misc. Devices MISC 1 set of bed rails. 1 each 0    Misc. Devices MISC Please apply compression stockings (JOHANNE hose) to bilateral lower extremities for 12 hours daily. Remove nightly.  2 Device 0    albuterol sulfate  (90 Base) MCG/ACT inhaler Inhale 2 puffs into the lungs every 6 hours as needed for Wheezing 2 Inhaler 5    Psyllium Fiber 0.52 g CAPS Take 1 capsule by mouth daily 90 capsule 3    lisinopril (PRINIVIL;ZESTRIL) 20 MG tablet Take 20 mg by mouth daily      Glucosamine-Chondroitin 500-250 MG CAPS Take by mouth daily      metoprolol tartrate (LOPRESSOR) 25 MG tablet Take 1 tablet by mouth 2 times daily 180 tablet 3    simvastatin (ZOCOR) 40 MG tablet Take 1 tablet by mouth nightly 90 tablet 3    vitamin B-12 (CYANOCOBALAMIN) 1000 MCG tablet Take 1 tablet by mouth daily 30 tablet 3    Probiotic Product ([employer] PO) Take 1 tablet by mouth daily          Medications patient taking as of now reconciled against medications ordered at time of hospital discharge: Yes    Review of Systems   Constitutional: Negative for fatigue. Gastrointestinal: Negative for constipation. Musculoskeletal: Positive for arthralgias and myalgias. Skin: Negative for color change. Objective:    BP (!) 122/54   Pulse 72   Resp 22       An electronic signature was used to authenticate this note.   --PETEY Grayson - CNP

## 2022-05-04 NOTE — PATIENT INSTRUCTIONS
Patient Education        Hip Bursitis: Care Instructions  Your Care Instructions     Bursitis is inflammation of the bursa. A bursa is a small sac of fluid that cushions a joint and helps it move easily. A bursa sits between a bone in the hip and the muscles and tendons in the thigh and buttock. Injury or overuse of the hip can cause bursitis. Activities that can lead to bursitis includetwisting and rapid joint movement. Bursitis can cause hip pain. Bursitis usually gets better if you avoid the activity that caused it. If pain lasts or gets worse despite home treatment, your doctor may draw fluid from the bursa through a needle. This may relieve your pain and help your doctor know if you have an infection. If so, your doctor will prescribe antibiotics. If you have inflammation only, you may get a corticosteroid shot to reduce swellingand pain. Sometimes surgery is needed to drain or remove the bursa. Follow-up care is a key part of your treatment and safety. Be sure to make and go to all appointments, and call your doctor if you are having problems. It's also a good idea to know your test results and keep alist of the medicines you take. How can you care for yourself at home?  Put ice or a cold pack on your hip for 10 to 20 minutes at a time. Put a thin cloth between the ice and your skin.  After 3 days of using ice, you may use heat on your hip. You can use a hot water bottle, a heating pad set on low, or a warm, moist towel.  Rest your hip. Stop any activities that cause pain. Switch to activities that do not stress your hip.  Take your medicines exactly as prescribed. Call your doctor if you think you are having a problem with your medicine.  Ask your doctor if you can take an over-the-counter pain medicine, such as acetaminophen (Tylenol), ibuprofen (Advil, Motrin), or naproxen (Aleve). Be safe with medicines. Read and follow all instructions on the label.    To prevent stiffness, gently move the hip joint as much as you can without pain every day. As the pain gets better, keep doing range-of-motion exercises. Ask your doctor for exercises that will make the muscles around the hip joint stronger. Do these as directed.  You can slowly return to the activity that caused the pain, but do it with less effort until you can do it without pain or swelling. Be sure to warm up before and stretch after you do the activity. When should you call for help? Call your doctor now or seek immediate medical care if:     You have a fever.      You have increased swelling or redness in your hip.      You cannot use your hip, or the pain in your hip gets worse. Watch closely for changes in your health, and be sure to contact your doctor if:     You have pain for 2 weeks or longer despite home treatment. Where can you learn more? Go to https://Gridle.inshiela.HowGood. org and sign in to your [employer] account. Enter I193 in the Nu[employer] box to learn more about \"Hip Bursitis: Care Instructions. \"     If you do not have an account, please click on the \"Sign Up Now\" link. Current as of: July 1, 2021               Content Version: 13.2  © 2006-2022 [employer]. Care instructions adapted under license by [facility] ([facility]). If you have questions about a medical condition or this instruction, always ask your healthcare professional. [place] [address] any warranty or liability for your use of this information. Patient Education        Hip Bursitis: Exercises  Introduction  Here are some examples of exercises for you to try. The exercises may be suggested for a condition or for rehabilitation. Start each exercise slowly. Ease off the exercises if you start to have pain. You will be told when to start these exercises and which ones will work bestfor you. How to do the exercises  Hip rotator stretch    1.  Lie on your back with both knees bent and your feet flat on the floor.  2. Put the ankle of your affected leg on your opposite thigh near your knee. 3. Use your hand to gently push your knee away from your body until you feel a gentle stretch around your hip. 4. Hold the stretch for 15 to 30 seconds. 5. Repeat 2 to 4 times. 6. Repeat steps 1 through 5, but this time use your hand to gently pull your knee toward your opposite shoulder. Iliotibial band stretch    1. Lean sideways against a wall. If you are not steady on your feet, hold on to a chair or counter. 2. Stand on the leg with the affected hip, with that leg close to the wall. Then cross your other leg in front of it. 3. Let your affected hip drop out to the side of your body and against wall. Then lean away from your affected hip until you feel a stretch. 4. Hold the stretch for 15 to 30 seconds. 5. Repeat 2 to 4 times. Straight-leg raises to the outside    1. Lie on your side, with your affected hip on top. 2. Tighten the front thigh muscles of your top leg to keep your knee straight. 3. Keep your hip and your leg straight in line with the rest of your body, and keep your knee pointing forward. Do not drop your hip back. 4. Lift your top leg straight up toward the ceiling, about 12 inches off the floor. Hold for about 6 seconds, then slowly lower your leg. 5. Repeat 8 to 12 times. Clamshell    1. Lie on your side, with your affected hip on top and your head propped on a pillow. Keep your feet and knees together and your knees bent. 2. Raise your top knee, but keep your feet together. Do not let your hips roll back. Your legs should open up like a clamshell. 3. Hold for 6 seconds. 4. Slowly lower your knee back down. Rest for 10 seconds. 5. Repeat 8 to 12 times. Follow-up care is a key part of your treatment and safety. Be sure to make and go to all appointments, and call your doctor if you are having problems.  It's also a good idea to know your test results and keep alist of the medicines you take.  Where can you learn more? Go to https://chpepiceweb.Asl Analytical. org and sign in to your [employer]APR account. Enter T600 in the SonoMedicahire box to learn more about \"Hip Bursitis: Exercises. \"     If you do not have an account, please click on the \"Sign Up Now\" link. Current as of: July 1, 2021               Content Version: 13.2  © 2006-2022 [employer]. Care instructions adapted under license by [facility] ([facility]). If you have questions about a medical condition or this instruction, always ask your healthcare professional. [place] [address] any warranty or liability for your use of this information. Patient Education        Trochanteric Bursitis: Exercises  Introduction  Here are some examples of exercises for you to try. The exercises may be suggested for a condition or for rehabilitation. Start each exercise slowly. Ease off the exercises if you start to have pain. You will be told when to start these exercises and which ones will work bestfor you. How to do the exercises  Hamstring wall stretch    1. Lie on your back in a doorway, with your good leg through the open door. 2. Slide your affected leg up the wall to straighten your knee. You should feel a gentle stretch down the back of your leg. 3. Hold the stretch for at least 1 minute to begin. Then try to lengthen the time you hold the stretch to as long as 6 minutes. 4. Repeat 2 to 4 times. 5. If you do not have a place to do this exercise in a doorway, there is another way to do it:  6. Lie on your back, and bend the knee of your affected leg. 7. Loop a towel under the ball and toes of that foot, and hold the ends of the towel in your hands. 8. Straighten your knee, and slowly pull back on the towel. You should feel a gentle stretch down the back of your leg. 9. Hold the stretch for 15 to 30 seconds. Or even better, hold the stretch for 1 minute if you can. 10. Repeat 2 to 4 times.   1. Do not arch your back. 2. Do not bend either knee. 3. Keep one heel touching the floor and the other heel touching the wall. Do not point your toes. Straight-leg raises to the outside    1. Lie on your side, with your affected leg on top. 2. Tighten the front thigh muscles of your top leg to keep your knee straight. 3. Keep your hip and your leg straight in line with the rest of your body, and keep your knee pointing forward. Do not drop your hip back. 4. Lift your top leg straight up toward the ceiling, about 12 inches off the floor. Hold for about 6 seconds, then slowly lower your leg. 5. Repeat 8 to 12 times. Clamshell    1. Lie on your side, with your affected leg on top and your head propped on a pillow. Keep your feet and knees together and your knees bent. 2. Raise your top knee, but keep your feet together. Do not let your hips roll back. Your legs should open up like a clamshell. 3. Hold for 6 seconds. 4. Slowly lower your knee back down. Rest for 10 seconds. 5. Repeat 8 to 12 times. Standing quadriceps stretch    1. If you are not steady on your feet, hold on to a chair, counter, or wall. You can also lie on your stomach or your side to do this exercise. 2. Bend the knee of the leg you want to stretch, and reach behind you to grab the front of your foot or ankle with the hand on the same side. For example, if you are stretching your right leg, use your right hand. 3. Keeping your knees next to each other, pull your foot toward your buttock until you feel a gentle stretch across the front of your hip and down the front of your thigh. Your knee should be pointed directly to the ground, and not out to the side. 4. Hold the stretch for 15 to 30 seconds. 5. Repeat 2 to 4 times. Piriformis stretch    1. Lie on your back with your legs straight. 2. Lift your affected leg and bend your knee.  With your opposite hand, reach across your body, and then gently pull your knee toward your opposite shoulder. 3. Hold the stretch for 15 to 30 seconds. 4. Repeat 2 to 4 times. Double knee-to-chest    1. Lie on your back with your knees bent and your feet flat on the floor. You can put a small pillow under your head and neck if it is more comfortable. 2. Bring both knees to your chest.  3. Keep your lower back pressed to the floor. Hold for 15 to 30 seconds. 4. Relax, and lower your knees to the starting position. 5. Repeat 2 to 4 times. Follow-up care is a key part of your treatment and safety. Be sure to make and go to all appointments, and call your doctor if you are having problems. It's also a good idea to know your test results and keep alist of the medicines you take. Where can you learn more? Go to https://ChooosmontrellHammer and Grindeb.SignalSet. org and sign in to your [employer] account. Enter U252 in the Playchemy box to learn more about \"Trochanteric Bursitis: Exercises. \"     If you do not have an account, please click on the \"Sign Up Now\" link. Current as of: July 1, 2021               Content Version: 13.2  © 9766-5732 Healthwise, Incorporated. Care instructions adapted under license by [facility] ([facility]). If you have questions about a medical condition or this instruction, always ask your healthcare professional. [address] any warranty or liability for your use of this information.

## 2022-05-05 ENCOUNTER — CARE COORDINATION (OUTPATIENT)
Dept: CASE MANAGEMENT | Age: 82
End: 2022-05-05

## 2022-05-05 NOTE — CARE COORDINATION
[facility] Transitions Follow Up Call    2022    Patient: Kit Gilbert  Patient : 1940    MRN: [id number]  Reason for Admission: Trochanteric bursitis    Discharge Date: 22 RARS: Readmission Risk Score: 13.9 ( )    Attempted to contact patient for Transition Subsequent call. Left HIPAA Compliant message on Voice Mail to call CTN (number given) with any questions and an update on patient's condition since discharge. Will continue to follow. Saw PCP 2022    Starr Steel LPN    [phone]  [facility] Transitions Subsequent and Final Call    Subsequent and Final Calls  Care Transitions Interventions  Other Interventions:            Follow Up  Future Appointments   Date Time Provider Berry Mukherjee   6/15/2022  2:00 PM PETEY Do CNP Counts include 234 beds at the [facility] [address] [facility]   8/3/2022  2:00 PM PETEY Do CNP [place], [place]

## 2022-05-11 ENCOUNTER — CARE COORDINATION (OUTPATIENT)
Dept: CASE MANAGEMENT | Age: 82
End: 2022-05-11

## 2022-05-11 NOTE — CARE COORDINATION
[facility] Transitions Follow Up Call    2022    Patient: Mariluz Thakur  Patient : 1940    MRN: [id number]  Reason for Admission: Trochanteric bursitis    Discharge Date: 22 RARS: Readmission Risk Score: 13.9 ( )    Attempted to contact patient for Transition Subsequent call. Left HIPAA Compliant message on Voice Mail to call CTN (number given) with any questions and an update on patient's condition since discharge. Will continue to follow. Shaunna Spencer LPN    [phone]  [facility] / [facility] Transitions Subsequent and Final Call    Subsequent and Final Calls  Care Transitions Interventions  Other Interventions:            Follow Up  Future Appointments   Date Time Provider Berry Mukherjee   6/15/2022  2:00 PM PETEY Brown CNP  [facility]   8/3/2022  2:00 PM PETEY Brown CNP [place], [place]

## 2022-05-17 ENCOUNTER — CARE COORDINATION (OUTPATIENT)
Dept: CASE MANAGEMENT | Age: 82
End: 2022-05-17

## 2022-05-17 NOTE — CARE COORDINATION
[facility] Transitions Follow Up Call    2022    Patient: Jorge Alexandre  Patient : 1940   MRN: [id number]  Reason for Admission:   Discharge Date: 22 RARS: Readmission Risk Score: 13.9 ( )    Attempted to contact patient for 2nd  Non reached Transition Subsequent call. Left HIPAA Compliant message on Voice Mail to call CTN (number given) with any questions and an update on patient's condition since discharge. Left HIPAA Compliant message on voice mail for Patient that this is the Final Transition Call and to call their Specialist/PCP for any problems or issues that may occur. Lenin Bocanegra LPN    [phone]  [facility] / [address] [place] [address] Transitions Subsequent and Final Call    Subsequent and Final Calls  Care Transitions Interventions  Other Interventions:            Follow Up  Future Appointments   Date Time Provider Berry Mukherjee   6/15/2022  2:00 PM Milbert Double, APRN - CNP [facility] [place]   8/3/2022  2:00 PM PETEY Chacon CNP [facility]  [facility] [place], [place]

## 2022-06-06 ENCOUNTER — OFFICE VISIT (OUTPATIENT)
Dept: FAMILY MEDICINE CLINIC | Age: 82
End: 2022-06-06
Payer: MEDICARE

## 2022-06-06 VITALS
SYSTOLIC BLOOD PRESSURE: 128 MMHG | WEIGHT: 135 LBS | DIASTOLIC BLOOD PRESSURE: 68 MMHG | RESPIRATION RATE: 18 BRPM | BODY MASS INDEX: 22.47 KG/M2 | HEART RATE: 73 BPM

## 2022-06-06 DIAGNOSIS — M70.72 BURSITIS OF LEFT HIP, UNSPECIFIED BURSA: ICD-10-CM

## 2022-06-06 DIAGNOSIS — M25.552 PAIN IN LEFT HIP: Primary | ICD-10-CM

## 2022-06-06 DIAGNOSIS — M70.61 TROCHANTERIC BURSITIS OF RIGHT HIP: ICD-10-CM

## 2022-06-06 PROCEDURE — G8400 PT W/DXA NO RESULTS DOC: HCPCS | Performed by: NURSE PRACTITIONER

## 2022-06-06 PROCEDURE — G8420 CALC BMI NORM PARAMETERS: HCPCS | Performed by: NURSE PRACTITIONER

## 2022-06-06 PROCEDURE — 4004F PT TOBACCO SCREEN RCVD TLK: CPT | Performed by: NURSE PRACTITIONER

## 2022-06-06 PROCEDURE — G8427 DOCREV CUR MEDS BY ELIG CLIN: HCPCS | Performed by: NURSE PRACTITIONER

## 2022-06-06 PROCEDURE — 20610 DRAIN/INJ JOINT/BURSA W/O US: CPT | Performed by: NURSE PRACTITIONER

## 2022-06-06 PROCEDURE — 99213 OFFICE O/P EST LOW 20 MIN: CPT | Performed by: NURSE PRACTITIONER

## 2022-06-06 PROCEDURE — 1123F ACP DISCUSS/DSCN MKR DOCD: CPT | Performed by: NURSE PRACTITIONER

## 2022-06-06 PROCEDURE — 1090F PRES/ABSN URINE INCON ASSESS: CPT | Performed by: NURSE PRACTITIONER

## 2022-06-06 RX ORDER — METHYLPREDNISOLONE ACETATE 80 MG/ML
80 INJECTION, SUSPENSION INTRA-ARTICULAR; INTRALESIONAL; INTRAMUSCULAR; SOFT TISSUE ONCE
Status: DISCONTINUED | OUTPATIENT
Start: 2022-06-06 | End: 2022-06-06

## 2022-06-06 RX ORDER — BUPIVACAINE HYDROCHLORIDE 5 MG/ML
5 INJECTION, SOLUTION PERINEURAL ONCE
Status: DISCONTINUED | OUTPATIENT
Start: 2022-06-06 | End: 2022-06-06

## 2022-06-06 RX ORDER — BUPIVACAINE HYDROCHLORIDE 5 MG/ML
5 INJECTION, SOLUTION PERINEURAL ONCE
Status: COMPLETED | OUTPATIENT
Start: 2022-06-06 | End: 2022-06-06

## 2022-06-06 RX ORDER — HYDROCODONE BITARTRATE AND ACETAMINOPHEN 5; 325 MG/1; MG/1
1 TABLET ORAL EVERY 6 HOURS PRN
Qty: 120 TABLET | Refills: 0 | Status: SHIPPED | OUTPATIENT
Start: 2022-06-06 | End: 2022-07-06

## 2022-06-06 RX ORDER — METHYLPREDNISOLONE ACETATE 80 MG/ML
80 INJECTION, SUSPENSION INTRA-ARTICULAR; INTRALESIONAL; INTRAMUSCULAR; SOFT TISSUE ONCE
Status: COMPLETED | OUTPATIENT
Start: 2022-06-06 | End: 2022-06-06

## 2022-06-06 RX ADMIN — BUPIVACAINE HYDROCHLORIDE 25 MG: 5 INJECTION, SOLUTION PERINEURAL at 15:00

## 2022-06-06 RX ADMIN — METHYLPREDNISOLONE ACETATE 80 MG: 80 INJECTION, SUSPENSION INTRA-ARTICULAR; INTRALESIONAL; INTRAMUSCULAR; SOFT TISSUE at 15:00

## 2022-06-06 NOTE — PATIENT INSTRUCTIONS
Patient Education        Learning About a Hip Bursa Injection  What is a hip bursa injection? A bursa is a small sac of fluid that cushions an area between tendons and bones. The bursa on the outer side of the hip bone is called the trochanteric (say \"KBUW-lfa-FIYP-ik\") bursa. Sometimes it can become swollen and painful. This condition is called bursitis. An injection into the bursa is a shot of medicine to reduce pain and swelling. The medicines may include pain relievers and steroid medicines. A steroid shot can sometimes help with short-term pain relief when other treatments haven'tworked. How is a hip bursa injection done? First the area over the bursa will be cleaned. Your doctor may use a tinyneedle to numb the skin over the area where you will get the injection. If a tiny needle is used to numb the area, your doctor will use another needle to inject the medicine. Your doctor may use a pain reliever, a steroid, orboth. You may feel some pressure or discomfort. Your doctor may put ice on the area before you go home. What can you expect after the injection? You will probably go home soon after your shot. You may have numbness over yourhip for a few hours. If your shot included both a pain reliever and a steroid, the pain will probably go away right away. But it might come back after a few hours. This might happen if the pain reliever wears off and the steroid has not started towork yet. The steroid medicine generally takes a few days to work. If the pain comes back, you can put ice or a cold pack on your hip for 10 to 20minutes at a time. Put a thin cloth between the ice and your skin. Follow your doctor's instructions carefully. Avoid strenuous activities on theday you get the shot, especially those that put stress on your hip. Steroids don't always work. But when they do, the pain relief can last forseveral days to a few months or longer.   Follow-up care is a key part of your treatment and safety. Be sure to make and go to all appointments, and call your doctor if you are having problems. It's also a good idea to know your test results and keep alist of the medicines you take. Where can you learn more? Go to https://chshiela.[email or web address] org and sign in to your [employer] account. Enter K858 in the [facility] box to learn more about \"Learning About a Hip Bursa Injection. \"     If you do not have an account, please click on the \"Sign Up Now\" link. Current as of: July 1, 2021               Content Version: 13.2  © 2006-2022 [employer]. Care instructions adapted under license by [facility] ([facility]). If you have questions about a medical condition or this instruction, always ask your healthcare professional. [address] any warranty or liability for your use of this information. Patient Education        Hip Pain: Care Instructions  Your Care Instructions     Hip pain may be caused by many things, including overuse, a fall, or a twisting movement. Another cause of hip pain is arthritis. Your pain may increase whenyou stand up, walk, or squat. The pain may come and go or may be constant. Home treatment can help relieve hip pain, swelling, and stiffness. If your painis ongoing, you may need more tests and treatment. Follow-up care is a key part of your treatment and safety. Be sure to make and go to all appointments, and call your doctor if you are having problems. It's also a good idea to know your test results and keep alist of the medicines you take. How can you care for yourself at home?  Take pain medicines exactly as directed. ? If the doctor gave you a prescription medicine for pain, take it as prescribed. ? If you are not taking a prescription pain medicine, ask your doctor if you can take an over-the-counter medicine.  Rest and protect your hip.  Take a break from any activity, including standing or walking, that may cause pain.   Put ice or a cold pack against your hip for 10 to 20 minutes at a time. Try to do this every 1 to 2 hours for the next 3 days (when you are awake) or until the swelling goes down. Put a thin cloth between the ice and your skin.  Sleep on your healthy side with a pillow between your knees, or sleep on your back with pillows under your knees.  If there is no swelling, you can put moist heat, a heating pad, or a warm cloth on your hip. Do gentle stretching exercises to help keep your hip flexible.  Learn how to prevent falls. Have your vision and hearing checked regularly. Wear slippers or shoes with a nonskid sole.  Stay at a healthy weight.  Wear comfortable shoes. When should you call for help? Call 911 anytime you think you may need emergency care. For example, call if:     You have sudden chest pain and shortness of breath, or you cough up blood.      You are not able to stand or walk or bear weight.      Your buttocks, legs, or feet feel numb or tingly.      Your leg or foot is cool or pale or changes color.      You have severe pain. Call your doctor now or seek immediate medical care if:     You have signs of infection, such as:  ? Increased pain, swelling, warmth, or redness in the hip area. ? Red streaks leading from the hip area. ? Pus draining from the hip area. ? A fever.      You have signs of a blood clot, such as:  ? Pain in your calf, back of the knee, thigh, or groin. ? Redness and swelling in your leg or groin.      You are not able to bend, straighten, or move your leg normally.      You have trouble urinating or having bowel movements. Watch closely for changes in your health, and be sure to contact your doctor if:     You do not get better as expected. Where can you learn more? Go to https://chpepiceweb.[email or web address] org and sign in to your [employer] account.  Enter H873 in the [facility] box to learn more about \"Hip Pain: Care

## 2022-06-08 DIAGNOSIS — R53.1 GENERALIZED WEAKNESS: Primary | ICD-10-CM

## 2022-06-08 DIAGNOSIS — R29.6 MULTIPLE FALLS: ICD-10-CM

## 2022-06-08 DIAGNOSIS — M16.0 BILATERAL HIP JOINT ARTHRITIS: ICD-10-CM

## 2022-06-08 RX ORDER — WHEELCHAIR
EACH MISCELLANEOUS
Qty: 1 EACH | Refills: 0 | Status: SHIPPED | OUTPATIENT
Start: 2022-06-08 | End: 2022-08-03

## 2022-06-09 ENCOUNTER — TELEPHONE (OUTPATIENT)
Dept: FAMILY MEDICINE CLINIC | Age: 82
End: 2022-06-09

## 2022-06-09 DIAGNOSIS — M25.552 PAIN IN LEFT HIP: Primary | ICD-10-CM

## 2022-06-09 NOTE — TELEPHONE ENCOUNTER
Would she like to see orthopedics? Already on norco q6 hours with tylenol.  Also would likely benefit from PT.

## 2022-06-10 NOTE — TELEPHONE ENCOUNTER
Patient informed of [facility] recommendations. Patient states she is already doing PT. She is willing to go to [facility] for a consult. Patient informed that we will place the referral and they will contact patient to schedule. She voiced understanding. Referral placed and faxed to [facility].

## 2022-06-15 ENCOUNTER — OFFICE VISIT (OUTPATIENT)
Dept: FAMILY MEDICINE CLINIC | Age: 82
End: 2022-06-15
Payer: MEDICARE

## 2022-06-15 VITALS — HEART RATE: 64 BPM | SYSTOLIC BLOOD PRESSURE: 110 MMHG | RESPIRATION RATE: 18 BRPM | DIASTOLIC BLOOD PRESSURE: 62 MMHG

## 2022-06-15 DIAGNOSIS — M25.552 BILATERAL HIP PAIN: ICD-10-CM

## 2022-06-15 DIAGNOSIS — I25.10 CORONARY ARTERY DISEASE INVOLVING NATIVE CORONARY ARTERY OF NATIVE HEART WITHOUT ANGINA PECTORIS: ICD-10-CM

## 2022-06-15 DIAGNOSIS — Z00.00 MEDICARE ANNUAL WELLNESS VISIT, SUBSEQUENT: Primary | ICD-10-CM

## 2022-06-15 DIAGNOSIS — E78.2 MIXED HYPERLIPIDEMIA: ICD-10-CM

## 2022-06-15 DIAGNOSIS — I25.119 ATHEROSCLEROSIS OF NATIVE CORONARY ARTERY WITH ANGINA PECTORIS, UNSPECIFIED WHETHER NATIVE OR TRANSPLANTED HEART (HCC): ICD-10-CM

## 2022-06-15 DIAGNOSIS — M25.551 BILATERAL HIP PAIN: ICD-10-CM

## 2022-06-15 DIAGNOSIS — F03.90 DEMENTIA WITHOUT BEHAVIORAL DISTURBANCE, UNSPECIFIED DEMENTIA TYPE: ICD-10-CM

## 2022-06-15 DIAGNOSIS — R41.3 MEMORY LOSS: ICD-10-CM

## 2022-06-15 DIAGNOSIS — J44.9 CHRONIC OBSTRUCTIVE PULMONARY DISEASE, UNSPECIFIED COPD TYPE (HCC): ICD-10-CM

## 2022-06-15 DIAGNOSIS — M70.61 TROCHANTERIC BURSITIS OF RIGHT HIP: ICD-10-CM

## 2022-06-15 DIAGNOSIS — E11.42 TYPE 2 DIABETES MELLITUS WITH DIABETIC POLYNEUROPATHY, WITHOUT LONG-TERM CURRENT USE OF INSULIN (HCC): ICD-10-CM

## 2022-06-15 DIAGNOSIS — I10 HYPERTENSION, UNSPECIFIED TYPE: ICD-10-CM

## 2022-06-15 DIAGNOSIS — M16.0 BILATERAL PRIMARY OSTEOARTHRITIS OF HIP: ICD-10-CM

## 2022-06-15 DIAGNOSIS — I82.512 CHRONIC DEEP VEIN THROMBOSIS (DVT) OF FEMORAL VEIN OF LEFT LOWER EXTREMITY (HCC): ICD-10-CM

## 2022-06-15 DIAGNOSIS — K51.911 ULCERATIVE COLITIS WITH RECTAL BLEEDING, UNSPECIFIED LOCATION (HCC): ICD-10-CM

## 2022-06-15 PROCEDURE — G8420 CALC BMI NORM PARAMETERS: HCPCS | Performed by: NURSE PRACTITIONER

## 2022-06-15 PROCEDURE — 99214 OFFICE O/P EST MOD 30 MIN: CPT | Performed by: NURSE PRACTITIONER

## 2022-06-15 PROCEDURE — G8427 DOCREV CUR MEDS BY ELIG CLIN: HCPCS | Performed by: NURSE PRACTITIONER

## 2022-06-15 PROCEDURE — G8400 PT W/DXA NO RESULTS DOC: HCPCS | Performed by: NURSE PRACTITIONER

## 2022-06-15 PROCEDURE — 4004F PT TOBACCO SCREEN RCVD TLK: CPT | Performed by: NURSE PRACTITIONER

## 2022-06-15 PROCEDURE — 3023F SPIROM DOC REV: CPT | Performed by: NURSE PRACTITIONER

## 2022-06-15 PROCEDURE — 1123F ACP DISCUSS/DSCN MKR DOCD: CPT | Performed by: NURSE PRACTITIONER

## 2022-06-15 PROCEDURE — G0439 PPPS, SUBSEQ VISIT: HCPCS | Performed by: NURSE PRACTITIONER

## 2022-06-15 PROCEDURE — 1090F PRES/ABSN URINE INCON ASSESS: CPT | Performed by: NURSE PRACTITIONER

## 2022-06-15 RX ORDER — SIMVASTATIN 40 MG
40 TABLET ORAL NIGHTLY
Qty: 90 TABLET | Refills: 3 | Status: SHIPPED | OUTPATIENT
Start: 2022-06-15

## 2022-06-15 RX ORDER — LISINOPRIL 20 MG/1
20 TABLET ORAL DAILY
Qty: 90 TABLET | Refills: 3 | Status: SHIPPED | OUTPATIENT
Start: 2022-06-15 | End: 2023-06-10

## 2022-06-15 RX ORDER — PREDNISONE 10 MG/1
10 TABLET ORAL DAILY
Qty: 90 TABLET | Refills: 3 | Status: SHIPPED | OUTPATIENT
Start: 2022-06-15 | End: 2023-06-10

## 2022-06-15 RX ORDER — DONEPEZIL HYDROCHLORIDE 10 MG/1
10 TABLET, FILM COATED ORAL NIGHTLY
Qty: 90 TABLET | Refills: 3 | Status: SHIPPED | OUTPATIENT
Start: 2022-06-15 | End: 2023-06-10

## 2022-06-15 ASSESSMENT — PATIENT HEALTH QUESTIONNAIRE - PHQ9
SUM OF ALL RESPONSES TO PHQ QUESTIONS 1-9: 0
2. FEELING DOWN, DEPRESSED OR HOPELESS: 0
SUM OF ALL RESPONSES TO PHQ QUESTIONS 1-9: 0
SUM OF ALL RESPONSES TO PHQ9 QUESTIONS 1 & 2: 0
SUM OF ALL RESPONSES TO PHQ QUESTIONS 1-9: 0
SUM OF ALL RESPONSES TO PHQ QUESTIONS 1-9: 0
1. LITTLE INTEREST OR PLEASURE IN DOING THINGS: 0

## 2022-06-15 ASSESSMENT — ENCOUNTER SYMPTOMS
COLOR CHANGE: 0
ABDOMINAL PAIN: 0
SHORTNESS OF BREATH: 0
CONSTIPATION: 0
EYE PAIN: 0
EYE ITCHING: 0
BACK PAIN: 0
EYE REDNESS: 0
VISUAL CHANGE: 0
DIARRHEA: 0
COUGH: 0
BLURRED VISION: 0
WHEEZING: 0
EYE DISCHARGE: 0
SINUS PRESSURE: 0
BLOOD IN STOOL: 0

## 2022-06-15 ASSESSMENT — LIFESTYLE VARIABLES: HOW OFTEN DO YOU HAVE A DRINK CONTAINING ALCOHOL: NEVER

## 2022-06-15 NOTE — PATIENT INSTRUCTIONS
Personalized Preventive Plan for Matias Ramirez - 6/15/2022  Medicare offers a range of preventive health benefits. Some of the tests and screenings are paid in full while other may be subject to a deductible, co-insurance, and/or copay. Some of these benefits include a comprehensive review of your medical history including lifestyle, illnesses that may run in your family, and various assessments and screenings as appropriate. After reviewing your medical record and screening and assessments performed today your provider may have ordered immunizations, labs, imaging, and/or referrals for you. A list of these orders (if applicable) as well as your Preventive Care list are included within your After Visit Summary for your review. Other Preventive Recommendations:    · A preventive eye exam performed by an eye specialist is recommended every 1-2 years to screen for glaucoma; cataracts, macular degeneration, and other eye disorders. · A preventive dental visit is recommended every 6 months. · Try to get at least 150 minutes of exercise per week or 10,000 steps per day on a pedometer . · Order or download the FREE \"Exercise & Physical Activity: Your Everyday Guide\" from [employer]. Call 5-424.539.1388 or search [employer] online. · You need 8321-1632 mg of calcium and 3687-1056 IU of vitamin D per day. It is possible to meet your calcium requirement with diet alone, but a vitamin D supplement is usually necessary to meet this goal.  · When exposed to the sun, use a sunscreen that protects against both UVA and UVB radiation with an SPF of 30 or greater. Reapply every 2 to 3 hours or after sweating, drying off with a towel, or swimming. · Always wear a seat belt when traveling in a car. Always wear a helmet when riding a bicycle or motorcycle.   Patient Education        Arthritis: Care Instructions  Overview     Arthritis, also called osteoarthritis, is a breakdown of the cartilage that cushions your joints. When the cartilage wears down, your bones rub against each other. This causes pain and stiffness. Many people have some arthritis as they age. Arthritis most often affects the joints of the spine, hands, hips,knees, or feet. Arthritis never goes away completely. But medicine and home treatment can helpreduce pain and help you stay active. Follow-up care is a key part of your treatment and safety. Be sure to make and go to all appointments, and call your doctor if you are having problems. It's also a good idea to know your test results and keep alist of the medicines you take. How can you care for yourself at home? Stay at a healthy weight. Being overweight puts extra strain on your joints. Talk to your doctor or physical therapist about exercises that will help ease joint pain. Stretch. You may enjoy gentle forms of yoga to help keep your joints and muscles flexible. Walk instead of jog. Other types of exercise that are less stressful on the joints include riding a bike, swimming, martin chi, or water exercise. Lift weights. Strong muscles help reduce stress on your joints. Stronger thigh muscles, for example, take some of the stress off of the knees and hips. Learn the right way to lift weights so you don't make joint pain worse. Take your medicines exactly as prescribed. Call your doctor if you think you are having a problem with your medicine. Take pain medicines exactly as directed. If the doctor gave you a prescription medicine for pain, take it as prescribed. If you are not taking a prescription pain medicine, ask your doctor if you can take an over-the-counter medicine. Use a cane, crutch, walker, or another device if you need help to get around. These can help rest your joints. You also can use other things to make life easier, such as a higher toilet seat and padded handles on kitchen utensils. Do not sit in low chairs.  They can make it hard to get up. Put heat or cold on your sore joints as needed. Use whichever helps you most. You can also switch between hot and cold packs. Apply heat 2 or 3 times a day for 20 to 30 minutes--using a heating pad, hot shower, or hot pack--to relieve pain and stiffness. But don't use heat on a swollen joint. Put ice or a cold pack on your sore joint for 10 to 20 minutes at a time. Put a thin cloth between the ice and your skin. When should you call for help? Call your doctor now or seek immediate medical care if:    You have sudden swelling, warmth, or pain in any joint.     You have joint pain and a fever or rash.     You have such bad pain that you cannot use a joint. Watch closely for changes in your health, and be sure to contact your doctor if:    You have mild joint symptoms that continue even with more than 6 weeks of care at home.     You have stomach pain or other problems with your medicine. Where can you learn more? Go to [email or web address] org and sign in to your [employer] account. Enter L824 in the [employer] box to learn more about \"Arthritis: Care Instructions. \"     If you do not have an account, please click on the \"Sign Up Now\" link. Current as of: December 20, 2021               Content Version: 13.2  © 2006-2022 Healthwise, Incorporated. Care instructions adapted under license by [facility] ([facility]). If you have questions about a medical condition or this instruction, always ask your healthcare professional. [place] [address] any warranty or liability for your use of this information. Patient Education        Alzheimer's Disease: Care Instructions  Overview     Alzheimer's disease is a type of dementia. It causes memory loss and affects judgment, language, and behavior. You may have trouble making decisions or may get lost in places that you used to know well. Alzheimer's disease is different than mild memory loss that occurs with aging. It's not clear what causesAlzheimer's disease. It's the most common form of dementia in older adults. Although there is no cure at this time, medicine in some cases may slow memory loss for a while. Other medicines may help with sleep, depression, or behaviorchanges. Alzheimer's disease is different for everyone. Some people can function well for a long time. In the early stage of the disease, you can do things at home to make life easier and safer. You also can keep doing your hobbies and otheractivities. Many people find comfort in planning now for their future needs. Follow-up care is a key part of your treatment and safety. Be sure to make and go to all appointments, and call your doctor if you are having problems. It's also a good idea to know your test results and keep alist of the medicines you take. How can you care for yourself at home? Taking care of yourself  If your doctor gives you medicines, take them exactly as prescribed. Call your doctor if you think you are having a problem with your medicine. You will get more details on the medicines your doctor prescribes. Eat a balanced diet. Get plenty of whole grains, fruits, and vegetables every day. If you are not hungry at mealtimes, eat snacks at midmorning and in the afternoon. Try drinks such as Boost, Ensure, or Sustacal if you are having trouble keeping your weight up. Stay active. Exercise such as walking may slow the decline of your mental abilities. Try to stay active mentally too. Read and work crossword puzzles if you enjoy these activities. If you have trouble sleeping, do not nap during the day. Get regular exercise (but not within several hours of bedtime). Drink a glass of warm milk or caffeine-free herbal tea before going to bed. Ask your doctor about support groups and other resources in your area. They can help people who have Alzheimer's disease and their families. Be patient.  You may find that a task takes you longer than it used to. If you have not already done so, make a list of advance directives. Advance directives are instructions to your doctor and family members about what kind of care you want if you become unable to speak or express yourself. Talk to a  about making a will, if you do not already have one. Keeping schedules  Develop a routine. You will feel less frustrated or confused if you have a clear, simple plan of what to do every day. Make lists of your medicines and when to take them. Write down appointments and other tasks in a calendar. Put sticky notes around the house to help you remember events and other things you have to do. Schedule activities and tasks for times of the day when you are best able to handle them. Staying safe  Tell someone when you are going out and where you are going. Let the person know when you will be back. Before you go out alone, write down where you are going, how to get there, and how to get back home. Do this even if you have gone there many times before. Take someone along with you when possible. Make your home safe. Tack down rugs, put no-slip tape in the tub, use handrails, and put safety switches on stoves and appliances. Have a family member or other caregiver tell you whether you are driving badly. Deciding to stop driving is very hard for many people. Driving helps you feel independent. Your state 's license bureau can do a driving test if there is any question. Plan for other means of getting around when you are no longer able to drive. Use strong lighting, especially at night. Put night-lights in bedrooms, hallways, and bathrooms. Lower the hot water temperature setting to 120°F or lower to avoid burns. When should you call for help? Call 911 anytime you think you may need emergency care. For example, call if:    You are lost and do not know whom to call.     You are injured and do not know whom to call.    Call your doctor now or seek immediate medical care if:    Your symptoms suddenly get much worse. Watch closely for changes in your health, and be sure to contact your doctor if:    You want more information about how you can take care of yourself. Where can you learn more? Go to [email or web address] org and sign in to your [employer] account. Enter Y179 in the [facility] box to learn more about \"Alzheimer's Disease: Care Instructions. \"     If you do not have an account, please click on the \"Sign Up Now\" link. Current as of: June 16, 2021               Content Version: 13.2  © 2006-2022 [employer]. Care instructions adapted under license by [facility] ([facility]). If you have questions about a medical condition or this instruction, always ask your healthcare professional. [address] any warranty or liability for your use of this information. Patient Education        Bursitis: Care Instructions  Your Care Instructions     A bursa is a small sac of fluid that helps the tissues around a joint slide over one another easily. Injury or overuse of a joint can cause pain, redness, and inflammation in the bursa (bursitis). Bursitis usually gets better if you avoid the activity that caused it. You can help prevent bursitis from coming back by doing stretching and strengthening exercises. You may also need tochange the way you do some activities. Follow-up care is a key part of your treatment and safety. Be sure to make and go to all appointments, and call your doctor if you are having problems. It's also a good idea to know your test results and keep alist of the medicines you take. How can you care for yourself at home? Put ice or a cold pack on the area for 10 to 20 minutes at a time. Try to do this every 1 to 2 hours for the next 3 days (when you are awake) or until the swelling goes down. Put a thin cloth between the ice and your skin.   After the 3 days of using ice, you may use heat on the area. You can use a hot water bottle; a warm, moist towel; or a heating pad set on low. You can also try alternating heat and ice. Rest the area where you have pain. Stop any activities that cause pain. Switch to activities that do not stress the area. Take pain medicines exactly as directed. If the doctor gave you a prescription medicine for pain, take it as prescribed. If you are not taking a prescription pain medicine, ask your doctor if you can take an over-the-counter medicine. Do not take two or more pain medicines at the same time unless the doctor told you to. Many pain medicines have acetaminophen, which is Tylenol. Too much acetaminophen (Tylenol) can be harmful. To prevent stiffness, gently move the joint as much as you can without pain every day. As the pain gets better, keep doing range-of-motion exercises. Ask your doctor for exercises that will make the muscles around the joint stronger. Do these as directed. You can slowly return to the activity that caused the pain, but do it with less effort until you can do it without pain or swelling. Be sure to warm up before and stretch after you do the activity. When should you call for help? Call your doctor now or seek immediate medical care if:    You have new or worse symptoms of infection, such as: Increased pain, swelling, warmth, or redness. Red streaks leading from the area. Pus draining from the area. A fever. Watch closely for changes in your health, and be sure to contact your doctor if:    You do not get better as expected. Where can you learn more? Go to https://[email or web address].[email or web address] org and sign in to your [employer] account. Enter Z274 in the [facility] box to learn more about \"Bursitis: Care Instructions. \"     If you do not have an account, please click on the \"Sign Up Now\" link. Current as of: July 1, 2021               Content Version: 13.2  © 5214-3832 Healthwise, Incorporated.    Care instructions adapted under license by [facility] ([facility]). If you have questions about a medical condition or this instruction, always ask your healthcare professional. [facility] [address] any warranty or liability for your use of this information. Patient Education        Chronic Obstructive Pulmonary Disease (COPD): Care Instructions  Overview     Chronic obstructive pulmonary disease (COPD) is a lung disease that makes it hard to breathe. With COPD, the airways that lead to the lungs are narrowed, and the tiny air sacs in the lungs are damaged and lose their stretch. People with COPD have decreased airflow in and out of the lungs, which makes it hard to breathe. The airways also can get clogged with thick mucus. Cigarettesmoking is a major cause of COPD. Although there is no cure for COPD, you can slow its progress. Following your treatment plan and taking care of yourself can help you feel better and livelonger. Follow-up care is a key part of your treatment and safety. Be sure to make and go to all appointments, and call your doctor if you are having problems. It's also a good idea to know your test results and keep alist of the medicines you take. How can you care for yourself at home? Staying healthy    Do not smoke. This is the most important step you can take to prevent more damage to your lungs. If you need help quitting, talk to your doctor about stop-smoking programs and medicines. These can increase your chances of quitting for good.     Avoid colds and other infections. Get the pneumococcal and whooping cough (pertussis) vaccines. If you have had these vaccines before, ask your doctor if you need another dose. Get the flu vaccine every fall. If you must be around people with colds or the flu, wash your hands often.     Avoid secondhand smoke and air pollution. Try to stay inside with your windows closed when air pollution is bad.    Medicines and oxygen therapy    Take your medicines exactly as prescribed. Call your doctor if you think you are having a problem with your medicine. You may be taking medicines such as:  Bronchodilators. These help open your airways and make breathing easier. They are either short-acting (work for 4 to 9 hours) or long-acting (work for 12 to 24 hours). You inhale most bronchodilators, so they start to act quickly. Always carry your quick-relief inhaler with you in case you need it. Corticosteroids (prednisone, budesonide). These reduce airway inflammation. They come in inhaled or pill form.     Ask your doctor or pharmacist if you are using each type of inhaler correctly. With correct use, the medicine is more likely to get to your lungs.     See if a spacer is right for you. A spacer may also help you get more inhaled medicine to your lungs. If you use one, ask how to use it properly.     Do not take any vitamins, over-the-counter medicine, or herbal products without talking to your doctor first.     If your doctor prescribed antibiotics, take them as directed. Do not stop taking them just because you feel better. You need to take the full course of antibiotics.     If you use oxygen therapy, use the flow rate your doctor has recommended. Don't change it without talking to your doctor first. Oxygen therapy boosts the amount of oxygen in your blood and helps you breathe easier. Activity    Get regular exercise. Walking is an easy way to get exercise. Start out slowly, and walk a little more each day.     Pay attention to your breathing. You are exercising too hard if you can't talk while you exercise.     Take short rest breaks when doing household chores and other activities.     Learn breathing methods--such as breathing through pursed lips--to help you become less short of breath.     If your doctor has not set you up with a pulmonary rehabilitation program, ask if rehab is right for you.  Rehab includes exercise programs, education about your disease and how to manage it, help with diet and other changes, and emotional support. Diet    Eat regular, healthy meals.     Use bronchodilators about 1 hour before you eat to make it easier to eat.     Eat several smaller, frequent meals to prevent getting too full. A full stomach can push on the muscle that helps you breathe (your diaphragm) and make it harder to breathe.     Drink beverages at the end of the meal.     Avoid foods that are hard to chew.     Eat foods that contain protein so you don't lose muscle mass.     Talk with your doctor if you gain too much weight or if you lose weight without trying. Mental health    Talk to your family, friends, or a therapist about your feelings. Some people feel frightened, angry, hopeless, helpless, and even guilty. Talking openly about feelings may help you cope. If these feelings last, talk to your doctor. When should you call for help? Call 911 anytime you think you may need emergency care. For example, call if:    You have severe trouble breathing.     You are having chest pain that is different or worse than usual.   Call your doctor now or seek immediate medical care if:    You have new or worse trouble breathing.     You cough up blood.     You have a fever.     You have feelings of anxiety or depression. Watch closely for changes in your health, and be sure to contact your doctor if:    You cough more deeply or more often, especially if you notice more mucus or a change in the color of your mucus.     You have new or worse swelling in your legs or belly.     You are not getting better as expected. Where can you learn more? Go to https://yobany.[email or web address] org and sign in to your Goalbook account. Enter Y602 in the upad box to learn more about \"Chronic Obstructive Pulmonary Disease (COPD): Care Instructions. \"     If you do not have an account, please click on the \"Sign Up Now\" link.   Current as of: July 6, 2021               Content Version: 13.2  © 2006-2022 Healthwise, [employer]. Care instructions adapted under license by [facility] ([facility]). If you have questions about a medical condition or this instruction, always ask your healthcare professional. [address] any warranty or liability for your use of this information. Patient Education        Noninsulin Medicines for Type 2 Diabetes: Care Instructions  Overview     There are different types of noninsulin medicines for diabetes. Each works in a different way. But they all help you control your blood sugar. Some types help your body make insulin to lower your blood sugar. Others lower how much insulin your body needs. Some can slow how fast your body digests sugars. And some canremove extra glucose through your urine. You may need to take more than one medicine for diabetes. Two or more medicinesmay work better to lower your blood sugar level than just one does. Metformin. This lowers how much glucose your liver makes. And it helps you respond better to insulin. It also lowers the amount of stored sugar that your liver releases when you are not eating. Sulfonylureas. These help your body release more insulin. Some work for many hours. They can cause low blood sugar if you don't eat as you planned. An example is glipizide. Thiazolidinediones. These reduce the amount of blood glucose. They also help you respond better to insulin. An example is pioglitazone. SGLT2 inhibitors. These help to remove extra glucose through your urine. They may also help some people lose weight. An example is ertugliflozin. DPP-4 inhibitors. These help your body raise the level of insulin after you eat. They also help your body make less of a hormone that raises blood sugar. An example is alogliptin. GLP-1 receptor agonists. These help your body make a protein that can raise your insulin level and make you less hungry. They're given as shots or pills.  An example is semaglutide. Meglitinides. These help your body release insulin. They also help slow how your body digests sugars. So they can keep your blood sugar from rising too fast after you eat. Alpha-glucosidase inhibitors. These keep starches from breaking down. This means that they lower the amount of glucose absorbed when you eat. They don't help your body make more insulin. So they will not cause low blood sugar unless you use them with other medicines for diabetes. Follow-up care is a key part of your treatment and safety. Be sure to make and go to all appointments, and call your doctor if you are having problems. It's also a good idea to know your test results and keep alist of the medicines you take. How can you care for yourself at home? Eat a healthy diet. Get some exercise each day. This may help you to reduce how much medicine you need. Do not take other prescription or over-the-counter medicines, vitamins, herbal products, or supplements without talking to your doctor first. Some medicines for type 2 diabetes can cause problems with other medicines or supplements. Tell your doctor if you plan to get pregnant. Some of these drugs are not safe for pregnant women. Be safe with medicines. Take your medicines exactly as prescribed. Meglitinides and sulfonylureas can cause your blood sugar to drop very low. Call your doctor if you think you are having a problem with your medicine. Check your blood sugar often. You can use a glucose monitor. Keeping track can help you know how certain foods, activities, and medicines affect your blood sugar. And it can help you keep your blood sugar from getting so low that it's not safe. When should you call for help? Call 911 anytime you think you may need emergency care. For example, call if:    You passed out (lost consciousness).     You are confused or cannot think clearly.     Your blood sugar is very high or very low.    Watch closely for changes in your health, and be sure to contact your doctor if:    Your blood sugar stays outside the level your doctor set for you.     You have any problems. Where can you learn more? Go to https://chpepiceweb.[email or web address] org and sign in to your [employer] account. Enter H153 in the [facility] box to learn more about \"Noninsulin Medicines for Type 2 Diabetes: Care Instructions. \"     If you do not have an account, please click on the \"Sign Up Now\" link. Current as of: July 28, 2021               Content Version: 13.2  © 7804-6144 [employer]. Care instructions adapted under license by [facility] ([facility]). If you have questions about a medical condition or this instruction, always ask your healthcare professional. [address] any warranty or liability for your use of this information. Patient Education        High Blood Pressure: Care Instructions  Overview     It's normal for blood pressure to go up and down throughout the day. But if it stays up, you have high blood pressure. Another name for high blood pressure ishypertension. Despite what a lot of people think, high blood pressure usually doesn't cause headaches or make you feel dizzy or lightheaded. It usually has no symptoms. But it does increase your risk of stroke, heart attack, and other problems. You and your doctor will talk about your risks of these problems based on yourblood pressure. Your doctor will give you a goal for your blood pressure. Your goal will bebased on your health and your age. Lifestyle changes, such as eating healthy and being active, are always important to help lower blood pressure. You might also take medicine to reachyour blood pressure goal.  Follow-up care is a key part of your treatment and safety. Be sure to make and go to all appointments, and call your doctor if you are having problems. It's also a good idea to know your test results and keep alist of the medicines you take.   How can you care for yourself at home? Medical treatment  If you stop taking your medicine, your blood pressure will go back up. You may take one or more types of medicine to lower your blood pressure. Be safe with medicines. Take your medicine exactly as prescribed. Call your doctor if you think you are having a problem with your medicine. Talk to your doctor before you start taking aspirin every day. Aspirin can help certain people lower their risk of a heart attack or stroke. But taking aspirin isn't right for everyone, because it can cause serious bleeding. See your doctor regularly. You may need to see the doctor more often at first or until your blood pressure comes down. If you are taking blood pressure medicine, talk to your doctor before you take decongestants or anti-inflammatory medicine, such as ibuprofen. Some of these medicines can raise blood pressure. Learn how to check your blood pressure at home. Lifestyle changes  Stay at a healthy weight. This is especially important if you put on weight around the waist. Losing even 10 pounds can help you lower your blood pressure. If your doctor recommends it, get more exercise. Walking is a good choice. Bit by bit, increase the amount you walk every day. Try for at least 30 minutes on most days of the week. You also may want to swim, bike, or do other activities. Avoid or limit alcohol. Talk to your doctor about whether you can drink any alcohol. Try to limit how much sodium you eat to less than 2,300 milligrams (mg) a day. Your doctor may ask you to try to eat less than 1,500 mg a day. Eat plenty of fruits (such as bananas and oranges), vegetables, legumes, whole grains, and low-fat dairy products. Lower the amount of saturated fat in your diet. Saturated fat is found in animal products such as milk, cheese, and meat. Limiting these foods may help you lose weight and also lower your risk for heart disease. Do not smoke.  Smoking increases your risk for heart attack and stroke. If you need help quitting, talk to your doctor about stop-smoking programs and medicines. These can increase your chances of quitting for good. When should you call for help? Call 911  anytime you think you may need emergency care. This may mean having symptoms that suggest that your blood pressure is causing a serious heart or blood vessel problem. Your blood pressure may be over 180/120. For example, call 911 if:    You have symptoms of a heart attack. These may include:  Chest pain or pressure, or a strange feeling in the chest.  Sweating. Shortness of breath. Nausea or vomiting. Pain, pressure, or a strange feeling in the back, neck, jaw, or upper belly or in one or both shoulders or arms. Lightheadedness or sudden weakness. A fast or irregular heartbeat.     You have symptoms of a stroke. These may include:  Sudden numbness, tingling, weakness, or loss of movement in your face, arm, or leg, especially on only one side of your body. Sudden vision changes. Sudden trouble speaking. Sudden confusion or trouble understanding simple statements. Sudden problems with walking or balance. A sudden, severe headache that is different from past headaches.     You have severe back or belly pain. Do not wait until your blood pressure comes down on its own. Get help right away. Call your doctor now or seek immediate care if:    Your blood pressure is much higher than normal (such as 180/120 or higher), but you don't have symptoms.     You think high blood pressure is causing symptoms, such as:  Severe headache. Blurry vision. Watch closely for changes in your health, and be sure to contact your doctor if:    Your blood pressure measures higher than your doctor recommends at least 2 times. That means the top number is higher or the bottom number is higher, or both.     You think you may be having side effects from your blood pressure medicine. Where can you learn more?   Go to [email or web address] and sign in to your [facility] account. Enter D578 in the [facility] box to learn more about \"High Blood Pressure: Care Instructions. \"     If you do not have an account, please click on the \"Sign Up Now\" link. Current as of: January 10, 2022               Content Version: 13.2  © 2006-2022 [employer]. Care instructions adapted under license by [facility] ([facility]). If you have questions about a medical condition or this instruction, always ask your healthcare professional. [place] [address] any warranty or liability for your use of this information. Patient Education        Osteoarthritis: Care Instructions  Your Care Instructions     Arthritis is a common health problem in which the joints are inflamed. There are several kinds of arthritis. Osteoarthritis is caused by a breakdown of cartilage, the hard, thick tissue that cushions the joints. It causes pain, stiffness, and swelling, often in the spine, fingers, hips, and knees. Osteoarthritis can happen at any age, but it is most common in older people. Osteoarthritis never goes away completely, but it can be controlled. Medicine and home treatment can reduce the pain and prevent the arthritis from getting worse. Follow-up care is a key part of your treatment and safety. Be sure to make and go to all appointments, and call your doctor if you are having problems. It's also a good idea to know your test results and keep a list of the medicines you take. How can you care for yourself at home? Take a warm shower or bath in the morning to relieve stiffness. Avoid sitting still afterwards. If the joint is not swollen, use moist heat, like a warm, damp towel, for 20 to 30 minutes, 2 or 3 times a day. Do not use heat on a swollen joint. If the joint is swollen, use ice or cold packs for 10 to 20 minutes, once an hour. Cold will help relieve pain and reduce inflammation.  Put a thin cloth between the ice and your skin. To prevent stiffness, gently move the joint through its full range of motion several times a day. If the joint hurts, avoid activities that put a strain on it for a few days. Take rest breaks throughout the day. Get regular exercise. Walking, swimming, yoga, biking, martin chi, and water aerobics are good exercises that are gentle on the joints. Reach and stay at a healthy weight. If you need to lose or maintain weight, regular exercise and a healthy diet will help. Extra weight can strain the joints, especially the knees and hips, and make the pain worse. Losing even a few pounds may help. Take pain medicines exactly as directed. If the doctor gave you a prescription medicine for pain, take it as prescribed. If you are not taking a prescription pain medicine, ask your doctor if you can take an over-the-counter medicine. When should you call for help? Call your doctor now or seek immediate medical care if:    The pain is so bad that you cannot use the joint.     You have sudden back pain with weakness in your legs or loss of bowel or bladder control.     Your stools are black and tarlike or have streaks of blood.     You have severe pain and swelling in more than one joint. Watch closely for changes in your health, and be sure to contact your doctor if:    You have side effects from the medicines, like belly pain, ongoing heartburn, or nausea.     Joint pain continues for more than 6 weeks, and home treatment is not helping. Where can you learn more? Go to https://auctionpoint.[email or web address] org and sign in to your [employer] account. Enter G324 in the [employer] box to learn more about \"Osteoarthritis: Care Instructions. \"     If you do not have an account, please click on the \"Sign Up Now\" link. Current as of: August 5, 2020               Content Version: 12.9  © 2006-2021 [employer].    Care instructions adapted under license by [facility]. If you have questions about a medical condition or this instruction, always ask your healthcare professional. [place] [address] any warranty or liability for your use of this information.

## 2022-06-15 NOTE — PROGRESS NOTES
Medicare Annual Wellness Visit    Mitchell Yeung is here for Medicare AWV (left hip is started hurting again )    Assessment & Plan       Kusum Snowden was seen today for medicare awv. Diagnoses and all orders for this visit:      Medicare annual wellness visit, subsequent  - Age-appropriate education provided. - Health maintenance reviewed. - Encourage healthy diet and regular exercise. - Areas of concern addressed      Recommendations for Preventive Services Due: see orders and patient instructions/AVS.  Recommended screening schedule for the next 5-10 years is provided to the patient in written form: see Patient Instructions/AVS.     Return in about 6 months (around 12/15/2022) for Chronic Conditions. Subjective     Patient's complete Health Risk Assessment and screening values have been reviewed and are found in Flowsheets. The following problems were reviewed today and where indicated follow up appointments were made and/or referrals ordered.     Positive Risk Factor Screenings with Interventions:    Fall Risk:  Do you feel unsteady or are you worried about falling? : (!) yes  2 or more falls in past year?: no  Fall with injury in past year?: no     Fall Risk Interventions:    · Home safety tips provided  · Physical therapy referral ordered for strength and balance training        Tobacco Use:     Tobacco Use: High Risk    Smoking Tobacco Use: Current Some Day Smoker    Smokeless Tobacco Use: Never Used     E-Cigarettes/Vaping Use     Questions Responses    E-Cigarette/Vaping Use Never User    Start Date     Passive Exposure     Quit Date     Counseling Given     Comments         Substance Use - Tobacco Interventions:  tobacco cessation tips and resources provided           General Health and ACP:  General  In general, how would you say your health is?: Very Good  In the past 7 days, have you experienced any of the following: New or Increased Pain, New or Increased Fatigue, Loneliness, Social Isolation, Stress or Anger?: (!) Yes  Select all that apply: (!) New or Increased Pain  Do you get the social and emotional support that you need?: Yes  Do you have a Living Will?: Yes    Advance Directives     Power of  Living Will ACP-Advance Directive ACP-Power of Randell Kimberli on 07/01/21 Filed on 05/09/22 Filed [address] [place] Risk Interventions:  · Pain issues: referral placed to orthopedics, physical therapy referral ordered    Health Habits/Nutrition:     Physical Activity: Insufficiently Active    Days of Exercise per Week: 5 days    Minutes of Exercise per Session: 20 min     Have you lost any weight without trying in the past 3 months?: No     Have you seen the dentist within the past year?: (!) No    Health Habits/Nutrition Interventions:  · Dental exam overdue:  has dentures    Hearing/Vision:  Do you or your family notice any trouble with your hearing that hasn't been managed with hearing aids?: (!) Yes  Do you have difficulty driving, watching TV, or doing any of your daily activities because of your eyesight?: No  Have you had an eye exam within the past year?: (!) No  No exam data present    Hearing/Vision Interventions:  · Hearing concerns:  has worn new hearing aids once  · Vision concerns:  patient encouraged to make appointment with his/her eye specialist     ADLs:  In the past 7 days, did you need help from others to perform any of the following everyday activities: Eating, dressing, grooming, bathing, toileting, or walking/balance?: No  In the past 7 days, did you need help from others to take care of any of the following: Laundry, housekeeping, banking/finances, shopping, telephone use, food preparation, transportation, or taking medications?: (!) Yes  Select all that apply: (!) Transportation,Food Preparation,Shopping,Housekeeping,Laundry    ADL Interventions:  · Patient declines any further evaluation/treatment for this issue          Objective   Vitals:    06/15/22 1413   BP: 110/62   Pulse: 64   Resp: 18      There is no height or weight on file to calculate BMI. Allergies   Allergen Reactions    Azulfidine [Sulfasalazine] Diarrhea    Keflex [Cephalexin] Diarrhea    Mesalamine Other (See Comments)    Sulfa Antibiotics     Alcohol Nausea And Vomiting     Drinking alcohol makes her sick and gives her headaches     Prior to Visit Medications    Medication Sig Taking? Authorizing Provider   donepezil (ARICEPT) 10 MG tablet Take 1 tablet by mouth nightly Yes Bula Manifold, APRN - CNP   metoprolol tartrate (LOPRESSOR) 25 MG tablet Take 1 tablet by mouth 2 times daily Yes Bula Manifold, APRN - CNP   lisinopril (PRINIVIL;ZESTRIL) 20 MG tablet Take 1 tablet by mouth daily Yes Bula Manifold, APRN - CNP   predniSONE (DELTASONE) 10 MG tablet Take 1 tablet by mouth daily Yes Bula Manifold, APRN - CNP   simvastatin (ZOCOR) 40 MG tablet Take 1 tablet by mouth nightly Yes Bula Manifold, APRN - CNP   umeclidinium-vilanterol (ANORO ELLIPTA) 62.5-25 MCG/INH AEPB inhaler Inhale 1 puff into the lungs daily Yes Bula Manifold, APRN - CNP   Misc. Devices [facility]) MISC 1 standard wheelchair. Yes Bula Manifold, APRN - CNP   HYDROcodone-acetaminophen (NORCO) 5-325 MG per tablet Take 1 tablet by mouth every 6 hours as needed for Pain for up to 30 days.  Yes Bula Manifold, APRN - CNP   Multiple Vitamin (MULTIVITAMIN ADULT PO) Take 1 tablet by mouth daily Yes Historical Provider, MD   vitamin C (ASCORBIC ACID) 500 MG tablet Take 1,000 mg by mouth daily Yes Historical Provider, MD   zinc gluconate 50 MG tablet Take 50 mg by mouth daily Yes Historical Provider, MD   albuterol (PROVENTIL) (2.5 MG/3ML) 0.083% nebulizer solution Take 2.5 mg by nebulization every 6 hours as needed for Wheezing or Shortness of Breath Yes Historical Provider, MD   ibuprofen (ADVIL;MOTRIN) 400 MG tablet Take 400 mg by mouth 2 times daily as needed for Pain Yes Historical Provider, MD   ipratropium (ATROVENT) 0.02 % nebulizer solution Take 0.5 mg by nebulization every 6-8 hours as needed for Wheezing Yes Historical Provider, MD   denosumab (PROLIA) 60 MG/ML SOSY SC injection Inject 1 mL into the skin every 6 months Yes PETEY Waller CNP   acetaminophen (TYLENOL) 500 MG tablet Take 2 tablets by mouth 3 times daily as needed for Pain Yes PETEY Waller CNP   Misc. Devices MISC 1 set of bed rails. Yes PETEY Waller CNP   Misc. Devices MISC Please apply compression stockings (JOHANNE hose) to bilateral lower extremities for 12 hours daily. Remove nightly. Yes PETEY Waller CNP   albuterol sulfate  (90 Base) MCG/ACT inhaler Inhale 2 puffs into the lungs every 6 hours as needed for Wheezing Yes PETEY Waller CNP   Psyllium Fiber 0.52 g CAPS Take 1 capsule by mouth daily Yes PETEY Walelr CNP   Glucosamine-Chondroitin 500-250 MG CAPS Take by mouth daily Yes Historical Provider, MD   vitamin B-12 (CYANOCOBALAMIN) 1000 MCG tablet Take 1 tablet by mouth daily Yes Lisa Modi MD   Probiotic Product ([address]) Take 1 tablet by mouth daily Yes Historical Provider, MD Dean (Including outside providers/suppliers regularly involved in providing care):   Patient Care Team:  PETEY Waller CNP as PCP - General (Nurse Practitioner)  PETEY Waller CNP as PCP - [facility] Empaneled Provider     Reviewed and updated this visit:  Tobacco  Allergies  Meds  Problems  Med Hx  Surg Hx  Soc Hx  Fam Hx                 Ramin Gibson (:  1940) is a 80 y.o. female,Established patient, here for evaluation of the following chief complaint(s):  Medicare AWV (left hip is started hurting again )         ASSESSMENT/PLAN:  Alberto Velasco was seen today for medicare awv.     Diagnoses and all orders for this visit:    Bilateral hip pain  - Chronic, unstable  - Start physical therapy of bilateral hips  - Continue with scheduled ortho appt  - Continue current medication treatments  - Okay for prn ibuprofen use- risks discussed  -     [facility]    Bilateral primary osteoarthritis of hip  -     [facility]    Type 2 diabetes mellitus with diabetic polyneuropathy, without long-term current use of insulin ([facility]  - Update blood work  - Currently treated with diet alone  - Healthy diet and exercise encouraged  -      DIABETES FOOT EXAM  -     Hemoglobin A1C; Future    Dementia without behavioral disturbance, unspecified dementia type ([facility]  - Chronic, stable  - Continue donepezil  -     donepezil (ARICEPT) 10 MG tablet; Take 1 tablet by mouth nightly    Ulcerative colitis with rectal bleeding, unspecified location (HCC)  - Chronic, stable  - Denies current symptoms    Chronic deep vein thrombosis (DVT) of femoral vein of left lower extremity (HCC)  - Resolved  - Previously treated with eliquis    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, unspecified whether native or transplanted heart (HCC)  - Chronic, stable  - Denies current chest pain  - Continue statin therapy  - Encouraged smoking cessation    Memory loss  -     donepezil (ARICEPT) 10 MG tablet; Take 1 tablet by mouth nightly    Hypertension, unspecified type  - Chronic, stable  - Controlled with current therapies- continue metoprolol and lisinopril  - DASH diet, regular exercise encouraged  -     metoprolol tartrate (LOPRESSOR) 25 MG tablet; Take 1 tablet by mouth 2 times daily  -     lisinopril (PRINIVIL;ZESTRIL) 20 MG tablet; Take 1 tablet by mouth daily    Trochanteric bursitis of right hip  -     predniSONE (DELTASONE) 10 MG tablet; Take 1 tablet by mouth daily    Coronary artery disease involving native coronary artery of native heart without angina pectoris  - Chronic, stable  - Denies current chest pain  - Continue statin therapy  - Encouraged smoking cessation  -     simvastatin (ZOCOR) 40 MG tablet; Take 1 tablet by mouth nightly  -     Lipid Panel;  Future    Mixed hyperlipidemia  - Chronic, stable  - Controlled with current therapies- continue simvastatin  - Healthy diet and exercise encouraged  -     simvastatin (ZOCOR) 40 MG tablet; Take 1 tablet by mouth nightly  -     Lipid Panel; Future    Chronic obstructive pulmonary disease, unspecified COPD type ([facility]  - Chronic, stable  - Controlled with current therapies- continue Anoro Ellipta  - Albuterol as needed  - Encouraged smoking cessation patient declines  -     umeclidinium-vilanterol (ANORO ELLIPTA) 62.5-25 MCG/INH AEPB inhaler; Inhale 1 puff into the lungs daily      Return in about 6 months (around 12/15/2022) for Chronic Conditions. Subjective   SUBJECTIVE/OBJECTIVE:  Presents for 6 month follow up with her son. Medical history significant for hypertension, hyperlipidemia, COPD and DM2. Current concerns include worsening left hip pain. Received steroid injection 1.5 months ago while in the hospital. Pain temporarily improved but worsened again today. Is currently being treated with norco, tylenol and prednisone. Had referral placed to [facility] 1 week ago. States she has scheduled this appt but is unsure of the date. Is currently in PT but reports only having 2 sessions remaining. Diabetes  She presents for her follow-up diabetic visit. She has type 2 diabetes mellitus. Her disease course has been stable. There are no hypoglycemic associated symptoms. Pertinent negatives for hypoglycemia include no confusion, dizziness, headaches, nervousness/anxiousness or pallor. Pertinent negatives for diabetes include no blurred vision, no chest pain, no fatigue, no foot paresthesias, no foot ulcerations, no polydipsia, no polyphagia, no polyuria and no visual change. There are no hypoglycemic complications. Symptoms are stable. Risk factors for coronary artery disease include family history and diabetes mellitus. When asked about current treatments, none were reported. Hyperlipidemia  This is a chronic problem.  The current episode started more than 1 year ago. The problem is controlled. Recent lipid tests were reviewed and are normal. Exacerbating diseases include diabetes. Factors aggravating her hyperlipidemia include smoking. Pertinent negatives include no chest pain or shortness of breath. Current antihyperlipidemic treatment includes statins. The current treatment provides significant improvement of lipids. There are no compliance problems. Risk factors for coronary artery disease include family history, dyslipidemia, diabetes mellitus and hypertension. Hypertension  This is a chronic problem. The current episode started more than 1 year ago. The problem is unchanged. The problem is controlled. Pertinent negatives include no blurred vision, chest pain, headaches, neck pain, palpitations or shortness of breath. Risk factors for coronary artery disease include diabetes mellitus, dyslipidemia and family history. Past treatments include ACE inhibitors. The current treatment provides significant improvement. There are no compliance problems. PHQ-9 Total Score: 0 (6/15/2022  2:06 PM)        Review of Systems   Constitutional: Negative for chills, fatigue and fever. HENT: Negative for congestion, ear pain, sinus pressure and sneezing. Eyes: Negative for blurred vision, pain, discharge, redness and itching. Respiratory: Negative for cough, shortness of breath and wheezing. Cardiovascular: Negative for chest pain, palpitations and leg swelling. Gastrointestinal: Negative for abdominal pain, blood in stool, constipation and diarrhea. Endocrine: Negative for polydipsia, polyphagia and polyuria. Genitourinary: Negative for difficulty urinating and hematuria. Musculoskeletal: Positive for arthralgias. Negative for back pain and neck pain. Skin: Negative for color change, pallor and rash. Allergic/Immunologic: Negative for environmental allergies and food allergies.    Neurological: Negative for dizziness, light-headedness, numbness and headaches. Psychiatric/Behavioral: Negative for agitation and confusion. The patient is not nervous/anxious. Objective   Physical Exam  Vitals and nursing note reviewed. Constitutional:       General: She is awake. Appearance: Normal appearance. HENT:      Head: Normocephalic and atraumatic. Right Ear: Hearing and external ear normal.      Left Ear: Hearing and external ear normal.      Nose: Nose normal. No congestion or rhinorrhea. Eyes:      General: Lids are normal.         Right eye: No discharge. Left eye: No discharge. Conjunctiva/sclera: Conjunctivae normal.   Neck:      Trachea: No tracheal deviation. Cardiovascular:      Rate and Rhythm: Normal rate and regular rhythm. Heart sounds: Normal heart sounds. No murmur heard. Pulmonary:      Effort: Pulmonary effort is normal. No respiratory distress. Breath sounds: No stridor. No wheezing. Musculoskeletal:      Cervical back: Full passive range of motion without pain. Skin:     General: Skin is dry. Coloration: Skin is not jaundiced or pale. Neurological:      General: No focal deficit present. Mental Status: She is alert. Mental status is at baseline. Psychiatric:         Mood and Affect: Mood and affect normal.         Behavior: Behavior is cooperative. An electronic signature was used to authenticate this note.     --Joe Bates, PETEY - CNP

## 2022-07-17 ENCOUNTER — APPOINTMENT (OUTPATIENT)
Dept: CT IMAGING | Age: 82
DRG: 551 | End: 2022-07-17
Payer: MEDICARE

## 2022-07-17 ENCOUNTER — HOSPITAL ENCOUNTER (INPATIENT)
Age: 82
LOS: 6 days | Discharge: NURSING, REHAB OR LONG-TERM CARE | DRG: 551 | End: 2022-07-23
Attending: EMERGENCY MEDICINE | Admitting: SURGERY
Payer: MEDICARE

## 2022-07-17 DIAGNOSIS — S32.050A CLOSED COMPRESSION FRACTURE OF L5 VERTEBRA, INITIAL ENCOUNTER (HCC): ICD-10-CM

## 2022-07-17 DIAGNOSIS — S22.020A COMPRESSION FRACTURE OF T2 VERTEBRA, INITIAL ENCOUNTER (HCC): ICD-10-CM

## 2022-07-17 DIAGNOSIS — S22.42XA CLOSED FRACTURE OF MULTIPLE RIBS OF LEFT SIDE, INITIAL ENCOUNTER: ICD-10-CM
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
## 2022-07-17 DIAGNOSIS — N39.0 ACUTE UTI: ICD-10-CM

## 2022-07-17 LAB
ANION GAP SERPL CALCULATED.3IONS-SCNC: 9 MEQ/L (ref 8–16)
BACTERIA: ABNORMAL /HPF
BASOPHILS # BLD: 0.3 %
BASOPHILS ABSOLUTE: 0 THOU/MM3 (ref 0–0.1)
BILIRUBIN URINE: NEGATIVE
BLOOD, URINE: ABNORMAL
BUN BLDV-MCNC: 27 MG/DL (ref 7–22)
CALCIUM SERPL-MCNC: 9.3 MG/DL (ref 8.5–10.5)
CASTS 2: ABNORMAL /LPF
CASTS UA: ABNORMAL /LPF
CHARACTER, URINE: CLEAR
CHLORIDE BLD-SCNC: 105 MEQ/L (ref 98–111)
CO2: 27 MEQ/L (ref 23–33)
COLOR: YELLOW
CREAT SERPL-MCNC: 1 MG/DL (ref 0.4–1.2)
CRYSTALS, UA: ABNORMAL
EKG ATRIAL RATE: 91 BPM
EKG P AXIS: 57 DEGREES
EKG P-R INTERVAL: 164 MS
EKG Q-T INTERVAL: 392 MS
EKG QRS DURATION: 118 MS
EKG QTC CALCULATION (BAZETT): 482 MS
EKG R AXIS: -63 DEGREES
EKG T AXIS: 42 DEGREES
EKG VENTRICULAR RATE: 91 BPM
EOSINOPHIL # BLD: 0.2 %
EOSINOPHILS ABSOLUTE: 0 THOU/MM3 (ref 0–0.4)
EPITHELIAL CELLS, UA: ABNORMAL /HPF
ERYTHROCYTE [DISTWIDTH] IN BLOOD BY AUTOMATED COUNT: 15.3 % (ref 11.5–14.5)
ERYTHROCYTE [DISTWIDTH] IN BLOOD BY AUTOMATED COUNT: 56.9 FL (ref 35–45)
GFR SERPL CREATININE-BSD FRML MDRD: 53 ML/MIN/1.73M2
GLUCOSE BLD-MCNC: 130 MG/DL (ref 70–108)
GLUCOSE URINE: NEGATIVE MG/DL
HCT VFR BLD CALC: 42.9 % (ref 37–47)
HEMOGLOBIN: 13.3 GM/DL (ref 12–16)
IMMATURE GRANS (ABS): 0.17 THOU/MM3 (ref 0–0.07)
IMMATURE GRANULOCYTES: 1.1 %
KETONES, URINE: NEGATIVE
LACTIC ACID, SEPSIS: 1.7 MMOL/L (ref 0.5–1.9)
LEUKOCYTE ESTERASE, URINE: ABNORMAL
LYMPHOCYTES # BLD: 14.7 %
LYMPHOCYTES ABSOLUTE: 2.2 THOU/MM3 (ref 1–4.8)
MCH RBC QN AUTO: 30.8 PG (ref 26–33)
MCHC RBC AUTO-ENTMCNC: 31 GM/DL (ref 32.2–35.5)
MCV RBC AUTO: 99.3 FL (ref 81–99)
MISCELLANEOUS 2: ABNORMAL
MONOCYTES # BLD: 6.6 %
MONOCYTES ABSOLUTE: 1 THOU/MM3 (ref 0.4–1.3)
NITRITE, URINE: POSITIVE
NUCLEATED RED BLOOD CELLS: 0 /100 WBC
OSMOLALITY CALCULATION: 288.1 MOSMOL/KG (ref 275–300)
PH UA: 7.5 (ref 5–9)
PLATELET # BLD: 260 THOU/MM3 (ref 130–400)
PMV BLD AUTO: 10.4 FL (ref 9.4–12.4)
POTASSIUM REFLEX MAGNESIUM: 4.2 MEQ/L (ref 3.5–5.2)
PROTEIN UA: NEGATIVE
RBC # BLD: 4.32 MILL/MM3 (ref 4.2–5.4)
RBC URINE: ABNORMAL /HPF
RENAL EPITHELIAL, UA: ABNORMAL
SEG NEUTROPHILS: 77.1 %
SEGMENTED NEUTROPHILS ABSOLUTE COUNT: 11.6 THOU/MM3 (ref 1.8–7.7)
SODIUM BLD-SCNC: 141 MEQ/L (ref 135–145)
SPECIFIC GRAVITY, URINE: 1.01 (ref 1–1.03)
UROBILINOGEN, URINE: 0.2 EU/DL (ref 0–1)
WBC # BLD: 15 THOU/MM3 (ref 4.8–10.8)
WBC UA: ABNORMAL /HPF
YEAST: ABNORMAL

## 2022-07-17 PROCEDURE — 81001 URINALYSIS AUTO W/SCOPE: CPT

## 2022-07-17 PROCEDURE — 87040 BLOOD CULTURE FOR BACTERIA: CPT

## 2022-07-17 PROCEDURE — 6360000002 HC RX W HCPCS: Performed by: STUDENT IN AN ORGANIZED HEALTH CARE EDUCATION/TRAINING PROGRAM

## 2022-07-17 PROCEDURE — 80048 BASIC METABOLIC PNL TOTAL CA: CPT

## 2022-07-17 PROCEDURE — 87186 SC STD MICRODIL/AGAR DIL: CPT

## 2022-07-17 PROCEDURE — 99285 EMERGENCY DEPT VISIT HI MDM: CPT

## 2022-07-17 PROCEDURE — 36415 COLL VENOUS BLD VENIPUNCTURE: CPT

## 2022-07-17 PROCEDURE — 74176 CT ABD & PELVIS W/O CONTRAST: CPT

## 2022-07-17 PROCEDURE — APPNB30 APP NON BILLABLE TIME 0-30 MINS: Performed by: PHYSICIAN ASSISTANT

## 2022-07-17 PROCEDURE — 83605 ASSAY OF LACTIC ACID: CPT

## 2022-07-17 PROCEDURE — 76376 3D RENDER W/INTRP POSTPROCES: CPT

## 2022-07-17 PROCEDURE — 85025 COMPLETE CBC W/AUTO DIFF WBC: CPT

## 2022-07-17 PROCEDURE — 6370000000 HC RX 637 (ALT 250 FOR IP): Performed by: EMERGENCY MEDICINE

## 2022-07-17 PROCEDURE — 87077 CULTURE AEROBIC IDENTIFY: CPT

## 2022-07-17 PROCEDURE — 96365 THER/PROPH/DIAG IV INF INIT: CPT

## 2022-07-17 PROCEDURE — 87086 URINE CULTURE/COLONY COUNT: CPT

## 2022-07-17 PROCEDURE — 71250 CT THORAX DX C-: CPT

## 2022-07-17 PROCEDURE — 1200000000 HC SEMI PRIVATE

## 2022-07-17 PROCEDURE — 72125 CT NECK SPINE W/O DYE: CPT

## 2022-07-17 PROCEDURE — 93010 ELECTROCARDIOGRAM REPORT: CPT | Performed by: INTERNAL MEDICINE

## 2022-07-17 PROCEDURE — 93005 ELECTROCARDIOGRAM TRACING: CPT | Performed by: EMERGENCY MEDICINE

## 2022-07-17 PROCEDURE — 2580000003 HC RX 258: Performed by: STUDENT IN AN ORGANIZED HEALTH CARE EDUCATION/TRAINING PROGRAM

## 2022-07-17 PROCEDURE — 6820000001 HC L2 TRAUMA SURGERY EVALUATION: Performed by: SURGERY

## 2022-07-17 PROCEDURE — 70450 CT HEAD/BRAIN W/O DYE: CPT

## 2022-07-17 RX ORDER — HYDROCODONE BITARTRATE AND ACETAMINOPHEN 5; 325 MG/1; MG/1
0.5 TABLET ORAL ONCE
Status: COMPLETED | OUTPATIENT
Start: 2022-07-17 | End: 2022-07-17

## 2022-07-17 RX ORDER — CYCLOBENZAPRINE HCL 10 MG
10 TABLET ORAL 3 TIMES DAILY PRN
Status: DISCONTINUED | OUTPATIENT
Start: 2022-07-17 | End: 2022-07-19

## 2022-07-17 RX ORDER — LIDOCAINE 4 G/G
1 PATCH TOPICAL DAILY
Status: DISCONTINUED | OUTPATIENT
Start: 2022-07-17 | End: 2022-07-17

## 2022-07-17 RX ORDER — LIDOCAINE 4 G/G
2 PATCH TOPICAL DAILY
Status: DISCONTINUED | OUTPATIENT
Start: 2022-07-18 | End: 2022-07-23 | Stop reason: HOSPADM

## 2022-07-17 RX ADMIN — HYDROCODONE BITARTRATE AND ACETAMINOPHEN 0.5 TABLET: 5; 325 TABLET ORAL at 17:54

## 2022-07-17 RX ADMIN — CEFTRIAXONE SODIUM 1000 MG: 1 INJECTION, POWDER, FOR SOLUTION INTRAMUSCULAR; INTRAVENOUS at 22:18

## 2022-07-17 ASSESSMENT — PAIN - FUNCTIONAL ASSESSMENT
PAIN_FUNCTIONAL_ASSESSMENT: 0-10

## 2022-07-17 ASSESSMENT — ENCOUNTER SYMPTOMS
DIARRHEA: 0
COLOR CHANGE: 0
CHEST TIGHTNESS: 0
PHOTOPHOBIA: 0
NAUSEA: 0
BACK PAIN: 1
EYE ITCHING: 0
EYE PAIN: 0
SINUS PAIN: 0
SINUS PRESSURE: 0
EYE DISCHARGE: 0
FACIAL SWELLING: 0
SHORTNESS OF BREATH: 0
VOMITING: 0
RHINORRHEA: 0
EYE REDNESS: 0
ABDOMINAL DISTENTION: 0
ABDOMINAL PAIN: 0

## 2022-07-17 ASSESSMENT — PAIN SCALES - GENERAL
PAINLEVEL_OUTOF10: 3
PAINLEVEL_OUTOF10: 2

## 2022-07-17 NOTE — ED NOTES
Patient given meal tray at this time. Patient assisted to comfortable position in order to eat. Patient VSS and patient does not appear to be in any current distress at this time. Will continue to monitor. Call light within reach.        Ry Reed RN  07/17/22 [id number]

## 2022-07-17 NOTE — ED NOTES
Bedside report received from [facility].  Pt meal try removed per request.     Magen Wylie RN  07/17/22 2460

## 2022-07-17 NOTE — ED PROVIDER NOTES
[facility] ENCOUNTER          Pt Name: Sachin Neal  MRN: [id number]  [place] 1940  Date of evaluation: 7/17/2022  Treating Resident Physician: Clark Ambrose DO  Supervising Physician: Dr. Tod Diane    History obtained from the patient. CHIEF COMPLAINT       Chief Complaint   Patient presents with    Fall           HISTORY OF PRESENT ILLNESS    HPI  Sachin Neal is a 80 y.o. female who presents to the emergency department for evaluation of fall. The patient lives at [address] She reports that her son-in-law was picking her up to go eat, when she forgot her coat and went back into her apartment. She reportedly tripped. The patient does not know how she fell but she reports that she was hurrying. She reports that she normally walks with a walker, but was not walking with a walker when she fell. The patient states that she landed on her back. She denies head injury or loss of consciousness. She does not use blood thinners. She is currently complaining of mid thoracic spine pain. She states that she did not try to get up after she fell and the nurse at the [facility] called the squad. She denies symptoms prior to falling. The patient has no other acute complaints at this time. REVIEW OF SYSTEMS   Review of Systems   Constitutional:  Negative for fever. HENT:  Negative for rhinorrhea, sinus pressure and sinus pain. Eyes:  Negative for discharge, redness and itching. Respiratory:  Negative for chest tightness and shortness of breath. Cardiovascular:  Negative for chest pain. Gastrointestinal:  Negative for abdominal distention, abdominal pain, diarrhea, nausea and vomiting. Genitourinary:  Negative for difficulty urinating, dysuria, frequency, hematuria and urgency. Musculoskeletal:  Positive for back pain. Negative for arthralgias and neck pain.    Skin:  Negative for color change and pallor. Neurological:  Negative for dizziness, light-headedness and headaches.        PAST MEDICAL AND SURGICAL HISTORY     Past Medical History:   Diagnosis Date    Abnormal nuclear stress test 06/01/2018    Cataract     Chronic deep vein thrombosis (DVT) of femoral vein of left lower extremity (HCC) 3/9/2021    Colon polyps     COPD (chronic obstructive pulmonary disease) (HCC)     Diverticulosis large intestine w/o perforation or abscess w/bleeding     DVT, recurrent, lower extremity, acute, left (HCC)     Fibroid, uterine     Hearing loss     Hx of blood clots     Hyperlipidemia     Nicotine dependence     Nocturnal hypoxemia     Postmenopausal     Sleep apnea     Ulcerative colitis ([facility] [address]     with rectal bleeding      Past Surgical History:   Procedure Laterality Date    BREAST BIOPSY  1988    right breast biopsy     BREAST BIOPSY Right 03/2011    stereotactic biopsy    COLONOSCOPY  2006    Dr Surya Torres  2010    Dr Jose Ayala    COLONOSCOPY  2010    Dr Salvador Ramirez interminate colitis    COLONOSCOPY  03/03/2014    Dr Monnie Meckel Bilateral 2011 2012    HIP SURGERY  06/13/2020    left fracture    OSTEOTOMY Left 11/2/2021    LEFT 5TH METATARSAL HEAD RESECTION EXCISION SOFT TISSUE LESION performed by Tushar Dacosta DPM at [address] [place] [address]     Current Facility-Administered Medications:     0.9 % sodium chloride infusion, , IntraVENous, Continuous, GLENN Tee    sodium chloride flush 0.9 % injection 5-40 mL, 5-40 mL, IntraVENous, 2 times per day, Otanaia Calie Pricepa, PA    sodium chloride flush 0.9 % injection 5-40 mL, 5-40 mL, IntraVENous, PRN, Gil Leivae Cammie PA    0.9 % sodium chloride infusion, 25 mL, IntraVENous, PRN, Gil Bonds PA    acetaminophen (TYLENOL) tablet 650 mg, 650 mg, Oral, Q4H PRN, GLENN Tee    famotidine (PEPCID) injection 20 mg, 20 mg, IntraVENous, Daily, GLENN Tee    ondansetron (ZOFRAN-ODT) disintegrating tablet 4 mg, 4 mg, Oral, Q8H PRN **OR** ondansetron (ZOFRAN) injection 4 mg, 4 mg, IntraVENous, Q6H PRN, GLENN Tee, 4 mg at 07/18/22 0212    polyethylene glycol (GLYCOLAX) packet 17 g, 17 g, Oral, Daily, GLENN Tee    bisacodyl (DULCOLAX) suppository 10 mg, 10 mg, Rectal, Daily, GLENN Bauman    senna (SENOKOT) tablet 8.6 mg, 1 tablet, Oral, Daily PRN, GLENN Bauman    morphine (PF) injection 2 mg, 2 mg, IntraVENous, Q2H PRN **OR** morphine injection 4 mg, 4 mg, IntraVENous, Q2H PRN, GLENN Bauman    oxyCODONE (ROXICODONE) immediate release tablet 5 mg, 5 mg, Oral, Q4H PRN **OR** oxyCODONE (ROXICODONE) immediate release tablet 10 mg, 10 mg, Oral, Q4H PRN, GLENN Bauman    cyclobenzaprine (FLEXERIL) tablet 10 mg, 10 mg, Oral, TID PRN, GLENN Bauman    lidocaine 4 % external patch 2 patch, 2 patch, TransDERmal, Daily, GLENN Bauman      SOCIAL HISTORY     Social History     Social History Narrative    Not on file     Social History     Tobacco Use    Smoking status: Some Days     Packs/day: 1.00     Years: 40.00     Pack years: 40.00     Types: Cigarettes    Smokeless tobacco: Never    Tobacco comments:     8-10 cigarettes / day   Vaping Use    Vaping Use: Never used   Substance Use Topics    Alcohol use: No    Drug use: Never         ALLERGIES     Allergies   Allergen Reactions    Azulfidine [Sulfasalazine] Diarrhea    Keflex [Cephalexin] Diarrhea    Mesalamine Other (See Comments)    Sulfa Antibiotics     Alcohol Nausea And Vomiting     Drinking alcohol makes her sick and gives her headaches         FAMILY HISTORY     Family History   Problem Relation Age of Onset    Diabetes Mother     High Blood Pressure Mother     Heart Disease Mother     Coronary Art Dis Father     Heart Disease Father          PREVIOUS RECORDS   Previous records reviewed        PHYSICAL EXAM     ED Triage Vitals   BP Temp Temp Source Heart Rate Resp SpO2 Height Weight   07/17/22 1615 07/17/22 1622 07/17/22 1622 07/17/22 1622 07/17/22 1622 07/17/22 1622 -- --   (!) 157/114 98.3 °F (36.8 °C) Oral 100 20 95 %       Initial vital signs and nursing assessment reviewed and abnormal from HTN, tachycardia . Body mass index is 22.3 kg/m². Pulsoximetry is normal per my interpretation. Additional Vital Signs:  Vitals:    07/18/22 0015   BP: 120/78   Pulse: 96   Resp: 16   Temp: 98 °F (36.7 °C)   SpO2: 93%       Physical Exam  Vitals and nursing note reviewed. Constitutional:       General: She is not in acute distress. Appearance: Normal appearance. She is not ill-appearing. HENT:      Head: Normocephalic and atraumatic. Nose: Nose normal. No congestion or rhinorrhea. Mouth/Throat:      Mouth: Mucous membranes are moist.      Pharynx: Oropharynx is clear. No oropharyngeal exudate or posterior oropharyngeal erythema. Eyes:      Extraocular Movements: Extraocular movements intact. Pupils: Pupils are equal, round, and reactive to light. Cardiovascular:      Rate and Rhythm: Normal rate and regular rhythm. Pulses: Normal pulses. Heart sounds: Normal heart sounds. Pulmonary:      Effort: Pulmonary effort is normal.      Breath sounds: Normal breath sounds. Abdominal:      General: Abdomen is flat. There is no distension. Palpations: Abdomen is soft. Tenderness: There is no abdominal tenderness. Musculoskeletal:         General: Tenderness and signs of injury present. No swelling. Normal range of motion. Cervical back: Normal range of motion and neck supple. No tenderness. Comments: There is left lower chest wall tenderness to palpation. There is middle midline thoracic spine tenderness to palpation. There is a small contusion over the area of the lower left scapula. Skin:     General: Skin is warm and dry. Findings: Bruising present. Neurological:      General: No focal deficit present.       Mental Status: She is alert and oriented to person, place, and time. MEDICAL DECISION MAKING   Initial Assessment:   80-year-old female presenting to the emergency department for evaluation of nonsyncopal ground-level fall. Differential diagnosis includes but is not limited to: Anemia, electrolyte abnormality, SHARIF, UTI. Plan:   IV, labs. CT head, CT cervical spine, CT chest, CT thoracic spine recons, CT abdomen pelvis, CT lumbar recons. UA. ED RESULTS   Laboratory results:  Labs Reviewed   CBC WITH AUTO DIFFERENTIAL - Abnormal; Notable for the following components:       Result Value    WBC 15.0 (*)     MCV 99.3 (*)     MCHC 31.0 (*)     RDW-CV 15.3 (*)     RDW-SD 56.9 (*)     Segs Absolute 11.6 (*)     Immature Grans (Abs) 0.17 (*)     All other components within normal limits   BASIC METABOLIC PANEL W/ REFLEX TO MG FOR LOW K - Abnormal; Notable for the following components:    Glucose 130 (*)     BUN 27 (*)     All other components within normal limits   URINE WITH REFLEXED MICRO - Abnormal; Notable for the following components:    Blood, Urine TRACE (*)     Nitrite, Urine POSITIVE (*)     Leukocyte Esterase, Urine SMALL (*)     All other components within normal limits   GLOMERULAR FILTRATION RATE, ESTIMATED - Abnormal; Notable for the following components:    Est, Glom Filt Rate 53 (*)     All other components within normal limits   CULTURE, REFLEXED, URINE   CULTURE, BLOOD 1   CULTURE, BLOOD 2   LACTATE, SEPSIS   ANION GAP   OSMOLALITY   COMPREHENSIVE METABOLIC PANEL W/ REFLEX TO MG FOR LOW K   CBC WITH AUTO DIFFERENTIAL       Radiologic studies results:  CT HEAD WO CONTRAST   Final Result   1. No acute intracranial abnormality. 2. Sequela of chronic microvascular changes. **This report has been created using voice recognition software. It may contain minor errors which are inherent in voice recognition technology. **      Final report electronically signed by Dr Sabiha Regan on 7/17/2022 8:15 PM      CT CERVICAL SPINE WO CONTRAST   Final Result   1. Mild compression fracture of T2.   2. Multilevel degenerative changes of the cervical spine. 3. Thyromegaly. Left thyroid nodule. **This report has been created using voice recognition software. It may contain minor errors which are inherent in voice recognition technology. **      Final report electronically signed by Dr Carlos Aviles on 7/17/2022 8:21 PM      CT CHEST WO CONTRAST   Final Result   1. Mild acute compression fracture of T2.   2. No pulmonary consolidation. No pneumothorax. 3. Left lower rib fractures are seen. **This report has been created using voice recognition software. It may contain minor errors which are inherent in voice recognition technology. **      Final report electronically signed by Dr Carlos Aviles on 7/17/2022 8:28 PM      CT THORACIC RECONSTRUCTION WO POST PROCESS   Final Result   1. Mild acute compression fracture of T2.   2. Degenerative changes of the thoracic spine. **This report has been created using voice recognition software. It may contain minor errors which are inherent in voice recognition technology. **      Final report electronically signed by Dr Carlos Aviles on 7/17/2022 8:43 PM      CT ABDOMEN PELVIS WO CONTRAST Additional Contrast? None   Final Result   1. Cholelithiasis. 2. Increase sclerosis within the sacrum relates to sacral insufficiency fractures. 3. Acute compression fracture of L5   4. Other findings as described above. **This report has been created using voice recognition software. It may contain minor errors which are inherent in voice recognition technology. **      Final report electronically signed by Dr Carlos Aviles on 7/17/2022 8:38 PM      CT LUMBAR RECONSTRUCTION WO POST PROCESS   Final Result   1. Acute compression fracture of L5.   2. Increased sclerosis within the sacrum relates to insufficiency sacral fractures.    3. Degenerative changes of the lumbar spine.   4. Marked osteopenia. **This report has been created using voice recognition software. It may contain minor errors which are inherent in voice recognition technology. **      Final report electronically signed by Dr Carlos Aviles on 7/17/2022 8:49 PM      XR CHEST PORTABLE    (Results Pending)       ED Medications administered this visit:   Medications   0.9 % sodium chloride infusion (has no administration in time range)   sodium chloride flush 0.9 % injection 5-40 mL (has no administration in time range)   sodium chloride flush 0.9 % injection 5-40 mL (has no administration in time range)   0.9 % sodium chloride infusion (has no administration in time range)   acetaminophen (TYLENOL) tablet 650 mg (has no administration in time range)   famotidine (PEPCID) injection 20 mg (has no administration in time range)   ondansetron (ZOFRAN-ODT) disintegrating tablet 4 mg ( Oral See Alternative 7/18/22 0212)     Or   ondansetron [facility]) injection 4 mg (4 mg IntraVENous Given 7/18/22 3003)   polyethylene glycol (GLYCOLAX) packet 17 g (has no administration in time range)   bisacodyl (DULCOLAX) suppository 10 mg (has no administration in time range)   senna (SENOKOT) tablet 8.6 mg (has no administration in time range)   morphine (PF) injection 2 mg (has no administration in time range)     Or   morphine injection 4 mg (has no administration in time range)   oxyCODONE (ROXICODONE) immediate release tablet 5 mg (has no administration in time range)     Or   oxyCODONE (ROXICODONE) immediate release tablet 10 mg (has no administration in time range)   cyclobenzaprine (FLEXERIL) tablet 10 mg (has no administration in time range)   lidocaine 4 % external patch 2 patch (has no administration in time range)   HYDROcodone-acetaminophen (NORCO) 5-325 MG per tablet 0.5 tablet (0.5 tablets Oral Given 7/17/22 3934)   cefTRIAXone (ROCEPHIN) 1000 mg IVPB in 50 mL D5W minibag (0 mg IntraVENous Stopped 7/17/22 7254) ED COURSE        Work-up in the emergency department reviewed and remarkable for:    CT scan showing an acute compression fracture of T2, multiple left lower rib fractures, and an acute compression fracture of L5. Given the patient's multiple traumatic injuries we will plan to admit the patient to the hospital.  This case was discussed with Dr. Eros Yi, who accepted the patient for admission to the trauma service, and with GLENN Deleon. A UA remarkable for positive nitrates and many bacteria. The patient was started on Rocephin. MEDICATION CHANGES     Current Discharge Medication List            FINAL DISPOSITION     Final diagnoses:   Fall, initial encounter   Compression fracture of T2 vertebra, initial encounter ([facility] [address]   Closed compression fracture of L5 vertebra, initial encounter ([facility] [address]   Closed fracture of multiple ribs of left side, initial encounter   Acute UTI     Condition: condition: serious  Dispo: Admit to the trauma service      This transcription was electronically signed. Parts of this transcriptions may have been dictated by use of voice recognition software and electronically transcribed, and parts may have been transcribed with the assistance of an ED scribe. The transcription may contain errors not detected in proofreading. Please refer to my supervising physician's documentation if my documentation differs.     Electronically Signed: Kim Shukla DO, 07/18/22, 2:52 AM         Kim Shukla DO  Resident  07/18/22 6397

## 2022-07-17 NOTE — ED NOTES
ED nurse-to-nurse bedside report    Chief Complaint   Patient presents with    Fall      LOC: alert and orientated to name, place, date  Vital signs   Vitals:    07/17/22 1615 07/17/22 1622 07/17/22 1805 07/17/22 1850   BP: (!) 157/114  (!) 178/73 (!) 164/79   Pulse:  100 86 92   Resp:  20 23 23   Temp:  98.3 °F (36.8 °C)     TempSrc:  Oral     SpO2:  95% 97% 96%      Pain:    Pain Interventions: Lidocaine patch, Norco  Pain Goal: 2  Oxygen: No    Current needs required ra   Telemetry: Yes  LDAs:    Continuous Infusions:   Mobility: Requires assistance * 1  Hill Fall Risk Score:    Fall Risk 6/15/2022 3/28/2022 12/22/2020 8/7/2019 9/17/2018 9/7/2017   2 or more falls in past year? no yes yes yes yes no   Fall with injury in past year? no no yes no no no     Fall Interventions: side rails and call light  Report given to: Chelly Barron, [address] [place] [address] Drive, RN  07/17/22 0403

## 2022-07-17 NOTE — ED NOTES
Patient bed changed at this time and new gown applied. Patient medicated per MAR. Patient denies any further needs. Patient respirations are regular and unlabored. Patient appears to be in no current distress. Patient VSS. Call light is within reach.        Lili Newsome RN  07/17/22 [id number]

## 2022-07-18 ENCOUNTER — APPOINTMENT (OUTPATIENT)
Dept: GENERAL RADIOLOGY | Age: 82
DRG: 551 | End: 2022-07-18
Payer: MEDICARE

## 2022-07-18 ENCOUNTER — APPOINTMENT (OUTPATIENT)
Dept: MRI IMAGING | Age: 82
DRG: 551 | End: 2022-07-18
Payer: MEDICARE
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
## 2022-07-18 LAB
ALBUMIN SERPL-MCNC: 3.1 G/DL (ref 3.5–5.1)
ALP BLD-CCNC: 77 U/L (ref 38–126)
ALT SERPL-CCNC: 16 U/L (ref 11–66)
ANION GAP SERPL CALCULATED.3IONS-SCNC: 10 MEQ/L (ref 8–16)
AST SERPL-CCNC: 16 U/L (ref 5–40)
BASOPHILS # BLD: 0.4 %
BASOPHILS ABSOLUTE: 0.1 THOU/MM3 (ref 0–0.1)
BILIRUB SERPL-MCNC: 0.3 MG/DL (ref 0.3–1.2)
BUN BLDV-MCNC: 21 MG/DL (ref 7–22)
CALCIUM SERPL-MCNC: 8.5 MG/DL (ref 8.5–10.5)
CHLORIDE BLD-SCNC: 108 MEQ/L (ref 98–111)
CO2: 25 MEQ/L (ref 23–33)
CREAT SERPL-MCNC: 0.7 MG/DL (ref 0.4–1.2)
EOSINOPHIL # BLD: 0.9 %
EOSINOPHILS ABSOLUTE: 0.1 THOU/MM3 (ref 0–0.4)
ERYTHROCYTE [DISTWIDTH] IN BLOOD BY AUTOMATED COUNT: 15.3 % (ref 11.5–14.5)
ERYTHROCYTE [DISTWIDTH] IN BLOOD BY AUTOMATED COUNT: 54.4 FL (ref 35–45)
GFR SERPL CREATININE-BSD FRML MDRD: 80 ML/MIN/1.73M2
GLUCOSE BLD-MCNC: 121 MG/DL (ref 70–108)
HCT VFR BLD CALC: 40.1 % (ref 37–47)
HEMOGLOBIN: 12.7 GM/DL (ref 12–16)
IMMATURE GRANS (ABS): 0.07 THOU/MM3 (ref 0–0.07)
IMMATURE GRANULOCYTES: 0.6 %
LYMPHOCYTES # BLD: 17.1 %
LYMPHOCYTES ABSOLUTE: 2.2 THOU/MM3 (ref 1–4.8)
MCH RBC QN AUTO: 30.6 PG (ref 26–33)
MCHC RBC AUTO-ENTMCNC: 31.7 GM/DL (ref 32.2–35.5)
MCV RBC AUTO: 96.6 FL (ref 81–99)
MONOCYTES # BLD: 8.8 %
MONOCYTES ABSOLUTE: 1.1 THOU/MM3 (ref 0.4–1.3)
NUCLEATED RED BLOOD CELLS: 0 /100 WBC
ORGANISM: ABNORMAL
PLATELET # BLD: 261 THOU/MM3 (ref 130–400)
PMV BLD AUTO: 10.1 FL (ref 9.4–12.4)
POTASSIUM REFLEX MAGNESIUM: 4.1 MEQ/L (ref 3.5–5.2)
PRO-BNP: 654.1 PG/ML (ref 0–1800)
RBC # BLD: 4.15 MILL/MM3 (ref 4.2–5.4)
SEG NEUTROPHILS: 72.2 %
SEGMENTED NEUTROPHILS ABSOLUTE COUNT: 9.2 THOU/MM3 (ref 1.8–7.7)
SODIUM BLD-SCNC: 143 MEQ/L (ref 135–145)
TOTAL PROTEIN: 5.4 G/DL (ref 6.1–8)
TROPONIN T: 0.01 NG/ML
TSH SERPL DL<=0.05 MIU/L-ACNC: 0.49 UIU/ML (ref 0.4–4.2)
URINE CULTURE REFLEX: ABNORMAL
WBC # BLD: 12.7 THOU/MM3 (ref 4.8–10.8)

## 2022-07-18 PROCEDURE — 85025 COMPLETE CBC W/AUTO DIFF WBC: CPT

## 2022-07-18 PROCEDURE — 94010 BREATHING CAPACITY TEST: CPT

## 2022-07-18 PROCEDURE — 6360000002 HC RX W HCPCS: Performed by: PHYSICIAN ASSISTANT

## 2022-07-18 PROCEDURE — 6360000002 HC RX W HCPCS

## 2022-07-18 PROCEDURE — 6370000000 HC RX 637 (ALT 250 FOR IP): Performed by: PHYSICIAN ASSISTANT

## 2022-07-18 PROCEDURE — APPNB15 APP NON BILLABLE TIME 0-15 MINS: Performed by: PHYSICIAN ASSISTANT

## 2022-07-18 PROCEDURE — 80053 COMPREHEN METABOLIC PANEL: CPT

## 2022-07-18 PROCEDURE — 84443 ASSAY THYROID STIM HORMONE: CPT

## 2022-07-18 PROCEDURE — 94669 MECHANICAL CHEST WALL OSCILL: CPT

## 2022-07-18 PROCEDURE — 1200000000 HC SEMI PRIVATE

## 2022-07-18 PROCEDURE — 36415 COLL VENOUS BLD VENIPUNCTURE: CPT

## 2022-07-18 PROCEDURE — 84484 ASSAY OF TROPONIN QUANT: CPT

## 2022-07-18 PROCEDURE — 6370000000 HC RX 637 (ALT 250 FOR IP)

## 2022-07-18 PROCEDURE — 99222 1ST HOSP IP/OBS MODERATE 55: CPT

## 2022-07-18 PROCEDURE — 2580000003 HC RX 258

## 2022-07-18 PROCEDURE — 71045 X-RAY EXAM CHEST 1 VIEW: CPT

## 2022-07-18 PROCEDURE — 83880 ASSAY OF NATRIURETIC PEPTIDE: CPT

## 2022-07-18 PROCEDURE — 72146 MRI CHEST SPINE W/O DYE: CPT

## 2022-07-18 PROCEDURE — 2500000003 HC RX 250 WO HCPCS: Performed by: PHYSICIAN ASSISTANT

## 2022-07-18 PROCEDURE — 94760 N-INVAS EAR/PLS OXIMETRY 1: CPT

## 2022-07-18 PROCEDURE — 94799 UNLISTED PULMONARY SVC/PX: CPT

## 2022-07-18 PROCEDURE — 2580000003 HC RX 258: Performed by: PHYSICIAN ASSISTANT

## 2022-07-18 PROCEDURE — 99223 1ST HOSP IP/OBS HIGH 75: CPT | Performed by: SURGERY

## 2022-07-18 RX ORDER — LACTOBACILLUS RHAMNOSUS GG 10B CELL
1 CAPSULE ORAL
Status: DISCONTINUED | OUTPATIENT
Start: 2022-07-19 | End: 2022-07-23 | Stop reason: HOSPADM

## 2022-07-18 RX ORDER — ACETAMINOPHEN 325 MG/1
650 TABLET ORAL EVERY 4 HOURS PRN
Status: DISCONTINUED | OUTPATIENT
Start: 2022-07-18 | End: 2022-07-23 | Stop reason: HOSPADM

## 2022-07-18 RX ORDER — ONDANSETRON 2 MG/ML
4 INJECTION INTRAMUSCULAR; INTRAVENOUS EVERY 6 HOURS PRN
Status: DISCONTINUED | OUTPATIENT
Start: 2022-07-18 | End: 2022-07-23 | Stop reason: HOSPADM

## 2022-07-18 RX ORDER — DONEPEZIL HYDROCHLORIDE 10 MG/1
10 TABLET, FILM COATED ORAL NIGHTLY
Status: DISCONTINUED | OUTPATIENT
Start: 2022-07-18 | End: 2022-07-23 | Stop reason: HOSPADM

## 2022-07-18 RX ORDER — ALBUTEROL SULFATE 90 UG/1
2 AEROSOL, METERED RESPIRATORY (INHALATION) EVERY 6 HOURS PRN
Status: DISCONTINUED | OUTPATIENT
Start: 2022-07-18 | End: 2022-07-23 | Stop reason: HOSPADM

## 2022-07-18 RX ORDER — BISACODYL 10 MG
10 SUPPOSITORY, RECTAL RECTAL DAILY
Status: DISCONTINUED | OUTPATIENT
Start: 2022-07-18 | End: 2022-07-23 | Stop reason: HOSPADM

## 2022-07-18 RX ORDER — FAMOTIDINE 10 MG/ML
20 INJECTION, SOLUTION INTRAVENOUS DAILY
Status: DISCONTINUED | OUTPATIENT
Start: 2022-07-18 | End: 2022-07-19 | Stop reason: DRUGHIGH

## 2022-07-18 RX ORDER — MORPHINE SULFATE 2 MG/ML
2 INJECTION, SOLUTION INTRAMUSCULAR; INTRAVENOUS
Status: DISCONTINUED | OUTPATIENT
Start: 2022-07-18 | End: 2022-07-23 | Stop reason: HOSPADM

## 2022-07-18 RX ORDER — HYDROCODONE BITARTRATE AND ACETAMINOPHEN 5; 325 MG/1; MG/1
1 TABLET ORAL EVERY 6 HOURS PRN
COMMUNITY

## 2022-07-18 RX ORDER — OXYCODONE HYDROCHLORIDE 5 MG/1
10 TABLET ORAL EVERY 4 HOURS PRN
Status: DISCONTINUED | OUTPATIENT
Start: 2022-07-18 | End: 2022-07-23 | Stop reason: HOSPADM

## 2022-07-18 RX ORDER — ZINC GLUCONATE 50 MG
50 TABLET ORAL DAILY
Status: DISCONTINUED | OUTPATIENT
Start: 2022-07-18 | End: 2022-07-18 | Stop reason: CLARIF

## 2022-07-18 RX ORDER — SODIUM CHLORIDE 0.9 % (FLUSH) 0.9 %
5-40 SYRINGE (ML) INJECTION EVERY 12 HOURS SCHEDULED
Status: DISCONTINUED | OUTPATIENT
Start: 2022-07-18 | End: 2022-07-23 | Stop reason: HOSPADM

## 2022-07-18 RX ORDER — LANOLIN ALCOHOL/MO/W.PET/CERES
1000 CREAM (GRAM) TOPICAL DAILY
Status: DISCONTINUED | OUTPATIENT
Start: 2022-07-18 | End: 2022-07-23 | Stop reason: HOSPADM

## 2022-07-18 RX ORDER — SENNA PLUS 8.6 MG/1
1 TABLET ORAL DAILY PRN
Status: DISCONTINUED | OUTPATIENT
Start: 2022-07-18 | End: 2022-07-23 | Stop reason: HOSPADM

## 2022-07-18 RX ORDER — OXYCODONE HYDROCHLORIDE 5 MG/1
5 TABLET ORAL EVERY 4 HOURS PRN
Status: DISCONTINUED | OUTPATIENT
Start: 2022-07-18 | End: 2022-07-23 | Stop reason: HOSPADM

## 2022-07-18 RX ORDER — POLYETHYLENE GLYCOL 3350 17 G/17G
17 POWDER, FOR SOLUTION ORAL DAILY
Status: DISCONTINUED | OUTPATIENT
Start: 2022-07-18 | End: 2022-07-23 | Stop reason: HOSPADM

## 2022-07-18 RX ORDER — MORPHINE SULFATE 4 MG/ML
4 INJECTION, SOLUTION INTRAMUSCULAR; INTRAVENOUS
Status: DISCONTINUED | OUTPATIENT
Start: 2022-07-18 | End: 2022-07-23 | Stop reason: HOSPADM

## 2022-07-18 RX ORDER — ASCORBIC ACID 500 MG
1000 TABLET ORAL DAILY
Status: DISCONTINUED | OUTPATIENT
Start: 2022-07-18 | End: 2022-07-23 | Stop reason: HOSPADM

## 2022-07-18 RX ORDER — LISINOPRIL 20 MG/1
20 TABLET ORAL DAILY
Status: DISCONTINUED | OUTPATIENT
Start: 2022-07-18 | End: 2022-07-23 | Stop reason: HOSPADM

## 2022-07-18 RX ORDER — ATORVASTATIN CALCIUM 20 MG/1
20 TABLET, FILM COATED ORAL NIGHTLY
Refills: 3 | Status: DISCONTINUED | OUTPATIENT
Start: 2022-07-18 | End: 2022-07-23 | Stop reason: HOSPADM

## 2022-07-18 RX ORDER — NICOTINE 21 MG/24HR
1 PATCH, TRANSDERMAL 24 HOURS TRANSDERMAL DAILY
Status: DISCONTINUED | OUTPATIENT
Start: 2022-07-18 | End: 2022-07-23 | Stop reason: HOSPADM

## 2022-07-18 RX ORDER — SODIUM CHLORIDE 9 MG/ML
INJECTION, SOLUTION INTRAVENOUS CONTINUOUS
Status: DISCONTINUED | OUTPATIENT
Start: 2022-07-18 | End: 2022-07-23 | Stop reason: HOSPADM

## 2022-07-18 RX ORDER — SODIUM CHLORIDE 0.9 % (FLUSH) 0.9 %
5-40 SYRINGE (ML) INJECTION PRN
Status: DISCONTINUED | OUTPATIENT
Start: 2022-07-18 | End: 2022-07-23 | Stop reason: HOSPADM

## 2022-07-18 RX ORDER — PREDNISONE 10 MG/1
10 TABLET ORAL DAILY
Status: DISCONTINUED | OUTPATIENT
Start: 2022-07-18 | End: 2022-07-23 | Stop reason: HOSPADM

## 2022-07-18 RX ORDER — ENOXAPARIN SODIUM 100 MG/ML
40 INJECTION SUBCUTANEOUS DAILY
Status: DISCONTINUED | OUTPATIENT
Start: 2022-07-18 | End: 2022-07-23 | Stop reason: HOSPADM

## 2022-07-18 RX ORDER — SODIUM CHLORIDE 9 MG/ML
25 INJECTION, SOLUTION INTRAVENOUS PRN
Status: DISCONTINUED | OUTPATIENT
Start: 2022-07-18 | End: 2022-07-23 | Stop reason: HOSPADM

## 2022-07-18 RX ORDER — MULTIVITAMIN WITH IRON
1 TABLET ORAL DAILY
Status: DISCONTINUED | OUTPATIENT
Start: 2022-07-18 | End: 2022-07-23 | Stop reason: HOSPADM

## 2022-07-18 RX ORDER — ONDANSETRON 4 MG/1
4 TABLET, ORALLY DISINTEGRATING ORAL EVERY 8 HOURS PRN
Status: DISCONTINUED | OUTPATIENT
Start: 2022-07-18 | End: 2022-07-23 | Stop reason: HOSPADM

## 2022-07-18 RX ADMIN — FAMOTIDINE 20 MG: 10 INJECTION, SOLUTION INTRAVENOUS at 10:56

## 2022-07-18 RX ADMIN — METOPROLOL TARTRATE 25 MG: 25 TABLET, FILM COATED ORAL at 10:56

## 2022-07-18 RX ADMIN — LISINOPRIL 20 MG: 20 TABLET ORAL at 10:56

## 2022-07-18 RX ADMIN — ONDANSETRON 4 MG: 4 TABLET, ORALLY DISINTEGRATING ORAL at 06:03

## 2022-07-18 RX ADMIN — PREDNISONE 10 MG: 10 TABLET ORAL at 10:56

## 2022-07-18 RX ADMIN — METOPROLOL TARTRATE 25 MG: 25 TABLET, FILM COATED ORAL at 23:44

## 2022-07-18 RX ADMIN — ONDANSETRON 4 MG: 2 INJECTION INTRAMUSCULAR; INTRAVENOUS at 02:12

## 2022-07-18 RX ADMIN — DONEPEZIL HYDROCHLORIDE 10 MG: 10 TABLET, FILM COATED ORAL at 20:54

## 2022-07-18 RX ADMIN — SENNOSIDES 8.6 MG: 8.6 TABLET, FILM COATED ORAL at 10:57

## 2022-07-18 RX ADMIN — PSYLLIUM HUSK 1 PACKET: 3.4 GRANULE ORAL at 10:56

## 2022-07-18 RX ADMIN — ENOXAPARIN SODIUM 40 MG: 100 INJECTION SUBCUTANEOUS at 17:59

## 2022-07-18 RX ADMIN — OXYCODONE HYDROCHLORIDE AND ACETAMINOPHEN 1000 MG: 500 TABLET ORAL at 10:56

## 2022-07-18 RX ADMIN — Medication 1 TABLET: at 10:56

## 2022-07-18 RX ADMIN — CEFTRIAXONE SODIUM 1000 MG: 1 INJECTION, POWDER, FOR SOLUTION INTRAMUSCULAR; INTRAVENOUS at 14:21

## 2022-07-18 RX ADMIN — SODIUM CHLORIDE: 9 INJECTION, SOLUTION INTRAVENOUS at 03:05

## 2022-07-18 RX ADMIN — ATORVASTATIN CALCIUM 20 MG: 20 TABLET, FILM COATED ORAL at 20:54

## 2022-07-18 RX ADMIN — Medication 1000 MCG: at 10:56

## 2022-07-18 RX ADMIN — POLYETHYLENE GLYCOL 3350 17 G: 17 POWDER, FOR SOLUTION ORAL at 10:56

## 2022-07-18 RX ADMIN — SODIUM CHLORIDE: 9 INJECTION, SOLUTION INTRAVENOUS at 17:30

## 2022-07-18 ASSESSMENT — PAIN DESCRIPTION - DESCRIPTORS: DESCRIPTORS: ACHING;SORE

## 2022-07-18 ASSESSMENT — PAIN - FUNCTIONAL ASSESSMENT: PAIN_FUNCTIONAL_ASSESSMENT: PREVENTS OR INTERFERES SOME ACTIVE ACTIVITIES AND ADLS

## 2022-07-18 ASSESSMENT — PAIN DESCRIPTION - LOCATION: LOCATION: RIB CAGE

## 2022-07-18 ASSESSMENT — PAIN DESCRIPTION - ONSET: ONSET: ON-GOING

## 2022-07-18 ASSESSMENT — PAIN DESCRIPTION - FREQUENCY: FREQUENCY: INTERMITTENT

## 2022-07-18 ASSESSMENT — PAIN DESCRIPTION - ORIENTATION: ORIENTATION: LEFT

## 2022-07-18 ASSESSMENT — PAIN SCALES - GENERAL: PAINLEVEL_OUTOF10: 2

## 2022-07-18 ASSESSMENT — PAIN DESCRIPTION - PAIN TYPE: TYPE: ACUTE PAIN

## 2022-07-18 NOTE — CARE COORDINATION
7/18/22, 1:16 PM EDT    DISCHARGE PLANNING EVALUATION       Spoke with patient and she resides at [facility]. Spoke with RN Leroy Albert and give update unsure of needs as patient is on bed rest.  Will follow.

## 2022-07-18 NOTE — ED NOTES
ED to inpatient nurses report    Chief Complaint   Patient presents with    Fall      Present to ED from nursing home  LOC: alert and orientated to name, place, date  Vital signs   Vitals:    07/17/22 2040 07/17/22 2207 07/17/22 2221 07/17/22 2326   BP: 125/79 136/89 136/89 (!) 121/95   Pulse: 81 92 (!) 102 97   Resp:  18 18 16   Temp:       TempSrc:       SpO2: 93% 92% 92% 93%      Oxygen Baseline RA    Current needs required RA   LDAs:   Peripheral IV 07/17/22 Right Forearm (Active)   Site Assessment Clean, dry & intact 07/17/22 2328   Line Status Normal saline locked 07/17/22 2328   Phlebitis Assessment No symptoms 07/17/22 2328   Infiltration Assessment 0 07/17/22 2328   Dressing Status Clean, dry & intact 07/17/22 2328     Mobility: Fully dependent  Pending ED orders: NA  Present condition: Pt resting on cot. Pt alert and oriented x4. Pt rating pain 2/10.     C-SSRS    Swallow Screening      Electronically signed by Perfecto Gardner RN on 7/17/2022 at 11:44 PM     Perfecto Gardner RN  07/17/22 5740

## 2022-07-18 NOTE — CONSULTS
Hospitalist Consult Note        Patient:  Wilfredo Queen  YOB: 1940  Date of Service: 7/18/2022  MRN: [id number]   Acct:  [de-identified]   Primary Care Physician: PETEY Reyes - DANELLE    Chief Complaint:  Fall  Reason for consult  Medical management    Date of Service: Pt seen/examined in consultation on 7/18/2022     History Of Present Illness:      Wilhemena Pond y.o. female who we are asked to see/evaluate by Vik De Los Santos MD for medical management of HFpEF, HTN, COPD, dementia, Hx of DVT, Hx of UC, KALIE. Pt presented to [facility] ED yesterday after a suspected unwitnessed fall. Patient is an overall poor historian due to underlying dementia. Pt denies any pain at this time. She states pain starts whenever she tries to move. She denies chest pain, shortness of breath, lightheadedness, dizziness. Assessment and Plan:-  Mild acute compression fracture of T2, Acute compression fracture of L5, s/p fall:   Managed by primary, with Dr. Miguelangel Canales attending. Initial concern for syncope. TSH with reflex, BNP, Troponin ordered. EKG yesterday reveals NSR, left axis deviation, RBBB, with inferior infarct, age undetermined. Can consider palliative consult to discuss goals of care. Acute UTI, uncomplicated:    UA remarkable for Nitrites and leuks, with many bacteria. Urine preliminary culture showing gram-negative bacilli. Given Rocephin x 1 dose yesterday. Will continue daily for 4 more days. Repeat CBC, BMP in the AM.     Acute leukocytosis:    Improved. WBC 12.7, with bandemia. Suspect multifactorial to #1 and #2. Also noted to be on prednisone outpatient. Pt is afebrile, non-toxic appearing. Continue to monitor with daily CBC. HFpEF:    Last ECHO 6/14/2020 reveals EF 60-65% with grade 1 diastolic dysfunction. Pt appears compensated today. CXR unremarkable. Troponin,  BNP ordered to further investigate etiology of fall. Prieto weights, I&Os. COPD:     At baseline or RA.  Continue home inhalers. CXR yesterday reveals no pleural effusions, pneumothorax with background fibrotic changes. To note, patient on 10mg daily. Essential HTN:    BP stable. Continue home meds and continue to monitor closely. Dementia:    Pt alert and oriented, but intermittently confused. Lives at [facility]. Poor historian. Continue donepezil. Hx of DVT:    Does not appear to be on any anticoagulation therapy outpatient. Hx of UC:    Noted. Continue psyllium. KALIE:   Stable. Past Medical History:        Diagnosis Date    Abnormal nuclear stress test 06/01/2018    Cataract     Chronic deep vein thrombosis (DVT) of femoral vein of left lower extremity (HCC) 3/9/2021    Colon polyps     COPD (chronic obstructive pulmonary disease) (HCC)     Diverticulosis large intestine w/o perforation or abscess w/bleeding     DVT, recurrent, lower extremity, acute, left (HCC)     Fibroid, uterine     Hearing loss     Hx of blood clots     Hyperlipidemia     Nicotine dependence     Nocturnal hypoxemia     Postmenopausal     Sleep apnea     Ulcerative colitis ([address]     with rectal bleeding        Past Surgical History:        Procedure Laterality Date    BREAST BIOPSY  1988    right breast biopsy     BREAST BIOPSY Right 03/2011    stereotactic biopsy    COLONOSCOPY  2006    Dr Kashif Quarles  2010    Dr Mariann Denson    COLONOSCOPY  2010    Dr Samuel Lopez interminate colitis    COLONOSCOPY  03/03/2014    Dr Salvador Boyer Bilateral 2011 2012    HIP SURGERY  06/13/2020    left fracture    OSTEOTOMY Left 11/2/2021    LEFT 5TH METATARSAL HEAD RESECTION EXCISION SOFT TISSUE LESION performed by Rocio Feng DPM at [address] Medications:   No current facility-administered medications on file prior to encounter.      Current Outpatient Medications on File Prior to Encounter   Medication Sig Dispense Refill    HYDROcodone-acetaminophen (Burns Balsam) 5-325 MG per tablet Take 1 tablet by mouth every 6 hours as needed for Pain. donepezil (ARICEPT) 10 MG tablet Take 1 tablet by mouth nightly 90 tablet 3    metoprolol tartrate (LOPRESSOR) 25 MG tablet Take 1 tablet by mouth 2 times daily 180 tablet 3    lisinopril (PRINIVIL;ZESTRIL) 20 MG tablet Take 1 tablet by mouth daily 90 tablet 3    predniSONE (DELTASONE) 10 MG tablet Take 1 tablet by mouth daily 90 tablet 3    simvastatin (ZOCOR) 40 MG tablet Take 1 tablet by mouth nightly 90 tablet 3    umeclidinium-vilanterol (ANORO ELLIPTA) 62.5-25 MCG/INH AEPB inhaler Inhale 1 puff into the lungs daily 1 each 5    Misc. Devices [facility]) MISC 1 standard wheelchair. 1 each 0    Multiple Vitamin (MULTIVITAMIN ADULT PO) Take 1 tablet by mouth daily      vitamin C (ASCORBIC ACID) 500 MG tablet Take 1,000 mg by mouth daily      zinc gluconate 50 MG tablet Take 50 mg by mouth daily      albuterol (PROVENTIL) (2.5 MG/3ML) 0.083% nebulizer solution Take 2.5 mg by nebulization every 6 hours as needed for Wheezing or Shortness of Breath      ibuprofen (ADVIL;MOTRIN) 400 MG tablet Take 400 mg by mouth 2 times daily as needed for Pain      ipratropium (ATROVENT) 0.02 % nebulizer solution Take 0.5 mg by nebulization every 6-8 hours as needed for Wheezing      denosumab (PROLIA) 60 MG/ML SOSY SC injection Inject 1 mL into the skin every 6 months 1 mL 1    acetaminophen (TYLENOL) 500 MG tablet Take 2 tablets by mouth 3 times daily as needed for Pain 100 tablet 1    Misc. Devices MISC 1 set of bed rails. 1 each 0    Misc. Devices MISC Please apply compression stockings (JOHANNE hose) to bilateral lower extremities for 12 hours daily. Remove nightly.  2 Device 0    albuterol sulfate  (90 Base) MCG/ACT inhaler Inhale 2 puffs into the lungs every 6 hours as needed for Wheezing 2 Inhaler 5    Psyllium Fiber 0.52 g CAPS Take 1 capsule by mouth daily 90 capsule 3    Glucosamine-Chondroitin 500-250 MG CAPS Take by mouth daily vitamin B-12 (CYANOCOBALAMIN) 1000 MCG tablet Take 1 tablet by mouth daily 30 tablet 3    Probiotic Product ([employer] PO) Take 1 tablet by mouth daily         Allergies:    Azulfidine [sulfasalazine], Keflex [cephalexin], Mesalamine, Sulfa antibiotics, and Alcohol    Social History:    reports that she has been smoking cigarettes. She has a 40.00 pack-year smoking history. She has never used smokeless tobacco. She reports that she does not drink alcohol and does not use drugs. Family History:       Problem Relation Age of Onset    Diabetes Mother     High Blood Pressure Mother     Heart Disease Mother     Coronary Art Dis Father     Heart Disease Father        Diet:  Diet NPO    Review of systems:   Pertinent positives as noted in the HPI. All other systems reviewed and negative. PHYSICAL EXAM:  /78   Pulse 96   Temp 98 °F (36.7 °C) (Oral)   Resp 16   Ht 5' 5\" (1.651 m)   Wt 134 lb (60.8 kg)   SpO2 93%   BMI 22.30 kg/m²   General appearance: Chronically ill appearing. No apparent distress, appears stated age and cooperative. HEENT: Normal cephalic, atraumatic without obvious deformity. Pupils equal, round, and reactive to light. Extra ocular muscles intact. Conjunctivae/corneas clear. Neck: Supple, with full range of motion. No jugular venous distention. Trachea midline. Respiratory: Inspiratory/Expiratory wheeze noted in bases bilaterally. Normal respiratory effort. Cardiovascular: Regular rate and rhythm with normal S1/S2 without murmurs, rubs or gallops. Abdomen: Soft, non-tender, non-distended with normal bowel sounds. Musculoskeletal:  Moving all four extremities without pain. Tenderness to palpation of the thoracic and lumbar spine midline. No clubbing, cyanosis or edema bilaterally. Skin: Skin color, texture, turgor normal.  No rashes or lesions. Neurologic:  Neurovascularly intact without any focal sensory/motor deficits.  Cranial nerves: II-XII intact, grossly non-focal.  Psychiatric: Alert and oriented, thought content appropriate, poor insight. Intermittently confused. Will provide intermittently inappropriate answers to questions. Peripheral Pulses: +2 palpable, equal bilaterally     Labs:   Recent Labs     07/17/22 2036   WBC 15.0*   HGB 13.3   HCT 42.9        Recent Labs     07/17/22 2036      K 4.2      CO2 27   BUN 27*   CREATININE 1.0   CALCIUM 9.3     No results for input(s): AST, ALT, BILIDIR, BILITOT, ALKPHOS in the last 72 hours. No results for input(s): INR in the last 72 hours. No results for input(s): Ardelle Chalk in the last 72 hours. Urinalysis:    Lab Results   Component Value Date/Time    NITRU POSITIVE 07/17/2022 08:40 PM    WBCUA 10-15 07/17/2022 08:40 PM    WBCUA 0-4 05/24/2018 01:05 AM    BACTERIA MANY 07/17/2022 08:40 PM    RBCUA 0-2 07/17/2022 08:40 PM    BLOODU TRACE 07/17/2022 08:40 PM    GLUCOSEU NEGATIVE 07/17/2022 08:40 PM       Radiology:   CT HEAD WO CONTRAST   Final Result   1. No acute intracranial abnormality. 2. Sequela of chronic microvascular changes. **This report has been created using voice recognition software. It may contain minor errors which are inherent in voice recognition technology. **      Final report electronically signed by Dr Jennifer Perdomo on 7/17/2022 8:15 PM      CT CERVICAL SPINE WO CONTRAST   Final Result   1. Mild compression fracture of T2.   2. Multilevel degenerative changes of the cervical spine. 3. Thyromegaly. Left thyroid nodule. **This report has been created using voice recognition software. It may contain minor errors which are inherent in voice recognition technology. **      Final report electronically signed by Dr Jennifer Perdomo on 7/17/2022 8:21 PM      CT CHEST WO CONTRAST   Final Result   1. Mild acute compression fracture of T2.   2. No pulmonary consolidation. No pneumothorax. 3. Left lower rib fractures are seen.             **This report has been created using voice recognition software. It may contain minor errors which are inherent in voice recognition technology. **      Final report electronically signed by Dr Marco [facility] on 7/17/2022 8:28 PM      CT THORACIC RECONSTRUCTION WO POST PROCESS   Final Result   1. Mild acute compression fracture of T2.   2. Degenerative changes of the thoracic spine. **This report has been created using voice recognition software. It may contain minor errors which are inherent in voice recognition technology. **      Final report electronically signed by Dr [facility] on 7/17/2022 8:43 PM      CT ABDOMEN PELVIS WO CONTRAST Additional Contrast? None   Final Result   1. Cholelithiasis. 2. Increase sclerosis within the sacrum relates to sacral insufficiency fractures. 3. Acute compression fracture of L5   4. Other findings as described above. **This report has been created using voice recognition software. It may contain minor errors which are inherent in voice recognition technology. **      Final report electronically signed by Dr Marco [facility] on 7/17/2022 8:38 PM      CT LUMBAR RECONSTRUCTION WO POST PROCESS   Final Result   1. Acute compression fracture of L5.   2. Increased sclerosis within the sacrum relates to insufficiency sacral fractures. 3. Degenerative changes of the lumbar spine. 4. Marked osteopenia. **This report has been created using voice recognition software. It may contain minor errors which are inherent in voice recognition technology. **      Final report electronically signed by Dr Marco [facility] on 7/17/2022 8:49 PM      XR CHEST PORTABLE    (Results Pending)   MRI THORACIC SPINE [address]    (Results Pending)     CT ABDOMEN PELVIS WO CONTRAST Additional Contrast? None    Result Date: 7/17/2022  PROCEDURE: CT ABDOMEN PELVIS WO CONTRAST CLINICAL INFORMATION: left flank pain COMPARISON: None TECHNIQUE: Helical CT acquisition of the abdomen and pelvis was performed without intravenous contrast. Multiplanar reformats are provided. All CT scans at this facility use dose modulation, iterative reconstruction, and/or weight based dosing when appropriate to reduce the radiation dose to as low as reasonably achievable. FINDINGS: The noncontrast CT limits the evaluation for solid organ pathology, vascular pathology, and lymphadenopathy. The chest has been described on the concurrent CT of the chest. Gallstone is present. Hypodensities in the liver likely hepatic cysts. Tiny calculi are seen in both kidneys. Mild atrophy of the pancreas. Large amount of stool is seen in the colon. A myomatous uterus is seen. Otherwise, the biliary tree, adrenal glands, and spleen are unremarkable. No bowel obstruction or acute inflammatory bowel process. The appendix is unremarkable. Colonic diverticulosis. The abdominal aorta is not aneurysmal. Aortoiliac calcifications. No significantly enlarged lymph nodes are seen. The bladder is grossly unremarkable. Bones: The bones are demineralized. Scoliosis of the lumbar spine. Degenerative changes of the thoracolumbar spine. Lateral plate and nail fixation of the proximal left femur is seen. There is increased sclerosis within the sacrum that likely reflects underlying insufficiency fractures. Acute compression fracture of L5. Chronic compression deformity of T12.     1. Cholelithiasis. 2. Increase sclerosis within the sacrum relates to sacral insufficiency fractures. 3. Acute compression fracture of L5 4. Other findings as described above. **This report has been created using voice recognition software. It may contain minor errors which are inherent in voice recognition technology. ** Final report electronically signed by Dr Aga Serna on 7/17/2022 8:38 PM    CT HEAD WO CONTRAST    Result Date: 7/17/2022  PROCEDURE: CT HEAD WO CONTRAST CLINICAL INFORMATION:Fall.  COMPARISON: None TECHNIQUE: 5 mm axial imaging through the head without IV contrast. All CT scans at this facility use dose modulation, iterative reconstruction, and/or weight based dosing when appropriate to reduce the radiation dose to as low as reasonably achievable. FINDINGS: Periventricular  and subcortical white matter hypodensities that likely relate to chronic microangiopathic disease. Intracranial atherosclerotic calcifications. No ventriculomegaly. No midline shift or mass effect. No acute intracranial hemorrhage. No intracranial collection. Gray-white differentiation is unremarkable. The posterior fossa is unremarkable. The craniocervical junction is unremarkable. No acute bony abnormality. The  paranasal sinuses are clear. The  mastoid air cells are clear. The orbits are unremarkable. 1. No acute intracranial abnormality. 2. Sequela of chronic microvascular changes. **This report has been created using voice recognition software. It may contain minor errors which are inherent in voice recognition technology. ** Final report electronically signed by Dr Reyes Panning on 7/17/2022 8:15 PM    CT CHEST WO CONTRAST    Result Date: 7/17/2022  PROCEDURE: CT CHEST WO CONTRAST CLINICAL INFORMATION: Fall, right lower scapula contusion, left lower lateral chest wall tenderness. COMPARISON: None TECHNIQUE: Helical CT acquisition of the chest was performed without intravenous contrast. Multiplanar reformats are provided. All CT scans at this facility use dose modulation, iterative reconstruction, and/or weight based dosing when appropriate to reduce the radiation dose to as low as reasonably achievable. FINDINGS: The noncontrast CT limits the evaluation for solid organ pathology, vascular pathology, and lymphadenopathy. Mild to moderate emphysematous changes. Subsegmental atelectasis is seen in the lung bases. The thyroid is mildly enlarged. Aortocoronary calcifications. No focal pulmonary consolidation. No large pulmonary mass. Central airway is patent.  No pleural effusions. No significant pericardial effusion. The heart is not enlarged. Ascending thoracic aorta is not dilated. The main pulmonary artery is not dilated. No significantly enlarged mediastinal or  axillary lymph node. Cloteal Wright No chest wall mass. The abdomen is described on a concurrent CT abdomen and pelvis. Bones: Degenerative changes of the thoracic spine. The bones are demineralized. Compression fracture of T2. Scoliosis. . Left lower rib fractures are seen that are nondisplaced. 1. Mild acute compression fracture of T2. 2. No pulmonary consolidation. No pneumothorax. 3. Left lower rib fractures are seen. **This report has been created using voice recognition software. It may contain minor errors which are inherent in voice recognition technology. ** Final report electronically signed by Dr Trena Briones on 7/17/2022 8:28 PM    CT CERVICAL SPINE WO CONTRAST    Result Date: 7/17/2022  PROCEDURE: CT CERVICAL SPINE WO CONTRAST CLINICAL INFORMATION: Fall. COMPARISON: None TECHNIQUE: 3 mm axial imaging through the cervical spine without contrast.  Sagittal and coronal reconstructions were performed. All CT scans at this facility use dose modulation, iterative reconstruction, and/or weight based dosing when appropriate to reduce the radiation dose to as low as reasonably achievable. FINDINGS: ALIGNMENT: Cervical lordosis is maintained. BONES: The bones appear demineralized. Mild degenerative changes of the cervical spine. Multilevel disc osteophyte complexes are seen. Multilevel facet arthrosis and uncovertebral degeneration. Acute compression fracture of T2 is seen. SOFT TISSUES: Carotid calcifications are seen. The thyroid is enlarged. A left thyroid nodule is seen. Emphysema is seen in the lung apices. DISC LEVELS: Calcification of the ligamentum flava at C2-C3 and C3-C4 is noted. At C3-C4, the calcification is leading to mild central canal narrowing. Mild right neuroforaminal narrowing at C6-C7.      1. Mild compression fracture of T2. 2. Multilevel degenerative changes of the cervical spine. 3. Thyromegaly. Left thyroid nodule. **This report has been created using voice recognition software. It may contain minor errors which are inherent in voice recognition technology. ** Final report electronically signed by Dr Gin Guillory on 7/17/2022 8:21 PM    CT LUMBAR RECONSTRUCTION WO POST PROCESS    Result Date: 7/17/2022  PROCEDURE: CT LUMBAR RECONSTRUCTION WO POST PROCESS CLINICAL INFORMATION: low backpain fall COMPARISON: None TECHNIQUE: 3 mm axial CT images were reconstructed through the lumbar spine from the patient's CT abdomen/pelvis examination without IV contrast. Sagittal and coronal reconstructions were also performed. All CT scans at this facility use dose modulation, iterative reconstruction, and/or weight based dosing when appropriate to reduce the radiation dose to as low as reasonably achievable. FINDINGS: ALIGNMENT: The lumbar lordosis is maintained. Levoscoliosis of the lumbar spine. BONE: The bones are markedly demineralized. . Acute compression fracture of L5. Increased sclerosis within the sacrum relates to insufficiency sacral fractures. Degenerative changes of the SI joints. Mild degenerative changes of the lumbar spine. Multilevel facet arthrosis. . . The soft tissues have been described on the CT abdomen and pelvis. DISC SPACES/FACET JOINT/SPINAL CANAL/NEURAL FORAMEN: The lack of intrathecal contrast limits the evaluation of the spinal canal. Mild central canal narrowing at L3-L4 with moderate right neural foraminal narrowing. Moderate central canal narrowing at L4-L5 with moderate right neuroforaminal narrowing. 1. Acute compression fracture of L5. 2. Increased sclerosis within the sacrum relates to insufficiency sacral fractures. 3. Degenerative changes of the lumbar spine. 4. Marked osteopenia. **This report has been created using voice recognition software.   It may contain minor errors which are inherent in voice recognition technology. ** Final report electronically signed by Dr Shanta Kaba on 7/17/2022 8:49 PM    CT THORACIC RECONSTRUCTION WO POST PROCESS    Result Date: 7/17/2022  PROCEDURE: CT THORACIC RECONSTRUCTION WO POST PROCESS CLINICAL INFORMATION: Fall, middle midline thoracic spine tenderness. COMPARISON: None TECHNIQUE: 3 mm axial CT images were reconstructed through the thoracic spine from the patient's CT chest examination without IV contrast. Sagittal and coronal reconstruction were also performed. All CT scans at this facility use dose modulation, iterative reconstruction, and/or weight based dosing when appropriate to reduce the radiation dose to as low as reasonably achievable. FINDINGS: ALIGNMENT: The thoracic kyphosis is maintained. Dextroscoliosis. BONE: The bones are demineralized. Acute compression fracture of T2. Chronic compression deformity of T12. . Multilevel degenerative changes of the thoracic spine. Multilevel facet arthrosis. . . SOFT TISSUE: The soft tissues of been described on the CT chest.. DISC LEVEL: 1. The lack of intrathecal contrast limits the evaluation of the spinal canal. No significant central canal narrowing. No significant neuroforamina narrowing. 1. Mild acute compression fracture of T2. 2. Degenerative changes of the thoracic spine. **This report has been created using voice recognition software. It may contain minor errors which are inherent in voice recognition technology. ** Final report electronically signed by Dr Shanta Kaba on 7/17/2022 8:43 PM        EKG:  NSR; left axis deviation, RBBB      [address]    Electronically signed by Tao Nicole PA-C on 7/18/2022 at 7:20 AM

## 2022-07-18 NOTE — PROGRESS NOTES
[facility]  OCCUPATIONAL THERAPY MISSED TREATMENT NOTE  [facility]  [id number]      Date: 2022  Patient Name: Erick Garcia        CSN: [id number]   : 1940  (80 y.o.)  Gender: female                REASON FOR MISSED TREATMENT:  Pt has strict bedrest orders and pending ortho spine consult. OT will hold this date and check back 22 as time allows.

## 2022-07-18 NOTE — FLOWSHEET NOTE
07/18/22 1500   Assessment   Charting Type Reassessment   Reassessment Performed Changes documented below   Psychosocial   Psychosocial (WDL) X   Patient Behaviors Uncooperative;Non-compliant;Irritable; Inappropriate; Impulsive;Agitated   Neurological   Level of Consciousness Alert (0)   Orientation Level Oriented to person;Oriented to place; Disoriented to situation;Disoriented to time   Cognition Impulsive;Poor safety awareness;Poor attention/concentration; Short term memory loss   Patient with increased confusion. Trying to get out of bed (has bedrest orders) not redirecting well. Getting angry with staff. Dr. Jaylen Briseno updated of patient's increased confusion. Call received from Sampson Harveyma who said to get a telesitter or live sitter with patient. No new orders at this time. Called daughter and she said she would be coming up to be with patient.

## 2022-07-18 NOTE — CONSULTS
Inpatient Consultation    Catalina Donaldson ([birth year])  7/18/2022    Reason for Consult:  T2 and L5 VCF  Requesting Physician: Trauma    CHIEF COMPLAINT:  Fall    History Obtained From:  patient, electronic medical record    HISTORY OF PRESENT ILLNESS:                The patient is a 80 y.o. female who presents with above chief complaint. The patient presented to the ED last evening after suffering a fall. Patient reports she was in a hurry and tripped walking into her kitchen. She landed directly on her back, unsure what exactly caused her to fall. She complains primarily of mid back pain. No radicular symptoms or numbness/tingling into the upper or lower extremities. She denies currently having any back pain while laying in bed. She denies bowel or bladder incontinence. CT scanned showed T2 and L5 vertebral compression fractures.  Patient denies any reason she can not have a MRI    Past Medical History:        Diagnosis Date    Abnormal nuclear stress test 06/01/2018    Cataract     Chronic deep vein thrombosis (DVT) of femoral vein of left lower extremity (HCC) 3/9/2021    Colon polyps     COPD (chronic obstructive pulmonary disease) (HCC)     Diverticulosis large intestine w/o perforation or abscess w/bleeding     DVT, recurrent, lower extremity, acute, left (HCC)     Fibroid, uterine     Hearing loss     Hx of blood clots     Hyperlipidemia     Nicotine dependence     Nocturnal hypoxemia     Postmenopausal     Sleep apnea     Ulcerative colitis ([address]     with rectal bleeding      Past Surgical History:        Procedure Laterality Date    BREAST BIOPSY  1988    right breast biopsy     BREAST BIOPSY Right 03/2011    stereotactic biopsy    COLONOSCOPY  2006    Dr Malena Garcia  2010    Dr Xiomara Huff    COLONOSCOPY  2010    Dr Jennifer Manley interminate colitis    COLONOSCOPY  03/03/2014    Dr Martin Ibrahim Bilateral 2011 2012    HIP SURGERY  06/13/2020    left fracture OSTEOTOMY Left 11/2/2021    LEFT 5TH METATARSAL HEAD RESECTION EXCISION SOFT TISSUE LESION performed by Sharon Dacosta DPM at [facility]     Current Medications:   Current Facility-Administered Medications: 0.9 % sodium chloride infusion, , IntraVENous, Continuous  sodium chloride flush 0.9 % injection 5-40 mL, 5-40 mL, IntraVENous, 2 times per day  sodium chloride flush 0.9 % injection 5-40 mL, 5-40 mL, IntraVENous, PRN  0.9 % sodium chloride infusion, 25 mL, IntraVENous, PRN  acetaminophen (TYLENOL) tablet 650 mg, 650 mg, Oral, Q4H PRN  famotidine (PEPCID) injection 20 mg, 20 mg, IntraVENous, Daily  ondansetron (ZOFRAN-ODT) disintegrating tablet 4 mg, 4 mg, Oral, Q8H PRN **OR** ondansetron (ZOFRAN) injection 4 mg, 4 mg, IntraVENous, Q6H PRN  polyethylene glycol (GLYCOLAX) packet 17 g, 17 g, Oral, Daily  bisacodyl (DULCOLAX) suppository 10 mg, 10 mg, Rectal, Daily  senna (SENOKOT) tablet 8.6 mg, 1 tablet, Oral, Daily PRN  morphine (PF) injection 2 mg, 2 mg, IntraVENous, Q2H PRN **OR** morphine injection 4 mg, 4 mg, IntraVENous, Q2H PRN  oxyCODONE (ROXICODONE) immediate release tablet 5 mg, 5 mg, Oral, Q4H PRN **OR** oxyCODONE (ROXICODONE) immediate release tablet 10 mg, 10 mg, Oral, Q4H PRN  cyclobenzaprine (FLEXERIL) tablet 10 mg, 10 mg, Oral, TID PRN  lidocaine 4 % external patch 2 patch, 2 patch, TransDERmal, Daily  Allergies:  Azulfidine [sulfasalazine], Keflex [cephalexin], Mesalamine, Sulfa antibiotics, and Alcohol    Social History:   TOBACCO:   reports that she has been smoking cigarettes. She has a 40.00 pack-year smoking history. She has never used smokeless tobacco.  ETOH:   reports no history of alcohol use. DRUGS:   reports no history of drug use.   Family History:       Problem Relation Age of Onset    Diabetes Mother     High Blood Pressure Mother     Heart Disease Mother     Coronary Art Dis Father     Heart Disease Father        REVIEW OF SYSTEMS:    CONSTITUTIONAL:  negative for fevers, chills  RESPIRATORY:  negative for cough and shortness of breath  CARDIOVASCULAR:  negative for chest pain and palpitations  GASTROINTESTINAL:  negative for nausea, vomiting  GENITOURINARY:  negative for dysuria and urinary incontinence  MUSCULOSKELETAL:  positive for back pain, negative for leg pain  NEUROLOGICAL:  negative for headaches, syncope  BEHAVIOR/PSYCH:  negative for depressed mood, anxiety    PHYSICAL EXAM:      CONSTITUTIONAL:  awake, alert, cooperative, no apparent distress, and appears stated age, patient resting comfortably in bed. HEAD: atraumatic, normocephalic  LUNGS:  No respiratory distress. CTA bilaterally per H&P  CARDIOVASCULAR:  RRR, no murmur per H&P  ABDOMEN:  Soft, non-distended, non-tender  MUSCULOSKELETAL:  TTP mid back pain, no specific point tenderness. 5/5 muscle strength bilateral lower extremities. Patient able to move all extremities without issue. NEUROLOGIC:  Awake, alert, oriented to name, place and time. Sensory intact to touch bilateral lower extremities.  Negative clonus    DATA:    CBC:   Lab Results   Component Value Date/Time    WBC 15.0 07/17/2022 08:36 PM    RBC 4.32 07/17/2022 08:36 PM    RBC 0-2 05/24/2018 01:05 AM    HGB 13.3 07/17/2022 08:36 PM    HCT 42.9 07/17/2022 08:36 PM    MCV 99.3 07/17/2022 08:36 PM    MCH 30.8 07/17/2022 08:36 PM    MCHC 31.0 07/17/2022 08:36 PM    RDW 14.9 04/20/2021 12:00 AM     07/17/2022 08:36 PM    MPV 10.4 07/17/2022 08:36 PM     WBC:    Lab Results   Component Value Date/Time    WBC 15.0 07/17/2022 08:36 PM     Hemoglobin/Hematocrit:    Lab Results   Component Value Date/Time    HGB 13.3 07/17/2022 08:36 PM    HCT 42.9 07/17/2022 08:36 PM     CMP:    Lab Results   Component Value Date/Time     07/17/2022 08:36 PM    K 4.2 07/17/2022 08:36 PM     07/17/2022 08:36 PM    CO2 27 07/17/2022 08:36 PM    BUN 27 07/17/2022 08:36 PM    CREATININE 1.0 07/17/2022 08:36 PM    GFRAA >60 02/26/2020 11:54 AM    AGRATIO

## 2022-07-18 NOTE — PROGRESS NOTES
[address] [facility],[address]   Herbal and Nutritional Product Restrictions      The following herbal, alternative, and/or nutritional/dietary supplement product(s) has been discontinued per [facility] approved policy:    Zinc gluconate. Please reorder upon discharge if appropriate.     Thank you,  Alejandro Chatman, [facility]  7/18/2022 10:07 AM

## 2022-07-18 NOTE — ED NOTES
Urine sample obtained. Pt updated on wait times for lab results to come in. Pt expresses understanding. Respirations even and unlabored.  JAMIL Hollingsworth RN  07/17/22 2042

## 2022-07-18 NOTE — ED NOTES
Pt medicated per MAR. Pt denies a need for anything else at this time.      Jillian Gómez RN  07/17/22 3363

## 2022-07-18 NOTE — CARE COORDINATION
7/18/22, 8:07 AM EDT  DISCHARGE PLANNING EVALUATION:    Letitia Hall       Admitted: 7/17/2022/ [address] Ashlyn day: 1   Location: [facility]08/008-A Reason for admit: Acute UTI [N39.0]  Fall, initial encounter [W19. XXXA]  Fracture of ribs, two, left, closed, initial encounter [S22.42XA]  Closed fracture of multiple ribs of left side, initial encounter [S22.42XA]  Compression fracture of T2 vertebra, initial encounter ([address] [S22.020A]  Closed compression fracture of L5 vertebra, initial encounter ([address] Nayan Kaplan   PMH:  has a past medical history of Abnormal nuclear stress test, Cataract, Chronic deep vein thrombosis (DVT) of femoral vein of left lower extremity ([address] Colon polyps, COPD (chronic obstructive pulmonary disease) ([address] Diverticulosis large intestine w/o perforation or abscess w/bleeding, DVT, recurrent, lower extremity, acute, left ([address] Fibroid, uterine, Hearing loss, Hx of blood clots, Hyperlipidemia, Nicotine dependence, Nocturnal hypoxemia, Postmenopausal, Sleep apnea, and Ulcerative colitis ([address] To ED for evaluation of fall. The patient lives at [address] Procedure:   CT cervical spine:  Mild compression fracture of T2. Multilevel degenerative changes of the cervical spine. Thyromegaly. Left thyroid nodule. CT chest:  Mild acute compression fracture of T2. No pulmonary consolidation. No pneumothorax. Left lower rib fractures are seen. CT abdomen:  Cholelithiasis. Increase sclerosis within the sacrum relates to sacral insufficiency fractures. Acute compression fracture of L5      Barriers to Discharge:  N/V checks, bedrest, neuro spine consulted,  PT/OT on hold. PCP: PETEY Mojica CNP  Readmission Risk Score: 16.5%  Patient's Healthcare Decision Maker: Named in [address] [place] [address]    Patient Goals/Plan/Treatment Preferences: Rama Guadalupe is from [address] [place] [address]; SW consulted. This CM visit deferred with SW following.      Transportation/Food

## 2022-07-18 NOTE — PROGRESS NOTES
Pt admitted to  7K8 via cart/stretcher. Complaints: fall with rib fractures and compression fractures. IV site free of s/s of infection or infiltration. Vital signs obtained. Assessment and data collection initiated. Two nurse skin assessment performed by [id number] and Richelle Kauffman LPN/Jessy RN. Oriented to room. Explained patients right to have family, representative or physician notified of their admission. Patient has Declined for physician to be notified. Patient has Declined for family/representative to be notified. The patient is interested in [facility]. Leighs meds to beds program?:  No    Policies and procedures for  explained. All questions answered with no further questions at this time. Fall prevention and safety brochure discussed with patient. Bed alarm on. Call light in reach.

## 2022-07-18 NOTE — PROGRESS NOTES
Respiratory Care is following the rib fracture order set. Patient's position when testing was done was supine do to order to continue to lay flat until MRI results. A Negative Inspiratory Force (NIF) was performed with patient achieving a NIF of -25 cm H2O. The NIF was greater than 25 cm H2O. A Forced Vital Capacity (FVC) was obtained with patient achieving an FVC of 0.85 liters. The patient's calculated ideal body weight, (IBW) is  57 kg. 0.020 liters/kg of the patient's IBW is 1.14 liters. The patient's FVC was less than 0.020 liters/kg of IBW. Based on the spirometry measurement alone, patient does not meet ICU admission criteria. Pt needed coaching and had difficult time  following directions. Last pain medication was given on  7/17/2022 @ 1754. Physician was not called regarding spirometry measurement.

## 2022-07-18 NOTE — PROGRESS NOTES
[address]  PHYSICAL THERAPY MISSED TREATMENT NOTE  [facility]    Date: 2022  Patient Name: Bharat Tavarez        MRN: [id number]   : 1940  (80 y.o.)  Gender: female                REASON FOR MISSED TREATMENT:  Missed Treat. Pt on strict bedrest awaiting ortho to see for spine fractures. Will check back as able.

## 2022-07-18 NOTE — PROGRESS NOTES
Speech Language Pathology  [address] [address]  SPEECH THERAPY MISSED TREATMENT NOTE  [facility]      Date: 2022  Patient Name: Sergio Devries        MRN: [id number]    : 1940  (80 y.o.)    REASON FOR MISSED TREATMENT:  ST received new order for cognitive evaluation d/t admission of fall. Upon attempt RN approved session at 4414; upon entrance to room patient unavailable d/t nursing needs.  Will re-attempt later this date as schedule permits/patient appropriate or     Saima Smith M.S. Otilio Huff

## 2022-07-18 NOTE — ED NOTES
Pt resting on cot. Pt updated on admission status. Pt expressed understanding. Pt denies a need for anything at this time.      Tuan Mobley RN  07/17/22 5550

## 2022-07-18 NOTE — PROGRESS NOTES
Randy Nichole  Daily Progress Note    Pt Name: Julisa Olivo  Medical Record Number: [id number]  Date of Birth 1940   Today's Date: 7/18/2022    HD: # 1    CC: Back pain    ASSESSMENT  1. Active Hospital Problems    Diagnosis Date Noted    Fracture of ribs, two, left, closed, initial encounter [S22.42XA] 07/17/2022     Priority: Medium    Closed compression fracture of L5 lumbar vertebra, initial encounter ([address] [S32.050A] 07/17/2022     Priority: Medium    Closed wedge compression fracture of T2 vertebra ([address] [S22.020A] 07/17/2022     Priority: Medium    UTI (urinary tract infection) [N39.0] 07/17/2022     Priority: Medium         PLAN  Patient admitted under [employer], possible syncope              -PT/OT              -Consult hospitalist for further work-up   -TSH, BNP, troponin ordered by hospitalist. Troponin slightly elevated at 0.011, hospitalist to manage. T2 and L5 compression fractures              -Bedrest and n.p.o. until spine specialist evaluates                 -Orthopedic spine consulted for further management              -PT/OT when appropriate              -Pain control   -7/18: MRI thoracic spine pending. Ortho spine noted ordered MRI to determine what type of brace is needed. Lumbar brace vs c-collar to cover the T2 VCF. Further recs pending MRI results     Left ninth/10th rib fractures              -Rib fracture protocol              -Flexeril              -Pain control              -Lidoderm patches              -Incentive spirometry/cough and deep breathing              -Monitor respiratory status   -7/18: CXR this AM with no pleural effusions or pneumothorax. Patient not reporting chest pain and shortness of breath. Respirations unlabored     Symptomatic UTI              -Bacteriuria and leukocyte Estrace on UA. Rocephin given in the ED              -Consult hospitalist for further management.  On prednisone   - 7/18: Hospitalist managing with Rocephin     Leukocytosis              -Reactive versus infective possible urinary source              -Afebrile, repeat labs in a.m.   -7/18: Leukocytosis improving, 12.7. On Recephin     Thyroid nodule with thyromegaly              -Thyroid ultrasound ordered              -Hospitalist for further evaluation. TSH normal    Chronic Issues: Hx DVT, COPD, blood clots, hyperlipidemia, sleep apnea, abnormal stress test, and ulcerative colitis   - Hospitalist following for assistance with medical management   - Home medications restarted   - Hx of blood clots, no home anticoagulation or antiplatelets     Consults orthopedic spine, hospitalist     Pain Management              -Morphine, Oxy IR     Prophylaxis: SCD's, Incentive Spirometry, GlycoLax, Pepcid, Zofran   - Start chemical DVT prophylaxis when okay with spine. Hx of DVT/blood clots. Regular diet     IVF Management  Regular Neurovascular Checks  Repeat Labs Tomorrow AM  PT/OT/SLP Eval and Treat  Bedrest flat pending spine evaluation     Planned Discharge discharge pending clinical course    - From [place], following therapy recommendations for safe discharge planning    SUBJECTIVE  Patient seen on 7K this morning. Patient endorsed mild pain in back but denied any other pain or complaints. Patient denied any headaches, lightheadedness, dizziness, neck pain, chest pain, shortness of breath, abdominal pain, nausea/vomiting, pain in extremities, and paresthesias. Tertiary exam completed, no new traumatic injuries identified. All imaging reviewed. Consult updates reviewed. Labs and vital signs reviewed. Patient afebrile, vital signs stable. AM labs, leukocytosis down at 12.7. Hgb stable 12.7. Patient stable from a trauma surgery perspective. Awaiting final recommendations from spine following MRI results. Start chemical DVT prophylaxis as soon as possible when cleared by spine with hx of blood clots/DVTs. Continue rib fracture protocol. Follow therapy for safe discharge plan. Plan to discussed with trauma surgeon. Final impression and plan per Dr. Mynor Mullen. Wt Readings from Last 3 Encounters:   07/18/22 134 lb (60.8 kg)   07/18/22 140 lb (63.5 kg)   06/06/22 135 lb (61.2 kg)     Temp Readings from Last 3 Encounters:   07/18/22 98.3 °F (36.8 °C) (Oral)   04/28/22 97.6 °F (36.4 °C) (Oral)   04/24/22 98.5 °F (36.9 °C)     BP Readings from Last 3 Encounters:   07/18/22 (!) 144/65   06/15/22 110/62   06/06/22 128/68     Pulse Readings from Last 3 Encounters:   07/18/22 92   06/15/22 64   06/06/22 73       24 HR INTAKE/OUTPUT :   Intake/Output Summary (Last 24 hours) at 7/18/2022 1403  Last data filed at 7/18/2022 0551  Gross per 24 hour   Intake 252.25 ml   Output --   Net 252.25 ml     ADULT DIET; Regular    OBJECTIVE  CURRENT VITALS BP (!) 144/65   Pulse 92   Temp 98.3 °F (36.8 °C) (Oral)   Resp 18   Ht 5' 5\" (1.651 m)   Wt 134 lb (60.8 kg)   SpO2 93%   BMI 22.30 kg/m²   GENERAL: Awake, alert, no acute distress, pleasant and cooperative with exam  HEENT: Normocephalic, pupils equal and reactive to light, nares patent bilaterally  NEURO: Alert and orient x3, GCS 15, follows commands, PMS intact in all four extremities, no signs of focal neurological deficits  CSPINE/BACK: No midline cervical tenderness to palpation but patient with midline thoracic and lumbar tenderness to palpation  HEART: Regular rate and rhythm with no obvious murmurs, rubs, gallops. Distal pulses intact. LUNGS/CHEST WALL: Lungs are clear to auscultation bilaterally with no wheezes, rales, rhonchi. No respiratory distress or increased work of breathing. Mild left lateral chest wall tenderness to palpation. ABDOMEN: Abdomen soft, nondistended, with no tenderness to palpation. No guarding or peritoneal signs. Bowel sounds normal active  EXTREMITIES: No cyanosis. PMS intact in all four extremities. No extremity tenderness to palpation. Range of motion intact. Strength 5/5 bilaterally with  strength and plantar/dorsiflexion. SKIN: Warm and dry      LABS  CBC :   Recent Labs     07/17/22 2036 07/18/22 0724   WBC 15.0* 12.7*   HGB 13.3 12.7   HCT 42.9 40.1   MCV 99.3* 96.6    261     BMP:   Recent Labs     07/17/22 2036 07/18/22 0724    143   K 4.2 4.1    108   CO2 27 25   BUN 27* 21   CREATININE 1.0 0.7     COAGS:   Recent Labs     07/18/22 0724   PROT 5.4*     Pancreas/HFP:  No results for input(s): LIPASE, AMYLASE in the last 72 hours. Recent Labs     07/18/22 0724   AST 16   ALT 16   BILITOT 0.3   ALKPHOS 68     RADIOLOGY  CT ABDOMEN PELVIS WO CONTRAST Additional Contrast? None    Result Date: 7/17/2022  PROCEDURE: CT ABDOMEN PELVIS WO CONTRAST CLINICAL INFORMATION: left flank pain COMPARISON: None TECHNIQUE: Helical CT acquisition of the abdomen and pelvis was performed without intravenous contrast. Multiplanar reformats are provided. All CT scans at this facility use dose modulation, iterative reconstruction, and/or weight based dosing when appropriate to reduce the radiation dose to as low as reasonably achievable. FINDINGS: The noncontrast CT limits the evaluation for solid organ pathology, vascular pathology, and lymphadenopathy. The chest has been described on the concurrent CT of the chest. Gallstone is present. Hypodensities in the liver likely hepatic cysts. Tiny calculi are seen in both kidneys. Mild atrophy of the pancreas. Large amount of stool is seen in the colon. A myomatous uterus is seen. Otherwise, the biliary tree, adrenal glands, and spleen are unremarkable. No bowel obstruction or acute inflammatory bowel process. The appendix is unremarkable. Colonic diverticulosis. The abdominal aorta is not aneurysmal. Aortoiliac calcifications. No significantly enlarged lymph nodes are seen. The bladder is grossly unremarkable. Bones: The bones are demineralized. Scoliosis of the lumbar spine.  Degenerative changes of the thoracolumbar spine. Lateral plate and nail fixation of the proximal left femur is seen. There is increased sclerosis within the sacrum that likely reflects underlying insufficiency fractures. Acute compression fracture of L5. Chronic compression deformity of T12.     1. Cholelithiasis. 2. Increase sclerosis within the sacrum relates to sacral insufficiency fractures. 3. Acute compression fracture of L5 4. Other findings as described above. **This report has been created using voice recognition software. It may contain minor errors which are inherent in voice recognition technology. ** Final report electronically signed by Dr Katerine Modi on 7/17/2022 8:38 PM    CT HEAD WO CONTRAST    Result Date: 7/17/2022  PROCEDURE: CT HEAD WO CONTRAST CLINICAL INFORMATION:Fall. COMPARISON: None TECHNIQUE: 5 mm axial imaging through the head without IV contrast. All CT scans at this facility use dose modulation, iterative reconstruction, and/or weight based dosing when appropriate to reduce the radiation dose to as low as reasonably achievable. FINDINGS: Periventricular  and subcortical white matter hypodensities that likely relate to chronic microangiopathic disease. Intracranial atherosclerotic calcifications. No ventriculomegaly. No midline shift or mass effect. No acute intracranial hemorrhage. No intracranial collection. Gray-white differentiation is unremarkable. The posterior fossa is unremarkable. The craniocervical junction is unremarkable. No acute bony abnormality. The  paranasal sinuses are clear. The  mastoid air cells are clear. The orbits are unremarkable. 1. No acute intracranial abnormality. 2. Sequela of chronic microvascular changes. **This report has been created using voice recognition software. It may contain minor errors which are inherent in voice recognition technology. ** Final report electronically signed by Dr Katerine Modi on 7/17/2022 8:15 PM    CT CHEST WO CONTRAST    Result Date: 7/17/2022  PROCEDURE: CT CHEST WO CONTRAST CLINICAL INFORMATION: Fall, right lower scapula contusion, left lower lateral chest wall tenderness. COMPARISON: None TECHNIQUE: Helical CT acquisition of the chest was performed without intravenous contrast. Multiplanar reformats are provided. All CT scans at this facility use dose modulation, iterative reconstruction, and/or weight based dosing when appropriate to reduce the radiation dose to as low as reasonably achievable. FINDINGS: The noncontrast CT limits the evaluation for solid organ pathology, vascular pathology, and lymphadenopathy. Mild to moderate emphysematous changes. Subsegmental atelectasis is seen in the lung bases. The thyroid is mildly enlarged. Aortocoronary calcifications. No focal pulmonary consolidation. No large pulmonary mass. Central airway is patent. No pleural effusions. No significant pericardial effusion. The heart is not enlarged. Ascending thoracic aorta is not dilated. The main pulmonary artery is not dilated. No significantly enlarged mediastinal or  axillary lymph node. Denisse Karst No chest wall mass. The abdomen is described on a concurrent CT abdomen and pelvis. Bones: Degenerative changes of the thoracic spine. The bones are demineralized. Compression fracture of T2. Scoliosis. . Left lower rib fractures are seen that are nondisplaced. 1. Mild acute compression fracture of T2. 2. No pulmonary consolidation. No pneumothorax. 3. Left lower rib fractures are seen. **This report has been created using voice recognition software. It may contain minor errors which are inherent in voice recognition technology. ** Final report electronically signed by Dr Gin Guillory on 7/17/2022 8:28 PM    CT CERVICAL SPINE WO CONTRAST    Result Date: 7/17/2022  PROCEDURE: CT CERVICAL SPINE WO CONTRAST CLINICAL INFORMATION: Fall.  COMPARISON: None TECHNIQUE: 3 mm axial imaging through the cervical spine without contrast.  Sagittal and coronal reconstructions were performed. All CT scans at this facility use dose modulation, iterative reconstruction, and/or weight based dosing when appropriate to reduce the radiation dose to as low as reasonably achievable. FINDINGS: ALIGNMENT: Cervical lordosis is maintained. BONES: The bones appear demineralized. Mild degenerative changes of the cervical spine. Multilevel disc osteophyte complexes are seen. Multilevel facet arthrosis and uncovertebral degeneration. Acute compression fracture of T2 is seen. SOFT TISSUES: Carotid calcifications are seen. The thyroid is enlarged. A left thyroid nodule is seen. Emphysema is seen in the lung apices. DISC LEVELS: Calcification of the ligamentum flava at C2-C3 and C3-C4 is noted. At C3-C4, the calcification is leading to mild central canal narrowing. Mild right neuroforaminal narrowing at C6-C7. 1. Mild compression fracture of T2. 2. Multilevel degenerative changes of the cervical spine. 3. Thyromegaly. Left thyroid nodule. **This report has been created using voice recognition software. It may contain minor errors which are inherent in voice recognition technology. ** Final report electronically signed by Dr Krystal Coronel on 7/17/2022 8:21 PM    XR CHEST PORTABLE    Result Date: 7/18/2022  PROCEDURE: XR CHEST PORTABLE CLINICAL INFORMATION: follow up for rib fractures. Left lower rib fractures. COMPARISON: Chest CT 7/17/2022. TECHNIQUE: AP semiupright view of the chest. FINDINGS: The heart size is normal.The mediastinum is not widened. There are background fibrotic changes. There is no pneumothorax. There are no pulmonary infiltrates. There are no pleural effusions. The pulmonary vascularity is normal. The patient's known left lower rib fractures are not seen radiographically. No pleural effusions. No pneumothorax. Background fibrotic changes. **This report has been created using voice recognition software.  It may contain minor errors which are inherent in voice recognition technology. ** Final report electronically signed by Dr. Brittanie Bartholomew on 7/18/2022 7:40 AM    CT LUMBAR RECONSTRUCTION WO POST PROCESS    Result Date: 7/17/2022  PROCEDURE: CT LUMBAR RECONSTRUCTION WO POST PROCESS CLINICAL INFORMATION: low backpain fall COMPARISON: None TECHNIQUE: 3 mm axial CT images were reconstructed through the lumbar spine from the patient's CT abdomen/pelvis examination without IV contrast. Sagittal and coronal reconstructions were also performed. All CT scans at this facility use dose modulation, iterative reconstruction, and/or weight based dosing when appropriate to reduce the radiation dose to as low as reasonably achievable. FINDINGS: ALIGNMENT: The lumbar lordosis is maintained. Levoscoliosis of the lumbar spine. BONE: The bones are markedly demineralized. . Acute compression fracture of L5. Increased sclerosis within the sacrum relates to insufficiency sacral fractures. Degenerative changes of the SI joints. Mild degenerative changes of the lumbar spine. Multilevel facet arthrosis. . . The soft tissues have been described on the CT abdomen and pelvis. DISC SPACES/FACET JOINT/SPINAL CANAL/NEURAL FORAMEN: The lack of intrathecal contrast limits the evaluation of the spinal canal. Mild central canal narrowing at L3-L4 with moderate right neural foraminal narrowing. Moderate central canal narrowing at L4-L5 with moderate right neuroforaminal narrowing. 1. Acute compression fracture of L5. 2. Increased sclerosis within the sacrum relates to insufficiency sacral fractures. 3. Degenerative changes of the lumbar spine. 4. Marked osteopenia. **This report has been created using voice recognition software. It may contain minor errors which are inherent in voice recognition technology. ** Final report electronically signed by Dr Marco Griffin on 7/17/2022 8:49 PM    CT THORACIC RECONSTRUCTION WO POST PROCESS    Result Date: 7/17/2022  PROCEDURE: CT THORACIC RECONSTRUCTION WO POST PROCESS CLINICAL INFORMATION: Fall, middle midline thoracic spine tenderness. COMPARISON: None TECHNIQUE: 3 mm axial CT images were reconstructed through the thoracic spine from the patient's CT chest examination without IV contrast. Sagittal and coronal reconstruction were also performed. All CT scans at this facility use dose modulation, iterative reconstruction, and/or weight based dosing when appropriate to reduce the radiation dose to as low as reasonably achievable. FINDINGS: ALIGNMENT: The thoracic kyphosis is maintained. Dextroscoliosis. BONE: The bones are demineralized. Acute compression fracture of T2. Chronic compression deformity of T12. . Multilevel degenerative changes of the thoracic spine. Multilevel facet arthrosis. . . SOFT TISSUE: The soft tissues of been described on the CT chest.. DISC LEVEL: 1. The lack of intrathecal contrast limits the evaluation of the spinal canal. No significant central canal narrowing. No significant neuroforamina narrowing. 1. Mild acute compression fracture of T2. 2. Degenerative changes of the thoracic spine. **This report has been created using voice recognition software. It may contain minor errors which are inherent in voice recognition technology. ** Final report electronically signed by Dr Frederick Owen on 7/17/2022 8:43 PM       Total time spent in care of patient:  15 minutes collectively between subjective/objective examination, chart review, documentation, clinical reasoning and discussion with attending regarding plan/interval changes.       Electronically signed by Sascha Rogers PA-C on 7/18/2022 at 2:03 PM

## 2022-07-18 NOTE — H&P
Trauma H&P     Patient:  Bruno Childers  Admit date: 7/17/2022   YOB: 1940 Date of Evaluation: 7/17/2022  MRN: [id number]  Acct: [de-identified]    Injury Date: 7/17/2022  injury time: Evening  PCP: PETEY Mcgrath CNP   Referring physician: Dr. Martha Haney    Time of Trauma Surgeon Notification: 2215 this was a level 3 trauma admission time called was 10:09 PM on the 17th time seen was 10:30 AM on the 18th. Patient is at an assisted living states she had gone out of her apartment and went back in to get a coat when she tripped over a chair. She denies syncopal episode she sustained T8 to an L5 compression fractures along with fractures of to lower ribs on the left will be admitted for orthopedic consultation and pain control  Time of MICHAEL Arrival: 2230  Time of Trauma Surgeon Arrival: N/A for consult    Assessment:    Principal Problem:    Fracture of ribs, two, left, closed, initial encounter  Active Problems:    Closed compression fracture of L5 lumbar vertebra, initial encounter ([facility] [address]    Closed wedge compression fracture of T2 vertebra (HCC)    UTI (urinary tract infection)  Resolved Problems:    * No resolved hospital problems. *    Plan:    Patient admitted under Trauma Services    Fall, possible syncope   -PT/OT   -Consult hospitalist for further work-up    T2 and L5 compression fractures   -Bedrest and n.p.o. until spine specialist evaluates    -Orthopedic spine consulted for further management   -PT/OT when appropriate   -Pain control    Left ninth/10th rib fractures   -Rib fracture protocol   -Flexeril   -Pain control   -Lidoderm patches   -Incentive spirometry/cough and deep breathing   -Repeat chest x-ray in a.m.   -Monitor respiratory status     Symptomatic UTI   -Bacteriuria and leukocyte Estrace on UA.   Rocephin given in the ED   -Consult hospitalist for further management    Leukocytosis   -Reactive versus infective possible urinary source   -Afebrile, repeat labs in a.m.    Thyroid nodule with thyromegaly   -Thyroid ultrasound ordered   -Hospitalist for further evaluation    Consults orthopedic spine, hospitalist    Pain Management   -Morphine, Norco    Prophylaxis: SCD's, Incentive Spirometry, GlycoLax, Pepcid, Zofran    N.p.o. until orthospine evaluate    IVF Management  Regular Neurovascular Checks  Repeat Labs Tomorrow AM  PT/OT/SLP Eval and Treat  Bedrest flat    Planned Discharge discharge pending clinical course      Activation:    [] Level I (Trauma Alert)   [] Level II (Injury Call)   [x] Level III (Trauma Consult)   [] Downgraded (Time: )   Mode of Arrival: EMS transportation  Referring Facility: [facility]  Loss of Consciousness []No []Yes[]Unknown  Duration(min):  Mechanism of Injury:  []Motor Vehicle crash   []Single Vehicle [] []Passenger []Scene Fatality []Front Seat  []Restrained   []Air Bag Deployed   []Ejected []Rollover []Pedestrian []Trapped   Type of vehicle:   Protective Devices:   []Motorcycle  Wearing Helmet []Yes []No  []Bicycle  Wearing Helmet []Yes []No  []Fall   Distance -    []Assault    Abuse Reported []Yes []No  []Gunshot  []Stabbing  []Work Related  []Burn: []Flame []Scald []Electrical []Chemical []Contact []Inhalation []House Fire  []Other:     Specialties contacted for 30 minute response: N/AA    Subjective   Chief Complaint: Fall    History of Present Illness:    She is a 80 y.o. female evaluated for trauma consult trauma, brought by EMS following a mechanical of her syncopal fall at AL with unknown LOC; past medical history chronic DVT, COPD, diverticulosis, fibroids, sleep apnea, UC, hyperlipidemia, hypertension, and other comorbidities. Per report patient was in her kitchen when for some reason she fell over a chair, she states she did not pass out, daughter at bedside states she believes it might have been a syncopal episode. Patient reports falling directly onto her back with immediate upper and lower back pain.   Patient states she has had urinary frequency but denies dysuria or foul-smelling urine. Patient reports chronic tremor. Patient denies chest pain, shortness of breath, cough, headache, dizziness, lightheadedness, numbness, paraesthesias, weakness, chills, fevers, abdominal pain, nausea, vomiting, neck pain. Care discussed and in coordination with trauma surgeon Dr. Edgardo Xie. Review of Systems:   Review of Systems   Constitutional:  Negative for chills and fever. HENT:  Negative for facial swelling, mouth sores and nosebleeds. Eyes:  Negative for photophobia, pain and visual disturbance. Respiratory:  Negative for chest tightness and shortness of breath. Cardiovascular:  Negative for chest pain and palpitations. Gastrointestinal:  Negative for abdominal pain, nausea and vomiting. Genitourinary:  Positive for frequency. Negative for difficulty urinating, dysuria and urgency. Musculoskeletal:  Positive for back pain. Negative for arthralgias and neck pain. Skin:  Negative for color change, pallor and wound. Neurological:  Negative for dizziness, seizures, syncope, weakness, light-headedness, numbness and headaches. Hematological:  Does not bruise/bleed easily. Psychiatric/Behavioral:  Negative for agitation and confusion. The patient is not nervous/anxious.     Azulfidine [sulfasalazine], Keflex [cephalexin], Mesalamine, Sulfa antibiotics, and Alcohol  Past Surgical History:   Procedure Laterality Date    BREAST BIOPSY  1988    right breast biopsy     BREAST BIOPSY Right 03/2011    stereotactic biopsy    COLONOSCOPY  2006    Dr Shayla Euceda  2010    Dr Olman Baum    COLONOSCOPY  2010    Dr Gilma Edwards interminate colitis    COLONOSCOPY  03/03/2014    Dr Vidales [facility] Bilateral 2011 2012    HIP SURGERY  06/13/2020    left fracture    OSTEOTOMY Left 11/2/2021    LEFT 5TH METATARSAL HEAD RESECTION EXCISION SOFT TISSUE LESION performed by Shahram Domínguez DPM at [facility]     Past Medical History:   Diagnosis Date    Abnormal nuclear stress test 06/01/2018    Cataract     Chronic deep vein thrombosis (DVT) of femoral vein of left lower extremity (HCC) 3/9/2021    Colon polyps     COPD (chronic obstructive pulmonary disease) (HCC)     Diverticulosis large intestine w/o perforation or abscess w/bleeding     DVT, recurrent, lower extremity, acute, left (HCC)     Fibroid, uterine     Hearing loss     Hx of blood clots     Hyperlipidemia     Nicotine dependence     Nocturnal hypoxemia     Postmenopausal     Sleep apnea     Ulcerative colitis ([address]     with rectal bleeding      Past Surgical History:   Procedure Laterality Date    BREAST BIOPSY  1988    right breast biopsy     BREAST BIOPSY Right 03/2011    stereotactic biopsy    COLONOSCOPY  2006    Dr Shauna Ramires  2010    Dr Jose Roblero    COLONOSCOPY  2010    Dr Luis Fernando Cedeno interminate colitis    COLONOSCOPY  03/03/2014    Dr Calli Garrett Bilateral 2011 2012    HIP SURGERY  06/13/2020    left fracture    OSTEOTOMY Left 11/2/2021    LEFT 5TH METATARSAL HEAD RESECTION EXCISION SOFT TISSUE LESION performed by Cristiana Villaseñor DPM at [address] [place] [address] History     Socioeconomic History    Marital status:     Tobacco Use    Smoking status: Some Days     Packs/day: 1.00     Years: 40.00     Pack years: 40.00     Types: Cigarettes    Smokeless tobacco: Never    Tobacco comments:     8-10 cigarettes / day   Vaping Use    Vaping Use: Never used   Substance and Sexual Activity    Alcohol use: No    Drug use: Never    Sexual activity: Not Currently     Social Determinants of Health     Financial Resource Strain: Low Risk     Difficulty of Paying Living Expenses: Not hard at all   Food Insecurity: No Food Insecurity    Worried About Running Out of Food in the Last Year: Never true    Ran Out of Food in the Last Year: Never true   Physical Activity: Insufficiently Active    Days of Exercise per Week: 5 days    Minutes of Exercise per Session: 20 min     Family History   Problem Relation Age of Onset    Diabetes Mother     High Blood Pressure Mother     Heart Disease Mother     Coronary Art Dis Father     Heart Disease Father        Home medications:    Previous Medications    ACETAMINOPHEN (TYLENOL) 500 MG TABLET    Take 2 tablets by mouth 3 times daily as needed for Pain    ALBUTEROL (PROVENTIL) (2.5 MG/3ML) 0.083% NEBULIZER SOLUTION    Take 2.5 mg by nebulization every 6 hours as needed for Wheezing or Shortness of Breath    ALBUTEROL SULFATE  (90 BASE) MCG/ACT INHALER    Inhale 2 puffs into the lungs every 6 hours as needed for Wheezing    DENOSUMAB (PROLIA) 60 MG/ML SOSY SC INJECTION    Inject 1 mL into the skin every 6 months    DONEPEZIL (ARICEPT) 10 MG TABLET    Take 1 tablet by mouth nightly    GLUCOSAMINE-CHONDROITIN 500-250 MG CAPS    Take by mouth daily    IBUPROFEN (ADVIL;MOTRIN) 400 MG TABLET    Take 400 mg by mouth 2 times daily as needed for Pain    IPRATROPIUM (ATROVENT) 0.02 % NEBULIZER SOLUTION    Take 0.5 mg by nebulization every 6-8 hours as needed for Wheezing    LISINOPRIL (PRINIVIL;ZESTRIL) 20 MG TABLET    Take 1 tablet by mouth daily    METOPROLOL TARTRATE (LOPRESSOR) 25 MG TABLET    Take 1 tablet by mouth 2 times daily    MISC. DEVICES (WHEELCHAIR) MISC    1 standard wheelchair. MISC. DEVICES MISC    Please apply compression stockings (JOHANNE hose) to bilateral lower extremities for 12 hours daily. Remove nightly. MISC. DEVICES MISC    1 set of bed rails.     MULTIPLE VITAMIN (MULTIVITAMIN ADULT PO)    Take 1 tablet by mouth daily    PREDNISONE (DELTASONE) 10 MG TABLET    Take 1 tablet by mouth daily    PROBIOTIC PRODUCT (Workable PO)    Take 1 tablet by mouth daily    PSYLLIUM FIBER 0.52 G CAPS    Take 1 capsule by mouth daily    SIMVASTATIN (ZOCOR) 40 MG TABLET    Take 1 tablet by mouth nightly UMECLIDINIUM-VILANTEROL (ANORO ELLIPTA) 62.5-25 MCG/INH AEPB INHALER    Inhale 1 puff into the lungs daily    VITAMIN B-12 (CYANOCOBALAMIN) 1000 MCG TABLET    Take 1 tablet by mouth daily    VITAMIN C (ASCORBIC ACID) 500 MG TABLET    Take 1,000 mg by mouth daily    ZINC GLUCONATE 50 MG TABLET    Take 50 mg by mouth daily       Hospital medications:  Scheduled Meds:   [START ON 7/18/2022] lidocaine  2 patch TransDERmal Daily     Continuous Infusions:  PRN Meds:cyclobenzaprine  Objective   ED TRIAGE VITALS  BP: 136/89, Temp: 98.3 °F (36.8 °C), Heart Rate: (!) 102, Resp: 18, SpO2: 92 %  [place] Coma Scale  Eye Opening: Spontaneous  Best Verbal Response: Oriented  Best Motor Response: Obeys commands  [place] Coma Scale Score: 15  Results for orders placed or performed during the hospital encounter of 07/17/22   CBC with Auto Differential   Result Value Ref Range    WBC 15.0 (H) 4.8 - 10.8 thou/mm3    RBC 4.32 4.20 - 5.40 mill/mm3    Hemoglobin 13.3 12.0 - 16.0 gm/dl    Hematocrit 42.9 37.0 - 47.0 %    MCV 99.3 (H) 81.0 - 99.0 fL    MCH 30.8 26.0 - 33.0 pg    MCHC 31.0 (L) 32.2 - 35.5 gm/dl    RDW-CV 15.3 (H) 11.5 - 14.5 %    RDW-SD 56.9 (H) 35.0 - 45.0 fL    Platelets 595 886 - 410 thou/mm3    MPV 10.4 9.4 - 12.4 fL    Seg Neutrophils 77.1 %    Lymphocytes 14.7 %    Monocytes 6.6 %    Eosinophils 0.2 %    Basophils 0.3 %    Immature Granulocytes 1.1 %    Segs Absolute 11.6 (H) 1.8 - 7.7 thou/mm3    Lymphocytes Absolute 2.2 1.0 - 4.8 thou/mm3    Monocytes Absolute 1.0 0.4 - 1.3 thou/mm3    Eosinophils Absolute 0.0 0.0 - 0.4 thou/mm3    Basophils Absolute 0.0 0.0 - 0.1 thou/mm3    Immature Grans (Abs) 0.17 (H) 0.00 - 0.07 thou/mm3    nRBC 0 /100 wbc   Basic Metabolic Panel w/ Reflex to MG   Result Value Ref Range    Sodium 141 135 - 145 meq/L    Potassium reflex Magnesium 4.2 3.5 - 5.2 meq/L    Chloride 105 98 - 111 meq/L    CO2 27 23 - 33 meq/L    Glucose 130 (H) 70 - 108 mg/dL    BUN 27 (H) 7 - 22 mg/dL    CREATININE 1.0 0.4 - 1.2 mg/dL    Calcium 9.3 8.5 - 10.5 mg/dL   Urine with Reflexed Micro   Result Value Ref Range    Glucose, Ur NEGATIVE NEGATIVE mg/dl    Bilirubin Urine NEGATIVE NEGATIVE    Ketones, Urine NEGATIVE NEGATIVE    Specific Gravity, Urine 1.009 1.002 - 1.030    Blood, Urine TRACE (A) NEGATIVE    pH, UA 7.5 5.0 - 9.0    Protein, UA NEGATIVE NEGATIVE    Urobilinogen, Urine 0.2 0.0 - 1.0 eu/dl    Nitrite, Urine POSITIVE (A) NEGATIVE    Leukocyte Esterase, Urine SMALL (A) NEGATIVE    Color, UA YELLOW STRAW-YELLOW    Character, Urine CLEAR CLEAR-SL CLOUD    RBC, UA 0-2 0-2/hpf /hpf    WBC, UA 10-15 0-4/hpf /hpf    Epithelial Cells, UA 0-2 3-5/hpf /hpf    Bacteria, UA MANY FEW/NONE SEEN /hpf    Casts UA NONE SEEN NONE SEEN /lpf    Crystals, UA NONE SEEN NONE SEEN    Renal Epithelial, UA NONE SEEN NONE SEEN    Yeast, UA NONE SEEN NONE SEEN    CASTS 2 NONE SEEN NONE SEEN /lpf    MISCELLANEOUS 2 NONE SEEN    Lactate, Sepsis   Result Value Ref Range    Lactic Acid, Sepsis 1.7 0.5 - 1.9 mmol/L   Anion Gap   Result Value Ref Range    Anion Gap 9.0 8.0 - 16.0 meq/L   Glomerular Filtration Rate, Estimated   Result Value Ref Range    Est, Glom Filt Rate 53 (A) ml/min/1.73m2   Osmolality   Result Value Ref Range    Osmolality Calc 288. 1 275.0 - 300.0 mOsmol/kg   EKG 12 Lead   Result Value Ref Range    Ventricular Rate 91 BPM    Atrial Rate 91 BPM    P-R Interval 164 ms    QRS Duration 118 ms    Q-T Interval 392 ms    QTc Calculation (Bazett) 482 ms    P Axis 57 degrees    R Axis -63 degrees    T Axis 42 degrees       Physical Exam:  Patient Vitals for the past 24 hrs:   BP Temp Temp src Pulse Resp SpO2   07/17/22 2221 136/89 -- -- (!) 102 18 92 %   07/17/22 2207 136/89 -- -- 92 18 92 %   07/17/22 2040 125/79 -- -- 81 -- 93 %   07/17/22 1850 (!) 164/79 -- -- 92 23 96 %   07/17/22 1805 (!) 178/73 -- -- 86 23 97 %   07/17/22 1622 -- 98.3 °F (36.8 °C) Oral 100 20 95 %   07/17/22 1615 (!) 157/114 -- -- -- -- --     Primary Assessment:  Airway: Patent, trachea midline  Breathing: Breath sounds present and equal bilaterally, spontaneous, and unlabored  Circulation: Hemodynamically stable, 2+ central and peripheral pulses. Disability: GRACE x 4, following commands. GCS =15    Secondary Assessment:  GENERAL: Presents sitting upright in bed unassisted, A&Ox4 to person, place, time, and events; In no acute distress and well nourished  HEAD: Atraumatic, normocephalic, symmetric, without deformity. No tenderness to palpation. No raccoon eyes, lieberman signs or visible CSF leakage. EYES: No apparent trauma, discharge, or hematoma bilaterally. PERRL at 3mm  EARS: Set evenly on head. No apparent external trauma. NECK: Neck is atraumatic, trachea midline, no visible lacerations, step off deformity, expanding swelling or midline tenderness. CARDIO: No visible chest wall deformity or palpable crepitus. No heaves or lifts. Strong/regular S1/S2 rate and rhythm. No rubs murmurs, or gallops. 2+ radial, posterior tibial, and dorsalis pedal pulses. Capillary refill <2 sec. No extremity edema noted. PULMONARY:  Trachea midline, no audible wheezing, increased respiratory effort, accessory muscle use, or asymmetrical chest wall movement. Lung sounds are clear and audible to ascultation in superior and inferior fields. ABDOMEN: Abdomen is non distended without lacerations. Abdomen soft and nontender to palpation in all quadrants. MSK: Moving all extremities without pain. Tenderness palpation left chest wall pelvis stable to compression. No other deformity, contusion, or bleeding.  strength 5/5 and equal bilaterally. There is to palpation of upper thoracic spine at midline, tenderness palpation lower lumbar spine midline. No tenderness to palpation at the midline of cervical spine  NEURO: Follows commands, reasoning intact, recalls recent events. Chronic essential tremor. PMS intact, moves limbs freely.   No focal neurological deficits  SKIN: Appropriate for ethnicity, warm and dry. Medical Decision Making: On evaluation patient vital signs hypertensive. Patient to be admitted for consult orthospine and medicine, sepsis work-up and further monitoring with pulmonary toileting. Radiology:     CT HEAD WO CONTRAST   Final Result   1. No acute intracranial abnormality. 2. Sequela of chronic microvascular changes. **This report has been created using voice recognition software. It may contain minor errors which are inherent in voice recognition technology. **      Final report electronically signed by Dr Mattie Huggins on 7/17/2022 8:15 PM      CT CERVICAL SPINE WO CONTRAST   Final Result   1. Mild compression fracture of T2.   2. Multilevel degenerative changes of the cervical spine. 3. Thyromegaly. Left thyroid nodule. **This report has been created using voice recognition software. It may contain minor errors which are inherent in voice recognition technology. **      Final report electronically signed by Dr Mattie Huggins on 7/17/2022 8:21 PM      CT CHEST WO CONTRAST   Final Result   1. Mild acute compression fracture of T2.   2. No pulmonary consolidation. No pneumothorax. 3. Left lower rib fractures are seen. **This report has been created using voice recognition software. It may contain minor errors which are inherent in voice recognition technology. **      Final report electronically signed by Dr Mattie Huggins on 7/17/2022 8:28 PM      CT THORACIC RECONSTRUCTION WO POST PROCESS   Final Result   1. Mild acute compression fracture of T2.   2. Degenerative changes of the thoracic spine. **This report has been created using voice recognition software. It may contain minor errors which are inherent in voice recognition technology. **      Final report electronically signed by Dr Mattie Huggins on 7/17/2022 8:43 PM      CT ABDOMEN PELVIS WO CONTRAST Additional Contrast? None   Final Result   1. Cholelithiasis. 2. Increase sclerosis within the sacrum relates to sacral insufficiency fractures. 3. Acute compression fracture of L5   4. Other findings as described above. **This report has been created using voice recognition software. It may contain minor errors which are inherent in voice recognition technology. **      Final report electronically signed by Dr Marco Griffin on 7/17/2022 8:38 PM      CT LUMBAR RECONSTRUCTION WO POST PROCESS   Final Result   1. Acute compression fracture of L5.   2. Increased sclerosis within the sacrum relates to insufficiency sacral fractures. 3. Degenerative changes of the lumbar spine. 4. Marked osteopenia. **This report has been created using voice recognition software. It may contain minor errors which are inherent in voice recognition technology. **      Final report electronically signed by Dr Marco Griffin on 7/17/2022 8:49 PM        Fast Exam: No.    Total time spent in care of patient: 45 minutes collectively between subjective/objective examination, chart review, documentation, clinical reasoning and discussion with attending regarding plan/interval changes. Electronically signed by GLENN Diaz on 7/17/2022 at 11:26 PM   Patient seen and examined independently by me 7/18/2022     I personally supervised the PA/NP in the evaluation, management and development of the treatment plan for Fidencio Lew  on the same date of service as above. I personally interviewed Fidencio Lew   and  discussed his review of symptoms as able due to the patient's condition, as well as performed an individual physical exam on the same   date of service as above. In addition I discussed the patient's condition and treatment options with the patient, if able, and/or designated family if available.       I have also reviewed and agree with the past medical,  family and social history updates as well as care plans unless otherwise noted below. All questions were answered. I examined independently and reviewed relevant data myself and may have done so in the context of team rounds. A full chart review was performed by me. I attest that this medical record entry accurately reflects signatures and notations that I made in my capacity as an M. D. when I treated and diagnosed Dior Powell on the date of service above     I was responsible for all medical decision making involving this encounter. I identified and/or confirmed all problems associated with this patient encounter by my own direct physical examination of this patient and review of all radiology studies and labwork  that were ordered and available. Active Hospital Problems    Diagnosis     Fall [W19. XXXA]      Priority: Medium    Fracture of ribs, two, left, closed, initial encounter [S22.42XA]      Priority: Medium    Closed compression fracture of fifth lumbar vertebra ([address] [S32.050A]      Priority: Medium    Compression fracture of T2 vertebra ([address] [S22.020A]      Priority: Medium    UTI (urinary tract infection) [N39.0]      Priority: Medium        I  discussed the management of all of the identified problems with the APN or PA. I formulated the treatment plan for all identified problems and discussed those with the APN or PA . This management plan was then carried out and the patient's orders for care by the APN or PA.       Total time personally spent on this patient encounter was 50 minutes which includes :  Preparing to see the patient( reviewing tests and chart)  Obtaining and reviewing separately obtained history  Performing a medically appropriate examination and evaluation  Ordering medications, tests, or procedures  Counseling and educating the patient/family/caregiver  Care coordination  Referring and communicating with other healthcare professionals  Documenting clinical information in the EHR  Independent interpretation of results and communicating the results to patient and care team  This includes a direct physical exam as well as all the other encounter activities described above. Time may be discontiguous. Time does not include procedures. Please see our orders that were directed and approved by me if there are any new ones for the updated patient care plan. Above discussed and I agree with documentation and orders placed by Guinevere Bence PA    See any additional comments if needed below for any other updated orders and plans. Patient seen and examined independently by me. Above discussed and I agree with CNP. Labs, cultures, and radiographs where available were reviewed. See orders for the updated patient care plan. Alison Haddad MD MD, as above patient admitted as a trauma with T2 L5 compression fractures and rib fractures 9 and 10 on the left.   Patient was also found to have a UTI medicine will see we will begin antibiotics orthopedics to see check the MRI  7/18/2022   4:02 PM

## 2022-07-18 NOTE — FLOWSHEET NOTE
07/18/22 1547   Treatment Team Notification   Reason for Communication Review case  (MRI results)   Team Member Name Amisha Knox   Treatment Team Role Physician Assistant   Method of Communication Secure Message   Response Waiting for response   Notification Time [phone]       Message received from [address] that said \"Update ortho spine to clear bedrest and what brace she needs. Also ask if they are okay with lovenox. Thanks. \" Bruno Harrison, who is on for orthospine and asked about bedrest, brace and Lovenox and he said he will have one of his PA's see patient.

## 2022-07-19 LAB
ANION GAP SERPL CALCULATED.3IONS-SCNC: 12 MEQ/L (ref 8–16)
BASOPHILS # BLD: 0.5 %
BASOPHILS ABSOLUTE: 0.1 THOU/MM3 (ref 0–0.1)
BUN BLDV-MCNC: 12 MG/DL (ref 7–22)
CALCIUM SERPL-MCNC: 8.9 MG/DL (ref 8.5–10.5)
CHLORIDE BLD-SCNC: 108 MEQ/L (ref 98–111)
CO2: 22 MEQ/L (ref 23–33)
CREAT SERPL-MCNC: 0.5 MG/DL (ref 0.4–1.2)
EKG ATRIAL RATE: 78 BPM
EKG P AXIS: 61 DEGREES
EKG P-R INTERVAL: 150 MS
EKG Q-T INTERVAL: 428 MS
EKG QRS DURATION: 120 MS
EKG QTC CALCULATION (BAZETT): 487 MS
EKG R AXIS: -53 DEGREES
EKG T AXIS: 44 DEGREES
EKG VENTRICULAR RATE: 78 BPM
EOSINOPHIL # BLD: 1.1 %
EOSINOPHILS ABSOLUTE: 0.1 THOU/MM3 (ref 0–0.4)
ERYTHROCYTE [DISTWIDTH] IN BLOOD BY AUTOMATED COUNT: 15.1 % (ref 11.5–14.5)
ERYTHROCYTE [DISTWIDTH] IN BLOOD BY AUTOMATED COUNT: 54.3 FL (ref 35–45)
GFR SERPL CREATININE-BSD FRML MDRD: > 90 ML/MIN/1.73M2
GLUCOSE BLD-MCNC: 123 MG/DL (ref 70–108)
HCT VFR BLD CALC: 46.9 % (ref 37–47)
HEMOGLOBIN: 14.8 GM/DL (ref 12–16)
IMMATURE GRANS (ABS): 0.08 THOU/MM3 (ref 0–0.07)
IMMATURE GRANULOCYTES: 0.6 %
LYMPHOCYTES # BLD: 21.2 %
LYMPHOCYTES ABSOLUTE: 2.6 THOU/MM3 (ref 1–4.8)
MCH RBC QN AUTO: 30.6 PG (ref 26–33)
MCHC RBC AUTO-ENTMCNC: 31.6 GM/DL (ref 32.2–35.5)
MCV RBC AUTO: 96.9 FL (ref 81–99)
MONOCYTES # BLD: 8.5 %
MONOCYTES ABSOLUTE: 1.1 THOU/MM3 (ref 0.4–1.3)
NUCLEATED RED BLOOD CELLS: 0 /100 WBC
PLATELET # BLD: 279 THOU/MM3 (ref 130–400)
PMV BLD AUTO: 10.1 FL (ref 9.4–12.4)
POTASSIUM REFLEX MAGNESIUM: 3.9 MEQ/L (ref 3.5–5.2)
RBC # BLD: 4.84 MILL/MM3 (ref 4.2–5.4)
SEG NEUTROPHILS: 68.1 %
SEGMENTED NEUTROPHILS ABSOLUTE COUNT: 8.4 THOU/MM3 (ref 1.8–7.7)
SODIUM BLD-SCNC: 142 MEQ/L (ref 135–145)
TROPONIN T: 0.02 NG/ML
WBC # BLD: 12.4 THOU/MM3 (ref 4.8–10.8)

## 2022-07-19 PROCEDURE — 6370000000 HC RX 637 (ALT 250 FOR IP)

## 2022-07-19 PROCEDURE — 36415 COLL VENOUS BLD VENIPUNCTURE: CPT

## 2022-07-19 PROCEDURE — 2580000003 HC RX 258: Performed by: PHYSICIAN ASSISTANT

## 2022-07-19 PROCEDURE — 99233 SBSQ HOSP IP/OBS HIGH 50: CPT | Performed by: INTERNAL MEDICINE

## 2022-07-19 PROCEDURE — 93005 ELECTROCARDIOGRAM TRACING: CPT | Performed by: INTERNAL MEDICINE

## 2022-07-19 PROCEDURE — 94640 AIRWAY INHALATION TREATMENT: CPT

## 2022-07-19 PROCEDURE — 97535 SELF CARE MNGMENT TRAINING: CPT

## 2022-07-19 PROCEDURE — 99232 SBSQ HOSP IP/OBS MODERATE 35: CPT | Performed by: SURGERY

## 2022-07-19 PROCEDURE — 97163 PT EVAL HIGH COMPLEX 45 MIN: CPT

## 2022-07-19 PROCEDURE — 84484 ASSAY OF TROPONIN QUANT: CPT

## 2022-07-19 PROCEDURE — 97530 THERAPEUTIC ACTIVITIES: CPT

## 2022-07-19 PROCEDURE — 1200000000 HC SEMI PRIVATE

## 2022-07-19 PROCEDURE — 80048 BASIC METABOLIC PNL TOTAL CA: CPT

## 2022-07-19 PROCEDURE — 2500000003 HC RX 250 WO HCPCS: Performed by: PHYSICIAN ASSISTANT

## 2022-07-19 PROCEDURE — 6360000002 HC RX W HCPCS: Performed by: PHYSICIAN ASSISTANT

## 2022-07-19 PROCEDURE — 93010 ELECTROCARDIOGRAM REPORT: CPT | Performed by: INTERNAL MEDICINE

## 2022-07-19 PROCEDURE — 6360000002 HC RX W HCPCS

## 2022-07-19 PROCEDURE — 97167 OT EVAL HIGH COMPLEX 60 MIN: CPT

## 2022-07-19 PROCEDURE — 6370000000 HC RX 637 (ALT 250 FOR IP): Performed by: PHYSICIAN ASSISTANT

## 2022-07-19 PROCEDURE — 85025 COMPLETE CBC W/AUTO DIFF WBC: CPT

## 2022-07-19 PROCEDURE — 2580000003 HC RX 258

## 2022-07-19 RX ORDER — FAMOTIDINE 20 MG/1
20 TABLET, FILM COATED ORAL DAILY
Status: DISCONTINUED | OUTPATIENT
Start: 2022-07-20 | End: 2022-07-23 | Stop reason: HOSPADM

## 2022-07-19 RX ADMIN — TIOTROPIUM BROMIDE AND OLODATEROL 2 PUFF: 3.124; 2.736 SPRAY, METERED RESPIRATORY (INHALATION) at 07:45

## 2022-07-19 RX ADMIN — CYCLOBENZAPRINE 10 MG: 10 TABLET, FILM COATED ORAL at 08:42

## 2022-07-19 RX ADMIN — SODIUM CHLORIDE, PRESERVATIVE FREE 10 ML: 5 INJECTION INTRAVENOUS at 08:29

## 2022-07-19 RX ADMIN — OXYCODONE HYDROCHLORIDE AND ACETAMINOPHEN 1000 MG: 500 TABLET ORAL at 08:43

## 2022-07-19 RX ADMIN — FAMOTIDINE 20 MG: 10 INJECTION, SOLUTION INTRAVENOUS at 08:44

## 2022-07-19 RX ADMIN — OXYCODONE 5 MG: 5 TABLET ORAL at 13:44

## 2022-07-19 RX ADMIN — CEFTRIAXONE SODIUM 1000 MG: 1 INJECTION, POWDER, FOR SOLUTION INTRAMUSCULAR; INTRAVENOUS at 13:44

## 2022-07-19 RX ADMIN — METOPROLOL TARTRATE 25 MG: 25 TABLET, FILM COATED ORAL at 20:21

## 2022-07-19 RX ADMIN — METOPROLOL TARTRATE 25 MG: 25 TABLET, FILM COATED ORAL at 08:43

## 2022-07-19 RX ADMIN — SODIUM CHLORIDE 950 ML: 9 INJECTION, SOLUTION INTRAVENOUS at 08:26

## 2022-07-19 RX ADMIN — DONEPEZIL HYDROCHLORIDE 10 MG: 10 TABLET, FILM COATED ORAL at 20:21

## 2022-07-19 RX ADMIN — Medication 1 CAPSULE: at 08:42

## 2022-07-19 RX ADMIN — ATORVASTATIN CALCIUM 20 MG: 20 TABLET, FILM COATED ORAL at 20:21

## 2022-07-19 RX ADMIN — PREDNISONE 10 MG: 10 TABLET ORAL at 08:43

## 2022-07-19 RX ADMIN — LISINOPRIL 20 MG: 20 TABLET ORAL at 08:43

## 2022-07-19 RX ADMIN — ENOXAPARIN SODIUM 40 MG: 100 INJECTION SUBCUTANEOUS at 08:44

## 2022-07-19 RX ADMIN — Medication 1000 MCG: at 08:43

## 2022-07-19 RX ADMIN — BISACODYL 10 MG: 10 SUPPOSITORY RECTAL at 08:42

## 2022-07-19 RX ADMIN — POLYETHYLENE GLYCOL 3350 17 G: 17 POWDER, FOR SOLUTION ORAL at 08:42

## 2022-07-19 RX ADMIN — Medication 1 TABLET: at 08:43

## 2022-07-19 RX ADMIN — ACETAMINOPHEN 325MG 650 MG: 325 TABLET ORAL at 08:43

## 2022-07-19 RX ADMIN — SODIUM CHLORIDE, PRESERVATIVE FREE 10 ML: 5 INJECTION INTRAVENOUS at 20:22

## 2022-07-19 ASSESSMENT — PAIN DESCRIPTION - ONSET: ONSET: ON-GOING

## 2022-07-19 ASSESSMENT — PAIN DESCRIPTION - LOCATION
LOCATION: NECK;SHOULDER
LOCATION: NECK

## 2022-07-19 ASSESSMENT — PAIN DESCRIPTION - DESCRIPTORS
DESCRIPTORS: ACHING;SORE
DESCRIPTORS: DISCOMFORT

## 2022-07-19 ASSESSMENT — PAIN - FUNCTIONAL ASSESSMENT
PAIN_FUNCTIONAL_ASSESSMENT: PREVENTS OR INTERFERES SOME ACTIVE ACTIVITIES AND ADLS
PAIN_FUNCTIONAL_ASSESSMENT: PREVENTS OR INTERFERES WITH MANY ACTIVE NOT PASSIVE ACTIVITIES

## 2022-07-19 ASSESSMENT — PAIN DESCRIPTION - ORIENTATION
ORIENTATION: MID
ORIENTATION: MID

## 2022-07-19 ASSESSMENT — PAIN SCALES - GENERAL
PAINLEVEL_OUTOF10: 2
PAINLEVEL_OUTOF10: 4
PAINLEVEL_OUTOF10: 5
PAINLEVEL_OUTOF10: 0

## 2022-07-19 ASSESSMENT — PAIN DESCRIPTION - FREQUENCY: FREQUENCY: INTERMITTENT

## 2022-07-19 ASSESSMENT — PAIN DESCRIPTION - PAIN TYPE: TYPE: ACUTE PAIN

## 2022-07-19 NOTE — PROGRESS NOTES
Patient voicing disagreeing concern with human sitter placement. Patient called daughter's phone number on personal cell phone. Spoke with son in law. Son in law, Nano Coreas, requested to speak with writer. Human sitter education provided due to patient dementia and multiple attempts to get out of bed. Nano Coreas requested to come to facility and sit with patient to replace human sitter. Writer notified primary RN Katherine and Keyla Jameson RN. [facility] RN approved family request with the addition of leaving the tele-monitor at bedside. Phone call returned to [place]. He stated he will come to this facility soon.

## 2022-07-19 NOTE — PROGRESS NOTES
[facility]  SPEECH THERAPY MISSED TREATMENT NOTE  [facility]      Date: 2022  Patient Name: Julio Abraham        MRN: [id number]    : 1940  (80 y.o.)    REASON FOR MISSED TREATMENT:  ST attempted to see patient this AM for completion of gufucd-kaxjflfn-ihtkxezig evaluation s/p fall and reports of increased confusion. MELISA Kidd Reason approved attempt this date; however, upon arrrival to room, patient sleeping in bed with daughter and live sitter at bedside. Daughter requesting ST allow patient to rest d/t poor sleep schedule since admission. Daughter reporting significant decline in cognition from baseline at this time. Given increased confusion, ST gathered social history from daughter to utilize within etborb-jehmpghh-qiauowpeg evaluation at a later date/time as patient is medically appropriate and available. 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     ST to re-attempt at a later date/time as patient is medically appropriate and available.      [facility]– [place]) [address]

## 2022-07-19 NOTE — PROGRESS NOTES
Luz Marcial 60  INPATIENT OCCUPATIONAL THERAPY  [facility] 7K  EVALUATION    Time:    Time In: 1130  Time Out: 1226  Timed Code Treatment Minutes: 55 Minutes  Minutes: 64   *11min co-tx time with PT for +2 assist and safety       Date: 2022  Patient Name: Bharat Tavarez,   Gender: female      MRN: [id number]  : 1940  (80 y.o.)  Referring Practitioner: GLENN Perales  Diagnosis: Acute UTI  Additional Pertinent Hx: The patient is a 80 y.o. female who presents with above chief complaint. The patient presented to the ED last evening after suffering a fall. Patient reports she was in a hurry and tripped walking into her kitchen. She landed directly on her back, unsure what exactly caused her to fall. She complains primarily of mid back pain. No radicular symptoms or numbness/tingling into the upper or lower extremities. She denies currently having any back pain while laying in bed. She denies bowel or bladder incontinence. CT scanned showed T2 and L5 vertebral compression fractures. Patient denies any reason she can not have a MRI    Restrictions/Precautions:  Restrictions/Precautions: General Precautions, Fall Risk  Required Braces or Orthoses  Cervical: c-collar  Position Activity Restriction  Spinal Precautions: No Bending, No Lifting, No Twisting  Spinal Precautions: cervical precautions  Sternal Precautions: No Pushing, No Pulling  Other position/activity restrictions: T2 & L5 fracture    Subjective  Chart Reviewed: Yes, Orders, Progress Notes, Operative Notes  Patient assessed for rehabilitation services?: Yes  Family / Caregiver Present: No    Subjective: RN okayed session. Pt was resting in bed upon arrival, pleasant and agreeable to OT. Pleasantly confused.     Pain: Denies pain    Vitals: Vitals not assessed per clinical judgement, see nursing flowsheet    Social/Functional History:  Type of Home: Assisted living [facility]  Home Layout: One level  Home Access: Level entry  Home Equipment: Walker, 4 wheeled           ADL Assistance: Independent  Homemaking Assistance: Needs assistance  Ambulation Assistance: Needs assistance  Transfer Assistance: Needs assistance          Additional Comments: Per discussion with pt and chart review, pt was indep with ADLs prior to admission. Pt with hx dementia, questionable historian. VISION:WFL    HEARING:  WFL    COGNITION: Slow Processing, Decreased Recall, Decreased Insight, Impaired Memory, Inattention, Decreased Problem Solving, Decreased Safety Awareness, Impaired Attention, Difficulty Following Commands, and Impulsive    RANGE OF MOTION:  Bilateral Upper Extremity:  WFL    STRENGTH:  Bilateral Upper Extremity:  Not Tested    SENSATION:   WFL    ADL:   Bathing: Maximum Assistance. While seated EOB. Max A for LB bathing and max VC for initiation/sequencing  Upper Extremity Dressing: Maximum Assistance. For gown mgmt  Lower Extremity Dressing: Maximum Assistance and X 2. To don briefs  Toileting: Maximum Assistance. For ronnie care after BM . BALANCE:  Sitting Balance:  Contact Guard Assistance. Seated EOB, confused and reaching out while seated  Standing Balance: Minimal Assistance, X 2. With BUE support, pt stood at 4WW with increased instability and confusion. Pt demoed improved balance with 2WW    BED MOBILITY:  Rolling to Left: Maximum Assistance    Rolling to Right: Maximum Assistance    Supine to Sit: Moderate Assistance with increased time and frequent VC for sequencing task  Sit to Supine: Maximum Assistance, X 2 x1 to guide BLE onto bed and x1 to guide UB  Scooting: Minimal Assistance to scoot towards EOB    TRANSFERS:  Sit to Stand:  Minimal Assistance, X 2. From EOB  Stand to Sit: Minimal Assistance, X 2.  To EOB    FUNCTIONAL MOBILITY:  Assistive Device: Rolling Walker and HHA  Assist Level:  Minimal Assistance and X 1- X2  Distance:  x2 trials of mobility, 4 steps during first trial, 4' forward and retro second trial  Very unsteady with occasional posterior lean, tendencies to reach out. Max VC for attn to task, initiation, and sequencing      Activity Tolerance:  Patient tolerance of  treatment: fair. Limited by confusion      Assessment:  Assessment: Pt presented with the listed deficits s/p admission with rib fx and T2 & L5 fx. Pt with decreased cognition, balance, endurance, and safety and currently requires +2 A for ADLs and IADLs. Pt would benefit from continued OT to restore PLOF maximize pt's indep with ADLs and IADLs in prep for a safe return home at max level of indep. Performance deficits / Impairments: Decreased functional mobility , Decreased safe awareness, Decreased balance, Decreased posture, Decreased ADL status, Decreased endurance, Decreased high-level IADLs, Decreased strength  Prognosis: Good  REQUIRES OT FOLLOW-UP: Yes  Decision Making: High Complexity    Treatment Initiated: Treatment and education initiated within context of evaluation. Evaluation time included review of current medical information, gathering information related to past medical, social and functional history, completion of standardized testing, formal and informal observation of tasks, assessment of data and development of plan of care and goals. Treatment time included skilled education and facilitation of tasks to increase safety and independence with ADL's for improved functional independence and quality of life.     Discharge Recommendations:  Continue to assess pending progress, Subacute/Skilled Nursing Facility    Patient Education:     Patient Education  Education Given To: Patient  Education Provided: Role of Therapy, Plan of Care, ADL Adaptive Strategies, Transfer Training, IADL Safety  Education Method: Demonstration, Verbal  Barriers to Learning: None    Equipment Recommendations:  Equipment Needed: No    Plan:  Times per Week: 5x  Times per Day: Daily  Current Treatment Recommendations: Strengthening, Balance training, Functional mobility training, Endurance training, Self-Care / ADL, Home management training, Patient/Caregiver education & training, Safety education & training. See long-term goal time frame for expected duration of plan of care. If no long-term goals established, a short length of stay is anticipated. Goals:  Patient goals : not stated  Short Term Goals  Time Frame for Short term goals: by discharge  Short Term Goal 1: Pt will complete functional mobility short distances to/from [facility] with no > than CGA x1 and min VC for safety to increase indep with  Short Term Goal 2: Pt will complete sit to stand t/fs with no > than Min A x1 and min VC for safety and initiation to increase indep with toileting  Short Term Goal 3: Pt will tolerate dynamic standing x4-5min with Min A x1 and 1-2 UE release to increase indep with grooming  Short Term Goal 4: Pt will complete bathing routine with no > than Min A and min VC for initiation/sequencing for increased indep with self care         Following session, patient left in safe position with all fall risk precautions in place.

## 2022-07-19 NOTE — PROGRESS NOTES
Karoline Jaimes  Daily Progress Note    Pt Name: Fidencio Lew  Medical Record Number: [id number]  Date of Birth 1940   Today's Date: 7/19/2022    HD: # 2    CC: Back pain    ASSESSMENT  1. Active Hospital Problems    Diagnosis Date Noted    Fall [W19. XXXA] 07/18/2022     Priority: Medium    Fracture of ribs, two, left, closed, initial encounter [S22.42XA] 07/17/2022     Priority: Medium    Closed compression fracture of fifth lumbar vertebra ([address] [S32.050A] 07/17/2022     Priority: Medium    Compression fracture of T2 vertebra ([address] [S22.020A] 07/17/2022     Priority: Medium    UTI (urinary tract infection) [N39.0] 07/17/2022     Priority: Medium         PLAN  Patient admitted under [address], possible syncope              -PT/OT continue to treat              -Consult hospitalist for further work-up   -TSH, BNP, troponin ordered by hospitalist. Troponin slightly elevated at 0.011, hospitalist to manage. T2 and L5 compression fractures              -Orthopedic spine consulted for further management              -PT/OT continue to treat              -Pain control   -7/18: Ortho spine ordered MRI to determine what type of brace is needed. Lumbar brace vs c-collar to cover the T2 VCF. Further recs pending MRI results   - 7/19: C-collar and lumbar corset braces to be worn when out of bed when ambulating. Follow up in 4 weeks in office     Left ninth/10th rib fractures              -Rib fracture protocol              -Flexeril              -Pain control              -Lidoderm patches              -Incentive spirometry/cough and deep breathing              -Monitor respiratory status   -7/18: CXR this AM with no pleural effusions or pneumothorax. Patient not reporting chest pain and shortness of breath. Respirations unlabored     Symptomatic UTI              -Bacteriuria and leukocyte Estrace on UA.   Rocephin given in the ED -Consult hospitalist for further management. On prednisone   - 7/18: Hospitalist managing with Rocephin     Leukocytosis              -Reactive versus infective possible urinary source              -Afebrile, repeat labs in a.m.   -7/18: Leukocytosis improving, 12.7. On Recephin     Thyroid nodule with thyromegaly              -Thyroid ultrasound ordered              -Hospitalist for further evaluation. TSH normal    Chronic Issues: Hx DVT, COPD, blood clots, hyperlipidemia, sleep apnea, abnormal stress test, and ulcerative colitis   - Hospitalist following for assistance with medical management   - Home medications restarted   - Hx of blood clots, no home anticoagulation or antiplatelets     Consults orthopedic spine, hospitalist     Pain Management              -Morphine, Oxy IR     Prophylaxis: SCD's, Incentive Spirometry, GlycoLax, Pepcid, Zofran   - Start chemical DVT prophylaxis when okay with spine. Hx of DVT/blood clots. Regular diet     IVF Management  Regular Neurovascular Checks  Repeat Labs Tomorrow AM  PT/OT/SLP continue to treat     Planned Discharge discharge pending clinical course    - From [place], following therapy recommendations for safe discharge planning    Pepe Mosqueda continues on 7K. She is in bed at the time of rounds. Sitter at bedside. Patient is confused, picking at linens on the bed. She is delirious, talking and picking at items that are not in the room. She believes that she had a therapy session at 11pm last night. Family on phone, concerned about confusion. Patient had received a half tablet of Norco in the evening of 7/17 and a dose of Flexeril this AM, has not slept since admission and also has a UTI as well as history of dementia; all of which may be playing a role in contributing to the patient's confusion. Daughter does not believe this to be the case since Elsie Campos was not like this prior to coming in to the hospital.  Over 30 minutes of questions answered. Deferred further questions to physician. Discussed with Dr. Rena Xie. Final impression and plan per Dr. Rena Xie. Wt Readings from Last 3 Encounters:   07/18/22 134 lb (60.8 kg)   07/18/22 140 lb (63.5 kg)   06/06/22 135 lb (61.2 kg)     Temp Readings from Last 3 Encounters:   07/19/22 98.4 °F (36.9 °C) (Oral)   04/28/22 97.6 °F (36.4 °C) (Oral)   04/24/22 98.5 °F (36.9 °C)     BP Readings from Last 3 Encounters:   07/19/22 138/87   06/15/22 110/62   06/06/22 128/68     Pulse Readings from Last 3 Encounters:   07/19/22 95   06/15/22 64   06/06/22 73       24 HR INTAKE/OUTPUT :   Intake/Output Summary (Last 24 hours) at 7/19/2022 1518  Last data filed at 7/19/2022 1314  Gross per 24 hour   Intake 370 ml   Output 650 ml   Net -280 ml       ADULT DIET; Regular    OBJECTIVE  CURRENT VITALS /87   Pulse 95   Temp 98.4 °F (36.9 °C) (Oral)   Resp 16   Ht 5' 5\" (1.651 m)   Wt 134 lb (60.8 kg)   SpO2 95%   BMI 22.30 kg/m²   GENERAL: Awake, alert, no acute distress, pleasant and cooperative with exam  HEENT: Normocephalic, pupils equal and reactive to light, nares patent bilaterally  NEURO: Alert and orient x2, GCS 14, follows commands, PMS intact in all four extremities, no signs of focal neurological deficits  CSPINE/BACK: No midline cervical tenderness to palpation but patient with midline thoracic and lumbar tenderness to palpation  HEART: Regular rate and rhythm with no obvious murmurs, rubs, gallops. Distal pulses intact. LUNGS/CHEST WALL: Lungs are clear to auscultation bilaterally with no wheezes, rales, rhonchi. No respiratory distress or increased work of breathing. Mild left lateral chest wall tenderness to palpation. ABDOMEN: Abdomen soft, nondistended, with no tenderness to palpation. No guarding or peritoneal signs. Bowel sounds normal active  EXTREMITIES: No cyanosis. PMS intact in all four extremities. No extremity tenderness to palpation. Range of motion intact.  Strength 5/5 bilaterally with  strength and plantar/dorsiflexion. SKIN: Warm and dry      LABS  CBC :   Recent Labs     07/17/22 2036 07/18/22 0724 07/19/22  0543   WBC 15.0* 12.7* 12.4*   HGB 13.3 12.7 14.8   HCT 42.9 40.1 46.9   MCV 99.3* 96.6 96.9    261 279       BMP:   Recent Labs     07/17/22 2036 07/18/22  0724 07/19/22  0543    143 142   K 4.2 4.1 3.9    108 108   CO2 27 25 22*   BUN 27* 21 12   CREATININE 1.0 0.7 0.5       COAGS:   Recent Labs     07/18/22 0724   PROT 5.4*       Pancreas/HFP:  No results for input(s): LIPASE, AMYLASE in the last 72 hours. Culture, Reflexed, Urine [[id number]] (Abnormal)     Collected: 07/17/22 2040    Updated: 07/18/22 2220    Specimen Source: Urine     Organism Escherichia coli Abnormal     Urine Culture Reflex [place] count: >100,000 CFU/mL    Narrative:     Source: urine       Site: unknown collect          Current Antibiotics: Ceftriaxone     RADIOLOGY  CT ABDOMEN PELVIS WO CONTRAST Additional Contrast? None    Result Date: 7/17/2022  PROCEDURE: CT ABDOMEN PELVIS WO CONTRAST CLINICAL INFORMATION: left flank pain COMPARISON: None TECHNIQUE: Helical CT acquisition of the abdomen and pelvis was performed without intravenous contrast. Multiplanar reformats are provided. All CT scans at this facility use dose modulation, iterative reconstruction, and/or weight based dosing when appropriate to reduce the radiation dose to as low as reasonably achievable. FINDINGS: The noncontrast CT limits the evaluation for solid organ pathology, vascular pathology, and lymphadenopathy. The chest has been described on the concurrent CT of the chest. Gallstone is present. Hypodensities in the liver likely hepatic cysts. Tiny calculi are seen in both kidneys. Mild atrophy of the pancreas. Large amount of stool is seen in the colon. A myomatous uterus is seen. Otherwise, the biliary tree, adrenal glands, and spleen are unremarkable.  No bowel obstruction or acute inflammatory bowel process. The appendix is unremarkable. Colonic diverticulosis. The abdominal aorta is not aneurysmal. Aortoiliac calcifications. No significantly enlarged lymph nodes are seen. The bladder is grossly unremarkable. Bones: The bones are demineralized. Scoliosis of the lumbar spine. Degenerative changes of the thoracolumbar spine. Lateral plate and nail fixation of the proximal left femur is seen. There is increased sclerosis within the sacrum that likely reflects underlying insufficiency fractures. Acute compression fracture of L5. Chronic compression deformity of T12.     1. Cholelithiasis. 2. Increase sclerosis within the sacrum relates to sacral insufficiency fractures. 3. Acute compression fracture of L5 4. Other findings as described above. **This report has been created using voice recognition software. It may contain minor errors which are inherent in voice recognition technology. ** Final report electronically signed by Dr Simeon Barr on 7/17/2022 8:38 PM    CT HEAD WO CONTRAST    Result Date: 7/17/2022  PROCEDURE: CT HEAD WO CONTRAST CLINICAL INFORMATION:Fall. COMPARISON: None TECHNIQUE: 5 mm axial imaging through the head without IV contrast. All CT scans at this facility use dose modulation, iterative reconstruction, and/or weight based dosing when appropriate to reduce the radiation dose to as low as reasonably achievable. FINDINGS: Periventricular  and subcortical white matter hypodensities that likely relate to chronic microangiopathic disease. Intracranial atherosclerotic calcifications. No ventriculomegaly. No midline shift or mass effect. No acute intracranial hemorrhage. No intracranial collection. Gray-white differentiation is unremarkable. The posterior fossa is unremarkable. The craniocervical junction is unremarkable. No acute bony abnormality. The  paranasal sinuses are clear. The  mastoid air cells are clear. The orbits are unremarkable. 1. No acute intracranial abnormality.  2. Sequela of chronic microvascular changes. **This report has been created using voice recognition software. It may contain minor errors which are inherent in voice recognition technology. ** Final report electronically signed by Dr Carlos Aviles on 7/17/2022 8:15 PM    CT CHEST WO CONTRAST    Result Date: 7/17/2022  PROCEDURE: CT CHEST WO CONTRAST CLINICAL INFORMATION: Fall, right lower scapula contusion, left lower lateral chest wall tenderness. COMPARISON: None TECHNIQUE: Helical CT acquisition of the chest was performed without intravenous contrast. Multiplanar reformats are provided. All CT scans at this facility use dose modulation, iterative reconstruction, and/or weight based dosing when appropriate to reduce the radiation dose to as low as reasonably achievable. FINDINGS: The noncontrast CT limits the evaluation for solid organ pathology, vascular pathology, and lymphadenopathy. Mild to moderate emphysematous changes. Subsegmental atelectasis is seen in the lung bases. The thyroid is mildly enlarged. Aortocoronary calcifications. No focal pulmonary consolidation. No large pulmonary mass. Central airway is patent. No pleural effusions. No significant pericardial effusion. The heart is not enlarged. Ascending thoracic aorta is not dilated. The main pulmonary artery is not dilated. No significantly enlarged mediastinal or  axillary lymph node. Alia Chime No chest wall mass. The abdomen is described on a concurrent CT abdomen and pelvis. Bones: Degenerative changes of the thoracic spine. The bones are demineralized. Compression fracture of T2. Scoliosis. . Left lower rib fractures are seen that are nondisplaced. 1. Mild acute compression fracture of T2. 2. No pulmonary consolidation. No pneumothorax. 3. Left lower rib fractures are seen. **This report has been created using voice recognition software. It may contain minor errors which are inherent in voice recognition technology. ** Final report electronically signed by Dr Felipe Armas on 7/17/2022 8:28 PM    CT CERVICAL SPINE WO CONTRAST    Result Date: 7/17/2022  PROCEDURE: CT CERVICAL SPINE WO CONTRAST CLINICAL INFORMATION: Fall. COMPARISON: None TECHNIQUE: 3 mm axial imaging through the cervical spine without contrast.  Sagittal and coronal reconstructions were performed. All CT scans at this facility use dose modulation, iterative reconstruction, and/or weight based dosing when appropriate to reduce the radiation dose to as low as reasonably achievable. FINDINGS: ALIGNMENT: Cervical lordosis is maintained. BONES: The bones appear demineralized. Mild degenerative changes of the cervical spine. Multilevel disc osteophyte complexes are seen. Multilevel facet arthrosis and uncovertebral degeneration. Acute compression fracture of T2 is seen. SOFT TISSUES: Carotid calcifications are seen. The thyroid is enlarged. A left thyroid nodule is seen. Emphysema is seen in the lung apices. DISC LEVELS: Calcification of the ligamentum flava at C2-C3 and C3-C4 is noted. At C3-C4, the calcification is leading to mild central canal narrowing. Mild right neuroforaminal narrowing at C6-C7. 1. Mild compression fracture of T2. 2. Multilevel degenerative changes of the cervical spine. 3. Thyromegaly. Left thyroid nodule. **This report has been created using voice recognition software. It may contain minor errors which are inherent in voice recognition technology. ** Final report electronically signed by Dr Felipe Armas on 7/17/2022 8:21 PM    XR CHEST PORTABLE    Result Date: 7/18/2022  PROCEDURE: XR CHEST PORTABLE CLINICAL INFORMATION: follow up for rib fractures. Left lower rib fractures. COMPARISON: Chest CT 7/17/2022. TECHNIQUE: AP semiupright view of the chest. FINDINGS: The heart size is normal.The mediastinum is not widened. There are background fibrotic changes. There is no pneumothorax. There are no pulmonary infiltrates. There are no pleural effusions.  The pulmonary vascularity is normal. The patient's known left lower rib fractures are not seen radiographically. No pleural effusions. No pneumothorax. Background fibrotic changes. **This report has been created using voice recognition software. It may contain minor errors which are inherent in voice recognition technology. ** Final report electronically signed by Dr. Ashley Palomino on 7/18/2022 7:40 AM    CT LUMBAR RECONSTRUCTION WO POST PROCESS    Result Date: 7/17/2022  PROCEDURE: CT LUMBAR RECONSTRUCTION WO POST PROCESS CLINICAL INFORMATION: low backpain fall COMPARISON: None TECHNIQUE: 3 mm axial CT images were reconstructed through the lumbar spine from the patient's CT abdomen/pelvis examination without IV contrast. Sagittal and coronal reconstructions were also performed. All CT scans at this facility use dose modulation, iterative reconstruction, and/or weight based dosing when appropriate to reduce the radiation dose to as low as reasonably achievable. FINDINGS: ALIGNMENT: The lumbar lordosis is maintained. Levoscoliosis of the lumbar spine. BONE: The bones are markedly demineralized. . Acute compression fracture of L5. Increased sclerosis within the sacrum relates to insufficiency sacral fractures. Degenerative changes of the SI joints. Mild degenerative changes of the lumbar spine. Multilevel facet arthrosis. . . The soft tissues have been described on the CT abdomen and pelvis. DISC SPACES/FACET JOINT/SPINAL CANAL/NEURAL FORAMEN: The lack of intrathecal contrast limits the evaluation of the spinal canal. Mild central canal narrowing at L3-L4 with moderate right neural foraminal narrowing. Moderate central canal narrowing at L4-L5 with moderate right neuroforaminal narrowing. 1. Acute compression fracture of L5. 2. Increased sclerosis within the sacrum relates to insufficiency sacral fractures. 3. Degenerative changes of the lumbar spine. 4. Marked osteopenia.  **This report has been created using voice recognition software. It may contain minor errors which are inherent in voice recognition technology. ** Final report electronically signed by Dr Jennifer Perdomo on 7/17/2022 8:49 PM    CT THORACIC RECONSTRUCTION WO POST PROCESS    Result Date: 7/17/2022  PROCEDURE: CT THORACIC RECONSTRUCTION WO POST PROCESS CLINICAL INFORMATION: Fall, middle midline thoracic spine tenderness. COMPARISON: None TECHNIQUE: 3 mm axial CT images were reconstructed through the thoracic spine from the patient's CT chest examination without IV contrast. Sagittal and coronal reconstruction were also performed. All CT scans at this facility use dose modulation, iterative reconstruction, and/or weight based dosing when appropriate to reduce the radiation dose to as low as reasonably achievable. FINDINGS: ALIGNMENT: The thoracic kyphosis is maintained. Dextroscoliosis. BONE: The bones are demineralized. Acute compression fracture of T2. Chronic compression deformity of T12. . Multilevel degenerative changes of the thoracic spine. Multilevel facet arthrosis. . . SOFT TISSUE: The soft tissues of been described on the CT chest.. DISC LEVEL: 1. The lack of intrathecal contrast limits the evaluation of the spinal canal. No significant central canal narrowing. No significant neuroforamina narrowing. 1. Mild acute compression fracture of T2. 2. Degenerative changes of the thoracic spine. **This report has been created using voice recognition software. It may contain minor errors which are inherent in voice recognition technology. ** Final report electronically signed by Dr Jennifer Perdomo on 7/17/2022 8:43 PM       Total time spent in care of patient:  15 minutes collectively between subjective/objective examination, chart review, documentation, clinical reasoning and discussion with attending regarding plan/interval changes.       Electronically signed by PETEY Sotelo CNP on 7/19/2022 at 3:18 PM   Patient seen and examined independently by me 7/19/2022     I personally supervised the PA/NP in the evaluation, management and development of the treatment plan for Fidencio Lew  on the same date of service as above. I personally interviewed Fidencio Lew   and  discussed his review of symptoms as able due to the patient's condition, as well as performed an individual physical exam on the same   date of service as above. In addition I discussed the patient's condition and treatment options with the patient, if able, and/or designated family if available. I have also reviewed and agree with the past medical,  family and social history updates as well as care plans unless otherwise noted below. All questions were answered. I examined independently and reviewed relevant data myself and may have done so in the context of team rounds. A full chart review was performed by me. I attest that this medical record entry accurately reflects signatures and notations that I made in my capacity as an M. D. when I treated and diagnosed Fidencio Lew on the date of service above     I was responsible for all medical decision making involving this encounter. I identified and/or confirmed all problems associated with this patient encounter by my own direct physical examination of this patient and review of all radiology studies and labwork  that were ordered and available. Active Hospital Problems    Diagnosis     Fall [W19. XXXA]      Priority: Medium    Fracture of ribs, two, left, closed, initial encounter [S22.42XA]      Priority: Medium    Closed compression fracture of fifth lumbar vertebra ([address] [S32.050A]      Priority: Medium    Compression fracture of T2 vertebra ([address] [S22.020A]      Priority: Medium    UTI (urinary tract infection) [N39.0]      Priority: Medium        I  discussed the management of all of the identified problems with the APN or PA.       I formulated the treatment plan for all identified problems and discussed those with the APN or PA . This management plan was then carried out and the patient's orders for care by the APN or PA. Total time personally spent on this patient encounter was 25 minutes which includes :  Preparing to see the patient( reviewing tests and chart)  Obtaining and reviewing separately obtained history  Performing a medically appropriate examination and evaluation  Ordering medications, tests, or procedures  Counseling and educating the patient/family/caregiver  Care coordination  Referring and communicating with other healthcare professionals  Documenting clinical information in the EHR  Independent interpretation of results and communicating the results to patient and care team  This includes a direct physical exam as well as all the other encounter activities described above. Time may be discontiguous. Time does not include procedures. Please see our orders that were directed and approved by me if there are any new ones for the updated patient care plan. Above discussed and I agree with documentation and orders placed by Jannette BOSCH    See any additional comments if needed below for any other updated orders and plans. Patient seen and examined independently by me. Above discussed and I agree with CNP. Labs, cultures, and radiographs where available were reviewed. See orders for the updated patient care plan. Terell Black MD MD, as elucidated above she has been confused. Daughter was at the bedside and I reiterated to her as Cece Schreiber as laid out that her confusion is likely multifactorial.  Could be some medication related although she has not taken a pain pill in 24 hours. Certainly sleep deprivation preadmission mild dementia the UTI which is being treated may all play a role. I reviewed the CT scan of the head with the patient on admission it does show chronic changes bleed. Patient did not hit her head had no LOC.   Patient was resting very soundly at this exam for me with some sleep she will improve no need to repeat CT scan at this time urine culture does show E. coli patient on Rocephin all consultants notes were reviewed  7/19/2022   9:06 PM

## 2022-07-19 NOTE — PROGRESS NOTES
[address] [address]  INPATIENT PHYSICAL THERAPY  EVALUATION  [facility] ORTHOPEDICS 7K - 7K-08/008-A    Time In: 1150  Time Out: 1201  Timed Code Treatment Minutes: 0 Minutes  Minutes: 11      Co-rx with OT with pt +2 for safety    Date: 2022  Patient Name: Tuan Connelly,  Gender:  female        MRN: [id number]  : 1940  (80 y.o.)      Referring Practitioner: GLENN Mead  Diagnosis: acute UTI  Additional Pertinent Hx: per ortho: The patient is a 80 y.o. female who presents with above chief complaint. The patient presented to the ED last evening after suffering a fall. Patient reports she was in a hurry and tripped walking into her kitchen. She landed directly on her back, unsure what exactly caused her to fall. She complains primarily of mid back pain. No radicular symptoms or numbness/tingling into the upper or lower extremities. She denies currently having any back pain while laying in bed. She denies bowel or bladder incontinence.  CT scanned showed T2 and L5 vertebral compression fractures     Restrictions/Precautions:  Restrictions/Precautions: General Precautions, Fall Risk  Required Braces or Orthoses  Cervical: c-collar  Position Activity Restriction  Spinal Precautions: No Bending, No Lifting, No Twisting  Spinal Precautions: cervical precautions  Sternal Precautions: No Pushing, No Pulling  Other position/activity restrictions: T2 & L5 fracture    Subjective:  Chart Reviewed: Yes  Patient assessed for rehabilitation services?: Yes  Subjective: pleasantly confused, pt difficulty following too much instructions-better with simple commands    General:        Hearing: Within functional limits       Pain: not rated, back    Vitals: Vitals not assessed per clinical judgement, see nursing flowsheet    Social/Functional History:    Type of Home: Assisted living ([facility])  Home Layout: One level  Home Access: Level entry  Home Equipment: Deepak Janus, 4 wheeled             ADL Assistance: Independent  Homemaking Assistance: Needs assistance  Ambulation Assistance: Needs assistance  Transfer Assistance: Needs assistance          Additional Comments: Per discussion with pt and chart review, pt was indep with ADLs prior to admission. Pt with hx dementia, questionable historian. OBJECTIVE:  Range of Motion:  Bilateral Lower Extremity: WFL    Strength:  Bilateral Lower Extremity: >/=3/5. Generalized weakness    Balance:  Static Sitting Balance:  Contact Guard Assistance, tendency to lean post for Celestino at times  Static Standing Balance: Minimal Assistance, with 4WW, tendency to lean back of BLE against bed    Bed Mobility:  Rolling to Right: Moderate Assistance, X 1   Supine to Sit: Moderate Assistance, X 1  Scooting: Minimal Assistance, X 1, fwd in sitting to EOB    Transfers:  Sit to Stand: Minimal Assistance, X 2  Stand to Sit:Minimal Assistance, X 2  Cues for hand placement, pt easily confused  Ambulation:  Minimal Assistance, X 1  Distance: 6 steps  Surface: Level Tile  Device: for 2 steps then HHA for 4 steps  Gait Deviations: Forward Flexed Posture, Slow Amanda, Decreased Step Length Bilaterally, Decreased Weight Shift Left, Lean to Right, and Unsteady Gait, pt easily confused with instructions    Exercise:  Patient was guided in 1 set(s) 8-10 reps of exercise to both lower extremities. Seated marches, Seated heel/toe raises, and Long arc quads. Exercises were completed for increased independence with functional mobility. Functional Outcome Measures: Completed  AM-PAC Inpatient Mobility without Stair Climbing Raw Score : 11  AM-PAC Inpatient without Stair Climbing T-Scale Score : 35.66    ASSESSMENT:  Activity Tolerance:  Patient tolerance of  treatment: fair. Treatment Initiated: No treatment initiated    Assessment:   Body Structures, Functions, Activity Limitations Requiring Skilled Therapeutic Intervention: Decreased functional mobility , Decreased safe awareness, Decreased cognition, Decreased endurance, Decreased tolerance to work activity, Decreased strength, Decreased balance  Assessment: pt s/p fall with lumbar and thoracic fractures, c collar and cervical/back precautions, pt dec safety awareness with hx of dementia, Pt demonstrates a decrease in baseline by way of bed mobility, transfers and ambulation secondary to decreased activity tolerance, strength, fatigue, and balance deficits. Pt will benefit from skilled PT services throughout admission and beyond hospital discharge for improvements in functional mobility and in order to decrease fall risk and return pt to [facility]. Therapy Prognosis: Good    Requires PT Follow-Up: Yes    Discharge Recommendations:  Discharge Recommendations: Continue to assess pending progress, Subacute/Skilled Nursing Facility, Patient would benefit from continued therapy after discharge    Patient Education:      . Patient Education  Education Given To: Patient  Education Provided: Role of Therapy, Plan of Care  Education Method: Verbal  Barriers to Learning: Cognition  Education Outcome: Continued education needed       Equipment Recommendations:  Equipment Needed: No    Plan:  Specific Instructions for Next Treatment: therex and mobility with cervical/back precautions  Plan:  (6X O)  Specific Instructions for Next Treatment: therex and mobility with cervical/back precautions    Goals:  Patient goals : not stated  Short Term Goals  Time Frame for Short term goals: by discharge  Short term goal 1: bed mobility with Celestino to get in/out of bed  Short term goal 2: transfer with CGA to get in/out of chairs  Short term goal 3: amb 25'x1 with walker and CGA to walk safely in room  Long Term Goals  Time Frame for Long term goals : no LTGs set seocndary to short ELOS    Following session, patient left in safe position with all fall risk precautions in place.

## 2022-07-19 NOTE — PROGRESS NOTES
Hospitalist Consult Note        Patient:  Keegan Pang  YOB: 1940  Date of Service: 7/19/2022  MRN: [id number]   Acct:  [de-identified]   Primary Care Physician: PETEY Briones - CNP    Chief Complaint:  Fall  Reason for consult  Medical management    Date of Service: Pt seen/examined in consultation on 7/19/2022     History Of Present Illness:      Ste[age] Dilln[age] y.o. female who we are asked to see/evaluate by Fabiano Orourke MD for medical management of HFpEF, HTN, COPD, dementia, Hx of DVT, Hx of UC, KALIE. Pt presented to [facility] ED yesterday after a suspected unwitnessed fall. Patient is an overall poor historian due to underlying dementia. Pt denies any pain at this time. She states pain starts whenever she tries to move. She denies chest pain, shortness of breath, lightheadedness, dizziness. Assessment and Plan:-  Mild acute compression fracture of T2, Acute compression fracture of L5, s/p fall:   Managed by primary, with Dr. Jennifer Lubin attending. Initial concern for syncope. TSH with reflex, BNP, Troponin ordered. EKG yesterday reveals NSR, left axis deviation, RBBB, with inferior infarct, age undetermined. Can consider palliative consult to discuss goals of care. Acute UTI, uncomplicated:    UA remarkable for Nitrites and leuks, with many bacteria. Urine preliminary culture showing gram-negative bacilli. Given Rocephin x 1 dose yesterday. Will continue daily for 4 more days. Repeat CBC, BMP in the AM.     Acute leukocytosis:    Improved. WBC 12.7, with bandemia. Suspect multifactorial to #1 and #2. Also noted to be on prednisone outpatient. Pt is afebrile, non-toxic appearing. Continue to monitor with daily CBC. HFpEF:    Last ECHO 6/14/2020 reveals EF 60-65% with grade 1 diastolic dysfunction. Pt appears compensated today. CXR unremarkable. Troponin,  BNP ordered to further investigate etiology of fall. Prieto weights, I&Os. COPD:     At baseline or RA.  Continue home inhalers. CXR yesterday reveals no pleural effusions, pneumothorax with background fibrotic changes. To note, patient on 10mg daily. Essential HTN:    BP stable. Continue home meds and continue to monitor closely. Dementia:    Pt alert and oriented, but intermittently confused. Lives at [facility]. Poor historian. Continue donepezil. Hx of DVT:    Does not appear to be on any anticoagulation therapy outpatient. Hx of UC:    Noted. Continue psyllium. KALIE:   Stable. [id number] urine culture showed E. coli sensitive to Rocephin OCNRAD less than 1, will continue for 5 days total WBC trending down 12.4 hemoglobin 14.8 most likely hemoconcentrated, platelets 065. Elevated troponin may be secondary to demand we will repeat along with repeat EKG. If patient is discharged before 5-day completion of antibiotics can be switched to p.o. Augmentin to complete 5 days.     Past Medical History:        Diagnosis Date    Abnormal nuclear stress test 06/01/2018    Cataract     Chronic deep vein thrombosis (DVT) of femoral vein of left lower extremity (HCC) 3/9/2021    Colon polyps     COPD (chronic obstructive pulmonary disease) (HCC)     Diverticulosis large intestine w/o perforation or abscess w/bleeding     DVT, recurrent, lower extremity, acute, left ([facility])     Fibroid, uterine     Hearing loss     Hx of blood clots     Hyperlipidemia     Nicotine dependence     Nocturnal hypoxemia     Postmenopausal     Sleep apnea     Ulcerative colitis ([address]     with rectal bleeding        Past Surgical History:        Procedure Laterality Date    BREAST BIOPSY  1988    right breast biopsy     BREAST BIOPSY Right 03/2011    stereotactic biopsy    COLONOSCOPY  2006    Dr Surya Torres  2010    Dr Jose Ayala    COLONOSCOPY  2010    Dr Salvador Ramirez interminate colitis    COLONOSCOPY  03/03/2014    Dr Monnie Meckel Bilateral 2011 2012    [address]  06/13/2020 left fracture    OSTEOTOMY Left 11/2/2021    LEFT 5TH METATARSAL HEAD RESECTION EXCISION SOFT TISSUE LESION performed by Yuliana Savage DPM at [address] Medications:   No current facility-administered medications on file prior to encounter. Current Outpatient Medications on File Prior to Encounter   Medication Sig Dispense Refill    HYDROcodone-acetaminophen (NORCO) 5-325 MG per tablet Take 1 tablet by mouth every 6 hours as needed for Pain. donepezil (ARICEPT) 10 MG tablet Take 1 tablet by mouth nightly 90 tablet 3    metoprolol tartrate (LOPRESSOR) 25 MG tablet Take 1 tablet by mouth 2 times daily 180 tablet 3    lisinopril (PRINIVIL;ZESTRIL) 20 MG tablet Take 1 tablet by mouth daily 90 tablet 3    predniSONE (DELTASONE) 10 MG tablet Take 1 tablet by mouth daily 90 tablet 3    simvastatin (ZOCOR) 40 MG tablet Take 1 tablet by mouth nightly 90 tablet 3    umeclidinium-vilanterol (ANORO ELLIPTA) 62.5-25 MCG/INH AEPB inhaler Inhale 1 puff into the lungs daily 1 each 5    Misc. Devices [facility]) MISC 1 standard wheelchair. 1 each 0    Multiple Vitamin (MULTIVITAMIN ADULT PO) Take 1 tablet by mouth daily      vitamin C (ASCORBIC ACID) 500 MG tablet Take 1,000 mg by mouth daily      zinc gluconate 50 MG tablet Take 50 mg by mouth daily      albuterol (PROVENTIL) (2.5 MG/3ML) 0.083% nebulizer solution Take 2.5 mg by nebulization every 6 hours as needed for Wheezing or Shortness of Breath      ibuprofen (ADVIL;MOTRIN) 400 MG tablet Take 400 mg by mouth 2 times daily as needed for Pain      ipratropium (ATROVENT) 0.02 % nebulizer solution Take 0.5 mg by nebulization every 6-8 hours as needed for Wheezing      denosumab (PROLIA) 60 MG/ML SOSY SC injection Inject 1 mL into the skin every 6 months 1 mL 1    acetaminophen (TYLENOL) 500 MG tablet Take 2 tablets by mouth 3 times daily as needed for Pain 100 tablet 1    Misc. Devices MISC 1 set of bed rails. 1 each 0    Misc.  Devices MISC Please non-tender, non-distended with normal bowel sounds. Musculoskeletal:  Moving all four extremities without pain. Tenderness to palpation of the thoracic and lumbar spine midline. No clubbing, cyanosis or edema bilaterally. Skin: Skin color, texture, turgor normal.  No rashes or lesions. Neurologic:  Neurovascularly intact without any focal sensory/motor deficits. Cranial nerves: II-XII intact, grossly non-focal.  Psychiatric: Alert and oriented, thought content appropriate, poor insight. Intermittently confused. Will provide intermittently inappropriate answers to questions. Peripheral Pulses: +2 palpable, equal bilaterally     Labs:   Recent Labs     07/17/22 2036 07/18/22  0724 07/19/22  0543   WBC 15.0* 12.7* 12.4*   HGB 13.3 12.7 14.8   HCT 42.9 40.1 46.9    261 279     Recent Labs     07/17/22 2036 07/18/22  0724 07/19/22  0543    143 142   K 4.2 4.1 3.9    108 108   CO2 27 25 22*   BUN 27* 21 12   CREATININE 1.0 0.7 0.5   CALCIUM 9.3 8.5 8.9     Recent Labs     07/18/22  0724   AST 16   ALT 16   BILITOT 0.3   ALKPHOS 77     No results for input(s): INR in the last 72 hours. No results for input(s): Wayna Khat in the last 72 hours. Urinalysis:    Lab Results   Component Value Date/Time    NITRU POSITIVE 07/17/2022 08:40 PM    WBCUA 10-15 07/17/2022 08:40 PM    WBCUA 0-4 05/24/2018 01:05 AM    BACTERIA MANY 07/17/2022 08:40 PM    RBCUA 0-2 07/17/2022 08:40 PM    BLOODU TRACE 07/17/2022 08:40 PM    GLUCOSEU NEGATIVE 07/17/2022 08:40 PM       Radiology:   MRI THORACIC SPINE WO CONTRAST   Final Result       1. Acute compression deformity of T2 with approximately 20% anterior height loss. 2. Chronic compression deformity of T1 with approximately 50% anterior height loss. **This report has been created using voice recognition software. It may contain minor errors which are inherent in voice recognition technology. **      Final report electronically signed by  Dahiana Barnes DO, MD on 7/18/2022 2:43 PM      XR CHEST PORTABLE   Final Result   No pleural effusions. No pneumothorax. Background fibrotic changes. **This report has been created using voice recognition software. It may contain minor errors which are inherent in voice recognition technology. **      Final report electronically signed by Dr. Tanvi Ross on 7/18/2022 7:40 AM      CT HEAD WO CONTRAST   Final Result   1. No acute intracranial abnormality. 2. Sequela of chronic microvascular changes. **This report has been created using voice recognition software. It may contain minor errors which are inherent in voice recognition technology. **      Final report electronically signed by Dr Sheial Galindo on 7/17/2022 8:15 PM      CT CERVICAL SPINE WO CONTRAST   Final Result   1. Mild compression fracture of T2.   2. Multilevel degenerative changes of the cervical spine. 3. Thyromegaly. Left thyroid nodule. **This report has been created using voice recognition software. It may contain minor errors which are inherent in voice recognition technology. **      Final report electronically signed by Dr Sheila Galindo on 7/17/2022 8:21 PM      CT CHEST WO CONTRAST   Final Result   1. Mild acute compression fracture of T2.   2. No pulmonary consolidation. No pneumothorax. 3. Left lower rib fractures are seen. **This report has been created using voice recognition software. It may contain minor errors which are inherent in voice recognition technology. **      Final report electronically signed by Dr Sheila Galindo on 7/17/2022 8:28 PM      CT THORACIC RECONSTRUCTION WO POST PROCESS   Final Result   1. Mild acute compression fracture of T2.   2. Degenerative changes of the thoracic spine. **This report has been created using voice recognition software. It may contain minor errors which are inherent in voice recognition technology. **      Final report electronically signed by Dr Jennifer Perdomo on 7/17/2022 8:43 PM      CT ABDOMEN PELVIS WO CONTRAST Additional Contrast? None   Final Result   1. Cholelithiasis. 2. Increase sclerosis within the sacrum relates to sacral insufficiency fractures. 3. Acute compression fracture of L5   4. Other findings as described above. **This report has been created using voice recognition software. It may contain minor errors which are inherent in voice recognition technology. **      Final report electronically signed by Dr Jennifer Perdomo on 7/17/2022 8:38 PM      CT LUMBAR RECONSTRUCTION WO POST PROCESS   Final Result   1. Acute compression fracture of L5.   2. Increased sclerosis within the sacrum relates to insufficiency sacral fractures. 3. Degenerative changes of the lumbar spine. 4. Marked osteopenia. **This report has been created using voice recognition software. It may contain minor errors which are inherent in voice recognition technology. **      Final report electronically signed by Dr Jennifer Perdomo on 7/17/2022 8:49 PM        CT ABDOMEN PELVIS WO CONTRAST Additional Contrast? None    Result Date: 7/17/2022  PROCEDURE: CT ABDOMEN PELVIS WO CONTRAST CLINICAL INFORMATION: left flank pain COMPARISON: None TECHNIQUE: Helical CT acquisition of the abdomen and pelvis was performed without intravenous contrast. Multiplanar reformats are provided. All CT scans at this facility use dose modulation, iterative reconstruction, and/or weight based dosing when appropriate to reduce the radiation dose to as low as reasonably achievable. FINDINGS: The noncontrast CT limits the evaluation for solid organ pathology, vascular pathology, and lymphadenopathy. The chest has been described on the concurrent CT of the chest. Gallstone is present. Hypodensities in the liver likely hepatic cysts. Tiny calculi are seen in both kidneys. Mild atrophy of the pancreas. Large amount of stool is seen in the colon.  A myomatous uterus is seen. Otherwise, the biliary tree, adrenal glands, and spleen are unremarkable. No bowel obstruction or acute inflammatory bowel process. The appendix is unremarkable. Colonic diverticulosis. The abdominal aorta is not aneurysmal. Aortoiliac calcifications. No significantly enlarged lymph nodes are seen. The bladder is grossly unremarkable. Bones: The bones are demineralized. Scoliosis of the lumbar spine. Degenerative changes of the thoracolumbar spine. Lateral plate and nail fixation of the proximal left femur is seen. There is increased sclerosis within the sacrum that likely reflects underlying insufficiency fractures. Acute compression fracture of L5. Chronic compression deformity of T12.     1. Cholelithiasis. 2. Increase sclerosis within the sacrum relates to sacral insufficiency fractures. 3. Acute compression fracture of L5 4. Other findings as described above. **This report has been created using voice recognition software. It may contain minor errors which are inherent in voice recognition technology. ** Final report electronically signed by Dr Shanta Kaba on 7/17/2022 8:38 PM    CT HEAD WO CONTRAST    Result Date: 7/17/2022  PROCEDURE: CT HEAD WO CONTRAST CLINICAL INFORMATION:Fall. COMPARISON: None TECHNIQUE: 5 mm axial imaging through the head without IV contrast. All CT scans at this facility use dose modulation, iterative reconstruction, and/or weight based dosing when appropriate to reduce the radiation dose to as low as reasonably achievable. FINDINGS: Periventricular  and subcortical white matter hypodensities that likely relate to chronic microangiopathic disease. Intracranial atherosclerotic calcifications. No ventriculomegaly. No midline shift or mass effect. No acute intracranial hemorrhage. No intracranial collection. Gray-white differentiation is unremarkable. The posterior fossa is unremarkable. The craniocervical junction is unremarkable.  No acute bony abnormality. The  paranasal sinuses are clear. The  mastoid air cells are clear. The orbits are unremarkable. 1. No acute intracranial abnormality. 2. Sequela of chronic microvascular changes. **This report has been created using voice recognition software. It may contain minor errors which are inherent in voice recognition technology. ** Final report electronically signed by Dr Chava Antunez on 7/17/2022 8:15 PM    CT CHEST WO CONTRAST    Result Date: 7/17/2022  PROCEDURE: CT CHEST WO CONTRAST CLINICAL INFORMATION: Fall, right lower scapula contusion, left lower lateral chest wall tenderness. COMPARISON: None TECHNIQUE: Helical CT acquisition of the chest was performed without intravenous contrast. Multiplanar reformats are provided. All CT scans at this facility use dose modulation, iterative reconstruction, and/or weight based dosing when appropriate to reduce the radiation dose to as low as reasonably achievable. FINDINGS: The noncontrast CT limits the evaluation for solid organ pathology, vascular pathology, and lymphadenopathy. Mild to moderate emphysematous changes. Subsegmental atelectasis is seen in the lung bases. The thyroid is mildly enlarged. Aortocoronary calcifications. No focal pulmonary consolidation. No large pulmonary mass. Central airway is patent. No pleural effusions. No significant pericardial effusion. The heart is not enlarged. Ascending thoracic aorta is not dilated. The main pulmonary artery is not dilated. No significantly enlarged mediastinal or  axillary lymph node. Juan Roscoe No chest wall mass. The abdomen is described on a concurrent CT abdomen and pelvis. Bones: Degenerative changes of the thoracic spine. The bones are demineralized. Compression fracture of T2. Scoliosis. . Left lower rib fractures are seen that are nondisplaced. 1. Mild acute compression fracture of T2. 2. No pulmonary consolidation. No pneumothorax. 3. Left lower rib fractures are seen.  **This report has been created using voice recognition software. It may contain minor errors which are inherent in voice recognition technology. ** Final report electronically signed by Dr Rae Lockett on 7/17/2022 8:28 PM    CT CERVICAL SPINE WO CONTRAST    Result Date: 7/17/2022  PROCEDURE: CT CERVICAL SPINE WO CONTRAST CLINICAL INFORMATION: Fall. COMPARISON: None TECHNIQUE: 3 mm axial imaging through the cervical spine without contrast.  Sagittal and coronal reconstructions were performed. All CT scans at this facility use dose modulation, iterative reconstruction, and/or weight based dosing when appropriate to reduce the radiation dose to as low as reasonably achievable. FINDINGS: ALIGNMENT: Cervical lordosis is maintained. BONES: The bones appear demineralized. Mild degenerative changes of the cervical spine. Multilevel disc osteophyte complexes are seen. Multilevel facet arthrosis and uncovertebral degeneration. Acute compression fracture of T2 is seen. SOFT TISSUES: Carotid calcifications are seen. The thyroid is enlarged. A left thyroid nodule is seen. Emphysema is seen in the lung apices. DISC LEVELS: Calcification of the ligamentum flava at C2-C3 and C3-C4 is noted. At C3-C4, the calcification is leading to mild central canal narrowing. Mild right neuroforaminal narrowing at C6-C7. 1. Mild compression fracture of T2. 2. Multilevel degenerative changes of the cervical spine. 3. Thyromegaly. Left thyroid nodule. **This report has been created using voice recognition software. It may contain minor errors which are inherent in voice recognition technology. ** Final report electronically signed by Dr Rae Lockett on 7/17/2022 8:21 PM    CT LUMBAR RECONSTRUCTION WO POST PROCESS    Result Date: 7/17/2022  PROCEDURE: CT LUMBAR RECONSTRUCTION WO POST PROCESS CLINICAL INFORMATION: low backpain fall COMPARISON: None TECHNIQUE: 3 mm axial CT images were reconstructed through the lumbar spine from the patient's CT abdomen/pelvis examination without IV contrast. Sagittal and coronal reconstructions were also performed. All CT scans at this facility use dose modulation, iterative reconstruction, and/or weight based dosing when appropriate to reduce the radiation dose to as low as reasonably achievable. FINDINGS: ALIGNMENT: The lumbar lordosis is maintained. Levoscoliosis of the lumbar spine. BONE: The bones are markedly demineralized. . Acute compression fracture of L5. Increased sclerosis within the sacrum relates to insufficiency sacral fractures. Degenerative changes of the SI joints. Mild degenerative changes of the lumbar spine. Multilevel facet arthrosis. . . The soft tissues have been described on the CT abdomen and pelvis. DISC SPACES/FACET JOINT/SPINAL CANAL/NEURAL FORAMEN: The lack of intrathecal contrast limits the evaluation of the spinal canal. Mild central canal narrowing at L3-L4 with moderate right neural foraminal narrowing. Moderate central canal narrowing at L4-L5 with moderate right neuroforaminal narrowing. 1. Acute compression fracture of L5. 2. Increased sclerosis within the sacrum relates to insufficiency sacral fractures. 3. Degenerative changes of the lumbar spine. 4. Marked osteopenia. **This report has been created using voice recognition software. It may contain minor errors which are inherent in voice recognition technology. ** Final report electronically signed by Dr Riya Sahni on 7/17/2022 8:49 PM    CT THORACIC RECONSTRUCTION WO POST PROCESS    Result Date: 7/17/2022  PROCEDURE: CT THORACIC RECONSTRUCTION WO POST PROCESS CLINICAL INFORMATION: Fall, middle midline thoracic spine tenderness. COMPARISON: None TECHNIQUE: 3 mm axial CT images were reconstructed through the thoracic spine from the patient's CT chest examination without IV contrast. Sagittal and coronal reconstruction were also performed.  All CT scans at this facility use dose modulation, iterative reconstruction, and/or weight based dosing when appropriate to reduce the radiation dose to as low as reasonably achievable. FINDINGS: ALIGNMENT: The thoracic kyphosis is maintained. Dextroscoliosis. BONE: The bones are demineralized. Acute compression fracture of T2. Chronic compression deformity of T12. . Multilevel degenerative changes of the thoracic spine. Multilevel facet arthrosis. . . SOFT TISSUE: The soft tissues of been described on the CT chest.. DISC LEVEL: 1. The lack of intrathecal contrast limits the evaluation of the spinal canal. No significant central canal narrowing. No significant neuroforamina narrowing. 1. Mild acute compression fracture of T2. 2. Degenerative changes of the thoracic spine. **This report has been created using voice recognition software. It may contain minor errors which are inherent in voice recognition technology. ** Final report electronically signed by Dr Princess Hernandez on 7/17/2022 8:43 PM        EKG:  NSR; left axis deviation, RBBB          Electronically signed by Stanislaw Worley DO on 7/19/2022 at 10:21 AM

## 2022-07-19 NOTE — PROGRESS NOTES
Respiratory Care is following the rib fracture order set. Patient's position when testing was done was semi-fowlers. Amanda Min RN placed C-collar on patient and helped position them from supine to semi--fowlers. A Negative Inspiratory Force (NIF) was performed with patient achieving a NIF of -26 cm H2O. The NIF was greater than 25 cm H2O. A Forced Vital Capacity (FVC) was obtained with patient achieving an FVC of 1.11 liters. The patient's calculated ideal body weight, (IBW) is  57 kg. 0.020 liters/kg of the patient's IBW is 1.14 liters. The patient's FVC was less than 0.020 liters/kg of IBW. Based on the spirometry measurement alone, patient does not meet ICU admission criteria. Previous FVC was 0.85 liters and previous NIF was -25 cm H2O. Patient's NIF and FVC are improving from previous screening(s). Last pain medication was given on  7/18/22 @ 0014. Physician was not called regarding spirometry measurement. Patient able to follow directions better. RCP coached patient with each FVC maneuver.

## 2022-07-19 NOTE — PROGRESS NOTES
Department of Orthopedic Surgery  Spine Service  Attending Progress Note        Subjective:  Patient c/o neck pain and low back pain, patient somewhat confused but cooperative    Vitals  VITALS:  /87   Pulse 95   Temp 98.4 °F (36.9 °C) (Oral)   Resp 16   Ht 5' 5\" (1.651 m)   Wt 134 lb (60.8 kg)   SpO2 95%   BMI 22.30 kg/m²   24HR INTAKE/OUTPUT:    Intake/Output Summary (Last 24 hours) at 7/19/2022 1401  Last data filed at 7/19/2022 1314  Gross per 24 hour   Intake 470 ml   Output 1350 ml   Net -880 ml     URINARY CATHETER OUTPUT (Varma):  External Urinary Catheter-Output (mL): 650 mL  DRAIN/TUBE OUTPUT:         PHYSICAL EXAM:    Orientation:  alert and oriented to person, place, date - but answers some questions inappropriately    Upper Extremity Motor :   Deltoid:  5/5  Biceps:  5/5  Triceps:  5/5  : 5/5    Upper Motor Neuron Signs:  None  Upper Extremity Sensory:  Intact C3-T1 distribution    Lower Extremity Motor :  5/5 bilateral pedal push/pull  Lower Extremity Sensory:  Intact L1-S1        LABS:    HgB:    Lab Results   Component Value Date/Time    HGB 14.8 07/19/2022 05:43 AM         ASSESSMENT AND PLAN:    1) T2 and L5 vertebral compression fracture s/p fall    1:  C-collar ordered, patient to wear brace when out of bed. Patient also complaining of symptoms L5 fracture, will order lumbar corset brace to be worn when ambulating. Follow up in 4 weeks in the office.      Owen Cooper

## 2022-07-19 NOTE — CARE COORDINATION
7/19/22, 11:03 AM EDT    DISCHARGE ON [address], [address] day: 2  Location: -08/008-A Reason for admit: Acute UTI [N39.0]  Fall, initial encounter [W19. XXXA]  Fracture of ribs, two, left, closed, initial encounter [S22.42XA]  Closed fracture of multiple ribs of left side, initial encounter [S22.42XA]  Compression fracture of T2 vertebra, initial encounter ([facility] [address] [S22.020A]  Closed compression fracture of L5 vertebra, initial encounter ([facility] [address] [S32.050A]   Procedure:   CT cervical spine:  Mild compression fracture of T2. Multilevel degenerative changes of the cervical spine. Thyromegaly. Left thyroid nodule. CT chest:  Mild acute compression fracture of T2. No pulmonary consolidation. No pneumothorax. Left lower rib fractures are seen. CT abdomen:  Cholelithiasis. Increase sclerosis within the sacrum relates to sacral insufficiency fractures. Acute compression fracture of L5   Barriers to Discharge: Bedrest lifted this morning by ortho. PT/OT ordered. Pain control. IV rocephin for ecoli in urine. PCP: PETEY Desai CNP  Readmission Risk Score: 17.7%  Patient Goals/Plan/Treatment Preferences: From [facility] Follow for therapy recommendations.

## 2022-07-19 NOTE — PROGRESS NOTES
Human sitter remains at bedside due to impulsiveness. Son in law left bedside prior to 0400 vitals and assessment. Patient stated she would \"just accept that girl\" referring to [facility].

## 2022-07-19 NOTE — PLAN OF CARE
Problem: Discharge Planning  Goal: Discharge to home or other facility with appropriate resources  Outcome: Progressing Towards Goal  Flowsheets (Taken 7/19/2022 0556)  Discharge to home or other facility with appropriate resources:   Identify barriers to discharge with patient and caregiver   Arrange for needed discharge resources and transportation as appropriate   Identify discharge learning needs (meds, wound care, etc)  Note: Plans to discharge to [facility] when medically stable. Problem: Pain  Goal: Verbalizes/displays adequate comfort level or baseline comfort level  Outcome: Progressing Towards Goal  Flowsheets (Taken 7/19/2022 0556)  Verbalizes/displays adequate comfort level or baseline comfort level:   Encourage patient to monitor pain and request assistance   Assess pain using appropriate pain scale   Administer analgesics based on type and severity of pain and evaluate response   Implement non-pharmacological measures as appropriate and evaluate response  Note: Staff to assess pain with hourly rounding and administer analgesics with respect to MAR orders in order to maintain pain level at or below patient's desired pain goal during this shift. Problem: Safety - Adult  Goal: Free from fall injury  Outcome: Progressing Towards Goal  Flowsheets  Taken 4/69/2781 [id number] by Mariluz Shaw LPN  Free From Fall Injury: Instruct family/caregiver on patient safety  Taken 7/19/2022 0415 by John Sadler RN  Free From Fall Injury: Instruct family/caregiver on patient safety  Note: No falls noted this shift. Continue falling star program. Bed alarm on, bed in low position. Call light and personal belongings in reach. Patient uses call light appropriately. Problem: Skin/Tissue Integrity  Goal: Absence of new skin breakdown  Description: 1. Monitor for areas of redness and/or skin breakdown  2. Assess vascular access sites hourly  3.   Every 4-6 hours minimum:  Change oxygen saturation probe

## 2022-07-19 NOTE — PROGRESS NOTES
Reviewed chart for SBIRT per trauma service. Per [facility], pt has a history of Dementia without behavioral disturbance, unspecified dementia type ([facility] [address] Unable to complete.

## 2022-07-20 LAB
ANION GAP SERPL CALCULATED.3IONS-SCNC: 11 MEQ/L (ref 8–16)
BASOPHILS # BLD: 0.5 %
BASOPHILS ABSOLUTE: 0.1 THOU/MM3 (ref 0–0.1)
BUN BLDV-MCNC: 17 MG/DL (ref 7–22)
CALCIUM SERPL-MCNC: 8.6 MG/DL (ref 8.5–10.5)
CHLORIDE BLD-SCNC: 107 MEQ/L (ref 98–111)
CO2: 22 MEQ/L (ref 23–33)
CREAT SERPL-MCNC: 0.6 MG/DL (ref 0.4–1.2)
EOSINOPHIL # BLD: 1.3 %
EOSINOPHILS ABSOLUTE: 0.2 THOU/MM3 (ref 0–0.4)
ERYTHROCYTE [DISTWIDTH] IN BLOOD BY AUTOMATED COUNT: 15.6 % (ref 11.5–14.5)
ERYTHROCYTE [DISTWIDTH] IN BLOOD BY AUTOMATED COUNT: 56 FL (ref 35–45)
GFR SERPL CREATININE-BSD FRML MDRD: > 90 ML/MIN/1.73M2
GLUCOSE BLD-MCNC: 126 MG/DL (ref 70–108)
HCT VFR BLD CALC: 43.9 % (ref 37–47)
HEMOGLOBIN: 13.7 GM/DL (ref 12–16)
IMMATURE GRANS (ABS): 0.1 THOU/MM3 (ref 0–0.07)
IMMATURE GRANULOCYTES: 0.7 %
LYMPHOCYTES # BLD: 16.7 %
LYMPHOCYTES ABSOLUTE: 2.4 THOU/MM3 (ref 1–4.8)
MCH RBC QN AUTO: 30.4 PG (ref 26–33)
MCHC RBC AUTO-ENTMCNC: 31.2 GM/DL (ref 32.2–35.5)
MCV RBC AUTO: 97.3 FL (ref 81–99)
MONOCYTES # BLD: 7.4 %
MONOCYTES ABSOLUTE: 1 THOU/MM3 (ref 0.4–1.3)
NUCLEATED RED BLOOD CELLS: 0 /100 WBC
PLATELET # BLD: 260 THOU/MM3 (ref 130–400)
PMV BLD AUTO: 10.1 FL (ref 9.4–12.4)
POTASSIUM REFLEX MAGNESIUM: 3.6 MEQ/L (ref 3.5–5.2)
RBC # BLD: 4.51 MILL/MM3 (ref 4.2–5.4)
SEG NEUTROPHILS: 73.4 %
SEGMENTED NEUTROPHILS ABSOLUTE COUNT: 10.3 THOU/MM3 (ref 1.8–7.7)
SODIUM BLD-SCNC: 140 MEQ/L (ref 135–145)
WBC # BLD: 14.1 THOU/MM3 (ref 4.8–10.8)

## 2022-07-20 PROCEDURE — 97110 THERAPEUTIC EXERCISES: CPT

## 2022-07-20 PROCEDURE — 94799 UNLISTED PULMONARY SVC/PX: CPT

## 2022-07-20 PROCEDURE — 2580000003 HC RX 258: Performed by: PHYSICIAN ASSISTANT

## 2022-07-20 PROCEDURE — 2580000003 HC RX 258

## 2022-07-20 PROCEDURE — 94669 MECHANICAL CHEST WALL OSCILL: CPT

## 2022-07-20 PROCEDURE — 94640 AIRWAY INHALATION TREATMENT: CPT

## 2022-07-20 PROCEDURE — 6370000000 HC RX 637 (ALT 250 FOR IP): Performed by: PHYSICIAN ASSISTANT

## 2022-07-20 PROCEDURE — 94010 BREATHING CAPACITY TEST: CPT

## 2022-07-20 PROCEDURE — 97535 SELF CARE MNGMENT TRAINING: CPT

## 2022-07-20 PROCEDURE — 6370000000 HC RX 637 (ALT 250 FOR IP)

## 2022-07-20 PROCEDURE — 97530 THERAPEUTIC ACTIVITIES: CPT

## 2022-07-20 PROCEDURE — 6360000002 HC RX W HCPCS: Performed by: PHYSICIAN ASSISTANT

## 2022-07-20 PROCEDURE — 94760 N-INVAS EAR/PLS OXIMETRY 1: CPT

## 2022-07-20 PROCEDURE — 36415 COLL VENOUS BLD VENIPUNCTURE: CPT

## 2022-07-20 PROCEDURE — 99232 SBSQ HOSP IP/OBS MODERATE 35: CPT | Performed by: SURGERY

## 2022-07-20 PROCEDURE — 99223 1ST HOSP IP/OBS HIGH 75: CPT | Performed by: PHYSICAL MEDICINE & REHABILITATION

## 2022-07-20 PROCEDURE — 1200000000 HC SEMI PRIVATE

## 2022-07-20 PROCEDURE — 85025 COMPLETE CBC W/AUTO DIFF WBC: CPT

## 2022-07-20 PROCEDURE — 80048 BASIC METABOLIC PNL TOTAL CA: CPT

## 2022-07-20 PROCEDURE — 6360000002 HC RX W HCPCS

## 2022-07-20 RX ADMIN — Medication 1 TABLET: at 09:58

## 2022-07-20 RX ADMIN — SODIUM CHLORIDE, PRESERVATIVE FREE 10 ML: 5 INJECTION INTRAVENOUS at 09:58

## 2022-07-20 RX ADMIN — BISACODYL 10 MG: 10 SUPPOSITORY RECTAL at 09:56

## 2022-07-20 RX ADMIN — DONEPEZIL HYDROCHLORIDE 10 MG: 10 TABLET, FILM COATED ORAL at 20:59

## 2022-07-20 RX ADMIN — PSYLLIUM HUSK 1 PACKET: 3.4 GRANULE ORAL at 09:59

## 2022-07-20 RX ADMIN — PREDNISONE 10 MG: 10 TABLET ORAL at 09:58

## 2022-07-20 RX ADMIN — CEFTRIAXONE SODIUM 1000 MG: 1 INJECTION, POWDER, FOR SOLUTION INTRAMUSCULAR; INTRAVENOUS at 13:52

## 2022-07-20 RX ADMIN — SODIUM CHLORIDE, PRESERVATIVE FREE 10 ML: 5 INJECTION INTRAVENOUS at 21:00

## 2022-07-20 RX ADMIN — Medication 1000 MCG: at 09:59

## 2022-07-20 RX ADMIN — LISINOPRIL 20 MG: 20 TABLET ORAL at 09:57

## 2022-07-20 RX ADMIN — TIOTROPIUM BROMIDE AND OLODATEROL 2 PUFF: 3.124; 2.736 SPRAY, METERED RESPIRATORY (INHALATION) at 08:05

## 2022-07-20 RX ADMIN — OXYCODONE HYDROCHLORIDE AND ACETAMINOPHEN 1000 MG: 500 TABLET ORAL at 09:56

## 2022-07-20 RX ADMIN — FAMOTIDINE 20 MG: 20 TABLET ORAL at 09:56

## 2022-07-20 RX ADMIN — POLYETHYLENE GLYCOL 3350 17 G: 17 POWDER, FOR SOLUTION ORAL at 09:58

## 2022-07-20 RX ADMIN — Medication 1 CAPSULE: at 09:57

## 2022-07-20 RX ADMIN — ATORVASTATIN CALCIUM 20 MG: 20 TABLET, FILM COATED ORAL at 20:59

## 2022-07-20 RX ADMIN — ENOXAPARIN SODIUM 40 MG: 100 INJECTION SUBCUTANEOUS at 09:56

## 2022-07-20 ASSESSMENT — PAIN SCALES - GENERAL
PAINLEVEL_OUTOF10: 0
PAINLEVEL_OUTOF10: 0

## 2022-07-20 NOTE — PROGRESS NOTES
Consult received, chart reviewed, discussed with Dr Marquis Woodard, does not feel patient ready yet for IPR, will follow.

## 2022-07-20 NOTE — PROGRESS NOTES
Respiratory Care is following the rib fracture order set. Patient's position when testing was done was fowlers. A Negative Inspiratory Force (NIF) was performed with patient achieving a NIF of >-40 cm H2O. The NIF was greater than 25 cm H2O. A Forced Vital Capacity (FVC) was obtained with patient achieving an FVC of 1.25 liters. The patient's calculated ideal body weight, (IBW) is  57 kg. 0.020 liters/kg of the patient's IBW is 1.14 liters. The patient's FVC was greater than 0.020 liters/kg of IBW. Based on the spirometry measurement alone, patient does not meet ICU admission criteria. Previous FVC was 1.11 liters and previous NIF was -26 cm H2O. Patient's NIF and FVC are improving from previous screening(s). Last pain medication was given on  7/17 @ 1344. Physician was not called regarding spirometry measurement.

## 2022-07-20 NOTE — PROGRESS NOTES
[facility]  Daily Progress Note    Pt Name: Sachin Neal  Medical Record Number: [id number]  Date of Birth 1940   Today's Date: 7/20/2022    HD: # 3    CC: Back pain    ASSESSMENT  1. Active Hospital Problems    Diagnosis Date Noted    Fall [W19. XXXA] 07/18/2022     Priority: Medium    Fracture of ribs, two, left, closed, initial encounter [S22.42XA] 07/17/2022     Priority: Medium    Closed compression fracture of fifth lumbar vertebra ([address] [S32.050A] 07/17/2022     Priority: Medium    Compression fracture of T2 vertebra ([address] [S22.020A] 07/17/2022     Priority: Medium    UTI (urinary tract infection) [N39.0] 07/17/2022     Priority: Medium         PLAN  Patient admitted under [employer], possible syncope              -PT/OT continue to treat              -Consult hospitalist for further work-up   -TSH, BNP, troponin ordered by hospitalist. Troponin slightly elevated at 0.011, hospitalist to manage. T2 and L5 compression fractures              -Orthopedic spine consulted for further management              -PT/OT continue to treat              -Pain control   -7/18: Ortho spine ordered MRI to determine what type of brace is needed. Lumbar brace vs c-collar to cover the T2 VCF. Further recs pending MRI results   - 7/19: C-collar and lumbar corset braces to be worn when out of bed when ambulating. Follow up in 4 weeks in office     Left ninth/10th rib fractures              -Rib fracture protocol              -Flexeril              -Pain control              -Lidoderm patches              -Incentive spirometry/cough and deep breathing              -Monitor respiratory status   -7/18: CXR this AM with no pleural effusions or pneumothorax. Patient not reporting chest pain and shortness of breath.  Respirations unlabored   -7/20: Meeting respiratory indices on rib fracture protocol     Symptomatic UTI              -Bacteriuria and leukocyte Estrace on UA. Rocephin given in the ED              -Consult hospitalist for further management. On prednisone   - 7/18: Hospitalist managing with Rocephin     Leukocytosis              -Reactive versus infective possible urinary source              -Afebrile, repeat labs in a.m.   -7/18: Leukocytosis improving, 12.7. On Recephin   -7/20: Worsening white count to 14.1 this AM. Continues on Rocephin. Hospitalist following     Thyroid nodule with thyromegaly              -Thyroid ultrasound ordered              -Hospitalist for further evaluation. TSH normal    Chronic Issues: Hx DVT, COPD, blood clots, hyperlipidemia, sleep apnea, abnormal stress test, and ulcerative colitis   - Hospitalist following for assistance with medical management   - Home medications restarted   - Hx of blood clots, no home anticoagulation or antiplatelets    Hospitalist consulted for reported new confusion and hallucinations 7/19. Consults orthopedic spine, hospitalist, neurology, PM&R     Pain Management              -Morphine, Oxy IR     Prophylaxis: SCD's, Incentive Spirometry, GlycoLax, Pepcid, Zofran   - Lovenox start 7/18     Regular diet     IVF Management  Regular Neurovascular Checks  Repeat Labs Tomorrow AM  PT/OT/SLP continue to treat     Planned Discharge discharge pending clinical course    - From [place], following therapy recommendations for safe discharge planning   - PM&R consulted for rehab recommendations. PT recommending IPR, OT recommending SNF    SUBJECTIVE  Patient seen on 7K this morning. Patient endorsed mild pain in back but denied any other pain or complaints. Patient denied any headaches, lightheadedness, dizziness, neck pain, chest pain, shortness of breath, abdominal pain, nausea/vomiting, pain in extremities, and paresthesias. She endorsed tolerating diet and multiple bowel movements documented. All imaging reviewed. Consult updates reviewed.  Hospitalist consulted neurology for reported new confusion and hallucination. Spine noted C-collar and lumbar corset braces to be worn when out of bed when ambulating. Follow up in 4 weeks in office   Labs and vital signs reviewed. Patient afebrile, vital signs stable. Accelerated HTN noted overnight. Hospitalist following. AM labs, leukocytosis up at 14.1. Hgb stable 13.7. Patient stable from a trauma surgery perspective. Tolerating rib fracture protocol. Lovenox for DVT prophylaxis. Follow therapy for safe discharge plan. PM&R consulted for rehab recommendations. PT recommending IPR, OT recommending SNF. Final impression and plan per Dr. Fabian Johnson    Wt Readings from Last 3 Encounters:   07/20/22 136 lb (61.7 kg)   07/18/22 140 lb (63.5 kg)   06/06/22 135 lb (61.2 kg)     Temp Readings from Last 3 Encounters:   07/20/22 97.5 °F (36.4 °C) (Oral)   04/28/22 97.6 °F (36.4 °C) (Oral)   04/24/22 98.5 °F (36.9 °C)     BP Readings from Last 3 Encounters:   07/20/22 91/71   06/15/22 110/62   06/06/22 128/68     Pulse Readings from Last 3 Encounters:   07/20/22 83   06/15/22 64   06/06/22 73       24 HR INTAKE/OUTPUT :   Intake/Output Summary (Last 24 hours) at 7/20/2022 1414  Last data filed at 7/20/2022 1212  Gross per 24 hour   Intake 570 ml   Output 250 ml   Net 320 ml       ADULT DIET; Regular  ADULT ORAL NUTRITION SUPPLEMENT; Breakfast, Lunch, Dinner; Diabetic Oral Supplement    OBJECTIVE  CURRENT VITALS BP 91/71   Pulse 83   Temp 97.5 °F (36.4 °C) (Oral)   Resp 16   Ht 5' 5\" (1.651 m)   Wt 136 lb (61.7 kg)   SpO2 95%   BMI 22.63 kg/m²   GENERAL: Awake, alert, no acute distress, pleasant and cooperative with exam  HEENT: Normocephalic, pupils equal and reactive to light, nares patent bilaterally  NEURO: Alert and orient , conversing appropriately, follows commands, PMS intact in all four extremities, no signs of focal neurological deficits  CSPINE/BACK: [place] collar in place  HEART: Regular rate and rhythm with no obvious murmurs, rubs, gallops. Distal pulses intact. LUNGS/CHEST WALL: Lungs are clear to auscultation bilaterally with no wheezes, rales, rhonchi. No respiratory distress or increased work of breathing. No chest wall tenderness to palpation. ABDOMEN: Abdomen soft, nondistended, with no tenderness to palpation. No guarding or peritoneal signs. Bowel sounds normal active  EXTREMITIES: No cyanosis. PMS intact in all four extremities. No extremity tenderness to palpation. Range of motion intact. Strength 5/5 bilaterally with  strength and plantar/dorsiflexion. SKIN: Warm and dry      LABS  CBC :   Recent Labs     07/18/22  0724 07/19/22  0543 07/20/22  0914   WBC 12.7* 12.4* 14.1*   HGB 12.7 14.8 13.7   HCT 40.1 46.9 43.9   MCV 96.6 96.9 97.3    279 260       BMP:   Recent Labs     07/18/22  0724 07/19/22  0543 07/20/22  0914    142 140   K 4.1 3.9 3.6    108 107   CO2 25 22* 22*   BUN 21 12 17   CREATININE 0.7 0.5 0.6       COAGS:   Recent Labs     07/18/22  0724   PROT 5.4*       Pancreas/HFP:  No results for input(s): LIPASE, AMYLASE in the last 72 hours. Culture, Reflexed, Urine [[id number]] (Abnormal)     Collected: 07/17/22 2040    Updated: 07/18/22 2220    Specimen Source: Urine     Organism Escherichia coli Abnormal     Urine Culture Reflex [place] count: >100,000 CFU/mL    Narrative:     Source: urine       Site: unknown collect          Current Antibiotics: Ceftriaxone     RADIOLOGY  CT ABDOMEN PELVIS WO CONTRAST Additional Contrast? None    Result Date: 7/17/2022  PROCEDURE: CT ABDOMEN PELVIS WO CONTRAST CLINICAL INFORMATION: left flank pain COMPARISON: None TECHNIQUE: Helical CT acquisition of the abdomen and pelvis was performed without intravenous contrast. Multiplanar reformats are provided. All CT scans at this facility use dose modulation, iterative reconstruction, and/or weight based dosing when appropriate to reduce the radiation dose to as low as reasonably achievable.  FINDINGS: The noncontrast CT limits the evaluation for solid organ pathology, vascular pathology, and lymphadenopathy. The chest has been described on the concurrent CT of the chest. Gallstone is present. Hypodensities in the liver likely hepatic cysts. Tiny calculi are seen in both kidneys. Mild atrophy of the pancreas. Large amount of stool is seen in the colon. A myomatous uterus is seen. Otherwise, the biliary tree, adrenal glands, and spleen are unremarkable. No bowel obstruction or acute inflammatory bowel process. The appendix is unremarkable. Colonic diverticulosis. The abdominal aorta is not aneurysmal. Aortoiliac calcifications. No significantly enlarged lymph nodes are seen. The bladder is grossly unremarkable. Bones: The bones are demineralized. Scoliosis of the lumbar spine. Degenerative changes of the thoracolumbar spine. Lateral plate and nail fixation of the proximal left femur is seen. There is increased sclerosis within the sacrum that likely reflects underlying insufficiency fractures. Acute compression fracture of L5. Chronic compression deformity of T12.     1. Cholelithiasis. 2. Increase sclerosis within the sacrum relates to sacral insufficiency fractures. 3. Acute compression fracture of L5 4. Other findings as described above. **This report has been created using voice recognition software. It may contain minor errors which are inherent in voice recognition technology. ** Final report electronically signed by Dr Carlos Aviles on 7/17/2022 8:38 PM    CT HEAD WO CONTRAST    Result Date: 7/17/2022  PROCEDURE: CT HEAD WO CONTRAST CLINICAL INFORMATION:Fall. COMPARISON: None TECHNIQUE: 5 mm axial imaging through the head without IV contrast. All CT scans at this facility use dose modulation, iterative reconstruction, and/or weight based dosing when appropriate to reduce the radiation dose to as low as reasonably achievable.  FINDINGS: Periventricular  and subcortical white matter hypodensities that likely relate to chronic microangiopathic disease. Intracranial atherosclerotic calcifications. No ventriculomegaly. No midline shift or mass effect. No acute intracranial hemorrhage. No intracranial collection. Gray-white differentiation is unremarkable. The posterior fossa is unremarkable. The craniocervical junction is unremarkable. No acute bony abnormality. The  paranasal sinuses are clear. The  mastoid air cells are clear. The orbits are unremarkable. 1. No acute intracranial abnormality. 2. Sequela of chronic microvascular changes. **This report has been created using voice recognition software. It may contain minor errors which are inherent in voice recognition technology. ** Final report electronically signed by Dr Fabiola Quiroz on 7/17/2022 8:15 PM    CT CHEST WO CONTRAST    Result Date: 7/17/2022  PROCEDURE: CT CHEST WO CONTRAST CLINICAL INFORMATION: Fall, right lower scapula contusion, left lower lateral chest wall tenderness. COMPARISON: None TECHNIQUE: Helical CT acquisition of the chest was performed without intravenous contrast. Multiplanar reformats are provided. All CT scans at this facility use dose modulation, iterative reconstruction, and/or weight based dosing when appropriate to reduce the radiation dose to as low as reasonably achievable. FINDINGS: The noncontrast CT limits the evaluation for solid organ pathology, vascular pathology, and lymphadenopathy. Mild to moderate emphysematous changes. Subsegmental atelectasis is seen in the lung bases. The thyroid is mildly enlarged. Aortocoronary calcifications. No focal pulmonary consolidation. No large pulmonary mass. Central airway is patent. No pleural effusions. No significant pericardial effusion. The heart is not enlarged. Ascending thoracic aorta is not dilated. The main pulmonary artery is not dilated. No significantly enlarged mediastinal or  axillary lymph node. Rhenda Gemini No chest wall mass.  The abdomen is described on a concurrent CT abdomen and pelvis. Bones: Degenerative changes of the thoracic spine. The bones are demineralized. Compression fracture of T2. Scoliosis. . Left lower rib fractures are seen that are nondisplaced. 1. Mild acute compression fracture of T2. 2. No pulmonary consolidation. No pneumothorax. 3. Left lower rib fractures are seen. **This report has been created using voice recognition software. It may contain minor errors which are inherent in voice recognition technology. ** Final report electronically signed by Dr Aga Serna on 7/17/2022 8:28 PM    CT CERVICAL SPINE WO CONTRAST    Result Date: 7/17/2022  PROCEDURE: CT CERVICAL SPINE WO CONTRAST CLINICAL INFORMATION: Fall. COMPARISON: None TECHNIQUE: 3 mm axial imaging through the cervical spine without contrast.  Sagittal and coronal reconstructions were performed. All CT scans at this facility use dose modulation, iterative reconstruction, and/or weight based dosing when appropriate to reduce the radiation dose to as low as reasonably achievable. FINDINGS: ALIGNMENT: Cervical lordosis is maintained. BONES: The bones appear demineralized. Mild degenerative changes of the cervical spine. Multilevel disc osteophyte complexes are seen. Multilevel facet arthrosis and uncovertebral degeneration. Acute compression fracture of T2 is seen. SOFT TISSUES: Carotid calcifications are seen. The thyroid is enlarged. A left thyroid nodule is seen. Emphysema is seen in the lung apices. DISC LEVELS: Calcification of the ligamentum flava at C2-C3 and C3-C4 is noted. At C3-C4, the calcification is leading to mild central canal narrowing. Mild right neuroforaminal narrowing at C6-C7. 1. Mild compression fracture of T2. 2. Multilevel degenerative changes of the cervical spine. 3. Thyromegaly. Left thyroid nodule. **This report has been created using voice recognition software. It may contain minor errors which are inherent in voice recognition technology. ** Final report electronically signed by Dr Fabiola Quiroz on 7/17/2022 8:21 PM    XR CHEST PORTABLE    Result Date: 7/18/2022  PROCEDURE: XR CHEST PORTABLE CLINICAL INFORMATION: follow up for rib fractures. Left lower rib fractures. COMPARISON: Chest CT 7/17/2022. TECHNIQUE: AP semiupright view of the chest. FINDINGS: The heart size is normal.The mediastinum is not widened. There are background fibrotic changes. There is no pneumothorax. There are no pulmonary infiltrates. There are no pleural effusions. The pulmonary vascularity is normal. The patient's known left lower rib fractures are not seen radiographically. No pleural effusions. No pneumothorax. Background fibrotic changes. **This report has been created using voice recognition software. It may contain minor errors which are inherent in voice recognition technology. ** Final report electronically signed by Dr. Cassandra Cummings on 7/18/2022 7:40 AM    CT LUMBAR RECONSTRUCTION WO POST PROCESS    Result Date: 7/17/2022  PROCEDURE: CT LUMBAR RECONSTRUCTION WO POST PROCESS CLINICAL INFORMATION: low backpain fall COMPARISON: None TECHNIQUE: 3 mm axial CT images were reconstructed through the lumbar spine from the patient's CT abdomen/pelvis examination without IV contrast. Sagittal and coronal reconstructions were also performed. All CT scans at this facility use dose modulation, iterative reconstruction, and/or weight based dosing when appropriate to reduce the radiation dose to as low as reasonably achievable. FINDINGS: ALIGNMENT: The lumbar lordosis is maintained. Levoscoliosis of the lumbar spine. BONE: The bones are markedly demineralized. . Acute compression fracture of L5. Increased sclerosis within the sacrum relates to insufficiency sacral fractures. Degenerative changes of the SI joints. Mild degenerative changes of the lumbar spine. Multilevel facet arthrosis. . . The soft tissues have been described on the CT abdomen and pelvis.  DISC SPACES/FACET JOINT/SPINAL CANAL/NEURAL FORAMEN: The lack of intrathecal contrast limits the evaluation of the spinal canal. Mild central canal narrowing at L3-L4 with moderate right neural foraminal narrowing. Moderate central canal narrowing at L4-L5 with moderate right neuroforaminal narrowing. 1. Acute compression fracture of L5. 2. Increased sclerosis within the sacrum relates to insufficiency sacral fractures. 3. Degenerative changes of the lumbar spine. 4. Marked osteopenia. **This report has been created using voice recognition software. It may contain minor errors which are inherent in voice recognition technology. ** Final report electronically signed by Dr Carlos Aviles on 7/17/2022 8:49 PM    CT THORACIC RECONSTRUCTION WO POST PROCESS    Result Date: 7/17/2022  PROCEDURE: CT THORACIC RECONSTRUCTION WO POST PROCESS CLINICAL INFORMATION: Fall, middle midline thoracic spine tenderness. COMPARISON: None TECHNIQUE: 3 mm axial CT images were reconstructed through the thoracic spine from the patient's CT chest examination without IV contrast. Sagittal and coronal reconstruction were also performed. All CT scans at this facility use dose modulation, iterative reconstruction, and/or weight based dosing when appropriate to reduce the radiation dose to as low as reasonably achievable. FINDINGS: ALIGNMENT: The thoracic kyphosis is maintained. Dextroscoliosis. BONE: The bones are demineralized. Acute compression fracture of T2. Chronic compression deformity of T12. . Multilevel degenerative changes of the thoracic spine. Multilevel facet arthrosis. . . SOFT TISSUE: The soft tissues of been described on the CT chest.. DISC LEVEL: 1. The lack of intrathecal contrast limits the evaluation of the spinal canal. No significant central canal narrowing. No significant neuroforamina narrowing. 1. Mild acute compression fracture of T2. 2. Degenerative changes of the thoracic spine. **This report has been created using voice recognition software.   It may contain minor errors which are inherent in voice recognition technology. ** Final report electronically signed by Dr Romana Romney on 7/17/2022 8:43 PM       Total time spent in care of patient:  15 minutes collectively between subjective/objective examination, chart review, documentation, clinical reasoning and discussion with attending regarding plan/interval changes. Electronically signed by Reed Gomez PA-C on 7/20/2022 at 2:14 PM   Patient seen and examined independently by me 7/20/2022     I personally supervised the PA/NP in the evaluation, management and development of the treatment plan for Julisa Olivo  on the same date of service as above. I personally interviewed Julisa Olivo   and  discussed his review of symptoms as able due to the patient's condition, as well as performed an individual physical exam on the same   date of service as above. In addition I discussed the patient's condition and treatment options with the patient, if able, and/or designated family if available. I have also reviewed and agree with the past medical,  family and social history updates as well as care plans unless otherwise noted below. All questions were answered. I examined independently and reviewed relevant data myself and may have done so in the context of team rounds. A full chart review was performed by me. I attest that this medical record entry accurately reflects signatures and notations that I made in my capacity as an M. D. when I treated and diagnosed Julisa Olivo on the date of service above     I was responsible for all medical decision making involving this encounter. I identified and/or confirmed all problems associated with this patient encounter by my own direct physical examination of this patient and review of all radiology studies and labwork  that were ordered and available. Active Hospital Problems    Diagnosis     Fall [W19. XXXA]      Priority: Medium    Fracture of ribs, two, left, closed, initial encounter [S22.42XA]      Priority: Medium    Closed compression fracture of fifth lumbar vertebra ([address] [S32.050A]      Priority: Medium    Compression fracture of T2 vertebra ([address] [S22.020A]      Priority: Medium    UTI (urinary tract infection) [N39.0]      Priority: Medium        I  discussed the management of all of the identified problems with the APN or PA. I formulated the treatment plan for all identified problems and discussed those with the APN or PA . This management plan was then carried out and the patient's orders for care by the APN or PA. Total time personally spent on this patient encounter was 25 minutes which includes :  Preparing to see the patient( reviewing tests and chart)  Obtaining and reviewing separately obtained history  Performing a medically appropriate examination and evaluation  Ordering medications, tests, or procedures  Counseling and educating the patient/family/caregiver  Care coordination  Referring and communicating with other healthcare professionals  Documenting clinical information in the EHR  Independent interpretation of results and communicating the results to patient and care team  This includes a direct physical exam as well as all the other encounter activities described above. Time may be discontiguous. Time does not include procedures. Please see our orders that were directed and approved by me if there are any new ones for the updated patient care plan. Above discussed and I agree with documentation and orders placed by Nubia ELIZABETH    See any additional comments if needed below for any other updated orders and plans. Patient seen and examined independently by me. Above discussed and I agree with CNP. Labs, cultures, and radiographs where available were reviewed. See orders for the updated patient care plan.     Aide Fowler MD MD, patient seen and awake today and more alert daughter at bedside but still states patient is confused. Patient feels that changes surroundings is affected her mentation. Denies any left chest wall pain from my standpoint okay to get patient up and walk.   Reviewed rehab notes appreciate  note waiting neurology input grover present management  7/20/2022   9:17 PM

## 2022-07-20 NOTE — PROGRESS NOTES
[facility]  SPEECH THERAPY MISSED TREATMENT NOTE  [facility]      Date: 2022  Patient Name: Roseanna White        MRN: [id number]    : 1940  (80 y.o.)    REASON FOR MISSED TREATMENT:  ST attempted to see patient for completion of [id number] evaluation s/p fall and reports of increased confusion. MELISA Noonan approved attempt this date; however, upon arrival to room, patient sleeping in bed with daughter and live sitter at bedside. Daughter requesting ST allow patient to rest d/t poor sleep schedule. ST to re-attempt at a later date as patient is medically appropriate and available.      GEMMA Grossman, Speech Pathology Intern

## 2022-07-20 NOTE — PROGRESS NOTES
[address] [facility]  Occupational Therapy  Daily Note  Time:  Time In: 6701  Time Out: 1237  Timed Code Treatment Minutes: 32 Minutes  Minutes: 32          Date: 2022  Patient Name: Angelito Escoto,   Gender: female      Room: [facility]008-A  MRN: [id number]  : 1940  (80 y.o.)  Referring Practitioner: GLENN Jarrett  Diagnosis: Acute UTI  Additional Pertinent Hx: The patient is a 80 y.o. female who presents with above chief complaint. The patient presented to the ED last evening after suffering a fall. Patient reports she was in a hurry and tripped walking into her kitchen. She landed directly on her back, unsure what exactly caused her to fall. She complains primarily of mid back pain. No radicular symptoms or numbness/tingling into the upper or lower extremities. She denies currently having any back pain while laying in bed. She denies bowel or bladder incontinence. CT scanned showed T2 and L5 vertebral compression fractures. Patient denies any reason she can not have a MRI    Restrictions/Precautions:  Restrictions/Precautions: General Precautions, Fall Risk  Required Braces or Orthoses  Cervical: c-collar  Position Activity Restriction  Spinal Precautions: No Bending, No Lifting, No Twisting  Spinal Precautions: cervical precautions  Sternal Precautions: No Pushing, No Pulling  Other position/activity restrictions: T2 & L5 fracture     SUBJECTIVE: RN okayed OT session. Pt. In room with sitter present. Pt. Agreeable to participate. Pt.'s daughter present at end of session with questions regarding IPR. Pt. And daughter provided with education on IPR process and expectations. PAIN: reports discomfort in neck and back-pain note rated    Vitals: Vitals not assessed per clinical judgement, see nursing flowsheet    COGNITION: Slow Processing, Decreased Insight, Decreased Problem Solving, and Decreased Safety Awareness    ADL:   Grooming: Contact Guard Assistance.   To stand up and wash hands off   Lower Extremity Dressing: Dependent. To don and doff undergarment  Toileting: Maximum Assistance. Incontinent of stool Max A with ronnie care  Toilet Transfer: Minimal Assistance, Moderate Assistance, with verbal cues , and with increased time for completion. To stand . BALANCE:  Standing Balance: Contact Guard Assistance, Minimal Assistance. BED MOBILITY:  Not Tested    TRANSFERS:  Sit to Stand:  Minimal Assistance, Moderate Assistance, with increased time for completion, cues for hand placement, with verbal cues. From recliner  Stand to Sit: [employer], cues for hand placement, with verbal cues. To recliner and  toilet    FUNCTIONAL MOBILITY:  Assistive Device: 4 Wheeled Walker  Assist Level:  [employer] and with verbal cues . Distance: To and from bathroom  Pt. Demo some skakiness when first standing     ADDITIONAL ACTIVITIES:  Patient completed BUE strengthening exercises with skilled education on HEP: completed x reps x1 set with AROM in all joints and all planes in order to improve UE strength and activity tolerance required for BADL routine and toilet / shower transfers. Patient tolerated, requiring  rest breaks. Patient also required visual and verbal cues for technique. ASSESSMENT:     Activity Tolerance:  Patient tolerance of  treatment: fair.        Discharge Recommendations: Inpatient Rehabilitation  Equipment Recommendations: Equipment Needed: No  Plan: Times per Week: 5x  Times per Day: Daily  Current Treatment Recommendations: Strengthening, Balance training, Functional mobility training, Endurance training, Self-Care / ADL, Home management training, Patient/Caregiver education & training, Safety education & training    Patient Education  Patient Education: ADL's, Home Exercise Program, Precautions, Importance of Increasing Activity, and Assistive Device Safety    Goals  Short Term Goals  Time Frame for Short term goals: by

## 2022-07-20 NOTE — PLAN OF CARE
Problem: Discharge Planning  Goal: Discharge to home or other facility with appropriate resources  Outcome: Progressing  Flowsheets (Taken 7/19/2022 2105)  Discharge to home or other facility with appropriate resources:   Identify barriers to discharge with patient and caregiver   Arrange for needed discharge resources and transportation as appropriate   Identify discharge learning needs (meds, wound care, etc)     Problem: Pain  Goal: Verbalizes/displays adequate comfort level or baseline comfort level  Outcome: Progressing  Flowsheets (Taken 7/19/2022 2105)  Verbalizes/displays adequate comfort level or baseline comfort level:   Encourage patient to monitor pain and request assistance   Assess pain using appropriate pain scale   Administer analgesics based on type and severity of pain and evaluate response     Problem: Safety - Adult  Goal: Free from fall injury  Outcome: Progressing  Flowsheets (Taken 7/19/2022 2105)  Free From Fall Injury: Instruct family/caregiver on patient safety  Note: Sitter at bedside for safety. Problem: Skin/Tissue Integrity  Goal: Absence of new skin breakdown  Description: 1. Monitor for areas of redness and/or skin breakdown  2. Assess vascular access sites hourly  3. Every 4-6 hours minimum:  Change oxygen saturation probe site  4. Every 4-6 hours:  If on nasal continuous positive airway pressure, respiratory therapy assess nares and determine need for appliance change or resting period. Outcome: Progressing  Note: No new skin issues noted at this time. See Flowsheets.      Problem: Chronic Conditions and Co-morbidities  Goal: Patient's chronic conditions and co-morbidity symptoms are monitored and maintained or improved  Outcome: Progressing  Flowsheets (Taken 7/19/2022 2105)  Care Plan - Patient's Chronic Conditions and Co-Morbidity Symptoms are Monitored and Maintained or Improved:   Monitor and assess patient's chronic conditions and comorbid symptoms for stability, deterioration, or improvement   Collaborate with multidisciplinary team to address chronic and comorbid conditions and prevent exacerbation or deterioration   Care plan reviewed with patient. Patient verbalizes understanding of the plan of care and contribute to goal setting.

## 2022-07-20 NOTE — PROGRESS NOTES
[address] [address]  INPATIENT PHYSICAL THERAPY  DAILY NOTE  [facility] 7K - 7K-08/008-A    Time In: 3800  Time Out: 3622  Timed Code Treatment Minutes: 30 Minutes  Minutes: 30          Date: 2022  Patient Name: Candy Mauricio,  Gender:  female        MRN: [id number]  : 1940  (80 y.o.)     Referring Practitioner: GLENN Mead  Diagnosis: acute UTI  Additional Pertinent Hx: per ortho: The patient is a 80 y.o. female who presents with above chief complaint. The patient presented to the ED last evening after suffering a fall. Patient reports she was in a hurry and tripped walking into her kitchen. She landed directly on her back, unsure what exactly caused her to fall. She complains primarily of mid back pain. No radicular symptoms or numbness/tingling into the upper or lower extremities. She denies currently having any back pain while laying in bed. She denies bowel or bladder incontinence. CT scanned showed T2 and L5 vertebral compression fractures     Prior Level of Function:  Type of Home: Assisted living ([facility])  Home Layout: One level  Home Access: Level entry  Home Equipment: [employer], 4 wheeled        ADL Assistance: Independent  Homemaking Assistance: Needs assistance  Ambulation Assistance: Needs assistance  Transfer Assistance: Needs assistance  Additional Comments: Per discussion with pt and chart review, pt was indep with ADLs prior to admission. Pt with hx dementia, questionable historian. Restrictions/Precautions:  Restrictions/Precautions: General Precautions, Fall Risk  Required Braces or Orthoses  Cervical: c-collar  Position Activity Restriction  Spinal Precautions: No Bending, No Lifting, No Twisting  Spinal Precautions: cervical precautions  Sternal Precautions: No Pushing, No Pulling  Other position/activity restrictions: T2 & L5 fracture     SUBJECTIVE: RN approved session. Patient laying in bed upon arrival and agreeable to therapy.  Patient's daughter present to observe session. Patient requested to use restroom during session. PAIN: 3/10: back pain    Vitals: Vitals not assessed per clinical judgement, see nursing flowsheet    OBJECTIVE:  Bed Mobility:  Rolling to Left: Minimal Assistance, with head of bed raised, with rail, with verbal cues , with increased time for completion   Supine to Sit: Minimal Assistance, with head of bed raised, with rail, with verbal cues , with increased time for completion  Scooting: Contact Guard Assistance  **educated on log roll technique to maintain spinal precautions    Transfers:  Sit to Stand: Minimal Assistance, with increased time for completion, cues for hand placement, with verbal cues  Stand to [address], to lower to low toilet seat    Ambulation:  [address], with verbal cues , with increased time for completion  Distance: 10ft, 18ft  Surface: Level Tile  Device:4 Wheeled Walker  Gait Deviations: Forward Flexed Posture, Slow Amanda, Decreased Step Length Bilaterally, Decreased Gait Speed, Decreased Heel Strike Bilaterally, Mild Path Deviations, and Unsteady Gait  **patient requested use of her own 4WW, educated on safety concerns with 0OG and recommended use of RW at this time. Balance:  Dynamic Standing Balance: Stand By Assistance, Contact Guard Assistance, X 1, stood for bathroom tasks ~4min    Exercise:  Patient was guided in 1 set(s) 10 reps of exercise to both lower extremities. Ankle pumps, Glut sets, Quad sets, Heelslides, Hip abduction/adduction, and Straight leg raises. Exercises were completed for increased independence with functional mobility. *educated on HEP and provided handouts. Functional Outcome Measures: Completed  AM-PAC Inpatient Mobility without Stair Climbing Raw Score : 14  AM-PAC Inpatient without Stair Climbing T-Scale Score : 40.85    ASSESSMENT:  Assessment: Patient progressing toward established goals.   Activity Tolerance:  Patient tolerance of  treatment: good.      Equipment Recommendations:Equipment Needed: No  Discharge Recommendations: Inpatient Rehabilitation, 24 hour assistance or supervision, and Patient would benefit from continued PT at discharge  Plan: Specific Instructions for Next Treatment: therex and mobility with cervical/back precautions  Plan:  (6X O)  Specific Instructions for Next Treatment: therex and mobility with cervical/back precautions    Patient Education  Patient Education: Plan of Care, Precautions/Restrictions, Family Education, [employer] Mobility, Equipment Education, Transfers, Gait, Up in Chair for Lien Oglesby, Verbal Exercise Instruction    Goals:  Patient goals : not stated  Short Term Goals  Time Frame for Short term goals: by discharge  Short term goal 1: bed mobility with Celestino to get in/out of bed  Short term goal 2: transfer with CGA to get in/out of chairs  Short term goal 3: amb 25'x1 with walker and CGA to walk safely in room  Long Term Goals  Time Frame for Long term goals : no LTGs set seocndary to short ELOS    Following session, patient left in safe position with all fall risk precautions in place.

## 2022-07-20 NOTE — CONSULTS
Physical Medicine & Rehabilitation   Consult Note      Admitting Physician: Johanna Poole MD    Primary Care Provider: PETEY Yeung CNP     Reason for Consult: Parkinson's disease with frequent falls; status post fall with resultant left 9th and 10th rib fractures, closed compression fracture of L3 lumbar vertebrae and closed wedge compression fracture of L2 vertebrae; UTI; rehab needs    History of Present Illness:  Jorge Kim is a 80 y.o. female admitted to [address] [place] [address] on 7/17/2022 for trauma consult, after having been brought by EMS following a mechanical or syncopal fall at [facility] with unknown LOC. Her past medical history is positive for chronic DVT, COPD, diverticulosis, fibroids, sleep apnea, UC, hyperlipidemia, hypertension, and other comorbidities. Per report, patient had gone out of her apartment and went back in to get a coat when she tripped over a chair. She states she did not pass out. Daughter at bedside states she believes it might have been a syncopal episode. Patient reports falling directly onto her back with immediate upper and lower back pain. Patient states she has had urinary frequency but denies dysuria or foul-smelling urine. Patient reports chronic tremor. Patient denies chest pain, shortness of breath, cough, headache, dizziness, lightheadedness, numbness, paraesthesias, weakness, chills, fevers, abdominal pain, nausea, vomiting, neck pain. Care discussed and in coordination with trauma surgeon Dr. Calderon Vazquez. When seen and evaluated per myself today, 7/20/2022, the patient was in her room with a sitter. She is wearing a lumbosacral corset and a hard cervical collar.       Current Rehabilitation Assessments:  PT:       OBJECTIVE:  Bed Mobility:  Rolling to Left: Minimal Assistance, with head of bed raised, with rail, with verbal cues , with increased time for completion   Supine to Sit: Minimal Assistance, with head of bed raised, with rail, with verbal cues , with increased time for completion  Scooting: Contact Guard Assistance  **educated on log roll technique to maintain spinal precautions     Transfers:  Sit to Stand: Minimal Assistance, with increased time for completion, cues for hand placement, with verbal cues  Stand to [address], to lower to low toilet seat     Ambulation:  [employer], with verbal cues , with increased time for completion  Distance: 10ft, 18ft  Surface: Level Tile  Device:4 Wheeled Walker  Gait Deviations: Forward Flexed Posture, Slow Amanda, Decreased Step Length Bilaterally, Decreased Gait Speed, Decreased Heel Strike Bilaterally, Mild Path Deviations, and Unsteady Gait  **patient requested use of her own 4WW, educated on safety concerns with 8JP and recommended use of RW at this time. Balance:  Dynamic Standing Balance: Stand By Assistance, Contact Guard Assistance, X 1, stood for bathroom tasks ~4min     Exercise:  Patient was guided in 1 set(s) 10 reps of exercise to both lower extremities. Ankle pumps, Glut sets, Quad sets, Heelslides, Hip abduction/adduction, and Straight leg raises. Exercises were completed for increased independence with functional mobility. *educated on HEP and provided handouts. Functional Outcome Measures: Completed  AM-PAC Inpatient Mobility without Stair Climbing Raw Score : 14  AM-PAC Inpatient without Stair Climbing T-Scale Score : 40.85     ASSESSMENT:  Assessment: Patient progressing toward established goals. Activity Tolerance:  Patient tolerance of  treatment: good.       Equipment Recommendations:Equipment Needed: No  Discharge Recommendations: Inpatient Rehabilitation, 24 hour assistance or supervision, and Patient would benefit from continued PT at discharge      OT:       COGNITION: Slow Processing, Decreased Recall, Decreased Insight, Impaired Memory, Inattention, Decreased Problem Solving, Decreased Safety Awareness, Impaired Attention, Difficulty Following Commands, and Impulsive     RANGE OF MOTION:  Bilateral Upper Extremity:  WFL     STRENGTH:  Bilateral Upper Extremity:  Not Tested     SENSATION:   WFL     ADL:   Bathing: Maximum Assistance. While seated EOB. Max A for LB bathing and max VC for initiation/sequencing  Upper Extremity Dressing: Maximum Assistance. For gown mgmt  Lower Extremity Dressing: Maximum Assistance and X 2. To don briefs  Toileting: Maximum Assistance. For ronnie care after BM . BALANCE:  Sitting Balance:  Contact Guard Assistance. Seated EOB, confused and reaching out while seated  Standing Balance: Minimal Assistance, X 2. With BUE support, pt stood at 4WW with increased instability and confusion. Pt demoed improved balance with 2WW     BED MOBILITY:  Rolling to Left: Maximum Assistance    Rolling to Right: Maximum Assistance    Supine to Sit: Moderate Assistance with increased time and frequent VC for sequencing task  Sit to Supine: Maximum Assistance, X 2 x1 to guide BLE onto bed and x1 to guide UB  Scooting: Minimal Assistance to scoot towards EOB     TRANSFERS:  Sit to Stand:  Minimal Assistance, X 2. From EOB  Stand to Sit: Minimal Assistance, X 2. To EOB     FUNCTIONAL MOBILITY:  Assistive Device: Rolling Walker and HHA  Assist Level:  Minimal Assistance and X 1- X2  Distance:  x2 trials of mobility, 4 steps during first trial, 4' forward and retro second trial  Very unsteady with occasional posterior lean, tendencies to reach out. Max VC for attn to task, initiation, and sequencing        Activity Tolerance:  Patient tolerance of  treatment: fair. Limited by confusion       Assessment:  Assessment: Pt presented with the listed deficits s/p admission with rib fx and T2 & L5 fx. Pt with decreased cognition, balance, endurance, and safety and currently requires +2 A for ADLs and IADLs.  Pt would benefit from continued OT to restore PLOF maximize pt's indep with ADLs and IADLs in prep for a safe return home at max level of indep. Performance deficits / Impairments: Decreased functional mobility , Decreased safe awareness, Decreased balance, Decreased posture, Decreased ADL status, Decreased endurance, Decreased high-level IADLs, Decreased strength  Prognosis: Good  REQUIRES OT FOLLOW-UP: Yes  Decision Making: High Complexity      ST: Evaluation pending;  2 attempts thus far resulted in missed treatments        Past Medical History:        Diagnosis Date    Abnormal nuclear stress test 06/01/2018    Cataract     Chronic deep vein thrombosis (DVT) of femoral vein of left lower extremity (HCC) 3/9/2021    Colon polyps     COPD (chronic obstructive pulmonary disease) (HCC)     Diverticulosis large intestine w/o perforation or abscess w/bleeding     DVT, recurrent, lower extremity, acute, left (HCC)     Fibroid, uterine     Hearing loss     Hx of blood clots     Hyperlipidemia     Nicotine dependence     Nocturnal hypoxemia     Postmenopausal     Sleep apnea     Ulcerative colitis ([facility] [address]     with rectal bleeding        Past Surgical History:        Procedure Laterality Date    BREAST BIOPSY  1988    right breast biopsy     BREAST BIOPSY Right 03/2011    stereotactic biopsy    COLONOSCOPY  2006    Dr Mu Young  2010    Dr Aamir Silvestre    COLONOSCOPY  2010    Dr Debra Solis interminate colitis    COLONOSCOPY  03/03/2014    Dr Jennifer Jones Bilateral 2011 2012    HIP SURGERY  06/13/2020    left fracture    OSTEOTOMY Left 11/2/2021    LEFT 5TH METATARSAL HEAD RESECTION EXCISION SOFT TISSUE LESION performed by Lisa Townsend DPM at [address]       Allergies:     Allergies   Allergen Reactions    Azulfidine [Sulfasalazine] Diarrhea    Keflex [Cephalexin] Diarrhea    Mesalamine Other (See Comments)    Sulfa Antibiotics     Alcohol Nausea And Vomiting     Drinking alcohol makes her sick and gives her headaches        Current Medications:   Current Facility-Administered Medications: famotidine (PEPCID) tablet 20 mg, 20 mg, Oral, Daily  0.9 % sodium chloride infusion, , IntraVENous, Continuous  sodium chloride flush 0.9 % injection 5-40 mL, 5-40 mL, IntraVENous, 2 times per day  sodium chloride flush 0.9 % injection 5-40 mL, 5-40 mL, IntraVENous, PRN  0.9 % sodium chloride infusion, 25 mL, IntraVENous, PRN  acetaminophen (TYLENOL) tablet 650 mg, 650 mg, Oral, Q4H PRN  ondansetron (ZOFRAN-ODT) disintegrating tablet 4 mg, 4 mg, Oral, Q8H PRN **OR** ondansetron (ZOFRAN) injection 4 mg, 4 mg, IntraVENous, Q6H PRN  polyethylene glycol (GLYCOLAX) packet 17 g, 17 g, Oral, Daily  bisacodyl (DULCOLAX) suppository 10 mg, 10 mg, Rectal, Daily  senna (SENOKOT) tablet 8.6 mg, 1 tablet, Oral, Daily PRN  morphine (PF) injection 2 mg, 2 mg, IntraVENous, Q2H PRN **OR** morphine injection 4 mg, 4 mg, IntraVENous, Q2H PRN  oxyCODONE (ROXICODONE) immediate release tablet 5 mg, 5 mg, Oral, Q4H PRN **OR** oxyCODONE (ROXICODONE) immediate release tablet 10 mg, 10 mg, Oral, Q4H PRN  cefTRIAXone (ROCEPHIN) 1000 mg IVPB in 50 mL D5W minibag, 1,000 mg, IntraVENous, Q24H  lactobacillus (CULTURELLE) capsule 1 capsule, 1 capsule, Oral, Daily with breakfast  albuterol sulfate HFA (PROVENTIL;VENTOLIN;PROAIR) 108 (90 Base) MCG/ACT inhaler 2 puff, 2 puff, Inhalation, Q6H PRN  donepezil (ARICEPT) tablet 10 mg, 10 mg, Oral, Nightly  ipratropium (ATROVENT) 0.02 % nebulizer solution 0.5 mg, 0.5 mg, Nebulization, TID PRN  lisinopril (PRINIVIL;ZESTRIL) tablet 20 mg, 20 mg, Oral, Daily  metoprolol tartrate (LOPRESSOR) tablet 25 mg, 25 mg, Oral, BID  multivitamin 1 tablet, 1 tablet, Oral, Daily  predniSONE (DELTASONE) tablet 10 mg, 10 mg, Oral, Daily  psyllium (KONSYL) 28.3 % packet 1 packet, 1 packet, Oral, Daily  atorvastatin (LIPITOR) tablet 20 mg, 20 mg, Oral, Nightly  tiotropium-olodaterol (STIOLTO) 2.5-2.5 MCG/ACT inhaler 2 puff, 2 puff, Inhalation, Daily  vitamin B-12 (CYANOCOBALAMIN) tablet 1,000 mcg, 1,000 mcg, Oral, Daily  ascorbic acid (VITAMIN C) tablet 1,000 mg, 1,000 mg, Oral, Daily  nicotine (NICODERM CQ) 21 MG/24HR 1 patch, 1 patch, TransDERmal, Daily  enoxaparin (LOVENOX) injection 40 mg, 40 mg, SubCUTAneous, Daily  lidocaine 4 % external patch 2 patch, 2 patch, TransDERmal, Daily    Social History:  Social History     Socioeconomic History    Marital status:      Spouse name: Not on file    Number of children: Not on file    Years of education: Not on file    Highest education level: Not on file   Occupational History    Not on file   Tobacco Use    Smoking status: Some Days     Packs/day: 1.00     Years: 40.00     Pack years: 40.00     Types: Cigarettes    Smokeless tobacco: Never    Tobacco comments:     8-10 cigarettes / day   Vaping Use    Vaping Use: Never used   Substance and Sexual Activity    Alcohol use: No    Drug use: Never    Sexual activity: Not Currently   Other Topics Concern    Not on file   Social History Narrative    Not on file     Social Determinants of Health     Financial Resource Strain: Low Risk     Difficulty of Paying Living Expenses: Not hard at all   Food Insecurity: No Food Insecurity    Worried About Running Out of Food in the Last Year: Never true    Ran Out of Food in the Last Year: Never true   Transportation Needs: Not on file   Physical Activity: Insufficiently Active    Days of Exercise per Week: 5 days    Minutes of Exercise per Session: 20 min   Stress: Not on file   Social Connections: Not on file   Intimate Partner Violence: Not on file   Housing Stability: Not on file     Type of Home: Assisted living ([facility])  Home Layout: One level  Home Access: Level entry  Home Equipment: Betty Rhianna, 4 wheeled      ADL Assistance: Independent  Homemaking Assistance: Needs assistance  Ambulation Assistance: Needs assistance  Transfer Assistance: Needs assistance     Additional Comments: Per discussion with pt and chart review, pt was indep with ADLs prior to admission.  Pt with hx dementia, questionable historian.       Family History:       Problem Relation Age of Onset    Diabetes Mother     High Blood Pressure Mother     Heart Disease Mother     Coronary Art Dis Father     Heart Disease Father        Review of Systems:  CONSTITUTIONAL:  positive for  fatigue  EYES:  negative  HEENT:  positive for hushed voice; masked facies  RESPIRATORY:  negative  CARDIOVASCULAR:  negative  GASTROINTESTINAL: Diverticulosis: Ulcerative colitis with rectal bleeding  GENITOURINARY:  positive for frequency  SKIN:  bruising  HEMATOLOGIC/LYMPHATIC:  negative  MUSCULOSKELETAL:  positive for  arthralgias, pain, muscle weakness, bone pain, and rib and vertebral fractures as above  NEUROLOGICAL:  positive for memory problems, speech problems, gait problems, tremor, weakness, pain, and bradykinesia, episodes of \"freezing\" during gait, decreased balance, frequent falls, and increased tone/cog-wheeling at wrists bilaterally especially with opening and closing opposite hands   BEHAVIOR/PSYCH:  positive for decreased energy level, poor concentration, fatigue, and anxiety  System review otherwise negative      Physical Exam:  BP 91/71   Pulse 83   Temp 97.5 °F (36.4 °C) (Oral)   Resp 16   Ht 5' 5\" (1.651 m)   Wt 136 lb (61.7 kg)   SpO2 95%   BMI 22.63 kg/m²   awake  Orientation:   person, place  Mood: within normal limits  Affect: calm and quiet  General appearance: well groomed and in no acute distress    Memory:   abnormal - ,   Attention/Concentration: abnormal -   Language:  abnormal - hushed voice    Cranial Nerves:  cranial nerves II-XII are grossly intact  ROM:  normal  Motor Exam:  Motor exam is symmetrical 4out of 5 all extremities bilaterally  Tone:  abnormal - increased  Muscle bulk: within normal limits  Sensory:  Sensory intact  Coordination:   abnormal -   Gait: Forward Flexed Posture, Slow Amanda, Decreased Step Length Bilaterally, Decreased Gait Speed, Decreased Heel Strike Bilaterally, Mild Path Deviations, and Unsteady Gait    Heart:  normal rate and regular rhythm  Lungs: clear to auscultation without wheezes or rales  Abdomen: soft, non-tender, non-distended, normal bowel sounds, no masses or organomegaly    Skin: warm and dry  Peripheral vascular: Pulses: Normal upper and lower extremity pulses; Edema: trace      Diagnostics:  Recent Results (from the past 24 hour(s))   Basic Metabolic Panel w/ Reflex to MG    Collection Time: 07/20/22  9:14 AM   Result Value Ref Range    Sodium 140 135 - 145 meq/L    Potassium reflex Magnesium 3.6 3.5 - 5.2 meq/L    Chloride 107 98 - 111 meq/L    CO2 22 (L) 23 - 33 meq/L    Glucose 126 (H) 70 - 108 mg/dL    BUN 17 7 - 22 mg/dL    CREATININE 0.6 0.4 - 1.2 mg/dL    Calcium 8.6 8.5 - 10.5 mg/dL   CBC auto differential    Collection Time: 07/20/22  9:14 AM   Result Value Ref Range    WBC 14.1 (H) 4.8 - 10.8 thou/mm3    RBC 4.51 4.20 - 5.40 mill/mm3    Hemoglobin 13.7 12.0 - 16.0 gm/dl    Hematocrit 43.9 37.0 - 47.0 %    MCV 97.3 81.0 - 99.0 fL    MCH 30.4 26.0 - 33.0 pg    MCHC 31.2 (L) 32.2 - 35.5 gm/dl    RDW-CV 15.6 (H) 11.5 - 14.5 %    RDW-SD 56.0 (H) 35.0 - 45.0 fL    Platelets 237 327 - 585 thou/mm3    MPV 10.1 9.4 - 12.4 fL    Seg Neutrophils 73.4 %    Lymphocytes 16.7 %    Monocytes 7.4 %    Eosinophils 1.3 %    Basophils 0.5 %    Immature Granulocytes 0.7 %    Segs Absolute 10.3 (H) 1.8 - 7.7 thou/mm3    Lymphocytes Absolute 2.4 1.0 - 4.8 thou/mm3    Monocytes Absolute 1.0 0.4 - 1.3 thou/mm3    Eosinophils Absolute 0.2 0.0 - 0.4 thou/mm3    Basophils Absolute 0.1 0.0 - 0.1 thou/mm3    Immature Grans (Abs) 0.10 (H) 0.00 - 0.07 thou/mm3    nRBC 0 /100 wbc   Anion Gap    Collection Time: 07/20/22  9:14 AM   Result Value Ref Range    Anion Gap 11.0 8.0 - 16.0 meq/L   Glomerular Filtration Rate, Estimated    Collection Time: 07/20/22  9:14 AM   Result Value Ref Range    Est, Glom Filt Rate >90 ml/min/1.73m2           Impression:  Newly diagnosed Parkinson's disease; discussed extensively with patient and her daughter Horald Dubin) @ [phone]  Dementia, 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 coli; given Rocephin x1 dose at admission and will continue daily for 4 additional days (through 7/21/2022 )  Cataract  Chronic and acute (recurrent) DVT of femoral vein in left lower extremity  HFpEF  Colonic polyps  COPD  Diverticulosis large intestine without perforation or abscess but with bleeding  Fibroid,, uterine  Hearing loss  History of blood clots  Hyperlipidemia  Hypertension  Nicotine dependence  Nocturnal hypoxemia  Postmenopausal  Obstructive sleep apnea  Osteopenia, marked  Thyromegaly with left thyroid nodule  Ulcerative colitis with rectal bleeding      Recommendations:  Sinemet 25 100 every 4 hours while awake  Continue Aricept as currently ordered  Will follow her progress with therapies to determine most appropriate disposition; patient is currently unable to tolerate therapies secondary to severe confusion and need for sitter;     Spent 72 minutes interviewing and examining the patient, discussing the case with the patient and her daughter at great length, ordering medication, and completing documentation. It was my pleasure to evaluate Julissa Farias today. Please call with questions.     Ann Ambrocio MD

## 2022-07-20 NOTE — RT PROTOCOL NOTE
RT Inhaler-Nebulizer Bronchodilator Protocol Note    There is a bronchodilator order in the chart from a provider indicating to follow the RT Bronchodilator Protocol and there is an Initiate RT Inhaler-Nebulizer Bronchodilator Protocol order as well (see protocol at bottom of note). CXR Findings:  No results found. The findings from the last RT Protocol Assessment were as follows:   History Pulmonary Disease: Chronic pulmonary disease  Respiratory Pattern: Regular pattern and RR 12-20 bpm  Breath Sounds: Clear breath sounds  Cough: Strong, spontaneous, non-productive  Indication for Bronchodilator Therapy:    Bronchodilator Assessment Score: 2    Aerosolized bronchodilator medication orders have been revised according to the RT Inhaler-Nebulizer Bronchodilator Protocol below. Respiratory Therapist to perform RT Therapy Protocol Assessment initially then follow the protocol. Repeat RT Therapy Protocol Assessment PRN for score 0-3 or on second treatment, BID, and PRN for scores above 3. No Indications - adjust the frequency to every 6 hours PRN wheezing or bronchospasm, if no treatments needed after 48 hours then discontinue using Per Protocol order mode. If indication present, adjust the RT bronchodilator orders based on the Bronchodilator Assessment Score as indicated below. Use Inhaler orders unless patient has one or more of the following: on home nebulizer, not able to hold breath for 10 seconds, is not alert and oriented, cannot activate and use MDI correctly, or respiratory rate 25 breaths per minute or more, then use the equivalent nebulizer order(s) with same Frequency and PRN reasons based on the score. If a patient is on this medication at home then do not decrease Frequency below that used at home.     0-3 - enter or revise RT bronchodilator order(s) to equivalent RT Bronchodilator order with Frequency of every 4 hours PRN for wheezing or increased work of breathing using Per Protocol order mode. 4-6 - enter or revise RT Bronchodilator order(s) to two equivalent RT bronchodilator orders with one order with BID Frequency and one order with Frequency of every 4 hours PRN wheezing or increased work of breathing using Per Protocol order mode. 7-10 - enter or revise RT Bronchodilator order(s) to two equivalent RT bronchodilator orders with one order with TID Frequency and one order with Frequency of every 4 hours PRN wheezing or increased work of breathing using Per Protocol order mode. 11-13 - enter or revise RT Bronchodilator order(s) to one equivalent RT bronchodilator order with QID Frequency and an Albuterol order with Frequency of every 4 hours PRN wheezing or increased work of breathing using Per Protocol order mode. Greater than 13 - enter or revise RT Bronchodilator order(s) to one equivalent RT bronchodilator order with every 4 hours Frequency and an Albuterol order with Frequency of every 2 hours PRN wheezing or increased work of breathing using Per Protocol order mode. RT to enter RT Home Evaluation for COPD & MDI Assessment order using Per Protocol order mode.     Electronically signed by Ginna Joy RCP on 7/20/2022 at 8:18 AM

## 2022-07-20 NOTE — CARE COORDINATION
7/20/22, 2:59 PM EDT    DISCHARGE ON GOING [address] day: 3  [id number]  Procedure:  CT cervical spine:  Mild compression fracture of T2. Multilevel degenerative changes of the cervical spine. Thyromegaly. Left thyroid nodule. CT chest:  Mild acute compression fracture of T2. No pulmonary consolidation. No pneumothorax. Left lower rib fractures are seen. CT abdomen:  Cholelithiasis. Increase sclerosis within the sacrum relates to sacral insufficiency fractures. Acute compression fracture of L5   Barriers to Discharge:  sitter at [facility], needs removed before tx to IP rehab     Patient Goals/Plan/Treatment Preferences:planning IP Rehab when sitter removed and med ready.

## 2022-07-21 LAB
ANION GAP SERPL CALCULATED.3IONS-SCNC: 13 MEQ/L (ref 8–16)
BASOPHILS # BLD: 0.5 %
BASOPHILS ABSOLUTE: 0.1 THOU/MM3 (ref 0–0.1)
BUN BLDV-MCNC: 19 MG/DL (ref 7–22)
CALCIUM SERPL-MCNC: 9.9 MG/DL (ref 8.5–10.5)
CHLORIDE BLD-SCNC: 107 MEQ/L (ref 98–111)
CO2: 22 MEQ/L (ref 23–33)
CREAT SERPL-MCNC: 0.6 MG/DL (ref 0.4–1.2)
EOSINOPHIL # BLD: 1.2 %
EOSINOPHILS ABSOLUTE: 0.1 THOU/MM3 (ref 0–0.4)
ERYTHROCYTE [DISTWIDTH] IN BLOOD BY AUTOMATED COUNT: 15.6 % (ref 11.5–14.5)
ERYTHROCYTE [DISTWIDTH] IN BLOOD BY AUTOMATED COUNT: 57.5 FL (ref 35–45)
GFR SERPL CREATININE-BSD FRML MDRD: > 90 ML/MIN/1.73M2
GLUCOSE BLD-MCNC: 101 MG/DL (ref 70–108)
HCT VFR BLD CALC: 46.5 % (ref 37–47)
HEMOGLOBIN: 14.3 GM/DL (ref 12–16)
IMMATURE GRANS (ABS): 0.08 THOU/MM3 (ref 0–0.07)
IMMATURE GRANULOCYTES: 0.7 %
LYMPHOCYTES # BLD: 23.2 %
LYMPHOCYTES ABSOLUTE: 2.8 THOU/MM3 (ref 1–4.8)
MCH RBC QN AUTO: 30.6 PG (ref 26–33)
MCHC RBC AUTO-ENTMCNC: 30.8 GM/DL (ref 32.2–35.5)
MCV RBC AUTO: 99.4 FL (ref 81–99)
MONOCYTES # BLD: 8 %
MONOCYTES ABSOLUTE: 1 THOU/MM3 (ref 0.4–1.3)
NUCLEATED RED BLOOD CELLS: 0 /100 WBC
PLATELET # BLD: 263 THOU/MM3 (ref 130–400)
PMV BLD AUTO: 9.8 FL (ref 9.4–12.4)
POTASSIUM REFLEX MAGNESIUM: 5.1 MEQ/L (ref 3.5–5.2)
RBC # BLD: 4.68 MILL/MM3 (ref 4.2–5.4)
SEG NEUTROPHILS: 66.4 %
SEGMENTED NEUTROPHILS ABSOLUTE COUNT: 7.9 THOU/MM3 (ref 1.8–7.7)
SODIUM BLD-SCNC: 142 MEQ/L (ref 135–145)
WBC # BLD: 11.9 THOU/MM3 (ref 4.8–10.8)

## 2022-07-21 PROCEDURE — 99223 1ST HOSP IP/OBS HIGH 75: CPT | Performed by: PHYSICIAN ASSISTANT

## 2022-07-21 PROCEDURE — 97530 THERAPEUTIC ACTIVITIES: CPT

## 2022-07-21 PROCEDURE — 6370000000 HC RX 637 (ALT 250 FOR IP): Performed by: PHYSICIAN ASSISTANT

## 2022-07-21 PROCEDURE — 6370000000 HC RX 637 (ALT 250 FOR IP)

## 2022-07-21 PROCEDURE — 6360000002 HC RX W HCPCS: Performed by: PHYSICIAN ASSISTANT

## 2022-07-21 PROCEDURE — 2580000003 HC RX 258

## 2022-07-21 PROCEDURE — 2580000003 HC RX 258: Performed by: PHYSICIAN ASSISTANT

## 2022-07-21 PROCEDURE — 6360000002 HC RX W HCPCS

## 2022-07-21 PROCEDURE — 92523 SPEECH SOUND LANG COMPREHEN: CPT

## 2022-07-21 PROCEDURE — 94010 BREATHING CAPACITY TEST: CPT

## 2022-07-21 PROCEDURE — 99232 SBSQ HOSP IP/OBS MODERATE 35: CPT | Performed by: SURGERY

## 2022-07-21 PROCEDURE — 1200000000 HC SEMI PRIVATE

## 2022-07-21 PROCEDURE — APPSS15 APP SPLIT SHARED TIME 0-15 MINUTES: Performed by: NURSE PRACTITIONER

## 2022-07-21 PROCEDURE — 94799 UNLISTED PULMONARY SVC/PX: CPT

## 2022-07-21 PROCEDURE — 85025 COMPLETE CBC W/AUTO DIFF WBC: CPT

## 2022-07-21 PROCEDURE — 97110 THERAPEUTIC EXERCISES: CPT

## 2022-07-21 PROCEDURE — 80048 BASIC METABOLIC PNL TOTAL CA: CPT

## 2022-07-21 PROCEDURE — 94640 AIRWAY INHALATION TREATMENT: CPT

## 2022-07-21 PROCEDURE — 36415 COLL VENOUS BLD VENIPUNCTURE: CPT

## 2022-07-21 RX ADMIN — ENOXAPARIN SODIUM 40 MG: 100 INJECTION SUBCUTANEOUS at 09:05

## 2022-07-21 RX ADMIN — ATORVASTATIN CALCIUM 20 MG: 20 TABLET, FILM COATED ORAL at 21:32

## 2022-07-21 RX ADMIN — OXYCODONE HYDROCHLORIDE AND ACETAMINOPHEN 1000 MG: 500 TABLET ORAL at 09:05

## 2022-07-21 RX ADMIN — SODIUM CHLORIDE, PRESERVATIVE FREE 10 ML: 5 INJECTION INTRAVENOUS at 09:06

## 2022-07-21 RX ADMIN — METOPROLOL TARTRATE 25 MG: 25 TABLET, FILM COATED ORAL at 21:32

## 2022-07-21 RX ADMIN — CEFTRIAXONE SODIUM 1000 MG: 1 INJECTION, POWDER, FOR SOLUTION INTRAMUSCULAR; INTRAVENOUS at 13:32

## 2022-07-21 RX ADMIN — Medication 1 CAPSULE: at 09:08

## 2022-07-21 RX ADMIN — FAMOTIDINE 20 MG: 20 TABLET ORAL at 09:05

## 2022-07-21 RX ADMIN — TIOTROPIUM BROMIDE AND OLODATEROL 2 PUFF: 3.124; 2.736 SPRAY, METERED RESPIRATORY (INHALATION) at 08:47

## 2022-07-21 RX ADMIN — METOPROLOL TARTRATE 25 MG: 25 TABLET, FILM COATED ORAL at 09:07

## 2022-07-21 RX ADMIN — ACETAMINOPHEN 325MG 650 MG: 325 TABLET ORAL at 09:05

## 2022-07-21 RX ADMIN — PSYLLIUM HUSK 1 PACKET: 3.4 GRANULE ORAL at 09:06

## 2022-07-21 RX ADMIN — SODIUM CHLORIDE, PRESERVATIVE FREE 10 ML: 5 INJECTION INTRAVENOUS at 21:31

## 2022-07-21 RX ADMIN — POLYETHYLENE GLYCOL 3350 17 G: 17 POWDER, FOR SOLUTION ORAL at 09:05

## 2022-07-21 RX ADMIN — DONEPEZIL HYDROCHLORIDE 10 MG: 10 TABLET, FILM COATED ORAL at 21:32

## 2022-07-21 RX ADMIN — LISINOPRIL 20 MG: 20 TABLET ORAL at 09:05

## 2022-07-21 RX ADMIN — PREDNISONE 10 MG: 10 TABLET ORAL at 09:05

## 2022-07-21 RX ADMIN — Medication 1 TABLET: at 09:05

## 2022-07-21 RX ADMIN — Medication 1000 MCG: at 09:05

## 2022-07-21 ASSESSMENT — PAIN SCALES - GENERAL
PAINLEVEL_OUTOF10: 0
PAINLEVEL_OUTOF10: 0

## 2022-07-21 NOTE — PROGRESS NOTES
Ally Paz  Daily Progress Note    Pt Name: Bruno Childers  Medical Record Number: [id number]  Date of Birth 1940   Today's Date: 7/21/2022    HD: # 4    CC: Back pain    ASSESSMENT  1. Active Hospital Problems    Diagnosis Date Noted    Fall [W19. XXXA] 07/18/2022     Priority: Medium    Fracture of ribs, two, left, closed, initial encounter [S22.42XA] 07/17/2022     Priority: Medium    Closed compression fracture of fifth lumbar vertebra ([address] [S32.050A] 07/17/2022     Priority: Medium    Compression fracture of T2 vertebra ([address] [S22.020A] 07/17/2022     Priority: Medium    UTI (urinary tract infection) [N39.0] 07/17/2022     Priority: Medium         PLAN  Patient admitted under [address], possible syncope              -PT/OT continue to treat              -Consult hospitalist for further work-up   -TSH, BNP, troponin ordered by hospitalist. Troponin slightly elevated at 0.011, hospitalist to manage. T2 and L5 compression fractures              -Orthopedic spine consulted for further management              -PT/OT continue to treat              -Pain control   -7/18: Ortho spine ordered MRI to determine what type of brace is needed. Lumbar brace vs c-collar to cover the T2 VCF. Further recs pending MRI results   - 7/19: C-collar and lumbar corset braces to be worn when out of bed when ambulating. Follow up in 4 weeks in office     Left ninth/10th rib fractures              -Rib fracture protocol              -Flexeril              -Pain control              -Lidoderm patches              -Incentive spirometry/cough and deep breathing              -Monitor respiratory status   -7/18: CXR this AM with no pleural effusions or pneumothorax. Patient not reporting chest pain and shortness of breath. Respirations unlabored   -7/20: Meeting respiratory indices on rib fracture protocol   -7/21: Continues to meet respiratory indices. Symptomatic UTI              -Bacteriuria and leukocyte Estrace on UA. Rocephin given in the ED              -Consult hospitalist for further management. On prednisone   - 7/18: Hospitalist managing with Rocephin     Leukocytosis              -Reactive versus infective possible urinary source              -Afebrile, repeat labs in a.m.   -7/18: Leukocytosis improving, 12.7. On Recephin   -7/20: Worsening white count to 14.1 this AM. Continues on Rocephin. Hospitalist following   -7/21: Improved WBC down to 11.9, afebrile last 24 hours. Thyroid nodule with thyromegaly              -Consider thyroid ultrasound               -Hospitalist for further evaluation. TSH normal    Chronic Issues: Hx DVT, COPD, blood clots, hyperlipidemia, sleep apnea, abnormal stress test, and ulcerative colitis   - Hospitalist following for assistance with medical management   - Home medications restarted   - Hx of blood clots, no home anticoagulation or antiplatelets    Hospitalist consulted for reported new confusion and hallucinations 7/19. Neurology consulted for assistance with confusion. EEG ordered and pending. Consults orthopedic spine, hospitalist, neurology, PM&R     Pain Management              -Morphine, Oxy IR     Prophylaxis: SCD's, Incentive Spirometry, GlycoLax, Pepcid, Zofran   - Lovenox start 7/18     Regular diet     IVF Management  Regular Neurovascular Checks  Repeat Labs Tomorrow AM  PT/OT/SLP continue to treat     Planned Discharge discharge pending clinical course    - From [place], following therapy recommendations for safe discharge planning   - PM&R consulted for rehab recommendations. PT recommending IPR, OT recommending SNF   - 7/21: PM&R recommending SNF. SUBJECTIVE  Patient seen on 7K this morning. She remains pleasantly confused with jerking tremors of her extremities. Sitter at bedside. She continues to be unsteady on her feet and requires assistance with ambulation.   The hospitalist service and neurology service are assisting with medical management. Ortho spine has signed off and will follow her as an outpatient. She continues to wear the lumbar brace and cervical brace when she is out of bed. She has a good appetite and is eating fairly well. Rocephin continues for treatment of UTI.  is working on placement with family. Final impression and plan per Dr. Estephania Hopkins    Wt Readings from Last 3 Encounters:   07/21/22 136 lb 2 oz (61.7 kg)   07/18/22 140 lb (63.5 kg)   06/06/22 135 lb (61.2 kg)     Temp Readings from Last 3 Encounters:   07/21/22 97.4 °F (36.3 °C) (Oral)   04/28/22 97.6 °F (36.4 °C) (Oral)   04/24/22 98.5 °F (36.9 °C)     BP Readings from Last 3 Encounters:   07/21/22 (!) 149/53   06/15/22 110/62   06/06/22 128/68     Pulse Readings from Last 3 Encounters:   07/21/22 77   06/15/22 64   06/06/22 73       24 HR INTAKE/OUTPUT :   Intake/Output Summary (Last 24 hours) at 7/21/2022 1221  Last data filed at 7/21/2022 1158  Gross per 24 hour   Intake 480 ml   Output --   Net 480 ml       ADULT DIET; Regular  ADULT ORAL NUTRITION SUPPLEMENT; Breakfast, Lunch, Dinner; Diabetic Oral Supplement    OBJECTIVE  CURRENT VITALS BP (!) 149/53   Pulse 77   Temp 97.4 °F (36.3 °C) (Oral)   Resp 18   Ht 5' 5\" (1.651 m)   Wt 136 lb 2 oz (61.7 kg)   SpO2 94%   BMI 22.65 kg/m²   GENERAL: Awake, alert, no acute distress, pleasant and cooperative with exam  HEENT: Normocephalic, pupils equal and reactive to light, nares patent bilaterally  NEURO: Alert and orient x1, follows commands, PMS intact in all four extremities, no signs of focal neurological deficits  CSPINE/BACK: [place] collar in place  HEART: Regular rate and rhythm with no obvious murmurs, rubs, gallops. Distal pulses intact. LUNGS/CHEST WALL: Lungs are clear to auscultation bilaterally with no wheezes, rales, rhonchi. No respiratory distress or increased work of breathing.   No chest wall tenderness to palpation. ABDOMEN: Abdomen soft, nondistended, with no tenderness to palpation. No guarding or peritoneal signs. Bowel sounds normal active  EXTREMITIES: No cyanosis. PMS intact in all four extremities. No extremity tenderness to palpation. Range of motion intact. Strength 5/5 bilaterally with  strength and plantar/dorsiflexion. SKIN: Warm and dry      LABS  CBC :   Recent Labs     07/19/22  0543 07/20/22  0914 07/21/22  0623   WBC 12.4* 14.1* 11.9*   HGB 14.8 13.7 14.3   HCT 46.9 43.9 46.5   MCV 96.9 97.3 99.4*    260 263       BMP:   Recent Labs     07/19/22  0543 07/20/22  0914 07/21/22  0604    140 142   K 3.9 3.6 5.1    107 107   CO2 22* 22* 22*   BUN 12 17 19   CREATININE 0.5 0.6 0.6       COAGS:   No results for input(s): APTT, PROT, INR in the last 72 hours. Pancreas/HFP:  No results for input(s): LIPASE, AMYLASE in the last 72 hours. Culture, Reflexed, Urine [[id number]] (Abnormal)     Collected: 07/17/22 2040    Updated: 07/18/22 2220    Specimen Source: Urine     Organism Escherichia coli Abnormal     Urine Culture Reflex Hewitt count: >100,000 CFU/mL    Narrative:     Source: urine       Site: unknown collect          Current Antibiotics: Ceftriaxone     RADIOLOGY  CT ABDOMEN PELVIS WO CONTRAST Additional Contrast? None    Result Date: 7/17/2022  PROCEDURE: CT ABDOMEN PELVIS WO CONTRAST CLINICAL INFORMATION: left flank pain COMPARISON: None TECHNIQUE: Helical CT acquisition of the abdomen and pelvis was performed without intravenous contrast. Multiplanar reformats are provided. All CT scans at this facility use dose modulation, iterative reconstruction, and/or weight based dosing when appropriate to reduce the radiation dose to as low as reasonably achievable. FINDINGS: The noncontrast CT limits the evaluation for solid organ pathology, vascular pathology, and lymphadenopathy. The chest has been described on the concurrent CT of the chest. Gallstone is present. Hypodensities in the liver likely hepatic cysts. Tiny calculi are seen in both kidneys. Mild atrophy of the pancreas. Large amount of stool is seen in the colon. A myomatous uterus is seen. Otherwise, the biliary tree, adrenal glands, and spleen are unremarkable. No bowel obstruction or acute inflammatory bowel process. The appendix is unremarkable. Colonic diverticulosis. The abdominal aorta is not aneurysmal. Aortoiliac calcifications. No significantly enlarged lymph nodes are seen. The bladder is grossly unremarkable. Bones: The bones are demineralized. Scoliosis of the lumbar spine. Degenerative changes of the thoracolumbar spine. Lateral plate and nail fixation of the proximal left femur is seen. There is increased sclerosis within the sacrum that likely reflects underlying insufficiency fractures. Acute compression fracture of L5. Chronic compression deformity of T12.     1. Cholelithiasis. 2. Increase sclerosis within the sacrum relates to sacral insufficiency fractures. 3. Acute compression fracture of L5 4. Other findings as described above. **This report has been created using voice recognition software. It may contain minor errors which are inherent in voice recognition technology. ** Final report electronically signed by Dr Romana Romney on 7/17/2022 8:38 PM    CT HEAD WO CONTRAST    Result Date: 7/17/2022  PROCEDURE: CT HEAD WO CONTRAST CLINICAL INFORMATION:Fall. COMPARISON: None TECHNIQUE: 5 mm axial imaging through the head without IV contrast. All CT scans at this facility use dose modulation, iterative reconstruction, and/or weight based dosing when appropriate to reduce the radiation dose to as low as reasonably achievable. FINDINGS: Periventricular  and subcortical white matter hypodensities that likely relate to chronic microangiopathic disease. Intracranial atherosclerotic calcifications. No ventriculomegaly. No midline shift or mass effect. No acute intracranial hemorrhage.  No intracranial collection. Gray-white differentiation is unremarkable. The posterior fossa is unremarkable. The craniocervical junction is unremarkable. No acute bony abnormality. The  paranasal sinuses are clear. The  mastoid air cells are clear. The orbits are unremarkable. 1. No acute intracranial abnormality. 2. Sequela of chronic microvascular changes. **This report has been created using voice recognition software. It may contain minor errors which are inherent in voice recognition technology. ** Final report electronically signed by Dr Minerva Donaldson on 7/17/2022 8:15 PM    CT CHEST WO CONTRAST    Result Date: 7/17/2022  PROCEDURE: CT CHEST WO CONTRAST CLINICAL INFORMATION: Fall, right lower scapula contusion, left lower lateral chest wall tenderness. COMPARISON: None TECHNIQUE: Helical CT acquisition of the chest was performed without intravenous contrast. Multiplanar reformats are provided. All CT scans at this facility use dose modulation, iterative reconstruction, and/or weight based dosing when appropriate to reduce the radiation dose to as low as reasonably achievable. FINDINGS: The noncontrast CT limits the evaluation for solid organ pathology, vascular pathology, and lymphadenopathy. Mild to moderate emphysematous changes. Subsegmental atelectasis is seen in the lung bases. The thyroid is mildly enlarged. Aortocoronary calcifications. No focal pulmonary consolidation. No large pulmonary mass. Central airway is patent. No pleural effusions. No significant pericardial effusion. The heart is not enlarged. Ascending thoracic aorta is not dilated. The main pulmonary artery is not dilated. No significantly enlarged mediastinal or  axillary lymph node. Ana Bloodgood No chest wall mass. The abdomen is described on a concurrent CT abdomen and pelvis. Bones: Degenerative changes of the thoracic spine. The bones are demineralized. Compression fracture of T2. Scoliosis. . Left lower rib fractures are seen that are nondisplaced. 1. Mild acute compression fracture of T2. 2. No pulmonary consolidation. No pneumothorax. 3. Left lower rib fractures are seen. **This report has been created using voice recognition software. It may contain minor errors which are inherent in voice recognition technology. ** Final report electronically signed by Dr Sabiha Regan on 7/17/2022 8:28 PM    CT CERVICAL SPINE WO CONTRAST    Result Date: 7/17/2022  PROCEDURE: CT CERVICAL SPINE WO CONTRAST CLINICAL INFORMATION: Fall. COMPARISON: None TECHNIQUE: 3 mm axial imaging through the cervical spine without contrast.  Sagittal and coronal reconstructions were performed. All CT scans at this facility use dose modulation, iterative reconstruction, and/or weight based dosing when appropriate to reduce the radiation dose to as low as reasonably achievable. FINDINGS: ALIGNMENT: Cervical lordosis is maintained. BONES: The bones appear demineralized. Mild degenerative changes of the cervical spine. Multilevel disc osteophyte complexes are seen. Multilevel facet arthrosis and uncovertebral degeneration. Acute compression fracture of T2 is seen. SOFT TISSUES: Carotid calcifications are seen. The thyroid is enlarged. A left thyroid nodule is seen. Emphysema is seen in the lung apices. DISC LEVELS: Calcification of the ligamentum flava at C2-C3 and C3-C4 is noted. At C3-C4, the calcification is leading to mild central canal narrowing. Mild right neuroforaminal narrowing at C6-C7. 1. Mild compression fracture of T2. 2. Multilevel degenerative changes of the cervical spine. 3. Thyromegaly. Left thyroid nodule. **This report has been created using voice recognition software. It may contain minor errors which are inherent in voice recognition technology. ** Final report electronically signed by Dr Sabiha Regan on 7/17/2022 8:21 PM    XR CHEST PORTABLE    Result Date: 7/18/2022  PROCEDURE: XR CHEST PORTABLE CLINICAL INFORMATION: follow up for rib fractures.  Left lower rib fractures. COMPARISON: Chest CT 7/17/2022. TECHNIQUE: AP semiupright view of the chest. FINDINGS: The heart size is normal.The mediastinum is not widened. There are background fibrotic changes. There is no pneumothorax. There are no pulmonary infiltrates. There are no pleural effusions. The pulmonary vascularity is normal. The patient's known left lower rib fractures are not seen radiographically. No pleural effusions. No pneumothorax. Background fibrotic changes. **This report has been created using voice recognition software. It may contain minor errors which are inherent in voice recognition technology. ** Final report electronically signed by Dr. Georgia Varela on 7/18/2022 7:40 AM    CT LUMBAR RECONSTRUCTION WO POST PROCESS    Result Date: 7/17/2022  PROCEDURE: CT LUMBAR RECONSTRUCTION WO POST PROCESS CLINICAL INFORMATION: low backpain fall COMPARISON: None TECHNIQUE: 3 mm axial CT images were reconstructed through the lumbar spine from the patient's CT abdomen/pelvis examination without IV contrast. Sagittal and coronal reconstructions were also performed. All CT scans at this facility use dose modulation, iterative reconstruction, and/or weight based dosing when appropriate to reduce the radiation dose to as low as reasonably achievable. FINDINGS: ALIGNMENT: The lumbar lordosis is maintained. Levoscoliosis of the lumbar spine. BONE: The bones are markedly demineralized. . Acute compression fracture of L5. Increased sclerosis within the sacrum relates to insufficiency sacral fractures. Degenerative changes of the SI joints. Mild degenerative changes of the lumbar spine. Multilevel facet arthrosis. . . The soft tissues have been described on the CT abdomen and pelvis. DISC SPACES/FACET JOINT/SPINAL CANAL/NEURAL FORAMEN: The lack of intrathecal contrast limits the evaluation of the spinal canal. Mild central canal narrowing at L3-L4 with moderate right neural foraminal narrowing.  Moderate central canal narrowing at L4-L5 with moderate right neuroforaminal narrowing. 1. Acute compression fracture of L5. 2. Increased sclerosis within the sacrum relates to insufficiency sacral fractures. 3. Degenerative changes of the lumbar spine. 4. Marked osteopenia. **This report has been created using voice recognition software. It may contain minor errors which are inherent in voice recognition technology. ** Final report electronically signed by Dr Felipe Armas on 7/17/2022 8:49 PM    CT THORACIC RECONSTRUCTION WO POST PROCESS    Result Date: 7/17/2022  PROCEDURE: CT THORACIC RECONSTRUCTION WO POST PROCESS CLINICAL INFORMATION: Fall, middle midline thoracic spine tenderness. COMPARISON: None TECHNIQUE: 3 mm axial CT images were reconstructed through the thoracic spine from the patient's CT chest examination without IV contrast. Sagittal and coronal reconstruction were also performed. All CT scans at this facility use dose modulation, iterative reconstruction, and/or weight based dosing when appropriate to reduce the radiation dose to as low as reasonably achievable. FINDINGS: ALIGNMENT: The thoracic kyphosis is maintained. Dextroscoliosis. BONE: The bones are demineralized. Acute compression fracture of T2. Chronic compression deformity of T12. . Multilevel degenerative changes of the thoracic spine. Multilevel facet arthrosis. . . SOFT TISSUE: The soft tissues of been described on the CT chest.. DISC LEVEL: 1. The lack of intrathecal contrast limits the evaluation of the spinal canal. No significant central canal narrowing. No significant neuroforamina narrowing. 1. Mild acute compression fracture of T2. 2. Degenerative changes of the thoracic spine. **This report has been created using voice recognition software. It may contain minor errors which are inherent in voice recognition technology. ** Final report electronically signed by Dr Felipe Armas on 7/17/2022 8:43 PM       Total time spent in care of patient: 15 minutes collectively between subjective/objective examination, chart review, documentation, clinical reasoning and discussion with attending regarding plan/interval changes. Electronically signed by PETEY Mendez CNP on 7/21/2022 at 12:21 PM   Patient seen and examined independently by me 7/21/2022     I personally supervised the PA/NP in the evaluation, management and development of the treatment plan for Keegan Pang  on the same date of service as above. I personally interviewed Keegan Pang   and  discussed his review of symptoms as able due to the patient's condition, as well as performed an individual physical exam on the same   date of service as above. In addition I discussed the patient's condition and treatment options with the patient, if able, and/or designated family if available. I have also reviewed and agree with the past medical,  family and social history updates as well as care plans unless otherwise noted below. All questions were answered. I examined independently and reviewed relevant data myself and may have done so in the context of team rounds. A full chart review was performed by me. I attest that this medical record entry accurately reflects signatures and notations that I made in my capacity as an M. D. when I treated and diagnosed Keegan Pang on the date of service above     I was responsible for all medical decision making involving this encounter. I identified and/or confirmed all problems associated with this patient encounter by my own direct physical examination of this patient and review of all radiology studies and labwork  that were ordered and available. Active Hospital Problems    Diagnosis     Fall [W19. XXXA]      Priority: Medium    Fracture of ribs, two, left, closed, initial encounter [S22.42XA]      Priority: Medium    Closed compression fracture of fifth lumbar vertebra ([address] [S32.050A]      Priority: Medium Compression fracture of T2 vertebra ([facility] [address] [S22.020A]      Priority: Medium    UTI (urinary tract infection) [N39.0]      Priority: Medium        I  discussed the management of all of the identified problems with the APN or PA. I formulated the treatment plan for all identified problems and discussed those with the APN or PA . This management plan was then carried out and the patient's orders for care by the APN or PA. Total time personally spent on this patient encounter was 25 minutes which includes :  Preparing to see the patient( reviewing tests and chart)  Obtaining and reviewing separately obtained history  Performing a medically appropriate examination and evaluation  Ordering medications, tests, or procedures  Counseling and educating the patient/family/caregiver  Care coordination  Referring and communicating with other healthcare professionals  Documenting clinical information in the EHR  Independent interpretation of results and communicating the results to patient and care team  This includes a direct physical exam as well as all the other encounter activities described above. Time may be discontiguous. Time does not include procedures. Please see our orders that were directed and approved by me if there are any new ones for the updated patient care plan. Above discussed and I agree with documentation and orders placed by Sydnee Rodriguez CNP    See any additional comments if needed below for any other updated orders and plans. Patient seen and examined independently by me. Above discussed and I agree with CNP. Labs, cultures, and radiographs where available were reviewed. See orders for the updated patient care plan. Diogenes Du MD MD, patient is sitting up dressed and looks the best she has looked.   He has c-collar in place she denies any left sided chest wall pain patient is still having some intermittent confusion she was evaluated by rehab note reviewed but

## 2022-07-21 NOTE — PROGRESS NOTES
Comprehensive Nutrition Assessment    Type and Reason for Visit:  Initial, Consult (Oral Nutrition Supplements)    Nutrition Recommendations/Plan:   Recommend a Multivitamin daily. Continue Glucerna TID. Continue current diet. Continue Culturelle as ordered. Malnutrition Assessment:  Malnutrition Status: At risk for malnutrition (Comment) (07/21/22 1196)    Context:  Acute Illness     Findings of the 6 clinical characteristics of malnutrition:  Energy Intake:  Mild decrease in energy intake (Comment) (some po intake less than 75%)  Weight Loss:  No significant weight loss     Body Fat Loss:  Unable to assess (advanced age)     Muscle Mass Loss:  Unable to assess (advanced age)    Fluid Accumulation:  No significant fluid accumulation Extremities   Strength:  Not Performed    Nutrition Assessment: Pt. nutritionally compromised AEB pt with multiple fractures 2/2 fall At risk for further nutrition compromise r/t admit d/t fall with multiple fractures, underlying medical condition (Hx: HTN, COPD, HLD, Ulcerative Colitis, Tobacco Abuse). Nutrition Related Findings:    Pt. Report/Treatments/Miscellaneous: Pt seen with spouse present; pt in c-collar; pt reports good appetite PTA, but admits to not eating all of her meals at the [facility] d/t disliking the food there; pt states she likes the Glucerna shakes TID and would like to continue it. GI Status: PT denies N/V. +diarrhea since admit- x2 BM on 7/22. Pertinent Labs: Noted. HgA1C 7% on 6/22/21. Pertinent Meds: Vitamin C, Dulcolax, Pepcid, Culturelle, Multivitamin, Glycolax, Prednisone, Vitamin B-12, Zofran PRN and Senna PRN. Wound Type: None (multiple fractures from fall)       Current Nutrition Intake & Therapies:    Average Meal Intake: %, 51-75%  Average Supplements Intake: % (per pt report)  ADULT DIET;  Regular  ADULT ORAL NUTRITION SUPPLEMENT; Breakfast, Lunch, Dinner; Diabetic Oral Supplement    Anthropometric Measures:  Height: 5' 5\"

## 2022-07-21 NOTE — PROGRESS NOTES
Physical Medicine & Rehabilitation  Progress Note                Lynne Olmstead MD     PM&R service has been following patient's progress with therapies. Unfortunately, she continues to require a sitter secondary to severe confusion. Do not believe she will be able to tolerate 3 hours of therapy per day at this time if her confusion continues. Recommend discharge back to [facility] with 24/7 supervision and care. Thank you for allowing us to participate in the care of this patient.       Lynne Olmstead MD

## 2022-07-21 NOTE — PLAN OF CARE
Problem: Discharge Planning  Goal: Discharge to home or other facility with appropriate resources  Outcome: Progressing  Flowsheets (Taken 7/20/2022 2253 by Harris Thompson, RN)  Discharge to home or other facility with appropriate resources:   Identify barriers to discharge with patient and caregiver   Arrange for needed discharge resources and transportation as appropriate   Identify discharge learning needs (meds, wound care, etc)     Problem: Pain  Goal: Verbalizes/displays adequate comfort level or baseline comfort level  Outcome: Progressing  Flowsheets (Taken 7/20/2022 2253 by Harris Thompson RN)  Verbalizes/displays adequate comfort level or baseline comfort level:   Encourage patient to monitor pain and request assistance   Assess pain using appropriate pain scale   Administer analgesics based on type and severity of pain and evaluate response     Problem: Safety - Adult  Goal: Free from fall injury  Outcome: Progressing  Flowsheets (Taken 7/21/2022 5784)  Free From Fall Injury: Instruct family/caregiver on patient safety     Problem: Skin/Tissue Integrity  Goal: Absence of new skin breakdown  Description: 1. Monitor for areas of redness and/or skin breakdown  2. Assess vascular access sites hourly  3. Every 4-6 hours minimum:  Change oxygen saturation probe site  4. Every 4-6 hours:  If on nasal continuous positive airway pressure, respiratory therapy assess nares and determine need for appliance change or resting period.   Outcome: Progressing     Problem: Chronic Conditions and Co-morbidities  Goal: Patient's chronic conditions and co-morbidity symptoms are monitored and maintained or improved  Outcome: Progressing  Flowsheets (Taken 7/20/2022 2253 by Harris Thompson RN)  Care Plan - Patient's Chronic Conditions and Co-Morbidity Symptoms are Monitored and Maintained or Improved:   Monitor and assess patient's chronic conditions and comorbid symptoms for stability, deterioration, or improvement Collaborate with multidisciplinary team to address chronic and comorbid conditions and prevent exacerbation or deterioration     Problem: Nutrition Deficit:  Goal: Optimize nutritional status  Outcome: Progressing  Flowsheets (Taken 7/21/2022 1840)  Nutrient intake appropriate for improving, restoring, or maintaining nutritional needs:   Assess nutritional status and recommend course of action   Monitor oral intake, labs, and treatment plans     Problem: ABCDS Injury Assessment  Goal: Absence of physical injury  Outcome: Progressing  Flowsheets (Taken 7/21/2022 1840)  Absence of Physical Injury: Implement safety measures based on patient assessment     Care plan reviewed with patient and patient and verbalizes understanding of the plan of care and contribute to goal setting.

## 2022-07-21 NOTE — CARE COORDINATION
7/21/22, 1:54 PM EDT    DISCHARGE ON [address], [address] day: 4  Location: -08/008-A Reason for admit: Acute UTI [N39.0]  Fall, initial encounter [W19. XXXA]  Fracture of ribs, two, left, closed, initial encounter [S22.42XA]  Closed fracture of multiple ribs of left side, initial encounter [S22.42XA]  Compression fracture of T2 vertebra, initial encounter ([facility] [address] [S22.020A]  Closed compression fracture of L5 vertebra, initial encounter ([facility] [address] [H96.040Y]   Procedure:   CT Head: negative for intracranial abnormality. Chronic microvascular changes. CT C-spine: T2 compression fracture, multilevel DDD noted. CT L-spine: L5 compression fracture, insufficiency sacral fractures, DDD, osteopenia  Barriers to Discharge: telesitter in room, SLP to eval.   PCP: PETEY Mojica CNP  Readmission Risk Score: 17.5%  Patient Goals/Plan/Treatment Preferences: PM & R feels pt will not tolerate minimum of 3 hours of therapy. SW follow for [facility] discharge on Friday 7/22.

## 2022-07-21 NOTE — PROGRESS NOTES
Call to [facility] for corset brace, order and face sheet faxed.  Stated they would be out tomorrow for fitting

## 2022-07-21 NOTE — CONSULTS
Neurology Consult Note    Date:7/21/2022       IIMP:[id number]  Patient Kellie Riggins     YOB: 1940     Age:81 y.o. Requesting Physician: Andrea Holm MD     Reason for Consult:  Evaluate for new confusion, hallucinations      Chief Complaint:   Chief Complaint   Patient presents with    Fall       Subjective     John Paul Stanley is a 80 y.o. female with a history of heart failure, HTN, COPD, chronic DVT left lower extremity, HLD, ulcerative colitis, dementia who was admitted to [facility] following a fall on 7/17/2022 at her assisted living facility with unknown loss of consciousness. Patient states she tripped over a chair. Denies passing out. However, daughter feels it was due to a syncopal episode. Patient states she fell directly on her back with immediate upper and lower back pain. She is currently undergoing medical management of compression fractures at multiple levels, treated by Dr. Suni Olivares. Associated symptoms include urinary frequency, chronic tremor of right lower extremity with more intense jerking of bilateral shoulders. We are consulted for new hallucinations and confusion following this fall. She was found to have UTI, which is being medically treated with Rocephin. Pertinent family history includes Alzheimer history of her father. Her current medication regimen includes Aricept, recently added carbidopa levodopa for possible Parkinson's. Additionally she is on atorvastatin 20. She is not on any anticoagulant therapy at this time. She currently has a c-collar applied. She denies chest pain, respiratory distress, cough, headache, vision changes, dizziness, numbness, tingling, weakness, recent illness. She denies feeling confused, increased anxiety, or seeing things that aren't there.      Review of Systems   Review of Systems   Unable to perform ROS: Dementia   Medications   Scheduled Meds:    famotidine  20 mg Oral Daily    sodium chloride flush  5-40 mL Take 1 tablet by mouth daily      vitamin C (ASCORBIC ACID) 500 MG tablet Take 1,000 mg by mouth daily      zinc gluconate 50 MG tablet Take 50 mg by mouth daily      albuterol (PROVENTIL) (2.5 MG/3ML) 0.083% nebulizer solution Take 2.5 mg by nebulization every 6 hours as needed for Wheezing or Shortness of Breath      ibuprofen (ADVIL;MOTRIN) 400 MG tablet Take 400 mg by mouth 2 times daily as needed for Pain      ipratropium (ATROVENT) 0.02 % nebulizer solution Take 0.5 mg by nebulization every 6-8 hours as needed for Wheezing      denosumab (PROLIA) 60 MG/ML SOSY SC injection Inject 1 mL into the skin every 6 months 1 mL 1    acetaminophen (TYLENOL) 500 MG tablet Take 2 tablets by mouth 3 times daily as needed for Pain 100 tablet 1    Misc. Devices MISC 1 set of bed rails. 1 each 0    Misc. Devices MISC Please apply compression stockings (JOHANNE hose) to bilateral lower extremities for 12 hours daily. Remove nightly. 2 Device 0    albuterol sulfate  (90 Base) MCG/ACT inhaler Inhale 2 puffs into the lungs every 6 hours as needed for Wheezing 2 Inhaler 5    Psyllium Fiber 0.52 g CAPS Take 1 capsule by mouth daily 90 capsule 3    Glucosamine-Chondroitin 500-250 MG CAPS Take by mouth daily      vitamin B-12 (CYANOCOBALAMIN) 1000 MCG tablet Take 1 tablet by mouth daily 30 tablet 3    Probiotic Product (Kiddies Smilz PO) Take 1 tablet by mouth daily       Past History    Past Medical History:   has a past medical history of Abnormal nuclear stress test, Cataract, Chronic deep vein thrombosis (DVT) of femoral vein of left lower extremity (HCC), Colon polyps, COPD (chronic obstructive pulmonary disease) ([facility] [address] Diverticulosis large intestine w/o perforation or abscess w/bleeding, DVT, recurrent, lower extremity, acute, left (HCC), Fibroid, uterine, Hearing loss, Hx of blood clots, Hyperlipidemia, Nicotine dependence, Nocturnal hypoxemia, Postmenopausal, Sleep apnea, and Ulcerative colitis ([address]     Social History:   reports that she has been smoking cigarettes. She has a 40.00 pack-year smoking history. She has never used smokeless tobacco. She reports that she does not drink alcohol and does not use drugs. Family History:   Family History   Problem Relation Age of Onset    Diabetes Mother     High Blood Pressure Mother     Heart Disease Mother     Coronary Art Dis Father     Heart Disease Father        Physical Examination      Vitals:  BP (!) 104/58   Pulse (!) 109   Temp 98.2 °F (36.8 °C) (Oral)   Resp 16   Ht 5' 5\" (1.651 m)   Wt 136 lb 2 oz (61.7 kg)   SpO2 97%   BMI 22.65 kg/m²   Temp (24hrs), Av.9 °F (36.6 °C), Min:97.5 °F (36.4 °C), Max:98.2 °F (36.8 °C)      I/O (24Hr): Intake/Output Summary (Last 24 hours) at 2022 0606  Last data filed at 2022 1212  Gross per 24 hour   Intake 340 ml   Output --   Net 340 ml         Physical Exam  Vitals reviewed. Constitutional:       General: She is not in acute distress. Appearance: She is ill-appearing. HENT:      Head: Normocephalic and atraumatic. Right Ear: External ear normal.      Left Ear: External ear normal.      Nose: Nose normal.      Mouth/Throat:      Mouth: Mucous membranes are moist.      Pharynx: No oropharyngeal exudate or posterior oropharyngeal erythema. Eyes:      Extraocular Movements: Extraocular movements intact and EOM normal.      Pupils: Pupils are equal, round, and reactive to light. Neck:      Comments: C-collar in place  Cardiovascular:      Rate and Rhythm: Regular rhythm. Tachycardia present. Pulmonary:      Effort: Pulmonary effort is normal. No respiratory distress. Breath sounds: Normal breath sounds. No wheezing. Abdominal:      General: Bowel sounds are normal.      Palpations: Abdomen is soft. Tenderness: There is no abdominal tenderness. Musculoskeletal:         General: Normal range of motion. Right lower leg: No edema. Left lower leg: No edema.    Skin: General: Skin is warm. Findings: No rash. Neurological:      Mental Status: She is alert and oriented to person, place, and time. Deep Tendon Reflexes:      Reflex Scores:       Bicep reflexes are 2+ on the right side and 2+ on the left side. Brachioradialis reflexes are 2+ on the right side and 2+ on the left side. Patellar reflexes are 2+ on the right side and 2+ on the left side. Psychiatric:         Speech: Speech normal.     Neurologic Exam     Mental Status   Oriented to person, place, and time. Attention: normal. Concentration: normal.   Speech: speech is normal   Level of consciousness: alert    Cranial Nerves   Cranial nerves II through XII intact. CN II   Visual fields full to confrontation. Right visual field deficit: none  Left visual field deficit: none     CN III, IV, VI   Pupils are equal, round, and reactive to light. Extraocular motions are normal.   Right pupil: Shape: regular. Reactivity: brisk. Left pupil: Shape: regular. Reactivity: brisk. CN V   Facial sensation intact. Right facial sensation deficit: none  Left facial sensation deficit: none    CN VII   Facial expression full, symmetric. Right facial weakness: none  Left facial weakness: none    CN VIII   CN VIII normal.   Hearing: intact    CN IX, X   CN IX normal.   CN X normal.   Palate: symmetric    CN XII   CN XII normal.   Tongue: not atrophic  Fasciculations: absent  Tongue deviation: none    CN XI testing limited secondary to c-collar placement. Overall, CN testing limited secondary to cognitive deficits. Motor Exam   Motor strength 4/5 bilateral upper and lower extremities. No focal weakness noted. Sensory Exam   Light touch normal.     Limited secondary to cognitive deficits.       Gait, Coordination, and Reflexes     Reflexes   Right brachioradialis: 2+  Left brachioradialis: 2+  Right biceps: 2+  Left biceps: 2+  Right patellar: 2+  Left patellar: 2+  Myoclonic jerking noted of bilateral, proximal upper extremities, as well as distal, bilateral lower extremities. Labs/Imaging/Diagnostics   Labs:  CBC:  Recent Labs     07/18/22  0724 07/19/22  0543 07/20/22  0914   WBC 12.7* 12.4* 14.1*   RBC 4.15* 4.84 4.51   HGB 12.7 14.8 13.7   HCT 40.1 46.9 43.9   MCV 96.6 96.9 97.3    279 260     CHEMISTRIES:  Recent Labs     07/18/22  0724 07/19/22  0543 07/20/22  0914    142 140   K 4.1 3.9 3.6    108 107   CO2 25 22* 22*   BUN 21 12 17   CREATININE 0.7 0.5 0.6   GLUCOSE 121* 123* 126*     COAGULATION STUDIES:No results for input(s): PROTIME, INR, APTT in the last 72 hours. LIVER PROFILE:  Recent Labs     07/18/22 0724   AST 16   ALT 16   BILITOT 0.3   ALKPHOS 77     CHOLESTEROL AND A1C:No results for input(s): LDLCALC, HDL, CHOL, TRIG, LABA1C in the last 720 hours. Imaging Last 24 Hours:  No results found. Assessment and Plan:        Myoclonic jerking in setting of likely toxic metabolic encephalopathy vs acute delirium  CTH: negative for intracranial abnormality. Chronic microvascular changes. CT C-spine: T2 compression fracture, multilevel DDD noted. CT L-spine: L5 compression fracture, insufficiency sacral fractures, DDD, osteopenia   MRI T-Spine: T2 acute compression fx with 20% anterior height loss. Chronic T1 compression fx with 50% anterior height loss. CXR/CT chest negative for pneumothorax  EEG ordered. Sinemet discontinued, as these are classic myoclonic jerking movements of toxic metabolic encephalopathy rather than parkinson's disease. Continue infectious workup and medical management of underlying illnesses. Continue pneumatic compression device and Lovenox for DVT prevention. Neurology will continue to follow pending EEG. Please call with questions or concerns. This patient was seen & evaluated in conjunction with Dr. Paresh Yung who is in agreement with assessment and plan.     Electronically signed by Mary Concepcion PA-C on 7/20/22 at 10:47 PM EDT

## 2022-07-21 NOTE — PLAN OF CARE
Problem: Discharge Planning  Goal: Discharge to home or other facility with appropriate resources  Outcome: Progressing  Flowsheets (Taken 7/20/2022 2253)  Discharge to home or other facility with appropriate resources:   Identify barriers to discharge with patient and caregiver   Arrange for needed discharge resources and transportation as appropriate   Identify discharge learning needs (meds, wound care, etc)     Problem: Pain  Goal: Verbalizes/displays adequate comfort level or baseline comfort level  Outcome: Progressing  Flowsheets (Taken 7/20/2022 2253)  Verbalizes/displays adequate comfort level or baseline comfort level:   Encourage patient to monitor pain and request assistance   Assess pain using appropriate pain scale   Administer analgesics based on type and severity of pain and evaluate response     Problem: Safety - Adult  Goal: Free from fall injury  Outcome: Progressing  Flowsheets (Taken 7/20/2022 2253)  Free From Fall Injury: Instruct family/caregiver on patient safety  Note: Sitter at bedside. Problem: Skin/Tissue Integrity  Goal: Absence of new skin breakdown  Description: 1. Monitor for areas of redness and/or skin breakdown  2. Assess vascular access sites hourly  3. Every 4-6 hours minimum:  Change oxygen saturation probe site  4. Every 4-6 hours:  If on nasal continuous positive airway pressure, respiratory therapy assess nares and determine need for appliance change or resting period. Outcome: Progressing  Note: No new ski issus noted. See flowsheets.      Problem: Chronic Conditions and Co-morbidities  Goal: Patient's chronic conditions and co-morbidity symptoms are monitored and maintained or improved  Outcome: Progressing  Flowsheets (Taken 7/20/2022 2253)  Care Plan - Patient's Chronic Conditions and Co-Morbidity Symptoms are Monitored and Maintained or Improved:   Monitor and assess patient's chronic conditions and comorbid symptoms for stability, deterioration, or improvement   Collaborate with multidisciplinary team to address chronic and comorbid conditions and prevent exacerbation or deterioration   Care plan reviewed with patient. Patient verbalizes understanding of the plan of care and contribute to goal setting.

## 2022-07-21 NOTE — PROGRESS NOTES
[facility] THERAPY  [facility] ORTHOPEDICS 7K  Speech - Language - Cognitive Evaluation    SLP Individual Minutes  Time In: 3297  Time Out: 1401  Minutes: 24  Timed Code Treatment Minutes: 0 Minutes       Date: 2022  Patient Name: Steve Campos      CSN: [id number]   : 1940  [de-identified]80 y.o.)  Gender: female   Referring Physician:  Unknown GLENN Johnson  Diagnosis: Acute UTI  Precautions: Fall Risk   History of Present Illness/Injury: Patient admitted with above diagnosis. Per chart review, Madelaine Guadarrama is a 80 y.o. female admitted to [address] [place] [address] on 2022 for trauma consult, after having been brought by EMS following a mechanical or syncopal fall at AL with unknown LOC. Her past medical history is positive for chronic DVT, COPD, diverticulosis, fibroids, sleep apnea, UC, hyperlipidemia, hypertension, and other comorbidities. Per report, patient had gone out of her apartment and went back in to get a coat when she tripped over a chair. She states she did not pass out. Daughter at bedside states she believes it might have been a syncopal episode. Patient reports falling directly onto her back with immediate upper and lower back pain. Patient states she has had urinary frequency but denies dysuria or foul-smelling urine. Patient reports chronic tremor. Patient denies chest pain, shortness of breath, cough, headache, dizziness, lightheadedness, numbness, paraesthesias, weakness, chills, fevers, abdominal pain, nausea, vomiting, neck pain. Care discussed and in coordination with trauma surgeon Dr. Larita Boeck. \"     ST consulted to complete [id number] evaluation to assess current cognitive-linguistic skills s/p fall and update POC as clinically indicated.      Past Medical History:   Diagnosis Date    Abnormal nuclear stress test 2018    Cataract     Chronic deep vein thrombosis (DVT) of femoral vein of left lower extremity ([address] 3/9/2021 Colon polyps     COPD (chronic obstructive pulmonary disease) (HCC)     Diverticulosis large intestine w/o perforation or abscess w/bleeding     DVT, recurrent, lower extremity, acute, left (HCC)     Fibroid, uterine     Hearing loss     Hx of blood clots     Hyperlipidemia     Nicotine dependence     Nocturnal hypoxemia     Postmenopausal     Sleep apnea     Ulcerative colitis ([facility] [address]     with rectal bleeding        Pain: No pain reported. Subjective:  MELISA Francis approved completion of evaluation this date. Upon arrival to room, patient awake in bed with son at bedside. Patient agreeable to complete evaluation. Pleasant and cooperative throughout. 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    Type of Home: Assisted living ([facility])  Home Layout: One level  Home Access: Level entry  Home Equipment: Jeter Dessert, 4 wheeled    SPEECH / VOICE:  Speech and Voice appear to be grossly intact for basic and complex daily communication    LANGUAGE:  Receptive:  Receptive language skills appear to be grossly intact for basic and complex daily communication. Expressive:  Expressive language skills appear to be grossly intact for basic and complex daily communication. COGNITION:  Gage Cognitive Assessment [facility]) version 7.3 completed. Pt scored 19/30. Normal is greater than or equal to 26/30.     Orientation: 6/6  Immediate Recall: Trial 1: 4/5, Trial 2: 5/5   Short-Term Recall: 1/5 independently, 1/5 given categorical cue, 3/5 given FO2  Divergent Namin words/minute (target = 11 words/minute)  Reasonin/2  Thought Organization: 0/1  Attention: 1  Math Computation: 3/3  Executive Functionin    SWALLOWING:  Current Diet: Regular and Thin Liquids   Not Tested - patient reporting no difficulty with current diet level. RECOMMENDATIONS/ASSESSMENT:  DIAGNOSTIC IMPRESSIONS:  Patient presents with evidence of a mild cognitive-linguistic impairment characterized by a score of 19/30 on the [facility] with deficits noted within the domains of executive functioning, verbal/visual reasoning, thought organization, recall, mental manipulation, math computation, and complex attention. Patient's expressive and receptive language appear [facility] for basic and complex daily communication. No dysarthria or dysphonia noted with speech intelligibility approximating 100% across all domains at the conversational level. Patient would greatly benefit from continued skilled ST services to address aforementioned deficits. At this time, patient will require close, daily supervision and assistance with management of medications, finances, and ADL completion. Rehabilitation Potential: good    EDUCATION:  Learner: Patient and Son   Education:  Reviewed results and recommendations of this evaluation, Reviewed ST goals and Plan of Care, Reviewed recommendations for follow-up, Education Related to Potential Risks and Complications Due to Impairment/Illness/Injury, Education Related to Prevention of Recurrence of Impairment/Illness/Injury, and Education Related to [employer] and Wellness  Evaluation of Education: [employer] understanding    PLAN:  Skilled SLP intervention on acute care 3-5 x per week or until goals met and/or pt plateaus in function. Specific interventions for next session may include: Recall, executive functioning. PATIENT GOAL:    Did not state. Will further assess during treatment.     SHORT TERM GOALS:  Short-term Goals  Timeframe for Short-term Goals: 2 weeks  Goal 1: Patient will complete verbal/visual reasoning and executive functioning tasks (medications, finances) with 80% accuracy given mod cues to resume ADL/IADL completion with least amount of supervision/assistance. Goal 2: Patient will complete recall tasks (immediate, delayed, working) with focus on memory strategies with 80% accuracy given mod cues to improve recall of novel information  Goal 3: Patient will complete thought organization tasks (divergent/convergent thinking tasks, sequencing, calendar use/planning) with 80% accuracy given mod cues to improve processing speed and organization during ADL/IADL completion. Goal 4: Patient will complete attention tasks (selective, alternating, divided) with no more than 5 errors/redirections to improve attention during ADL completion.     LONG TERM GOALS:  No long term goals recommended d/t karen Ragsdale ([facility] [address], [address]

## 2022-07-21 NOTE — CARE COORDINATION
7/21/22, 12:16 PM EDT    DISCHARGE PLANNING EVALUATION    Spoke with Rodri Valencia and her son. Discussed inpt rehab denial, recommendation for return to [facility] or ec. Provided ecf list, discussed medicare benefit. Discussed level of care [facility] can provide, discussed that facility may not be able to provide sufficient level of care. Spoke with [facility], they could consider for return. Spoke again with patient and son. They are requesting [facility], which does not have beds available. Referral made to [facility] as second choice- facility has bed open tomorrow.

## 2022-07-21 NOTE — PROGRESS NOTES
[address] [address]  INPATIENT PHYSICAL THERAPY  DAILY NOTE  [facility] ORTHOPEDICS 7K - [id number]    Time In: 4153  Time Out: 4172  Timed Code Treatment Minutes: 40 Minutes  Minutes: 40          Date: 2022  Patient Name: Julisa Olivo,  Gender:  female        MRN: [id number]  : 1940  (80 y.o.)     Referring Practitioner: GLENN Mead  Diagnosis: acute UTI  Additional Pertinent Hx: per ortho: The patient is a 80 y.o. female who presents with above chief complaint. The patient presented to the ED last evening after suffering a fall. Patient reports she was in a hurry and tripped walking into her kitchen. She landed directly on her back, unsure what exactly caused her to fall. She complains primarily of mid back pain. No radicular symptoms or numbness/tingling into the upper or lower extremities. She denies currently having any back pain while laying in bed. She denies bowel or bladder incontinence. CT scanned showed T2 and L5 vertebral compression fractures     Prior Level of Function:  Type of Home: Assisted living ([facility])  Home Layout: One level  Home Access: Level entry  Home Equipment: Elverna Corti, 4 wheeled        ADL Assistance: Independent  Homemaking Assistance: Needs assistance  Ambulation Assistance: Needs assistance  Transfer Assistance: Needs assistance  Additional Comments: Per discussion with pt and chart review, pt was indep with ADLs prior to admission. Pt with hx dementia, questionable historian. Restrictions/Precautions:  Restrictions/Precautions: General Precautions, Fall Risk  Required Braces or Orthoses  Cervical: c-collar  Position Activity Restriction  Spinal Precautions: No Bending, No Lifting, No Twisting  Spinal Precautions: cervical precautions  Sternal Precautions: No Pushing, No Pulling  Other position/activity restrictions: T2 & L5 fracture     SUBJECTIVE: RN approved session. Patient in bedside chair upon arrival and agreeable to therapy.  Patient requested to use restroom during session. PAIN: denies    Vitals: Nurse checked vitals prior to session    OBJECTIVE:  Bed Mobility:  Sit to Supine: Stand By Assistance, with head of bed raised, with increased time for completion     Transfers:  Sit to Stand: [employer], with increased time for completion, with verbal cues  [facility], with increased time for completion, with verbal cues, cues to lock 4WW brakes  **extra time for patient to take meds in chair. Ambulation:  Contact Guard Assistance  Distance: ~8ft, 10ft, 15ft, 12ft  Surface: Level Tile  Device:4 Wheeled Walker  Gait Deviations: Forward Flexed Posture, Slow Amanda, Decreased Step Length Bilaterally, Decreased Gait Speed, Decreased Heel Strike Bilaterally, Mild Path Deviations, and Unsteady Gait    Balance:  Dynamic Standing Balance: Stand By Assistance, Contact Guard Assistance, X 1, during bathroom tasks, no LOB    Exercise:  Patient was guided in 1 set(s) 10-15 reps of exercise to both lower extremities. Ankle pumps, Quad sets, Heelslides, Hip abduction/adduction, Straight leg raises, Seated marches, and Long arc quads. Exercises were completed for increased independence with functional mobility. Functional Outcome Measures: Completed  -PAC Inpatient Mobility without Stair Climbing Raw Score : 14  AM-PAC Inpatient without Stair Climbing T-Scale Score : 40.85    ASSESSMENT:  Assessment: Patient progressing toward established goals. Activity Tolerance:  Patient tolerance of  treatment: good.       Equipment Recommendations:Equipment Needed: No  Discharge Recommendations: Inpatient Rehabilitation, 24 hour assistance or supervision, and Patient would benefit from continued PT at discharge  Plan: Specific Instructions for Next Treatment: therex and mobility with cervical/back precautions  Plan:  (6X O)  Specific Instructions for Next Treatment: therex and mobility with cervical/back precautions    Patient Education  Patient Education: Plan of Care, Precautions/Restrictions, [employer] Mobility, Equipment Education, Transfers, Gait, Up in Chair for Lien Oglesby, Verbal Exercise Instruction    Goals:  Patient goals : not stated  Short Term Goals  Time Frame for Short term goals: by discharge  Short term goal 1: bed mobility with Celestino to get in/out of bed  Short term goal 2: transfer with CGA to get in/out of chairs  Short term goal 3: amb 25'x1 with walker and CGA to walk safely in room  Long Term Goals  Time Frame for Long term goals : no LTGs set seocndary to short ELOS    Following session, patient left in safe position with all fall risk precautions in place.

## 2022-07-21 NOTE — PROGRESS NOTES
Hospitalist Progress Note      Patient:  Angelito Escoto    Unit/Bed:7K-08/008-A  YOB: 1940  MRN: [id number]   Acct: [de-identified]   PCP: PETEY Aguilar CNP  Date of Admission: 7/17/2022    Assessment/Plan:  Mild acute compression fracture of T2, Acute compression fracture of L5, s/p fall:              Managed by primary, Dr. Kim Tran attending. Initial concern for syncope. TSH with reflex, BNP, Troponin elevated, however without ischemic changes on EKG. No preceding palpitations/dizziness. Acute UTI, uncomplicated:              UA remarkable for Nitrites and leuks, with many bacteria. Urine preliminary culture showing gram-negative bacilli. Given Rocephin x 1 dose yesterday. Completed tx x 5 days. Acute leukocytosis:              Improved. Suspect multifactorial to #1 and #2. Also noted to be on prednisone outpatient. Pt is afebrile, non-toxic appearing. Continue to monitor with daily CBC. HFpEF:              Last ECHO 6/14/2020 reveals EF 60-65% with grade 1 diastolic dysfunction. Pt appears compensated today. CXR unremarkable. Daily weights, I&Os. COPD without acute exacerbation:               At baseline or RA. Continue home inhalers. CXR yesterday reveals no pleural effusions, pneumothorax with background fibrotic changes. To note, patient on 10mg daily. Essential HTN:              BP stable. Continue home meds and continue to monitor closely. Dementia:              Pt alert and oriented, but intermittently confused. Lives at [facility]. Poor historian. Continue donepezil. Hx of DVT:              Does not appear to be on any anticoagulation therapy outpatient. Hx of UC:              Noted. Continue psyllium. KALIE:              Stable.     Chief Complaint: Fall    Initial H and P:-    Temo elizabeth female who we are asked to see/evaluate by Brooke Garzon MD for medical management of HFpEF, HTN, COPD, dementia, Hx of DVT, Hx of UC, KALIE. Pt presented to [facility] ED yesterday after a suspected unwitnessed fall. Patient is an overall poor historian due to underlying dementia. Pt denies any pain at this time. She states pain starts whenever she tries to move. She denies chest pain, shortness of breath, lightheadedness, dizziness. Subjective (past 24 hours):   7/21: Patient sitting up in bed with son at bedside. She is alert and oriented though does exhibit some intermittent confusion at times. Patient not accepted to inpatient rehab, SW working on SNF placement. Patient completed her UTI antibiotic therapy today. Denies CP/SOB/abd pain/dizziness/lightheadedness. Hospital service will sign off. Past medical history, family history, social history and allergies reviewed again and is unchanged since admission. ROS (All review of systems completed. Pertinent positives noted.  Otherwise All other systems reviewed and negative.)     Medications:  Reviewed    Infusion Medications    sodium chloride 950 mL (07/19/22 0826)    sodium chloride       Scheduled Medications    famotidine  20 mg Oral Daily    sodium chloride flush  5-40 mL IntraVENous 2 times per day    polyethylene glycol  17 g Oral Daily    bisacodyl  10 mg Rectal Daily    lactobacillus  1 capsule Oral Daily with breakfast    donepezil  10 mg Oral Nightly    lisinopril  20 mg Oral Daily    metoprolol tartrate  25 mg Oral BID    multivitamin  1 tablet Oral Daily    predniSONE  10 mg Oral Daily    psyllium  1 packet Oral Daily    atorvastatin  20 mg Oral Nightly    tiotropium-olodaterol  2 puff Inhalation Daily    vitamin B-12  1,000 mcg Oral Daily    vitamin C  1,000 mg Oral Daily    nicotine  1 patch TransDERmal Daily    enoxaparin  40 mg SubCUTAneous Daily    lidocaine  2 patch TransDERmal Daily     PRN Meds: sodium chloride flush, sodium chloride, acetaminophen, ondansetron **OR** ondansetron, senna, morphine **OR** morphine, oxyCODONE **OR** oxyCODONE, albuterol sulfate HFA, ipratropium      Intake/Output Summary (Last 24 hours) at 7/21/2022 1627  Last data filed at 7/21/2022 1158  Gross per 24 hour   Intake 480 ml   Output --   Net 480 ml       Diet:  ADULT DIET; Regular  ADULT ORAL NUTRITION SUPPLEMENT; Breakfast, Lunch, Dinner; Diabetic Oral Supplement    Exam:  BP 98/77   Pulse 80   Temp 97.4 °F (36.3 °C) (Oral)   Resp 18   Ht 5' 5\" (1.651 m)   Wt 136 lb 2 oz (61.7 kg)   SpO2 95%   BMI 22.65 kg/m²   General appearance: Elderly, pleasant, neck brace in place, no apparent distress, appears stated age and cooperative. HEENT: Pupils equal, round, and reactive to light. Conjunctivae/corneas clear. Neck: Neck brace. Trachea midline. Respiratory:  Normal respiratory effort. Clear to auscultation anteriorly, bilaterally without Rales/Wheezes/Rhonchi. Cardiovascular: Regular rate and rhythm with normal S1/S2 without murmurs, rubs or gallops. Abdomen: Soft, non-tender, non-distended with normal bowel sounds. Musculoskeletal: passive and active ROM x 4 extremities. Skin: Skin color, texture, turgor normal.  No rashes or lesions. Neurologic:  Neurovascularly intact without any focal sensory/motor deficits. Cranial nerves: II-XII intact, grossly non-focal.  Psychiatric: Alert and oriented x4, thought content appropriate no at times confused, normal insight  Capillary Refill: Brisk,< 3 seconds   Peripheral Pulses: +2 palpable, equal bilaterally     Labs:   Recent Labs     07/19/22  0543 07/20/22  0914 07/21/22  0623   WBC 12.4* 14.1* 11.9*   HGB 14.8 13.7 14.3   HCT 46.9 43.9 46.5    260 263     Recent Labs     07/19/22  0543 07/20/22  0914 07/21/22  0604    140 142   K 3.9 3.6 5.1    107 107   CO2 22* 22* 22*   BUN 12 17 19   CREATININE 0.5 0.6 0.6   CALCIUM 8.9 8.6 9.9     No results for input(s): AST, ALT, BILIDIR, BILITOT, ALKPHOS in the last 72 hours.   No results for input(s): INR in the last 72 hours. No results for input(s): Liz Porter in the last 72 hours. Microbiology:    Blood culture #1:   Lab Results   Component Value Date/Time    BC No growth-preliminary  07/17/2022 09:54 PM    BC No growth-preliminary  07/17/2022 09:54 PM       Blood culture #2:  Lab Results   Component Value Date/Time    BLOODCULT2 SENT TO REFERENCE LAB, SEE SEPARATE REPORT 01/04/2019 02:00 PM       Organism:  Lab Results   Component Value Date/Time    ORG Escherichia coli 07/17/2022 08:40 PM       No results found for: LABGRAM    MRSA culture only:No results found for: [facility]    Urine culture: No results found for: LABURIN    Respiratory culture: No results found for: CULTRESP    Aerobic and Anaerobic :  No results found for: LABAERO  No results found for: LABANAE    Urinalysis:      Lab Results   Component Value Date/Time    NITRU POSITIVE 07/17/2022 08:40 PM    WBCUA 10-15 07/17/2022 08:40 PM    WBCUA 0-4 05/24/2018 01:05 AM    BACTERIA MANY 07/17/2022 08:40 PM    RBCUA 0-2 07/17/2022 08:40 PM    BLOODU TRACE 07/17/2022 08:40 PM    GLUCOSEU NEGATIVE 07/17/2022 08:40 PM       Radiology:  MRI THORACIC SPINE WO CONTRAST   Final Result       1. Acute compression deformity of T2 with approximately 20% anterior height loss. 2. Chronic compression deformity of T1 with approximately 50% anterior height loss. **This report has been created using voice recognition software. It may contain minor errors which are inherent in voice recognition technology. **      Final report electronically signed by Dr. Thiago Iqbal MD on 7/18/2022 2:43 PM      XR CHEST PORTABLE   Final Result   No pleural effusions. No pneumothorax. Background fibrotic changes. **This report has been created using voice recognition software. It may contain minor errors which are inherent in voice recognition technology. **      Final report electronically signed by Dr. Nicole Victoria on 7/18/2022 7:40 AM      CT HEAD WO CONTRAST   Final Result   1. No acute intracranial abnormality. 2. Sequela of chronic microvascular changes. **This report has been created using voice recognition software. It may contain minor errors which are inherent in voice recognition technology. **      Final report electronically signed by Dr Sandra Edward on 7/17/2022 8:15 PM      CT CERVICAL SPINE WO CONTRAST   Final Result   1. Mild compression fracture of T2.   2. Multilevel degenerative changes of the cervical spine. 3. Thyromegaly. Left thyroid nodule. **This report has been created using voice recognition software. It may contain minor errors which are inherent in voice recognition technology. **      Final report electronically signed by Dr Sandra Edward on 7/17/2022 8:21 PM      CT CHEST WO CONTRAST   Final Result   1. Mild acute compression fracture of T2.   2. No pulmonary consolidation. No pneumothorax. 3. Left lower rib fractures are seen. **This report has been created using voice recognition software. It may contain minor errors which are inherent in voice recognition technology. **      Final report electronically signed by Dr Sandra Edward on 7/17/2022 8:28 PM      CT THORACIC RECONSTRUCTION WO POST PROCESS   Final Result   1. Mild acute compression fracture of T2.   2. Degenerative changes of the thoracic spine. **This report has been created using voice recognition software. It may contain minor errors which are inherent in voice recognition technology. **      Final report electronically signed by Dr Sandra Edward on 7/17/2022 8:43 PM      CT ABDOMEN PELVIS WO CONTRAST Additional Contrast? None   Final Result   1. Cholelithiasis. 2. Increase sclerosis within the sacrum relates to sacral insufficiency fractures. 3. Acute compression fracture of L5   4. Other findings as described above. **This report has been created using voice recognition software.   It may contain minor errors which are inherent in voice recognition technology. **      Final report electronically signed by Dr Kamryn Thompson on 7/17/2022 8:38 PM      CT LUMBAR RECONSTRUCTION WO POST PROCESS   Final Result   1. Acute compression fracture of L5.   2. Increased sclerosis within the sacrum relates to insufficiency sacral fractures. 3. Degenerative changes of the lumbar spine. 4. Marked osteopenia. **This report has been created using voice recognition software. It may contain minor errors which are inherent in voice recognition technology. **      Final report electronically signed by Dr Kamryn Thompson on 7/17/2022 8:49 PM        CT ABDOMEN PELVIS WO CONTRAST Additional Contrast? None    Result Date: 7/17/2022  PROCEDURE: CT ABDOMEN PELVIS WO CONTRAST CLINICAL INFORMATION: left flank pain COMPARISON: None TECHNIQUE: Helical CT acquisition of the abdomen and pelvis was performed without intravenous contrast. Multiplanar reformats are provided. All CT scans at this facility use dose modulation, iterative reconstruction, and/or weight based dosing when appropriate to reduce the radiation dose to as low as reasonably achievable. FINDINGS: The noncontrast CT limits the evaluation for solid organ pathology, vascular pathology, and lymphadenopathy. The chest has been described on the concurrent CT of the chest. Gallstone is present. Hypodensities in the liver likely hepatic cysts. Tiny calculi are seen in both kidneys. Mild atrophy of the pancreas. Large amount of stool is seen in the colon. A myomatous uterus is seen. Otherwise, the biliary tree, adrenal glands, and spleen are unremarkable. No bowel obstruction or acute inflammatory bowel process. The appendix is unremarkable. Colonic diverticulosis. The abdominal aorta is not aneurysmal. Aortoiliac calcifications. No significantly enlarged lymph nodes are seen. The bladder is grossly unremarkable. Bones: The bones are demineralized.  Scoliosis of the lumbar spine. Degenerative changes of the thoracolumbar spine. Lateral plate and nail fixation of the proximal left femur is seen. There is increased sclerosis within the sacrum that likely reflects underlying insufficiency fractures. Acute compression fracture of L5. Chronic compression deformity of T12.     1. Cholelithiasis. 2. Increase sclerosis within the sacrum relates to sacral insufficiency fractures. 3. Acute compression fracture of L5 4. Other findings as described above. **This report has been created using voice recognition software. It may contain minor errors which are inherent in voice recognition technology. ** Final report electronically signed by Dr Florin Monae on 7/17/2022 8:38 PM    CT HEAD WO CONTRAST    Result Date: 7/17/2022  PROCEDURE: CT HEAD WO CONTRAST CLINICAL INFORMATION:Fall. COMPARISON: None TECHNIQUE: 5 mm axial imaging through the head without IV contrast. All CT scans at this facility use dose modulation, iterative reconstruction, and/or weight based dosing when appropriate to reduce the radiation dose to as low as reasonably achievable. FINDINGS: Periventricular  and subcortical white matter hypodensities that likely relate to chronic microangiopathic disease. Intracranial atherosclerotic calcifications. No ventriculomegaly. No midline shift or mass effect. No acute intracranial hemorrhage. No intracranial collection. Gray-white differentiation is unremarkable. The posterior fossa is unremarkable. The craniocervical junction is unremarkable. No acute bony abnormality. The  paranasal sinuses are clear. The  mastoid air cells are clear. The orbits are unremarkable. 1. No acute intracranial abnormality. 2. Sequela of chronic microvascular changes. **This report has been created using voice recognition software. It may contain minor errors which are inherent in voice recognition technology. ** Final report electronically signed by Dr Florin Monae on 7/17/2022 8:15 PM    CT CHEST WO CONTRAST    Result Date: 7/17/2022  PROCEDURE: CT CHEST WO CONTRAST CLINICAL INFORMATION: Fall, right lower scapula contusion, left lower lateral chest wall tenderness. COMPARISON: None TECHNIQUE: Helical CT acquisition of the chest was performed without intravenous contrast. Multiplanar reformats are provided. All CT scans at this facility use dose modulation, iterative reconstruction, and/or weight based dosing when appropriate to reduce the radiation dose to as low as reasonably achievable. FINDINGS: The noncontrast CT limits the evaluation for solid organ pathology, vascular pathology, and lymphadenopathy. Mild to moderate emphysematous changes. Subsegmental atelectasis is seen in the lung bases. The thyroid is mildly enlarged. Aortocoronary calcifications. No focal pulmonary consolidation. No large pulmonary mass. Central airway is patent. No pleural effusions. No significant pericardial effusion. The heart is not enlarged. Ascending thoracic aorta is not dilated. The main pulmonary artery is not dilated. No significantly enlarged mediastinal or  axillary lymph node. Mignon Blas No chest wall mass. The abdomen is described on a concurrent CT abdomen and pelvis. Bones: Degenerative changes of the thoracic spine. The bones are demineralized. Compression fracture of T2. Scoliosis. . Left lower rib fractures are seen that are nondisplaced. 1. Mild acute compression fracture of T2. 2. No pulmonary consolidation. No pneumothorax. 3. Left lower rib fractures are seen. **This report has been created using voice recognition software. It may contain minor errors which are inherent in voice recognition technology. ** Final report electronically signed by Dr Reyes Panning on 7/17/2022 8:28 PM    CT CERVICAL SPINE WO CONTRAST    Result Date: 7/17/2022  PROCEDURE: CT CERVICAL SPINE WO CONTRAST CLINICAL INFORMATION: Fall.  COMPARISON: None TECHNIQUE: 3 mm axial imaging through the cervical spine without contrast.  Sagittal and coronal reconstructions were performed. All CT scans at this facility use dose modulation, iterative reconstruction, and/or weight based dosing when appropriate to reduce the radiation dose to as low as reasonably achievable. FINDINGS: ALIGNMENT: Cervical lordosis is maintained. BONES: The bones appear demineralized. Mild degenerative changes of the cervical spine. Multilevel disc osteophyte complexes are seen. Multilevel facet arthrosis and uncovertebral degeneration. Acute compression fracture of T2 is seen. SOFT TISSUES: Carotid calcifications are seen. The thyroid is enlarged. A left thyroid nodule is seen. Emphysema is seen in the lung apices. DISC LEVELS: Calcification of the ligamentum flava at C2-C3 and C3-C4 is noted. At C3-C4, the calcification is leading to mild central canal narrowing. Mild right neuroforaminal narrowing at C6-C7. 1. Mild compression fracture of T2. 2. Multilevel degenerative changes of the cervical spine. 3. Thyromegaly. Left thyroid nodule. **This report has been created using voice recognition software. It may contain minor errors which are inherent in voice recognition technology. ** Final report electronically signed by Dr Shanta Kaba on 7/17/2022 8:21 PM    MRI THORACIC SPINE WO CONTRAST    Result Date: 7/18/2022  PROCEDURE: MRI THORACIC SPINE WO CONTRAST CLINICAL INFORMATION: r/o acute VCF. Fall with mid back tenderness. COMPARISON: CT thoracic spine dated 7/17/2022. TECHNIQUE: Sagittal T1, T2 and STIR sequences were obtained through the thoracic spine. Axial T2-weighted images were obtained through the discs. FINDINGS:  There is a dextrocurvature of the thoracic spine, stable compared to prior CT. There are compression deformities at the T1 and T2 vertebral bodies. There is approximately 15% anterior height loss at T1. There is no edema/hyperintense STIR signal in the T1 vertebral body.  There is scalloping of the inferior endplate of T2 with approximately 20% anterior height loss, similar to prior CT. There is bandlike hyperintense STIR signal adjacent to the inferior endplate at this level. There is otherwise anatomic vertebral body height and alignment. Marrow signal is otherwise within normal limits. The thoracic spinal cord is normal in caliber without focal area of abnormal T2 signal identified. Paraspinal soft tissues are unremarkable. There is no significant spinal canal or neuroforaminal stenosis at any thoracic level. 1. Acute compression deformity of T2 with approximately 20% anterior height loss. 2. Chronic compression deformity of T1 with approximately 50% anterior height loss. **This report has been created using voice recognition software. It may contain minor errors which are inherent in voice recognition technology. ** Final report electronically signed by Dr. Orlando Rey MD on 7/18/2022 2:43 PM    XR CHEST PORTABLE    Result Date: 7/18/2022  PROCEDURE: XR CHEST PORTABLE CLINICAL INFORMATION: follow up for rib fractures. Left lower rib fractures. COMPARISON: Chest CT 7/17/2022. TECHNIQUE: AP semiupright view of the chest. FINDINGS: The heart size is normal.The mediastinum is not widened. There are background fibrotic changes. There is no pneumothorax. There are no pulmonary infiltrates. There are no pleural effusions. The pulmonary vascularity is normal. The patient's known left lower rib fractures are not seen radiographically. No pleural effusions. No pneumothorax. Background fibrotic changes. **This report has been created using voice recognition software. It may contain minor errors which are inherent in voice recognition technology. ** Final report electronically signed by Dr. Laurie Naidu on 7/18/2022 7:40 AM    CT LUMBAR RECONSTRUCTION WO POST PROCESS    Result Date: 7/17/2022  PROCEDURE: CT LUMBAR RECONSTRUCTION WO POST PROCESS CLINICAL INFORMATION: low backpain fall COMPARISON: None TECHNIQUE: 3 mm axial CT images were reconstructed through the lumbar spine from the patient's CT abdomen/pelvis examination without IV contrast. Sagittal and coronal reconstructions were also performed. All CT scans at this facility use dose modulation, iterative reconstruction, and/or weight based dosing when appropriate to reduce the radiation dose to as low as reasonably achievable. FINDINGS: ALIGNMENT: The lumbar lordosis is maintained. Levoscoliosis of the lumbar spine. BONE: The bones are markedly demineralized. . Acute compression fracture of L5. Increased sclerosis within the sacrum relates to insufficiency sacral fractures. Degenerative changes of the SI joints. Mild degenerative changes of the lumbar spine. Multilevel facet arthrosis. . . The soft tissues have been described on the CT abdomen and pelvis. DISC SPACES/FACET JOINT/SPINAL CANAL/NEURAL FORAMEN: The lack of intrathecal contrast limits the evaluation of the spinal canal. Mild central canal narrowing at L3-L4 with moderate right neural foraminal narrowing. Moderate central canal narrowing at L4-L5 with moderate right neuroforaminal narrowing. 1. Acute compression fracture of L5. 2. Increased sclerosis within the sacrum relates to insufficiency sacral fractures. 3. Degenerative changes of the lumbar spine. 4. Marked osteopenia. **This report has been created using voice recognition software. It may contain minor errors which are inherent in voice recognition technology. ** Final report electronically signed by Dr Marco Griffin on 7/17/2022 8:49 PM    CT THORACIC RECONSTRUCTION WO POST PROCESS    Result Date: 7/17/2022  PROCEDURE: CT THORACIC RECONSTRUCTION WO POST PROCESS CLINICAL INFORMATION: Fall, middle midline thoracic spine tenderness. COMPARISON: None TECHNIQUE: 3 mm axial CT images were reconstructed through the thoracic spine from the patient's CT chest examination without IV contrast. Sagittal and coronal reconstruction were also performed.  All CT scans at this facility use dose modulation, iterative reconstruction, and/or weight based dosing when appropriate to reduce the radiation dose to as low as reasonably achievable. FINDINGS: ALIGNMENT: The thoracic kyphosis is maintained. Dextroscoliosis. BONE: The bones are demineralized. Acute compression fracture of T2. Chronic compression deformity of T12. . Multilevel degenerative changes of the thoracic spine. Multilevel facet arthrosis. . . SOFT TISSUE: The soft tissues of been described on the CT chest.. DISC LEVEL: 1. The lack of intrathecal contrast limits the evaluation of the spinal canal. No significant central canal narrowing. No significant neuroforamina narrowing. 1. Mild acute compression fracture of T2. 2. Degenerative changes of the thoracic spine. **This report has been created using voice recognition software. It may contain minor errors which are inherent in voice recognition technology. ** Final report electronically signed by Dr Sheila Galindo on 7/17/2022 8:43 PM      Electronically signed by Porter Kellogg PA-C on 7/21/2022 at 4:27 PM

## 2022-07-22 LAB
ANION GAP SERPL CALCULATED.3IONS-SCNC: 11 MEQ/L (ref 8–16)
BASOPHILS # BLD: 0.6 %
BASOPHILS ABSOLUTE: 0.1 THOU/MM3 (ref 0–0.1)
BUN BLDV-MCNC: 21 MG/DL (ref 7–22)
CALCIUM SERPL-MCNC: 9.9 MG/DL (ref 8.5–10.5)
CHLORIDE BLD-SCNC: 106 MEQ/L (ref 98–111)
CO2: 23 MEQ/L (ref 23–33)
CREAT SERPL-MCNC: 0.6 MG/DL (ref 0.4–1.2)
EOSINOPHIL # BLD: 1.1 %
EOSINOPHILS ABSOLUTE: 0.1 THOU/MM3 (ref 0–0.4)
ERYTHROCYTE [DISTWIDTH] IN BLOOD BY AUTOMATED COUNT: 15.4 % (ref 11.5–14.5)
ERYTHROCYTE [DISTWIDTH] IN BLOOD BY AUTOMATED COUNT: 55.3 FL (ref 35–45)
GFR SERPL CREATININE-BSD FRML MDRD: > 90 ML/MIN/1.73M2
GLUCOSE BLD-MCNC: 108 MG/DL (ref 70–108)
HCT VFR BLD CALC: 46.2 % (ref 37–47)
HEMOGLOBIN: 14.4 GM/DL (ref 12–16)
IMMATURE GRANS (ABS): 0.1 THOU/MM3 (ref 0–0.07)
IMMATURE GRANULOCYTES: 0.9 %
LYMPHOCYTES # BLD: 26 %
LYMPHOCYTES ABSOLUTE: 3 THOU/MM3 (ref 1–4.8)
MCH RBC QN AUTO: 30.4 PG (ref 26–33)
MCHC RBC AUTO-ENTMCNC: 31.2 GM/DL (ref 32.2–35.5)
MCV RBC AUTO: 97.7 FL (ref 81–99)
MONOCYTES # BLD: 8.8 %
MONOCYTES ABSOLUTE: 1 THOU/MM3 (ref 0.4–1.3)
NUCLEATED RED BLOOD CELLS: 0 /100 WBC
PLATELET # BLD: 275 THOU/MM3 (ref 130–400)
PMV BLD AUTO: 9.9 FL (ref 9.4–12.4)
POTASSIUM REFLEX MAGNESIUM: 3.9 MEQ/L (ref 3.5–5.2)
RBC # BLD: 4.73 MILL/MM3 (ref 4.2–5.4)
SEG NEUTROPHILS: 62.6 %
SEGMENTED NEUTROPHILS ABSOLUTE COUNT: 7.3 THOU/MM3 (ref 1.8–7.7)
SODIUM BLD-SCNC: 140 MEQ/L (ref 135–145)
WBC # BLD: 11.7 THOU/MM3 (ref 4.8–10.8)

## 2022-07-22 PROCEDURE — 2580000003 HC RX 258: Performed by: PHYSICIAN ASSISTANT

## 2022-07-22 PROCEDURE — 94799 UNLISTED PULMONARY SVC/PX: CPT

## 2022-07-22 PROCEDURE — 6360000002 HC RX W HCPCS: Performed by: PHYSICIAN ASSISTANT

## 2022-07-22 PROCEDURE — 97130 THER IVNTJ EA ADDL 15 MIN: CPT

## 2022-07-22 PROCEDURE — 1200000000 HC SEMI PRIVATE

## 2022-07-22 PROCEDURE — 99232 SBSQ HOSP IP/OBS MODERATE 35: CPT | Performed by: SURGERY

## 2022-07-22 PROCEDURE — 80048 BASIC METABOLIC PNL TOTAL CA: CPT

## 2022-07-22 PROCEDURE — APPNB15 APP NON BILLABLE TIME 0-15 MINS: Performed by: PHYSICIAN ASSISTANT

## 2022-07-22 PROCEDURE — 95819 EEG AWAKE AND ASLEEP: CPT

## 2022-07-22 PROCEDURE — 95819 EEG AWAKE AND ASLEEP: CPT | Performed by: PSYCHIATRY & NEUROLOGY

## 2022-07-22 PROCEDURE — 6370000000 HC RX 637 (ALT 250 FOR IP): Performed by: PHYSICIAN ASSISTANT

## 2022-07-22 PROCEDURE — 97129 THER IVNTJ 1ST 15 MIN: CPT

## 2022-07-22 PROCEDURE — 6370000000 HC RX 637 (ALT 250 FOR IP)

## 2022-07-22 PROCEDURE — 94640 AIRWAY INHALATION TREATMENT: CPT

## 2022-07-22 PROCEDURE — 85025 COMPLETE CBC W/AUTO DIFF WBC: CPT

## 2022-07-22 PROCEDURE — 36415 COLL VENOUS BLD VENIPUNCTURE: CPT

## 2022-07-22 RX ADMIN — FAMOTIDINE 20 MG: 20 TABLET ORAL at 10:59

## 2022-07-22 RX ADMIN — POLYETHYLENE GLYCOL 3350 17 G: 17 POWDER, FOR SOLUTION ORAL at 10:59

## 2022-07-22 RX ADMIN — TIOTROPIUM BROMIDE AND OLODATEROL 2 PUFF: 3.124; 2.736 SPRAY, METERED RESPIRATORY (INHALATION) at 10:13

## 2022-07-22 RX ADMIN — ENOXAPARIN SODIUM 40 MG: 100 INJECTION SUBCUTANEOUS at 11:00

## 2022-07-22 RX ADMIN — Medication 1000 MCG: at 10:59

## 2022-07-22 RX ADMIN — PSYLLIUM HUSK 1 PACKET: 3.4 GRANULE ORAL at 11:00

## 2022-07-22 RX ADMIN — ATORVASTATIN CALCIUM 20 MG: 20 TABLET, FILM COATED ORAL at 20:10

## 2022-07-22 RX ADMIN — SODIUM CHLORIDE, PRESERVATIVE FREE 10 ML: 5 INJECTION INTRAVENOUS at 10:59

## 2022-07-22 RX ADMIN — DONEPEZIL HYDROCHLORIDE 10 MG: 10 TABLET, FILM COATED ORAL at 20:10

## 2022-07-22 RX ADMIN — PREDNISONE 10 MG: 10 TABLET ORAL at 11:00

## 2022-07-22 RX ADMIN — Medication 1 TABLET: at 11:00

## 2022-07-22 RX ADMIN — METOPROLOL TARTRATE 25 MG: 25 TABLET, FILM COATED ORAL at 20:10

## 2022-07-22 RX ADMIN — Medication 1 CAPSULE: at 11:00

## 2022-07-22 RX ADMIN — METOPROLOL TARTRATE 25 MG: 25 TABLET, FILM COATED ORAL at 11:00

## 2022-07-22 RX ADMIN — SODIUM CHLORIDE, PRESERVATIVE FREE 10 ML: 5 INJECTION INTRAVENOUS at 20:10

## 2022-07-22 RX ADMIN — LISINOPRIL 20 MG: 20 TABLET ORAL at 11:00

## 2022-07-22 RX ADMIN — ACETAMINOPHEN 325MG 650 MG: 325 TABLET ORAL at 11:00

## 2022-07-22 RX ADMIN — OXYCODONE HYDROCHLORIDE AND ACETAMINOPHEN 1000 MG: 500 TABLET ORAL at 10:59

## 2022-07-22 ASSESSMENT — ENCOUNTER SYMPTOMS
SHORTNESS OF BREATH: 0
COUGH: 0
BACK PAIN: 1

## 2022-07-22 ASSESSMENT — PAIN SCALES - GENERAL
PAINLEVEL_OUTOF10: 4
PAINLEVEL_OUTOF10: 4

## 2022-07-22 NOTE — PROGRESS NOTES
mg SubCUTAneous Daily    lidocaine  2 patch TransDERmal Daily     Continuous Infusions:   sodium chloride 950 mL (07/19/22 0826)    sodium chloride       PRN Meds:.sodium chloride flush, sodium chloride, acetaminophen, ondansetron **OR** ondansetron, senna, morphine **OR** morphine, oxyCODONE **OR** oxyCODONE, albuterol sulfate HFA, ipratropium    Technician: Jacqueline Carroll 7/22/2022

## 2022-07-22 NOTE — ACP (ADVANCE CARE PLANNING)
Advance Care Planning     Advance Care Planning Inpatient Note  [facility] Department    Today's Date: 2022  Unit: [facility]    Received request from rounding. Upon review of chart and communication with care team, spoke to patient's daughter via phone. Goals of ACP Conversation:  Discuss advance care planning documents    Health Care Decision Makers:       Primary Decision Maker: Benita Henderson - [phone]  Summary:  Patient has two identical POA documents on file, scanned 2021 and 22. Both documents are incomplete. They are only two pages and do not contain the signature or witness/notary signatures. Advance Care Planning Documents (Patient Wishes):  Healthcare Power of /Advance Directive Appointment of Health Care Agent     Assessment:  Patient's  is listed as POA, however he is not listed in the documents. Contacted alt HCPOA, daughter Angus Alexander. She confirmed that her father is . She is not home, but believes those two pages are all she has. She will check. States patient is doing much better and is back to her baseline mentation after have UTI earlier this week and falling at [facility]. Jazmine inquired about new HCPOA. Explained that we can assist with that and upgrade her to [facility] and name new alternate. Gave her hours  will be present this weekend. Interventions:  See Above    Care Preferences Communicated:   No  Patient has DNR-CCA  from May 2022. She is currently a full code. Did not address this during this conversation. Outcomes/Plan:  [place] Chet was graeful for the phone call and the informaiton about updating the POA. She will follow up with chaplains when she sees what she has.      Electronically signed by Padmini Davis on 2022 at 6:27 PM

## 2022-07-22 NOTE — CARE COORDINATION
7/22/22, 12:38 PM EDT    DISCHARGE PLANNING EVALUATION    Barrett Gant will accept today, if discharged. Will need ambulance transport.

## 2022-07-22 NOTE — DISCHARGE INSTR - COC
Continuity of Care Form    Patient Name: Eli Quach   :  [birth year]  MRN:  [id number]    Admit date:  2022  Discharge date:  2022    Code Status Order: Full Code   Advance Directives:     Admitting Physician:   Emil Mathias MD  PCP: PETEY Madera CNP    Discharging Nurse: [facility] [place] [place] Unit/Room#: 7K-08/008-A  Discharging Unit Phone Number: [phone]    Emergency Contact:   Extended Emergency Contact Information  Primary Emergency Contact: Gulshan Fitzgerald 59 Phone: [phone]  Relation: Child  Secondary Emergency Contact: Asuncion Juarez [address] Phone: [phone]  Relation: Child    Past Surgical History:  Past Surgical History:   Procedure Laterality Date    BREAST BIOPSY  1988    right breast biopsy     BREAST BIOPSY Right 2011    stereotactic biopsy    COLONOSCOPY      Dr Elie Willingham      Dr Rena Castro    COLONOSCOPY      Dr Kayla León interminate colitis    COLONOSCOPY  2014    Dr Mavis Edwards Bilateral 2011    [address]  2020    left fracture    OSTEOTOMY Left 2021    LEFT 5TH METATARSAL HEAD RESECTION EXCISION SOFT TISSUE LESION performed by Alberto Balgey DPM at [address]       Immunization History:   Immunization History   Administered Date(s) Administered    COVID-19, PFIZER GRAY top, DO NOT Dilute, (age 15 y+), IM, 30 mcg/0.3 mL 2022    COVID-19, PFIZER PURPLE top, DILUTE for use, (age 15 y+), 30mcg/0.3mL 2021, 2021, 2021, 2021, 2021, 2021    Influenza Vaccine, unspecified formulation 2006, 2006, 2008, 2014, 10/28/2014, 10/07/2015, 2016    Influenza, High Dose (Fluzone 65 yrs and older) 2017, 10/30/2018, 10/21/2021    Influenza, Quadv, adjuvanted, 65 yrs +, IM, PF (Fluad) 10/15/2020, 10/15/2020    Influenza, Triv, inactivated, subunit, adjuvanted, IM (Fluad 65 yrs and older) 10/09/2019    Pneumococcal Conjugate 13-valent ([id number]) 10/07/2015    Pneumococcal Polysaccharide ([id number]) 12/28/2006, 09/07/2017    Tdap (Boostrix, Adacel) 02/04/2016, 01/02/2020    Zoster Recombinant (Shingrix) 11/05/2019, 11/05/2019, 02/09/2020, 02/09/2020       Active Problems:  Patient Active Problem List   Diagnosis Code    Nonexudative age-related macular degeneration H35.3190    Nuclear sclerotic cataract H25.10    Ulcerative colitis ([facility] [address] K51.90    Sleep apnea G47.30    Postmenopausal Z78.0    Nocturnal hypoxemia G47.34    Nicotine dependence F17.200    Hyperlipidemia E78.5    Diverticulosis large intestine w/o perforation or abscess w/bleeding K57.31    Simple chronic bronchitis ([facility]) J41.0    Age-related osteoporosis without current pathological fracture M81.0    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 ([facility]) I25.119    Type 2 diabetes mellitus with diabetic polyneuropathy, without long-term current use of insulin ([facility]) E11.42    Trochanteric bursitis M70.60    Generalized weakness R53.1    Dementia without behavioral disturbance, unspecified dementia type ([facility] [address] F03.90    Hypertension I10    Chronic obstructive pulmonary disease ([facility]) J44.9    Fracture of ribs, two, left, closed, initial encounter S22.42XA    Closed compression fracture of fifth lumbar vertebra ([facility] [address] S32.050A    Compression fracture of T2 vertebra ([facility] [address] S22.020A    UTI (urinary tract infection) N39.0    Fall W19. Lorenzo Constable       Isolation/Infection:   Isolation            No Isolation          Patient Infection Status       None to display            Nurse Assessment:  Last Vital Signs: BP (!) 145/68   Pulse 71   Temp 97.9 °F (36.6 °C) (Oral)   Resp 16   Ht 5' 5\" (1.651 m)   Wt 136 lb 2 oz (61.7 kg)   SpO2 99%   BMI 22.65 kg/m²     Last documented pain score (0-10 scale): Pain Level: 0  Last Weight:   Wt Readings from Last 1 Encounters:   07/21/22 136 lb 2 oz (61.7 kg)     Mental Status:  oriented, thought processes intact, able to concentrate and follow conversation, and intermittent confusion. IV Access:  - None    Nursing Mobility/ADLs:  Walking   Assisted  Transfer  Assisted  Bathing  Assisted  Dressing  Assisted  Toileting  Assisted  Feeding  Independent  Med Admin  Assisted  Med Delivery   whole    Wound Care Documentation and Therapy:  Incision 11/02/21 Femoral Left (Active)   Number of days: 261        Elimination:  Continence: Bowel: Yes  Bladder: Yes  Urinary Catheter: None   Colostomy/Ileostomy/Ileal Conduit: No       Date of Last BM: 7/22/2022    Intake/Output Summary (Last 24 hours) at 7/22/2022 0909  Last data filed at 7/21/2022 1158  Gross per 24 hour   Intake 240 ml   Output --   Net 240 ml     I/O last 3 completed shifts: In: 480 [P.O.:480]  Out: -     Safety Concerns: At Risk for Falls    Impairments/Disabilities:      None    Nutrition Therapy:  Current Nutrition Therapy:   - Oral Diet:  General    Routes of Feeding: Oral  Liquids: No Restrictions  Daily Fluid Restriction: no  Last Modified Barium Swallow with Video (Video Swallowing Test): not done    Treatments at the Time of Hospital Discharge:   Respiratory Treatments: none  Oxygen Therapy:  is not on home oxygen therapy.   Ventilator:    - No ventilator support    Rehab Therapies: Physical Therapy, Occupational Therapy, and Speech/Language Therapy  Weight Bearing Status/Restrictions: No weight bearing restrictions  Other Medical Equipment (for information only, NOT a DME order):  walker, hospital bed, and corset back brace and C-collar when up until follow up appointment  Other Treatments: none    Patient's personal belongings (please select all that are sent with patient):  Glasses    RN SIGNATURE:  Electronically signed by Jacqueline Ballard RN on 7/23/22 at 8:16 AM EDT    CASE MANAGEMENT/SOCIAL WORK SECTION    Inpatient Status Date: 07/17/22    Readmission Risk Assessment Score:  Readmission Risk

## 2022-07-22 NOTE — PROGRESS NOTES
[facility]  INPATIENT SPEECH THERAPY  [facility]  DAILY NOTE    TIME   SLP Individual Minutes  Time In:   Time Out: 312  Minutes: 32  Timed Code Treatment Minutes: 32 Minutes       Date: 2022  Patient Name: Barry Harry      CSN: [id number]   : 1940  [de-identified]80 y.o.)  Gender: female   Referring Physician:  GLENN Wellington  Diagnosis: Acute UTI  Precautions: Fall Risk   Current Diet: Regular and Thin Liquids   Swallowing Strategies: Standard Universal Swallow Precautions  Date of Last MBS/FEES: Not Applicable    Pain:   - Pain location: Back Pain - RN aware    Subjective:  RN Trevor Fishman approved completion of cognitive tx this date. Upon arrival to room, patient awake in recliner with son at bedside. Patient agreeable to complete treatment. Pleasant and cooperative throughout; however, progressing fatigue as session progressed. Short-Term Goals:  SHORT TERM GOAL #1:  Goal 1: Patient will complete verbal/visual reasoning and executive functioning tasks (medications, finances) with 80% accuracy given mod cues to resume ADL/IADL completion with least amount of supervision/assistance. INTERVENTIONS:  Medication Management - patient provided with pill box + prescriptions and was prompted to search for errors  3/3 moderate cues   *required verbal cue to assist with visual scanning  *decreased sustained attention within task     SHORT TERM GOAL #2:  Goal 2: Patient will complete recall tasks (immediate, delayed, working) with focus on memory strategies with 80% accuracy given mod cues to improve recall of novel information  INTERVENTIONS: ST completed review of STM strategies using the acronym WRAP--Write it down, Repeat it, Associate it, and Pictures it. ST the provided patient with 4 new units of information related to ST Concepcion Denney, [address] [place] [address]) and provided verbal cues for patient to implement memory strategies.      Immediate recall:  independently  Delayed recall: 3/4 given categorical cue, 1/4 given total assist    SHORT TERM GOAL #3:  Goal 3: Patient will complete thought organization tasks (divergent/convergent thinking tasks, sequencing, calendar use/planning) with 80% accuracy given mod cues to improve processing speed and organization during ADL/IADL completion. INTERVENTIONS:  Monthly Calendar Use   2/4 independently, 1/4 given mod cues, 1/4 given max cues   *slow visual scanning + required verbal/visual cues to scan week by week. SHORT TERM GOAL #4:  Goal 4: Patient will complete attention tasks (selective, alternating, divided) with no more than 5 errors/redirections to improve attention during ADL completion. INTERVENTIONS: See goal 1 for details    Long-Term Goals:  No long term goals recommended d/t short ELOS        EDUCATION:  Learner: Patient and Son  Education:  Reviewed ST goals and Plan of Care, Reviewed recommendations for follow-up, Education Related to Potential Risks and Complications Due to Impairment/Illness/Injury, Education Related to Prevention of Recurrence of Impairment/Illness/Injury, Education Related to [employer] and Wellness, and Home Safety Education  Evaluation of Education: Sifuentes Foot understanding and Needs further instruction    ASSESSMENT/PLAN:  Activity Tolerance:  Patient tolerance of  treatment: good. Assessment/Plan: Patient progressing toward established goals. Continues to require skilled care of licensed speech pathologist to progress toward achievement of established goals and plan of care. .     Plan for Next Session: Attention, finances, thought organization  Discharge Recommendations: [facility]) Osiris Francisco M.A., [address]

## 2022-07-22 NOTE — FLOWSHEET NOTE
[address] [facility]  PHYSICAL THERAPY MISSED TREATMENT NOTE  [facility]    Date: 2022  Patient Name: Eli Quach        MRN: [id number]   : 1940  (80 y.o.)  Gender: female   Referring Practitioner: GLENN Mead  Diagnosis: acute UTI         REASON FOR MISSED TREATMENT:  Hold treatment per nursing request.  EEG in progress upon arrival. Will try back later as time allows.

## 2022-07-22 NOTE — PROGRESS NOTES
Neurology Progress Note    Date:7/22/2022       MD:[id number]  Patient Alina Fofana     YOB: 1940     Age:81 y.o. Requesting Physician: Demetrius Sheldon MD     Reason for Consult:  Evaluate for new confusion, hallucinations      Chief Complaint:   Chief Complaint   Patient presents with    Fall       Subjective     Tiffany Hutchison is a 80 y.o. female with a history of heart failure, HTN, COPD, chronic DVT left lower extremity, HLD, ulcerative colitis, dementia who was admitted to [facility] following a fall on 7/17/2022 at her assisted living facility with unknown loss of consciousness. Patient states she tripped over a chair. Denies passing out. However, daughter feels it was due to a syncopal episode. Patient states she fell directly on her back with immediate upper and lower back pain. She is currently undergoing medical management of compression fractures at multiple levels, treated by Dr. Manish Paula. Associated symptoms include urinary frequency, chronic tremor of right lower extremity with more intense jerking of bilateral shoulders. We are consulted for new hallucinations and confusion following this fall. She was found to have UTI, which is being medically treated with Rocephin. Pertinent family history includes Alzheimer history of her father. Her current medication regimen includes Aricept, recently added carbidopa levodopa for possible Parkinson's. Additionally she is on atorvastatin 20. She is not on any anticoagulant therapy at this time. She currently has a c-collar applied. She denies chest pain, respiratory distress, cough, headache, vision changes, dizziness, numbness, tingling, weakness, recent illness. She denies feeling confused, increased anxiety, or seeing things that aren't there. Interval History 7/22/22:  Patient doing well, only complaining of back pain. A&O x4. Nursing staff reports plan for discharge today.  Myoclonic jerk of HEATHER noted, much improved from prior. Review of Systems   Review of Systems   Unable to perform ROS: Dementia   Eyes:  Negative for visual disturbance. Respiratory:  Negative for cough and shortness of breath. Cardiovascular:  Negative for chest pain and palpitations. Musculoskeletal:  Positive for back pain. Negative for arthralgias and myalgias. Neurological:  Negative for dizziness, speech difficulty, weakness, numbness and headaches. Psychiatric/Behavioral:  The patient is not nervous/anxious. Medications   Scheduled Meds:    famotidine  20 mg Oral Daily    sodium chloride flush  5-40 mL IntraVENous 2 times per day    polyethylene glycol  17 g Oral Daily    bisacodyl  10 mg Rectal Daily    lactobacillus  1 capsule Oral Daily with breakfast    donepezil  10 mg Oral Nightly    lisinopril  20 mg Oral Daily    metoprolol tartrate  25 mg Oral BID    multivitamin  1 tablet Oral Daily    predniSONE  10 mg Oral Daily    psyllium  1 packet Oral Daily    atorvastatin  20 mg Oral Nightly    tiotropium-olodaterol  2 puff Inhalation Daily    vitamin B-12  1,000 mcg Oral Daily    vitamin C  1,000 mg Oral Daily    nicotine  1 patch TransDERmal Daily    enoxaparin  40 mg SubCUTAneous Daily    lidocaine  2 patch TransDERmal Daily     Continuous Infusions:    sodium chloride 950 mL (07/19/22 0826)    sodium chloride       PRN Meds: sodium chloride flush, sodium chloride, acetaminophen, ondansetron **OR** ondansetron, senna, morphine **OR** morphine, oxyCODONE **OR** oxyCODONE, albuterol sulfate HFA, ipratropium  Medications Prior to Admission:   No current facility-administered medications on file prior to encounter. Current Outpatient Medications on File Prior to Encounter   Medication Sig Dispense Refill    HYDROcodone-acetaminophen (NORCO) 5-325 MG per tablet Take 1 tablet by mouth every 6 hours as needed for Pain.       donepezil (ARICEPT) 10 MG tablet Take 1 tablet by mouth nightly 90 tablet 3    metoprolol tartrate (LOPRESSOR) 25 MG tablet Take 1 tablet by mouth 2 times daily 180 tablet 3    lisinopril (PRINIVIL;ZESTRIL) 20 MG tablet Take 1 tablet by mouth daily 90 tablet 3    predniSONE (DELTASONE) 10 MG tablet Take 1 tablet by mouth daily 90 tablet 3    simvastatin (ZOCOR) 40 MG tablet Take 1 tablet by mouth nightly 90 tablet 3    umeclidinium-vilanterol (ANORO ELLIPTA) 62.5-25 MCG/INH AEPB inhaler Inhale 1 puff into the lungs daily 1 each 5    Misc. Devices [facility]) MISC 1 standard wheelchair. 1 each 0    Multiple Vitamin (MULTIVITAMIN ADULT PO) Take 1 tablet by mouth daily      vitamin C (ASCORBIC ACID) 500 MG tablet Take 1,000 mg by mouth daily      zinc gluconate 50 MG tablet Take 50 mg by mouth daily      albuterol (PROVENTIL) (2.5 MG/3ML) 0.083% nebulizer solution Take 2.5 mg by nebulization every 6 hours as needed for Wheezing or Shortness of Breath      ibuprofen (ADVIL;MOTRIN) 400 MG tablet Take 400 mg by mouth 2 times daily as needed for Pain      ipratropium (ATROVENT) 0.02 % nebulizer solution Take 0.5 mg by nebulization every 6-8 hours as needed for Wheezing      denosumab (PROLIA) 60 MG/ML SOSY SC injection Inject 1 mL into the skin every 6 months 1 mL 1    acetaminophen (TYLENOL) 500 MG tablet Take 2 tablets by mouth 3 times daily as needed for Pain 100 tablet 1    Misc. Devices MISC 1 set of bed rails. 1 each 0    Misc. Devices MISC Please apply compression stockings (JOHANNE hose) to bilateral lower extremities for 12 hours daily. Remove nightly.  2 Device 0    albuterol sulfate  (90 Base) MCG/ACT inhaler Inhale 2 puffs into the lungs every 6 hours as needed for Wheezing 2 Inhaler 5    Psyllium Fiber 0.52 g CAPS Take 1 capsule by mouth daily 90 capsule 3    Glucosamine-Chondroitin 500-250 MG CAPS Take by mouth daily      vitamin B-12 (CYANOCOBALAMIN) 1000 MCG tablet Take 1 tablet by mouth daily 30 tablet 3    Probiotic Product (Planandoo PO) Take 1 tablet by mouth daily       Past History Past Medical History:   has a past medical history of Abnormal nuclear stress test, Cataract, Chronic deep vein thrombosis (DVT) of femoral vein of left lower extremity ([address] Colon polyps, COPD (chronic obstructive pulmonary disease) ([address] Diverticulosis large intestine w/o perforation or abscess w/bleeding, DVT, recurrent, lower extremity, acute, left (HCC), Fibroid, uterine, Hearing loss, Hx of blood clots, Hyperlipidemia, Nicotine dependence, Nocturnal hypoxemia, Postmenopausal, Sleep apnea, and Ulcerative colitis ([address] Social History:   reports that she has been smoking cigarettes. She has a 40.00 pack-year smoking history. She has never used smokeless tobacco. She reports that she does not drink alcohol and does not use drugs. Family History:   Family History   Problem Relation Age of Onset    Diabetes Mother     High Blood Pressure Mother     Heart Disease Mother     Coronary Art Dis Father     Heart Disease Father        Physical Examination      Vitals:  /70   Pulse 82   Temp 98 °F (36.7 °C) (Oral)   Resp 16   Ht 5' 5\" (1.651 m)   Wt 136 lb 2 oz (61.7 kg)   SpO2 94%   BMI 22.65 kg/m²   Temp (24hrs), Av.9 °F (36.6 °C), Min:97.4 °F (36.3 °C), Max:98.1 °F (36.7 °C)      I/O (24Hr): Intake/Output Summary (Last 24 hours) at 2022 1742  Last data filed at 2022 1222  Gross per 24 hour   Intake 120 ml   Output --   Net 120 ml         Physical Exam  Vitals reviewed. Constitutional:       General: She is not in acute distress. Appearance: She is ill-appearing. HENT:      Head: Normocephalic and atraumatic. Nose: Nose normal.      Mouth/Throat:      Mouth: Mucous membranes are moist.      Pharynx: Oropharynx is clear. Eyes:      Extraocular Movements: Extraocular movements intact and EOM normal.      Pupils: Pupils are equal, round, and reactive to light. Neck:      Comments: C-collar in place  Cardiovascular:      Rate and Rhythm: Normal rate.       Pulses: Normal pulses. Pulmonary:      Effort: Pulmonary effort is normal. No respiratory distress. Musculoskeletal:         General: Normal range of motion. Right lower leg: No edema. Left lower leg: No edema. Skin:     General: Skin is warm. Neurological:      Mental Status: She is alert and oriented to person, place, and time. Coordination: Finger-Nose-Finger Test and Heel to [employer] normal.   Psychiatric:         Mood and Affect: Mood normal.         Speech: Speech normal.         Behavior: Behavior normal.     Neurologic Exam     Mental Status   Oriented to person, place, and time. Attention: normal. Concentration: normal.   Speech: speech is normal   Level of consciousness: alert  Normal comprehension. Cranial Nerves   Cranial nerves II through XII intact. CN II   Visual fields full to confrontation. Right visual field deficit: none  Left visual field deficit: none     CN III, IV, VI   Pupils are equal, round, and reactive to light. Extraocular motions are normal.   Right pupil: Shape: regular. Reactivity: brisk. Left pupil: Shape: regular. Reactivity: brisk. CN V   Facial sensation intact. Right facial sensation deficit: none  Left facial sensation deficit: none    CN VII   Facial expression full, symmetric. Right facial weakness: none  Left facial weakness: none    CN VIII   CN VIII normal.   Hearing: intact    CN IX, X   CN IX normal.   CN X normal.   Palate: symmetric    CN XI   CN XI normal.   Right trapezius strength: normal  Left trapezius strength: normal    CN XII   CN XII normal.   Tongue: not atrophic  Fasciculations: absent  Tongue deviation: none    Motor Exam   Muscle bulk: normal  Overall muscle tone: normal  Motor strength 5/5 bilateral upper and lower extremities.      Sensory Exam   Light touch normal.     Gait, Coordination, and Reflexes     Coordination   Finger to nose coordination: normal  Heel to shin coordination: normal  Intermittent myoclonic jerks noted of R shoulder, improved significantly from prior. Labs/Imaging/Diagnostics   Labs:  CBC:  Recent Labs     07/20/22  0914 07/21/22  0623 07/22/22  0613   WBC 14.1* 11.9* 11.7*   RBC 4.51 4.68 4.73   HGB 13.7 14.3 14.4   HCT 43.9 46.5 46.2   MCV 97.3 99.4* 97.7    263 275     CHEMISTRIES:  Recent Labs     07/20/22  0914 07/21/22  0604 07/22/22  0613    142 140   K 3.6 5.1 3.9    107 106   CO2 22* 22* 23   BUN 17 19 21   CREATININE 0.6 0.6 0.6   GLUCOSE 126* 101 108     COAGULATION STUDIES:No results for input(s): PROTIME, INR, APTT in the last 72 hours. LIVER PROFILE:  No results for input(s): AST, ALT, BILIDIR, BILITOT, ALKPHOS in the last 72 hours. CHOLESTEROL AND A1C:No results for input(s): LDLCALC, HDL, CHOL, TRIG, LABA1C in the last 720 hours. Imaging Last 24 Hours:  No results found. Assessment and Plan:        Myoclonic jerks in setting of likely toxic metabolic encephalopathy vs acute delirium  CTH: negative for intracranial abnormality. Chronic microvascular changes. CT C-spine: T2 compression fracture, multilevel DDD noted. CT L-spine: L5 compression fracture, insufficiency sacral fractures, DDD, osteopenia   MRI T-Spine: T2 acute compression fx with 20% anterior height loss. Chronic T1 compression fx with 50% anterior height loss. CXR/CT chest negative for pneumothorax  EEG negative 7/22/22. Sinemet discontinued, as these are classic myoclonic jerking movements of toxic metabolic encephalopathy rather than parkinson's disease. Agree with continued medical management of underlying illnesses. Continue pneumatic compression device and Lovenox for DVT prevention. Neurological work-up completed at this time. Neurology will sign off. Please call with questions or concerns. Thank you for this consult. This patient's case was discussed with Dr. Kashif Rodriguez who is in agreement with assessment and plan.     Electronically signed by Yolanda Valadez PA-C on 7/22/22 at 5:44 PM EDT

## 2022-07-22 NOTE — PLAN OF CARE
Problem: Discharge Planning  Goal: Discharge to home or other facility with appropriate resources  7/22/2022 0057 by Sherrie Issa RN  Outcome: Progressing  7/21/2022 1840 by Vincent Dalal RN  Outcome: Progressing  Flowsheets (Taken 7/20/2022 2253 by Michel Hitchcock RN)  Discharge to home or other facility with appropriate resources:   Identify barriers to discharge with patient and caregiver   Arrange for needed discharge resources and transportation as appropriate   Identify discharge learning needs (meds, wound care, etc)     Problem: Pain  Goal: Verbalizes/displays adequate comfort level or baseline comfort level  7/22/2022 0057 by Sherrie Issa RN  Outcome: Progressing  7/21/2022 1840 by Vincent Dalal RN  Outcome: Progressing  Flowsheets (Taken 7/20/2022 2253 by Michel Hitchcock RN)  Verbalizes/displays adequate comfort level or baseline comfort level:   Encourage patient to monitor pain and request assistance   Assess pain using appropriate pain scale   Administer analgesics based on type and severity of pain and evaluate response     Problem: Safety - Adult  Goal: Free from fall injury  7/22/2022 0057 by Sherrie Issa RN  Outcome: Progressing  7/21/2022 1840 by Vincent Dalal RN  Outcome: Progressing  Flowsheets (Taken 7/21/2022 1839)  Free From Fall Injury: Instruct family/caregiver on patient safety     Problem: Skin/Tissue Integrity  Goal: Absence of new skin breakdown  Description: 1. Monitor for areas of redness and/or skin breakdown  2. Assess vascular access sites hourly  3. Every 4-6 hours minimum:  Change oxygen saturation probe site  4. Every 4-6 hours:  If on nasal continuous positive airway pressure, respiratory therapy assess nares and determine need for appliance change or resting period.   7/22/2022 0057 by Sherrie Issa RN  Outcome: Progressing  7/21/2022 1840 by Vincent Dalal RN  Outcome: Progressing     Problem: Chronic Conditions and Co-morbidities  Goal: Patient's chronic conditions and co-morbidity symptoms are monitored and maintained or improved  7/22/2022 0057 by Bindu Mendez RN  Outcome: Progressing  7/21/2022 1840 by Ioana Ramos RN  Outcome: Progressing  Flowsheets (Taken 7/20/2022 2253 by Maribell Montiel RN)  Care Plan - Patient's Chronic Conditions and Co-Morbidity Symptoms are Monitored and Maintained or Improved:   Monitor and assess patient's chronic conditions and comorbid symptoms for stability, deterioration, or improvement   Collaborate with multidisciplinary team to address chronic and comorbid conditions and prevent exacerbation or deterioration     Problem: Nutrition Deficit:  Goal: Optimize nutritional status  7/22/2022 0057 by Bindu Mendez RN  Outcome: Progressing  7/21/2022 1840 by Ioana Ramos RN  Outcome: Progressing  Flowsheets (Taken 7/21/2022 1840)  Nutrient intake appropriate for improving, restoring, or maintaining nutritional needs:   Assess nutritional status and recommend course of action   Monitor oral intake, labs, and treatment plans     Problem: ABCDS Injury Assessment  Goal: Absence of physical injury  7/22/2022 0057 by Bindu Mendez RN  Outcome: Progressing  7/21/2022 1840 by Ioana Ramos RN  Outcome: Progressing  Flowsheets (Taken 7/21/2022 1840)  Absence of Physical Injury: Implement safety measures based on patient assessment   Care plan reviewed with patient. Patient verbalize understanding of the plan of care and contribute to goal setting.

## 2022-07-22 NOTE — PROGRESS NOTES
Mary Kate Brando Ortega  Daily Progress Note    Pt Name: Wilfredo Queen  Medical Record Number: [id number]  Date of Birth 1940   Today's Date: 7/22/2022    HD: # 5    CC: Ready to go to the nursing home    ASSESSMENT  1. Active Hospital Problems    Diagnosis Date Noted    Fall [W19. XXXA] 07/18/2022     Priority: Medium    Fracture of ribs, two, left, closed, initial encounter [S22.42XA] 07/17/2022     Priority: Medium    Closed compression fracture of fifth lumbar vertebra ([address] [S32.050A] 07/17/2022     Priority: Medium    Compression fracture of T2 vertebra ([address] [S22.020A] 07/17/2022     Priority: Medium    UTI (urinary tract infection) [N39.0] 07/17/2022     Priority: Medium         PLAN  Patient admitted under [facility] [employer], possible syncope              -PT/OT continue to treat              -Consult hospitalist for further work-up   -TSH, BNP, troponin ordered by hospitalist. Troponin slightly elevated at 0.011, hospitalist to manage. T2 and L5 compression fractures              -Orthopedic spine consulted for further management              -PT/OT continue to treat              -Pain control   -7/18: Ortho spine ordered MRI to determine what type of brace is needed. Lumbar brace vs c-collar to cover the T2 VCF. Further recs pending MRI results   - 7/19: C-collar and lumbar corset braces to be worn when out of bed when ambulating. Follow up in 4 weeks in office     Left ninth/10th rib fractures              -Rib fracture protocol              -Flexeril              -Pain control              -Lidoderm patches              -Incentive spirometry/cough and deep breathing              -Monitor respiratory status   -7/18: CXR this AM with no pleural effusions or pneumothorax. Patient not reporting chest pain and shortness of breath.  Respirations unlabored   -7/20: Meeting respiratory indices on rib fracture protocol   -7/21: Continues to meet respiratory indices. Symptomatic UTI              -Bacteriuria and leukocyte Estrace on UA. Rocephin given in the ED              -Consult hospitalist for further management. On prednisone   - 7/18: Hospitalist managing with Rocephin     Leukocytosis              -Reactive versus infective possible urinary source              -Afebrile, repeat labs in a.m.   -7/18: Leukocytosis improving, 12.7. On Recephin   -7/20: Worsening white count to 14.1 this AM. Continues on Rocephin. Hospitalist following   -7/21: Improved WBC down to 11.9, afebrile last 24 hours. Thyroid nodule with thyromegaly              -Consider thyroid ultrasound               -Hospitalist for further evaluation. TSH normal    Chronic Issues: Hx DVT, COPD, blood clots, hyperlipidemia, sleep apnea, abnormal stress test, and ulcerative colitis   - Hospitalist following for assistance with medical management   - Home medications restarted   - Hx of blood clots, no home anticoagulation or antiplatelets    Confusion/hallucinations  - Hospitalist consulted for reported new confusion and hallucinations 7/19.  - Neurology consulted for assistance with confusion. EEG ordered and pending.     -7/22: EEG performed today, awaiting interpretation     Consults orthopedic spine, hospitalist, neurology, PM&R     Pain Management              -Morphine, Oxy IR     Prophylaxis: SCD's, Incentive Spirometry, GlycoLax, Pepcid, Zofran   - Lovenox start 7/18     Regular diet     IVF Management  Regular Neurovascular Checks  Repeat Labs Tomorrow AM  PT/OT/SLP continue to treat     Planned Discharge discharge pending clinical course    - From [place], following therapy recommendations for safe discharge planning   - PM&R consulted for rehab recommendations.  PT recommending IPR, OT recommending SNF   - 7/21: PM&R recommending SNF.    -7/22: Patient may be discharged when cleared by specialists, awaiting EEG interpretation (neurology aware of completed test)    SUBJECTIVE  She is a 80 y.o. female who continues to present at [facility] on [address] [facility] following a fall. Patient reports no complaints on exam. Patient denies chest pain, shortness of breath, cough, headache, dizziness, lightheadedness, numbness, paraesthesias, weakness, chills, fevers, abdominal pain, nausea, vomiting,neck pain, or back pain. Nursing staff states patient underwent EEG today, ECF can accept when clinically stable. Plan to discharge planning sign off from neurology and EEG interpretation. Hemoglobin stable, WBC downtrending, no electrolyte abnormality, vital signs stable on exam. Care in coordination with trauma surgeon Dr. Twan Vega. Wt Readings from Last 3 Encounters:   07/21/22 136 lb 2 oz (61.7 kg)   07/18/22 140 lb (63.5 kg)   06/06/22 135 lb (61.2 kg)     Temp Readings from Last 3 Encounters:   07/22/22 97.4 °F (36.3 °C) (Oral)   04/28/22 97.6 °F (36.4 °C) (Oral)   04/24/22 98.5 °F (36.9 °C)     BP Readings from Last 3 Encounters:   07/22/22 133/65   06/15/22 110/62   06/06/22 128/68     Pulse Readings from Last 3 Encounters:   07/22/22 98   06/15/22 64   06/06/22 73       24 HR INTAKE/OUTPUT :   Intake/Output Summary (Last 24 hours) at 7/22/2022 1328  Last data filed at 7/22/2022 1222  Gross per 24 hour   Intake 120 ml   Output --   Net 120 ml       ADULT DIET; Regular  ADULT ORAL NUTRITION SUPPLEMENT; Breakfast, Lunch, Dinner; Diabetic Oral Supplement    OBJECTIVE  CURRENT VITALS /65   Pulse 98   Temp 97.4 °F (36.3 °C) (Oral)   Resp 16   Ht 5' 5\" (1.651 m)   Wt 136 lb 2 oz (61.7 kg)   SpO2 90%   BMI 22.65 kg/m²   GENERAL: Presents sitting upright in bed unassisted, Awake and alert; In no acute distress and well nourished. Oriented x4. C-collar in place  SKIN: Appropriate for ethnicity, warm and dry. ENT: No apparent trauma, discharge, or hematoma bilaterally. PERRL at 3mm. Nares patient, membranes moist  CARDIO: No visible chest wall deformity.  Strong/regular S1/S2. 2+ radial and DP pulses bilaterally. Capillary refill <2 sec. No extremity edema noted. PULMONARY:  Trachea midline, no increased respiratory effort, or paradoxical chest movement. Lung sounds are clear to ascultation in all fields without adventitious sounds. ABDOMEN: Abdomen is soft, non distended. Bowel sounds present in all four quadrants. NTTP in all quadrants, absent of peritoneal signs. NEURO: GCS 15. Follows commands. PMS intact, moves limbs freely. No focal neurological deficits  MSK: Extremities intact and present. No new bruising, swelling, deformity, discoloration or bleeding. NTTP over major muscle groups.  strength 5/5 and equal bilaterally. LABS  CBC :   Recent Labs     07/20/22  0914 07/21/22  0623 07/22/22  0613   WBC 14.1* 11.9* 11.7*   HGB 13.7 14.3 14.4   HCT 43.9 46.5 46.2   MCV 97.3 99.4* 97.7    263 275       BMP:   Recent Labs     07/20/22  0914 07/21/22  0604 07/22/22  0613    142 140   K 3.6 5.1 3.9    107 106   CO2 22* 22* 23   BUN 17 19 21   CREATININE 0.6 0.6 0.6       COAGS:   No results for input(s): APTT, PROT, INR in the last 72 hours. Pancreas/HFP:  No results for input(s): LIPASE, AMYLASE in the last 72 hours. Culture, Reflexed, Urine [[id number]] (Abnormal)     Collected: 07/17/22 2040    Updated: 07/18/22 2220    Specimen Source: Urine     Organism Escherichia coli Abnormal     Urine Culture Reflex [place] count: >100,000 CFU/mL    Narrative:     Source: urine       Site: unknown collect          Current Antibiotics: Ceftriaxone     RADIOLOGY  CT ABDOMEN PELVIS WO CONTRAST Additional Contrast? None    Result Date: 7/17/2022  PROCEDURE: CT ABDOMEN PELVIS WO CONTRAST CLINICAL INFORMATION: left flank pain COMPARISON: None TECHNIQUE: Helical CT acquisition of the abdomen and pelvis was performed without intravenous contrast. Multiplanar reformats are provided.  All CT scans at this facility use dose modulation, iterative reconstruction, and/or weight based dosing when appropriate to reduce the radiation dose to as low as reasonably achievable. FINDINGS: The noncontrast CT limits the evaluation for solid organ pathology, vascular pathology, and lymphadenopathy. The chest has been described on the concurrent CT of the chest. Gallstone is present. Hypodensities in the liver likely hepatic cysts. Tiny calculi are seen in both kidneys. Mild atrophy of the pancreas. Large amount of stool is seen in the colon. A myomatous uterus is seen. Otherwise, the biliary tree, adrenal glands, and spleen are unremarkable. No bowel obstruction or acute inflammatory bowel process. The appendix is unremarkable. Colonic diverticulosis. The abdominal aorta is not aneurysmal. Aortoiliac calcifications. No significantly enlarged lymph nodes are seen. The bladder is grossly unremarkable. Bones: The bones are demineralized. Scoliosis of the lumbar spine. Degenerative changes of the thoracolumbar spine. Lateral plate and nail fixation of the proximal left femur is seen. There is increased sclerosis within the sacrum that likely reflects underlying insufficiency fractures. Acute compression fracture of L5. Chronic compression deformity of T12.     1. Cholelithiasis. 2. Increase sclerosis within the sacrum relates to sacral insufficiency fractures. 3. Acute compression fracture of L5 4. Other findings as described above. **This report has been created using voice recognition software. It may contain minor errors which are inherent in voice recognition technology. ** Final report electronically signed by Dr Shanta Kaba on 7/17/2022 8:38 PM    CT HEAD WO CONTRAST    Result Date: 7/17/2022  PROCEDURE: CT HEAD WO CONTRAST CLINICAL INFORMATION:Fall.  COMPARISON: None TECHNIQUE: 5 mm axial imaging through the head without IV contrast. All CT scans at this facility use dose modulation, iterative reconstruction, and/or weight based dosing when appropriate to reduce the radiation dose enlarged mediastinal or  axillary lymph node. [employer] No chest wall mass. The abdomen is described on a concurrent CT abdomen and pelvis. Bones: Degenerative changes of the thoracic spine. The bones are demineralized. Compression fracture of T2. Scoliosis. . Left lower rib fractures are seen that are nondisplaced. 1. Mild acute compression fracture of T2. 2. No pulmonary consolidation. No pneumothorax. 3. Left lower rib fractures are seen. **This report has been created using voice recognition software. It may contain minor errors which are inherent in voice recognition technology. ** Final report electronically signed by Dr Simeon Barr on 7/17/2022 8:28 PM    CT CERVICAL SPINE WO CONTRAST    Result Date: 7/17/2022  PROCEDURE: CT CERVICAL SPINE WO CONTRAST CLINICAL INFORMATION: Fall. COMPARISON: None TECHNIQUE: 3 mm axial imaging through the cervical spine without contrast.  Sagittal and coronal reconstructions were performed. All CT scans at this facility use dose modulation, iterative reconstruction, and/or weight based dosing when appropriate to reduce the radiation dose to as low as reasonably achievable. FINDINGS: ALIGNMENT: Cervical lordosis is maintained. BONES: The bones appear demineralized. Mild degenerative changes of the cervical spine. Multilevel disc osteophyte complexes are seen. Multilevel facet arthrosis and uncovertebral degeneration. Acute compression fracture of T2 is seen. SOFT TISSUES: Carotid calcifications are seen. The thyroid is enlarged. A left thyroid nodule is seen. Emphysema is seen in the lung apices. DISC LEVELS: Calcification of the ligamentum flava at C2-C3 and C3-C4 is noted. At C3-C4, the calcification is leading to mild central canal narrowing. Mild right neuroforaminal narrowing at C6-C7. 1. Mild compression fracture of T2. 2. Multilevel degenerative changes of the cervical spine. 3. Thyromegaly. Left thyroid nodule.  **This report has been created using voice recognition software. It may contain minor errors which are inherent in voice recognition technology. ** Final report electronically signed by Dr Sabiha Regan on 7/17/2022 8:21 PM    XR CHEST PORTABLE    Result Date: 7/18/2022  PROCEDURE: XR CHEST PORTABLE CLINICAL INFORMATION: follow up for rib fractures. Left lower rib fractures. COMPARISON: Chest CT 7/17/2022. TECHNIQUE: AP semiupright view of the chest. FINDINGS: The heart size is normal.The mediastinum is not widened. There are background fibrotic changes. There is no pneumothorax. There are no pulmonary infiltrates. There are no pleural effusions. The pulmonary vascularity is normal. The patient's known left lower rib fractures are not seen radiographically. No pleural effusions. No pneumothorax. Background fibrotic changes. **This report has been created using voice recognition software. It may contain minor errors which are inherent in voice recognition technology. ** Final report electronically signed by Dr. Marietta Albret on 7/18/2022 7:40 AM    CT LUMBAR RECONSTRUCTION WO POST PROCESS    Result Date: 7/17/2022  PROCEDURE: CT LUMBAR RECONSTRUCTION WO POST PROCESS CLINICAL INFORMATION: low backpain fall COMPARISON: None TECHNIQUE: 3 mm axial CT images were reconstructed through the lumbar spine from the patient's CT abdomen/pelvis examination without IV contrast. Sagittal and coronal reconstructions were also performed. All CT scans at this facility use dose modulation, iterative reconstruction, and/or weight based dosing when appropriate to reduce the radiation dose to as low as reasonably achievable. FINDINGS: ALIGNMENT: The lumbar lordosis is maintained. Levoscoliosis of the lumbar spine. BONE: The bones are markedly demineralized. . Acute compression fracture of L5. Increased sclerosis within the sacrum relates to insufficiency sacral fractures. Degenerative changes of the SI joints. Mild degenerative changes of the lumbar spine. Multilevel facet arthrosis. Erleen Arevalo Erleen Arevalo The soft tissues have been described on the CT abdomen and pelvis. DISC SPACES/FACET JOINT/SPINAL CANAL/NEURAL FORAMEN: The lack of intrathecal contrast limits the evaluation of the spinal canal. Mild central canal narrowing at L3-L4 with moderate right neural foraminal narrowing. Moderate central canal narrowing at L4-L5 with moderate right neuroforaminal narrowing. 1. Acute compression fracture of L5. 2. Increased sclerosis within the sacrum relates to insufficiency sacral fractures. 3. Degenerative changes of the lumbar spine. 4. Marked osteopenia. **This report has been created using voice recognition software. It may contain minor errors which are inherent in voice recognition technology. ** Final report electronically signed by Dr Mattie Huggins on 7/17/2022 8:49 PM    CT THORACIC RECONSTRUCTION WO POST PROCESS    Result Date: 7/17/2022  PROCEDURE: CT THORACIC RECONSTRUCTION WO POST PROCESS CLINICAL INFORMATION: Fall, middle midline thoracic spine tenderness. COMPARISON: None TECHNIQUE: 3 mm axial CT images were reconstructed through the thoracic spine from the patient's CT chest examination without IV contrast. Sagittal and coronal reconstruction were also performed. All CT scans at this facility use dose modulation, iterative reconstruction, and/or weight based dosing when appropriate to reduce the radiation dose to as low as reasonably achievable. FINDINGS: ALIGNMENT: The thoracic kyphosis is maintained. Dextroscoliosis. BONE: The bones are demineralized. Acute compression fracture of T2. Chronic compression deformity of T12. . Multilevel degenerative changes of the thoracic spine. Multilevel facet arthrosis. . . SOFT TISSUE: The soft tissues of been described on the CT chest.. DISC LEVEL: 1. The lack of intrathecal contrast limits the evaluation of the spinal canal. No significant central canal narrowing. No significant neuroforamina narrowing.      1. Mild acute compression fracture of T2. 2. Degenerative changes of the thoracic spine. **This report has been created using voice recognition software. It may contain minor errors which are inherent in voice recognition technology. ** Final report electronically signed by Dr Chayito Becerra on 7/17/2022 8:43 PM   Narrative:     Venkat Michaels MD     7/22/2022  3:41 PM       Date: 7/22/2022   Referring physician: Francisco Dailey PA-C     Indication   Patient aged 80 y with encephalopathy. EEG done to assess for   epileptiform activity. Introduction   This routine 20-minute EEG was recorded using the [employer]   10-20 System on a [employer] workstation at 256 samples/s. Automated   spike and seizure detection algorithms were applied. Description   During the maximal alert state, a well-regulated, symmetric, and   reactive 8-9 Hz posterior dominant rhythm was seen. No consistent   focal slowing or interhemispheric asymmetry was noted. Stage I   and stage II sleep were observed. There were no interictal   epileptiform discharges or electrographic seizures. Activations   Hyperventilation was not performed. Intermittent photic   stimulation was performed and demonstrated no posterior driving   response. Impression   Normal awake and sleep EEG. EKG lead did not show clear arrhythmia, if still in concern   consider formal EKG or correlation with telemetry. No epileptiform discharges were identified. Please note the   absence of such activity on this record cannot conclusively rule   out an epileptic disorder. If such is still clinically suspected,   a repeat study with sleep deprivation and/or prolonged sampling   may be helpful. Venkat Michaels MD   Epilepsy Board Certified. Neurology Board Certified.      Electronically Signed        Total time spent in care of patient: 15 minutes collectively between subjective/objective examination, chart review, documentation, clinical reasoning and discussion with attending regarding plan/interval changes. Electronically signed by GLENN Mcclendon on 7/22/2022 at 1:28 PM     Patient seen and examined independently by me 7/22/2022     I personally supervised the PA/NP in the evaluation, management and development of the treatment plan for Candy Mauricio  on the same date of service as above. I personally interviewed Candy Mauricio   and  discussed his review of symptoms as able due to the patient's condition, as well as performed an individual physical exam on the same   date of service as above. In addition I discussed the patient's condition and treatment options with the patient, if able, and/or designated family if available. I have also reviewed and agree with the past medical,  family and social history updates as well as care plans unless otherwise noted below. All questions were answered. I examined independently and reviewed relevant data myself and may have done so in the context of team rounds. A full chart review was performed by me. I attest that this medical record entry accurately reflects signatures and notations that I made in my capacity as an M. D. when I treated and diagnosed Candy Mauricio on the date of service above     I was responsible for all medical decision making involving this encounter. I identified and/or confirmed all problems associated with this patient encounter by my own direct physical examination of this patient and review of all radiology studies and labwork  that were ordered and available. Active Hospital Problems    Diagnosis     Fall [W19. XXXA]      Priority: Medium    Fracture of ribs, two, left, closed, initial encounter [S22.42XA]      Priority: Medium    Closed compression fracture of fifth lumbar vertebra ([address] [S32.050A]      Priority: Medium    Compression fracture of T2 vertebra ([address] [S22.020A]      Priority: Medium    UTI (urinary tract infection) [N39.0]      Priority: Medium        I  discussed the management of all of the identified problems with the APN or PA. I formulated the treatment plan for all identified problems and discussed those with the APN or PA . This management plan was then carried out and the patient's orders for care by the APN or PA. Total time personally spent on this patient encounter was 25 minutes which includes :  Preparing to see the patient( reviewing tests and chart)  Obtaining and reviewing separately obtained history  Performing a medically appropriate examination and evaluation  Ordering medications, tests, or procedures  Counseling and educating the patient/family/caregiver  Care coordination  Referring and communicating with other healthcare professionals  Documenting clinical information in the EHR  Independent interpretation of results and communicating the results to patient and care team  This includes a direct physical exam as well as all the other encounter activities described above. Time may be discontiguous. Time does not include procedures. Please see our orders that were directed and approved by me if there are any new ones for the updated patient care plan. Above discussed and I agree with documentation and orders placed by Balta ELIZABETH    See any additional comments if needed below for any other updated orders and plans. Patient seen and examined independently by me. Above discussed and I agree with CNP. Labs, cultures, and radiographs where available were reviewed. See orders for the updated patient care plan.     Ashley Johnson MD MD, patient is awake alert sitting up in chair and she is dressed son-in-law was at the bedside patient plan for nursing home transfer tomorrow EEG report was reviewed as above and was read as normal plan discharge tomorrow to nursing home  7/22/2022   8:53 PM

## 2022-07-23 VITALS
BODY MASS INDEX: 22.91 KG/M2 | OXYGEN SATURATION: 96 % | RESPIRATION RATE: 16 BRPM | HEART RATE: 92 BPM | HEIGHT: 65 IN | WEIGHT: 137.5 LBS | TEMPERATURE: 97.9 F | SYSTOLIC BLOOD PRESSURE: 91 MMHG | DIASTOLIC BLOOD PRESSURE: 52 MMHG

## 2022-07-23 LAB
ANION GAP SERPL CALCULATED.3IONS-SCNC: 11 MEQ/L (ref 8–16)
BASOPHILS # BLD: 0.5 %
BASOPHILS ABSOLUTE: 0.1 THOU/MM3 (ref 0–0.1)
BLOOD CULTURE, ROUTINE: NORMAL
BLOOD CULTURE, ROUTINE: NORMAL
BUN BLDV-MCNC: 22 MG/DL (ref 7–22)
CALCIUM SERPL-MCNC: 10 MG/DL (ref 8.5–10.5)
CHLORIDE BLD-SCNC: 105 MEQ/L (ref 98–111)
CO2: 26 MEQ/L (ref 23–33)
CREAT SERPL-MCNC: 0.7 MG/DL (ref 0.4–1.2)
EOSINOPHIL # BLD: 1.7 %
EOSINOPHILS ABSOLUTE: 0.2 THOU/MM3 (ref 0–0.4)
ERYTHROCYTE [DISTWIDTH] IN BLOOD BY AUTOMATED COUNT: 15.5 % (ref 11.5–14.5)
ERYTHROCYTE [DISTWIDTH] IN BLOOD BY AUTOMATED COUNT: 55.8 FL (ref 35–45)
GFR SERPL CREATININE-BSD FRML MDRD: 80 ML/MIN/1.73M2
GLUCOSE BLD-MCNC: 115 MG/DL (ref 70–108)
HCT VFR BLD CALC: 44.4 % (ref 37–47)
HEMOGLOBIN: 13.7 GM/DL (ref 12–16)
IMMATURE GRANS (ABS): 0.09 THOU/MM3 (ref 0–0.07)
IMMATURE GRANULOCYTES: 0.9 %
LYMPHOCYTES # BLD: 29.4 %
LYMPHOCYTES ABSOLUTE: 3 THOU/MM3 (ref 1–4.8)
MCH RBC QN AUTO: 30.2 PG (ref 26–33)
MCHC RBC AUTO-ENTMCNC: 30.9 GM/DL (ref 32.2–35.5)
MCV RBC AUTO: 97.8 FL (ref 81–99)
MONOCYTES # BLD: 7.7 %
MONOCYTES ABSOLUTE: 0.8 THOU/MM3 (ref 0.4–1.3)
NUCLEATED RED BLOOD CELLS: 0 /100 WBC
PLATELET # BLD: 292 THOU/MM3 (ref 130–400)
PMV BLD AUTO: 9.7 FL (ref 9.4–12.4)
POTASSIUM REFLEX MAGNESIUM: 4 MEQ/L (ref 3.5–5.2)
RBC # BLD: 4.54 MILL/MM3 (ref 4.2–5.4)
SEG NEUTROPHILS: 59.8 %
SEGMENTED NEUTROPHILS ABSOLUTE COUNT: 6 THOU/MM3 (ref 1.8–7.7)
SODIUM BLD-SCNC: 142 MEQ/L (ref 135–145)
WBC # BLD: 10.1 THOU/MM3 (ref 4.8–10.8)

## 2022-07-23 PROCEDURE — 36415 COLL VENOUS BLD VENIPUNCTURE: CPT

## 2022-07-23 PROCEDURE — 99231 SBSQ HOSP IP/OBS SF/LOW 25: CPT | Performed by: SURGERY

## 2022-07-23 PROCEDURE — 85025 COMPLETE CBC W/AUTO DIFF WBC: CPT

## 2022-07-23 PROCEDURE — APPNB30 APP NON BILLABLE TIME 0-30 MINS: Performed by: PHYSICIAN ASSISTANT

## 2022-07-23 PROCEDURE — 80048 BASIC METABOLIC PNL TOTAL CA: CPT

## 2022-07-23 RX ORDER — NICOTINE 21 MG/24HR
1 PATCH, TRANSDERMAL 24 HOURS TRANSDERMAL DAILY
Qty: 30 PATCH | Refills: 3 | Status: SHIPPED | OUTPATIENT
Start: 2022-07-23 | End: 2022-08-31

## 2022-07-23 RX ORDER — LIDOCAINE 4 G/G
2 PATCH TOPICAL DAILY
Qty: 1 BOX | Refills: 0 | Status: SHIPPED | OUTPATIENT
Start: 2022-07-23

## 2022-07-23 NOTE — PLAN OF CARE
Problem: Discharge Planning  Goal: Discharge to home or other facility with appropriate resources  Outcome: Progressing  Flowsheets (Taken 7/20/2022 2253 by Cordella Holstein, RN)  Discharge to home or other facility with appropriate resources:   Identify barriers to discharge with patient and caregiver   Arrange for needed discharge resources and transportation as appropriate   Identify discharge learning needs (meds, wound care, etc)     Problem: Pain  Goal: Verbalizes/displays adequate comfort level or baseline comfort level  Outcome: Progressing  Flowsheets (Taken 7/22/2022 2307)  Verbalizes/displays adequate comfort level or baseline comfort level:   Assess pain using appropriate pain scale   Administer analgesics based on type and severity of pain and evaluate response     Problem: Safety - Adult  Goal: Free from fall injury  Outcome: Progressing  Flowsheets (Taken 7/22/2022 0058 by Winsome Reich RN)  Free From Fall Injury: Instruct family/caregiver on patient safety     Problem: Skin/Tissue Integrity  Goal: Absence of new skin breakdown  Description: 1. Monitor for areas of redness and/or skin breakdown  2. Assess vascular access sites hourly  3. Every 4-6 hours minimum:  Change oxygen saturation probe site  4. Every 4-6 hours:  If on nasal continuous positive airway pressure, respiratory therapy assess nares and determine need for appliance change or resting period. Outcome: Progressing  Note: No skin breakdown this shift. Patient being assisted with turning. Patients states understanding of repositioning every two hours.       Problem: Chronic Conditions and Co-morbidities  Goal: Patient's chronic conditions and co-morbidity symptoms are monitored and maintained or improved  Outcome: Progressing  Flowsheets (Taken 7/20/2022 2253 by Cordella Holstein, RN)  Care Plan - Patient's Chronic Conditions and Co-Morbidity Symptoms are Monitored and Maintained or Improved:   Monitor and assess patient's chronic conditions and comorbid symptoms for stability, deterioration, or improvement   Collaborate with multidisciplinary team to address chronic and comorbid conditions and prevent exacerbation or deterioration     Problem: ABCDS Injury Assessment  Goal: Absence of physical injury  Outcome: Progressing  Flowsheets (Taken 7/22/2022 0058 by Javier Cabrera RN)  Absence of Physical Injury: Implement safety measures based on patient assessment   Care plan reviewed with patient. Patient verbalizes understanding of the plan of care and contributes to goal setting.

## 2022-07-23 NOTE — PROGRESS NOTES
[facility] - Dr. Agustin Humphreys covering for Edgardo Xie  Daily Progress Note    Pt Name: Bharat Tavarez  Medical Record Number: [id number]  Date of Birth 1940   Today's Date: 7/23/2022    HD: # 6    CC: Ready to go to the nursing home    ASSESSMENT  1. Active Hospital Problems    Diagnosis Date Noted    Fall [W19. XXXA] 07/18/2022     Priority: Medium    Fracture of ribs, two, left, closed, initial encounter [S22.42XA] 07/17/2022     Priority: Medium    Closed compression fracture of fifth lumbar vertebra ([address] [S32.050A] 07/17/2022     Priority: Medium    Compression fracture of T2 vertebra ([address] [S22.020A] 07/17/2022     Priority: Medium    UTI (urinary tract infection) [N39.0] 07/17/2022     Priority: Medium         PLAN  Patient admitted under [address], possible syncope              -PT/OT continue to treat              -Consult hospitalist for further work-up   -TSH, BNP, troponin ordered by hospitalist. Troponin slightly elevated at 0.011, hospitalist to manage. T2 and L5 compression fractures              -Orthopedic spine consulted for further management              -PT/OT continue to treat              -Pain control   -7/18: Ortho spine ordered MRI to determine what type of brace is needed. Lumbar brace vs c-collar to cover the T2 VCF. Further recs pending MRI results   - 7/19: C-collar and lumbar corset braces to be worn when out of bed when ambulating. Follow up in 4 weeks in office     Left ninth/10th rib fractures              -Rib fracture protocol              -Flexeril              -Pain control              -Lidoderm patches              -Incentive spirometry/cough and deep breathing              -Monitor respiratory status   -7/18: CXR this AM with no pleural effusions or pneumothorax. Patient not reporting chest pain and shortness of breath.  Respirations unlabored   -7/20: Meeting respiratory indices on rib fracture aware of completed test)    SUBJECTIVE  She is a 80 y.o. female who continues to present at [address] [place] [address] on [facility] following a fall. Patient reports no complaints on exam. She states she is all tired out from getting ready to go to the nursing home, transport arranged for this morning. Patient denies chest pain, shortness of breath, cough, headache, dizziness, lightheadedness, numbness, paraesthesias, weakness, chills, fevers, abdominal pain, nausea, vomiting,neck pain, or back pain. Chart reviewed. Patient's vital signs are stable and she is afebrile. Labs unremarkable. Leukocytosis resolved with WBC at 10.1. EEG was normal yesterday. Neurology recommending discontinuation of Sinemet due to myoclonic jerking movements. Patient will be discharged today to [facility]. Care in coordination with trauma surgeon Dr. Ry Nix MD.      [facility] Readings from Last 3 Encounters:   07/23/22 137 lb 8 oz (62.4 kg)   07/18/22 140 lb (63.5 kg)   06/06/22 135 lb (61.2 kg)     Temp Readings from Last 3 Encounters:   07/23/22 98.1 °F (36.7 °C) (Oral)   04/28/22 97.6 °F (36.4 °C) (Oral)   04/24/22 98.5 °F (36.9 °C)     BP Readings from Last 3 Encounters:   07/23/22 105/70   06/15/22 110/62   06/06/22 128/68     Pulse Readings from Last 3 Encounters:   07/23/22 74   06/15/22 64   06/06/22 73       24 HR INTAKE/OUTPUT :   Intake/Output Summary (Last 24 hours) at 7/23/2022 0800  Last data filed at 7/23/2022 0449  Gross per 24 hour   Intake 220 ml   Output --   Net 220 ml       ADULT DIET; Regular  ADULT ORAL NUTRITION SUPPLEMENT; Breakfast, Lunch, Dinner; Diabetic Oral Supplement    OBJECTIVE  CURRENT VITALS /70   Pulse 74   Temp 98.1 °F (36.7 °C) (Oral)   Resp 18   Ht 5' 5\" (1.651 m)   Wt 137 lb 8 oz (62.4 kg)   SpO2 94%   BMI 22.88 kg/m²   GENERAL: Presents sitting upright in bed unassisted, Awake and alert; In no acute distress and well nourished. Oriented x4.   C-collar in place  SKIN: Appropriate for ethnicity, warm and dry. ENT: No apparent trauma, discharge, or hematoma bilaterally. PERRL at 3mm. Nares patient, membranes moist  CARDIO: No visible chest wall deformity. Strong/regular S1/S2. 2+ radial and DP pulses bilaterally. Capillary refill <2 sec. No extremity edema noted. PULMONARY:  Trachea midline, no increased respiratory effort, or paradoxical chest movement. Lung sounds are clear to ascultation in all fields without adventitious sounds. ABDOMEN: Abdomen is soft, non distended. Bowel sounds present in all four quadrants. NTTP in all quadrants, absent of peritoneal signs. NEURO: GCS 15. Follows commands. PMS intact, moves limbs freely. No focal neurological deficits  MSK: Extremities intact and present. No new bruising, swelling, deformity, discoloration or bleeding. NTTP over major muscle groups.  strength 5/5 and equal bilaterally. LABS  CBC :   Recent Labs     07/21/22  0623 07/22/22  0613 07/23/22  0616   WBC 11.9* 11.7* 10.1   HGB 14.3 14.4 13.7   HCT 46.5 46.2 44.4   MCV 99.4* 97.7 97.8    275 292       BMP:   Recent Labs     07/21/22  0604 07/22/22  0613 07/23/22  0616    140 142   K 5.1 3.9 4.0    106 105   CO2 22* 23 26   BUN 19 21 22   CREATININE 0.6 0.6 0.7       COAGS:   No results for input(s): APTT, PROT, INR in the last 72 hours. Pancreas/HFP:  No results for input(s): LIPASE, AMYLASE in the last 72 hours.   Culture, Reflexed, Urine [[id number]] (Abnormal)     Collected: 07/17/22 2040    Updated: 07/18/22 2220    Specimen Source: Urine     Organism Escherichia coli Abnormal     Urine Culture Reflex [place] count: >100,000 CFU/mL    Narrative:     Source: urine       Site: unknown collect          Current Antibiotics: Ceftriaxone     RADIOLOGY  CT ABDOMEN PELVIS WO CONTRAST Additional Contrast? None    Result Date: 7/17/2022  PROCEDURE: CT ABDOMEN PELVIS WO CONTRAST CLINICAL INFORMATION: left flank pain COMPARISON: None TECHNIQUE: Helical CT acquisition of the abdomen and pelvis was performed without intravenous contrast. Multiplanar reformats are provided. All CT scans at this facility use dose modulation, iterative reconstruction, and/or weight based dosing when appropriate to reduce the radiation dose to as low as reasonably achievable. FINDINGS: The noncontrast CT limits the evaluation for solid organ pathology, vascular pathology, and lymphadenopathy. The chest has been described on the concurrent CT of the chest. Gallstone is present. Hypodensities in the liver likely hepatic cysts. Tiny calculi are seen in both kidneys. Mild atrophy of the pancreas. Large amount of stool is seen in the colon. A myomatous uterus is seen. Otherwise, the biliary tree, adrenal glands, and spleen are unremarkable. No bowel obstruction or acute inflammatory bowel process. The appendix is unremarkable. Colonic diverticulosis. The abdominal aorta is not aneurysmal. Aortoiliac calcifications. No significantly enlarged lymph nodes are seen. The bladder is grossly unremarkable. Bones: The bones are demineralized. Scoliosis of the lumbar spine. Degenerative changes of the thoracolumbar spine. Lateral plate and nail fixation of the proximal left femur is seen. There is increased sclerosis within the sacrum that likely reflects underlying insufficiency fractures. Acute compression fracture of L5. Chronic compression deformity of T12.     1. Cholelithiasis. 2. Increase sclerosis within the sacrum relates to sacral insufficiency fractures. 3. Acute compression fracture of L5 4. Other findings as described above. **This report has been created using voice recognition software. It may contain minor errors which are inherent in voice recognition technology. ** Final report electronically signed by Dr Sheila Galindo on 7/17/2022 8:38 PM    CT HEAD WO CONTRAST    Result Date: 7/17/2022  PROCEDURE: CT HEAD WO CONTRAST CLINICAL INFORMATION:Fall.  COMPARISON: None TECHNIQUE: 5 mm axial imaging through the head without IV contrast. All CT scans at this facility use dose modulation, iterative reconstruction, and/or weight based dosing when appropriate to reduce the radiation dose to as low as reasonably achievable. FINDINGS: Periventricular  and subcortical white matter hypodensities that likely relate to chronic microangiopathic disease. Intracranial atherosclerotic calcifications. No ventriculomegaly. No midline shift or mass effect. No acute intracranial hemorrhage. No intracranial collection. Gray-white differentiation is unremarkable. The posterior fossa is unremarkable. The craniocervical junction is unremarkable. No acute bony abnormality. The  paranasal sinuses are clear. The  mastoid air cells are clear. The orbits are unremarkable. 1. No acute intracranial abnormality. 2. Sequela of chronic microvascular changes. **This report has been created using voice recognition software. It may contain minor errors which are inherent in voice recognition technology. ** Final report electronically signed by Dr King Hassan on 7/17/2022 8:15 PM    CT CHEST WO CONTRAST    Result Date: 7/17/2022  PROCEDURE: CT CHEST WO CONTRAST CLINICAL INFORMATION: Fall, right lower scapula contusion, left lower lateral chest wall tenderness. COMPARISON: None TECHNIQUE: Helical CT acquisition of the chest was performed without intravenous contrast. Multiplanar reformats are provided. All CT scans at this facility use dose modulation, iterative reconstruction, and/or weight based dosing when appropriate to reduce the radiation dose to as low as reasonably achievable. FINDINGS: The noncontrast CT limits the evaluation for solid organ pathology, vascular pathology, and lymphadenopathy. Mild to moderate emphysematous changes. Subsegmental atelectasis is seen in the lung bases. The thyroid is mildly enlarged. Aortocoronary calcifications. No focal pulmonary consolidation. No large pulmonary mass. Central airway is patent. No pleural effusions. No significant pericardial effusion. The heart is not enlarged. Ascending thoracic aorta is not dilated. The main pulmonary artery is not dilated. No significantly enlarged mediastinal or  axillary lymph node. Barnetta Graf No chest wall mass. The abdomen is described on a concurrent CT abdomen and pelvis. Bones: Degenerative changes of the thoracic spine. The bones are demineralized. Compression fracture of T2. Scoliosis. . Left lower rib fractures are seen that are nondisplaced. 1. Mild acute compression fracture of T2. 2. No pulmonary consolidation. No pneumothorax. 3. Left lower rib fractures are seen. **This report has been created using voice recognition software. It may contain minor errors which are inherent in voice recognition technology. ** Final report electronically signed by Dr King Hassan on 7/17/2022 8:28 PM    CT CERVICAL SPINE WO CONTRAST    Result Date: 7/17/2022  PROCEDURE: CT CERVICAL SPINE WO CONTRAST CLINICAL INFORMATION: Fall. COMPARISON: None TECHNIQUE: 3 mm axial imaging through the cervical spine without contrast.  Sagittal and coronal reconstructions were performed. All CT scans at this facility use dose modulation, iterative reconstruction, and/or weight based dosing when appropriate to reduce the radiation dose to as low as reasonably achievable. FINDINGS: ALIGNMENT: Cervical lordosis is maintained. BONES: The bones appear demineralized. Mild degenerative changes of the cervical spine. Multilevel disc osteophyte complexes are seen. Multilevel facet arthrosis and uncovertebral degeneration. Acute compression fracture of T2 is seen. SOFT TISSUES: Carotid calcifications are seen. The thyroid is enlarged. A left thyroid nodule is seen. Emphysema is seen in the lung apices. DISC LEVELS: Calcification of the ligamentum flava at C2-C3 and C3-C4 is noted. At C3-C4, the calcification is leading to mild central canal narrowing. Mild right neuroforaminal narrowing at C6-C7. 1. Mild compression fracture of T2. 2. Multilevel degenerative changes of the cervical spine. 3. Thyromegaly. Left thyroid nodule. **This report has been created using voice recognition software. It may contain minor errors which are inherent in voice recognition technology. ** Final report electronically signed by Dr Minerva Donaldson on 7/17/2022 8:21 PM    XR CHEST PORTABLE    Result Date: 7/18/2022  PROCEDURE: XR CHEST PORTABLE CLINICAL INFORMATION: follow up for rib fractures. Left lower rib fractures. COMPARISON: Chest CT 7/17/2022. TECHNIQUE: AP semiupright view of the chest. FINDINGS: The heart size is normal.The mediastinum is not widened. There are background fibrotic changes. There is no pneumothorax. There are no pulmonary infiltrates. There are no pleural effusions. The pulmonary vascularity is normal. The patient's known left lower rib fractures are not seen radiographically. No pleural effusions. No pneumothorax. Background fibrotic changes. **This report has been created using voice recognition software. It may contain minor errors which are inherent in voice recognition technology. ** Final report electronically signed by Dr. Ramon Garner on 7/18/2022 7:40 AM    CT LUMBAR RECONSTRUCTION WO POST PROCESS    Result Date: 7/17/2022  PROCEDURE: CT LUMBAR RECONSTRUCTION WO POST PROCESS CLINICAL INFORMATION: low backpain fall COMPARISON: None TECHNIQUE: 3 mm axial CT images were reconstructed through the lumbar spine from the patient's CT abdomen/pelvis examination without IV contrast. Sagittal and coronal reconstructions were also performed. All CT scans at this facility use dose modulation, iterative reconstruction, and/or weight based dosing when appropriate to reduce the radiation dose to as low as reasonably achievable. FINDINGS: ALIGNMENT: The lumbar lordosis is maintained. Levoscoliosis of the lumbar spine. BONE: The bones are markedly demineralized. . Acute compression fracture of L5. Increased sclerosis within the sacrum relates to insufficiency sacral fractures. Degenerative changes of the SI joints. Mild degenerative changes of the lumbar spine. Multilevel facet arthrosis. . . The soft tissues have been described on the CT abdomen and pelvis. DISC SPACES/FACET JOINT/SPINAL CANAL/NEURAL FORAMEN: The lack of intrathecal contrast limits the evaluation of the spinal canal. Mild central canal narrowing at L3-L4 with moderate right neural foraminal narrowing. Moderate central canal narrowing at L4-L5 with moderate right neuroforaminal narrowing. 1. Acute compression fracture of L5. 2. Increased sclerosis within the sacrum relates to insufficiency sacral fractures. 3. Degenerative changes of the lumbar spine. 4. Marked osteopenia. **This report has been created using voice recognition software. It may contain minor errors which are inherent in voice recognition technology. ** Final report electronically signed by Dr Rae Lockett on 7/17/2022 8:49 PM    CT THORACIC RECONSTRUCTION WO POST PROCESS    Result Date: 7/17/2022  PROCEDURE: CT THORACIC RECONSTRUCTION WO POST PROCESS CLINICAL INFORMATION: Fall, middle midline thoracic spine tenderness. COMPARISON: None TECHNIQUE: 3 mm axial CT images were reconstructed through the thoracic spine from the patient's CT chest examination without IV contrast. Sagittal and coronal reconstruction were also performed. All CT scans at this facility use dose modulation, iterative reconstruction, and/or weight based dosing when appropriate to reduce the radiation dose to as low as reasonably achievable. FINDINGS: ALIGNMENT: The thoracic kyphosis is maintained. Dextroscoliosis. BONE: The bones are demineralized. Acute compression fracture of T2. Chronic compression deformity of T12. . Multilevel degenerative changes of the thoracic spine. Multilevel facet arthrosis. . . SOFT TISSUE: The soft tissues of been described on the CT chest.. DISC LEVEL: 1.  The lack of intrathecal contrast limits the evaluation of the spinal canal. No significant central canal narrowing. No significant neuroforamina narrowing. 1. Mild acute compression fracture of T2. 2. Degenerative changes of the thoracic spine. **This report has been created using voice recognition software. It may contain minor errors which are inherent in voice recognition technology. ** Final report electronically signed by Dr Angel Lopez on 7/17/2022 8:43 PM   Narrative:     Ronaldo Cortez MD     7/22/2022  3:41 PM       Date: 7/22/2022   Referring physician: Aleta Del Cid PA-C     Indication   Patient aged 80 y with encephalopathy. EEG done to assess for   epileptiform activity. Introduction   This routine 20-minute EEG was recorded using the [employer]   10-20 System on a [employer] workstation at 256 samples/s. Automated   spike and seizure detection algorithms were applied. Description   During the maximal alert state, a well-regulated, symmetric, and   reactive 8-9 Hz posterior dominant rhythm was seen. No consistent   focal slowing or interhemispheric asymmetry was noted. Stage I   and stage II sleep were observed. There were no interictal   epileptiform discharges or electrographic seizures. Activations   Hyperventilation was not performed. Intermittent photic   stimulation was performed and demonstrated no posterior driving   response. Impression   Normal awake and sleep EEG. EKG lead did not show clear arrhythmia, if still in concern   consider formal EKG or correlation with telemetry. No epileptiform discharges were identified. Please note the   absence of such activity on this record cannot conclusively rule   out an epileptic disorder. If such is still clinically suspected,   a repeat study with sleep deprivation and/or prolonged sampling   may be helpful. Ronaldo Cortez MD   Epilepsy Board Certified. Neurology Board Certified.      Electronically Signed        Total time spent in care of patient: 15 minutes collectively between subjective/objective examination, chart review, documentation, clinical reasoning and discussion with attending regarding plan/interval changes. Electronically signed by Regina Avalos PA-C on 7/23/2022 at 8:00 AM       Patient seen and examined independently by me 7/23/2022     I personally supervised the PA/NP in the evaluation, management and development of the treatment plan for Sania Mclean  on the same date of service as above. I personally interviewed Sania Mclean   and  discussed his review of symptoms as able due to the patient's condition, as well as performed an individual physical exam on the same   date of service as above. In addition I discussed the patient's condition and treatment options with the patient, if able, and/or designated family if available. I have also reviewed and agree with the past medical,  family and social history updates as well as care plans unless otherwise noted below. All questions were answered. I examined independently and reviewed relevant data myself and may have done so in the context of team rounds. A full chart review was performed by me. I attest that this medical record entry accurately reflects signatures and notations that I made in my capacity as an M. D. when I treated and diagnosed Sania Mclean on the date of service above     I was responsible for all medical decision making involving this encounter. I identified and/or confirmed all problems associated with this patient encounter by my own direct physical examination of this patient and review of all radiology studies and labwork  that were ordered and available. Active Hospital Problems    Diagnosis     Fall [W19. XXXA]      Priority: Medium    Fracture of ribs, two, left, closed, initial encounter [S22.42XA]      Priority: Medium    Closed compression fracture of fifth lumbar vertebra ([facility] [address] [facility] [P00.083X] Priority: Medium    Compression fracture of T2 vertebra ([facility] [P14.644C]      Priority: Medium    UTI (urinary tract infection) [N39.0]      Priority: Medium        I  discussed the management of all of the identified problems with the APN or PA. I formulated the treatment plan for all identified problems and discussed those with the APN or PA . This management plan was then carried out and the patient's orders for care by the APN or PA. Total time personally spent on this patient encounter was 25 minutes which includes :  Preparing to see the patient( reviewing tests and chart)  Obtaining and reviewing separately obtained history  Performing a medically appropriate examination and evaluation  Ordering medications, tests, or procedures  Counseling and educating the patient/family/caregiver  Care coordination  Referring and communicating with other healthcare professionals  Documenting clinical information in the EHR  Independent interpretation of results and communicating the results to patient and care team  This includes a direct physical exam as well as all the other encounter activities described above. Time may be discontiguous. Time does not include procedures. Please see our orders that were directed and approved by me if there are any new ones for the updated patient care plan. Above discussed and I agree with documentation and orders placed by Gianni Valenzuela PA    See any additional comments if needed below for any other updated orders and plans. -- Stable from a trauma standpoint. Planning discharge to [facility] today. Continue current care until transferred.     Electronically signed by Madalyn Nails MD on 7/23/22 at 3:56 PM EDT

## 2022-07-23 NOTE — DISCHARGE SUMMARY
Discharge Summary     Patient Identification:  Keegan Pang  :   MRN: [id number]   Account: [de-identified]     Admit date: 2022  Discharge date:    Attending provider: Fabiano Orourke MD        Primary care provider: PETEY Briones - CNP     Discharge Diagnoses:   Principal Problem:    Fracture of ribs, two, left, closed, initial encounter  Active Problems:    Closed compression fracture of fifth lumbar vertebra (HCC)    Compression fracture of T2 vertebra (HCC)    UTI (urinary tract infection)    Fall  Resolved Problems:    * No resolved hospital problems. *       Hospital Course:   Keegan Pang is a 80 y.o. female admitted to [address] [place] [address] on 2022 for a fall from standing. Patient notes she was in her assisted living facility and had gone out of her apartment to get a coat when she tripped over a nearby chair. Patient sustained T8 and L5 compression fractures along with fractures of the ninth and 10th rib on the right side. Patient was admitted for pain control and orthopedic consultation. Physical and Occupational Therapy consulted for safe discharge planning. During hospitalization patient was found to have a symptomatic UTI and she was treated with IV Rocephin. Hospitalist was consulted for management and for work-up of possible syncope. It was also noted that patient had a incidental thyroid nodule. TSH ordered and was normal, Medicine followed for this as well. MRI of the spine was performed and orthopedic surgery recommended c-collar when patient out of bed and lumbar corset brace to be worn during ambulation. Orthopedic Follow up in 4 weeks after discharge. On  patient had episodes of new confusion and hallucinations. Hospitalist and Neurology were consulted. They recommended discontinuation of Sinemet with suspected toxic metabolic encephalopathy vs acute delirium. EEG was ordered and found to be normal and confusion improved.  Therapies recommended ECF placement after discharge. [facility]. She has no complaints prior to discharge, feeling well and anxious to get to the nursing home. Discharge Medications:     Medication List        START taking these medications      lidocaine 4 % external patch  Place 2 patches onto the skin in the morning. nicotine 21 MG/24HR  Commonly known as: NICODERM CQ  Place 1 patch onto the skin in the morning. CONTINUE taking these medications      acetaminophen 500 MG tablet  Commonly known as: TYLENOL  Take 2 tablets by mouth 3 times daily as needed for Pain     * albuterol (2.5 MG/3ML) 0.083% nebulizer solution  Commonly known as: PROVENTIL     * albuterol sulfate  (90 Base) MCG/ACT inhaler  Commonly known as: PROVENTIL;VENTOLIN;PROAIR  Inhale 2 puffs into the lungs every 6 hours as needed for Wheezing     denosumab 60 MG/ML Sosy SC injection  Commonly known as: PROLIA  Inject 1 mL into the skin every 6 months     donepezil 10 MG tablet  Commonly known as: ARICEPT  Take 1 tablet by mouth nightly     Glucosamine-Chondroitin 500-250 MG Caps     HYDROcodone-acetaminophen 5-325 MG per tablet  Commonly known as: NORCO     ibuprofen 400 MG tablet  Commonly known as: ADVIL;MOTRIN     ipratropium 0.02 % nebulizer solution  Commonly known as: ATROVENT     lisinopril 20 MG tablet  Commonly known as: PRINIVIL;ZESTRIL  Take 1 tablet by mouth daily     metoprolol tartrate 25 MG tablet  Commonly known as: LOPRESSOR  Take 1 tablet by mouth 2 times daily     * Misc. Devices Misc  Please apply compression stockings (JOHANNE hose) to bilateral lower extremities for 12 hours daily. Remove nightly. * Misc. Devices Misc  1 set of bed rails. * Wheelchair Misc  1 standard wheelchair.      MULTIVITAMIN ADULT PO     Shared Spectrum PO     predniSONE 10 MG tablet  Commonly known as: DELTASONE  Take 1 tablet by mouth daily     Psyllium Fiber 0.52 g Caps  Take 1 capsule by mouth daily     simvastatin 40 MG tablet  Commonly known as: ZOCOR  Take 1 tablet by mouth nightly     umeclidinium-vilanterol 62.5-25 MCG/INH Aepb inhaler  Commonly known as: ANORO ELLIPTA  Inhale 1 puff into the lungs daily     vitamin B-12 1000 MCG tablet  Commonly known as: CYANOCOBALAMIN  Take 1 tablet by mouth daily     vitamin C 500 MG tablet  Commonly known as: ASCORBIC ACID     zinc gluconate 50 MG tablet           * This list has 5 medication(s) that are the same as other medications prescribed for you. Read the directions carefully, and ask your doctor or other care provider to review them with you. Where to Get Your Medications        These medications were sent to [facility] [phone]  [address] [place] [address]      Phone: [phone]   lidocaine 4 % external patch  nicotine 21 MG/24HR         Patient Instructions:    Discharge lab work: None    Activity: activity as tolerated  Diet: ADULT DIET;  Regular  ADULT ORAL NUTRITION SUPPLEMENT; Breakfast, Lunch, Dinner; Diabetic Oral Supplement    Code Status: Full Code    Follow-up visits:   [address] [place] [address] Dr MAY Christine 23 [phone]  [facility] 85, APRN - CNP  [address]  [phone]    Schedule an appointment as soon as possible for a visit in 2 week(s)       Procedures:   7/22 EEG with Dr Jasmina West MD found to be normal    Consults:   neurology, orthopedic surgery, and Internal medicine    Examination:  Vitals:  Vitals:    07/22/22 2315 07/23/22 0500 07/23/22 0600 07/23/22 0801   BP: 110/65 105/70  (!) 91/52   Pulse: 76 74  92   Resp: 18 18  16   Temp: 98.1 °F (36.7 °C) 98.1 °F (36.7 °C)  97.9 °F (36.6 °C)   TempSrc: Oral Oral  Oral   SpO2: 96% 94%  96%   Weight:   137 lb 8 oz (62.4 kg)    Height:         Weight: Weight: 137 lb 8 oz (62.4 kg)     24 hour intake/output:  Intake/Output Summary (Last 24 hours) at 7/23/2022 5634  Last data filed at 7/23/2022 0449  Gross per 24 hour   Intake 220 ml   Output --   Net 220 ml       General appearance - alert, well appearing, and in no distress  Chest - clear to auscultation, no wheezes, rales or rhonchi, symmetric air entry  Heart - normal rate, regular rhythm, normal S1, S2, no murmurs, rubs, clicks or gallops  Abdomen - soft, nontender, nondistended, no masses or organomegaly  Obese: No; Protuberant: No   Neurological - alert, oriented, normal speech, no focal findings or movement disorder noted  Extremities - peripheral pulses normal, no pedal edema, no clubbing or cyanosis  Skin - normal coloration and turgor, no rashes, no suspicious skin lesions noted    Significant Diagnostics:   Radiology: CT ABDOMEN PELVIS WO CONTRAST Additional Contrast? None    Result Date: 7/17/2022  PROCEDURE: CT ABDOMEN PELVIS WO CONTRAST CLINICAL INFORMATION: left flank pain COMPARISON: None TECHNIQUE: Helical CT acquisition of the abdomen and pelvis was performed without intravenous contrast. Multiplanar reformats are provided. All CT scans at this facility use dose modulation, iterative reconstruction, and/or weight based dosing when appropriate to reduce the radiation dose to as low as reasonably achievable. FINDINGS: The noncontrast CT limits the evaluation for solid organ pathology, vascular pathology, and lymphadenopathy. The chest has been described on the concurrent CT of the chest. Gallstone is present. Hypodensities in the liver likely hepatic cysts. Tiny calculi are seen in both kidneys. Mild atrophy of the pancreas. Large amount of stool is seen in the colon. A myomatous uterus is seen. Otherwise, the biliary tree, adrenal glands, and spleen are unremarkable. No bowel obstruction or acute inflammatory bowel process. The appendix is unremarkable. Colonic diverticulosis.  The abdominal aorta is not aneurysmal. Aortoiliac contain minor errors which are inherent in voice recognition technology. ** Final report electronically signed by Dr Carlos Aviles on 7/17/2022 8:15 PM    CT CHEST WO CONTRAST    Result Date: 7/17/2022  PROCEDURE: CT CHEST WO CONTRAST CLINICAL INFORMATION: Fall, right lower scapula contusion, left lower lateral chest wall tenderness. COMPARISON: None TECHNIQUE: Helical CT acquisition of the chest was performed without intravenous contrast. Multiplanar reformats are provided. All CT scans at this facility use dose modulation, iterative reconstruction, and/or weight based dosing when appropriate to reduce the radiation dose to as low as reasonably achievable. FINDINGS: The noncontrast CT limits the evaluation for solid organ pathology, vascular pathology, and lymphadenopathy. Mild to moderate emphysematous changes. Subsegmental atelectasis is seen in the lung bases. The thyroid is mildly enlarged. Aortocoronary calcifications. No focal pulmonary consolidation. No large pulmonary mass. Central airway is patent. No pleural effusions. No significant pericardial effusion. The heart is not enlarged. Ascending thoracic aorta is not dilated. The main pulmonary artery is not dilated. No significantly enlarged mediastinal or  axillary lymph node. [place]ime No chest wall mass. The abdomen is described on a concurrent CT abdomen and pelvis. Bones: Degenerative changes of the thoracic spine. The bones are demineralized. Compression fracture of T2. Scoliosis. . Left lower rib fractures are seen that are nondisplaced. 1. Mild acute compression fracture of T2. 2. No pulmonary consolidation. No pneumothorax. 3. Left lower rib fractures are seen. **This report has been created using voice recognition software. It may contain minor errors which are inherent in voice recognition technology. ** Final report electronically signed by Dr Carlos Aviles on 7/17/2022 8:28 PM    CT CERVICAL SPINE WO CONTRAST    Result Date: 7/17/2022  PROCEDURE: CT CERVICAL SPINE WO CONTRAST CLINICAL INFORMATION: Fall. COMPARISON: None TECHNIQUE: 3 mm axial imaging through the cervical spine without contrast.  Sagittal and coronal reconstructions were performed. All CT scans at this facility use dose modulation, iterative reconstruction, and/or weight based dosing when appropriate to reduce the radiation dose to as low as reasonably achievable. FINDINGS: ALIGNMENT: Cervical lordosis is maintained. BONES: The bones appear demineralized. Mild degenerative changes of the cervical spine. Multilevel disc osteophyte complexes are seen. Multilevel facet arthrosis and uncovertebral degeneration. Acute compression fracture of T2 is seen. SOFT TISSUES: Carotid calcifications are seen. The thyroid is enlarged. A left thyroid nodule is seen. Emphysema is seen in the lung apices. DISC LEVELS: Calcification of the ligamentum flava at C2-C3 and C3-C4 is noted. At C3-C4, the calcification is leading to mild central canal narrowing. Mild right neuroforaminal narrowing at C6-C7. 1. Mild compression fracture of T2. 2. Multilevel degenerative changes of the cervical spine. 3. Thyromegaly. Left thyroid nodule. **This report has been created using voice recognition software. It may contain minor errors which are inherent in voice recognition technology. ** Final report electronically signed by Dr Katerine Modi on 7/17/2022 8:21 PM    MRI THORACIC SPINE WO CONTRAST    Result Date: 7/18/2022  PROCEDURE: MRI THORACIC SPINE WO CONTRAST CLINICAL INFORMATION: r/o acute VCF. Fall with mid back tenderness. COMPARISON: CT thoracic spine dated 7/17/2022. TECHNIQUE: Sagittal T1, T2 and STIR sequences were obtained through the thoracic spine. Axial T2-weighted images were obtained through the discs. FINDINGS:  There is a dextrocurvature of the thoracic spine, stable compared to prior CT. There are compression deformities at the T1 and T2 vertebral bodies.  There is approximately 15% anterior height loss at T1. There is no edema/hyperintense STIR signal in the T1 vertebral body. There is scalloping of the inferior endplate of T2 with approximately 20% anterior height loss, similar to prior CT. There is bandlike hyperintense STIR signal adjacent to the inferior endplate at this level. There is otherwise anatomic vertebral body height and alignment. Marrow signal is otherwise within normal limits. The thoracic spinal cord is normal in caliber without focal area of abnormal T2 signal identified. Paraspinal soft tissues are unremarkable. There is no significant spinal canal or neuroforaminal stenosis at any thoracic level. 1. Acute compression deformity of T2 with approximately 20% anterior height loss. 2. Chronic compression deformity of T1 with approximately 50% anterior height loss. **This report has been created using voice recognition software. It may contain minor errors which are inherent in voice recognition technology. ** Final report electronically signed by Dr. Shaquille Chou MD on 7/18/2022 2:43 PM    XR CHEST PORTABLE    Result Date: 7/18/2022  PROCEDURE: XR CHEST PORTABLE CLINICAL INFORMATION: follow up for rib fractures. Left lower rib fractures. COMPARISON: Chest CT 7/17/2022. TECHNIQUE: AP semiupright view of the chest. FINDINGS: The heart size is normal.The mediastinum is not widened. There are background fibrotic changes. There is no pneumothorax. There are no pulmonary infiltrates. There are no pleural effusions. The pulmonary vascularity is normal. The patient's known left lower rib fractures are not seen radiographically. No pleural effusions. No pneumothorax. Background fibrotic changes. **This report has been created using voice recognition software. It may contain minor errors which are inherent in voice recognition technology. ** Final report electronically signed by Dr. Cassandra Cummings on 7/18/2022 7:40 AM    CT LUMBAR RECONSTRUCTION WO POST PROCESS    Result Date: 7/17/2022  PROCEDURE: CT LUMBAR RECONSTRUCTION WO POST PROCESS CLINICAL INFORMATION: low backpain fall COMPARISON: None TECHNIQUE: 3 mm axial CT images were reconstructed through the lumbar spine from the patient's CT abdomen/pelvis examination without IV contrast. Sagittal and coronal reconstructions were also performed. All CT scans at this facility use dose modulation, iterative reconstruction, and/or weight based dosing when appropriate to reduce the radiation dose to as low as reasonably achievable. FINDINGS: ALIGNMENT: The lumbar lordosis is maintained. Levoscoliosis of the lumbar spine. BONE: The bones are markedly demineralized. . Acute compression fracture of L5. Increased sclerosis within the sacrum relates to insufficiency sacral fractures. Degenerative changes of the SI joints. Mild degenerative changes of the lumbar spine. Multilevel facet arthrosis. . . The soft tissues have been described on the CT abdomen and pelvis. DISC SPACES/FACET JOINT/SPINAL CANAL/NEURAL FORAMEN: The lack of intrathecal contrast limits the evaluation of the spinal canal. Mild central canal narrowing at L3-L4 with moderate right neural foraminal narrowing. Moderate central canal narrowing at L4-L5 with moderate right neuroforaminal narrowing. 1. Acute compression fracture of L5. 2. Increased sclerosis within the sacrum relates to insufficiency sacral fractures. 3. Degenerative changes of the lumbar spine. 4. Marked osteopenia. **This report has been created using voice recognition software. It may contain minor errors which are inherent in voice recognition technology. ** Final report electronically signed by Dr Chava Antunez on 7/17/2022 8:49 PM    CT THORACIC RECONSTRUCTION WO POST PROCESS    Result Date: 7/17/2022  PROCEDURE: CT THORACIC RECONSTRUCTION WO POST PROCESS CLINICAL INFORMATION: Fall, middle midline thoracic spine tenderness.  COMPARISON: None TECHNIQUE: 3 mm axial CT images were reconstructed through the thoracic spine from the patient's CT chest examination without IV contrast. Sagittal and coronal reconstruction were also performed. All CT scans at this facility use dose modulation, iterative reconstruction, and/or weight based dosing when appropriate to reduce the radiation dose to as low as reasonably achievable. FINDINGS: ALIGNMENT: The thoracic kyphosis is maintained. Dextroscoliosis. BONE: The bones are demineralized. Acute compression fracture of T2. Chronic compression deformity of T12. . Multilevel degenerative changes of the thoracic spine. Multilevel facet arthrosis. . . SOFT TISSUE: The soft tissues of been described on the CT chest.. DISC LEVEL: 1. The lack of intrathecal contrast limits the evaluation of the spinal canal. No significant central canal narrowing. No significant neuroforamina narrowing. 1. Mild acute compression fracture of T2. 2. Degenerative changes of the thoracic spine. **This report has been created using voice recognition software. It may contain minor errors which are inherent in voice recognition technology. ** Final report electronically signed by Dr Princess Hernandez on 7/17/2022 8:43 PM      Labs:   Recent Results (from the past 72 hour(s))   Basic Metabolic Panel w/ Reflex to MG    Collection Time: 07/20/22  9:14 AM   Result Value Ref Range    Sodium 140 135 - 145 meq/L    Potassium reflex Magnesium 3.6 3.5 - 5.2 meq/L    Chloride 107 98 - 111 meq/L    CO2 22 (L) 23 - 33 meq/L    Glucose 126 (H) 70 - 108 mg/dL    BUN 17 7 - 22 mg/dL    Creatinine 0.6 0.4 - 1.2 mg/dL    Calcium 8.6 8.5 - 10.5 mg/dL   CBC auto differential    Collection Time: 07/20/22  9:14 AM   Result Value Ref Range    WBC 14.1 (H) 4.8 - 10.8 thou/mm3    RBC 4.51 4.20 - 5.40 mill/mm3    Hemoglobin 13.7 12.0 - 16.0 gm/dl    Hematocrit 43.9 37.0 - 47.0 %    MCV 97.3 81.0 - 99.0 fL    MCH 30.4 26.0 - 33.0 pg    MCHC 31.2 (L) 32.2 - 35.5 gm/dl    RDW-CV 15.6 (H) 11.5 - 14.5 %    RDW-SD 56.0 (H) 35.0 - 45.0 fL    Platelets 594 436 - 950 thou/mm3    MPV 10.1 9.4 - 12.4 fL    Seg Neutrophils 73.4 %    Lymphocytes 16.7 %    Monocytes 7.4 %    Eosinophils 1.3 %    Basophils 0.5 %    Immature Granulocytes 0.7 %    Segs Absolute 10.3 (H) 1.8 - 7.7 thou/mm3    Lymphocytes Absolute 2.4 1.0 - 4.8 thou/mm3    Monocytes Absolute 1.0 0.4 - 1.3 thou/mm3    Eosinophils Absolute 0.2 0.0 - 0.4 thou/mm3    Basophils Absolute 0.1 0.0 - 0.1 thou/mm3    Immature Grans (Abs) 0.10 (H) 0.00 - 0.07 thou/mm3    nRBC 0 /100 wbc   Anion Gap    Collection Time: 07/20/22  9:14 AM   Result Value Ref Range    Anion Gap 11.0 8.0 - 16.0 meq/L   Glomerular Filtration Rate, Estimated    Collection Time: 07/20/22  9:14 AM   Result Value Ref Range    Est, Glom Filt Rate >90 CF/YJP/7.26X8   Basic Metabolic Panel w/ Reflex to MG    Collection Time: 07/21/22  6:04 AM   Result Value Ref Range    Sodium 142 135 - 145 meq/L    Potassium reflex Magnesium 5.1 3.5 - 5.2 meq/L    Chloride 107 98 - 111 meq/L    CO2 22 (L) 23 - 33 meq/L    Glucose 101 70 - 108 mg/dL    BUN 19 7 - 22 mg/dL    Creatinine 0.6 0.4 - 1.2 mg/dL    Calcium 9.9 8.5 - 10.5 mg/dL   Anion Gap    Collection Time: 07/21/22  6:04 AM   Result Value Ref Range    Anion Gap 13.0 8.0 - 16.0 meq/L   Glomerular Filtration Rate, Estimated    Collection Time: 07/21/22  6:04 AM   Result Value Ref Range    Est, Glom Filt Rate >90 ml/min/1.73m2   CBC auto differential    Collection Time: 07/21/22  6:23 AM   Result Value Ref Range    WBC 11.9 (H) 4.8 - 10.8 thou/mm3    RBC 4.68 4.20 - 5.40 mill/mm3    Hemoglobin 14.3 12.0 - 16.0 gm/dl    Hematocrit 46.5 37.0 - 47.0 %    MCV 99.4 (H) 81.0 - 99.0 fL    MCH 30.6 26.0 - 33.0 pg    MCHC 30.8 (L) 32.2 - 35.5 gm/dl    RDW-CV 15.6 (H) 11.5 - 14.5 %    RDW-SD 57.5 (H) 35.0 - 45.0 fL    Platelets 685 677 - 206 thou/mm3    MPV 9.8 9.4 - 12.4 fL    Seg Neutrophils 66.4 %    Lymphocytes 23.2 %    Monocytes 8.0 %    Eosinophils 1.2 %    Basophils 0.5 % Immature Granulocytes 0.7 %    Segs Absolute 7.9 (H) 1.8 - 7.7 thou/mm3    Lymphocytes Absolute 2.8 1.0 - 4.8 thou/mm3    Monocytes Absolute 1.0 0.4 - 1.3 thou/mm3    Eosinophils Absolute 0.1 0.0 - 0.4 thou/mm3    Basophils Absolute 0.1 0.0 - 0.1 thou/mm3    Immature Grans (Abs) 0.08 (H) 0.00 - 0.07 thou/mm3    nRBC 0 /100 wbc   Basic Metabolic Panel w/ Reflex to MG    Collection Time: 07/22/22  6:13 AM   Result Value Ref Range    Sodium 140 135 - 145 meq/L    Potassium reflex Magnesium 3.9 3.5 - 5.2 meq/L    Chloride 106 98 - 111 meq/L    CO2 23 23 - 33 meq/L    Glucose 108 70 - 108 mg/dL    BUN 21 7 - 22 mg/dL    Creatinine 0.6 0.4 - 1.2 mg/dL    Calcium 9.9 8.5 - 10.5 mg/dL   CBC auto differential    Collection Time: 07/22/22  6:13 AM   Result Value Ref Range    WBC 11.7 (H) 4.8 - 10.8 thou/mm3    RBC 4.73 4.20 - 5.40 mill/mm3    Hemoglobin 14.4 12.0 - 16.0 gm/dl    Hematocrit 46.2 37.0 - 47.0 %    MCV 97.7 81.0 - 99.0 fL    MCH 30.4 26.0 - 33.0 pg    MCHC 31.2 (L) 32.2 - 35.5 gm/dl    RDW-CV 15.4 (H) 11.5 - 14.5 %    RDW-SD 55.3 (H) 35.0 - 45.0 fL    Platelets 556 041 - 743 thou/mm3    MPV 9.9 9.4 - 12.4 fL    Seg Neutrophils 62.6 %    Lymphocytes 26.0 %    Monocytes 8.8 %    Eosinophils 1.1 %    Basophils 0.6 %    Immature Granulocytes 0.9 %    Segs Absolute 7.3 1.8 - 7.7 thou/mm3    Lymphocytes Absolute 3.0 1.0 - 4.8 thou/mm3    Monocytes Absolute 1.0 0.4 - 1.3 thou/mm3    Eosinophils Absolute 0.1 0.0 - 0.4 thou/mm3    Basophils Absolute 0.1 0.0 - 0.1 thou/mm3    Immature Grans (Abs) 0.10 (H) 0.00 - 0.07 thou/mm3    nRBC 0 /100 wbc   Anion Gap    Collection Time: 07/22/22  6:13 AM   Result Value Ref Range    Anion Gap 11.0 8.0 - 16.0 meq/L   Glomerular Filtration Rate, Estimated    Collection Time: 07/22/22  6:13 AM   Result Value Ref Range    Est, Glom Filt Rate >90 AO/JJP/8.40S3   Basic Metabolic Panel w/ Reflex to MG    Collection Time: 07/23/22  6:16 AM   Result Value Ref Range    Sodium 142 135 - 145 meq/L    Potassium reflex Magnesium 4.0 3.5 - 5.2 meq/L    Chloride 105 98 - 111 meq/L    CO2 26 23 - 33 meq/L    Glucose 115 (H) 70 - 108 mg/dL    BUN 22 7 - 22 mg/dL    Creatinine 0.7 0.4 - 1.2 mg/dL    Calcium 10.0 8.5 - 10.5 mg/dL   CBC auto differential    Collection Time: 07/23/22  6:16 AM   Result Value Ref Range    WBC 10.1 4.8 - 10.8 thou/mm3    RBC 4.54 4.20 - 5.40 mill/mm3    Hemoglobin 13.7 12.0 - 16.0 gm/dl    Hematocrit 44.4 37.0 - 47.0 %    MCV 97.8 81.0 - 99.0 fL    MCH 30.2 26.0 - 33.0 pg    MCHC 30.9 (L) 32.2 - 35.5 gm/dl    RDW-CV 15.5 (H) 11.5 - 14.5 %    RDW-SD 55.8 (H) 35.0 - 45.0 fL    Platelets 617 703 - 945 thou/mm3    MPV 9.7 9.4 - 12.4 fL    Seg Neutrophils 59.8 %    Lymphocytes 29.4 %    Monocytes 7.7 %    Eosinophils 1.7 %    Basophils 0.5 %    Immature Granulocytes 0.9 %    Segs Absolute 6.0 1.8 - 7.7 thou/mm3    Lymphocytes Absolute 3.0 1.0 - 4.8 thou/mm3    Monocytes Absolute 0.8 0.4 - 1.3 thou/mm3    Eosinophils Absolute 0.2 0.0 - 0.4 thou/mm3    Basophils Absolute 0.1 0.0 - 0.1 thou/mm3    Immature Grans (Abs) 0.09 (H) 0.00 - 0.07 thou/mm3    nRBC 0 /100 wbc   Anion Gap    Collection Time: 07/23/22  6:16 AM   Result Value Ref Range    Anion Gap 11.0 8.0 - 16.0 meq/L   Glomerular Filtration Rate, Estimated    Collection Time: 07/23/22  6:16 AM   Result Value Ref Range    Est, Glom Filt Rate 80 (A) ml/min/1.73m2       Discharge condition: good  Disposition:  To a non-[facility] facility  Time spent on discharge:    Electronically signed by Kellie Cortez PA-C on 7/23/2022 at 8:52 AM

## 2022-07-23 NOTE — PROGRESS NOTES
[facility] discharged to [facility]. Blue discharge packet given to [facility]. Patient belongings sent with [facility].

## 2022-07-25 NOTE — CARE COORDINATION
7/25/22, 7:12 AM EDT    Patient goals/plan/ treatment preferences discussed by  and . Patient goals/plan/ treatment preferences reviewed with patient/ family. Patient/ family verbalize understanding of discharge plan and are in agreement with goal/plan/treatment preferences. Understanding was demonstrated using the teach back method. AVS provided by RN at time of discharge, which includes all necessary medical information pertaining to the patients current course of illness, treatment, post-discharge goals of care, and treatment preferences. Services At/After Discharge: [facility] (SNF) and In ambulance       IMM Letter  IMM Letter given to Patient/Family/Significant other/Guardian/POA/by[de-identified] CM:  Jessica Tracy RN  IMM Letter date given[de-identified] 07/22/22  IMM Letter time given[de-identified] 0900        [facility]

## 2022-07-27 ENCOUNTER — TELEPHONE (OUTPATIENT)
Dept: FAMILY MEDICINE CLINIC | Age: 82
End: 2022-07-27

## 2022-07-27 NOTE — TELEPHONE ENCOUNTER
I see a note from PM&R discussing the possibility of parkinson's- which they discussed with patient's daughter while in the hospital. There is another note by neurology stating the symptoms were more likely related to medication SE than parkinson's. No new medication were started for patient at discharge.

## 2022-07-27 NOTE — TELEPHONE ENCOUNTER
Patient is doing rehab at [facility] in [address] [facility] after [facility] for a fall. She was diagnosed by a neurologist in the hospital with parkinson's. The nursing home has no diagnosis of this. I can not find that diagnosis in our charting. Daughter Enrique Elise would like to know if you can find this diagnosis and how she can get this diagnosis to the nursing home so they put her back on correct medications. Please advise.

## 2022-08-03 ENCOUNTER — OFFICE VISIT (OUTPATIENT)
Dept: FAMILY MEDICINE CLINIC | Age: 82
End: 2022-08-03
Payer: MEDICARE

## 2022-08-03 VITALS
OXYGEN SATURATION: 96 % | WEIGHT: 134 LBS | HEART RATE: 74 BPM | BODY MASS INDEX: 22.3 KG/M2 | SYSTOLIC BLOOD PRESSURE: 124 MMHG | DIASTOLIC BLOOD PRESSURE: 62 MMHG

## 2022-08-03 DIAGNOSIS — Z87.81 HISTORY OF VERTEBRAL FRACTURE: ICD-10-CM

## 2022-08-03 DIAGNOSIS — R25.9 PARKINSONIAN FEATURES: Primary | ICD-10-CM

## 2022-08-03 PROCEDURE — 1123F ACP DISCUSS/DSCN MKR DOCD: CPT | Performed by: NURSE PRACTITIONER

## 2022-08-03 PROCEDURE — 4004F PT TOBACCO SCREEN RCVD TLK: CPT | Performed by: NURSE PRACTITIONER

## 2022-08-03 PROCEDURE — G8400 PT W/DXA NO RESULTS DOC: HCPCS | Performed by: NURSE PRACTITIONER

## 2022-08-03 PROCEDURE — G8427 DOCREV CUR MEDS BY ELIG CLIN: HCPCS | Performed by: NURSE PRACTITIONER

## 2022-08-03 PROCEDURE — 1111F DSCHRG MED/CURRENT MED MERGE: CPT | Performed by: NURSE PRACTITIONER

## 2022-08-03 PROCEDURE — 99214 OFFICE O/P EST MOD 30 MIN: CPT | Performed by: NURSE PRACTITIONER

## 2022-08-03 PROCEDURE — 1090F PRES/ABSN URINE INCON ASSESS: CPT | Performed by: NURSE PRACTITIONER

## 2022-08-03 PROCEDURE — G8420 CALC BMI NORM PARAMETERS: HCPCS | Performed by: NURSE PRACTITIONER

## 2022-08-03 SDOH — ECONOMIC STABILITY: FOOD INSECURITY: WITHIN THE PAST 12 MONTHS, THE FOOD YOU BOUGHT JUST DIDN'T LAST AND YOU DIDN'T HAVE MONEY TO GET MORE.: NEVER TRUE

## 2022-08-03 SDOH — ECONOMIC STABILITY: FOOD INSECURITY: WITHIN THE PAST 12 MONTHS, YOU WORRIED THAT YOUR FOOD WOULD RUN OUT BEFORE YOU GOT MONEY TO BUY MORE.: NEVER TRUE

## 2022-08-03 ASSESSMENT — SOCIAL DETERMINANTS OF HEALTH (SDOH): HOW HARD IS IT FOR YOU TO PAY FOR THE VERY BASICS LIKE FOOD, HOUSING, MEDICAL CARE, AND HEATING?: NOT HARD AT ALL

## 2022-08-03 NOTE — LETTER
[address] [address]  Phone: [phone]  Fax: [phone]    Christin SwiftPETEY CNP        August 3, 2022     Patient: Eli Quach   YOB: 1940   Date of Visit: 8/3/2022       To Whom It May Concern: It is my medical opinion that Eli Quach will require assistance with showering upon return to [facility]. If you have any questions or concerns, please don't hesitate to call.     Sincerely,        Christin PETEY Swift CNP

## 2022-08-03 NOTE — PROGRESS NOTES
Sania Mclean (:  1940) is a 80 y.o. female,Established patient, here for evaluation of the following chief complaint(s):  Follow-up         ASSESSMENT/PLAN:  1. Parkinsonian features  - New  - Restart carbidopa-levodopa treatment- monitor for medication SE  -     carbidopa-levodopa (SINEMET)  MG per tablet; Take 1 tablet by mouth in the morning and 1 tablet at noon and 1 tablet before bedtime. , Disp-270 tablet, R-0Print    2. History of vertebral fracture  - Healing  - Continue with scheduled f/u with [facility]    Return in about 3 months (around 11/3/2022) for Chronic Conditions. Subjective   SUBJECTIVE/OBJECTIVE:  Presents for follow up visit with her daughter. [facility] for rehab after recent hospital stay. Was admitted after fall. Was found to have multiple fractures including T8 and L5 compression fractures, as well as fractures of the 9th and 10th rib on the right side. Healing well. Completing PT and OT. While in the hospital, patient was diagnosed with Parkinson's. Was started on sinemet at that time. Medication was later discontinued per neurology service as they did not believe patient's movements were related to Parkinson's. Patient's daughter reports significant improvement in symptoms with the medication, which quickly returned after stopping medication. Would like to restart therapy. No other concerns. Review of Systems   Musculoskeletal:  Positive for arthralgias and gait problem. Neurological:  Positive for tremors. Negative for weakness and numbness. Objective   Physical Exam  Vitals and nursing note reviewed. Constitutional:       General: She is awake. Appearance: Normal appearance. HENT:      Head: Normocephalic and atraumatic. Right Ear: Hearing and external ear normal.      Left Ear: Hearing and external ear normal.      Nose: Nose normal. No congestion or rhinorrhea.    Eyes:      General: Lids are normal.         Right eye: No discharge. Left eye: No discharge. Conjunctiva/sclera: Conjunctivae normal.   Neck:      Trachea: No tracheal deviation. Comments: C-spine collar in place  Cardiovascular:      Rate and Rhythm: Normal rate and regular rhythm. Heart sounds: Normal heart sounds. No murmur heard. Pulmonary:      Effort: Pulmonary effort is normal. No respiratory distress. Breath sounds: No stridor. No wheezing. Musculoskeletal:      Cervical back: Full passive range of motion without pain. Signs of trauma present. Comments: Back brace in place   Skin:     General: Skin is dry. Coloration: Skin is not jaundiced or pale. Neurological:      General: No focal deficit present. Mental Status: She is alert. Mental status is at baseline. Motor: Tremor present. Psychiatric:         Mood and Affect: Mood and affect normal.         Behavior: Behavior is cooperative. An electronic signature was used to authenticate this note.     --Kathyrn Aschoff, APRN - CNP

## 2022-08-16 PROBLEM — N39.0 UTI (URINARY TRACT INFECTION): Status: RESOLVED | Noted: 2022-07-17 | Resolved: 2022-08-16
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
## 2022-08-19 ENCOUNTER — NURSE ONLY (OUTPATIENT)
Dept: FAMILY MEDICINE CLINIC | Age: 82
End: 2022-08-19

## 2022-08-19 ENCOUNTER — CARE COORDINATION (OUTPATIENT)
Dept: CASE MANAGEMENT | Age: 82
End: 2022-08-19

## 2022-08-19 NOTE — CARE COORDINATION
[facility] Discharge Call    2022    Patient: Candy Mauricio Patient : 1940   MRN: <[id number]>  Reason for Admission: Fx of two ribs L, Closed compression fx of fifth lumbar vertebra  Discharge Date: 22 RARS: Readmission Risk Score: 14.9    Acute Care Course: Admitted to [address] [place] [address] after a fall resulting in fracture two ribs L and closed compression fifth lumbar vertebra -. She discharged to [facility] for rehab from -. HFU made:      Sig Hx: Compression T2, UTI, fall    DME:     Conversation: Unable to reach patient for initial transitions of care call. Caller left a HIPAA compliant message introducing self, nature of the call and contact information for a return call. Follow up plan:  Will re-attemp        Care Transitions Post Acute Facility Transition    Post Acute Facility: \[facility]  Do you have all of your prescriptions and are they filled?: Yes         Care Transitions Interventions         Future Appointments   Date Time Provider Berry Mukherjee   2022  9:40 AM PETEY Celis CNP  [facility] - [facility]   11/3/2022  2:00 PM PETEY Celis CNP  [facility] - Inga Bence, RN

## 2022-08-22 ENCOUNTER — CARE COORDINATION (OUTPATIENT)
Dept: CASE MANAGEMENT | Age: 82
End: 2022-08-22

## 2022-08-22 NOTE — CARE COORDINATION
[facility] Discharge Call    2022    Patient: Jorge Kim Patient : 1940   MRN: [id number]  Reason for Admission: Fx of two ribs L, Closed compression fx of fifth lumbar vertebra  Discharge Date: 22 RARS: Readmission Risk Score: 14.9    Acute Care Course: Admitted to [address] [place] [address] after a fall resulting in fracture two ribs L and closed compression fifth lumbar vertebra -. [facility] for rehab from -. HFU made: PCP 22        Sig Hx: Compression T2, UTI, fall     DME:      Conversation: Contacted patient for initial ESTEPHANIA call. Patient states she is in pain. She wanted to know who to contact to get more pain patches. Patient complained of receiving constipation aides without being consulted. States \"they do very little\". States she is sob at rest.     CTN asked patient if she was on oxygen. Her response was no. Attempted to understand therapy arrangement. CTN asked patient where she was residing. Patient states [facility].  CTN explained she will call the nursing staff for clarification and updated med list.       Follow up plan: Request to  for updated med list.     Discharge Facility: [facility]      Do you have all of your prescriptions and are they filled?: Yes   Have you scheduled your follow up appointment?: Yes (Comment: PCP 22)         Do you feel like you have everything you need to keep you well at home?: Yes   Care Transitions Interventions         Future Appointments   Date Time Provider Berry Mukherjee   2022  1:00 PM [facility]   11/3/2022  2:00 PM [facility]

## 2022-08-23 ENCOUNTER — CARE COORDINATION (OUTPATIENT)
Dept: CARE COORDINATION | Age: 82
End: 2022-08-23

## 2022-08-29 DIAGNOSIS — K59.1 FUNCTIONAL DIARRHEA: Primary | ICD-10-CM

## 2022-08-29 RX ORDER — LOPERAMIDE HYDROCHLORIDE 2 MG/1
2 CAPSULE ORAL 4 TIMES DAILY PRN
Qty: 30 CAPSULE | Refills: 1 | Status: SHIPPED | OUTPATIENT
Start: 2022-08-29 | End: 2022-09-08

## 2022-08-29 NOTE — PROGRESS NOTES
Received message from [facility] stating patient experiencing diarrhea x3 days. REquesting rx for immodium. Rx ep'ed.

## 2022-08-31 ENCOUNTER — OFFICE VISIT (OUTPATIENT)
Dept: FAMILY MEDICINE CLINIC | Age: 82
End: 2022-08-31
Payer: MEDICARE

## 2022-08-31 VITALS
HEART RATE: 68 BPM | BODY MASS INDEX: 22.63 KG/M2 | OXYGEN SATURATION: 97 % | WEIGHT: 136 LBS | DIASTOLIC BLOOD PRESSURE: 78 MMHG | SYSTOLIC BLOOD PRESSURE: 128 MMHG

## 2022-08-31 DIAGNOSIS — R29.6 FALLS FREQUENTLY: ICD-10-CM

## 2022-08-31 DIAGNOSIS — N39.3 STRESS INCONTINENCE OF URINE: ICD-10-CM

## 2022-08-31 DIAGNOSIS — Z09 HOSPITAL DISCHARGE FOLLOW-UP: Primary | ICD-10-CM

## 2022-08-31 DIAGNOSIS — R25.9 PARKINSONIAN FEATURES: ICD-10-CM

## 2022-08-31 DIAGNOSIS — R15.9 INCONTINENCE OF FECES, UNSPECIFIED FECAL INCONTINENCE TYPE: ICD-10-CM

## 2022-08-31 PROBLEM — S22.42XA FRACTURE OF RIBS, TWO, LEFT, CLOSED, INITIAL ENCOUNTER: Status: RESOLVED | Noted: 2022-07-17 | Resolved: 2022-08-31

## 2022-08-31 PROCEDURE — G8400 PT W/DXA NO RESULTS DOC: HCPCS | Performed by: NURSE PRACTITIONER

## 2022-08-31 PROCEDURE — 1123F ACP DISCUSS/DSCN MKR DOCD: CPT | Performed by: NURSE PRACTITIONER

## 2022-08-31 PROCEDURE — G8420 CALC BMI NORM PARAMETERS: HCPCS | Performed by: NURSE PRACTITIONER

## 2022-08-31 PROCEDURE — 1111F DSCHRG MED/CURRENT MED MERGE: CPT | Performed by: NURSE PRACTITIONER

## 2022-08-31 PROCEDURE — G8427 DOCREV CUR MEDS BY ELIG CLIN: HCPCS | Performed by: NURSE PRACTITIONER

## 2022-08-31 PROCEDURE — 4004F PT TOBACCO SCREEN RCVD TLK: CPT | Performed by: NURSE PRACTITIONER

## 2022-08-31 PROCEDURE — 1090F PRES/ABSN URINE INCON ASSESS: CPT | Performed by: NURSE PRACTITIONER

## 2022-08-31 PROCEDURE — 99214 OFFICE O/P EST MOD 30 MIN: CPT | Performed by: NURSE PRACTITIONER

## 2022-08-31 NOTE — PROGRESS NOTES
Post-Discharge Transitional Care Management Progress Note      Fidencio Lew   YOB: 1940    Date of Office Visit:  8/31/2022  Date of Hospital Admission: 7/24/22   Date of Hospital Discharge: 8/18/2022    Care management risk score Rising risk (score 2-5) and Complex Care (Scores >=6): No Risk Score On File     Non face to face  following discharge, date last encounter closed (first attempt may have been earlier): *No documented post hospital discharge outreach found in the last 14 days *No documented post hospital discharge outreach found in the last 14 days    Call initiated 2 business days of discharge: *No response recorded in the last 14 days    ASSESSMENT/PLAN:   Hospital discharge follow-up  - Reviewed documentation related to recent hospital admission  - All questions answered  -     SC DISCHARGE MEDS RECONCILED W/ CURRENT OUTPATIENT MED LIST    Parkinsonian features  - Improving  - Patient elects to continue current dose of carbidopa-levodopa rather than increase to 4x daily  - Consider dose increase in the future    Stress incontinence of urine  - Start kegal exercises  - Pelvic floor therapy ordered  - Discussed initiating medication treatment but, with consultation of Michele English and her son, will withhold at this time d/t potential SE  -     External Referral To Physical Therapy    Incontinence of feces, unspecified fecal incontinence type  - Encouraged use of imodium as needed for chronic diarrhea  - Continue fiber supplementation  - Continue use of briefs as needed    Falls frequently  - Restart PT  -     External Referral To Physical Therapy    Medical Decision Making: moderate complexity  Return in about 3 months (around 11/30/2022). On this date 8/31/2022 I have spent 30 minutes reviewing previous notes, test results and face to face with the patient discussing the diagnosis and importance of compliance with the treatment plan as well as documenting on the day of the visit. Subjective:   HPI:  Follow up of Hospital problems/diagnosis(es): Patient admitted to [facility] after fall resulting in multiple fractures. Was there to complete intensive rehabilitation. Inpatient course: Discharge summary reviewed- see chart. Interval history/Current status: [facility]. Reports things are going well. Urinary and bowel incontinence continue. Has not yet resumed therapies. Patient Active Problem List   Diagnosis    Nonexudative age-related macular degeneration    Nuclear sclerotic cataract    Ulcerative colitis ([facility]    Sleep apnea    Postmenopausal    Nocturnal hypoxemia    Nicotine dependence    Hyperlipidemia    Diverticulosis large intestine w/o perforation or abscess w/bleeding    Simple chronic bronchitis (HCC)    Age-related osteoporosis without current pathological fracture    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 ([facility]    Type 2 diabetes mellitus with diabetic polyneuropathy, without long-term current use of insulin ([facility])    Trochanteric bursitis    Generalized weakness    Dementia without behavioral disturbance, unspecified dementia type ([facility]    Hypertension    Chronic obstructive pulmonary disease (HCC)    Closed compression fracture of fifth lumbar vertebra (HCC)    Wedge compression fracture of second thoracic vertebra, initial encounter for closed fracture [facility])       Medications listed as ordered at the time of discharge from hospital     Medication List            Accurate as of August 31, 2022  1:43 PM. If you have any questions, ask your nurse or doctor.                 CONTINUE taking these medications      acetaminophen 500 MG tablet  Commonly known as: TYLENOL  Take 2 tablets by mouth 3 times daily as needed for Pain     * albuterol (2.5 MG/3ML) 0.083% nebulizer solution  Commonly known as: PROVENTIL     * albuterol sulfate  (90 Base) MCG/ACT inhaler  Commonly known as: PROVENTIL;VENTOLIN;PROAIR  Inhale 2 puffs into the lungs every 6 hours as needed for Wheezing     carbidopa-levodopa  MG per tablet  Commonly known as: SINEMET  Take 1 tablet by mouth in the morning and 1 tablet at noon and 1 tablet before bedtime. denosumab 60 MG/ML Sosy SC injection  Commonly known as: PROLIA  Inject 1 mL into the skin every 6 months     donepezil 10 MG tablet  Commonly known as: ARICEPT  Take 1 tablet by mouth nightly     glucosamine-chondroitin 500-400 MG Caps     HYDROcodone-acetaminophen 5-325 MG per tablet  Commonly known as: NORCO     ibuprofen 400 MG tablet  Commonly known as: ADVIL;MOTRIN     ipratropium 0.02 % nebulizer solution  Commonly known as: ATROVENT     lidocaine 4 % external patch  Place 2 patches onto the skin in the morning. lisinopril 20 MG tablet  Commonly known as: PRINIVIL;ZESTRIL  Take 1 tablet by mouth daily     loperamide 2 MG capsule  Commonly known as: RA Anti-Diarrheal  Take 1 capsule by mouth 4 times daily as needed for Diarrhea     metoprolol tartrate 25 MG tablet  Commonly known as: LOPRESSOR  Take 1 tablet by mouth 2 times daily     MULTIVITAMIN ADULT PO     [facility] PO     predniSONE 10 MG tablet  Commonly known as: DELTASONE  Take 1 tablet by mouth daily     Psyllium Fiber 0.52 g Caps  Take 1 capsule by mouth daily     simvastatin 40 MG tablet  Commonly known as: ZOCOR  Take 1 tablet by mouth nightly     umeclidinium-vilanterol 62.5-25 MCG/INH Aepb inhaler  Commonly known as: ANORO ELLIPTA  Inhale 1 puff into the lungs daily     vitamin B-12 1000 MCG tablet  Commonly known as: CYANOCOBALAMIN  Take 1 tablet by mouth daily     vitamin C 500 MG tablet  Commonly known as: ASCORBIC ACID     zinc gluconate 50 MG tablet           * This list has 2 medication(s) that are the same as other medications prescribed for you. Read the directions carefully, and ask your doctor or other care provider to review them with you. Medications marked \"taking\" at this time  Outpatient Medications Marked as Taking for the 8/31/22 encounter (Office Visit) with PETEY Portillo - CNP   Medication Sig Dispense Refill    loperamide (RA ANTI-DIARRHEAL) 2 MG capsule Take 1 capsule by mouth 4 times daily as needed for Diarrhea 30 capsule 1    carbidopa-levodopa (SINEMET)  MG per tablet Take 1 tablet by mouth in the morning and 1 tablet at noon and 1 tablet before bedtime. 270 tablet 0    lidocaine 4 % external patch Place 2 patches onto the skin in the morning. 1 box 0    HYDROcodone-acetaminophen (NORCO) 5-325 MG per tablet Take 1 tablet by mouth every 6 hours as needed for Pain.       donepezil (ARICEPT) 10 MG tablet Take 1 tablet by mouth nightly 90 tablet 3    metoprolol tartrate (LOPRESSOR) 25 MG tablet Take 1 tablet by mouth 2 times daily 180 tablet 3    lisinopril (PRINIVIL;ZESTRIL) 20 MG tablet Take 1 tablet by mouth daily 90 tablet 3    predniSONE (DELTASONE) 10 MG tablet Take 1 tablet by mouth daily 90 tablet 3    simvastatin (ZOCOR) 40 MG tablet Take 1 tablet by mouth nightly 90 tablet 3    umeclidinium-vilanterol (ANORO ELLIPTA) 62.5-25 MCG/INH AEPB inhaler Inhale 1 puff into the lungs daily 1 each 5    Multiple Vitamin (MULTIVITAMIN ADULT PO) Take 1 tablet by mouth daily      vitamin C (ASCORBIC ACID) 500 MG tablet Take 1,000 mg by mouth daily      zinc gluconate 50 MG tablet Take 50 mg by mouth daily      albuterol (PROVENTIL) (2.5 MG/3ML) 0.083% nebulizer solution Take 2.5 mg by nebulization every 6 hours as needed for Wheezing or Shortness of Breath      ibuprofen (ADVIL;MOTRIN) 400 MG tablet Take 400 mg by mouth 2 times daily as needed for Pain      ipratropium (ATROVENT) 0.02 % nebulizer solution Take 0.5 mg by nebulization every 6-8 hours as needed for Wheezing      denosumab (PROLIA) 60 MG/ML SOSY SC injection Inject 1 mL into the skin every 6 months 1 mL 1    acetaminophen (TYLENOL) 500 MG tablet Take 2 tablets by mouth 3 times daily as needed for Pain 100 tablet 1    albuterol sulfate  (90 Base) MCG/ACT inhaler Inhale 2 puffs into the lungs every 6 hours as needed for Wheezing 2 Inhaler 5    Psyllium Fiber 0.52 g CAPS Take 1 capsule by mouth daily 90 capsule 3    glucosamine-chondroitin 500-400 MG CAPS Take by mouth daily      vitamin B-12 (CYANOCOBALAMIN) 1000 MCG tablet Take 1 tablet by mouth daily 30 tablet 3    Probiotic Product ([employer] PO) Take 1 tablet by mouth daily          Medications patient taking as of now reconciled against medications ordered at time of hospital discharge: Yes      Objective:    /78   Pulse 68   Wt 136 lb (61.7 kg)   SpO2 97%   BMI 22.63 kg/m²   Physical Exam  Vitals and nursing note reviewed. Constitutional:       General: She is awake. Appearance: Normal appearance. HENT:      Head: Normocephalic and atraumatic. Right Ear: Hearing and external ear normal.      Left Ear: Hearing and external ear normal.      Nose: Nose normal. No congestion or rhinorrhea. Eyes:      General: Lids are normal.         Right eye: No discharge. Left eye: No discharge. Conjunctiva/sclera: Conjunctivae normal.   Neck:      Trachea: No tracheal deviation. Cardiovascular:      Rate and Rhythm: Normal rate and regular rhythm. Heart sounds: Normal heart sounds. No murmur heard. Pulmonary:      Effort: Pulmonary effort is normal. No respiratory distress. Breath sounds: No stridor. No wheezing. Musculoskeletal:      Cervical back: Full passive range of motion without pain. Skin:     General: Skin is dry. Coloration: Skin is not jaundiced or pale. Neurological:      General: No focal deficit present. Mental Status: She is alert. Mental status is at baseline. Gait: Gait abnormal (requires walker). Psychiatric:         Mood and Affect: Mood and affect normal.         Behavior: Behavior is cooperative.          Cognition and Memory:

## 2022-10-06 ENCOUNTER — TELEPHONE (OUTPATIENT)
Dept: FAMILY MEDICINE CLINIC | Age: 82
End: 2022-10-06

## 2022-10-06 NOTE — LETTER
[address] [address]  Phone: [phone]  Fax: [phone]    PETEY Jj CNP        October 6, 2022     Patient: Gaudencio Brooks   YOB: 1940   Date of Visit: 10/6/2022       To Whom It May Concern:    Please increase Carmen's carbidopa-levodopa (SINEMET)  MG dosing to 4 times daily, up from 3x daily.       Sincerely,        PETEY Jj CNP

## 2022-10-06 NOTE — TELEPHONE ENCOUNTER
Daughter called in stating that patient shakiness is worsening and at last visit, it  was discussed to possibly increase it to QID. They are wondering if that is okay and if so, could we send orders to [facility] regarding dose change.

## 2022-11-08 DIAGNOSIS — M25.552 BILATERAL HIP PAIN: Primary | ICD-10-CM

## 2022-11-08 DIAGNOSIS — M25.551 BILATERAL HIP PAIN: Primary | ICD-10-CM

## 2022-11-08 RX ORDER — HYDROCODONE BITARTRATE AND ACETAMINOPHEN 5; 325 MG/1; MG/1
1 TABLET ORAL EVERY 6 HOURS PRN
Qty: 120 TABLET | Refills: 0 | Status: SHIPPED | OUTPATIENT
Start: 2022-11-08 | End: 2022-12-08

## 2022-11-08 NOTE — PROGRESS NOTES
Hydrocodone-acetaminophen ep'ed to requested pharmacy. PDMP Monitoring:    Last PDMP [place] as Reviewed:  Review User Review Instant Review Result   Aldodaron Hamida 11/8/2022 10:25 AM Reviewed PDMP [1]     Last Controlled Substance Monitoring Documentation      Flowsheet Row Orders Only from 11/8/2022 in [facility]   Periodic Controlled Substance Monitoring No signs of potential drug abuse or diversion identified. filed at 11/08/2022 1025          Urine Drug Screenings (1 yr)    No resulted procedures found.        Medication Contract and Consent for Opioid Use Documents Filed        No documents found

## 2022-11-11 ENCOUNTER — OFFICE VISIT (OUTPATIENT)
Dept: FAMILY MEDICINE CLINIC | Age: 82
End: 2022-11-11
Payer: MEDICARE

## 2022-11-11 VITALS
RESPIRATION RATE: 22 BRPM | TEMPERATURE: 97.8 F | DIASTOLIC BLOOD PRESSURE: 64 MMHG | SYSTOLIC BLOOD PRESSURE: 120 MMHG | HEART RATE: 65 BPM | OXYGEN SATURATION: 98 %

## 2022-11-11 DIAGNOSIS — J20.9 ACUTE BRONCHITIS, UNSPECIFIED ORGANISM: Primary | ICD-10-CM

## 2022-11-11 PROCEDURE — 4004F PT TOBACCO SCREEN RCVD TLK: CPT | Performed by: FAMILY MEDICINE

## 2022-11-11 PROCEDURE — G8420 CALC BMI NORM PARAMETERS: HCPCS | Performed by: FAMILY MEDICINE

## 2022-11-11 PROCEDURE — G8484 FLU IMMUNIZE NO ADMIN: HCPCS | Performed by: FAMILY MEDICINE

## 2022-11-11 PROCEDURE — 1090F PRES/ABSN URINE INCON ASSESS: CPT | Performed by: FAMILY MEDICINE

## 2022-11-11 PROCEDURE — 99213 OFFICE O/P EST LOW 20 MIN: CPT | Performed by: FAMILY MEDICINE

## 2022-11-11 PROCEDURE — 1123F ACP DISCUSS/DSCN MKR DOCD: CPT | Performed by: FAMILY MEDICINE

## 2022-11-11 PROCEDURE — G8427 DOCREV CUR MEDS BY ELIG CLIN: HCPCS | Performed by: FAMILY MEDICINE

## 2022-11-11 PROCEDURE — 3074F SYST BP LT 130 MM HG: CPT | Performed by: FAMILY MEDICINE

## 2022-11-11 PROCEDURE — G8400 PT W/DXA NO RESULTS DOC: HCPCS | Performed by: FAMILY MEDICINE

## 2022-11-11 PROCEDURE — 3078F DIAST BP <80 MM HG: CPT | Performed by: FAMILY MEDICINE

## 2022-11-11 RX ORDER — BENZONATATE 200 MG/1
200 CAPSULE ORAL 3 TIMES DAILY PRN
Qty: 30 CAPSULE | Refills: 0 | Status: SHIPPED | OUTPATIENT
Start: 2022-11-11 | End: 2022-11-30 | Stop reason: SDUPTHER

## 2022-11-11 RX ORDER — DOXYCYCLINE HYCLATE 100 MG
100 TABLET ORAL 2 TIMES DAILY
Qty: 20 TABLET | Refills: 0 | Status: ON HOLD | OUTPATIENT
Start: 2022-11-11 | End: 2022-11-21 | Stop reason: HOSPADM

## 2022-11-11 ASSESSMENT — ENCOUNTER SYMPTOMS
NAUSEA: 0
CONSTIPATION: 0
DIARRHEA: 0
CHEST TIGHTNESS: 0
WHEEZING: 0
SHORTNESS OF BREATH: 0
RHINORRHEA: 0
BLOOD IN STOOL: 0
VOMITING: 0
SORE THROAT: 0
BACK PAIN: 0
EYE PAIN: 0
ABDOMINAL PAIN: 0
COUGH: 1

## 2022-11-11 NOTE — PROGRESS NOTES
Exam  Vitals and nursing note reviewed. Constitutional:       Appearance: She is well-developed. HENT:      Head: Normocephalic and atraumatic. Right Ear: External ear normal.      Left Ear: External ear normal.      Nose: Nose normal.      Mouth/Throat:      Mouth: Mucous membranes are moist.   Eyes:      Pupils: Pupils are equal, round, and reactive to light. Neck:      Thyroid: No thyromegaly. Cardiovascular:      Rate and Rhythm: Normal rate and regular rhythm. Heart sounds: Normal heart sounds. Pulmonary:      Breath sounds: Rhonchi present. No wheezing or rales. Abdominal:      General: Bowel sounds are normal.      Palpations: Abdomen is soft. Tenderness: There is no abdominal tenderness. There is no guarding or rebound. Musculoskeletal:         General: Normal range of motion. Cervical back: Neck supple. Lymphadenopathy:      Cervical: No cervical adenopathy. Skin:     General: Skin is warm and dry. Findings: No rash. Neurological:      Mental Status: She is alert and oriented to person, place, and time. Cranial Nerves: No cranial nerve deficit. Deep Tendon Reflexes: Reflexes are normal and symmetric. An electronic signature was used to authenticate this note.     --Judy Giordano MD

## 2022-11-20 ENCOUNTER — APPOINTMENT (OUTPATIENT)
Dept: GENERAL RADIOLOGY | Age: 82
End: 2022-11-20
Payer: MEDICARE

## 2022-11-20 ENCOUNTER — HOSPITAL ENCOUNTER (OUTPATIENT)
Age: 82
Setting detail: OBSERVATION
Discharge: OTHER INSTITUTION | End: 2022-11-21
Attending: STUDENT IN AN ORGANIZED HEALTH CARE EDUCATION/TRAINING PROGRAM | Admitting: STUDENT IN AN ORGANIZED HEALTH CARE EDUCATION/TRAINING PROGRAM
Payer: MEDICARE

## 2022-11-20 ENCOUNTER — APPOINTMENT (OUTPATIENT)
Dept: CT IMAGING | Age: 82
End: 2022-11-20
Payer: MEDICARE

## 2022-11-20 DIAGNOSIS — M25.512 ACUTE PAIN OF BOTH SHOULDERS: ICD-10-CM

## 2022-11-20 DIAGNOSIS — J44.1 CHRONIC OBSTRUCTIVE PULMONARY DISEASE WITH ACUTE EXACERBATION (HCC): ICD-10-CM

## 2022-11-20 DIAGNOSIS — M70.61 TROCHANTERIC BURSITIS OF RIGHT HIP: ICD-10-CM

## 2022-11-20 DIAGNOSIS — M25.511 ACUTE PAIN OF BOTH SHOULDERS: ICD-10-CM

## 2022-11-20 DIAGNOSIS — R91.8 PULMONARY INFILTRATES: ICD-10-CM

## 2022-11-20 DIAGNOSIS — J96.01 ACUTE RESPIRATORY FAILURE WITH HYPOXIA (HCC): ICD-10-CM

## 2022-11-20 DIAGNOSIS — R09.02 HYPOXEMIA: Primary | ICD-10-CM

## 2022-11-20 LAB
ANION GAP SERPL CALCULATED.3IONS-SCNC: 10 MEQ/L (ref 8–16)
BASOPHILS # BLD: 0.6 %
BASOPHILS ABSOLUTE: 0.1 THOU/MM3 (ref 0–0.1)
BUN BLDV-MCNC: 23 MG/DL (ref 7–22)
CALCIUM SERPL-MCNC: 9.3 MG/DL (ref 8.5–10.5)
CHLORIDE BLD-SCNC: 104 MEQ/L (ref 98–111)
CO2: 25 MEQ/L (ref 23–33)
CREAT SERPL-MCNC: 0.6 MG/DL (ref 0.4–1.2)
EKG ATRIAL RATE: 80 BPM
EKG P AXIS: 64 DEGREES
EKG P-R INTERVAL: 178 MS
EKG Q-T INTERVAL: 432 MS
EKG QRS DURATION: 122 MS
EKG QTC CALCULATION (BAZETT): 498 MS
EKG R AXIS: -63 DEGREES
EKG T AXIS: 51 DEGREES
EKG VENTRICULAR RATE: 80 BPM
EOSINOPHIL # BLD: 1 %
EOSINOPHILS ABSOLUTE: 0.1 THOU/MM3 (ref 0–0.4)
ERYTHROCYTE [DISTWIDTH] IN BLOOD BY AUTOMATED COUNT: 14.6 % (ref 11.5–14.5)
ERYTHROCYTE [DISTWIDTH] IN BLOOD BY AUTOMATED COUNT: 51.8 FL (ref 35–45)
GFR SERPL CREATININE-BSD FRML MDRD: > 60 ML/MIN/1.73M2
GLUCOSE BLD-MCNC: 126 MG/DL (ref 70–108)
HCT VFR BLD CALC: 45.3 % (ref 37–47)
HEMOGLOBIN: 14.4 GM/DL (ref 12–16)
IMMATURE GRANS (ABS): 0.05 THOU/MM3 (ref 0–0.07)
IMMATURE GRANULOCYTES: 0.4 %
INFLUENZA A: NOT DETECTED
INFLUENZA B: NOT DETECTED
LYMPHOCYTES # BLD: 11.7 %
LYMPHOCYTES ABSOLUTE: 1.3 THOU/MM3 (ref 1–4.8)
MAGNESIUM: 1.9 MG/DL (ref 1.6–2.4)
MCH RBC QN AUTO: 30.8 PG (ref 26–33)
MCHC RBC AUTO-ENTMCNC: 31.8 GM/DL (ref 32.2–35.5)
MCV RBC AUTO: 97 FL (ref 81–99)
MONOCYTES # BLD: 8.6 %
MONOCYTES ABSOLUTE: 1 THOU/MM3 (ref 0.4–1.3)
NUCLEATED RED BLOOD CELLS: 0 /100 WBC
OSMOLALITY CALCULATION: 282.8 MOSMOL/KG (ref 275–300)
PLATELET # BLD: 262 THOU/MM3 (ref 130–400)
PMV BLD AUTO: 9.7 FL (ref 9.4–12.4)
POTASSIUM SERPL-SCNC: 4.6 MEQ/L (ref 3.5–5.2)
PRO-BNP: 331.7 PG/ML (ref 0–1800)
PROCALCITONIN: 0.08 NG/ML (ref 0.01–0.09)
RBC # BLD: 4.67 MILL/MM3 (ref 4.2–5.4)
SARS-COV-2 RNA, RT PCR: NOT DETECTED
SEG NEUTROPHILS: 77.7 %
SEGMENTED NEUTROPHILS ABSOLUTE COUNT: 8.9 THOU/MM3 (ref 1.8–7.7)
SODIUM BLD-SCNC: 139 MEQ/L (ref 135–145)
TROPONIN T: < 0.01 NG/ML
WBC # BLD: 11.5 THOU/MM3 (ref 4.8–10.8)

## 2022-11-20 PROCEDURE — 96374 THER/PROPH/DIAG INJ IV PUSH: CPT

## 2022-11-20 PROCEDURE — 6370000000 HC RX 637 (ALT 250 FOR IP): Performed by: PHYSICIAN ASSISTANT

## 2022-11-20 PROCEDURE — 6370000000 HC RX 637 (ALT 250 FOR IP): Performed by: STUDENT IN AN ORGANIZED HEALTH CARE EDUCATION/TRAINING PROGRAM

## 2022-11-20 PROCEDURE — G0378 HOSPITAL OBSERVATION PER HR: HCPCS

## 2022-11-20 PROCEDURE — 36415 COLL VENOUS BLD VENIPUNCTURE: CPT

## 2022-11-20 PROCEDURE — 99285 EMERGENCY DEPT VISIT HI MDM: CPT

## 2022-11-20 PROCEDURE — 71275 CT ANGIOGRAPHY CHEST: CPT

## 2022-11-20 PROCEDURE — 6360000002 HC RX W HCPCS: Performed by: PHYSICIAN ASSISTANT

## 2022-11-20 PROCEDURE — 96375 TX/PRO/DX INJ NEW DRUG ADDON: CPT

## 2022-11-20 PROCEDURE — 93005 ELECTROCARDIOGRAM TRACING: CPT | Performed by: STUDENT IN AN ORGANIZED HEALTH CARE EDUCATION/TRAINING PROGRAM

## 2022-11-20 PROCEDURE — 85025 COMPLETE CBC W/AUTO DIFF WBC: CPT

## 2022-11-20 PROCEDURE — 73030 X-RAY EXAM OF SHOULDER: CPT

## 2022-11-20 PROCEDURE — 94640 AIRWAY INHALATION TREATMENT: CPT

## 2022-11-20 PROCEDURE — 6360000004 HC RX CONTRAST MEDICATION: Performed by: PHYSICIAN ASSISTANT

## 2022-11-20 PROCEDURE — 2700000000 HC OXYGEN THERAPY PER DAY

## 2022-11-20 PROCEDURE — 6360000002 HC RX W HCPCS: Performed by: STUDENT IN AN ORGANIZED HEALTH CARE EDUCATION/TRAINING PROGRAM

## 2022-11-20 PROCEDURE — 84145 PROCALCITONIN (PCT): CPT

## 2022-11-20 PROCEDURE — 99220 PR INITIAL OBSERVATION CARE/DAY 70 MINUTES: CPT | Performed by: STUDENT IN AN ORGANIZED HEALTH CARE EDUCATION/TRAINING PROGRAM

## 2022-11-20 PROCEDURE — 83880 ASSAY OF NATRIURETIC PEPTIDE: CPT

## 2022-11-20 PROCEDURE — 96372 THER/PROPH/DIAG INJ SC/IM: CPT

## 2022-11-20 PROCEDURE — 83735 ASSAY OF MAGNESIUM: CPT

## 2022-11-20 PROCEDURE — 94669 MECHANICAL CHEST WALL OSCILL: CPT

## 2022-11-20 PROCEDURE — 87636 SARSCOV2 & INF A&B AMP PRB: CPT

## 2022-11-20 PROCEDURE — 84484 ASSAY OF TROPONIN QUANT: CPT

## 2022-11-20 PROCEDURE — 93010 ELECTROCARDIOGRAM REPORT: CPT | Performed by: INTERNAL MEDICINE

## 2022-11-20 PROCEDURE — 80048 BASIC METABOLIC PNL TOTAL CA: CPT

## 2022-11-20 PROCEDURE — 2580000003 HC RX 258: Performed by: STUDENT IN AN ORGANIZED HEALTH CARE EDUCATION/TRAINING PROGRAM

## 2022-11-20 RX ORDER — IPRATROPIUM BROMIDE AND ALBUTEROL SULFATE 2.5; .5 MG/3ML; MG/3ML
1 SOLUTION RESPIRATORY (INHALATION)
Status: DISCONTINUED | OUTPATIENT
Start: 2022-11-20 | End: 2022-11-20

## 2022-11-20 RX ORDER — METOPROLOL TARTRATE 50 MG/1
25 TABLET, FILM COATED ORAL 2 TIMES DAILY
Status: DISCONTINUED | OUTPATIENT
Start: 2022-11-20 | End: 2022-11-21 | Stop reason: HOSPADM

## 2022-11-20 RX ORDER — LISINOPRIL 20 MG/1
20 TABLET ORAL DAILY
Status: DISCONTINUED | OUTPATIENT
Start: 2022-11-20 | End: 2022-11-21 | Stop reason: HOSPADM

## 2022-11-20 RX ORDER — ONDANSETRON 4 MG/1
4 TABLET, ORALLY DISINTEGRATING ORAL EVERY 8 HOURS PRN
Status: DISCONTINUED | OUTPATIENT
Start: 2022-11-20 | End: 2022-11-21 | Stop reason: HOSPADM

## 2022-11-20 RX ORDER — ZINC SULFATE 50(220)MG
50 CAPSULE ORAL DAILY
Status: DISCONTINUED | OUTPATIENT
Start: 2022-11-21 | End: 2022-11-21 | Stop reason: HOSPADM

## 2022-11-20 RX ORDER — PREDNISONE 20 MG/1
40 TABLET ORAL DAILY
Status: DISCONTINUED | OUTPATIENT
Start: 2022-11-20 | End: 2022-11-21 | Stop reason: HOSPADM

## 2022-11-20 RX ORDER — ATORVASTATIN CALCIUM 20 MG/1
20 TABLET, FILM COATED ORAL DAILY
Status: DISCONTINUED | OUTPATIENT
Start: 2022-11-20 | End: 2022-11-21 | Stop reason: HOSPADM

## 2022-11-20 RX ORDER — DONEPEZIL HYDROCHLORIDE 10 MG/1
10 TABLET, FILM COATED ORAL NIGHTLY
Status: DISCONTINUED | OUTPATIENT
Start: 2022-11-20 | End: 2022-11-21 | Stop reason: HOSPADM

## 2022-11-20 RX ORDER — ENOXAPARIN SODIUM 100 MG/ML
40 INJECTION SUBCUTANEOUS DAILY
Status: DISCONTINUED | OUTPATIENT
Start: 2022-11-20 | End: 2022-11-21 | Stop reason: HOSPADM

## 2022-11-20 RX ORDER — ASCORBIC ACID 500 MG
1000 TABLET ORAL DAILY
Status: DISCONTINUED | OUTPATIENT
Start: 2022-11-21 | End: 2022-11-20 | Stop reason: SDUPTHER

## 2022-11-20 RX ORDER — HYDROCODONE BITARTRATE AND ACETAMINOPHEN 5; 325 MG/1; MG/1
1 TABLET ORAL EVERY 6 HOURS PRN
Status: DISCONTINUED | OUTPATIENT
Start: 2022-11-20 | End: 2022-11-21 | Stop reason: HOSPADM

## 2022-11-20 RX ORDER — IPRATROPIUM BROMIDE AND ALBUTEROL SULFATE 2.5; .5 MG/3ML; MG/3ML
1 SOLUTION RESPIRATORY (INHALATION) 3 TIMES DAILY
Status: DISCONTINUED | OUTPATIENT
Start: 2022-11-20 | End: 2022-11-20

## 2022-11-20 RX ORDER — IPRATROPIUM BROMIDE AND ALBUTEROL SULFATE 2.5; .5 MG/3ML; MG/3ML
1 SOLUTION RESPIRATORY (INHALATION) 2 TIMES DAILY
Status: DISCONTINUED | OUTPATIENT
Start: 2022-11-21 | End: 2022-11-21 | Stop reason: HOSPADM

## 2022-11-20 RX ORDER — IPRATROPIUM BROMIDE AND ALBUTEROL SULFATE 2.5; .5 MG/3ML; MG/3ML
1 SOLUTION RESPIRATORY (INHALATION) ONCE
Status: COMPLETED | OUTPATIENT
Start: 2022-11-20 | End: 2022-11-20

## 2022-11-20 RX ORDER — MULTIVITAMIN WITH IRON
1 TABLET ORAL DAILY
Status: DISCONTINUED | OUTPATIENT
Start: 2022-11-20 | End: 2022-11-21 | Stop reason: HOSPADM

## 2022-11-20 RX ORDER — ACETAMINOPHEN 325 MG/1
650 TABLET ORAL EVERY 6 HOURS PRN
Status: DISCONTINUED | OUTPATIENT
Start: 2022-11-20 | End: 2022-11-21 | Stop reason: HOSPADM

## 2022-11-20 RX ORDER — ATORVASTATIN CALCIUM 20 MG/1
20 TABLET, FILM COATED ORAL DAILY
Status: DISCONTINUED | OUTPATIENT
Start: 2022-11-20 | End: 2022-11-20 | Stop reason: ALTCHOICE

## 2022-11-20 RX ORDER — ACETAMINOPHEN 650 MG/1
650 SUPPOSITORY RECTAL EVERY 6 HOURS PRN
Status: DISCONTINUED | OUTPATIENT
Start: 2022-11-20 | End: 2022-11-21 | Stop reason: HOSPADM

## 2022-11-20 RX ORDER — SODIUM CHLORIDE 0.9 % (FLUSH) 0.9 %
5-40 SYRINGE (ML) INJECTION PRN
Status: DISCONTINUED | OUTPATIENT
Start: 2022-11-20 | End: 2022-11-21 | Stop reason: HOSPADM

## 2022-11-20 RX ORDER — LANOLIN ALCOHOL/MO/W.PET/CERES
1000 CREAM (GRAM) TOPICAL DAILY
Status: DISCONTINUED | OUTPATIENT
Start: 2022-11-21 | End: 2022-11-21 | Stop reason: HOSPADM

## 2022-11-20 RX ORDER — SODIUM CHLORIDE 9 MG/ML
INJECTION, SOLUTION INTRAVENOUS PRN
Status: DISCONTINUED | OUTPATIENT
Start: 2022-11-20 | End: 2022-11-21 | Stop reason: HOSPADM

## 2022-11-20 RX ORDER — LIDOCAINE 4 G/G
2 PATCH TOPICAL DAILY
Status: DISCONTINUED | OUTPATIENT
Start: 2022-11-20 | End: 2022-11-21 | Stop reason: HOSPADM

## 2022-11-20 RX ORDER — BENZONATATE 100 MG/1
200 CAPSULE ORAL 3 TIMES DAILY PRN
Status: DISCONTINUED | OUTPATIENT
Start: 2022-11-20 | End: 2022-11-21 | Stop reason: HOSPADM

## 2022-11-20 RX ORDER — POLYETHYLENE GLYCOL 3350 17 G/17G
17 POWDER, FOR SOLUTION ORAL DAILY PRN
Status: DISCONTINUED | OUTPATIENT
Start: 2022-11-20 | End: 2022-11-21 | Stop reason: HOSPADM

## 2022-11-20 RX ORDER — SIMVASTATIN 20 MG
40 TABLET ORAL NIGHTLY
Status: DISCONTINUED | OUTPATIENT
Start: 2022-11-20 | End: 2022-11-20 | Stop reason: CLARIF

## 2022-11-20 RX ORDER — ASCORBIC ACID 500 MG
1000 TABLET ORAL DAILY
Status: DISCONTINUED | OUTPATIENT
Start: 2022-11-21 | End: 2022-11-21 | Stop reason: HOSPADM

## 2022-11-20 RX ORDER — AZITHROMYCIN 250 MG/1
250 TABLET, FILM COATED ORAL DAILY
Status: DISCONTINUED | OUTPATIENT
Start: 2022-11-20 | End: 2022-11-21 | Stop reason: HOSPADM

## 2022-11-20 RX ORDER — BENZONATATE 100 MG/1
200 CAPSULE ORAL 3 TIMES DAILY PRN
Status: DISCONTINUED | OUTPATIENT
Start: 2022-11-20 | End: 2022-11-20 | Stop reason: SDUPTHER

## 2022-11-20 RX ORDER — SODIUM CHLORIDE 0.9 % (FLUSH) 0.9 %
5-40 SYRINGE (ML) INJECTION EVERY 12 HOURS SCHEDULED
Status: DISCONTINUED | OUTPATIENT
Start: 2022-11-20 | End: 2022-11-21 | Stop reason: HOSPADM

## 2022-11-20 RX ORDER — NICOTINE 21 MG/24HR
1 PATCH, TRANSDERMAL 24 HOURS TRANSDERMAL DAILY
Status: DISCONTINUED | OUTPATIENT
Start: 2022-11-20 | End: 2022-11-21 | Stop reason: HOSPADM

## 2022-11-20 RX ORDER — MORPHINE SULFATE 4 MG/ML
4 INJECTION, SOLUTION INTRAMUSCULAR; INTRAVENOUS ONCE
Status: COMPLETED | OUTPATIENT
Start: 2022-11-20 | End: 2022-11-20

## 2022-11-20 RX ORDER — ONDANSETRON 2 MG/ML
4 INJECTION INTRAMUSCULAR; INTRAVENOUS EVERY 6 HOURS PRN
Status: DISCONTINUED | OUTPATIENT
Start: 2022-11-20 | End: 2022-11-21 | Stop reason: HOSPADM

## 2022-11-20 RX ORDER — ONDANSETRON 2 MG/ML
4 INJECTION INTRAMUSCULAR; INTRAVENOUS ONCE
Status: COMPLETED | OUTPATIENT
Start: 2022-11-20 | End: 2022-11-20

## 2022-11-20 RX ADMIN — IOPAMIDOL 80 ML: 755 INJECTION, SOLUTION INTRAVENOUS at 09:53

## 2022-11-20 RX ADMIN — HYDROCODONE BITARTRATE AND ACETAMINOPHEN 1 TABLET: 5; 325 TABLET ORAL at 20:16

## 2022-11-20 RX ADMIN — SODIUM CHLORIDE, PRESERVATIVE FREE 10 ML: 5 INJECTION INTRAVENOUS at 22:09

## 2022-11-20 RX ADMIN — ONDANSETRON 4 MG: 2 INJECTION INTRAMUSCULAR; INTRAVENOUS at 09:01

## 2022-11-20 RX ADMIN — Medication 1 TABLET: at 23:29

## 2022-11-20 RX ADMIN — MORPHINE SULFATE 4 MG: 4 INJECTION, SOLUTION INTRAMUSCULAR; INTRAVENOUS at 09:01

## 2022-11-20 RX ADMIN — AZITHROMYCIN MONOHYDRATE 250 MG: 250 TABLET ORAL at 18:51

## 2022-11-20 RX ADMIN — ATORVASTATIN CALCIUM 20 MG: 20 TABLET, FILM COATED ORAL at 23:29

## 2022-11-20 RX ADMIN — METOPROLOL TARTRATE 25 MG: 50 TABLET, FILM COATED ORAL at 22:21

## 2022-11-20 RX ADMIN — IPRATROPIUM BROMIDE AND ALBUTEROL SULFATE 1 AMPULE: .5; 3 SOLUTION RESPIRATORY (INHALATION) at 13:33

## 2022-11-20 RX ADMIN — ENOXAPARIN SODIUM 40 MG: 100 INJECTION SUBCUTANEOUS at 17:43

## 2022-11-20 RX ADMIN — DONEPEZIL HYDROCHLORIDE 10 MG: 10 TABLET, FILM COATED ORAL at 23:30

## 2022-11-20 ASSESSMENT — PAIN DESCRIPTION - LOCATION
LOCATION: SHOULDER

## 2022-11-20 ASSESSMENT — PAIN DESCRIPTION - FREQUENCY
FREQUENCY: CONTINUOUS
FREQUENCY: CONTINUOUS

## 2022-11-20 ASSESSMENT — PAIN SCALES - GENERAL
PAINLEVEL_OUTOF10: 8
PAINLEVEL_OUTOF10: 7
PAINLEVEL_OUTOF10: 5
PAINLEVEL_OUTOF10: 5

## 2022-11-20 ASSESSMENT — ENCOUNTER SYMPTOMS
ABDOMINAL PAIN: 0
EYE PAIN: 0
DIARRHEA: 0
SHORTNESS OF BREATH: 0
STRIDOR: 0
COUGH: 0
NAUSEA: 0
VOMITING: 0

## 2022-11-20 ASSESSMENT — PAIN DESCRIPTION - DESCRIPTORS
DESCRIPTORS: SHARP

## 2022-11-20 ASSESSMENT — PAIN DESCRIPTION - PAIN TYPE: TYPE: ACUTE PAIN

## 2022-11-20 ASSESSMENT — PAIN DESCRIPTION - ORIENTATION
ORIENTATION: LEFT
ORIENTATION: RIGHT;LEFT
ORIENTATION: LEFT

## 2022-11-20 ASSESSMENT — PAIN - FUNCTIONAL ASSESSMENT
PAIN_FUNCTIONAL_ASSESSMENT: ACTIVITIES ARE NOT PREVENTED
PAIN_FUNCTIONAL_ASSESSMENT: 0-10

## 2022-11-20 ASSESSMENT — PAIN DESCRIPTION - ONSET
ONSET: ON-GOING
ONSET: ON-GOING

## 2022-11-20 NOTE — ED NOTES
Upon attempting to get patient out of bed to ambulate. Patient became hypoxic at rest. Patent oxygen saturation decreased from 94% to 86% on room air. Patient is a high fall risk, this RN did not attempt to get the patient up while she was hypoxic.      George Styles RN  11/20/22 7980

## 2022-11-20 NOTE — ED NOTES
ED to inpatient nurses report    Chief Complaint   Patient presents with    Shoulder Pain     Bilateral       Present to ED from nursing home  LOC: alert and orientated to name, place, date  Vital signs   Vitals:    11/20/22 1355 11/20/22 1404 11/20/22 1405 11/20/22 1409   BP:       Pulse:       Resp:       Temp:       TempSrc:       SpO2: 92% 94% (!) 89% 93%      Oxygen Baseline RA    Current needs required 3 L NC Bipap/Cpap No  LDAs:   Peripheral IV 11/20/22 Left Forearm (Active)   Site Assessment Clean, dry & intact 11/20/22 0858     Mobility: Requires assistance * 1  Pending ED orders: Complete  Present condition: Stable    Electronically signed by Stephanie Garner RN on 11/20/2022 at [address]  11/20/22 1502

## 2022-11-20 NOTE — RT PROTOCOL NOTE
RT Inhaler-Nebulizer Bronchodilator Protocol Note    There is a bronchodilator order in the chart from a provider indicating to follow the RT Bronchodilator Protocol and there is an Initiate RT Inhaler-Nebulizer Bronchodilator Protocol order as well (see protocol at bottom of note). CXR Findings:  No results found. The findings from the last RT Protocol Assessment were as follows:   History Pulmonary Disease: Chronic pulmonary disease  Respiratory Pattern: Dyspnea on exertion or RR 21-25 bpm  Breath Sounds: Intermittent or unilateral wheezes  Cough: Strong, spontaneous, non-productive  Indication for Bronchodilator Therapy: On home bronchodilators, Wheezing associated with pulm disorder  Bronchodilator Assessment Score: 8    Aerosolized bronchodilator medication orders have been revised according to the RT Inhaler-Nebulizer Bronchodilator Protocol below. Respiratory Therapist to perform RT Therapy Protocol Assessment initially then follow the protocol. Repeat RT Therapy Protocol Assessment PRN for score 0-3 or on second treatment, BID, and PRN for scores above 3. No Indications - adjust the frequency to every 6 hours PRN wheezing or bronchospasm, if no treatments needed after 48 hours then discontinue using Per Protocol order mode. If indication present, adjust the RT bronchodilator orders based on the Bronchodilator Assessment Score as indicated below. Use Inhaler orders unless patient has one or more of the following: on home nebulizer, not able to hold breath for 10 seconds, is not alert and oriented, cannot activate and use MDI correctly, or respiratory rate 25 breaths per minute or more, then use the equivalent nebulizer order(s) with same Frequency and PRN reasons based on the score. If a patient is on this medication at home then do not decrease Frequency below that used at home.     0-3 - enter or revise RT bronchodilator order(s) to equivalent RT Bronchodilator order with Frequency of every 4 hours PRN for wheezing or increased work of breathing using Per Protocol order mode. 4-6 - enter or revise RT Bronchodilator order(s) to two equivalent RT bronchodilator orders with one order with BID Frequency and one order with Frequency of every 4 hours PRN wheezing or increased work of breathing using Per Protocol order mode. 7-10 - enter or revise RT Bronchodilator order(s) to two equivalent RT bronchodilator orders with one order with TID Frequency and one order with Frequency of every 4 hours PRN wheezing or increased work of breathing using Per Protocol order mode. 11-13 - enter or revise RT Bronchodilator order(s) to one equivalent RT bronchodilator order with QID Frequency and an Albuterol order with Frequency of every 4 hours PRN wheezing or increased work of breathing using Per Protocol order mode. Greater than 13 - enter or revise RT Bronchodilator order(s) to one equivalent RT bronchodilator order with every 4 hours Frequency and an Albuterol order with Frequency of every 2 hours PRN wheezing or increased work of breathing using Per Protocol order mode. RT to enter RT Home Evaluation for COPD & MDI Assessment order using Per Protocol order mode.     Electronically signed by Etelvina Hansen RCP on 11/20/2022 at 4:47 PM

## 2022-11-20 NOTE — H&P
History & Physical       Patient: Ruth Ann Negro  YOB: 1940    MRN: [id number]     Acct: [de-identified]    PCP: PETEY Kraus CNP    Date of Admission: 11/20/2022    Date of Service: Patient seen / examined on 11/20/22 and admitted to Observation with expected LOS less than two midnights due to medical therapy. ASSESSMENT / PLAN:  1. Acute hypoxic respiratory failure  Secondary to COPD exacerbation. Supplemental O2 initiated. Keep SPO2 > 90%. Incentive spirometry. Acapella. Home O2 eval tomorrow. 2. Acute COPD exacerbation  Cough. No spirometry in chart to assess severity. On Stiolto and albuterol prn as OP. Prednisone, azithromycin initiated. DuoNeb every 4 hours. Interstitial lung markings on CT scan -> will need outpatient full PFT. 3. Primary Hypertension  Uncontrolled. Lisinopril and metoprolol resumed with hold parameters. 4. Dementia  Aricept resumed    5. Hyperlipidemia  Lipitor resumed    6. Bilateral Shoulder Pain, chronic  Secondary to mechanical fall in July 2022. Lidocaine patch. Chief Complaint:  Bilateral shoulder pain    History of Present Illness:  80 y.o. female with an hx of COPD, HTN, HLD, and dementia who presented to [address] [place] [address] for evaluation of bilateral shoulder pain. History was obtained by patient and family who are at bedside. Patient complains of bilateral shoulder pain. Sharp character. 9/10 in severity. Constant. Patient's daughter states that patient has complained of shoulder pain since she fell back in July 2022. Patient states that her pain is stronger than in the past.  No chest pain, palpitations, shortness of breath, orthopnea, PND or syncope. Patient also complains of a dry cough of 2 week duration. Denies fever, chills, mucopurulent sputum production. Patient received doxycycline by her's PCP which has not relieve her cough. Vitals on arrival: Temp 97.9, RR 17, HR 88, /95, 93% on RA.   Labs were remarkable for a slight leukocytosis 11.5 K. X-ray of the right and left shoulder demonstrates no acute fracture or dislocation. CT chest demonstrates thickening of the interstitial lung markings. No PE. Patient became hypoxic while in the ED. Patient's O2 sat dropped from 94% to 86% while on RA. Patient is on room air at baseline. Hospitalist was contacted for admission for acute hypoxic respiratory failure. Please refer to A&P for further details. Past Medical History:    Past Medical History:   Diagnosis Date    Abnormal nuclear stress test 06/01/2018    Cataract     Chronic deep vein thrombosis (DVT) of femoral vein of left lower extremity (HCC) 3/9/2021    Colon polyps     COPD (chronic obstructive pulmonary disease) (HCC)     Diverticulosis large intestine w/o perforation or abscess w/bleeding     DVT, recurrent, lower extremity, acute, left (HCC)     Fibroid, uterine     Hearing loss     Hx of blood clots     Hyperlipidemia     Nicotine dependence     Nocturnal hypoxemia     Postmenopausal     Sleep apnea     Ulcerative colitis ([facility] [address]     with rectal bleeding      Past Surgical History:    Past Surgical History:   Procedure Laterality Date    BREAST BIOPSY  1988    right breast biopsy     BREAST BIOPSY Right 03/2011    stereotactic biopsy    COLONOSCOPY  2006    Dr Chela Howard  2010    Dr Tracey Post    COLONOSCOPY  2010    Dr April Barney interminate colitis    COLONOSCOPY  03/03/2014    Dr John Fernández Bilateral 2011 2012    HIP SURGERY  06/13/2020    left fracture    OSTEOTOMY Left 11/2/2021    LEFT 5TH METATARSAL HEAD RESECTION EXCISION SOFT TISSUE LESION [facility] [address]      Medications Prior to Admission:   No current facility-administered medications on file prior to encounter.      Current Outpatient Medications on File Prior to Encounter   Medication Sig Dispense Refill    doxycycline hyclate (VIBRA-TABS) 100 MG tablet Take 1 tablet by mouth 2 times daily for 10 days 20 tablet 0    benzonatate (TESSALON) 200 MG capsule Take 1 capsule by mouth 3 times daily as needed for Cough 30 capsule 0    HYDROcodone-acetaminophen (NORCO) 5-325 MG per tablet Take 1 tablet by mouth every 6 hours as needed for Pain for up to 30 days. 120 tablet 0    carbidopa-levodopa (SINEMET)  MG per tablet Take 1 tablet by mouth in the morning and 1 tablet at noon and 1 tablet before bedtime. 270 tablet 0    lidocaine 4 % external patch Place 2 patches onto the skin in the morning.  1 box 0    donepezil (ARICEPT) 10 MG tablet Take 1 tablet by mouth nightly 90 tablet 3    metoprolol tartrate (LOPRESSOR) 25 MG tablet Take 1 tablet by mouth 2 times daily 180 tablet 3    lisinopril (PRINIVIL;ZESTRIL) 20 MG tablet Take 1 tablet by mouth daily 90 tablet 3    predniSONE (DELTASONE) 10 MG tablet Take 1 tablet by mouth daily 90 tablet 3    simvastatin (ZOCOR) 40 MG tablet Take 1 tablet by mouth nightly 90 tablet 3    umeclidinium-vilanterol (ANORO ELLIPTA) 62.5-25 MCG/INH AEPB inhaler Inhale 1 puff into the lungs daily 1 each 5    Multiple Vitamin (MULTIVITAMIN ADULT PO) Take 1 tablet by mouth daily      vitamin C (ASCORBIC ACID) 500 MG tablet Take 1,000 mg by mouth daily      zinc gluconate 50 MG tablet Take 50 mg by mouth daily      albuterol (PROVENTIL) (2.5 MG/3ML) 0.083% nebulizer solution Take 2.5 mg by nebulization every 6 hours as needed for Wheezing or Shortness of Breath      ibuprofen (ADVIL;MOTRIN) 400 MG tablet Take 400 mg by mouth 2 times daily as needed for Pain      ipratropium (ATROVENT) 0.02 % nebulizer solution Take 0.5 mg by nebulization every 6-8 hours as needed for Wheezing      denosumab (PROLIA) 60 MG/ML SOSY SC injection Inject 1 mL into the skin every 6 months 1 mL 1    acetaminophen (TYLENOL) 500 MG tablet Take 2 tablets by mouth 3 times daily as needed for Pain 100 tablet 1    albuterol sulfate  (90 Base) MCG/ACT inhaler Inhale 2 puffs into the lungs every 6 hours as needed for Wheezing 2 Inhaler 5    Psyllium Fiber 0.52 g CAPS Take 1 capsule by mouth daily 90 capsule 3    glucosamine-chondroitin 500-400 MG CAPS Take by mouth daily      vitamin B-12 (CYANOCOBALAMIN) 1000 MCG tablet Take 1 tablet by mouth daily 30 tablet 3    Probiotic Product ([facility] PO) Take 1 tablet by mouth daily       Allergies:   Azulfidine [sulfasalazine], Keflex [cephalexin], Mesalamine, Sulfa antibiotics, and Alcohol    Social History:   Social History     Socioeconomic History    Marital status:       Spouse name: Not on file    Number of children: Not on file    Years of education: Not on file    Highest education level: Not on file   Occupational History    Not on file   Tobacco Use    Smoking status: Some Days     Packs/day: 1.00     Years: 40.00     Pack years: 40.00     Types: Cigarettes    Smokeless tobacco: Never    Tobacco comments:     8-10 cigarettes / day   Vaping Use    Vaping Use: Never used   Substance and Sexual Activity    Alcohol use: No    Drug use: Never    Sexual activity: Not Currently   Other Topics Concern    Not on file   Social History Narrative    Not on file     Social Determinants of Health     Financial Resource Strain: Low Risk     Difficulty of Paying Living Expenses: Not hard at all   Food Insecurity: No Food Insecurity    Worried About Running Out of Food in the Last Year: Never true    Ran Out of Food in the Last Year: Never true   Transportation Needs: Not on file   Physical Activity: Insufficiently Active    Days of Exercise per Week: 5 days    Minutes of Exercise per Session: 20 min   Stress: Not on file   Social Connections: Not on file   Intimate Partner Violence: Not on file   Housing Stability: Not on file     Family History:    Family History   Problem Relation Age of Onset    Diabetes Mother     High Blood Pressure Mother     Heart Disease Mother     Coronary Art Dis Father     Heart Disease Father      REVIEW OF SYSTEMS:  A 14-point ROS was obtained and negative, with the exception of pertinent positives as listed below:    Positive for cough. Positive for bilateral shoulder pain    PHYSICAL EXAM:  Vitals:    11/20/22 1355 11/20/22 1404 11/20/22 1405 11/20/22 1409   BP:       Pulse:       Resp:       Temp:       TempSrc:       SpO2: 92% 94% (!) 89% 93%     General appearance: Alert / ill-appearing. Cooperative. NAD. HEENT:  Normocephalic / atraumatic. PERRL. EOM intact. Conjunctivae appear normal.  Neck: Supple. No JVD. Respiratory: Normal respiratory effort on RA. Right upper lobe wheezing. Cardiovascular: RRR. Normal S1/S2. No murmurs / rubs / gallops. Abdomen: Soft / non-tender / non-distended. BS present. Musculoskeletal: No cyanosis or edema. Skin: Warm / dry. Normal turgor. Neurologic: A/O x 3. Speech normal. Answers questions appropriately. CN intact. No obvious focal neurologic deficits. Psychiatric: Thought content / judgment / insight appear appropriate. Capillary refill: Brisk bilaterally. Peripheral pulses: +2 bilaterally.     Labs:   Results for orders placed or performed during the hospital encounter of 11/20/22   COVID-19 & Influenza Combo    Specimen: Nasopharyngeal Swab   Result Value Ref Range    SARS-CoV-2 RNA, RT PCR NOT DETECTED NOT DETECTED    INFLUENZA A NOT DETECTED NOT DETECTED    INFLUENZA B NOT DETECTED NOT DETECTED   CBC with Auto Differential   Result Value Ref Range    WBC 11.5 (H) 4.8 - 10.8 thou/mm3    RBC 4.67 4.20 - 5.40 mill/mm3    Hemoglobin 14.4 12.0 - 16.0 gm/dl    Hematocrit 45.3 37.0 - 47.0 %    MCV 97.0 81.0 - 99.0 fL    MCH 30.8 26.0 - 33.0 pg    MCHC 31.8 (L) 32.2 - 35.5 gm/dl    RDW-CV 14.6 (H) 11.5 - 14.5 %    RDW-SD 51.8 (H) 35.0 - 45.0 fL    Platelets 637 207 - 072 thou/mm3    MPV 9.7 9.4 - 12.4 fL    Seg Neutrophils 77.7 %    Lymphocytes 11.7 %    Monocytes 8.6 %    Eosinophils 1.0 %    Basophils 0.6 %    Immature Granulocytes 0.4 %    Segs Absolute 8.9 (H) 1.8 - 7.7 thou/mm3    Lymphocytes Absolute 1.3 1.0 - 4.8 thou/mm3    Monocytes Absolute 1.0 0.4 - 1.3 thou/mm3    Eosinophils Absolute 0.1 0.0 - 0.4 thou/mm3    Basophils Absolute 0.1 0.0 - 0.1 thou/mm3    Immature Grans (Abs) 0.05 0.00 - 0.07 thou/mm3    nRBC 0 /100 wbc   Basic Metabolic Panel   Result Value Ref Range    Sodium 139 135 - 145 meq/L    Potassium 4.6 3.5 - 5.2 meq/L    Chloride 104 98 - 111 meq/L    CO2 25 23 - 33 meq/L    Glucose 126 (H) 70 - 108 mg/dL    BUN 23 (H) 7 - 22 mg/dL    Creatinine 0.6 0.4 - 1.2 mg/dL    Calcium 9.3 8.5 - 10.5 mg/dL   Troponin   Result Value Ref Range    Troponin T < 0.010 ng/ml   Magnesium   Result Value Ref Range    Magnesium 1.9 1.6 - 2.4 mg/dL   Glomerular Filtration Rate, Estimated   Result Value Ref Range    Est, Glom Filt Rate >60 >60 ml/min/1.73m2   Anion Gap   Result Value Ref Range    Anion Gap 10.0 8.0 - 16.0 meq/L   Osmolality   Result Value Ref Range    Osmolality Calc 282.8 275.0 - 300.0 mOsmol/kg   Procalcitonin   Result Value Ref Range    Procalcitonin 0.08 0.01 - 0.09 ng/mL   Brain Natriuretic Peptide   Result Value Ref Range    Pro-.7 0.0 - 1800.0 pg/mL   EKG Emergency   Result Value Ref Range    Ventricular Rate 80 BPM    Atrial Rate 80 BPM    P-R Interval 178 ms    QRS Duration 122 ms    Q-T Interval 432 ms    QTc Calculation (Bazett) 498 ms    P Axis 64 degrees    R Axis -63 degrees    T Axis 51 degrees       Radiology:     XR SHOULDER RIGHT (MIN 2 VIEWS)    Result Date: 11/20/2022  PROCEDURE: XR SHOULDER RIGHT (MIN 2 VIEWS) CLINICAL INFORMATION: Pain. COMPARISON: No prior study. TECHNIQUE: 3 views of the shoulder including AP view, a glenoid view, and a scapular Y view. FINDINGS: There is degenerative change involving the acromioclavicular and glenohumeral joints. There is no fracture or dislocation. There is mild diffuse osteopenia. .  The clavicle is normal.  The soft tissues are normal.  There are no suspicious findings in the visualized aspects of the upper lung. 1. Degenerative change involving the acromioclavicular and glenohumeral joints. 2. No acute fracture or dislocation. 3. Mild diffuse osteopenia. **This report has been created using voice recognition software. It may contain minor errors which are inherent in voice recognition technology. ** Final report electronically signed by DR Asia Simms on 11/20/2022 9:25 AM    CTA CHEST W WO CONTRAST    Result Date: 11/20/2022  PROCEDURE: CTA CHEST W WO CONTRAST CLINICAL INFORMATION: bilateral shoulder pain. COMPARISON: CT scan of the chest dated 17th of July 2022. TECHNIQUE: 3 mm axial images were obtained through the chest after the administration of IV contrast.  A non-contrast localizer was obtained. 3D reconstructions were performed on the scanner to include MIP coronal and sagittal images through the chest. Isovue was the intravenous contrast utilized. All CT scans at this facility use dose modulation, iterative reconstruction, and/or weight-based dosing when appropriate to reduce radiation dose to as low as reasonably achievable. FINDINGS: There is adequate opacification of the pulmonary arterial system. No pulmonary emboli are present. There is extensive calcification in the thoracic aorta. . There is mild cardiomegaly. There is no pericardial or pleural effusion. There is no mediastinal, axillary or hilar adenopathy. There is thickening off the interstitial lung markings and chronic infiltrates in the right and to lesser extent left lower lobes. .  There is a dextroscoliosis. There is thoracic spondylosis. . There are compression deformities involving approximately the  T2 and T3 vertebral bodies. There is mild enlargement of the right and left lobes of the thyroid gland. There is a hiatal hernia. . There is a 13 x 11 mm hypodensity in the right lobe of the liver. 1. No evidence of pulmonary emboli. 2. Mild cardiomegaly.  3. Extensive calcification in the thoracic aorta. 4. Thickening off the interstitial lung markings. Chronic infiltrates in the right and to lesser extent left lower lobes. 5. Dextroscoliosis. Thoracic spondylosis. Compression deformities involving approximately the T2 and T3 vertebral bodies. 6. Thyromegaly. 7. Moderate-sized hiatal hernia. 8. 13 x 11 mm hypodensity in the right lobe of the liver. **This report has been created using voice recognition software. It may contain minor errors which are inherent in voice recognition technology. ** Final report electronically signed by DR Shakila Hackett on 11/20/2022 10:25 AM    XR SHOULDER LEFT (MIN 2 VIEWS)    Result Date: 11/20/2022  PROCEDURE: XR SHOULDER LEFT (MIN 2 VIEWS) CLINICAL INFORMATION: pain. COMPARISON: No prior study. TECHNIQUE: 4 views of the shoulder including AP view, a glenoid view, and a scapular Y view. FINDINGS: There is degenerative change involving the acromioclavicular and glenohumeral joints. There is deformity of the distal clavicle which may be secondary to an old fracture. There is no acute fracture or dislocation. There is diffuse osteopenia. The soft tissues are normal.  There are no suspicious findings in the visualized aspects of the upper lung. 1. Degenerative change involving the acromioclavicular and glenohumeral joints. 2. Deformity of the distal left clavicle which may be secondary to an old fracture. 3. Diffuse osteopenia. 4. No acute fracture or dislocation. **This report has been created using voice recognition software. It may contain minor errors which are inherent in voice recognition technology. ** Final report electronically signed by DR Shakila Hackett on 11/20/2022 9:27 AM    FEN/GI/DVT:  IVF: None  Electrolytes: Monitor and replace per protocols  Diet: General  GI PPX: No  DVT Prophylaxis: Lovenox    CODE STATUS:  Full    Thank you PETEY Shin CNP for the opportunity to be involved in this patient's care.     Electronically signed by Albino Beverly MD on 11/20/2022 at 2:43 PM

## 2022-11-20 NOTE — ED NOTES
Family expresses that Oxygen needs to be turned down slowly so patient can respond to it better. This RN honors families wishes and turns the Oxygen down from 3L NC to 2L NC. Shortly after patient became hypoxic and the oxygen was increased back to 3L NC.      Stephanie Garner RN  11/20/22 [id number]

## 2022-11-20 NOTE — ED NOTES
Patient provided meal tray at this time. No further needs expressed. Patient respirations are regular and unlabored. Patient appears to be in no current distress. Patient VSS. Call light is within reach.        Mairon Aguilera RN  11/20/22 2209

## 2022-11-20 NOTE — ED PROVIDER NOTES
Megan Sanchez  Phone: [phone]       Chief Complaint   Patient presents with    Shoulder Pain     Bilateral        Nurses Notes reviewed and I agree except as notedin the HPI. HISTORY OF PRESENT ILLNESS    Hiral López is a 80 y.o. female who presents lanes of bilateral shoulder pain is been going on that started last night. She states is worse with movement. Patient denies any chest pain or shortness of breath. She denies any injuries or falls. She took a [place] at the [facility] and felt that it made her nauseated. She is still having pain in both shoulders. REVIEW OF SYSTEMS     Review of Systems   Constitutional:  Negative for chills and fever. HENT:  Negative for congestion and tinnitus. Eyes:  Negative for pain. Respiratory:  Negative for cough, shortness of breath and stridor. Cardiovascular:  Negative for chest pain and palpitations. Gastrointestinal:  Negative for abdominal pain, diarrhea, nausea and vomiting. Genitourinary:  Negative for dysuria and urgency. Musculoskeletal:  Negative for myalgias and neck pain. Bilateral shoulder pain   Skin:  Negative for rash. Neurological:  Negative for dizziness. Psychiatric/Behavioral:  Negative for suicidal ideas. All other systems reviewed and are negative. PAST MEDICAL HISTORY    has a past medical history of Abnormal nuclear stress test, Cataract, Chronic deep vein thrombosis (DVT) of femoral vein of left lower extremity (HCC), Colon polyps, COPD (chronic obstructive pulmonary disease) ([address] Diverticulosis large intestine w/o perforation or abscess w/bleeding, DVT, recurrent, lower extremity, acute, left (HCC), Fibroid, uterine, Hearing loss, Hx of blood clots, Hyperlipidemia, Nicotine dependence, Nocturnal hypoxemia, Postmenopausal, Sleep apnea, and Ulcerative colitis ([address]     SURGICAL HISTORY      has a past surgical history that includes Foot surgery; Breast biopsy (1988); Colonoscopy (2006); Colonoscopy (2010); Colonoscopy (2010); Colonoscopy (03/03/2014); Breast biopsy (Right, 03/2011); Foot surgery (Bilateral, 2011); hip surgery (06/13/2020); and osteotomy (Left, 11/2/2021). CURRENT MEDICATIONS       Current Discharge Medication List        CONTINUE these medications which have NOT CHANGED    Details   doxycycline hyclate (VIBRA-TABS) 100 MG tablet Take 1 tablet by mouth 2 times daily for 10 days  Qty: 20 tablet, Refills: 0    Associated Diagnoses: Acute bronchitis, unspecified organism      benzonatate (TESSALON) 200 MG capsule Take 1 capsule by mouth 3 times daily as needed for Cough  Qty: 30 capsule, Refills: 0    Associated Diagnoses: Acute bronchitis, unspecified organism      HYDROcodone-acetaminophen (NORCO) 5-325 MG per tablet Take 1 tablet by mouth every 6 hours as needed for Pain for up to 30 days. Qty: 120 tablet, Refills: 0    Comments: Reduce doses taken as pain becomes manageable  Associated Diagnoses: Bilateral hip pain      carbidopa-levodopa (SINEMET)  MG per tablet Take 1 tablet by mouth in the morning and 1 tablet at noon and 1 tablet before bedtime. Qty: 270 tablet, Refills: 0    Associated Diagnoses: Parkinsonian features      lidocaine 4 % external patch Place 2 patches onto the skin in the morning.   Qty: 1 box, Refills: 0      donepezil (ARICEPT) 10 MG tablet Take 1 tablet by mouth nightly  Qty: 90 tablet, Refills: 3    Associated Diagnoses: Dementia without behavioral disturbance, unspecified dementia type; Memory loss      metoprolol tartrate (LOPRESSOR) 25 MG tablet Take 1 tablet by mouth 2 times daily  Qty: 180 tablet, Refills: 3    Associated Diagnoses: Hypertension, unspecified type      lisinopril (PRINIVIL;ZESTRIL) 20 MG tablet Take 1 tablet by mouth daily  Qty: 90 tablet, Refills: 3    Associated Diagnoses: Hypertension, unspecified type      predniSONE (DELTASONE) 10 MG tablet Take 1 tablet by mouth daily  Qty: 90 tablet, Refills: 3    Associated Diagnoses: Trochanteric bursitis of right hip      simvastatin (ZOCOR) 40 MG tablet Take 1 tablet by mouth nightly  Qty: 90 tablet, Refills: 3    Associated Diagnoses: Coronary artery disease involving native coronary artery of native heart without angina pectoris;  Mixed hyperlipidemia      umeclidinium-vilanterol (ANORO ELLIPTA) 62.5-25 MCG/INH AEPB inhaler Inhale 1 puff into the lungs daily  Qty: 1 each, Refills: 5    Associated Diagnoses: Chronic obstructive pulmonary disease, unspecified COPD type (HCC)      Multiple Vitamin (MULTIVITAMIN ADULT PO) Take 1 tablet by mouth daily      vitamin C (ASCORBIC ACID) 500 MG tablet Take 1,000 mg by mouth daily      zinc gluconate 50 MG tablet Take 50 mg by mouth daily      albuterol (PROVENTIL) (2.5 MG/3ML) 0.083% nebulizer solution Take 2.5 mg by nebulization every 6 hours as needed for Wheezing or Shortness of Breath      ibuprofen (ADVIL;MOTRIN) 400 MG tablet Take 400 mg by mouth 2 times daily as needed for Pain      ipratropium (ATROVENT) 0.02 % nebulizer solution Take 0.5 mg by nebulization every 6-8 hours as needed for Wheezing      denosumab (PROLIA) 60 MG/ML SOSY SC injection Inject 1 mL into the skin every 6 months  Qty: 1 mL, Refills: 1    Associated Diagnoses: Osteopenia of multiple sites      acetaminophen (TYLENOL) 500 MG tablet Take 2 tablets by mouth 3 times daily as needed for Pain  Qty: 100 tablet, Refills: 1    Associated Diagnoses: Mid back pain      albuterol sulfate  (90 Base) MCG/ACT inhaler Inhale 2 puffs into the lungs every 6 hours as needed for Wheezing  Qty: 2 Inhaler, Refills: 5    Associated Diagnoses: Simple chronic bronchitis (HCC)      Psyllium Fiber 0.52 g CAPS Take 1 capsule by mouth daily  Qty: 90 capsule, Refills: 3    Associated Diagnoses: Incontinence in female      glucosamine-chondroitin 500-400 MG CAPS Take by mouth daily      vitamin B-12 (CYANOCOBALAMIN) 1000 MCG tablet Take 1 tablet by mouth daily  Qty: 30 tablet, Refills: 3    Associated Diagnoses: B12 deficiency      Probiotic Product ([employer] [facility] PO) Take 1 tablet by mouth daily    Associated Diagnoses: Ulcerative colitis without complications, unspecified location ([facility]             ALLERGIES     is allergic to azulfidine [sulfasalazine], keflex [cephalexin], mesalamine, sulfa antibiotics, and alcohol. HISTORY     She indicated that her mother is . She indicated that her father is . family history includes Coronary Art Dis in her father; Diabetes in her mother; Heart Disease in her father and mother; High Blood Pressure in her mother. SOCIALHISTORY      reports that she has been smoking cigarettes. She has a 40.00 pack-year smoking history. She has never used smokeless tobacco. She reports that she does not drink alcohol and does not use drugs. PHYSICAL EXAM     INITIAL VITALS:  height is 5' 5\" (1.651 m) and weight is 141 lb 1.5 oz (64 kg). Her oral temperature is 98.4 °F (36.9 °C). Her blood pressure is 112/75 and her pulse is 92. Her respiration is 20 and oxygen saturation is 93%. Physical Exam  Vitals and nursing note reviewed. HENT:      Head: Normocephalic and atraumatic. Eyes:      Pupils: Pupils are equal, round, and reactive to light. Neck:      Thyroid: No thyromegaly. Trachea: No tracheal deviation. Cardiovascular:      Rate and Rhythm: Normal rate and regular rhythm. Heart sounds: No murmur heard. No friction rub. Pulmonary:      Effort: Pulmonary effort is normal. No respiratory distress. Breath sounds: Normal breath sounds. No wheezing. Abdominal:      General: Bowel sounds are normal. There is no distension. Palpations: Abdomen is soft. Tenderness: There is no abdominal tenderness. Musculoskeletal:      Cervical back: Neck supple. Comments: Bilateral shoulder tenderness. Pain with range of motion about bilateral shoulders no redness. No deformity. Skin:     General: Skin is warm and dry. Findings: No erythema or rash. Neurological:      Mental Status: She is alert and oriented to person, place, and time. DIFFERENTIAL DIAGNOSIS:   Bilateral shoulder pain suspect musculoskeletal etiology. We will make sure the patient has no aneurysm. DIAGNOSTIC RESULTS     EKG: All EKG's are interpreted by the Emergency Department Physician who either signs or Co-signs this chart in the absence of a cardiologist.      RADIOLOGY: non-plain film images(s) such as CT, Ultrasound and MRI are read by the radiologist.  [address]A [address]   Final Result      1. No evidence of pulmonary emboli. 2. Mild cardiomegaly. 3. Extensive calcification in the thoracic aorta. 4. Thickening off the interstitial lung markings. Chronic infiltrates in the right and to lesser extent left lower lobes. 5. Dextroscoliosis. Thoracic spondylosis. Compression deformities involving approximately the T2 and T3 vertebral bodies. 6. Thyromegaly. 7. Moderate-sized hiatal hernia. 8. 13 x 11 mm hypodensity in the right lobe of the liver. **This report has been created using voice recognition software. It may contain minor errors which are inherent in voice recognition technology. **      Final report electronically signed by DR George Moulton on 11/20/2022 10:25 AM      XR SHOULDER RIGHT (MIN 2 VIEWS)   Final Result       1. Degenerative change involving the acromioclavicular and glenohumeral joints. 2. No acute fracture or dislocation. 3. Mild diffuse osteopenia. **This report has been created using voice recognition software. It may contain minor errors which are inherent in voice recognition technology. **      Final report electronically signed by DR George Moulton on 11/20/2022 9:25 AM      XR SHOULDER LEFT (MIN 2 VIEWS)   Final Result       1. Degenerative change involving the acromioclavicular and glenohumeral joints.    2. Deformity of the distal left clavicle which may be secondary to an old fracture. 3. Diffuse osteopenia. 4. No acute fracture or dislocation. **This report has been created using voice recognition software. It may contain minor errors which are inherent in voice recognition technology. **      Final report electronically signed by DR Patsy Tran on 11/20/2022 9:27 AM            LABS:   Labs Reviewed   CBC WITH AUTO DIFFERENTIAL - Abnormal; Notable for the following components:       Result Value    WBC 11.5 (*)     MCHC 31.8 (*)     RDW-CV 14.6 (*)     RDW-SD 51.8 (*)     Segs Absolute 8.9 (*)     All other components within normal limits   BASIC METABOLIC PANEL - Abnormal; Notable for the following components:    Glucose 126 (*)     BUN 23 (*)     All other components within normal limits   COVID-19 & INFLUENZA COMBO   TROPONIN   MAGNESIUM   GLOMERULAR FILTRATION RATE, ESTIMATED   ANION GAP   OSMOLALITY   PROCALCITONIN   BRAIN NATRIURETIC PEPTIDE       EMERGENCY DEPARTMENT COURSE:   :    Vitals:    11/20/22 1507 11/20/22 1536 11/20/22 1554 11/20/22 1652   BP: 127/71 112/75     Pulse: 90 92     Resp: 18 20     Temp: 97.4 °F (36.3 °C) 98.4 °F (36.9 °C)     TempSrc: Oral Oral     SpO2: 94% 96%  93%   Weight:   141 lb 1.5 oz (64 kg)    Height:   5' 5\" (1.651 m)      Patient was seen history physical exam was performed. Patient was given morphine and Zofran IV. Patient was given a DuoNeb aerosol. The patient remained hypoxic. She does have chronic infiltrates on her chest x-ray. I did consult the internal medicine service to admit the patient. See disposition below    CRITICAL CARE:  None    CONSULTS:  Hospitalist service    PROCEDURES:  None    FINAL IMPRESSION      1. Hypoxemia    2. Acute pain of both shoulders    3. Pulmonary infiltrates          DISPOSITION/PLAN   Admit    PATIENT REFERRED TO:  No follow-up provider specified.     DISCHARGE MEDICATIONS:  Current Discharge Medication List          (Please note that portions of this note were completed with a voice recognitionprogram.  Efforts were made to edit the dictations but occasionally words are mis-transcribed.)    GLENN Prince [place], [place]  11/20/22 0490

## 2022-11-20 NOTE — PROGRESS NOTES
Patient is on 1.5L O2 stating at 92-93% resting in bed. [place] and incentive spirometry were provided to the patient and educated on how to use and frequency.

## 2022-11-20 NOTE — ED NOTES
Patient in bed and talking with Owen Day. Patient provided education regarding current situation with pt plan of care. Patient expresses the need to use the restroom. Patient VSS and patient does not appear to be in any current distress at this time. Will continue to monitor. Call light within reach.        Leland Robbins RN  11/20/22 6444

## 2022-11-20 NOTE — ED NOTES
While getting patient back to bed patient had labored breathing with sp02 85%.  Patient placed on 3L NC, provider notified      Alex Dang RN  11/20/22 6230

## 2022-11-20 NOTE — ED NOTES
Patient to ED for bilateral shoulder pain that started last night. Patient denies any recent falls or injuries. patietn coming from [facility] and was given a norco for pain. Patient became sick vomiting shortly after medications was given.  Patient denies chest pain sob on arrival. Patient is alert and oriented      Olvin Xiong RN  11/20/22 7745

## 2022-11-20 NOTE — ED NOTES
Patient in bed and talking with family at the bedside. Patient provided education regarding current situation with pt plan of care. Patient VSS and patient does not appear to be in any current distress at this time. Will continue to monitor. Call light within reach.        Mayte Benedict RN  11/20/22 5533

## 2022-11-20 NOTE — ED NOTES
Patient resting in bed,eyes closed with easy and unlabored respirations.  Call light within reach side rails up x 2     Jesse Alexis RN  11/20/22 6717

## 2022-11-20 NOTE — ED NOTES
Patient provided bedside commode at this time. Patient bed linens changed out at this time. Patient VSS and patient does not appear to be in any current distress at this time. Will continue to monitor. Call light within reach.        Donte Mobley RN  11/20/22 0302

## 2022-11-20 NOTE — ED NOTES
Pt transported to [facility]. Floor called prior to transport, spoke with Daniella Harvey.      Yousuf Donovan  11/20/22 9305

## 2022-11-21 VITALS
OXYGEN SATURATION: 91 % | BODY MASS INDEX: 23.51 KG/M2 | HEIGHT: 65 IN | SYSTOLIC BLOOD PRESSURE: 119 MMHG | RESPIRATION RATE: 18 BRPM | TEMPERATURE: 98.4 F | WEIGHT: 141.09 LBS | HEART RATE: 85 BPM | DIASTOLIC BLOOD PRESSURE: 56 MMHG

## 2022-11-21 LAB
ANION GAP SERPL CALCULATED.3IONS-SCNC: 10 MEQ/L (ref 8–16)
BASOPHILS # BLD: 0.7 %
BASOPHILS ABSOLUTE: 0.1 THOU/MM3 (ref 0–0.1)
BUN BLDV-MCNC: 21 MG/DL (ref 7–22)
CALCIUM SERPL-MCNC: 9.6 MG/DL (ref 8.5–10.5)
CHLORIDE BLD-SCNC: 106 MEQ/L (ref 98–111)
CO2: 27 MEQ/L (ref 23–33)
CREAT SERPL-MCNC: 0.9 MG/DL (ref 0.4–1.2)
EOSINOPHIL # BLD: 0.8 %
EOSINOPHILS ABSOLUTE: 0.1 THOU/MM3 (ref 0–0.4)
ERYTHROCYTE [DISTWIDTH] IN BLOOD BY AUTOMATED COUNT: 14.7 % (ref 11.5–14.5)
ERYTHROCYTE [DISTWIDTH] IN BLOOD BY AUTOMATED COUNT: 53.1 FL (ref 35–45)
GFR SERPL CREATININE-BSD FRML MDRD: > 60 ML/MIN/1.73M2
GLUCOSE BLD-MCNC: 139 MG/DL (ref 70–108)
HCT VFR BLD CALC: 45 % (ref 37–47)
HEMOGLOBIN: 14.1 GM/DL (ref 12–16)
IMMATURE GRANS (ABS): 0.06 THOU/MM3 (ref 0–0.07)
IMMATURE GRANULOCYTES: 0.6 %
LYMPHOCYTES # BLD: 10 %
LYMPHOCYTES ABSOLUTE: 1.1 THOU/MM3 (ref 1–4.8)
MCH RBC QN AUTO: 30.7 PG (ref 26–33)
MCHC RBC AUTO-ENTMCNC: 31.3 GM/DL (ref 32.2–35.5)
MCV RBC AUTO: 97.8 FL (ref 81–99)
MONOCYTES # BLD: 11.4 %
MONOCYTES ABSOLUTE: 1.2 THOU/MM3 (ref 0.4–1.3)
NUCLEATED RED BLOOD CELLS: 0 /100 WBC
PLATELET # BLD: 240 THOU/MM3 (ref 130–400)
PMV BLD AUTO: 10.1 FL (ref 9.4–12.4)
POTASSIUM REFLEX MAGNESIUM: 4.1 MEQ/L (ref 3.5–5.2)
RBC # BLD: 4.6 MILL/MM3 (ref 4.2–5.4)
SEG NEUTROPHILS: 76.5 %
SEGMENTED NEUTROPHILS ABSOLUTE COUNT: 8.1 THOU/MM3 (ref 1.8–7.7)
SODIUM BLD-SCNC: 143 MEQ/L (ref 135–145)
WBC # BLD: 10.6 THOU/MM3 (ref 4.8–10.8)

## 2022-11-21 PROCEDURE — G0378 HOSPITAL OBSERVATION PER HR: HCPCS

## 2022-11-21 PROCEDURE — 99217 PR OBSERVATION CARE DISCHARGE MANAGEMENT: CPT | Performed by: STUDENT IN AN ORGANIZED HEALTH CARE EDUCATION/TRAINING PROGRAM

## 2022-11-21 PROCEDURE — 80048 BASIC METABOLIC PNL TOTAL CA: CPT

## 2022-11-21 PROCEDURE — 94761 N-INVAS EAR/PLS OXIMETRY MLT: CPT

## 2022-11-21 PROCEDURE — 6360000002 HC RX W HCPCS: Performed by: STUDENT IN AN ORGANIZED HEALTH CARE EDUCATION/TRAINING PROGRAM

## 2022-11-21 PROCEDURE — 6370000000 HC RX 637 (ALT 250 FOR IP): Performed by: STUDENT IN AN ORGANIZED HEALTH CARE EDUCATION/TRAINING PROGRAM

## 2022-11-21 PROCEDURE — 85025 COMPLETE CBC W/AUTO DIFF WBC: CPT

## 2022-11-21 PROCEDURE — 2580000003 HC RX 258: Performed by: STUDENT IN AN ORGANIZED HEALTH CARE EDUCATION/TRAINING PROGRAM

## 2022-11-21 PROCEDURE — 94669 MECHANICAL CHEST WALL OSCILL: CPT

## 2022-11-21 PROCEDURE — 96376 TX/PRO/DX INJ SAME DRUG ADON: CPT

## 2022-11-21 PROCEDURE — 94640 AIRWAY INHALATION TREATMENT: CPT

## 2022-11-21 PROCEDURE — 36415 COLL VENOUS BLD VENIPUNCTURE: CPT

## 2022-11-21 PROCEDURE — 2700000000 HC OXYGEN THERAPY PER DAY

## 2022-11-21 RX ORDER — PREDNISONE 20 MG/1
40 TABLET ORAL DAILY
Qty: 8 TABLET | Refills: 0 | DISCHARGE
Start: 2022-11-22 | End: 2022-11-21 | Stop reason: SDUPTHER

## 2022-11-21 RX ORDER — AZITHROMYCIN 250 MG/1
250 TABLET, FILM COATED ORAL DAILY
Qty: 3 TABLET | Refills: 0 | DISCHARGE
Start: 2022-11-22 | End: 2022-11-21 | Stop reason: SDUPTHER

## 2022-11-21 RX ORDER — AZITHROMYCIN 250 MG/1
250 TABLET, FILM COATED ORAL DAILY
Qty: 3 TABLET | Refills: 0 | Status: SHIPPED | OUTPATIENT
Start: 2022-11-22 | End: 2022-11-25

## 2022-11-21 RX ORDER — PREDNISONE 10 MG/1
10 TABLET ORAL DAILY
Qty: 90 TABLET | Refills: 3 | DISCHARGE
Start: 2022-11-25 | End: 2023-11-20

## 2022-11-21 RX ORDER — PREDNISONE 20 MG/1
40 TABLET ORAL DAILY
Qty: 8 TABLET | Refills: 0 | Status: SHIPPED | OUTPATIENT
Start: 2022-11-22 | End: 2022-11-26

## 2022-11-21 RX ADMIN — Medication 50 MG: at 09:53

## 2022-11-21 RX ADMIN — IPRATROPIUM BROMIDE AND ALBUTEROL SULFATE 1 AMPULE: .5; 3 SOLUTION RESPIRATORY (INHALATION) at 05:24

## 2022-11-21 RX ADMIN — HYDROCODONE BITARTRATE AND ACETAMINOPHEN 1 TABLET: 5; 325 TABLET ORAL at 17:09

## 2022-11-21 RX ADMIN — BENZONATATE 200 MG: 100 CAPSULE ORAL at 03:32

## 2022-11-21 RX ADMIN — IPRATROPIUM BROMIDE AND ALBUTEROL SULFATE 1 AMPULE: .5; 3 SOLUTION RESPIRATORY (INHALATION) at 15:46

## 2022-11-21 RX ADMIN — ONDANSETRON 4 MG: 4 TABLET, ORALLY DISINTEGRATING ORAL at 03:32

## 2022-11-21 RX ADMIN — HYDROCODONE BITARTRATE AND ACETAMINOPHEN 1 TABLET: 5; 325 TABLET ORAL at 03:32

## 2022-11-21 RX ADMIN — PREDNISONE 40 MG: 20 TABLET ORAL at 09:52

## 2022-11-21 RX ADMIN — ONDANSETRON 4 MG: 2 INJECTION INTRAMUSCULAR; INTRAVENOUS at 10:03

## 2022-11-21 RX ADMIN — SODIUM CHLORIDE, PRESERVATIVE FREE 10 ML: 5 INJECTION INTRAVENOUS at 09:56

## 2022-11-21 RX ADMIN — Medication 1000 MCG: at 09:53

## 2022-11-21 RX ADMIN — OXYCODONE HYDROCHLORIDE AND ACETAMINOPHEN 1000 MG: 500 TABLET ORAL at 09:52

## 2022-11-21 RX ADMIN — Medication 1 TABLET: at 09:52

## 2022-11-21 RX ADMIN — AZITHROMYCIN MONOHYDRATE 250 MG: 250 TABLET ORAL at 09:52

## 2022-11-21 ASSESSMENT — PAIN DESCRIPTION - DESCRIPTORS: DESCRIPTORS: ACHING

## 2022-11-21 ASSESSMENT — PAIN SCALES - GENERAL
PAINLEVEL_OUTOF10: 0
PAINLEVEL_OUTOF10: 8
PAINLEVEL_OUTOF10: 4

## 2022-11-21 ASSESSMENT — PAIN DESCRIPTION - ORIENTATION: ORIENTATION: MID

## 2022-11-21 ASSESSMENT — PAIN - FUNCTIONAL ASSESSMENT: PAIN_FUNCTIONAL_ASSESSMENT: PREVENTS OR INTERFERES SOME ACTIVE ACTIVITIES AND ADLS

## 2022-11-21 ASSESSMENT — PAIN DESCRIPTION - LOCATION: LOCATION: SHOULDER

## 2022-11-21 NOTE — DISCHARGE INSTR - DIET

## 2022-11-21 NOTE — PROGRESS NOTES
A home oxygen evaluation has been completed. [x]Patient is an inpatient. It is expected that the patient will be discharged within the next 48 hours. Qualified provider to write order for home prescription if patient qualifies. Social service/care managers will arrange for home oxygen. If patient is active, arrange for Home Medical supplier to assess for Oxygen Conserving Device per pulse oximetry. []Patient is an outpatient. Results will be faxed to the ordering provider. Qualified provider to write order for home prescription if patient qualifies and arranges for home oxygen. Patient was placed on room air for 60 minutes. SpO2 was 87 % on room air at rest. Patients SpO2 was 92% on 1 liter. Patient was walked . SpO2 was 85 % during walking. Patients SpO2 was below 89% and qualified for home oxygen. Oxygen was applied at 4 lpm via nasal cannula to maintain a SpO2 between 90-92% while walking. Actual SpO2 was 92 %.

## 2022-11-21 NOTE — PLAN OF CARE
Problem: Discharge Planning  Goal: Discharge to home or other facility with appropriate resources  11/21/2022 1411 by Daniel Maria LPN  Outcome: Completed  Flowsheets (Taken 11/21/2022 1215)  Discharge to home or other facility with appropriate resources: Identify barriers to discharge with patient and caregiver  11/21/2022 0023 by Urban Michele RN  Outcome: Progressing  Flowsheets (Taken 11/21/2022 0023)  Discharge to home or other facility with appropriate resources:   Identify barriers to discharge with patient and caregiver   Arrange for needed discharge resources and transportation as appropriate   Identify discharge learning needs (meds, wound care, etc)   Refer to discharge planning if patient needs post-hospital services based on physician order or complex needs related to functional status, cognitive ability or social support system     Problem: Pain  Goal: Verbalizes/displays adequate comfort level or baseline comfort level  11/21/2022 1411 by Daniel Maria LPN  Outcome: Completed  Flowsheets (Taken 11/21/2022 0023 by Urban Michele RN)  Verbalizes/displays adequate comfort level or baseline comfort level:   Encourage patient to monitor pain and request assistance   Assess pain using appropriate pain scale   Administer analgesics based on type and severity of pain and evaluate response   Implement non-pharmacological measures as appropriate and evaluate response  11/21/2022 0023 by Urban Michele RN  Outcome: Progressing  Flowsheets (Taken 11/21/2022 0023)  Verbalizes/displays adequate comfort level or baseline comfort level:   Encourage patient to monitor pain and request assistance   Assess pain using appropriate pain scale   Administer analgesics based on type and severity of pain and evaluate response   Implement non-pharmacological measures as appropriate and evaluate response     Problem: Safety - Adult  Goal: Free from fall injury  11/21/2022 1411 by Daniel Maria LPN  Outcome: Completed  Flowsheets (Taken 11/21/2022 1411)  Free From Fall Injury: Instruct family/caregiver on patient safety  11/21/2022 0023 by Santino Stewart RN  Outcome: Progressing  11/21/2022 0023 by Santino Stewart RN  Outcome: Progressing  Flowsheets  Taken 11/21/2022 0023  Free From Fall Injury: Instruct family/caregiver on patient safety  Taken 11/21/2022 0019  Free From Fall Injury: Instruct family/caregiver on patient safety     Problem: Respiratory - Adult  Goal: Clear lung sounds  11/21/2022 1411 by Johnny Mesa LPN  Outcome: Completed  Note: Lung sounds diminished  11/21/2022 0527 by Sara Phelps RCP  Outcome: Progressing  Note: Txs to help improve lung aeration. Patient mutually agreed on goals. 11/21/2022 0023 by Santino Stewart RN  Outcome: Progressing  Note: See flow sheets      Problem: ABCDS Injury Assessment  Goal: Absence of physical injury  11/21/2022 1411 by Johnny Mesa LPN  Outcome: Completed  Flowsheets (Taken 11/21/2022 0023 by Santino Stewart RN)  Absence of Physical Injury: Implement safety measures based on patient assessment  11/21/2022 0023 by Santino Stewart RN  Outcome: Progressing  Flowsheets (Taken 11/21/2022 0023)  Absence of Physical Injury: Implement safety measures based on patient assessment     Problem: Skin/Tissue Integrity  Goal: Absence of new skin breakdown  Description: 1. Monitor for areas of redness and/or skin breakdown  2. Assess vascular access sites hourly  3. Every 4-6 hours minimum:  Change oxygen saturation probe site  4. Every 4-6 hours:  If on nasal continuous positive airway pressure, respiratory therapy assess nares and determine need for appliance change or resting period. 11/21/2022 1411 by Johnny Mesa LPN  Outcome: Completed  Note: No new skin breakdown noted. 11/21/2022 0024 by Santino Stewart RN  Outcome: Progressing  Note: See flow sheets for skin integrity documentation.       Problem: Chronic Conditions and Co-morbidities  Goal: Patient's chronic conditions and co-morbidity symptoms are monitored and maintained or improved  Outcome: Completed  Flowsheets (Taken 11/21/2022 1411)  Care Plan - Patient's Chronic Conditions and Co-Morbidity Symptoms are Monitored and Maintained or Improved: Monitor and assess patient's chronic conditions and comorbid symptoms for stability, deterioration, or improvement

## 2022-11-21 NOTE — PROGRESS NOTES
Patient discharged in stable condition as per order of attending physician to Assisted Living per staff at Time: [phone] to car. AVS provided by RN at time of discharge, which includes all necessary medical information pertaining to the patients current course of illness, treatment, medications, post-discharge goals of care, and treatment preferences. Last dose MAR. Patient/ family verbalize understanding of discharge plan and are in agreement with goal/plan/treatment preferences. Belongings including Glasses, Dentures upper and lower, Jewelry sent with patient. Home medications sent home with patient yes, given to daughter. Availability of \"My Chart\" offered to patient as a tool for updated health record.   Steps for activation discussed with patient as mentioned on AVS.

## 2022-11-21 NOTE — CARE COORDINATION
11/21/22, 11:52 AM EST    DISCHARGE PLANNING EVALUATION    Order received due to pt residing at [facility]. GIOVANI spoke with pt and her son, they are prepared for pt to return to [address] [place] [address] today and family will transport. Pt states [facility] assists with showers and setting up medication. Pt states she uses her walker at [address]    GIOVANI spoke with Parminder at [address] [place] [address] to confirm return today. GIOVANI faxed updated notes and Discharge Summary.

## 2022-11-21 NOTE — PLAN OF CARE
Problem: Respiratory - Adult  Goal: Clear lung sounds  11/21/2022 0527 by Damian Thompson RCP  Outcome: Progressing  Note: Txs to help improve lung aeration. Patient mutually agreed on goals.

## 2022-11-21 NOTE — DISCHARGE SUMMARY
Hospitalist Discharge Summary        Patient: Brett Garner  YOB: 1940  MRN: [id number]   Acct: [de-identified]    Primary Care Physician: PETEY Sharp - CNP    Admit date  11/20/2022    Discharge date:  11/21/2022 11:13 AM    Chief Complaint on presentation :-    Shoulder pain    Discharge Assessment and Plan:-   Acute hypoxic respiratory failure  AECOPD  Placed on prednisone burst 40mg x5 days and azithromycin  Encourage frequent IS and acapella  Required supplemental oxygn at 1-3L. Titrated down to 1L prior to discharge. CTA without PE. Chronic interstitial lung markings/infiltrates noted. No acute pathology. Recommend repeat PFTs to further assess severity of COPD  Continue LABA/LAMA and ipratropium/albuterol prn  Counseled on smoking cessation and counseled on the high risk of smoking while on oxygen. Can continue chronic steroid after prednisone burst, but would prefer she be weaned off if able. If weaned off, consider adding ICS to current COPD regimen  Shoulder pain  XR shows degenerative changes and suspect old fracture of the left clavicle. Diffuse osteopenia. Recommending continuing Prolia and DC chronic steroids if possible. Chronic conditions:  HTN  Dementia  HLD    Initial H and P and Hospital course:-  Patient is an 80-year-old female with medical history of COPD, dementia, and generalized weakness who presents the hospital with complaints of bilateral shoulder pain. While in the ED, she was noted to have hypoxia dropping to the mid to low 80s while at rest and with minimal exertion. She required supplemental oxygen. She had noted bronchospasm on exam and was treated for COPD exacerbation with p.o. prednisone and azithromycin. The following morning, she was requiring 1 L nasal cannula at rest and stated that her breathing was much improved. I recommend she continue with the incentive spirometer and Acapella for pulmonary hygiene.   Completed short course of 40 mg prednisone as well as azithromycin. She may then resume her home prednisone dose. I do recommend that she wean off the steroids if at all possible. Recommend follow-up with pulmonology for repeat PFTs. Continue with LABA/LAMA as well as nebulized bronchodilators. If weaned from systemic glucocorticoids, consider adding ICS. Terms of her bilateral shoulder pain, imaging was consistent with degenerative changes as well as a likely chronic/old fracture to the left clavicle. She also had noted diffuse osteopenia. Recommended continuing steroids if at all possible.     Physical Exam:-  Vitals:   Patient Vitals for the past 24 hrs:   BP Temp Temp src Pulse Resp SpO2 Height Weight   11/21/22 0937 (!) 107/57 -- -- -- -- -- -- --   11/21/22 0936 (!) 107/57 -- -- -- -- -- -- --   11/21/22 0930 -- -- -- -- -- 93 % -- --   11/21/22 0904 (!) 107/56 97.9 °F (36.6 °C) Oral 82 20 96 % -- --   11/21/22 0524 -- -- -- -- -- 94 % -- --   11/21/22 0402 -- -- -- -- 18 -- -- --   11/21/22 0332 -- -- -- -- 20 -- -- --   11/21/22 0329 (!) 182/72 -- -- -- -- 95 % -- --   11/21/22 0328 (!) 183/96 98.8 °F (37.1 °C) Oral 83 20 98 % -- --   11/21/22 0130 -- -- -- -- -- 100 % -- --   11/20/22 2315 (!) 163/92 98.4 °F (36.9 °C) Oral 93 20 96 % -- --   11/20/22 2221 113/68 -- -- 87 -- -- -- --   11/20/22 2046 -- -- -- -- 20 -- -- --   11/20/22 2015 113/68 -- -- -- -- -- -- --   11/20/22 2010 (!) 80/59 98.7 °F (37.1 °C) Oral 87 20 93 % -- --   11/20/22 1652 -- -- -- -- -- 93 % -- --   11/20/22 1554 -- -- -- -- -- -- 5' 5\" (1.651 m) 141 lb 1.5 oz (64 kg)   11/20/22 1536 112/75 98.4 °F (36.9 °C) Oral 92 20 96 % -- --   11/20/22 1507 127/71 97.4 °F (36.3 °C) Oral 90 18 94 % -- --   11/20/22 1409 -- -- -- -- -- 93 % -- --   11/20/22 1405 -- -- -- -- -- (!) 89 % -- --   11/20/22 1404 -- -- -- -- -- 94 % -- --   11/20/22 1355 -- -- -- -- -- 92 % -- --   11/20/22 1352 -- -- -- -- -- (!) 86 % -- --   11/20/22 1343 (!) 142/82 -- -- 72 20 93 % -- --   11/20/22 1333 -- -- -- -- 23 95 % -- --   11/20/22 1229 (!) 147/70 -- -- 78 19 95 % -- --   11/20/22 1129 (!) 174/81 -- -- 82 20 95 % -- --     Weight:   Weight: 141 lb 1.5 oz (64 kg)   24 hour intake/output:     Intake/Output Summary (Last 24 hours) at 11/21/2022 1113  Last data filed at 11/21/2022 0346  Gross per 24 hour   Intake 310 ml   Output --   Net 310 ml       General appearance: No apparent distress, appears stated age and cooperative. HEENT: Normal cephalic, atraumatic without obvious deformity. Pupils equal, round, and reactive to light. Extra ocular muscles intact. Conjunctivae/corneas clear. Neck: Supple, with full range of motion. No jugular venous distention. Trachea midline. Respiratory: Mild expiratory wheezes noted diffusely. Symmetric chest expansion. No rales or rhonchi. Cardiovascular: Regular rate and rhythm with normal S1/S2 without murmurs, rubs or gallops. Abdomen: Soft, non-tender, non-distended with normal bowel sounds. Musculoskeletal:  No clubbing, cyanosis or edema bilaterally. Skin: Skin color, texture, turgor normal.  No rashes or lesions. Neurologic:  Neurovascularly intact without any focal sensory/motor deficits. Cranial nerves: II-XII intact, grossly non-focal.  Psychiatric: Alert to person, place, and time. Has some short-term memory losses. Capillary Refill: Brisk,< 3 seconds   Peripheral Pulses: +2 palpable, equal bilaterally       Discharge Medications:-      Medication List        START taking these medications      azithromycin 250 MG tablet  Commonly known as: ZITHROMAX  Take 1 tablet by mouth daily for 3 doses  Start taking on: November 22, 2022            CHANGE how you take these medications      carbidopa-levodopa  MG per tablet  Commonly known as: SINEMET  Take 1 tablet by mouth in the morning and 1 tablet at noon and 1 tablet before bedtime.   What changed: when to take this     * predniSONE 20 MG tablet  Commonly known as: DELTASONE  Take 2 tablets by mouth daily for 4 doses  Start taking on: November 22, 2022  What changed: You were already taking a medication with the same name, and this prescription was added. Make sure you understand how and when to take each. * predniSONE 10 MG tablet  Commonly known as: DELTASONE  Take 1 tablet by mouth daily  Start taking on: November 25, 2022  What changed: These instructions start on November 25, 2022. If you are unsure what to do until then, ask your doctor or other care provider. * This list has 2 medication(s) that are the same as other medications prescribed for you. Read the directions carefully, and ask your doctor or other care provider to review them with you. CONTINUE taking these medications      acetaminophen 500 MG tablet  Commonly known as: TYLENOL  Take 2 tablets by mouth 3 times daily as needed for Pain     * albuterol (2.5 MG/3ML) 0.083% nebulizer solution  Commonly known as: PROVENTIL     * albuterol sulfate  (90 Base) MCG/ACT inhaler  Commonly known as: PROVENTIL;VENTOLIN;PROAIR  Inhale 2 puffs into the lungs every 6 hours as needed for Wheezing     benzonatate 200 MG capsule  Commonly known as: TESSALON  Take 1 capsule by mouth 3 times daily as needed for Cough     denosumab 60 MG/ML Sosy SC injection  Commonly known as: PROLIA  Inject 1 mL into the skin every 6 months     donepezil 10 MG tablet  Commonly known as: ARICEPT  Take 1 tablet by mouth nightly     glucosamine-chondroitin 500-400 MG Caps     HYDROcodone-acetaminophen 5-325 MG per tablet  Commonly known as: NORCO  Take 1 tablet by mouth every 6 hours as needed for Pain for up to 30 days. ipratropium 0.02 % nebulizer solution  Commonly known as: ATROVENT     lidocaine 4 % external patch  Place 2 patches onto the skin in the morning.      lisinopril 20 MG tablet  Commonly known as: PRINIVIL;ZESTRIL  Take 1 tablet by mouth daily     metoprolol tartrate 25 MG tablet  Commonly known as: LOPRESSOR  Take 1 tablet by mouth 2 times daily     MULTIVITAMIN ADULT PO     Multigig [facility] PO     Psyllium Fiber 0.52 g Caps  Take 1 capsule by mouth daily     simvastatin 40 MG tablet  Commonly known as: ZOCOR  Take 1 tablet by mouth nightly     umeclidinium-vilanterol 62.5-25 MCG/INH Aepb inhaler  Commonly known as: ANORO ELLIPTA  Inhale 1 puff into the lungs daily     vitamin B-12 1000 MCG tablet  Commonly known as: CYANOCOBALAMIN  Take 1 tablet by mouth daily     vitamin C 500 MG tablet  Commonly known as: ASCORBIC ACID     zinc gluconate 50 MG tablet           * This list has 2 medication(s) that are the same as other medications prescribed for you. Read the directions carefully, and ask your doctor or other care provider to review them with you.                 STOP taking these medications      doxycycline hyclate 100 MG tablet  Commonly known as: VIBRA-TABS     ibuprofen 400 MG tablet  Commonly known as: ADVIL;MOTRIN               Where to Get Your Medications        Information about where to get these medications is not yet available    Ask your nurse or doctor about these medications  azithromycin 250 MG tablet  predniSONE 10 MG tablet  predniSONE 20 MG tablet          Labs :-  Recent Results (from the past 72 hour(s))   EKG Emergency    Collection Time: 11/20/22  8:30 AM   Result Value Ref Range    Ventricular Rate 80 BPM    Atrial Rate 80 BPM    P-R Interval 178 ms    QRS Duration 122 ms    Q-T Interval 432 ms    QTc Calculation (Bazett) 498 ms    P Axis 64 degrees    R Axis -63 degrees    T Axis 51 degrees   Glomerular Filtration Rate, Estimated    Collection Time: 11/20/22  8:46 AM   Result Value Ref Range    Est, Glom Filt Rate >60 >60 ml/min/1.73m2   CBC with Auto Differential    Collection Time: 11/20/22  9:00 AM   Result Value Ref Range    WBC 11.5 (H) 4.8 - 10.8 thou/mm3    RBC 4.67 4.20 - 5.40 mill/mm3    Hemoglobin 14.4 12.0 - 16.0 gm/dl    Hematocrit 45.3 37.0 - 47.0 %    MCV 97.0 81.0 - 99.0 fL    MCH 30.8 26.0 - 33.0 pg    MCHC 31.8 (L) 32.2 - 35.5 gm/dl    RDW-CV 14.6 (H) 11.5 - 14.5 %    RDW-SD 51.8 (H) 35.0 - 45.0 fL    Platelets 657 908 - 197 thou/mm3    MPV 9.7 9.4 - 12.4 fL    Seg Neutrophils 77.7 %    Lymphocytes 11.7 %    Monocytes 8.6 %    Eosinophils 1.0 %    Basophils 0.6 %    Immature Granulocytes 0.4 %    Segs Absolute 8.9 (H) 1.8 - 7.7 thou/mm3    Lymphocytes Absolute 1.3 1.0 - 4.8 thou/mm3    Monocytes Absolute 1.0 0.4 - 1.3 thou/mm3    Eosinophils Absolute 0.1 0.0 - 0.4 thou/mm3    Basophils Absolute 0.1 0.0 - 0.1 thou/mm3    Immature Grans (Abs) 0.05 0.00 - 0.07 thou/mm3    nRBC 0 /100 wbc   Basic Metabolic Panel    Collection Time: 11/20/22  9:00 AM   Result Value Ref Range    Sodium 139 135 - 145 meq/L    Potassium 4.6 3.5 - 5.2 meq/L    Chloride 104 98 - 111 meq/L    CO2 25 23 - 33 meq/L    Glucose 126 (H) 70 - 108 mg/dL    BUN 23 (H) 7 - 22 mg/dL    Creatinine 0.6 0.4 - 1.2 mg/dL    Calcium 9.3 8.5 - 10.5 mg/dL   Troponin    Collection Time: 11/20/22  9:00 AM   Result Value Ref Range    Troponin T < 0.010 ng/ml   Magnesium    Collection Time: 11/20/22  9:00 AM   Result Value Ref Range    Magnesium 1.9 1.6 - 2.4 mg/dL   Anion Gap    Collection Time: 11/20/22  9:00 AM   Result Value Ref Range    Anion Gap 10.0 8.0 - 16.0 meq/L   Osmolality    Collection Time: 11/20/22  9:00 AM   Result Value Ref Range    Osmolality Calc 282.8 275.0 - 300.0 mOsmol/kg   Procalcitonin    Collection Time: 11/20/22  9:00 AM   Result Value Ref Range    Procalcitonin 0.08 0.01 - 0.09 ng/mL   Brain Natriuretic Peptide    Collection Time: 11/20/22  9:00 AM   Result Value Ref Range    Pro-.7 0.0 - 1800.0 pg/mL   COVID-19 & Influenza Combo    Collection Time: 11/20/22  1:02 PM    Specimen: Nasopharyngeal Swab   Result Value Ref Range    SARS-CoV-2 RNA, RT PCR NOT DETECTED NOT DETECTED    INFLUENZA A NOT DETECTED NOT DETECTED    INFLUENZA B NOT DETECTED NOT DETECTED   Glomerular Filtration Rate, Estimated    Collection Time: 11/20/22  6:10 PM   Result Value Ref Range    Est, Glom Filt Rate >60 >60 BX/MLV/6.56C3   Basic Metabolic Panel w/ Reflex to MG    Collection Time: 11/21/22  6:15 AM   Result Value Ref Range    Sodium 143 135 - 145 meq/L    Potassium reflex Magnesium 4.1 3.5 - 5.2 meq/L    Chloride 106 98 - 111 meq/L    CO2 27 23 - 33 meq/L    Glucose 139 (H) 70 - 108 mg/dL    BUN 21 7 - 22 mg/dL    Creatinine 0.9 0.4 - 1.2 mg/dL    Calcium 9.6 8.5 - 10.5 mg/dL   CBC with Auto Differential    Collection Time: 11/21/22  6:15 AM   Result Value Ref Range    WBC 10.6 4.8 - 10.8 thou/mm3    RBC 4.60 4.20 - 5.40 mill/mm3    Hemoglobin 14.1 12.0 - 16.0 gm/dl    Hematocrit 45.0 37.0 - 47.0 %    MCV 97.8 81.0 - 99.0 fL    MCH 30.7 26.0 - 33.0 pg    MCHC 31.3 (L) 32.2 - 35.5 gm/dl    RDW-CV 14.7 (H) 11.5 - 14.5 %    RDW-SD 53.1 (H) 35.0 - 45.0 fL    Platelets 465 115 - 745 thou/mm3    MPV 10.1 9.4 - 12.4 fL    Seg Neutrophils 76.5 %    Lymphocytes 10.0 %    Monocytes 11.4 %    Eosinophils 0.8 %    Basophils 0.7 %    Immature Granulocytes 0.6 %    Segs Absolute 8.1 (H) 1.8 - 7.7 thou/mm3    Lymphocytes Absolute 1.1 1.0 - 4.8 thou/mm3    Monocytes Absolute 1.2 0.4 - 1.3 thou/mm3    Eosinophils Absolute 0.1 0.0 - 0.4 thou/mm3    Basophils Absolute 0.1 0.0 - 0.1 thou/mm3    Immature Grans (Abs) 0.06 0.00 - 0.07 thou/mm3    nRBC 0 /100 wbc   Anion Gap    Collection Time: 11/21/22  6:15 AM   Result Value Ref Range    Anion Gap 10.0 8.0 - 16.0 meq/L        Microbiology:    Blood culture #1:   Lab Results   Component Value Date/Time    BC No growth-preliminary No growth 07/17/2022 09:54 PM    BC No growth-preliminary No growth 07/17/2022 09:54 PM       Blood culture #2:  Lab Results   Component Value Date/Time    BLOODCULT2 SENT TO REFERENCE LAB, SEE SEPARATE REPORT 01/04/2019 02:00 PM       Organism:  No results found for: LABGRAM    MRSA culture only:No results found for: [address] [place] [address]    Urine culture:  No results found for: Pablo Wallace  Lab Results   Component Value Date/Time    ORG Escherichia coli 07/17/2022 08:40 PM        Respiratory culture: No results found for: CULTRESP    Aerobic and Anaerobic :  No results found for: LABAERO  No results found for: LABANAE    Urinalysis:      Lab Results   Component Value Date/Time    NITRU POSITIVE 07/17/2022 08:40 PM    WBCUA 10-15 07/17/2022 08:40 PM    WBCUA 0-4 05/24/2018 01:05 AM    BACTERIA MANY 07/17/2022 08:40 PM    RBCUA 0-2 07/17/2022 08:40 PM    BLOODU TRACE 07/17/2022 08:40 PM    GLUCOSEU NEGATIVE 07/17/2022 08:40 PM       Radiology:-  XR SHOULDER RIGHT (MIN 2 VIEWS)    Result Date: 11/20/2022  PROCEDURE: XR SHOULDER RIGHT (MIN 2 VIEWS) CLINICAL INFORMATION: Pain. COMPARISON: No prior study. TECHNIQUE: 3 views of the shoulder including AP view, a glenoid view, and a scapular Y view. FINDINGS: There is degenerative change involving the acromioclavicular and glenohumeral joints. There is no fracture or dislocation. There is mild diffuse osteopenia. .  The clavicle is normal.  The soft tissues are normal.  There are no suspicious findings in the visualized aspects of the upper lung. 1. Degenerative change involving the acromioclavicular and glenohumeral joints. 2. No acute fracture or dislocation. 3. Mild diffuse osteopenia. **This report has been created using voice recognition software. It may contain minor errors which are inherent in voice recognition technology. ** Final report electronically signed by DR Dior Connelly on 11/20/2022 9:25 AM    CTA CHEST W WO CONTRAST    Result Date: 11/20/2022  PROCEDURE: CTA CHEST W WO CONTRAST CLINICAL INFORMATION: bilateral shoulder pain. COMPARISON: CT scan of the chest dated 17th of July 2022. TECHNIQUE: 3 mm axial images were obtained through the chest after the administration of IV contrast.  A non-contrast localizer was obtained.   3D reconstructions were performed on the scanner to include MIP coronal and sagittal images through the chest. Isovue was the intravenous contrast utilized. All CT scans at this facility use dose modulation, iterative reconstruction, and/or weight-based dosing when appropriate to reduce radiation dose to as low as reasonably achievable. FINDINGS: There is adequate opacification of the pulmonary arterial system. No pulmonary emboli are present. There is extensive calcification in the thoracic aorta. . There is mild cardiomegaly. There is no pericardial or pleural effusion. There is no mediastinal, axillary or hilar adenopathy. There is thickening off the interstitial lung markings and chronic infiltrates in the right and to lesser extent left lower lobes. .  There is a dextroscoliosis. There is thoracic spondylosis. . There are compression deformities involving approximately the  T2 and T3 vertebral bodies. There is mild enlargement of the right and left lobes of the thyroid gland. There is a hiatal hernia. . There is a 13 x 11 mm hypodensity in the right lobe of the liver. 1. No evidence of pulmonary emboli. 2. Mild cardiomegaly. 3. Extensive calcification in the thoracic aorta. 4. Thickening off the interstitial lung markings. Chronic infiltrates in the right and to lesser extent left lower lobes. 5. Dextroscoliosis. Thoracic spondylosis. Compression deformities involving approximately the T2 and T3 vertebral bodies. 6. Thyromegaly. 7. Moderate-sized hiatal hernia. 8. 13 x 11 mm hypodensity in the right lobe of the liver. **This report has been created using voice recognition software. It may contain minor errors which are inherent in voice recognition technology. ** Final report electronically signed by DR Donnie Calvo on 11/20/2022 10:25 AM    XR SHOULDER LEFT (MIN 2 VIEWS)    Result Date: 11/20/2022  PROCEDURE: XR SHOULDER LEFT (MIN 2 VIEWS) CLINICAL INFORMATION: pain. COMPARISON: No prior study. TECHNIQUE: 4 views of the shoulder including AP view, a glenoid view, and a scapular Y view.  FINDINGS: There is degenerative change involving the acromioclavicular and glenohumeral joints. There is deformity of the distal clavicle which may be secondary to an old fracture. There is no acute fracture or dislocation. There is diffuse osteopenia. The soft tissues are normal.  There are no suspicious findings in the visualized aspects of the upper lung. 1. Degenerative change involving the acromioclavicular and glenohumeral joints. 2. Deformity of the distal left clavicle which may be secondary to an old fracture. 3. Diffuse osteopenia. 4. No acute fracture or dislocation. **This report has been created using voice recognition software. It may contain minor errors which are inherent in voice recognition technology. ** Final report electronically signed by DR Asia Simms on 11/20/2022 9:27 AM       Follow-up scheduled after discharge :-    in 5-7 days with PETEY Sandoval - DANELLE      Consultations during this hospital stay:-  [] NONE [] Cardiology  [] Nephrology  [] Hemo onco   [] GI   [] ID  [] Endocrine  [] Pulm    [] Neuro    [] Psych   [] Urology  [] ENT   [] G SURGERY   []Ortho    []CV surg    [] Palliative  [] Hospice [] Pain management   []    []TCU   [] PT/OT  OTHERS:-    Disposition: SNF  Condition at Discharge: Stable    Time Spent:- 40 minutes    Electronically signed by Jennifer Monaco DO on 11/21/22 at 11:13 AM EST   Discharging Hospitalist

## 2022-11-21 NOTE — PROGRESS NOTES
Spoke to pharmacy about patient requesting home medications. Patient supply is prepackaged from her nursing facility. Confirmed with pharmacist Willie Hooks that these would be unable to be used due to them not being in prescribed pill bottles. No Heavy lifting/straining

## 2022-11-21 NOTE — RT PROTOCOL NOTE
RT Inhaler-Nebulizer Bronchodilator Protocol Note    There is a bronchodilator order in the chart from a provider indicating to follow the RT Bronchodilator Protocol and there is an Initiate RT Inhaler-Nebulizer Bronchodilator Protocol order as well (see protocol at bottom of note). CXR Findings:  No results found. The findings from the last RT Protocol Assessment were as follows:   History Pulmonary Disease: Chronic pulmonary disease  Respiratory Pattern: Regular pattern and RR 12-20 bpm  Breath Sounds: Slightly diminished and/or crackles  Cough: Strong, spontaneous, non-productive  Indication for Bronchodilator Therapy: On home bronchodilators  Bronchodilator Assessment Score: 4    Aerosolized bronchodilator medication orders have been revised according to the RT Inhaler-Nebulizer Bronchodilator Protocol below. Respiratory Therapist to perform RT Therapy Protocol Assessment initially then follow the protocol. Repeat RT Therapy Protocol Assessment PRN for score 0-3 or on second treatment, BID, and PRN for scores above 3. No Indications - adjust the frequency to every 6 hours PRN wheezing or bronchospasm, if no treatments needed after 48 hours then discontinue using Per Protocol order mode. If indication present, adjust the RT bronchodilator orders based on the Bronchodilator Assessment Score as indicated below. Use Inhaler orders unless patient has one or more of the following: on home nebulizer, not able to hold breath for 10 seconds, is not alert and oriented, cannot activate and use MDI correctly, or respiratory rate 25 breaths per minute or more, then use the equivalent nebulizer order(s) with same Frequency and PRN reasons based on the score. If a patient is on this medication at home then do not decrease Frequency below that used at home.     0-3 - enter or revise RT bronchodilator order(s) to equivalent RT Bronchodilator order with Frequency of every 4 hours PRN for wheezing or increased work of breathing using Per Protocol order mode. 4-6 - enter or revise RT Bronchodilator order(s) to two equivalent RT bronchodilator orders with one order with BID Frequency and one order with Frequency of every 4 hours PRN wheezing or increased work of breathing using Per Protocol order mode. 7-10 - enter or revise RT Bronchodilator order(s) to two equivalent RT bronchodilator orders with one order with TID Frequency and one order with Frequency of every 4 hours PRN wheezing or increased work of breathing using Per Protocol order mode. 11-13 - enter or revise RT Bronchodilator order(s) to one equivalent RT bronchodilator order with QID Frequency and an Albuterol order with Frequency of every 4 hours PRN wheezing or increased work of breathing using Per Protocol order mode. Greater than 13 - enter or revise RT Bronchodilator order(s) to one equivalent RT bronchodilator order with every 4 hours Frequency and an Albuterol order with Frequency of every 2 hours PRN wheezing or increased work of breathing using Per Protocol order mode. RT to enter RT Home Evaluation for COPD & MDI Assessment order using Per Protocol order mode.     Electronically signed by Rosalba Jordan RCP on 11/21/2022 at 5:27 AM Statement Selected

## 2022-11-21 NOTE — PROGRESS NOTES
Advance Care Planning     Advance Care Planning Inpatient Note  [facility] Department    Today's Date: 11/21/2022  Unit: [address]    Received request from patient and family. Upon review of chart and communication with care team, patient's decision making abilities are not in question. . Patient and Child/Children was/were present in the room during visit. Goals of ACP Conversation:  Discuss advance care planning documents  Facilitate a discussion related to patient's goals of care as they align with the patient's values and beliefs. Health Care Decision Makers:       Primary Decision Maker: Grace Esquivel Child - [phone]    Secondary Decision Maker: Trung Maxwell Child - [phone]  Summary:  Verified Documents  Completed [facility]  Updated Healthcare Decision [facility]    Advance Care Planning Documents (Patient Wishes):  Healthcare Power of /Advance Directive Appointment of Health Care Agent     Assessment:  Discussed Living will. Family will talk things over with Jennifer Bonds. The Health Care POA was updated. Then we had prayer for her healing and strength as she is leaving today, back to assisted living. Interventions:  Provided education on documents for clarity and greater understanding  Discussed and provided education on state decision maker hierarchy  Assisted in the completion of documents according to patient's wishes at this time  Encouraged ongoing ACP conversation with future decision makers and loved ones    Care Preferences Communicated:   No    Outcomes/Plan:  New advance directive completed.    11/21/22 1422   Encounter Summary   Encounter Overview/Reason  Initial Encounter   Service Provided For: Patient and family together   Referral/Consult From: Nurse;Family;Multi-disciplinary team   Support System Children  (son and daughter)   Last Encounter  11/21/22   Complexity of Encounter High   Begin Time 1340   End Time  0273 Total Time Calculated 43 min   Spiritual/Emotional needs   Type Spiritual Support   Advance Care Planning   Type ACP conversation       Electronically signed by Ellyn Angel, [address] on 11/21/2022 at 2:25 PM

## 2022-11-21 NOTE — PLAN OF CARE
Problem: Discharge Planning  Goal: Discharge to home or other facility with appropriate resources  Outcome: Progressing  Flowsheets (Taken 11/21/2022 0023)  Discharge to home or other facility with appropriate resources:   Identify barriers to discharge with patient and caregiver   Arrange for needed discharge resources and transportation as appropriate   Identify discharge learning needs (meds, wound care, etc)   Refer to discharge planning if patient needs post-hospital services based on physician order or complex needs related to functional status, cognitive ability or social support system     Problem: Pain  Goal: Verbalizes/displays adequate comfort level or baseline comfort level  Outcome: Progressing  Flowsheets (Taken 11/21/2022 0023)  Verbalizes/displays adequate comfort level or baseline comfort level:   Encourage patient to monitor pain and request assistance   Assess pain using appropriate pain scale   Administer analgesics based on type and severity of pain and evaluate response   Implement non-pharmacological measures as appropriate and evaluate response     Problem: Safety - Adult  Goal: Free from fall injury  11/21/2022 0023 by Mony Escobedo RN  Outcome: Progressing  Flowsheets  Taken 11/21/2022 0023  Free From Fall Injury: Instruct family/caregiver on patient safety  Taken 11/21/2022 0019  Free From Fall Injury: Instruct family/caregiver on patient safety     Problem: Respiratory - Adult  Goal: Clear lung sounds  Outcome: Progressing  Note: See flow sheets      Problem: ABCDS Injury Assessment  Goal: Absence of physical injury  Outcome: Progressing  Flowsheets (Taken 11/21/2022 0023)  Absence of Physical Injury: Implement safety measures based on patient assessment     Problem: Skin/Tissue Integrity  Goal: Absence of new skin breakdown  Description: 1. Monitor for areas of redness and/or skin breakdown  2. Assess vascular access sites hourly  3.   Every 4-6 hours minimum:  Change oxygen

## 2022-11-21 NOTE — CARE COORDINATION
Case Management Assessment  Initial Evaluation    Date/Time of Evaluation: 11/21/2022 1:14 PM  Assessment Completed by: Deepak Pittman RN    If patient is discharged prior to next notation, then this note serves as note for discharge by case management. [address] [address]    Patient Name: Sean Sims                YOB: 1940  Diagnosis: Hypoxemia [R09.02]  Acute respiratory failure with hypoxia ([address] [J96.01]  Pulmonary infiltrates [R91.8]  Acute pain of both shoulders [M25.511, M25.512]                   Date / Time: 11/20/2022  8:27 AM    Patient Admission Status: Observation     Current PCP: PETEY Hartman CNP  PCP verified by CM? Yes    Chart Reviewed: Yes      Patient Orientation: Alert and Oriented    Patient Cognition: Alert  History Provided by: Patient, Child/Family    Hospitalization in the last 30 days (Readmission):  No    If yes, Readmission Assessment in CM Navigator will be completed. Advance Directives:     Code Status: Full Code     Primary Decision Maker: Jazmine Nuñez - Child - [phone]    Discharge Planning  Patient lives with: Alone Type of Home: Assisted living   Primary Caregiver: Self  Patient Support Systems include: Children   Current Financial resources: Medicare  Current community resources: Other (Comment) (from [facility])  Current services prior to admission:    Type of Home Care services:       ADLS  Prior functional level: Independent in ADLs/IADLs  Current functional level: Independent in ADLs/IADLs      Family can provide assistance at DC: Yes  Would you like Case Management to discuss the discharge plan with any other family members/significant others, and if so, who?  No  Plans to Return to Present Housing: Yes  Other Identified Issues/Barriers to RETURNING to current housing: none  Potential Assistance needed at discharge:  home O2  Patient expects to discharge to:  [facility] [address] for transportation at discharge: Family    Financial  Payor: Edwin Mckeon / Plan: MEDICARE PART A AND B / Product Type: *No Product type* /     Does insurance require precert for SNF: No    Potential assistance Purchasing Medications: No  Meds-to-Beds request: Yes      [address] [address] [address] [phone]  [address] [facility] [address]  Phone: [phone] Fax: [place], [address]  [address] [facility]  [address] [facility]  Phone: [phone] Fax: [address] [phone] - f [phone]  [facility] [address] [place] [address]  Phone: [phone] Fax: [phone]    [facility] [id number] - [place], [address] [facility] - F [phone]  [address] [facility] [address]  Phone: [phone] Fax: [phone]      Factors facilitating achievement of predicted outcomes: Family support, Motivated, Cooperative, Pleasant, and Good insight into deficits    Barriers to discharge: Limited safety awareness and Decreased endurance    Additional Case Management Notes: to ED from [facility] with   bilateral shoulder pain. Procedure:   Right shoulder xray  Degenerative change involving the acromioclavicular and glenohumeral joints. No acute fracture or dislocation. Mild diffuse osteopenia. Left shoulder xray:  Degenerative change involving the acromioclavicular and glenohumeral joints. Deformity of the distal left clavicle which may be secondary to an old fracture. Diffuse osteopenia. No acute fracture or dislocation. CTA chest:  No evidence of pulmonary emboli. Mild cardiomegaly. Extensive calcification in the thoracic aorta. Thickening off the interstitial lung markings. Chronic infiltrates in the right and to lesser extent left lower lobes. Dextroscoliosis. Thoracic spondylosis. Compression deformities involving approximately the T2 and T3 vertebral bodies. Thyromegaly. Moderate-sized hiatal hernia. 13 x 11 mm hypodensity in the right lobe of the liver. On Zithromax po, Lidocaine patches,     The Plan for Transition of Care is related to the following treatment goals of Hypoxemia [R09.02]  Acute respiratory failure with hypoxia (HCC) [J96.01]  Pulmonary infiltrates [R91.8]  Acute pain of both shoulders [M25.511, M25.512]    Patient Goals/Plan/Treatment Preferences: From [facility]; possibly discharge back today per MELISA Delvalle and Dr Marlene Mcknight. Home O2 eval.      Patient requires Home O2 per primary nurse Adelia; family requested [facility] for O2. Notified Geetha Melgoza at [facility]; he is awaiting RT note and order for home O2. Adelia VINCENT will follow up. Transportation/Food Security/Housekeeping Addressed: No issues identified.      Jennifer Lazcano RN  Case Management Department

## 2022-11-21 NOTE — RT PROTOCOL NOTE
RT Inhaler-Nebulizer Bronchodilator Protocol Note    There is a bronchodilator order in the chart from a provider indicating to follow the RT Bronchodilator Protocol and there is an Initiate RT Inhaler-Nebulizer Bronchodilator Protocol order as well (see protocol at bottom of note). CXR Findings:  No results found. The findings from the last RT Protocol Assessment were as follows:   History Pulmonary Disease: Chronic pulmonary disease  Respiratory Pattern: Regular pattern and RR 12-20 bpm  Breath Sounds: Slightly diminished and/or crackles  Cough: Strong, spontaneous, non-productive  Indication for Bronchodilator Therapy: On home bronchodilators, Wheezing associated with pulm disorder  Bronchodilator Assessment Score: 4    Aerosolized bronchodilator medication orders have been revised according to the RT Inhaler-Nebulizer Bronchodilator Protocol below. Respiratory Therapist to perform RT Therapy Protocol Assessment initially then follow the protocol. Repeat RT Therapy Protocol Assessment PRN for score 0-3 or on second treatment, BID, and PRN for scores above 3. No Indications - adjust the frequency to every 6 hours PRN wheezing or bronchospasm, if no treatments needed after 48 hours then discontinue using Per Protocol order mode. If indication present, adjust the RT bronchodilator orders based on the Bronchodilator Assessment Score as indicated below. Use Inhaler orders unless patient has one or more of the following: on home nebulizer, not able to hold breath for 10 seconds, is not alert and oriented, cannot activate and use MDI correctly, or respiratory rate 25 breaths per minute or more, then use the equivalent nebulizer order(s) with same Frequency and PRN reasons based on the score. If a patient is on this medication at home then do not decrease Frequency below that used at home.     0-3 - enter or revise RT bronchodilator order(s) to equivalent RT Bronchodilator order with Frequency of every 4 hours PRN for wheezing or increased work of breathing using Per Protocol order mode. 4-6 - enter or revise RT Bronchodilator order(s) to two equivalent RT bronchodilator orders with one order with BID Frequency and one order with Frequency of every 4 hours PRN wheezing or increased work of breathing using Per Protocol order mode. 7-10 - enter or revise RT Bronchodilator order(s) to two equivalent RT bronchodilator orders with one order with TID Frequency and one order with Frequency of every 4 hours PRN wheezing or increased work of breathing using Per Protocol order mode. 11-13 - enter or revise RT Bronchodilator order(s) to one equivalent RT bronchodilator order with QID Frequency and an Albuterol order with Frequency of every 4 hours PRN wheezing or increased work of breathing using Per Protocol order mode. Greater than 13 - enter or revise RT Bronchodilator order(s) to one equivalent RT bronchodilator order with every 4 hours Frequency and an Albuterol order with Frequency of every 2 hours PRN wheezing or increased work of breathing using Per Protocol order mode. RT to enter RT Home Evaluation for COPD & MDI Assessment order using Per Protocol order mode.     Electronically signed by Jassi Delgado RCP on 11/20/2022 at 9:33 PM

## 2022-11-21 NOTE — DISCHARGE INSTR - COC
Continuity of Care Form    Patient Name: Mirian Rutherford   :  [birth year]  MRN:  [id number]    Admit date:  2022  Discharge date:  22    Code Status Order: Full Code   Advance Directives:     Admitting Physician:  Farzana Amaya DO  PCP: Patsy Mratines, APRN - CNP    Discharging Nurse: SHERRI Ochoa [facility] Unit/Room#: 7K-08/008-A  Discharging Unit Phone Number: [phone]    Emergency Contact:   Extended Emergency Contact Information  Primary Emergency Contact: [address] Phone: [phone]  Relation: Child  Secondary Emergency Contact: [address] Phone: [phone]  Relation: Child    Past Surgical History:  Past Surgical History:   Procedure Laterality Date    BREAST BIOPSY  1988    right breast biopsy     BREAST BIOPSY Right 2011    stereotactic biopsy    COLONOSCOPY      Dr Asia Alexandre      Dr Salvador Menard    COLONOSCOPY      Dr Ktay Mcdermott interminate colitis    COLONOSCOPY  2014    Dr Douglas Sor Bilateral [address]  2020    left fracture    OSTEOTOMY Left 2021    LEFT 5TH METATARSAL HEAD RESECTION EXCISION SOFT TISSUE LESION performed by Cedric Ferrer DPM at [address] [facility]       Immunization History:   Immunization History   Administered Date(s) Administered    COVID-19, PFIZER Bivalent BOOSTER, (age 12y+), IM, 30 mcg/0.3 mL dose 11/10/2022    COVID-19, PFIZER GRAY top, DO NOT Dilute, (age 15 y+), IM, 30 mcg/0.3 mL 2022    COVID-19, PFIZER PURPLE top, DILUTE for use, (age 15 y+), 30mcg/0.3mL 2021, 2021, 2021, 2021, 2021, 2021    Influenza Vaccine, unspecified formulation 2006, 2006, 2008, 2014, 10/28/2014, 10/07/2015, 2016    Influenza Virus Vaccine 10/10/2022    Influenza, FLUAD, (age 72 y+), Adjuvanted, 0.5mL 10/15/2020, 10/15/2020    Influenza, High Dose (Fluzone 65 yrs and older) 09/07/2017, 10/30/2018, 10/21/2021    Influenza, Triv, inactivated, subunit, adjuvanted, IM (Fluad 65 yrs and older) 10/09/2019    Pneumococcal Conjugate 13-valent ([id number]) 10/07/2015    Pneumococcal Polysaccharide ([id number]) 12/28/2006, 09/07/2017    Tdap (Boostrix, Adacel) 02/04/2016, 01/02/2020    Zoster Recombinant (Shingrix) 11/05/2019, 11/05/2019, 02/09/2020, 02/09/2020       Active Problems:  Patient Active Problem List   Diagnosis Code    Nonexudative age-related macular degeneration H35.3190    Nuclear sclerotic cataract H25.10    Ulcerative colitis ([facility] K51.90    Sleep apnea G47.30    Postmenopausal Z78.0    Nocturnal hypoxemia G47.34    Nicotine dependence F17.200    Hyperlipidemia E78.5    Diverticulosis large intestine w/o perforation or abscess w/bleeding K57.31    Simple chronic bronchitis ([facility]) J41.0    Age-related osteoporosis without current pathological fracture M81.0    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 ([facility]) I25.119    Type 2 diabetes mellitus with diabetic polyneuropathy, without long-term current use of insulin ([facility]) E11.42    Trochanteric bursitis M70.60    Generalized weakness R53.1    Dementia without behavioral disturbance, unspecified dementia type F03.90    Hypertension I10    Chronic obstructive pulmonary disease ([facility] J44.9    Closed compression fracture of fifth lumbar vertebra ([facility]) S32.050A    Wedge compression fracture of second thoracic vertebra, initial encounter for closed fracture ([facility] S22.020A    Acute respiratory failure with hypoxia ([facility] J96.01       Isolation/Infection:   Isolation            No Isolation          Patient Infection Status       Infection Onset Added Last Indicated Last Indicated By Review Planned Expiration Resolved Resolved By    None active    Resolved    COVID-19 (Rule Out) 11/20/22 11/20/22 11/20/22 COVID-19 & Influenza Combo (Ordered)   11/20/22 Rule-Out Test Resulted            Nurse Assessment:  Last Vital Signs: BP (!) 107/57   Pulse 82   Temp 97.9 °F (36.6 °C) (Oral)   Resp 20   Ht 5' 5\" (1.651 m)   Wt 141 lb 1.5 oz (64 kg)   SpO2 93%   BMI 23.48 kg/m²     Last documented pain score (0-10 scale): Pain Level: 0  Last Weight:   Wt Readings from Last 1 Encounters:   11/20/22 141 lb 1.5 oz (64 kg)     Mental Status:  alert confused at times. IV Access:  - None    Nursing Mobility/ADLs:  Walking   Assisted  Transfer  Assisted  Bathing  Assisted  Dressing  Assisted  Toileting  assisted  Feeding  Independent  Med Admin  Assisted  Med Delivery   whole    Wound Care Documentation and Therapy:  Incision 11/02/21 Femoral Left (Active)   Number of days: 384        Elimination:  Continence: Bowel: Yes  Bladder: incontinent at times  Urinary Catheter: None   Colostomy/Ileostomy/Ileal Conduit: No       Date of Last BM: 11/18/22    Intake/Output Summary (Last 24 hours) at 11/21/2022 1110  Last data filed at 11/21/2022 0346  Gross per 24 hour   Intake 310 ml   Output --   Net 310 ml     I/O last 3 completed shifts: In: 310 [P.O.:300; I.V.:10]  Out: -     Safety Concerns:     History of Falls (last 30 days) and At Risk for Falls    Impairments/Disabilities:      Hearing    Nutrition Therapy:  Current Nutrition Therapy:   - Oral Diet:  General    Routes of Feeding: Oral  Liquids: Thin Liquids  Daily Fluid Restriction: no  Last Modified Barium Swallow with Video (Video Swallowing Test): not done    Treatments at the Time of Hospital Discharge:   Respiratory Treatments: patient has PRN Breathing tx  Oxygen Therapy:  is on oxygen at 1 L/min per nasal cannula.   Ventilator:    - No ventilator support    Rehab Therapies: Physical Therapy and Occupational Therapy  Weight Bearing Status/Restrictions: No weight bearing restrictions  Other Medical Equipment (for information only, NOT a DME order):  walker  Other Treatments: na    Patient's personal belongings (please select all that are sent with patient):  Glasses, Dentures upper and lower    RN SIGNATURE:  Electronically signed by Jorge Luis Rivera LPN on 47/91/18 at 4:03 PM EST    CASE MANAGEMENT/SOCIAL WORK SECTION    Inpatient Status Date: ***    Readmission Risk Assessment Score:  Readmission Risk              Risk of Unplanned Readmission:  0           Discharging to Facility/ Agency   Name:   Address:  Phone:  Fax:    Dialysis Facility (if applicable)   Name:  Address:  Dialysis Schedule:  Phone:  Fax:    / signature: {Esignature:[id number]}    PHYSICIAN SECTION    Prognosis: Fair    Condition at Discharge: Stable    Rehab Potential (if transferring to Rehab): Fair    Recommended Labs or Other Treatments After Discharge: Recommend smoking cessation. Wean O2 is able, but may require long term use due to COPD. Will complete prednisone burst and then resume chronic prednisone. Taper off if possible. Complete azithromycin course. Recommend repeat PFTs. Physician Certification: I certify the above information and transfer of Errol Jean Baptiste  is necessary for the continuing treatment of the diagnosis listed and that she requires [facility] for greater 30 days.      Update Admission H&P: No change in H&P    PHYSICIAN SIGNATURE:  Electronically signed by Desiree Loera DO on 11/21/22 at 11:10 AM EST

## 2022-11-22 ENCOUNTER — CARE COORDINATION (OUTPATIENT)
Dept: CASE MANAGEMENT | Age: 82
End: 2022-11-22

## 2022-11-22 ENCOUNTER — TELEPHONE (OUTPATIENT)
Dept: FAMILY MEDICINE CLINIC | Age: 82
End: 2022-11-22

## 2022-11-22 DIAGNOSIS — J96.01 ACUTE RESPIRATORY FAILURE WITH HYPOXIA (HCC): Primary | ICD-10-CM

## 2022-11-22 DIAGNOSIS — R11.0 NAUSEA: ICD-10-CM

## 2022-11-22 DIAGNOSIS — T36.95XA ANTIBIOTICS CAUSING ADVERSE EFFECT IN THERAPEUTIC USE, INITIAL ENCOUNTER: Primary | ICD-10-CM

## 2022-11-22 PROBLEM — F17.200 NICOTINE DEPENDENCE: Status: ACTIVE | Noted: 2017-04-19

## 2022-11-22 PROBLEM — E78.5 HYPERLIPIDEMIA: Status: ACTIVE | Noted: 2017-04-19

## 2022-11-22 PROBLEM — G47.30 SLEEP APNEA: Status: ACTIVE | Noted: 2017-04-19

## 2022-11-22 PROBLEM — G47.34 NOCTURNAL HYPOXEMIA: Status: ACTIVE | Noted: 2017-04-19

## 2022-11-22 PROBLEM — K57.31 DIVERTICULOSIS LARGE INTESTINE W/O PERFORATION OR ABSCESS W/BLEEDING: Status: ACTIVE | Noted: 2017-04-19

## 2022-11-22 PROBLEM — S32.050G COMPRESSION FRACTURE OF L5 VERTEBRA WITH DELAYED HEALING: Status: ACTIVE | Noted: 2022-07-17

## 2022-11-22 PROCEDURE — 1111F DSCHRG MED/CURRENT MED MERGE: CPT | Performed by: NURSE PRACTITIONER

## 2022-11-22 RX ORDER — ONDANSETRON 4 MG/1
4 TABLET, FILM COATED ORAL 3 TIMES DAILY PRN
Qty: 20 TABLET | Refills: 0 | Status: SHIPPED | OUTPATIENT
Start: 2022-11-22

## 2022-11-22 NOTE — CARE COORDINATION
[facility] Care Transitions Initial Follow Up Call    Call within 2 business days of discharge: Yes    Care Transition Nurse contacted the family and caregiver by telephone to perform post hospital discharge assessment. Verified name and  with family and caregiver as identifiers. Provided introduction to self, and explanation of the Care Transition Nurse role. Patient: Mort Nageotte Patient : 1940   MRN: [id number]  Reason for Admission: Hypoxemia  Discharge Date: 22 RARS: Readmission Risk Score: 14.9      Last Discharge  [address]       Date Complaint Diagnosis Description Type Department Provider    22 Shoulder Pain Hypoxemia . .. ED to Hosp-Admission (Discharged) (ADMITTED) [facility] 7K Eileen Hind, DO            Challenges to be reviewed by the provider   Additional needs identified to be addressed with provider: No  none               Method of communication with provider: none. Spoke with dtr, Jazmine (DANTE RENO), said she just left her mom. She has been nauseated this morning, thinks it is from the ATB but also told her the steroids on empty stomach can cause nausea. Jazmine said her mom is not a big eater. Jazmine called PCP and he sent Zofran script for her. Still complains of shoulder/back pain, Xrays were completed, no acute findings. She is on O2 1L at rest, 4L with activity but Niccishawn Gordonguerita is afraid that she won't be able to increase it to 4L on her own and afraid of tripping over cord. Ashia Britton lives at [facility] in her own room, has a call button if needs help. [facility] can monitor SPO2 but was told once a day but family asked if can be checked during meals which they said can be done. Told Jazmine that when she sees PCP next week, he could assess the need for the O2, she was told she would only need it for 5 days and it is prescribed for 3 months. Told her that they do that just in case but doesn't mean she will need it the whole time.   Ashia Britton also complained of dizziness this morning, took her 45 minutes to get up with [place] helping but sat back down d/t dizziness. Told Jazmine, I would call [facility] to go over medications and see how things are going. Denies any needs. No other questions or concerns at this time. Will continue to follow. Spoke with Seble Pisano, nurse at [facility] and reviewed medications/changes. Joanna Zamudion that she checked her SPO2 a little bit ago and was 90% on 1L. Sebastian Ching did tell her that she was dizzy, BP was ok. Did try to get her to eat something with her pills this morning, gave her Zofran for the nausea. No other questions or concerns at this time. Care Transition Nurse reviewed discharge instructions, medical action plan, and red flags with family and caregiver who verbalized understanding. The family and caregiver was given an opportunity to ask questions and does not have any further questions or concerns at this time. Were discharge instructions available to patient? Yes. Reviewed appropriate site of care based on symptoms and resources available to patient including: PCP  Specialist  Urgent care clinics  When to call 911. The family and caregiver agrees to contact the PCP office for questions related to their healthcare. Advance Care Planning:   Does patient have an Advance Directive: reviewed and current. Medication reconciliation was performed with caregiver, who verbalizes understanding of administration of home medications.  Medications reviewed, 1111F entered: yes    Was patient discharged with a pulse oximeter? no    Non-face-to-face services provided:  Scheduled appointment with PCP-11/30  Scheduled appointment with Specialist-1/16/23  Obtained and reviewed discharge summary and/or continuity of care documents    Offered patient enrollment in the Remote Patient Monitoring (RPM) program for in-home monitoring: NA.    Care Transitions 24 Hour Call    Schedule Follow Up Appointment with PCP: Completed  Do you have a copy of your discharge instructions?: Yes  Do you have all of your prescriptions and are they filled?: Yes  Have you been contacted by a [address]?: No  Have you scheduled your follow up appointment?: Yes  How are you going to get to your appointment?: Car - family or friend to transport  Do you feel like you have everything you need to keep you well at home?: Yes  Care Transitions Interventions     Transportation Support: Declined            Follow Up  Future Appointments   Date Time Provider Berry Mukherjee   11/30/2022  2:00 PM PETE[facility] - [facility]   1/16/2023 10:40 AM Sherryle Rued,  [facility] Transition Nurse provided contact information. Plan for follow-up call in 5-7 days based on severity of symptoms and risk factors. Plan for next call: symptom management-breathing issues, shoulder pain  self management-O2 use  follow-up appointment-changes from f/u 11/30?     Enrico Heimlich, RN

## 2022-11-22 NOTE — TELEPHONE ENCOUNTER
Patient is experiencing nausea, daughter is agreeable radha Piedra Askew while she completes abx. Please send to [facility].

## 2022-11-22 NOTE — TELEPHONE ENCOUNTER
The pt's daughter, Flor Georges [employer] [facility]), called and stated that the Zithromax is not agreeing with the pt. She is asking for a substitution medication.

## 2022-11-23 DIAGNOSIS — M85.89 OSTEOPENIA OF MULTIPLE SITES: ICD-10-CM

## 2022-11-25 ENCOUNTER — TELEPHONE (OUTPATIENT)
Dept: FAMILY MEDICINE CLINIC | Age: 82
End: 2022-11-25

## 2022-11-25 DIAGNOSIS — Z72.0 TOBACCO USE: Primary | ICD-10-CM

## 2022-11-25 RX ORDER — NICOTINE 21 MG/24HR
1 PATCH, TRANSDERMAL 24 HOURS TRANSDERMAL EVERY 24 HOURS
Qty: 14 PATCH | Refills: 0 | Status: SHIPPED | OUTPATIENT
Start: 2022-11-25 | End: 2022-12-09

## 2022-11-25 NOTE — TELEPHONE ENCOUNTER
Resident requesting an order for Nicoderm patches.      Also, she started on O2 11/21/22 with 1 LPM. She doesn't feel as if she is getting any oxygen; requesting increase to 2 LPM.

## 2022-11-30 ENCOUNTER — OFFICE VISIT (OUTPATIENT)
Dept: FAMILY MEDICINE CLINIC | Age: 82
End: 2022-11-30

## 2022-11-30 VITALS
RESPIRATION RATE: 18 BRPM | BODY MASS INDEX: 22.3 KG/M2 | WEIGHT: 134 LBS | SYSTOLIC BLOOD PRESSURE: 132 MMHG | HEART RATE: 88 BPM | DIASTOLIC BLOOD PRESSURE: 84 MMHG | OXYGEN SATURATION: 93 %

## 2022-11-30 DIAGNOSIS — N81.10 VAGINAL PROLAPSE: ICD-10-CM

## 2022-11-30 DIAGNOSIS — M25.511 ACUTE PAIN OF BOTH SHOULDERS: ICD-10-CM

## 2022-11-30 DIAGNOSIS — M81.0 AGE-RELATED OSTEOPOROSIS WITHOUT CURRENT PATHOLOGICAL FRACTURE: ICD-10-CM

## 2022-11-30 DIAGNOSIS — J44.9 CHRONIC OBSTRUCTIVE PULMONARY DISEASE, UNSPECIFIED COPD TYPE (HCC): Primary | ICD-10-CM

## 2022-11-30 DIAGNOSIS — J20.9 ACUTE BRONCHITIS, UNSPECIFIED ORGANISM: ICD-10-CM

## 2022-11-30 DIAGNOSIS — R25.9 PARKINSONIAN FEATURES: ICD-10-CM

## 2022-11-30 DIAGNOSIS — M25.512 ACUTE PAIN OF BOTH SHOULDERS: ICD-10-CM

## 2022-11-30 PROBLEM — S22.020A: Status: RESOLVED | Noted: 2022-07-17 | Resolved: 2022-11-30

## 2022-11-30 RX ORDER — BENZONATATE 200 MG/1
200 CAPSULE ORAL 3 TIMES DAILY PRN
Qty: 180 CAPSULE | Refills: 1 | Status: SHIPPED | OUTPATIENT
Start: 2022-11-30 | End: 2022-11-30

## 2022-11-30 RX ORDER — ALENDRONATE SODIUM 70 MG/1
70 TABLET ORAL
Qty: 4 TABLET | Refills: 3 | Status: SHIPPED | OUTPATIENT
Start: 2022-11-30

## 2022-11-30 ASSESSMENT — ENCOUNTER SYMPTOMS
WHEEZING: 0
SHORTNESS OF BREATH: 1
COUGH: 1

## 2022-11-30 NOTE — LETTER
[address] [address]  Phone: [phone]  Fax: [phone], APRN - CNP        November 30, 2022     Patient: Kalyani Anand   YOB: 1940   Date of Visit: 11/30/2022       To Whom It May Concern:    Casi Williamson was seen in office today. Changes to treatment include: stopping supplemental O2, increasing sinemet dosing back to 4x daily, stopping benzonatate, starting prn Dextromethorphan-guaiFENesin  MG CAPS, stopping Prolia injections, starting once weekly Fosamax dosing. I would also like patient to start physical therapy for shoulder pain, weakness.       If you have any questions, please contact the office,        PETEY Sandoval CNP

## 2022-11-30 NOTE — PROGRESS NOTES
PFT's scheduled at [facility] on 12/22/22 at 1:30 pm    [facility] PULMONARY FUNCTION TEST  PATIENT PREPS:   * Arrive 15 minutes prior to 2nd floor [address] [place] [address] (if after 4:30pm then Main Radiology)  * Do not use the following breathing medications for at least 4-6 hours BEFORE the test:   Albuterol, Xopenex, Proventil, Ventolin, Atrovent, Combivent, Proair, Levalbulaterol, Duoneb, or Accuneb. * Do not use the following breathing medications for a least 12 hours BEFORE the test:   Serevent, Advair, Symbicort, Foradil, Aleena Sans, Perforomist, Tudorza. * Do not use Spiriva, Arcapta, or Breo for 24 hours BEFORE the test.   * If you are unable to go this long without taking these breathing medications, please make note of the last time you used them. * No caffeine, smoking, or alcohol the day of the test.  * You may eat a light meal before the test.  * Avoid restrictive clothing, exercise, and cold air exposure (cover nose & mouth)  * Please report to the [address] [place] [address], 2nd Floor. If arriving after 4:15, please report to Main Radiology to register. Pt's daughter notified of appt date, time and prep    Referral for PT along with letter faxed to [facility].

## 2022-11-30 NOTE — PROGRESS NOTES
Jovan Singh (:  1940) is a 80 y.o. female,Established patient, here for evaluation of the following chief complaint(s):  3 Month Follow-Up         ASSESSMENT/PLAN:  1. Age-related osteoporosis without current pathological fracture  - Switch treatment from prolia to fosamax  - Recent imaging revealed osteopenia of lumbar spine and bilateral shoulders  -     alendronate (FOSAMAX) 70 MG tablet; Take 1 tablet by mouth every 7 days Take with a full glass of water upon arising for the day. Consume on an empty stomach at least 30 minutes before the first food, beverage, or medication. Stay upright (do not lie down) for at least 30 minutes. Do not crush or break., Disp-4 tablet, R-3Normal    2. Parkinsonian features  - Resume previous dosing schedule of 4x daily  -     carbidopa-levodopa (SINEMET)  MG per tablet; Take 1 tablet by mouth 4 times daily, Disp-360 tablet, R-1Normal    3. Chronic obstructive pulmonary disease, unspecified COPD type ([facility]  - Update pft's  - Continue Anoro inhaler with prn albuterol  - Start  dextromethorphan-guaifenesin as needed for cough  - Okay to stop supplemental o2. Need to maintain spO2 88% or greater  - Encouraged continued smoking cessation. Patient adamant about restarting after stopping supplemental O2   -     Dextromethorphan-guaiFENesin  MG CAPS; Take 1-2 tablets by mouth every 4 hours as needed (Cough) 1-2 tablets every 4 hours as needed. , Disp-24 capsule, R-0Normal  -     Full PFT Study With Bronchodilator; Future    4. Vaginal prolapse  - Further evaluation and treatment per obgyn  -     [facility]ne[facility]    5. Acute pain of both shoulders  - Continue prn tylenol and norco   - Start PT  -     External Referral To Physical Therapy    Return in about 3 months (around 2023) for Chronic Conditions. Subjective   SUBJECTIVE/OBJECTIVE:  Presents with her daughter Sandro Allen for a 3 month follow up.  Resident at [facility]. Multiple concerns:    Bilateral shoulder pain. Symptoms started approximately 10 days ago. Some improvement since then. No injury. Woke up with pain. Some difficulty standing up. Has low dose prednisone and prn norco currently ordered. COPD exacerbation. Recently hospitalized. Started on an antibiotic and oral steroid. Benzonatate not helping and is costly. Is using home O2. Oxygen levels decrease to mid 80's with exertion. Daughter concerned patient is going to fall. Exertional dizziness. O2 is set at 2 L at [facility]. Currently without O2. SpO2's at rest ranging from 88% to 92%. Drops to 87% with exertion. Concerned with cost of Prolia. Each injection costs approximately $1,400. Would like an alternative. Chronic steroid use. Jazmine questioning why dose of sinemet was decreased from 4x daily dosing to TID while in the hospital. Would like to resume previous dosing. Liza Roca reports protrusion of mass from her vagina. First noticed 2 weeks ago. Asymptomatic. Has never had a similar problem. Review of Systems   Constitutional:  Negative for fever. Respiratory:  Positive for cough and shortness of breath. Negative for wheezing. Genitourinary:  Negative for difficulty urinating and vaginal pain. Musculoskeletal:  Positive for arthralgias. Objective   Physical Exam  Vitals and nursing note reviewed. Constitutional:       General: She is awake. Appearance: Normal appearance. HENT:      Head: Normocephalic and atraumatic. Right Ear: Hearing and external ear normal.      Left Ear: Hearing and external ear normal.      Nose: Nose normal. No congestion or rhinorrhea. Eyes:      General: Lids are normal.         Right eye: No discharge. Left eye: No discharge. Conjunctiva/sclera: Conjunctivae normal.   Neck:      Trachea: No tracheal deviation. Cardiovascular:      Rate and Rhythm: Normal rate and regular rhythm.       Heart sounds: Normal heart sounds. No murmur heard. Pulmonary:      Effort: Pulmonary effort is normal. No respiratory distress. Breath sounds: Decreased air movement present. No stridor. No wheezing. Musculoskeletal:      Cervical back: Full passive range of motion without pain. Skin:     General: Skin is dry. Coloration: Skin is not jaundiced or pale. Neurological:      General: No focal deficit present. Mental Status: She is alert. Mental status is at baseline. Psychiatric:         Mood and Affect: Mood and affect normal.         Behavior: Behavior is cooperative. Cognition and Memory: She exhibits impaired recent memory. Judgment: Judgment normal.          On this date 11/30/2022 I have spent 45 minutes reviewing previous notes, test results and face to face with the patient discussing the diagnosis and importance of compliance with the treatment plan as well as documenting on the day of the visit. An electronic signature was used to authenticate this note.     --PETEY Rosario - CNP

## 2022-12-01 ENCOUNTER — CARE COORDINATION (OUTPATIENT)
Dept: CASE MANAGEMENT | Age: 82
End: 2022-12-01

## 2022-12-01 NOTE — CARE COORDINATION
[facility] Care Transitions Follow Up Call    Patient: Torsten Colbert  Patient : 1940   MRN: [id number]  Reason for Admission: Respiratory Failure Hypoxia, Hx. Dx. COPD  Discharge Date: 22 RARS: Readmission Risk Score: 14.9    Message left in compliance with HIPPA. Stated who I was, representing the [facility] SPECIALTY [facility], Care Transitions Team. Instructed to call PCP with any immediate health needs/questions/concerns. Care transitions will continue to follow.      Samantha Cannon RN

## 2022-12-02 ENCOUNTER — CARE COORDINATION (OUTPATIENT)
Dept: CASE MANAGEMENT | Age: 82
End: 2022-12-02

## 2022-12-02 NOTE — CARE COORDINATION
[facility] Care Transitions Follow Up Call    Care Transition Nurse contacted the family by telephone to follow up after admission on 22. Verified name and  with family as identifiers. Patient: Rosetta Adam  Patient : 1940   MRN: [id number]  Reason for Admission: shoulder pain  Discharge Date: 22 RARS: Readmission Risk Score: 14.9      Needs to be reviewed by the provider   Additional needs identified to be addressed with provider: No  none             Method of communication with provider: none. CTN call to Jessica Tinoco (daughter) today and she says Jigna Ching is doing about the same. Still having the shoulder pain. Takes Tylenol - Not much relief, then will ask for [place] @ breakfast. Saw PCP 22-> reviewed; Spoke w/ Curtis Ahumada [facility] aware of changes-> PT, meds, PFT 22 @ [facility]. Says PT has not started yet and she will check w/ their therapist on schedule. Denies inc. Sob, malaise, fever, chills,n/v/d, dizziness. Eating, drinking & sleeping fair. Denies problems w/ bladder & bowels. No other concerns voiced at this time. Addressed changes since last contact:  PT-ordered at [facility] by PCP  Discussed follow-up appointments. If no appointment was previously scheduled, appointment scheduling offered: Yes. Is follow up appointment scheduled within 7 days of discharge? No.    Follow Up  Future Appointments   Date Time Provider Berry Mukherjee   2022  1:30 PM STR PULMONARY FUNCTION ROOM 1 [facility] PFT AIDAN ALDANA   2023 10:40 AM Chichi Nogueira MD [facility] [address] [facility] follow up appointment(s):     Care Transition Nurse reviewed discharge instructions and medical action plan with family and caregiver and discussed any barriers to care and/or understanding of plan of care after discharge. Discussed appropriate site of care based on symptoms and resources available to patient including: PCP.  The family and caregiver agrees to contact the PCP office for questions related to their healthcare. Advance Care Planning:   reviewed and current. Patients top risk factors for readmission: functional physical ability, lack of knowledge about disease, medical condition-COPD, HTN dementia, shoulder pain, HLD, and polypharmacy  Interventions to address risk factors: Scheduled appointment with PCP-as needed and Scheduled appointment with Specialist-1/16/23 Pulm    Offered patient enrollment in the Remote Patient Monitoring (RPM) program for in-home monitoring: NA.     Care Transitions Subsequent and Final Call    Schedule Follow Up Appointment with PCP: Completed  Subsequent and Final Calls  Do you have any ongoing symptoms?: Yes  Onset of Patient-reported symptoms: In the past 7 days  Patient-reported symptoms: Pain  Interventions for patient-reported symptoms: Notified PCP/Physician  Have your medications changed?: No  Do you have any questions related to your medications?: No  Do you currently have any active services?: No  Do you have any needs or concerns that I can assist you with?: No  Identified Barriers: Lack of Education  Care Transitions Interventions    Physical Therapy: Completed       Transportation Support: Declined   Other Interventions:             Care Transition Nurse provided contact information for future needs. Plan for follow-up call in 7-10 days based on severity of symptoms and risk factors. Plan for next call: symptom management-sob, shoulder pain, smoking cessation?  Appetite, BM  PT started at [address]???    Lucinda Cedillo RN

## 2022-12-05 ENCOUNTER — TELEPHONE (OUTPATIENT)
Dept: FAMILY MEDICINE CLINIC | Age: 82
End: 2022-12-05

## 2022-12-05 DIAGNOSIS — N81.10 VAGINAL PROLAPSE: Primary | ICD-10-CM

## 2022-12-05 NOTE — TELEPHONE ENCOUNTER
[facility] called, they received referral. She wanted to verify Gregorio Torrez wanted the referral sent to them. They do not do surgery or pessaries. Do you want new referral to [facility]/GYN?

## 2022-12-09 ENCOUNTER — CARE COORDINATION (OUTPATIENT)
Dept: CASE MANAGEMENT | Age: 82
End: 2022-12-09

## 2022-12-09 NOTE — CARE COORDINATION
[facility] Care Transitions Follow Up Call    Care Transition Nurse contacted the patient and caregiver by telephone to follow up after admission on 22. Verified name and  with patient and caregiver as identifiers. Patient: Imelda Holman  Patient : 1940   MRN: [id number]  Reason for Admission:  Hypoxemia  Discharge Date: 22 RARS: Readmission Risk Score: 14.9      Needs to be reviewed by the provider   Additional needs identified to be addressed with provider: No  none             Method of communication with provider: none. Called Jazmine coleman but wasn't able to talk so called [facility], spoke with nurse, Eliza Bryant. Said that India Berg has been doing ok, coming down for meals. Refusing to use her O2, started smoking again, refusing Nicoderm patches. Her SPO2 at breakfast was 98% and 90% at lunch on RA. Was able to call and speak with India Berg, said she feels pretty good. Admitted to restarting smoking and not using O2. Said she wants to get rid of the equipment but encouraged her to wait until she has the PFT's in a couple of wks. Still having some shoulder pain but managed with medications. Denies any needs. No other questions or concerns at this time. Will continue to follow. Addressed changes since last contact:  none  Discussed follow-up appointments. .    Follow Up  Future Appointments   Date Time Provider Berry Mukherjee   2022  1:30 PM [facility] PULMONARY FUNCTION ROOM 1 [facility] PFT [facility]   2023 10:40 AM [facility] [address]     [facility] follow up appointment(s): casandra    Care Transition Nurse reviewed medical action plan and red flags with patient and caregiver and discussed any barriers to care and/or understanding of plan of care after discharge. Discussed appropriate site of care based on symptoms and resources available to patient including: PCP  Specialist  Urgent care clinics  When to call 911.  The patient and caregiver agrees to contact the PCP office for questions related to their healthcare. Advance Care Planning:   reviewed and current. Patients top risk factors for readmission: lack of knowledge about disease, medical condition-COPD, HTN, dementia, shoulder pain, and polypharmacy  Interventions to address risk factors: Scheduled appointment with Specialist-12/22 PFT and FDC will attend to issues    Offered patient enrollment in the Remote Patient Monitoring (RPM) program for in-home monitoring: NA.     Care Transitions Subsequent and Final Call    Subsequent and Final Calls  Do you have any ongoing symptoms?: No  Have your medications changed?: No  Do you have any questions related to your medications?: No  Do you currently have any active services?: Yes  Are you currently active with any services?: Other  Do you have any needs or concerns that I can assist you with?: No  Identified Barriers: Lack of Education  Care Transitions Interventions    Physical Therapy: Completed       Transportation Support: Declined   Other Interventions:             Care Transition Nurse provided contact information for future needs. Plan for follow-up call in 5-7 days based on severity of symptoms and risk factors.   Plan for next call: symptom management-new or worsening symptoms  Final call    Kelly Peraza RN

## 2022-12-15 ENCOUNTER — CARE COORDINATION (OUTPATIENT)
Dept: CASE MANAGEMENT | Age: 82
End: 2022-12-15

## 2022-12-15 NOTE — CARE COORDINATION
[facility] Care Transitions Follow Up Call-Final    Care Transition Nurse contacted the caregiver by telephone to follow up after admission on 22. Verified name and  with caregiver as identifiers. Patient: Ruth Ann Negro  Patient : 1940   MRN: [id number]  Reason for Admission: Hypoxia  Discharge Date: 22 RARS: Readmission Risk Score: 14.9      Needs to be reviewed by the provider   Additional needs identified to be addressed with provider: No  none             Method of communication with provider: none. Spoke with [facility] nurse, Eladia Morgan, said Tiffanie Gonzalez is doing good. She is no longer using the O2 during the day and her SPO2 has been good. She continues to \"enjoy\" smoking. Coming down for meals. Denies any needs. No other questions or concerns at this time. Final call. Addressed changes since last contact:  none  Discussed follow-up appointments. Follow Up  Future Appointments   Date Time Provider Berry Mukherjee   2022  1:30 PM [facility] PULMONARY FUNCTION ROOM 1 [facility] PFT [facility]   2023 10:40 AM [facility] follow up appointment(s):     Care Transition Nurse reviewed medical action plan and red flags with caregiver and discussed any barriers to care and/or understanding of plan of care after discharge. Discussed appropriate site of care based on symptoms and resources available to patient including: PCP  Specialist  Urgent care clinics  When to call 911. The caregiver agrees to contact the PCP office for questions related to their healthcare. Advance Care Planning:   reviewed and current.      Patients top risk factors for readmission: lack of knowledge about disease, medical condition-COPD, HTN, dementia, and polypharmacy  Interventions to address risk factors: Scheduled appointment with Specialist- and  [place] patient enrollment in the Remote Patient Monitoring (RPM) program for in-home monitoring: NA and monitored by [facility] staff . Care Transitions Subsequent and Final Call    Subsequent and Final Calls  Do you have any ongoing symptoms?: No  Have your medications changed?: No  Do you have any questions related to your medications?: No  Do you currently have any active services?: Yes  Are you currently active with any services?: Other  Do you have any needs or concerns that I can assist you with?: No  Identified Barriers: Lack of Education  Care Transitions Interventions    Physical Therapy: Completed       Transportation Support: Declined   Other Interventions:             Care Transition Nurse provided contact information for future needs. No further follow-up call indicated based on severity of symptoms and risk factors.     Sheeba Bettencourt RN

## 2022-12-19 ENCOUNTER — TELEPHONE (OUTPATIENT)
Dept: FAMILY MEDICINE CLINIC | Age: 82
End: 2022-12-19

## 2022-12-19 NOTE — TELEPHONE ENCOUNTER
----- Message from [facility] sent at 12/19/2022  2:56 PM EST -----  Subject: Message to Provider    QUESTIONS  Information for Provider? Pt says she needs to return her oxygen? Please   return call to discuss. She said PCP has to send notification about it in   order for it to be returned  ---------------------------------------------------------------------------  --------------  [id number]  [phone]; OK to leave message on voicemail  ---------------------------------------------------------------------------  --------------  SCRIPT ANSWERS  Relationship to Patient?  Self

## 2022-12-19 NOTE — TELEPHONE ENCOUNTER
Order was already sent to [facility] to d/c O2 on 12/15. Called [facility] and spoke to Tiffanie Wiley, states that they did receive order to DC O2 but she is \"pretty sure\" patient still has it on? I asked if they are going to take it off and she states that they will DC it. Sheila Glasgow and informed her of events.    She will call [facility] and take care of it

## 2023-01-09 ENCOUNTER — TELEPHONE (OUTPATIENT)
Dept: FAMILY MEDICINE CLINIC | Age: 83
End: 2023-01-09

## 2023-01-09 DIAGNOSIS — M25.551 BILATERAL HIP PAIN: ICD-10-CM

## 2023-01-09 DIAGNOSIS — M25.552 BILATERAL HIP PAIN: ICD-10-CM

## 2023-01-09 RX ORDER — HYDROCODONE BITARTRATE AND ACETAMINOPHEN 5; 325 MG/1; MG/1
1 TABLET ORAL EVERY 6 HOURS PRN
Qty: 120 TABLET | Refills: 0 | Status: SHIPPED | OUTPATIENT
Start: 2023-01-09 | End: 2023-02-08

## 2023-01-09 NOTE — TELEPHONE ENCOUNTER
Medication ep'ed to requested pharmacy. PDMP Monitoring:    Last PDMP Cristian Jaquez as Reviewed:  Review User Review Instant Review Result   MARILYNOWEN KRISTINE 1/9/2023 11:47 AM Reviewed PDMP [1]     [unfilled]  Urine Drug Screenings (1 yr)    No resulted procedures found.        Medication Contract and Consent for Opioid Use Documents Filed        No documents found
100

## 2023-02-08 ENCOUNTER — HOSPITAL ENCOUNTER (OUTPATIENT)
Dept: PULMONOLOGY | Age: 83
Discharge: HOME | End: 2023-02-08
Payer: MEDICARE

## 2023-02-08 DIAGNOSIS — J44.9 CHRONIC OBSTRUCTIVE PULMONARY DISEASE, UNSPECIFIED COPD TYPE (HCC): ICD-10-CM

## 2023-02-08 PROCEDURE — 94729 DIFFUSING CAPACITY: CPT

## 2023-02-08 PROCEDURE — 94726 PLETHYSMOGRAPHY LUNG VOLUMES: CPT

## 2023-02-08 PROCEDURE — 94060 EVALUATION OF WHEEZING: CPT

## 2023-02-09 ENCOUNTER — TELEPHONE (OUTPATIENT)
Dept: FAMILY MEDICINE CLINIC | Age: 83
End: 2023-02-09

## 2023-02-09 NOTE — TELEPHONE ENCOUNTER
----- Message from PETEY Hernandez CNP sent at 2/9/2023  9:54 AM EST -----  PFT consistent with COPD. May benefit from the addition of an inhaled steroid. If interested, will send in. Can also wait until pulmonology appt if she prefers.

## 2023-03-02 ENCOUNTER — OFFICE VISIT (OUTPATIENT)
Dept: FAMILY MEDICINE CLINIC | Age: 83
End: 2023-03-02

## 2023-03-02 VITALS
DIASTOLIC BLOOD PRESSURE: 86 MMHG | SYSTOLIC BLOOD PRESSURE: 136 MMHG | HEART RATE: 84 BPM | WEIGHT: 138 LBS | RESPIRATION RATE: 20 BRPM | BODY MASS INDEX: 22.96 KG/M2

## 2023-03-02 DIAGNOSIS — E78.5 HYPERLIPIDEMIA, UNSPECIFIED HYPERLIPIDEMIA TYPE: ICD-10-CM

## 2023-03-02 DIAGNOSIS — E11.42 TYPE 2 DIABETES MELLITUS WITH DIABETIC POLYNEUROPATHY, WITHOUT LONG-TERM CURRENT USE OF INSULIN (HCC): ICD-10-CM

## 2023-03-02 DIAGNOSIS — J44.9 CHRONIC OBSTRUCTIVE PULMONARY DISEASE, UNSPECIFIED COPD TYPE (HCC): ICD-10-CM

## 2023-03-02 DIAGNOSIS — R25.9 PARKINSONIAN FEATURES: ICD-10-CM

## 2023-03-02 DIAGNOSIS — R45.4 DIFFICULTY CONTROLLING ANGER: ICD-10-CM

## 2023-03-02 DIAGNOSIS — I25.119 ATHEROSCLEROSIS OF NATIVE CORONARY ARTERY WITH ANGINA PECTORIS, UNSPECIFIED WHETHER NATIVE OR TRANSPLANTED HEART (HCC): ICD-10-CM

## 2023-03-02 DIAGNOSIS — M16.0 BILATERAL PRIMARY OSTEOARTHRITIS OF HIP: ICD-10-CM

## 2023-03-02 DIAGNOSIS — F02.80 DEMENTIA IN OTHER DISEASES CLASSIFIED ELSEWHERE, UNSPECIFIED SEVERITY, WITHOUT BEHAVIORAL DISTURBANCE, PSYCHOTIC DISTURBANCE, MOOD DISTURBANCE, AND ANXIETY (HCC): ICD-10-CM

## 2023-03-02 DIAGNOSIS — R05.3 CHRONIC COUGH: Primary | ICD-10-CM

## 2023-03-02 PROBLEM — Z87.19 HISTORY OF ULCERATIVE COLITIS: Status: ACTIVE | Noted: 2019-04-03

## 2023-03-02 RX ORDER — BENZONATATE 200 MG/1
200 CAPSULE ORAL 3 TIMES DAILY PRN
Qty: 90 CAPSULE | Refills: 3 | Status: SHIPPED | OUTPATIENT
Start: 2023-03-02

## 2023-03-02 RX ORDER — FLUOXETINE 10 MG/1
10 TABLET, FILM COATED ORAL DAILY
Qty: 30 TABLET | Refills: 0 | Status: SHIPPED | OUTPATIENT
Start: 2023-03-02 | End: 2023-04-01

## 2023-03-02 SDOH — ECONOMIC STABILITY: FOOD INSECURITY: WITHIN THE PAST 12 MONTHS, THE FOOD YOU BOUGHT JUST DIDN'T LAST AND YOU DIDN'T HAVE MONEY TO GET MORE.: NEVER TRUE

## 2023-03-02 SDOH — ECONOMIC STABILITY: HOUSING INSECURITY
IN THE LAST 12 MONTHS, WAS THERE A TIME WHEN YOU DID NOT HAVE A STEADY PLACE TO SLEEP OR SLEPT IN A SHELTER (INCLUDING NOW)?: NO

## 2023-03-02 SDOH — ECONOMIC STABILITY: FOOD INSECURITY: WITHIN THE PAST 12 MONTHS, YOU WORRIED THAT YOUR FOOD WOULD RUN OUT BEFORE YOU GOT MONEY TO BUY MORE.: NEVER TRUE

## 2023-03-02 SDOH — ECONOMIC STABILITY: INCOME INSECURITY: HOW HARD IS IT FOR YOU TO PAY FOR THE VERY BASICS LIKE FOOD, HOUSING, MEDICAL CARE, AND HEATING?: NOT HARD AT ALL

## 2023-03-02 ASSESSMENT — ENCOUNTER SYMPTOMS
CHEST TIGHTNESS: 1
WHEEZING: 1
SHORTNESS OF BREATH: 1
COUGH: 1

## 2023-03-02 ASSESSMENT — PATIENT HEALTH QUESTIONNAIRE - PHQ9
SUM OF ALL RESPONSES TO PHQ QUESTIONS 1-9: 0
SUM OF ALL RESPONSES TO PHQ QUESTIONS 1-9: 0
SUM OF ALL RESPONSES TO PHQ9 QUESTIONS 1 & 2: 0
SUM OF ALL RESPONSES TO PHQ QUESTIONS 1-9: 0
SUM OF ALL RESPONSES TO PHQ QUESTIONS 1-9: 0
2. FEELING DOWN, DEPRESSED OR HOPELESS: 0
1. LITTLE INTEREST OR PLEASURE IN DOING THINGS: 0

## 2023-03-02 NOTE — PATIENT INSTRUCTIONS
[facility]   [facility] [address] [facility]   Sarika Jones    [phone]

## 2023-03-02 NOTE — PROGRESS NOTES
Sintia Almeida (:  1940) is a 80 y.o. female,Established patient, here for evaluation of the following chief complaint(s):  Cough and Hip Pain (Would like injection )         ASSESSMENT/PLAN:  1. Chronic cough  -     benzonatate (TESSALON) 200 MG capsule; Take 1 capsule by mouth 3 times daily as needed for Cough, Disp-90 capsule, R-3Normal  2. Parkinsonian features  -     carbidopa-levodopa (SINEMET)  MG per tablet; Take 2 tablets by mouth 3 times daily, Disp-540 tablet, R-1Normal  3. Dementia in other diseases classified elsewhere, unspecified severity, without behavioral disturbance, psychotic disturbance, mood disturbance, and anxiety (HCC)  -     FLUoxetine (PROZAC) 10 MG tablet; Take 1 tablet by mouth daily, Disp-30 tablet, R-0Normal  4. Difficulty controlling anger  -     FLUoxetine (PROZAC) 10 MG tablet; Take 1 tablet by mouth daily, Disp-30 tablet, R-0Normal  5. Chronic obstructive pulmonary disease, unspecified COPD type ([facility]  6. Type 2 diabetes mellitus with diabetic polyneuropathy, without long-term current use of insulin ([facility])  -     Hemoglobin A1C; Future  7. 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, unspecified whether native or transplanted heart ([facility]  -     Lipid Panel; Future  8. Hyperlipidemia, unspecified hyperlipidemia type  -     Lipid Panel; Future      Blood work orders provided. Increase dose of carbidopa-levodopa to 2 tablets TID from 1 tablet 4x daily. Restart benzonatate for chronic cough. Unlikely to be related to infectious process given the length of symptoms. Likely attributable to COPD, continued smoking. Smoking cessation encouraged, declined by patient. Bilateral hip pain continues. Will schedule with Dr. Naty Anglin for hip injections. May benefit from ortho referral although surgery is likely not realistic. Return in about 1 month (around 2023) for Chronic Conditions.          Subjective   SUBJECTIVE/OBJECTIVE:  Patient presents with her son for a follow up. Patient reports continued cough. Ongoing since [place]. Productive at times. Hx of COPD, smoking. Declines smoking cessation. Pulmonology appt in 2 weeks. Missed her visit with OBGYN for further evaluation of vaginal prolapse. Has not made additional appt. Tremors continues to worsen. Currently taking carbidopa-levodopa. Interested in increasing dose. Increased anger, Marcia Sheffield acknowledges. Frequent cursing which patient never used to do. Denies change in memory. Chronic hip pain continues. Last hip steroid injection was in June 2022. Cough  This is a chronic problem. The current episode started more than 1 month ago. The problem has been unchanged. The cough is Non-productive. Associated symptoms include shortness of breath and wheezing. Pertinent negatives include no chest pain or fever. She has tried a beta-agonist inhaler (Anoro) for the symptoms. The treatment provided mild relief. Her past medical history is significant for bronchitis and COPD. Hip Pain   The pain is present in the left hip and right hip. The quality of the pain is described as stabbing. The pain is at a severity of 8/10 (R is worse than the left). Pertinent negatives include no inability to bear weight, loss of motion, muscle weakness, numbness or tingling. She reports no foreign bodies present. The symptoms are aggravated by movement. Treatments tried: PT stopped 2 months ago. PHQ-9 Total Score: 0 (3/2/2023  1:27 PM)      Review of Systems   Constitutional:  Negative for fever. Respiratory:  Positive for cough, chest tightness, shortness of breath and wheezing. Cardiovascular:  Negative for chest pain. Musculoskeletal:  Positive for arthralgias. Neurological:  Positive for tremors. Negative for tingling and numbness. Psychiatric/Behavioral:  Positive for agitation. Negative for dysphoric mood. Objective   Physical Exam  Vitals and nursing note reviewed.    Constitutional:       General: She is awake.      Appearance: Normal appearance.   HENT:      Head: Normocephalic and atraumatic.      Right Ear: Hearing and external ear normal.      Left Ear: Hearing and external ear normal.      Nose: Nose normal. No congestion or rhinorrhea.   Eyes:      General: Lids are normal.         Right eye: No discharge.         Left eye: No discharge.      Conjunctiva/sclera: Conjunctivae normal.   Neck:      Trachea: No tracheal deviation.   Cardiovascular:      Rate and Rhythm: Normal rate and regular rhythm.      Heart sounds: Normal heart sounds. No murmur heard.  Pulmonary:      Effort: Pulmonary effort is normal. No tachypnea or respiratory distress.      Breath sounds: No stridor. Decreased breath sounds present. No wheezing.   Musculoskeletal:      Cervical back: Full passive range of motion without pain.   Skin:     General: Skin is dry.      Coloration: Skin is not jaundiced or pale.   Neurological:      General: No focal deficit present.      Mental Status: She is alert. Mental status is at baseline.      Motor: Tremor present.   Psychiatric:         Mood and Affect: Mood and affect normal.         Behavior: Behavior is not agitated. Behavior is cooperative.                An electronic signature was used to authenticate this note.    --KRISTINE PAUL, APRN - CNP

## 2023-03-07 ENCOUNTER — OFFICE VISIT (OUTPATIENT)
Dept: FAMILY MEDICINE CLINIC | Age: 83
End: 2023-03-07

## 2023-03-07 VITALS
DIASTOLIC BLOOD PRESSURE: 82 MMHG | RESPIRATION RATE: 16 BRPM | BODY MASS INDEX: 23.06 KG/M2 | WEIGHT: 138.4 LBS | OXYGEN SATURATION: 96 % | HEART RATE: 63 BPM | HEIGHT: 65 IN | TEMPERATURE: 97.7 F | SYSTOLIC BLOOD PRESSURE: 123 MMHG

## 2023-03-07 DIAGNOSIS — M25.551 RIGHT HIP PAIN: Primary | ICD-10-CM

## 2023-03-07 DIAGNOSIS — S81.801A OPEN WOUND OF RIGHT LOWER LEG, INITIAL ENCOUNTER: ICD-10-CM

## 2023-03-07 DIAGNOSIS — M70.61 GREATER TROCHANTERIC BURSITIS OF RIGHT HIP: ICD-10-CM

## 2023-03-07 RX ORDER — BUPIVACAINE HYDROCHLORIDE 5 MG/ML
4 INJECTION, SOLUTION PERINEURAL ONCE
Status: COMPLETED | OUTPATIENT
Start: 2023-03-07 | End: 2023-03-07

## 2023-03-07 RX ORDER — AMOXICILLIN 500 MG/1
500 CAPSULE ORAL 3 TIMES DAILY
Qty: 30 CAPSULE | Refills: 0 | Status: SHIPPED | OUTPATIENT
Start: 2023-03-07 | End: 2023-03-17

## 2023-03-07 RX ORDER — LIDOCAINE HYDROCHLORIDE 10 MG/ML
4 INJECTION, SOLUTION INFILTRATION; PERINEURAL ONCE
Status: COMPLETED | OUTPATIENT
Start: 2023-03-07 | End: 2023-03-07

## 2023-03-07 RX ORDER — TRIAMCINOLONE ACETONIDE 40 MG/ML
80 INJECTION, SUSPENSION INTRA-ARTICULAR; INTRAMUSCULAR ONCE
Status: COMPLETED | OUTPATIENT
Start: 2023-03-07 | End: 2023-03-07

## 2023-03-07 RX ADMIN — LIDOCAINE HYDROCHLORIDE 4 ML: 10 INJECTION, SOLUTION INFILTRATION; PERINEURAL at 12:17

## 2023-03-07 RX ADMIN — BUPIVACAINE HYDROCHLORIDE 20 MG: 5 INJECTION, SOLUTION PERINEURAL at 12:16

## 2023-03-07 RX ADMIN — TRIAMCINOLONE ACETONIDE 80 MG: 40 INJECTION, SUSPENSION INTRA-ARTICULAR; INTRAMUSCULAR at 12:18

## 2023-03-07 NOTE — PROGRESS NOTES
[facility]  [facility]  [address] [facility]  Dept: [phone]  Dept Fax: [phone]  Loc: [phone]  PROGRESS NOTE      Visit Date: 3/7/2023    Bess Burt is a 80 y.o. female who presents today for:  Chief Complaint   Patient presents with    Injections     Wants both hips injection       Subjective:  HPI    Bilateral hip pain. Pain with walking and crossing legs. Right hip is worse. History of surgery on left hip. Has tried Tylenol. No recent trauma or falls. Had x-ray of right hip and pelvis as well as right femur in April 2022. Seen by Carl Beckman recently and scheduled for possible injections for today. She reports having previous injections in her hips but does not recall when or by whom. Reports pain in the lateral right hip. Right lower extremity wound on shin. No fever or chills. Draining. Putting bandage on it daily. Present for couple weeks    Review of Systems   Constitutional:  Negative for chills and fever. Skin:  Positive for wound.    Patient Active Problem List   Diagnosis    Nonexudative age-related macular degeneration    Nuclear sclerotic cataract    History of ulcerative colitis    Sleep apnea    Postmenopausal    Nocturnal hypoxemia    Nicotine dependence    Hyperlipidemia    Diverticulosis large intestine w/o perforation or abscess w/bleeding    Simple chronic bronchitis (HCC)    Age-related osteoporosis without current pathological fracture    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 ([facility]    Type 2 diabetes mellitus with diabetic polyneuropathy, without long-term current use of insulin (HCC)    Trochanteric bursitis    Generalized weakness    Dementia without behavioral disturbance, unspecified dementia type    Hypertension    Chronic obstructive pulmonary disease (HCC)    Compression fracture of L5 vertebra with delayed healing    Acute respiratory failure with hypoxia [facility])     Past Medical History:   Diagnosis Date    Abnormal nuclear stress test 06/01/2018    Cataract     Chronic deep vein thrombosis (DVT) of femoral vein of left lower extremity ([facility] 3/9/2021    Colon polyps     COPD (chronic obstructive pulmonary disease) (HCC)     Diverticulosis large intestine w/o perforation or abscess w/bleeding     DVT, recurrent, lower extremity, acute, left (HCC)     Fibroid, uterine     Hearing loss     Hx of blood clots     Hyperlipidemia     Nicotine dependence     Nocturnal hypoxemia     Postmenopausal     Sleep apnea     Ulcerative colitis ([facility]     with rectal bleeding       Past Surgical History:   Procedure Laterality Date    BREAST BIOPSY  1988    right breast biopsy     BREAST BIOPSY Right 03/2011    stereotactic biopsy    COLONOSCOPY  2006    Dr Mario Bowden  2010    Dr Carline Ortiz    COLONOSCOPY  2010    Dr Katie Jack interminate colitis    COLONOSCOPY  03/03/2014    Dr Tanja Miller Bilateral 2011 2012    HIP SURGERY  06/13/2020    left fracture    OSTEOTOMY Left 11/2/2021    LEFT 5TH METATARSAL HEAD RESECTION EXCISION SOFT TISSUE LESION performed by Veneta Mcburney, DPM at [address]     Family History   Problem Relation Age of Onset    Diabetes Mother     High Blood Pressure Mother     Heart Disease Mother     Coronary Art Dis Father     Heart Disease Father      Social History     Tobacco Use    Smoking status: Some Days     Packs/day: 1.00     Years: 40.00     Pack years: 40.00     Types: Cigarettes    Smokeless tobacco: Never    Tobacco comments:     8-10 cigarettes / day   Substance Use Topics    Alcohol use: No      Current Outpatient Medications   Medication Sig Dispense Refill    benzonatate (TESSALON) 200 MG capsule Take 1 capsule by mouth 3 times daily as needed for Cough 90 capsule 3    carbidopa-levodopa (SINEMET)  MG per tablet Take 2 tablets by mouth 3 times daily 540 tablet 1    FLUoxetine (PROZAC) 10 MG tablet Take 1 tablet by mouth daily 30 tablet 0    alendronate (FOSAMAX) 70 MG tablet Take 1 tablet by mouth every 7 days Take with a full glass of water upon arising for the day. Consume on an empty stomach at least 30 minutes before the first food, beverage, or medication. Stay upright (do not lie down) for at least 30 minutes. Do not crush or break. 4 tablet 3    Dextromethorphan-guaiFENesin  MG CAPS Take 1-2 tablets by mouth every 4 hours as needed (Cough) 1-2 tablets every 4 hours as needed.  24 capsule 0    ondansetron (ZOFRAN) 4 MG tablet Take 1 tablet by mouth 3 times daily as needed for Nausea or Vomiting 20 tablet 0    predniSONE (DELTASONE) 10 MG tablet Take 1 tablet by mouth daily 90 tablet 3    donepezil (ARICEPT) 10 MG tablet Take 1 tablet by mouth nightly 90 tablet 3    metoprolol tartrate (LOPRESSOR) 25 MG tablet Take 1 tablet by mouth 2 times daily 180 tablet 3    lisinopril (PRINIVIL;ZESTRIL) 20 MG tablet Take 1 tablet by mouth daily 90 tablet 3    simvastatin (ZOCOR) 40 MG tablet Take 1 tablet by mouth nightly 90 tablet 3    umeclidinium-vilanterol (ANORO ELLIPTA) 62.5-25 MCG/INH AEPB inhaler Inhale 1 puff into the lungs daily 1 each 5    Multiple Vitamin (MULTIVITAMIN ADULT PO) Take 1 tablet by mouth daily      vitamin C (ASCORBIC ACID) 500 MG tablet Take 1,000 mg by mouth daily      zinc gluconate 50 MG tablet Take 50 mg by mouth daily      albuterol (PROVENTIL) (2.5 MG/3ML) 0.083% nebulizer solution Take 2.5 mg by nebulization every 6 hours as needed for Wheezing or Shortness of Breath      acetaminophen (TYLENOL) 500 MG tablet Take 2 tablets by mouth 3 times daily as needed for Pain 100 tablet 1    albuterol sulfate  (90 Base) MCG/ACT inhaler Inhale 2 puffs into the lungs every 6 hours as needed for Wheezing 2 Inhaler 5    Psyllium Fiber 0.52 g CAPS Take 1 capsule by mouth daily 90 capsule 3    glucosamine-chondroitin 500-400 MG CAPS Take by mouth daily      vitamin B-12 (CYANOCOBALAMIN) 1000 MCG tablet Take 1 tablet by mouth daily 30 tablet 3    Probiotic Product (mojio PO) Take 1 tablet by mouth daily      ipratropium (ATROVENT) 0.02 % nebulizer solution Take 0.5 mg by nebulization every 6-8 hours as needed for Wheezing (Patient not taking: Reported on 3/7/2023)       No current facility-administered medications for this visit. Allergies   Allergen Reactions    Azulfidine [Sulfasalazine] Diarrhea    Keflex [Cephalexin] Diarrhea    Mesalamine Other (See Comments)    Sulfa Antibiotics     Alcohol Nausea And Vomiting     Drinking alcohol makes her sick and gives her headaches     Health Maintenance   Topic Date Due    Diabetic retinal exam  02/12/2019    A1C test (Diabetic or Prediabetic)  12/22/2021    Lipids  04/20/2022    Diabetic foot exam  06/15/2023    Annual Wellness Visit (AWV)  06/16/2023    Depression Screen  03/02/2024    DTaP/Tdap/Td vaccine (3 - Td or Tdap) 01/02/2030    DEXA (modify frequency per FRAX score)  Completed    Flu vaccine  Completed    Shingles vaccine  Completed    Pneumococcal 65+ years Vaccine  Completed    COVID-19 Vaccine  Completed    Hepatitis A vaccine  Aged Out    Hib vaccine  Aged Out    Meningococcal (ACWY) vaccine  Aged Out       Objective:  /82   Pulse 63   Temp 97.7 °F (36.5 °C)   Resp 16   Ht 5' 5\" (1.651 m)   Wt 138 lb 6.4 oz (62.8 kg)   SpO2 96%   BMI 23.03 kg/m²   Physical Exam  Vitals reviewed. Constitutional:       General: She is not in acute distress. Appearance: She is not ill-appearing. Neurological:      Mental Status: She is alert. Psychiatric:         Attention and Perception: Attention normal.         Mood and Affect: Mood is anxious. Speech: Speech normal.         Cognition and Memory: She exhibits impaired recent memory. Right hip: Tenderness of the greater trochanter. Negative logroll test.  Negative FADIR test.  5/5 for abduction and adduction.     Left hip: Minimal tenderness of greater trochanter. Negative logroll test.  Negative FADIR test.  5/5 strength for abduction and adduction    Right lower leg anterior with skin ulcer as shown below        Procedure Note: Right Hip Greater Trochanter Injection    Discussed risks and benefits of steroid injection, including but not limited to infection, skin discoloration, pain, and bleeding. With the patient's verbal informed consent, her right lateral hip over the area of maximal tenderness was prepped  in standard sterile fashion with Betadine and Alcohol. Ethyl chloride was used to anesthetize the skin. A mixture of 4 cc of 0.5% Bupivacaine, 4 cc of 1% Lidocaine,  and 2 cc of Kenalog 40 mg was injected into the soft tissues overlying the right greater trochanter. The patient tolerated this well without difficulty. A band-aid was applied and the patient was advised to ice the lateral hip for 15-20 minutes to relieve any injection site related pain. BETTY Lopez was present for procedure      Impression/Plan:  1. Right hip pain  Right hip pain likely related to greater trochanteric bursitis. Steroid injection was performed as described above. Continue Tylenol. Recommend ice.  - VT ARTHROCENTESIS ASPIR&/INJ MAJOR JT/BURSA W/O US  - triamcinolone acetonide (KENALOG-40) injection 80 mg  - bupivacaine (MARCAINE) 0.5 % injection 20 mg  - lidocaine 1 % injection 4 mL    2. Greater trochanteric bursitis of right hip  - VT ARTHROCENTESIS ASPIR&/INJ MAJOR JT/BURSA W/O US  - triamcinolone acetonide (KENALOG-40) injection 80 mg  - bupivacaine (MARCAINE) 0.5 % injection 20 mg  - lidocaine 1 % injection 4 mL    3. Open wound of right lower leg, initial encounter  New problem of ulcer of right lower extremity shin area. Will start amoxicillin. Recommend daily bandage changes. She declines referral to wound clinic  - amoxicillin (AMOXIL) 500 MG capsule; Take 1 capsule by mouth 3 times daily for 10 days  Dispense: 30 capsule;  Refill: 0    No injection of the left hip was performed as her pain was minimal today    They voiced understanding. All questions answered. They agreed with treatment plan. See patient instructions for any educational materials that may have been given. Discussed use, benefit, and side effects of prescribed medications. Reviewed health maintenance. (Please note that portions of this note may have been completed with a voice recognition program.  Efforts were made to edit the dictation but occasionally words are mis-transcribed.)    Return in 2 weeks (on 3/21/2023) for right lower leg wound. .      Electronically signed by Yris Ayoub MD on 3/7/2023 at 11:47 AM

## 2023-03-14 ENCOUNTER — TELEPHONE (OUTPATIENT)
Dept: FAMILY MEDICINE CLINIC | Age: 83
End: 2023-03-14

## 2023-03-14 DIAGNOSIS — S81.801A OPEN WOUND OF RIGHT LOWER LEG, INITIAL ENCOUNTER: Primary | ICD-10-CM

## 2023-03-14 NOTE — TELEPHONE ENCOUNTER
Carla Astorga, nurse from [facility] called and would like a referral to [facility] for right lower leg wound. Patient has an appt. With Dr. Ant Bustillo on 3/21/23 at [phone].

## 2023-03-16 ENCOUNTER — HOSPITAL ENCOUNTER (OUTPATIENT)
Dept: WOUND CARE | Age: 83
Discharge: HOME | End: 2023-03-16
Payer: MEDICARE

## 2023-03-16 VITALS
TEMPERATURE: 97 F | HEART RATE: 59 BPM | DIASTOLIC BLOOD PRESSURE: 64 MMHG | SYSTOLIC BLOOD PRESSURE: 143 MMHG | OXYGEN SATURATION: 95 % | RESPIRATION RATE: 18 BRPM

## 2023-03-16 DIAGNOSIS — L97.929 VENOUS STASIS ULCER OF LEFT LOWER LEG WITH EDEMA OF LEFT LOWER LEG (HCC): ICD-10-CM

## 2023-03-16 DIAGNOSIS — R60.9 VENOUS STASIS ULCER OF LEFT LOWER LEG WITH EDEMA OF LEFT LOWER LEG (HCC): ICD-10-CM

## 2023-03-16 DIAGNOSIS — I83.891 VENOUS STASIS ULCER OF RIGHT LOWER LEG WITH EDEMA OF RIGHT LOWER LEG (HCC): ICD-10-CM

## 2023-03-16 DIAGNOSIS — I83.892 VENOUS STASIS ULCER OF LEFT LOWER LEG WITH EDEMA OF LEFT LOWER LEG (HCC): ICD-10-CM

## 2023-03-16 DIAGNOSIS — R60.9 VENOUS STASIS ULCER OF RIGHT LOWER LEG WITH EDEMA OF RIGHT LOWER LEG (HCC): ICD-10-CM

## 2023-03-16 DIAGNOSIS — I83.029 VENOUS STASIS ULCER OF LEFT LOWER LEG WITH EDEMA OF LEFT LOWER LEG (HCC): ICD-10-CM

## 2023-03-16 DIAGNOSIS — I83.019 VENOUS STASIS ULCER OF RIGHT LOWER LEG WITH EDEMA OF RIGHT LOWER LEG (HCC): ICD-10-CM

## 2023-03-16 DIAGNOSIS — L97.919 VENOUS STASIS ULCER OF RIGHT LOWER LEG WITH EDEMA OF RIGHT LOWER LEG (HCC): ICD-10-CM

## 2023-03-16 PROBLEM — R60.0 VENOUS STASIS ULCER OF LEFT LOWER LEG WITH EDEMA OF LEFT LOWER LEG (HCC): Status: ACTIVE | Noted: 2023-03-16

## 2023-03-16 PROBLEM — R60.0 VENOUS STASIS ULCER OF RIGHT LOWER LEG WITH EDEMA OF RIGHT LOWER LEG (HCC): Status: ACTIVE | Noted: 2023-03-16

## 2023-03-16 PROCEDURE — 99203 OFFICE O/P NEW LOW 30 MIN: CPT | Performed by: NURSE PRACTITIONER

## 2023-03-16 RX ORDER — GENTAMICIN SULFATE 1 MG/G
OINTMENT TOPICAL ONCE
OUTPATIENT
Start: 2023-03-16 | End: 2023-03-16

## 2023-03-16 RX ORDER — HYDROCODONE BITARTRATE AND ACETAMINOPHEN 5; 325 MG/1; MG/1
1 TABLET ORAL EVERY 6 HOURS PRN
COMMUNITY

## 2023-03-16 RX ORDER — LIDOCAINE HYDROCHLORIDE 20 MG/ML
JELLY TOPICAL ONCE
OUTPATIENT
Start: 2023-03-16 | End: 2023-03-16

## 2023-03-16 RX ORDER — BETAMETHASONE DIPROPIONATE 0.05 %
OINTMENT (GRAM) TOPICAL ONCE
OUTPATIENT
Start: 2023-03-16 | End: 2023-03-16

## 2023-03-16 RX ORDER — BACITRACIN, NEOMYCIN, POLYMYXIN B 400; 3.5; 5 [USP'U]/G; MG/G; [USP'U]/G
OINTMENT TOPICAL ONCE
OUTPATIENT
Start: 2023-03-16 | End: 2023-03-16

## 2023-03-16 RX ORDER — LIDOCAINE HYDROCHLORIDE 40 MG/ML
SOLUTION TOPICAL ONCE
OUTPATIENT
Start: 2023-03-16 | End: 2023-03-16

## 2023-03-16 RX ORDER — BACITRACIN ZINC AND POLYMYXIN B SULFATE 500; 1000 [USP'U]/G; [USP'U]/G
OINTMENT TOPICAL ONCE
OUTPATIENT
Start: 2023-03-16 | End: 2023-03-16

## 2023-03-16 RX ORDER — CLOBETASOL PROPIONATE 0.5 MG/G
OINTMENT TOPICAL ONCE
OUTPATIENT
Start: 2023-03-16 | End: 2023-03-16

## 2023-03-16 RX ORDER — LIDOCAINE 50 MG/G
OINTMENT TOPICAL ONCE
OUTPATIENT
Start: 2023-03-16 | End: 2023-03-16

## 2023-03-16 RX ORDER — GINSENG 100 MG
CAPSULE ORAL ONCE
OUTPATIENT
Start: 2023-03-16 | End: 2023-03-16

## 2023-03-16 RX ORDER — LIDOCAINE 40 MG/G
CREAM TOPICAL ONCE
OUTPATIENT
Start: 2023-03-16 | End: 2023-03-16

## 2023-03-16 NOTE — DISCHARGE INSTRUCTIONS
Visit Discharge/Physician Orders:  - elevate legs throughout the day to help with swelling   - patient measured for zip up marylin gonsalez will order today. - will see if able to refer to home health to assist with dressing changes. Home Care: Patient at [address] [place] [address] Location: left leg, right leg     Dressing orders:     1) Gather wound care supplies and arrange on clean table. 2) Wash your hands with soap and water or use alcohol based hand  for 20 seconds (sing \"Happy Birthday\" twice). 3) Cleanse wounds with normal saline or wound cleanser and gauze. Pat dry with clean gauze. 4) Right and Left leg - Apply lightly moistened hydrofera blue to open wounds. Cover with dry dressing. Wrap with kerlix (secure with tape if needed.) and coban from base of toes to just below bend of knee. Change 3 times a week. Keep all dressings clean & dry. Follow up visit:   2 Weeks Thursday March 30th at [facility]:    Duration of dressings: 60 days     We have sent your supply order to the following company:  [employer]  If you don't receive the items you were expecting or don't know what the items are that you received, call the company where the order was sent. If you are unable to obtain wound supplies, continue to use the supplies you have available until you are able to reach us. It is most important to keep the wound covered at all times. It is YOUR responsibility to make sure that supplies are re-ordered before you run out. Re-order telephone numbers are included in each package. Keep next scheduled appointment. Please give 24 hour notice if unable to keep appointment. [phone]    If you experience any of the following, please call the Wound Care Service during business hours: Monday through Friday 8:00 am - 4:30 pm  ([phone]).    *Increase in pain   *Temperature over 101   *Increase in drainage from your wound or a foul odor   *Uncontrolled swelling   *Need for compression bandage changes due to slippage, breakthrough drainage    If you need medical attention outside of business hours, please contact your Primary Care Doctor or go to the nearest emergency room.

## 2023-03-16 NOTE — PROGRESS NOTES
[address] f: [phone] f: [phone] p: [phone] [email or web address]      Ordering Center:   [address] [address]  [phone]  WOUND CARE Dept: [phone]   [facility] NUMBER [phone]    Patient Information:      Mohini Arndt  [address]  [facility]   [phone]   : 1940  AGE: 80 y.o. GENDER: female   EPISODE DATE: 3/16/2023    Insurance:      PRIMARY INSURANCE:  Plan: MEDICARE PART A AND B  Coverage: MEDICARE  Effective Date: 10/1/2005  Group Number: [unfilled]  Subscriber Number: [id number] - (Medicare)    Payer/Plan Subscr  Sex Relation Sub. Ins. ID Effective Group Num   1. MEDICARE - MEMariam Sovereign 1940 Female Self [id number] 10/1/05                                    [address] [address]   2. [address] [place] 1940 Female Self [id number] 1/1/15 Plan J                                   [address]       Patient Wound Information:      Problem List Items Addressed This Visit          Other    Venous stasis ulcer of right lower leg with edema of right lower leg [facility])    Relevant Orders    [address] [facility]    Venous stasis ulcer of left lower leg with edema of left lower leg [facility])    Relevant Orders    [address] DRESSING SUPPLIES:     Wound 23 Pretibial Right;Lateral (Active)   Wound Image   23   Wound Etiology Venous 23   Dressing Status Intact; Old drainage noted;New dressing applied 23   Wound Cleansed Cleansed with saline 23   Dressing/Treatment Hydrofera blue;ABD;Roll gauze;Coban/self-adherent bandages 23   Offloading for Diabetic Foot Ulcers Offloading not required 23   Wound Length (cm) 3 cm 23   Wound Width (cm) 1.5 cm 23   Wound Depth (cm) 0.2 cm 03/16/23 0930   Wound Surface Area (cm^2) 4.5 cm^2 03/16/23 0930   Wound Volume (cm^3) 0.9 cm^3 03/16/23 0930   Wound Assessment Slough; Devitalized tissue; Epithelialization;Granulation tissue 03/16/23 0930   Drainage Amount Moderate 03/16/23 0930   Drainage Description Serosanguinous; Yellow;Green 03/16/23 0930   Odor Mild 03/16/23 0930   Judie-wound Assessment Dry/flaky; Blanchable erythema; Maceration 03/16/23 0930   Margins Attached edges 03/16/23 0930   Wound Thickness Description not for Pressure Injury Full thickness 03/16/23 0930   Number of days: 0       Wound 03/16/23 Pretibial Left (Active)   Wound Image   03/16/23 0930   Wound Etiology Venous 03/16/23 0930   Dressing Status Intact; Old drainage noted;New dressing applied 03/16/23 0930   Wound Cleansed Cleansed with saline 03/16/23 0930   Dressing/Treatment Roll gauze;Coban/self-adherent bandages; Hydrofera blue;Dry dressing 03/16/23 0930   Offloading for Diabetic Foot Ulcers Offloading not required 03/16/23 0930   Wound Length (cm) 1.4 cm 03/16/23 0930   Wound Width (cm) 0.9 cm 03/16/23 0930   Wound Depth (cm) 0.1 cm 03/16/23 0930   Wound Surface Area (cm^2) 1.26 cm^2 03/16/23 0930   Wound Volume (cm^3) 0.126 cm^3 03/16/23 0930   Wound Assessment Slough;Granulation tissue 03/16/23 0930   Drainage Amount Moderate 03/16/23 0930   Drainage Description Serosanguinous 03/16/23 0930   Odor None 03/16/23 0930   Judie-wound Assessment Blanchable erythema;Dry/flaky; Intact 03/16/23 0930   Margins Attached edges 03/16/23 0930   Wound Thickness Description not for Pressure Injury Full thickness 03/16/23 0930   Number of days: 0          Supplies Requested :      WOUND #: 1   PRIMARY DRESSING:  Hydrofera Blue pad   Cover and Secure with: Bulky roll gauze  Coban     FREQUENCY OF DRESSING CHANGES:  Three times per week       WOUND #: 2   PRIMARY DRESSING:  Hydrofera Blue pad   Cover and Secure with: Bulky roll gauze  Coban     FREQUENCY OF DRESSING CHANGES:  Three times per week           ADDITIONAL ITEMS:  [] Gloves Small  [x] Gloves Medium [] Gloves Large [] Gloves XLarge  [] Tape 1\" [x] Tape 2\" [] Tape 3\"  [] Medipore Tape  [x] Saline  [] Skin Prep   [] Adhesive Remover   [] Cotton Tip Applicators   [] Other:    Patient Wound(s) Debrided: [x] Yes if yes please add date 3/16/23   [] No    Debribement Type: Mechanical     Patient currently being seen by Home Health: [] Yes   [x] No    Duration for needed supplies:  []15  [x]30  []60  []90 Days    Dispense as written    Electronically signed by Jose Rafael Tran RN on 3/16/2023 at 3:37 PM     Provider Information:      Yessenia Chin NAME: Brenda Lubin CNP     NPI: [id number]

## 2023-03-16 NOTE — PLAN OF CARE
Problem: Wound:  Goal: Will show signs of wound healing; wound closure and no evidence of infection  Description: Will show signs of wound healing; wound closure and no evidence of infection  Outcome: Progressing  Note: New patient today seen for right and left leg wound. Wound shows signs of proper closure and healing. No s/s of infection noted. Home health referral. Follow up in 2 weeks. See AVS for order changes. Care plan reviewed with patient. Patient verbalize understanding of the plan of care and contribute to goal setting.

## 2023-03-16 NOTE — PROGRESS NOTES
[facility]  Consult and Procedure Note      Nieves Daniel  MEDICAL RECORD NUMBER:  [id number]  AGE: 80 y.o. GENDER: female  : 1940  EPISODE DATE:  3/16/2023    SUBJECTIVE:     Chief Complaint   Patient presents with    Wound Check     HISTORY OF PRESENT ILLNESS      Nieves Daniel is a 80 y.o. female who presents today for evaluation of bilateral lower extremity wounds. Patient states wounds have been present for a month or so. She is unable to identify causative factor. Reports pain to both wounds and moderate amount of drainage. She is a resident of [address] [place] [address], unclear what current wound care is being completed. She denies any fevers or chills. Is a current 1 PPD smoker. Utilizes a walker for ambulation. Denies any further needs or concerns.     PAST MEDICAL HISTORY             Diagnosis Date    Abnormal nuclear stress test 2018    Cataract     Chronic deep vein thrombosis (DVT) of femoral vein of left lower extremity (HCC) 3/9/2021    Colon polyps     COPD (chronic obstructive pulmonary disease) (HCC)     Diverticulosis large intestine w/o perforation or abscess w/bleeding     DVT, recurrent, lower extremity, acute, left (HCC)     Fibroid, uterine     Hearing loss     Hx of blood clots     Hyperlipidemia     Nicotine dependence     Nocturnal hypoxemia     Postmenopausal     Sleep apnea     Ulcerative colitis ([address]     with rectal bleeding        PAST SURGICAL HISTORY     Past Surgical History:   Procedure Laterality Date    BREAST BIOPSY  1988    right breast biopsy     BREAST BIOPSY Right 2011    stereotactic biopsy    COLONOSCOPY      Dr Mahamed Paul      Dr Brad Steele    COLONOSCOPY      Dr Grady Ag interminate colitis    COLONOSCOPY  2014    Dr Matute Reef Bilateral 2011    HIP SURGERY  2020    left fracture    OSTEOTOMY Left 2021    LEFT 5TH METATARSAL HEAD RESECTION EXCISION SOFT TISSUE LESION performed by Torres Hanna DPM at [address] [place] [address]     Family History   Problem Relation Age of Onset    Diabetes Mother     High Blood Pressure Mother     Heart Disease Mother     Coronary Art Dis Father     Heart Disease Father        SOCIAL HISTORY     Social History     Tobacco Use    Smoking status: Some Days     Packs/day: 1.00     Years: 40.00     Pack years: 40.00     Types: Cigarettes    Smokeless tobacco: Never    Tobacco comments:     8-10 cigarettes / day   Vaping Use    Vaping Use: Never used   Substance Use Topics    Alcohol use: No    Drug use: Never       ALLERGIES     Allergies   Allergen Reactions    Azulfidine [Sulfasalazine] Diarrhea    Keflex [Cephalexin] Diarrhea    Mesalamine Other (See Comments)    Sulfa Antibiotics     Alcohol Nausea And Vomiting     Drinking alcohol makes her sick and gives her headaches       MEDICATIONS     Current Outpatient Medications on File Prior to Encounter   Medication Sig Dispense Refill    HYDROcodone-acetaminophen (NORCO) 5-325 MG per tablet Take 1 tablet by mouth every 6 hours as needed for Pain.      amoxicillin (AMOXIL) 500 MG capsule Take 1 capsule by mouth 3 times daily for 10 days 30 capsule 0    benzonatate (TESSALON) 200 MG capsule Take 1 capsule by mouth 3 times daily as needed for Cough 90 capsule 3    carbidopa-levodopa (SINEMET)  MG per tablet Take 2 tablets by mouth 3 times daily 540 tablet 1    FLUoxetine (PROZAC) 10 MG tablet Take 1 tablet by mouth daily 30 tablet 0    alendronate (FOSAMAX) 70 MG tablet Take 1 tablet by mouth every 7 days Take with a full glass of water upon arising for the day. Consume on an empty stomach at least 30 minutes before the first food, beverage, or medication. Stay upright (do not lie down) for at least 30 minutes. Do not crush or break.  4 tablet 3    Dextromethorphan-guaiFENesin  MG CAPS Take 1-2 tablets by mouth every 4 hours as needed (Cough) 1-2 tablets every 4 hours as needed. 24 capsule 0    ondansetron (ZOFRAN) 4 MG tablet Take 1 tablet by mouth 3 times daily as needed for Nausea or Vomiting 20 tablet 0    predniSONE (DELTASONE) 10 MG tablet Take 1 tablet by mouth daily 90 tablet 3    donepezil (ARICEPT) 10 MG tablet Take 1 tablet by mouth nightly 90 tablet 3    metoprolol tartrate (LOPRESSOR) 25 MG tablet Take 1 tablet by mouth 2 times daily 180 tablet 3    lisinopril (PRINIVIL;ZESTRIL) 20 MG tablet Take 1 tablet by mouth daily 90 tablet 3    simvastatin (ZOCOR) 40 MG tablet Take 1 tablet by mouth nightly 90 tablet 3    umeclidinium-vilanterol (ANORO ELLIPTA) 62.5-25 MCG/INH AEPB inhaler Inhale 1 puff into the lungs daily 1 each 5    Multiple Vitamin (MULTIVITAMIN ADULT PO) Take 1 tablet by mouth daily      vitamin C (ASCORBIC ACID) 500 MG tablet Take 1,000 mg by mouth daily      zinc gluconate 50 MG tablet Take 50 mg by mouth daily      albuterol (PROVENTIL) (2.5 MG/3ML) 0.083% nebulizer solution Take 2.5 mg by nebulization every 6 hours as needed for Wheezing or Shortness of Breath      ipratropium (ATROVENT) 0.02 % nebulizer solution Take 0.5 mg by nebulization every 6-8 hours as needed for Wheezing (Patient not taking: No sig reported)      acetaminophen (TYLENOL) 500 MG tablet Take 2 tablets by mouth 3 times daily as needed for Pain 100 tablet 1    albuterol sulfate  (90 Base) MCG/ACT inhaler Inhale 2 puffs into the lungs every 6 hours as needed for Wheezing 2 Inhaler 5    Psyllium Fiber 0.52 g CAPS Take 1 capsule by mouth daily 90 capsule 3    glucosamine-chondroitin 500-400 MG CAPS Take by mouth daily      vitamin B-12 (CYANOCOBALAMIN) 1000 MCG tablet Take 1 tablet by mouth daily 30 tablet 3    Probiotic Product ([employer] PO) Take 1 tablet by mouth daily       No current facility-administered medications on file prior to encounter.        REVIEW OF SYSTEMS:     Constitutional: Denies fever, chills, night sweats, fatigue, weight loss/gain, loss of appetite   Head: Denies headache,  dizziness, loss of consciousness  Respiratory: Denies shortness of breath, cough, wheezing, dyspnea with exertion  Cardiovascular:Denies chest pain, palpitations, edema  Gastrointestinal: Denies nausea, vomiting, constipation, diarrhea, abdominal pain   ROSELIA: Denies dysuria, frequency, urgency, hematuria  Musculoskeletal: Denies joint pain, swelling , stiffness,  Endocrine: Denies polyuria, polydipsia, cold or heat intolerance  Hematology: Denies easy brusing or bleeding, hx of clotting disorder  Dermatology: +wound Denies skin rash, eczema, pruritis    PHYSICAL EXAM:     BP (!) 143/64   Pulse 59   Temp 97 °F (36.1 °C) (Infrared)   Resp 18   SpO2 95%   Wt Readings from Last 3 Encounters:   03/07/23 138 lb 6.4 oz (62.8 kg)   03/02/23 138 lb (62.6 kg)   11/30/22 134 lb (60.8 kg)       General:  Awake, alert, no apparent distress. Appears stated age  [de-identified]: conjuctivae are clear without exudate or hemorrhage, anicteric sclera, moist oral mucosa. Chest:  Respirations regular, non-labored. Chest rise and fall equal bilaterally. Neurological: Awake, alert, oriented x4  Psychiatric:  Appropriate mood and affect  Extremities: non-traumatic in appearance. 2+ edema to bilateral lower legs. Teleangiectasis and varicose veins noted bilaterally. Pedal and posterior tibial pulses +1. Skin:  Warm and dry  Wound:    Right lateral leg wound bed granular with slough, epithelialization and devitalized tissue. Periwound macerated with blanchable erythema. Left leg wound bed granular with slough. Periwound dry with blanchable erythema. Wound 03/16/23 Pretibial Right;Lateral (Active)   Wound Image   03/16/23 0930   Wound Etiology Venous 03/16/23 0930   Dressing Status Intact; Old drainage noted;New dressing applied 03/16/23 0930   Wound Cleansed Cleansed with saline 03/16/23 0930   Dressing/Treatment Hydrofera blue;ABD;Roll gauze;Coban/self-adherent bandages 03/16/23 0930   Offloading for Diabetic Foot Ulcers Offloading not required 03/16/23 0930   Wound Length (cm) 3 cm 03/16/23 0930   Wound Width (cm) 1.5 cm 03/16/23 0930   Wound Depth (cm) 0.2 cm 03/16/23 0930   Wound Surface Area (cm^2) 4.5 cm^2 03/16/23 0930   Wound Volume (cm^3) 0.9 cm^3 03/16/23 0930   Wound Assessment Slough; Devitalized tissue; Epithelialization;Granulation tissue 03/16/23 0930   Drainage Amount Moderate 03/16/23 0930   Drainage Description Serosanguinous; Yellow;Green 03/16/23 0930   Odor Mild 03/16/23 0930   Judie-wound Assessment Dry/flaky; Blanchable erythema; Maceration 03/16/23 0930   Margins Attached edges 03/16/23 0930   Wound Thickness Description not for Pressure Injury Full thickness 03/16/23 0930   Number of days: 0       Wound 03/16/23 Pretibial Left (Active)   Wound Image   03/16/23 0930   Wound Etiology Venous 03/16/23 0930   Dressing Status Intact; Old drainage noted;New dressing applied 03/16/23 0930   Wound Cleansed Cleansed with saline 03/16/23 0930   Dressing/Treatment Roll gauze;Coban/self-adherent bandages; Hydrofera blue;Dry dressing 03/16/23 0930   Offloading for Diabetic Foot Ulcers Offloading not required 03/16/23 0930   Wound Length (cm) 1.4 cm 03/16/23 0930   Wound Width (cm) 0.9 cm 03/16/23 0930   Wound Depth (cm) 0.1 cm 03/16/23 0930   Wound Surface Area (cm^2) 1.26 cm^2 03/16/23 0930   Wound Volume (cm^3) 0.126 cm^3 03/16/23 0930   Wound Assessment Slough;Granulation tissue 03/16/23 0930   Drainage Amount Moderate 03/16/23 0930   Drainage Description Serosanguinous 03/16/23 0930   Odor None 03/16/23 0930   Judie-wound Assessment Blanchable erythema;Dry/flaky; Intact 03/16/23 0930   Margins Attached edges 03/16/23 0930   Wound Thickness Description not for Pressure Injury Full thickness 03/16/23 0930   Number of days: 0         LABS/IMAGING     UA: No results for input(s): Carlene Hallmark, COLORU, CLARITYU, MUCUS, PROTEINU, BLOODU, RBCUA, [address], BACTERIA, NITRU, GLUCOSEU, BILIRUBINUR, UROBILINOGEN, KETUA, LABCAST, LABCASTTY, AMORPHOS in the last 72 hours.    Invalid input(s): CRYSTALS  Micro:   Lab Results   Component Value Date/Time    BC No growth-preliminary No growth 07/17/2022 09:54 PM    BC No growth-preliminary No growth 07/17/2022 09:54 PM     ASSESSMENT     -Venous stasis ulcer, right lower leg with edema  -Venous stasis ulcer, left lower leg with edema    Ulcer Identification:  Ulcer Type: venous  Contributing Factors: edema, venous stasis, decreased mobility, and smoking    Depth of Diabetic/Pressure/Non Pressure Ulcers or Wound:  Wound, full thickness    Patient Active Problem List   Diagnosis Code    Nonexudative age-related macular degeneration H35.3190    Nuclear sclerotic cataract H25.10    History of ulcerative colitis Z87.19    Sleep apnea G47.30    Postmenopausal Z78.0    Nocturnal hypoxemia G47.34    Nicotine dependence F17.200    Hyperlipidemia E78.5    Diverticulosis large intestine w/o perforation or abscess w/bleeding K57.31    Simple chronic bronchitis ([facility]) J41.0    Age-related osteoporosis without current pathological fracture M81.0    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 ([facility]) I25.119    Type 2 diabetes mellitus with diabetic polyneuropathy, without long-term current use of insulin ([facility]) E11.42    Trochanteric bursitis M70.60    Generalized weakness R53.1    Dementia without behavioral disturbance, unspecified dementia type F03.90    Hypertension I10    Chronic obstructive pulmonary disease ([facility]) J44.9    Compression fracture of L5 vertebra with delayed healing S32.050G    Acute respiratory failure with hypoxia ([facility]) J96.01    Venous stasis ulcer of right lower leg with edema of right lower leg ([facility]) I83.019, I83.891, L97.919, R60.9    Venous stasis ulcer of left lower leg with edema of left lower leg ([facility]) I83.029, I83.892, L97.929, R60.9     PLAN     Patient examined and evaluated.    All available lab work, radiology  studies, and progress notes pertaining to Adrien Montoya reviewed prior to or during patient visit today. -Venous stasis ulcer, right lower leg with edema- Wound would benefit from debridement. Not completed today due to concerns for pain and anxiety, patient flinches/grimaces prior to touching leg. Will start use of Hydroferra blue to wound bed alternatively to promote breakdown of this material and promote healing. Patient states she has compression socks that she typically wears at home, has not been able to wear due to wound. Will start use of compression via kerlix and coban wrap. Change dressings three times weekly. -Venous stasis ulcer, left lower leg with edema-Start use of hydroferra blue, kerlix and coban wraps. Change three times weekly. Etiology of venous disease discussed with patient at today's visit. Reviewed the importance of lifelong control of edema through compression, elevation of legs while at rest, and regular exercise in both healing of current wounds and prevention of reoccurrence of wounds in the future. Complications of nonhealing/not treated wounds include but are not limited to infection, pain, infection of bone, sepsis, loss of life and limb.        -Patient currently resides at [facility]. Patient voices concerns regarding wound care stating, \"they only have 1 nurse, they don't have time for this. \"  I spoke to Duy Peralta, patient's nurse at facility as well as the DON regarding patient's concerns. DON stated that dressings seem complex,was advised to proceed with referral to home health to assist with advanced wound care. Patient states that she is home bound aside from doctor's visits and essentials. Is agreeable to home health. Orders placed. Application process discussed and demonstrated during visit today, written instructions provided. Patient verbalized comfort with process. All questions and concerns addressed prior to completion of visit. REferral placed to home health for assistance with wound care.     -No indications of infection noted at today's visit. Education provided on signs and symptoms of infection. Call clinic or seek emergency care should these occur. Sean Sims continues to smoke 1 PPD. Discussed importance of smoking cessation on improvement of general health. Educated patient on the effects of smoking on wound progression and the importance of cutting back/quitting to promote better healing. Patient denies readiness to quit at this time. Follow up 2 weeks. Call clinic with any needs or concerns prior to scheduled visit. All questions and concerns addressed prior to discharge from today's visit. Please see attached Discharge Instructions    Written patient dismissal instructions given to patient and signed by patient or POA. Treatment:   Orders Placed This Encounter   Procedures    [address]     Referral Priority:   Routine     Referral Type:   [address] [place] [address]     Referral Reason:   Specialty Services Required     Referral Location:   [facility]     Requested Specialty:   [address]     Number of Visits Requested:   1     I spent a total of 40 minutes face to face with Sean Sims  on 3/16/2023. Time spent includes review of previous progress notes, reviewing and discussing test results, education on plan of care for presenting diagnosis, answering questions, providing instructions on treatment and/or medications ordered, reviewing importance of compliance with outline treatment plan and any identifiable barriers to compliance and coordination of care based on plan and assessment as noted. Discharge Instructions         Discharge Instructions         Visit Discharge/Physician Orders:  - elevate legs throughout the day to help with swelling   - patient measured for zip up marylin gonsalez will order today.    - will see if able to refer to home health to assist with dressing changes. Home Care: Patient at [address] [place] [address] Location: left leg, right leg     Dressing orders:     1) Gather wound care supplies and arrange on clean table. 2) Wash your hands with soap and water or use alcohol based hand  for 20 seconds (sing \"Happy Birthday\" twice). 3) Cleanse wounds with normal saline or wound cleanser and gauze. Pat dry with clean gauze. 4) Right and Left leg - Apply lightly moistened hydrofera blue to open wounds. Cover with dry dressing. Wrap with kerlix (secure with tape if needed.) and coban from base of toes to just below bend of knee. Change 3 times a week. Keep all dressings clean & dry. Follow up visit:   2 Weeks Thursday March 30th at [facility]:    Duration of dressings: 60 days     We have sent your supply order to the following company:  [employer]  If you don't receive the items you were expecting or don't know what the items are that you received, call the company where the order was sent. If you are unable to obtain wound supplies, continue to use the supplies you have available until you are able to reach us. It is most important to keep the wound covered at all times. It is YOUR responsibility to make sure that supplies are re-ordered before you run out. Re-order telephone numbers are included in each package. Keep next scheduled appointment. Please give 24 hour notice if unable to keep appointment. [phone]    If you experience any of the following, please call the Wound Care Service during business hours: Monday through Friday 8:00 am - 4:30 pm  ([phone]).    *Increase in pain   *Temperature over 101   *Increase in drainage from your wound or a foul odor   *Uncontrolled swelling   *Need for compression bandage changes due to slippage, breakthrough drainage    If you need medical attention outside of business hours, please contact your Primary Care Doctor or go to the nearest emergency room.                Electronically signed by PETEY Rand CNP on 3/16/2023 at 1:56 PM

## 2023-03-16 NOTE — PROGRESS NOTES
Compression Garments    Supply Company for Compression Stockings:     [facility] [address] [place] [id number]  p: [phone] f: [phone]     Ordering Center:     [facility]  [address]  [facility] [address]  [phone]  WOUND CARE Dept: [phone]   FAX NUMBER [phone]    Patient Information:      Carmen Villarreal  [address]  [facility] [address]   [phone]   : 1940  AGE: 82 y.o.     GENDER: female   TODAYS DATE:  3/16/2023    Insurance:      PRIMARY INSURANCE:  Plan: MEDICARE PART A AND B  Coverage: MEDICARE  Effective Date: 10/1/2005  Group Number: [unfilled]  Subscriber Number: [id number] - (Medicare)    Payer/Plan Subscr  Sex Relation Sub. Ins. ID Effective Group Num   1. MEDICARE - ME* CARMEN VILLARREAL 1940 Female Self [id number] 10/1/05                                    [address] [address]   2. [facility]* CARMEN VILLARREAL 1940 Female Self [id number] 1/1/15 Plan J                                   [address]       Patient Information:      Problem List Items Addressed This Visit          Other    Venous stasis ulcer of right lower leg with edema of right lower leg (HCC)    Relevant Orders    [facility]    Venous stasis ulcer of left lower leg with edema of left lower leg (HCC)    Relevant Orders    [facility]       Wound 23 Pretibial Right;Lateral (Active)   Wound Image   23   Wound Etiology Venous 23   Dressing Status Intact;Old drainage noted;New dressing applied 23   Wound Cleansed Cleansed with saline 23   Dressing/Treatment Hydrofera blue;ABD;Roll gauze;Coban/self-adherent bandages 23   Offloading for Diabetic Foot Ulcers Offloading not required 23   Wound Length (cm) 3 cm 23   Wound Width (cm) 1.5 cm 23   Wound Depth (cm) 0.2 cm 23   Wound  Surface Area (cm^2) 4.5 cm^2 03/16/23 0930   Wound Volume (cm^3) 0.9 cm^3 03/16/23 0930   Wound Assessment Slough; Devitalized tissue; Epithelialization;Granulation tissue 03/16/23 0930   Drainage Amount Moderate 03/16/23 0930   Drainage Description Serosanguinous; Yellow;Green 03/16/23 0930   Odor Mild 03/16/23 0930   Judie-wound Assessment Dry/flaky; Blanchable erythema; Maceration 03/16/23 0930   Margins Attached edges 03/16/23 0930   Wound Thickness Description not for Pressure Injury Full thickness 03/16/23 0930   Number of days: 0       Wound 03/16/23 Pretibial Left (Active)   Wound Image   03/16/23 0930   Wound Etiology Venous 03/16/23 0930   Dressing Status Intact; Old drainage noted;New dressing applied 03/16/23 0930   Wound Cleansed Cleansed with saline 03/16/23 0930   Dressing/Treatment Roll gauze;Coban/self-adherent bandages; Hydrofera blue;Dry dressing 03/16/23 0930   Offloading for Diabetic Foot Ulcers Offloading not required 03/16/23 0930   Wound Length (cm) 1.4 cm 03/16/23 0930   Wound Width (cm) 0.9 cm 03/16/23 0930   Wound Depth (cm) 0.1 cm 03/16/23 0930   Wound Surface Area (cm^2) 1.26 cm^2 03/16/23 0930   Wound Volume (cm^3) 0.126 cm^3 03/16/23 0930   Wound Assessment Slough;Granulation tissue 03/16/23 0930   Drainage Amount Moderate 03/16/23 0930   Drainage Description Serosanguinous 03/16/23 0930   Odor None 03/16/23 0930   Judie-wound Assessment Blanchable erythema;Dry/flaky; Intact 03/16/23 0930   Margins Attached edges 03/16/23 0930   Wound Thickness Description not for Pressure Injury Full thickness 03/16/23 0930   Number of days: 0       Right Leg Measurements: (ALL measurements are in cm)  Right Leg Edema Point of Measurement  Heel to ankle: 10 cm  Heel to calf: 30  Leg circumference: 31 cm (length 44cm)  Ankle circumference: 21 cm  Compression Therapy: Other, Compression ordered (coban)    Left Leg Measurements: (ALL measurements are in cm)  Left Leg Edema Point of Measurement  Heel to ankle: 10 cm  Heel to calf: 30  Leg circumference: 31.7 cm  Ankle circumference: 22 cm  Compression Therapy: Other, Compression ordered (coban)    Supplies Requested :     Medicare Requirements  Patient must have a qualifying Active Venus Ulcer if ordering Bilateral Compression Wounds MUST be present on both legs for Medicare Coverage. The patient can Not be on home health or have had a Medicare part A stay in the past 24 hours.     Patient Wound(s) Debrided: [x] Yes if yes please add date 3/16/23   [] No    Debribement Type: Mechanical     Patient currently being seen by Home Health: [] Yes   [x] No     Compression Type: [address] 2-Part System Left/Kevin W/Kirsty Myles Md                                   Item #: [id number]                        [address] 2-Part System Right/Kevin COOK/Kirsty Myles Md                         Item #: [id number]    Provider Information:      PROVIDER'S NAME: Norah Tao CNP     NPI: [id number]

## 2023-03-20 ENCOUNTER — TELEPHONE (OUTPATIENT)
Dept: PULMONOLOGY | Age: 83
End: 2023-03-20

## 2023-03-20 ENCOUNTER — OFFICE VISIT (OUTPATIENT)
Dept: PULMONOLOGY | Age: 83
End: 2023-03-20
Payer: MEDICARE

## 2023-03-20 ENCOUNTER — TELEPHONE (OUTPATIENT)
Dept: FAMILY MEDICINE CLINIC | Age: 83
End: 2023-03-20

## 2023-03-20 VITALS
SYSTOLIC BLOOD PRESSURE: 118 MMHG | TEMPERATURE: 98.1 F | HEIGHT: 65 IN | BODY MASS INDEX: 22.66 KG/M2 | OXYGEN SATURATION: 97 % | WEIGHT: 136 LBS | DIASTOLIC BLOOD PRESSURE: 74 MMHG | HEART RATE: 72 BPM

## 2023-03-20 DIAGNOSIS — J44.9 STAGE 3 SEVERE COPD BY GOLD CLASSIFICATION (HCC): Primary | ICD-10-CM

## 2023-03-20 DIAGNOSIS — J41.0 SIMPLE CHRONIC BRONCHITIS (HCC): ICD-10-CM

## 2023-03-20 DIAGNOSIS — R93.89 ABNORMAL CT OF THE CHEST: ICD-10-CM

## 2023-03-20 DIAGNOSIS — F02.80 DEMENTIA IN OTHER DISEASES CLASSIFIED ELSEWHERE, UNSPECIFIED SEVERITY, WITHOUT BEHAVIORAL DISTURBANCE, PSYCHOTIC DISTURBANCE, MOOD DISTURBANCE, AND ANXIETY (HCC): ICD-10-CM

## 2023-03-20 DIAGNOSIS — R45.4 DIFFICULTY CONTROLLING ANGER: ICD-10-CM

## 2023-03-20 DIAGNOSIS — Z87.891 PERSONAL HISTORY OF TOBACCO USE, PRESENTING HAZARDS TO HEALTH: ICD-10-CM

## 2023-03-20 PROCEDURE — 3074F SYST BP LT 130 MM HG: CPT | Performed by: INTERNAL MEDICINE

## 2023-03-20 PROCEDURE — 1123F ACP DISCUSS/DSCN MKR DOCD: CPT | Performed by: INTERNAL MEDICINE

## 2023-03-20 PROCEDURE — G8420 CALC BMI NORM PARAMETERS: HCPCS | Performed by: INTERNAL MEDICINE

## 2023-03-20 PROCEDURE — 3078F DIAST BP <80 MM HG: CPT | Performed by: INTERNAL MEDICINE

## 2023-03-20 PROCEDURE — G8400 PT W/DXA NO RESULTS DOC: HCPCS | Performed by: INTERNAL MEDICINE

## 2023-03-20 PROCEDURE — 99204 OFFICE O/P NEW MOD 45 MIN: CPT | Performed by: INTERNAL MEDICINE

## 2023-03-20 PROCEDURE — 1090F PRES/ABSN URINE INCON ASSESS: CPT | Performed by: INTERNAL MEDICINE

## 2023-03-20 PROCEDURE — 3023F SPIROM DOC REV: CPT | Performed by: INTERNAL MEDICINE

## 2023-03-20 PROCEDURE — G8484 FLU IMMUNIZE NO ADMIN: HCPCS | Performed by: INTERNAL MEDICINE

## 2023-03-20 PROCEDURE — 4004F PT TOBACCO SCREEN RCVD TLK: CPT | Performed by: INTERNAL MEDICINE

## 2023-03-20 PROCEDURE — G8427 DOCREV CUR MEDS BY ELIG CLIN: HCPCS | Performed by: INTERNAL MEDICINE

## 2023-03-20 RX ORDER — ALBUTEROL SULFATE 90 UG/1
2 AEROSOL, METERED RESPIRATORY (INHALATION) EVERY 6 HOURS PRN
Qty: 1 EACH | Refills: 9 | Status: SHIPPED | OUTPATIENT
Start: 2023-03-20 | End: 2023-03-22 | Stop reason: SDUPTHER

## 2023-03-20 RX ORDER — FLUOXETINE 10 MG/1
5 TABLET, FILM COATED ORAL DAILY
Qty: 15 TABLET | Refills: 0 | Status: SHIPPED | OUTPATIENT
Start: 2023-03-20 | End: 2023-04-19

## 2023-03-20 RX ORDER — ALBUTEROL SULFATE 2.5 MG/3ML
2.5 SOLUTION RESPIRATORY (INHALATION) EVERY 6 HOURS PRN
Qty: 120 EACH | Refills: 9 | Status: SHIPPED | OUTPATIENT
Start: 2023-03-20 | End: 2023-03-22 | Stop reason: SDUPTHER

## 2023-03-20 NOTE — TELEPHONE ENCOUNTER
----- Message from Thiago Hendersondavion sent at 3/20/2023  9:34 AM EDT -----  Subject: Medication Problem    Medication: FLUoxetine (PROZAC) 10 MG tablet  Dosage: Take 1 tablet by mouth daily  Ordering Provider: jose m    Question/Problem: Daughter state this medication seems to be too strong. Is forgetting her middle initial and other kind of seems drugged. Pharmacy: [address] [place] [address] [address]    ---------------------------------------------------------------------------  --------------  Rolando Champagne INFO  [phone];  Do not leave any message, patient will call back for answer  ---------------------------------------------------------------------------  --------------    SCRIPT ANSWERS  Relationship to Patient: Other  Representative Name: Mague Edward  Is the representative on the Communication Release of Information (AMBER)   form in Epic: Yes

## 2023-03-20 NOTE — PATIENT INSTRUCTIONS
Recommendations/Plan:  -Stop Anoro 1 puff via inhalation daily.  -Will start patient onTrelegy Ellipta 100mcg/ 62.5mcg/25mcg per puff, 1puff daily in am. aCrol Conte and demonstrated in my office how to use Trelegy Ellipta. She verbalizes understanding. She was detailed about mechanism of action of drug along with associated side effects. She agreed to take the risk and medication. She verbalizes understanding.  -Patient educated to quit smoking as soon as possible. Patient verbalizes understanding of adverse consequences of tobacco smoking.  -Continue Albuterol HFA 90mcg/Spray MDI, 2puffs  Q6Hprn. She  was informed about adverse effects of Albuterol HFA. She verbalizes understanding.  -She was instructed to use Albuterol HFA  inhaler 2 puffs Q6h prn or Albuterol 2.5mg nebs Q6h prn (the nebulizer) at one time as rescue medication not both at the same time. She verbalizes understanding.   -We will schedule patient for CT scan of chest without IV contrast to follow her right lower lobe infiltrate noted on her previous CT scan of chest performed in November 2022 in 1 month. - She was educated and demonstrated in my office how to use inhalers.   -Hernandez Yamile advised to continue prescribed inhalers and keep good compliance. - Patient educated to update her COVID-19 vaccine, pneumococcal vaccine with family physician and take influenza vaccine in coming season with out fail.   - Schedule patient for Pulmonary rehab consult as soon as possible for pulmonary rehab therapy for her COPD once cleared by her family physician.  - Schedule patient for follow up with my clinic in 1month recommended testing including CT scan of chest without IV contrast. She was advised to make early appointment if needed. - David Ovalle and his daughter were educated about my impression and plan. They verbalizes understanding.

## 2023-03-20 NOTE — PROGRESS NOTES
Neck Circumference -   14 inches   Mallampati - 4    Lung Nodule Screening     [x] Qualifies    [] Does not qualify   [] Declined    [] Completed
90 capsule 3    carbidopa-levodopa (SINEMET)  MG per tablet Take 2 tablets by mouth 3 times daily 540 tablet 1    FLUoxetine (PROZAC) 10 MG tablet Take 1 tablet by mouth daily 30 tablet 0    alendronate (FOSAMAX) 70 MG tablet Take 1 tablet by mouth every 7 days Take with a full glass of water upon arising for the day. Consume on an empty stomach at least 30 minutes before the first food, beverage, or medication. Stay upright (do not lie down) for at least 30 minutes. Do not crush or break. 4 tablet 3    Dextromethorphan-guaiFENesin  MG CAPS Take 1-2 tablets by mouth every 4 hours as needed (Cough) 1-2 tablets every 4 hours as needed.  24 capsule 0    ondansetron (ZOFRAN) 4 MG tablet Take 1 tablet by mouth 3 times daily as needed for Nausea or Vomiting 20 tablet 0    predniSONE (DELTASONE) 10 MG tablet Take 1 tablet by mouth daily 90 tablet 3    donepezil (ARICEPT) 10 MG tablet Take 1 tablet by mouth nightly 90 tablet 3    metoprolol tartrate (LOPRESSOR) 25 MG tablet Take 1 tablet by mouth 2 times daily 180 tablet 3    lisinopril (PRINIVIL;ZESTRIL) 20 MG tablet Take 1 tablet by mouth daily 90 tablet 3    simvastatin (ZOCOR) 40 MG tablet Take 1 tablet by mouth nightly 90 tablet 3    umeclidinium-vilanterol (ANORO ELLIPTA) 62.5-25 MCG/INH AEPB inhaler Inhale 1 puff into the lungs daily 1 each 5    Multiple Vitamin (MULTIVITAMIN ADULT PO) Take 1 tablet by mouth daily      vitamin C (ASCORBIC ACID) 500 MG tablet Take 1,000 mg by mouth daily      zinc gluconate 50 MG tablet Take 50 mg by mouth daily      albuterol (PROVENTIL) (2.5 MG/3ML) 0.083% nebulizer solution Take 2.5 mg by nebulization every 6 hours as needed for Wheezing or Shortness of Breath      ipratropium (ATROVENT) 0.02 % nebulizer solution Take 0.5 mg by nebulization every 6-8 hours as needed for Wheezing      acetaminophen (TYLENOL) 500 MG tablet Take 2 tablets by mouth 3 times daily as needed for Pain 100 tablet 1    albuterol sulfate

## 2023-03-20 NOTE — LETTER
March 20, 2023       Janette Lim YOB: 1940   [address] [facility] Date of Visit:  3/20/2023       To Whom It May Concern:    Please reduce Angeline Howard's fluoxetine dose to 0.5 tablet daily (5 mg). New rx ep'ed to [facility]. If you have any questions or concerns, please don't hesitate to call.     Sincerely,        Erma Bates, APRN - CNP

## 2023-03-21 ENCOUNTER — FOLLOWUP TELEPHONE ENCOUNTER (OUTPATIENT)
Dept: CARDIAC REHAB | Age: 83
End: 2023-03-21

## 2023-03-21 NOTE — TELEPHONE ENCOUNTER
PULMONARY REHABILITATION REFERRAL  COPD Exacerbation    Pulmonary Rehab Evaluation order received. Spoke with patient about the benefits of supervised comfortable, progressive exercise with oxygen saturation monitoring and pulmonary education. Pt states she does not have a car to get here and I explained to pt that we could provide a ride to and from rehab using "[employer]. Pt states she may be interested but does not have her calender with her to set up appt and asked if we could call back next week and I told her we would.

## 2023-03-22 DIAGNOSIS — J44.9 STAGE 3 SEVERE COPD BY GOLD CLASSIFICATION (HCC): Primary | ICD-10-CM

## 2023-03-22 DIAGNOSIS — J41.0 SIMPLE CHRONIC BRONCHITIS (HCC): ICD-10-CM

## 2023-03-22 RX ORDER — ALBUTEROL SULFATE 90 UG/1
2 AEROSOL, METERED RESPIRATORY (INHALATION) EVERY 6 HOURS PRN
Qty: 1 EACH | Refills: 9 | Status: SHIPPED | OUTPATIENT
Start: 2023-03-22 | End: 2024-03-21

## 2023-03-22 RX ORDER — ALBUTEROL SULFATE 2.5 MG/3ML
2.5 SOLUTION RESPIRATORY (INHALATION) EVERY 6 HOURS PRN
Qty: 120 EACH | Refills: 9 | Status: SHIPPED | OUTPATIENT
Start: 2023-03-22 | End: 2024-03-21

## 2023-03-23 ENCOUNTER — NURSE ONLY (OUTPATIENT)
Dept: FAMILY MEDICINE CLINIC | Age: 83
End: 2023-03-23
Payer: MEDICARE

## 2023-03-23 ENCOUNTER — TELEPHONE (OUTPATIENT)
Dept: PULMONOLOGY | Age: 83
End: 2023-03-23

## 2023-03-23 ENCOUNTER — TELEPHONE (OUTPATIENT)
Dept: FAMILY MEDICINE CLINIC | Age: 83
End: 2023-03-23

## 2023-03-23 DIAGNOSIS — R31.9 URINARY TRACT INFECTION WITH HEMATURIA, SITE UNSPECIFIED: Primary | ICD-10-CM

## 2023-03-23 DIAGNOSIS — R41.0 CONFUSION: ICD-10-CM

## 2023-03-23 DIAGNOSIS — N39.0 URINARY TRACT INFECTION WITH HEMATURIA, SITE UNSPECIFIED: Primary | ICD-10-CM

## 2023-03-23 LAB
BILIRUBIN URINE: NEGATIVE
BLOOD URINE, POC: ABNORMAL
CHARACTER, URINE: ABNORMAL
COLOR, URINE: ABNORMAL
GLUCOSE URINE: NEGATIVE MG/DL
KETONES, URINE: ABNORMAL
LEUKOCYTE CLUMPS, URINE: ABNORMAL
NITRITE, URINE: POSITIVE
PH, URINE: 7 (ref 5–9)
PROTEIN, URINE: 30 MG/DL
SPECIFIC GRAVITY, URINE: 1.02 (ref 1–1.03)
UROBILINOGEN, URINE: 0.2 EU/DL (ref 0–1)

## 2023-03-23 PROCEDURE — 81003 URINALYSIS AUTO W/O SCOPE: CPT | Performed by: NURSE PRACTITIONER

## 2023-03-23 NOTE — TELEPHONE ENCOUNTER
Ale from [facility] called and wanted to verify that on the patients albuterol inh order , you would like the patietn to use every 6 hrs prn . She stated her order from another provider was every 4 hrs. I let the patient know the only order we have from Dr. Yordan De La Torre is for every 6 hrs prn, but she stated she just wanted to verify with you. Please advise, thank you !

## 2023-03-23 NOTE — TELEPHONE ENCOUNTER
Pt daughter would like to bring urine sample to the office today prior to appointment with TR tomorrow.

## 2023-03-24 ENCOUNTER — TELEMEDICINE (OUTPATIENT)
Dept: FAMILY MEDICINE CLINIC | Age: 83
End: 2023-03-24

## 2023-03-24 DIAGNOSIS — N30.01 ACUTE CYSTITIS WITH HEMATURIA: Primary | ICD-10-CM

## 2023-03-24 RX ORDER — NITROFURANTOIN 25; 75 MG/1; MG/1
100 CAPSULE ORAL 2 TIMES DAILY
Qty: 10 CAPSULE | Refills: 0 | Status: SHIPPED | OUTPATIENT
Start: 2023-03-24 | End: 2023-03-29

## 2023-03-24 NOTE — PROGRESS NOTES
David Ovalle (:  1940) is a Established patient, here for evaluation of the following:    Assessment & Plan   Below is the assessment and plan developed based on review of pertinent history, physical exam, labs, studies, and medications. 1. Acute cystitis with hematuria  - Acute  - Start oral antibiotic for uti  - Encouraged patient to increase fluid intake and to avoid carbonated drinks  - Urine culture for antibiotic susceptibility testing  -     nitrofurantoin, macrocrystal-monohydrate, (MACROBID) 100 MG capsule; Take 1 capsule by mouth 2 times daily for 5 days, Disp-10 capsule, R-0Normal  Return if symptoms worsen or fail to improve. Subjective   Patient presents virtually with her daughter [place] for acute confusion and frequent falls. 4 falls in the last week. No injuries. No fever. No urinary symptoms. Confusion continues to worsen. No pain with urination. No urinary symptoms at all. Not fever. Not drinking much water. Review of Systems   Constitutional:  Negative for fever. Genitourinary:  Negative for dysuria, frequency and hematuria. Psychiatric/Behavioral:  Positive for confusion. Objective   Patient-Reported Vitals  Full set of vital signs was not obtained d/t lack of proper equipment.     [INSTRUCTIONS:  \"[x]\" Indicates a positive item  \"[]\" Indicates a negative item  -- DELETE ALL ITEMS NOT EXAMINED]    Constitutional: [x] Appears well-developed and well-nourished [x] No apparent distress      [] Abnormal -     Mental status: [x] Alert and awake  [x] Oriented to person/place/time [x] Able to follow commands    [] Abnormal -     Eyes:   EOM    [x]  Normal    [] Abnormal -   Sclera  [x]  Normal    [] Abnormal -          Discharge [x]  None visible   [] Abnormal -     HENT: [x] Normocephalic, atraumatic  [] Abnormal -   [x] Mouth/Throat: Mucous membranes are moist    External Ears [x] Normal  [] Abnormal -    Neck: [x] No visualized mass [] Abnormal -     Pulmonary/Chest:

## 2023-03-26 LAB
BACTERIA UR CULT: ABNORMAL
BACTERIA UR CULT: ABNORMAL
ORGANISM: ABNORMAL
ORGANISM: ABNORMAL

## 2023-03-27 ENCOUNTER — TELEPHONE (OUTPATIENT)
Dept: FAMILY MEDICINE CLINIC | Age: 83
End: 2023-03-27

## 2023-03-27 DIAGNOSIS — R45.4 DIFFICULTY CONTROLLING ANGER: ICD-10-CM

## 2023-03-27 DIAGNOSIS — F02.80 DEMENTIA IN OTHER DISEASES CLASSIFIED ELSEWHERE, UNSPECIFIED SEVERITY, WITHOUT BEHAVIORAL DISTURBANCE, PSYCHOTIC DISTURBANCE, MOOD DISTURBANCE, AND ANXIETY (HCC): ICD-10-CM

## 2023-03-27 DIAGNOSIS — N30.01 ACUTE CYSTITIS WITH HEMATURIA: Primary | ICD-10-CM

## 2023-03-27 RX ORDER — CIPROFLOXACIN 500 MG/1
500 TABLET, FILM COATED ORAL 2 TIMES DAILY
Qty: 6 TABLET | Refills: 0 | Status: SHIPPED | OUTPATIENT
Start: 2023-03-27 | End: 2023-03-30

## 2023-03-27 NOTE — LETTER
March 28, 2023       Carmen Howard YOB: 1940   [address]  [facility] [address] Date of Visit:  3/27/2023       To Whom It May Concern:    Please increase Carmen Howard's dose of fluoxetine back to 10 mg daily as AMS symptoms were likely related to acute condition.     If you have any questions or concerns, please don't hesitate to call.    Sincerely,        KRISTINE PAUL, APRN - CNP

## 2023-03-27 NOTE — TELEPHONE ENCOUNTER
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 I will need to add on cipro to cover Morganella (ep'ed to [facility]). Continue macrobid as prescribed. If no improvement in your symptoms please notify the office.

## 2023-03-28 RX ORDER — FLUOXETINE 10 MG/1
10 TABLET, FILM COATED ORAL DAILY
Qty: 30 TABLET | Refills: 0 | Status: SHIPPED | OUTPATIENT
Start: 2023-03-28 | End: 2023-04-27

## 2023-03-28 NOTE — TELEPHONE ENCOUNTER
Patient daughter informed, she will start abx treatment. She is requesting order to be send to [facility] to increase her prozac back to the regular dose since she thought the symptoms patient was having was related to her medicine.

## 2023-03-29 DIAGNOSIS — M25.552 CHRONIC HIP PAIN, BILATERAL: Primary | ICD-10-CM

## 2023-03-29 DIAGNOSIS — G89.29 CHRONIC HIP PAIN, BILATERAL: Primary | ICD-10-CM

## 2023-03-29 DIAGNOSIS — M25.551 CHRONIC HIP PAIN, BILATERAL: Primary | ICD-10-CM

## 2023-03-29 RX ORDER — HYDROCODONE BITARTRATE AND ACETAMINOPHEN 5; 325 MG/1; MG/1
1 TABLET ORAL EVERY 6 HOURS PRN
Qty: 120 TABLET | Refills: 0 | Status: SHIPPED | OUTPATIENT
Start: 2023-03-29 | End: 2023-04-28

## 2023-03-29 NOTE — PROGRESS NOTES
Medication ep'ed to requested pharmacy. PDMP Monitoring:    Last PDMP Joyce Baer as Reviewed:  Review User Review Instant Review Result   Aleksey Blood 3/29/2023  7:55 AM Reviewed PDMP [1]     Last Controlled Substance Monitoring Documentation      Flowsheet Row Orders Only from 11/8/2022 in [facility]   Periodic Controlled Substance Monitoring No signs of potential drug abuse or diversion identified. filed at 11/08/2022 1025          Urine Drug Screenings (1 yr)    No resulted procedures found.        Medication Contract and Consent for Opioid Use Documents Filed        No documents found

## 2023-03-30 ENCOUNTER — NURSE ONLY (OUTPATIENT)
Dept: FAMILY MEDICINE CLINIC | Age: 83
End: 2023-03-30
Payer: MEDICARE

## 2023-03-30 ENCOUNTER — HOSPITAL ENCOUNTER (OUTPATIENT)
Dept: WOUND CARE | Age: 83
Discharge: HOME | End: 2023-03-30
Payer: MEDICARE

## 2023-03-30 ENCOUNTER — TELEPHONE (OUTPATIENT)
Dept: FAMILY MEDICINE CLINIC | Age: 83
End: 2023-03-30

## 2023-03-30 VITALS
SYSTOLIC BLOOD PRESSURE: 168 MMHG | OXYGEN SATURATION: 94 % | TEMPERATURE: 97.5 F | RESPIRATION RATE: 18 BRPM | HEART RATE: 68 BPM | DIASTOLIC BLOOD PRESSURE: 72 MMHG

## 2023-03-30 DIAGNOSIS — N30.01 ACUTE CYSTITIS WITH HEMATURIA: ICD-10-CM

## 2023-03-30 DIAGNOSIS — R41.0 CONFUSION: ICD-10-CM

## 2023-03-30 DIAGNOSIS — I83.019 VENOUS STASIS ULCER OF RIGHT LOWER LEG WITH EDEMA OF RIGHT LOWER LEG (HCC): ICD-10-CM

## 2023-03-30 DIAGNOSIS — I83.891 VENOUS STASIS ULCER OF RIGHT LOWER LEG WITH EDEMA OF RIGHT LOWER LEG (HCC): ICD-10-CM

## 2023-03-30 DIAGNOSIS — S51.811A ISTAP TYPE 3 SKIN TEAR OF RIGHT FOREARM: Primary | ICD-10-CM

## 2023-03-30 DIAGNOSIS — I83.029 VENOUS STASIS ULCER OF LEFT LOWER LEG WITH EDEMA OF LEFT LOWER LEG (HCC): ICD-10-CM

## 2023-03-30 DIAGNOSIS — R60.0 VENOUS STASIS ULCER OF LEFT LOWER LEG WITH EDEMA OF LEFT LOWER LEG (HCC): ICD-10-CM

## 2023-03-30 DIAGNOSIS — R60.0 VENOUS STASIS ULCER OF RIGHT LOWER LEG WITH EDEMA OF RIGHT LOWER LEG (HCC): ICD-10-CM

## 2023-03-30 DIAGNOSIS — N30.01 ACUTE CYSTITIS WITH HEMATURIA: Primary | ICD-10-CM

## 2023-03-30 DIAGNOSIS — I83.892 VENOUS STASIS ULCER OF LEFT LOWER LEG WITH EDEMA OF LEFT LOWER LEG (HCC): ICD-10-CM

## 2023-03-30 DIAGNOSIS — L97.919 VENOUS STASIS ULCER OF RIGHT LOWER LEG WITH EDEMA OF RIGHT LOWER LEG (HCC): ICD-10-CM

## 2023-03-30 DIAGNOSIS — R41.0 CONFUSION: Primary | ICD-10-CM

## 2023-03-30 DIAGNOSIS — L97.929 VENOUS STASIS ULCER OF LEFT LOWER LEG WITH EDEMA OF LEFT LOWER LEG (HCC): ICD-10-CM

## 2023-03-30 LAB
BILIRUBIN URINE: NEGATIVE
BLOOD URINE, POC: NEGATIVE
CHARACTER, URINE: CLEAR
COLOR, URINE: YELLOW
GLUCOSE URINE: >= 1000 MG/DL
KETONES, URINE: NEGATIVE
LEUKOCYTE CLUMPS, URINE: NEGATIVE
NITRITE, URINE: NEGATIVE
PH, URINE: 6 (ref 5–9)
PROTEIN, URINE: NEGATIVE MG/DL
SPECIFIC GRAVITY, URINE: 1.02 (ref 1–1.03)
UROBILINOGEN, URINE: 0.2 EU/DL (ref 0–1)

## 2023-03-30 PROCEDURE — 81003 URINALYSIS AUTO W/O SCOPE: CPT | Performed by: NURSE PRACTITIONER

## 2023-03-30 PROCEDURE — 99214 OFFICE O/P EST MOD 30 MIN: CPT | Performed by: NURSE PRACTITIONER

## 2023-03-30 PROCEDURE — 99214 OFFICE O/P EST MOD 30 MIN: CPT

## 2023-03-30 RX ORDER — LIDOCAINE 40 MG/G
CREAM TOPICAL ONCE
OUTPATIENT
Start: 2023-03-30 | End: 2023-03-30

## 2023-03-30 RX ORDER — LIDOCAINE HYDROCHLORIDE 20 MG/ML
JELLY TOPICAL ONCE
OUTPATIENT
Start: 2023-03-30 | End: 2023-03-30

## 2023-03-30 RX ORDER — GINSENG 100 MG
CAPSULE ORAL ONCE
OUTPATIENT
Start: 2023-03-30 | End: 2023-03-30

## 2023-03-30 RX ORDER — BACITRACIN, NEOMYCIN, POLYMYXIN B 400; 3.5; 5 [USP'U]/G; MG/G; [USP'U]/G
OINTMENT TOPICAL ONCE
OUTPATIENT
Start: 2023-03-30 | End: 2023-03-30

## 2023-03-30 RX ORDER — LIDOCAINE 50 MG/G
OINTMENT TOPICAL ONCE
OUTPATIENT
Start: 2023-03-30 | End: 2023-03-30

## 2023-03-30 RX ORDER — BETAMETHASONE DIPROPIONATE 0.05 %
OINTMENT (GRAM) TOPICAL ONCE
OUTPATIENT
Start: 2023-03-30 | End: 2023-03-30

## 2023-03-30 RX ORDER — CLOBETASOL PROPIONATE 0.5 MG/G
OINTMENT TOPICAL ONCE
OUTPATIENT
Start: 2023-03-30 | End: 2023-03-30

## 2023-03-30 RX ORDER — LIDOCAINE HYDROCHLORIDE 40 MG/ML
SOLUTION TOPICAL ONCE
OUTPATIENT
Start: 2023-03-30 | End: 2023-03-30

## 2023-03-30 RX ORDER — GENTAMICIN SULFATE 1 MG/G
OINTMENT TOPICAL ONCE
OUTPATIENT
Start: 2023-03-30 | End: 2023-03-30

## 2023-03-30 RX ORDER — BACITRACIN ZINC AND POLYMYXIN B SULFATE 500; 1000 [USP'U]/G; [USP'U]/G
OINTMENT TOPICAL ONCE
OUTPATIENT
Start: 2023-03-30 | End: 2023-03-30

## 2023-03-30 NOTE — DISCHARGE INSTRUCTIONS
please call the Wound Care Service during business hours: Monday through Friday 8:00 am - 4:30 pm  ([phone]). *Increase in pain              *Temperature over 101              *Increase in drainage from your wound or a foul odor              *Uncontrolled swelling              *Need for compression bandage changes due to slippage, breakthrough drainage     If you need medical attention outside of business hours, please contact your Primary Care Doctor or go to the nearest emergency room.

## 2023-03-30 NOTE — TELEPHONE ENCOUNTER
Please have her drop off an additional urine sample. Improvements in mentation may be gradual as she recovers from uti. Ensure patient is drinking adequate fluid. If mentation does not continue to improve, consider stopping SSRI treatment.

## 2023-03-30 NOTE — PLAN OF CARE
Problem: Wound:  Goal: Will show signs of wound healing; wound closure and no evidence of infection  Description: Will show signs of wound healing; wound closure and no evidence of infection  Outcome: Progressing     Patient presents to wound clinic for bilateral legs and left arm wound. No s/s of infection noted. See avs for discharge instructions. Follow up visit:   2 Weeks  Friday April 14th at 1:30 pm     Care plan reviewed with patient and daughter . Patient and daughter verbalize understanding of the plan of care and contribute to goal setting.

## 2023-03-30 NOTE — TELEPHONE ENCOUNTER
Daughter Melvi Kitchen for pt [place] Overcast: Today she will finish 2nd round of antibiotics for cystitis following initial urine test done at [facility]. Says the patient never expressed concern about experiencing urinary symptoms. No new symptoms now. However, the disorientation continues, a little better, but \"she's off:\"  She went down to breakfast 2 hours too early; when she eats breakfast thinks it's lunch, etc. No other new symptoms. But Delfina Arroyo says this is not like her mother. No additional falls or incidents. In light of this, Delfina Arroyo wants to know: Do you want a new urine specimen tomorrow? Or does the disorientation need addressed in some other way? Pls advise. Pls call Jazmine at [phone] to reply.     Last ov: 3/24/23 VV cystitis with hematuria

## 2023-03-30 NOTE — PROGRESS NOTES
Teresita interminate colitis    COLONOSCOPY  03/03/2014    Dr Judy Wadsworth Bilateral 2011 2012    HIP SURGERY  06/13/2020    left fracture    OSTEOTOMY Left 11/2/2021    LEFT 5TH METATARSAL HEAD RESECTION EXCISION SOFT TISSUE LESION performed by Wayne Ga DPM at [address]     Family History   Problem Relation Age of Onset    Diabetes Mother     High Blood Pressure Mother     Heart Disease Mother     Coronary Art Dis Father     Heart Disease Father        SOCIAL HISTORY     Social History     Tobacco Use    Smoking status: Some Days     Packs/day: 1.00     Years: 40.00     Pack years: 40.00     Types: Cigarettes    Smokeless tobacco: Never    Tobacco comments:     8-10 cigarettes / day   Vaping Use    Vaping Use: Never used   Substance Use Topics    Alcohol use: No    Drug use: Never       ALLERGIES     Allergies   Allergen Reactions    Azulfidine [Sulfasalazine] Diarrhea    Keflex [Cephalexin] Diarrhea    Mesalamine Other (See Comments)    Sulfa Antibiotics     Alcohol Nausea And Vomiting     Drinking alcohol makes her sick and gives her headaches       MEDICATIONS     Current Outpatient Medications on File Prior to Encounter   Medication Sig Dispense Refill    HYDROcodone-acetaminophen (NORCO) 5-325 MG per tablet Take 1 tablet by mouth every 6 hours as needed for Pain for up to 30 days.  Max Daily Amount: 4 tablets 120 tablet 0    FLUoxetine (PROZAC) 10 MG tablet Take 1 tablet by mouth daily 30 tablet 0    ciprofloxacin (CIPRO) 500 MG tablet Take 1 tablet by mouth 2 times daily for 3 days 6 tablet 0    fluticasone-umeclidin-vilant (TRELEGY ELLIPTA) 100-62.5-25 MCG/ACT AEPB inhaler Inhale 1 puff into the lungs daily 30 each 11    albuterol (PROVENTIL) (2.5 MG/3ML) 0.083% nebulizer solution Take 3 mLs by nebulization every 6 hours as needed for Wheezing or Shortness of Breath 120 each 9    albuterol sulfate HFA (PROVENTIL;VENTOLIN;PROAIR)

## 2023-03-31 ENCOUNTER — TELEPHONE (OUTPATIENT)
Dept: FAMILY MEDICINE CLINIC | Age: 83
End: 2023-03-31

## 2023-03-31 NOTE — TELEPHONE ENCOUNTER
----- Message from PETEY Adams CNP sent at 3/30/2023  3:48 PM EDT -----  No evidence of uti. Follow up as scheduled.

## 2023-04-03 ENCOUNTER — TELEPHONE (OUTPATIENT)
Dept: FAMILY MEDICINE CLINIC | Age: 83
End: 2023-04-03

## 2023-04-03 ENCOUNTER — OFFICE VISIT (OUTPATIENT)
Dept: FAMILY MEDICINE CLINIC | Age: 83
End: 2023-04-03
Payer: MEDICARE

## 2023-04-03 ENCOUNTER — HOSPITAL ENCOUNTER (OUTPATIENT)
Age: 83
Discharge: HOME | End: 2023-04-03
Payer: MEDICARE

## 2023-04-03 VITALS
SYSTOLIC BLOOD PRESSURE: 124 MMHG | WEIGHT: 133 LBS | HEART RATE: 60 BPM | BODY MASS INDEX: 22.13 KG/M2 | OXYGEN SATURATION: 94 % | DIASTOLIC BLOOD PRESSURE: 72 MMHG

## 2023-04-03 DIAGNOSIS — R41.0 CONFUSION: Primary | ICD-10-CM

## 2023-04-03 DIAGNOSIS — R41.0 CONFUSION: ICD-10-CM

## 2023-04-03 DIAGNOSIS — K51.911 ULCERATIVE COLITIS WITH RECTAL BLEEDING, UNSPECIFIED LOCATION (HCC): ICD-10-CM

## 2023-04-03 DIAGNOSIS — M25.551 BILATERAL HIP PAIN: ICD-10-CM

## 2023-04-03 DIAGNOSIS — H54.61 VISION LOSS, RIGHT EYE: ICD-10-CM

## 2023-04-03 DIAGNOSIS — I50.32 CHRONIC DIASTOLIC HEART FAILURE (HCC): ICD-10-CM

## 2023-04-03 DIAGNOSIS — R45.4 DIFFICULTY CONTROLLING ANGER: ICD-10-CM

## 2023-04-03 DIAGNOSIS — R29.6 FREQUENT FALLS: ICD-10-CM

## 2023-04-03 DIAGNOSIS — M25.552 BILATERAL HIP PAIN: ICD-10-CM

## 2023-04-03 LAB
ALBUMIN SERPL BCG-MCNC: 3.9 G/DL (ref 3.5–5.1)
ALP SERPL-CCNC: 93 U/L (ref 38–126)
ALT SERPL W/O P-5'-P-CCNC: 6 U/L (ref 11–66)
ANION GAP SERPL CALC-SCNC: 11 MEQ/L (ref 8–16)
AST SERPL-CCNC: 19 U/L (ref 5–40)
BILIRUB SERPL-MCNC: 0.3 MG/DL (ref 0.3–1.2)
BUN SERPL-MCNC: 24 MG/DL (ref 7–22)
CALCIUM SERPL-MCNC: 9.7 MG/DL (ref 8.5–10.5)
CHLORIDE SERPL-SCNC: 101 MEQ/L (ref 98–111)
CO2 SERPL-SCNC: 29 MEQ/L (ref 23–33)
CREAT SERPL-MCNC: 1.2 MG/DL (ref 0.4–1.2)
DEPRECATED RDW RBC AUTO: 54.4 FL (ref 35–45)
ERYTHROCYTE [DISTWIDTH] IN BLOOD BY AUTOMATED COUNT: 14.4 % (ref 11.5–14.5)
GFR SERPL CREATININE-BSD FRML MDRD: 45 ML/MIN/1.73M2
GLUCOSE SERPL-MCNC: 165 MG/DL (ref 70–108)
HCT VFR BLD AUTO: 48.1 % (ref 37–47)
HGB BLD-MCNC: 14.6 GM/DL (ref 12–16)
MCH RBC QN AUTO: 31.4 PG (ref 26–33)
MCHC RBC AUTO-ENTMCNC: 30.4 GM/DL (ref 32.2–35.5)
MCV RBC AUTO: 103.4 FL (ref 81–99)
PLATELET # BLD AUTO: 277 THOU/MM3 (ref 130–400)
PMV BLD AUTO: 10.8 FL (ref 9.4–12.4)
POTASSIUM SERPL-SCNC: 4.4 MEQ/L (ref 3.5–5.2)
PROT SERPL-MCNC: 6.1 G/DL (ref 6.1–8)
RBC # BLD AUTO: 4.65 MILL/MM3 (ref 4.2–5.4)
SODIUM SERPL-SCNC: 141 MEQ/L (ref 135–145)
WBC # BLD AUTO: 11.9 THOU/MM3 (ref 4.8–10.8)

## 2023-04-03 PROCEDURE — 84443 ASSAY THYROID STIM HORMONE: CPT

## 2023-04-03 PROCEDURE — 3078F DIAST BP <80 MM HG: CPT | Performed by: NURSE PRACTITIONER

## 2023-04-03 PROCEDURE — 82607 VITAMIN B-12: CPT

## 2023-04-03 PROCEDURE — 80053 COMPREHEN METABOLIC PANEL: CPT

## 2023-04-03 PROCEDURE — G8400 PT W/DXA NO RESULTS DOC: HCPCS | Performed by: NURSE PRACTITIONER

## 2023-04-03 PROCEDURE — G8420 CALC BMI NORM PARAMETERS: HCPCS | Performed by: NURSE PRACTITIONER

## 2023-04-03 PROCEDURE — 4004F PT TOBACCO SCREEN RCVD TLK: CPT | Performed by: NURSE PRACTITIONER

## 2023-04-03 PROCEDURE — 1123F ACP DISCUSS/DSCN MKR DOCD: CPT | Performed by: NURSE PRACTITIONER

## 2023-04-03 PROCEDURE — 36415 COLL VENOUS BLD VENIPUNCTURE: CPT

## 2023-04-03 PROCEDURE — G8427 DOCREV CUR MEDS BY ELIG CLIN: HCPCS | Performed by: NURSE PRACTITIONER

## 2023-04-03 PROCEDURE — 3074F SYST BP LT 130 MM HG: CPT | Performed by: NURSE PRACTITIONER

## 2023-04-03 PROCEDURE — 1090F PRES/ABSN URINE INCON ASSESS: CPT | Performed by: NURSE PRACTITIONER

## 2023-04-03 PROCEDURE — 99214 OFFICE O/P EST MOD 30 MIN: CPT | Performed by: NURSE PRACTITIONER

## 2023-04-03 PROCEDURE — 85027 COMPLETE CBC AUTOMATED: CPT

## 2023-04-03 RX ORDER — SERTRALINE HYDROCHLORIDE 25 MG/1
25 TABLET, FILM COATED ORAL DAILY
Qty: 60 TABLET | Refills: 0 | Status: ON HOLD | OUTPATIENT
Start: 2023-04-03 | End: 2023-06-02

## 2023-04-03 NOTE — LETTER
April 3, 2023       Tess Kellogg YOB: 1940   [address] Date of Visit:  4/3/2023       To Whom It May Concern: It is my medical opinion that Tess Kellogg we discontinue her multivitamin, zinc and vitamin c supplement to minimize daily medication use. I have stopped her fluoxetine, and in its place started sertraline. New PT referral placed for chronic hip pain. If you have any questions or concerns, please don't hesitate to call.     Sincerely,        Jessa Caro, APRN - CNP

## 2023-04-03 NOTE — PROGRESS NOTES
to patient's impaired ambulation and mobility restrictions and would be unable to resolve these daily living tasks using a cane or walker. The patient is capable of using a standard wheelchair safely in their home and can maneuver within their home with adequate access. There is a caregiver available to provide necessary assistance. The patient or caregiver has not expressed an unwillingness to use the wheelchair. Electronically signed by PETEY Martin CNP on 4/3/2023 at 12:40 PM      Return if symptoms worsen or fail to improve. Subjective   SUBJECTIVE/OBJECTIVE:  Presents for follow with her daughter Travis Queen. Multiple concerns. Chronic hip pain continues. Currently being treated with norco. Recent hip injection was ineffective. Jazmine would like for her to restart PT. Last completed in December. Requesting an rx for wheelchair. Confusion continues. Hallucinations. Going down for meals at inappropriate times. Forgetful. Did not know where she was going today when Travis Queen picked her up for the appt. Was dx and treated for a uti 10 days ago. Completed cipro and macrobid. Confusion was improving after treatment but has since plateaued. History of dementia. Started SSRI treatment 1 month ago for anger. Anger has not improved. Confusion started 2 weeks ago. Requesting referral to ophthalmology for cataract evaluation. C/o too many pills in the morning. Would like to cut back on supplements but continue prescribed treatments. Review of Systems   Eyes:  Positive for visual disturbance. Musculoskeletal:  Positive for arthralgias. Neurological:  Positive for weakness. Psychiatric/Behavioral:  Positive for agitation, confusion and hallucinations. Objective   Physical Exam  Constitutional:       General: She is sleeping. She is not in acute distress. Appearance: Normal appearance. HENT:      Head: Normocephalic and atraumatic.       Right Ear: External ear normal.      Left Ear:

## 2023-04-03 NOTE — TELEPHONE ENCOUNTER
Pt daughter called, states when she took her to [facility] to have blood drawn they did not have urine orders. She is requesting orders faxed to [facility]. Orders faxed.

## 2023-04-04 ENCOUNTER — NURSE ONLY (OUTPATIENT)
Dept: FAMILY MEDICINE CLINIC | Age: 83
End: 2023-04-04
Payer: MEDICARE

## 2023-04-04 ENCOUNTER — TELEPHONE (OUTPATIENT)
Dept: FAMILY MEDICINE CLINIC | Age: 83
End: 2023-04-04

## 2023-04-04 DIAGNOSIS — R41.0 CONFUSION: Primary | ICD-10-CM

## 2023-04-04 DIAGNOSIS — N30.90 CYSTITIS: Primary | ICD-10-CM

## 2023-04-04 LAB
BILIRUBIN URINE: NEGATIVE
BLOOD URINE, POC: ABNORMAL
CHARACTER, URINE: ABNORMAL
COLOR, URINE: ABNORMAL
GLUCOSE URINE: 100 MG/DL
KETONES, URINE: NEGATIVE
LEUKOCYTE CLUMPS, URINE: NEGATIVE
NITRITE, URINE: POSITIVE
PH, URINE: 7 (ref 5–9)
PROTEIN, URINE: NEGATIVE MG/DL
SPECIFIC GRAVITY, URINE: 1.02 (ref 1–1.03)
TSH SERPL DL<=0.005 MIU/L-ACNC: 0.45 UIU/ML (ref 0.4–4.2)
UROBILINOGEN, URINE: 0.2 EU/DL (ref 0–1)
VIT B12 SERPL-MCNC: 1229 PG/ML (ref 211–911)

## 2023-04-04 PROCEDURE — 81003 URINALYSIS AUTO W/O SCOPE: CPT | Performed by: NURSE PRACTITIONER

## 2023-04-04 RX ORDER — LEVOFLOXACIN 250 MG/1
250 TABLET ORAL DAILY
Qty: 5 TABLET | Refills: 0 | Status: ON HOLD | OUTPATIENT
Start: 2023-04-04 | End: 2023-04-09

## 2023-04-04 NOTE — TELEPHONE ENCOUNTER
----- Message from PETEY Crook CNP sent at 4/4/2023  8:00 AM EDT -----  Blood work revealed an elevated b12. Stop supplement as discussed during appt. GFR decreased- potentially related to hypovolemia as hematocrit also elevated. . Please ensure Irene Miller is drinking 40-60 oz of water per day. This can contribute to confusion. Awaiting results of urine. Recommend rechecking in 2 weeks.

## 2023-04-04 NOTE — LETTER
April 4, 2023       Romy Chong YOB: 1940   [address] Date of Visit:  4/4/2023       To Whom It May Concern: It is my medical opinion that Romy Chong we discontinue her vitamin B 12 supplement. If you have any questions or concerns, please don't hesitate to call.     Sincerely,        Shelley Meckel, PETEY - CNP

## 2023-04-06 ENCOUNTER — TELEPHONE (OUTPATIENT)
Dept: FAMILY MEDICINE CLINIC | Age: 83
End: 2023-04-06

## 2023-04-06 DIAGNOSIS — M25.551 BILATERAL HIP PAIN: Primary | ICD-10-CM

## 2023-04-06 DIAGNOSIS — M25.552 BILATERAL HIP PAIN: Primary | ICD-10-CM

## 2023-04-06 DIAGNOSIS — R11.0 NAUSEA: ICD-10-CM

## 2023-04-06 LAB
BACTERIA UR CULT: ABNORMAL
ORGANISM: ABNORMAL

## 2023-04-06 RX ORDER — PREDNISONE 20 MG/1
20 TABLET ORAL DAILY
Qty: 5 TABLET | Refills: 0 | Status: ON HOLD | OUTPATIENT
Start: 2023-04-06 | End: 2023-04-11

## 2023-04-06 RX ORDER — ONDANSETRON 4 MG/1
4 TABLET, FILM COATED ORAL 3 TIMES DAILY PRN
Qty: 20 TABLET | Refills: 0 | Status: ON HOLD | OUTPATIENT
Start: 2023-04-06

## 2023-04-06 NOTE — TELEPHONE ENCOUNTER
Zofran ordered. If no improvement, notify office. Most likely from antibiotic. Would like to continue levofloxacin, if able, to treat uti. Few other antibiotic option for current infection.

## 2023-04-06 NOTE — LETTER
April 6, 2023       Gaudencio Brooks YOB: 1940   [address] [facility]          To Whom It May Concern:    Please add 5 day course of prednisone 20 mg daily and zofran take 1 tablet by mouth 3 times daily as needed for Nausea or Vomiting. If you have any questions or concerns, please don't hesitate to call.     Sincerely,        Comfort Pal, PETEY - CNP

## 2023-04-06 NOTE — TELEPHONE ENCOUNTER
Spoke to nurse at [facility] and verified that pt is taking prednisone 10 mg daily, daughter is wanting to know if able to increase dose? Pt is suppose to start PT tomorrow but she is not sure she will be able to tolerated because of the pain.

## 2023-04-06 NOTE — TELEPHONE ENCOUNTER
Jazmine notified and verbalized understanding. She is also wanting to know if she is able to get a rx for prednisone. She is having a lot of pain in her hip and can barely move. Prednisone is on her med list but daughter said she is not taking. ?? Induction of labor

## 2023-04-06 NOTE — TELEPHONE ENCOUNTER
Daughter called and states pt is having a lot of nausea. She is not sure if it is the zoloft that was just started or the levvaquin for the uti. She is not drinking water because of the nausea and daughter is trying to encourage water due to the uti. She is wanting to know if she can get a rx for the nausea. She has zofran on her med list but [facility] does not have any med.   Please advise  Call daughter at [phone]

## 2023-04-07 ENCOUNTER — APPOINTMENT (OUTPATIENT)
Dept: CT IMAGING | Age: 83
DRG: 542 | End: 2023-04-07
Payer: MEDICARE

## 2023-04-07 ENCOUNTER — HOSPITAL ENCOUNTER (INPATIENT)
Age: 83
LOS: 1 days | Discharge: HOME | DRG: 542 | End: 2023-04-11
Attending: EMERGENCY MEDICINE | Admitting: ORTHOPAEDIC SURGERY
Payer: MEDICARE

## 2023-04-07 ENCOUNTER — APPOINTMENT (OUTPATIENT)
Dept: GENERAL RADIOLOGY | Age: 83
DRG: 542 | End: 2023-04-07
Payer: MEDICARE

## 2023-04-07 DIAGNOSIS — S82.142A CLOSED FRACTURE OF LEFT TIBIAL PLATEAU, INITIAL ENCOUNTER: Primary | ICD-10-CM

## 2023-04-07 LAB
ALBUMIN SERPL BCG-MCNC: 3.7 G/DL (ref 3.5–5.1)
ALP SERPL-CCNC: 101 U/L (ref 38–126)
ALT SERPL W/O P-5'-P-CCNC: < 5 U/L (ref 11–66)
ANION GAP SERPL CALC-SCNC: 11 MEQ/L (ref 8–16)
AST SERPL-CCNC: 16 U/L (ref 5–40)
BASOPHILS ABSOLUTE: 0 THOU/MM3 (ref 0–0.1)
BASOPHILS NFR BLD AUTO: 0.3 %
BILIRUB SERPL-MCNC: 0.4 MG/DL (ref 0.3–1.2)
BUN SERPL-MCNC: 26 MG/DL (ref 7–22)
CALCIUM SERPL-MCNC: 9.5 MG/DL (ref 8.5–10.5)
CHLORIDE SERPL-SCNC: 102 MEQ/L (ref 98–111)
CO2 SERPL-SCNC: 28 MEQ/L (ref 23–33)
CREAT SERPL-MCNC: 1 MG/DL (ref 0.4–1.2)
DEPRECATED RDW RBC AUTO: 49.9 FL (ref 35–45)
EOSINOPHIL NFR BLD AUTO: 0.1 %
EOSINOPHILS ABSOLUTE: 0 THOU/MM3 (ref 0–0.4)
ERYTHROCYTE [DISTWIDTH] IN BLOOD BY AUTOMATED COUNT: 13.6 % (ref 11.5–14.5)
GFR SERPL CREATININE-BSD FRML MDRD: 56 ML/MIN/1.73M2
GLUCOSE SERPL-MCNC: 196 MG/DL (ref 70–108)
HCT VFR BLD AUTO: 46.8 % (ref 37–47)
HGB BLD-MCNC: 14.6 GM/DL (ref 12–16)
IMM GRANULOCYTES # BLD AUTO: 0.17 THOU/MM3 (ref 0–0.07)
IMM GRANULOCYTES NFR BLD AUTO: 1.4 %
LYMPHOCYTES ABSOLUTE: 1 THOU/MM3 (ref 1–4.8)
LYMPHOCYTES NFR BLD AUTO: 8.6 %
MCH RBC QN AUTO: 30.9 PG (ref 26–33)
MCHC RBC AUTO-ENTMCNC: 31.2 GM/DL (ref 32.2–35.5)
MCV RBC AUTO: 99.2 FL (ref 81–99)
MONOCYTES ABSOLUTE: 0.6 THOU/MM3 (ref 0.4–1.3)
MONOCYTES NFR BLD AUTO: 5.2 %
NEUTROPHILS NFR BLD AUTO: 84.4 %
NRBC BLD AUTO-RTO: 0 /100 WBC
OSMOLALITY SERPL CALC.SUM OF ELEC: 291.4 MOSMOL/KG (ref 275–300)
PLATELET # BLD AUTO: 287 THOU/MM3 (ref 130–400)
PMV BLD AUTO: 9.7 FL (ref 9.4–12.4)
POTASSIUM SERPL-SCNC: 4.3 MEQ/L (ref 3.5–5.2)
PROT SERPL-MCNC: 6.3 G/DL (ref 6.1–8)
RBC # BLD AUTO: 4.72 MILL/MM3 (ref 4.2–5.4)
SEGMENTED NEUTROPHILS ABSOLUTE COUNT: 10 THOU/MM3 (ref 1.8–7.7)
SODIUM SERPL-SCNC: 141 MEQ/L (ref 135–145)
WBC # BLD AUTO: 11.9 THOU/MM3 (ref 4.8–10.8)

## 2023-04-07 PROCEDURE — G0378 HOSPITAL OBSERVATION PER HR: HCPCS

## 2023-04-07 PROCEDURE — 6820000001 HC L2 TRAUMA SURGERY EVALUATION

## 2023-04-07 PROCEDURE — 6370000000 HC RX 637 (ALT 250 FOR IP)

## 2023-04-07 PROCEDURE — 6360000002 HC RX W HCPCS: Performed by: PHYSICIAN ASSISTANT

## 2023-04-07 PROCEDURE — 99285 EMERGENCY DEPT VISIT HI MDM: CPT

## 2023-04-07 PROCEDURE — 36415 COLL VENOUS BLD VENIPUNCTURE: CPT

## 2023-04-07 PROCEDURE — 73523 X-RAY EXAM HIPS BI 5/> VIEWS: CPT

## 2023-04-07 PROCEDURE — 85025 COMPLETE CBC W/AUTO DIFF WBC: CPT

## 2023-04-07 PROCEDURE — 99222 1ST HOSP IP/OBS MODERATE 55: CPT

## 2023-04-07 PROCEDURE — 93010 ELECTROCARDIOGRAM REPORT: CPT | Performed by: NUCLEAR MEDICINE

## 2023-04-07 PROCEDURE — 6370000000 HC RX 637 (ALT 250 FOR IP): Performed by: EMERGENCY MEDICINE

## 2023-04-07 PROCEDURE — 80053 COMPREHEN METABOLIC PANEL: CPT

## 2023-04-07 PROCEDURE — 2580000003 HC RX 258: Performed by: PHYSICIAN ASSISTANT

## 2023-04-07 PROCEDURE — 6820000001 HC L2 TRAUMA SURGERY EVALUATION: Performed by: PHYSICIAN ASSISTANT

## 2023-04-07 PROCEDURE — 93005 ELECTROCARDIOGRAM TRACING: CPT | Performed by: EMERGENCY MEDICINE

## 2023-04-07 PROCEDURE — 6370000000 HC RX 637 (ALT 250 FOR IP): Performed by: PHYSICIAN ASSISTANT

## 2023-04-07 PROCEDURE — 73564 X-RAY EXAM KNEE 4 OR MORE: CPT

## 2023-04-07 PROCEDURE — 96372 THER/PROPH/DIAG INJ SC/IM: CPT

## 2023-04-07 PROCEDURE — 73700 CT LOWER EXTREMITY W/O DYE: CPT

## 2023-04-07 RX ORDER — HYDROCODONE BITARTRATE AND ACETAMINOPHEN 5; 325 MG/1; MG/1
1 TABLET ORAL ONCE
Status: COMPLETED | OUTPATIENT
Start: 2023-04-07 | End: 2023-04-07

## 2023-04-07 RX ORDER — OXYCODONE HYDROCHLORIDE 5 MG/1
10 TABLET ORAL EVERY 4 HOURS PRN
Status: DISCONTINUED | OUTPATIENT
Start: 2023-04-07 | End: 2023-04-11 | Stop reason: HOSPADM

## 2023-04-07 RX ORDER — ALBUTEROL SULFATE 90 UG/1
2 AEROSOL, METERED RESPIRATORY (INHALATION) EVERY 6 HOURS PRN
Status: DISCONTINUED | OUTPATIENT
Start: 2023-04-07 | End: 2023-04-11 | Stop reason: HOSPADM

## 2023-04-07 RX ORDER — SODIUM CHLORIDE 9 MG/ML
INJECTION, SOLUTION INTRAVENOUS PRN
Status: DISCONTINUED | OUTPATIENT
Start: 2023-04-07 | End: 2023-04-11 | Stop reason: HOSPADM

## 2023-04-07 RX ORDER — SERTRALINE HYDROCHLORIDE 25 MG/1
25 TABLET, FILM COATED ORAL DAILY
Status: DISCONTINUED | OUTPATIENT
Start: 2023-04-07 | End: 2023-04-11 | Stop reason: HOSPADM

## 2023-04-07 RX ORDER — PANTOPRAZOLE SODIUM 40 MG/1
40 TABLET, DELAYED RELEASE ORAL DAILY
Status: DISCONTINUED | OUTPATIENT
Start: 2023-04-07 | End: 2023-04-11 | Stop reason: HOSPADM

## 2023-04-07 RX ORDER — PREDNISONE 10 MG/1
10 TABLET ORAL DAILY
Status: DISCONTINUED | OUTPATIENT
Start: 2023-04-08 | End: 2023-04-08

## 2023-04-07 RX ORDER — DONEPEZIL HYDROCHLORIDE 10 MG/1
10 TABLET, FILM COATED ORAL NIGHTLY
Status: DISCONTINUED | OUTPATIENT
Start: 2023-04-07 | End: 2023-04-11 | Stop reason: HOSPADM

## 2023-04-07 RX ORDER — OXYCODONE HYDROCHLORIDE 5 MG/1
5 TABLET ORAL EVERY 4 HOURS PRN
Status: DISCONTINUED | OUTPATIENT
Start: 2023-04-07 | End: 2023-04-11 | Stop reason: HOSPADM

## 2023-04-07 RX ORDER — MORPHINE SULFATE 4 MG/ML
4 INJECTION, SOLUTION INTRAMUSCULAR; INTRAVENOUS
Status: DISCONTINUED | OUTPATIENT
Start: 2023-04-07 | End: 2023-04-11 | Stop reason: HOSPADM

## 2023-04-07 RX ORDER — SODIUM CHLORIDE 0.9 % (FLUSH) 0.9 %
5-40 SYRINGE (ML) INJECTION PRN
Status: DISCONTINUED | OUTPATIENT
Start: 2023-04-07 | End: 2023-04-11 | Stop reason: HOSPADM

## 2023-04-07 RX ORDER — ENOXAPARIN SODIUM 100 MG/ML
40 INJECTION SUBCUTANEOUS EVERY 24 HOURS
Status: DISCONTINUED | OUTPATIENT
Start: 2023-04-07 | End: 2023-04-11 | Stop reason: HOSPADM

## 2023-04-07 RX ORDER — SODIUM CHLORIDE 9 MG/ML
INJECTION, SOLUTION INTRAVENOUS CONTINUOUS
Status: DISCONTINUED | OUTPATIENT
Start: 2023-04-07 | End: 2023-04-11 | Stop reason: HOSPADM

## 2023-04-07 RX ORDER — ALBUTEROL SULFATE 2.5 MG/3ML
2.5 SOLUTION RESPIRATORY (INHALATION) EVERY 6 HOURS PRN
Status: DISCONTINUED | OUTPATIENT
Start: 2023-04-07 | End: 2023-04-11 | Stop reason: HOSPADM

## 2023-04-07 RX ORDER — ONDANSETRON 2 MG/ML
4 INJECTION INTRAMUSCULAR; INTRAVENOUS EVERY 6 HOURS PRN
Status: DISCONTINUED | OUTPATIENT
Start: 2023-04-07 | End: 2023-04-11 | Stop reason: HOSPADM

## 2023-04-07 RX ORDER — MORPHINE SULFATE 2 MG/ML
2 INJECTION, SOLUTION INTRAMUSCULAR; INTRAVENOUS
Status: DISCONTINUED | OUTPATIENT
Start: 2023-04-07 | End: 2023-04-11 | Stop reason: HOSPADM

## 2023-04-07 RX ORDER — ONDANSETRON 4 MG/1
4 TABLET, ORALLY DISINTEGRATING ORAL EVERY 8 HOURS PRN
Status: DISCONTINUED | OUTPATIENT
Start: 2023-04-07 | End: 2023-04-11 | Stop reason: HOSPADM

## 2023-04-07 RX ORDER — NICOTINE 21 MG/24HR
1 PATCH, TRANSDERMAL 24 HOURS TRANSDERMAL DAILY
Status: DISCONTINUED | OUTPATIENT
Start: 2023-04-07 | End: 2023-04-11 | Stop reason: HOSPADM

## 2023-04-07 RX ORDER — SODIUM CHLORIDE 0.9 % (FLUSH) 0.9 %
5-40 SYRINGE (ML) INJECTION EVERY 12 HOURS SCHEDULED
Status: DISCONTINUED | OUTPATIENT
Start: 2023-04-07 | End: 2023-04-11 | Stop reason: HOSPADM

## 2023-04-07 RX ORDER — ATORVASTATIN CALCIUM 20 MG/1
20 TABLET, FILM COATED ORAL NIGHTLY
Status: DISCONTINUED | OUTPATIENT
Start: 2023-04-07 | End: 2023-04-11 | Stop reason: HOSPADM

## 2023-04-07 RX ORDER — LISINOPRIL 20 MG/1
20 TABLET ORAL DAILY
Status: DISCONTINUED | OUTPATIENT
Start: 2023-04-08 | End: 2023-04-11 | Stop reason: HOSPADM

## 2023-04-07 RX ORDER — ACETAMINOPHEN 325 MG/1
650 TABLET ORAL EVERY 6 HOURS
Status: DISCONTINUED | OUTPATIENT
Start: 2023-04-07 | End: 2023-04-11 | Stop reason: HOSPADM

## 2023-04-07 RX ORDER — ALENDRONATE SODIUM 70 MG/1
70 TABLET ORAL
Status: DISCONTINUED | OUTPATIENT
Start: 2023-04-07 | End: 2023-04-07 | Stop reason: CLARIF

## 2023-04-07 RX ADMIN — DONEPEZIL HYDROCHLORIDE 10 MG: 10 TABLET, FILM COATED ORAL at 22:07

## 2023-04-07 RX ADMIN — SERTRALINE 25 MG: 25 TABLET, FILM COATED ORAL at 22:06

## 2023-04-07 RX ADMIN — OXYCODONE HYDROCHLORIDE 10 MG: 5 TABLET ORAL at 18:54

## 2023-04-07 RX ADMIN — ATORVASTATIN CALCIUM 20 MG: 20 TABLET, FILM COATED ORAL at 22:07

## 2023-04-07 RX ADMIN — PANTOPRAZOLE SODIUM 40 MG: 40 TABLET, DELAYED RELEASE ORAL at 18:54

## 2023-04-07 RX ADMIN — SODIUM CHLORIDE: 9 INJECTION, SOLUTION INTRAVENOUS at 18:49

## 2023-04-07 RX ADMIN — CARBIDOPA AND LEVODOPA 2 TABLET: 25; 100 TABLET ORAL at 22:06

## 2023-04-07 RX ADMIN — HYDROCODONE BITARTRATE AND ACETAMINOPHEN 1 TABLET: 5; 325 TABLET ORAL at 11:11

## 2023-04-07 RX ADMIN — ENOXAPARIN SODIUM 40 MG: 100 INJECTION SUBCUTANEOUS at 18:53

## 2023-04-07 RX ADMIN — METOPROLOL TARTRATE 25 MG: 25 TABLET, FILM COATED ORAL at 22:08

## 2023-04-07 ASSESSMENT — PAIN DESCRIPTION - LOCATION: LOCATION: KNEE

## 2023-04-07 ASSESSMENT — PAIN - FUNCTIONAL ASSESSMENT
PAIN_FUNCTIONAL_ASSESSMENT: NONE - DENIES PAIN
PAIN_FUNCTIONAL_ASSESSMENT: NONE - DENIES PAIN

## 2023-04-07 ASSESSMENT — PAIN SCALES - GENERAL: PAINLEVEL_OUTOF10: 9

## 2023-04-08 LAB
ANION GAP SERPL CALC-SCNC: 7 MEQ/L (ref 8–16)
BACTERIA: ABNORMAL
BASOPHILS ABSOLUTE: 0 THOU/MM3 (ref 0–0.1)
BASOPHILS NFR BLD AUTO: 0.3 %
BILIRUB UR QL STRIP: NEGATIVE
BUN SERPL-MCNC: 26 MG/DL (ref 7–22)
CALCIUM SERPL-MCNC: 8.5 MG/DL (ref 8.5–10.5)
CASTS #/AREA URNS LPF: ABNORMAL /LPF
CASTS #/AREA URNS LPF: ABNORMAL /LPF
CHARACTER UR: ABNORMAL
CHARCOAL URNS QL MICRO: ABNORMAL
CHLORIDE SERPL-SCNC: 105 MEQ/L (ref 98–111)
CO2 SERPL-SCNC: 26 MEQ/L (ref 23–33)
COLOR UR: YELLOW
CREAT SERPL-MCNC: 0.9 MG/DL (ref 0.4–1.2)
CRYSTALS URNS QL MICRO: ABNORMAL
DEPRECATED RDW RBC AUTO: 52.7 FL (ref 35–45)
EOSINOPHIL NFR BLD AUTO: 0.1 %
EOSINOPHILS ABSOLUTE: 0 THOU/MM3 (ref 0–0.4)
EPITHELIAL CELLS, UA: ABNORMAL /HPF
ERYTHROCYTE [DISTWIDTH] IN BLOOD BY AUTOMATED COUNT: 13.5 % (ref 11.5–14.5)
GFR SERPL CREATININE-BSD FRML MDRD: > 60 ML/MIN/1.73M2
GLUCOSE SERPL-MCNC: 110 MG/DL (ref 70–108)
GLUCOSE UR QL STRIP.AUTO: 500 MG/DL
HCT VFR BLD AUTO: 45 % (ref 37–47)
HGB BLD-MCNC: 13.6 GM/DL (ref 12–16)
HGB UR QL STRIP.AUTO: NEGATIVE
IMM GRANULOCYTES # BLD AUTO: 0.13 THOU/MM3 (ref 0–0.07)
IMM GRANULOCYTES NFR BLD AUTO: 0.9 %
KETONES UR QL STRIP.AUTO: NEGATIVE
LEUKOCYTE ESTERASE UR QL STRIP.AUTO: NEGATIVE
LYMPHOCYTES ABSOLUTE: 1.3 THOU/MM3 (ref 1–4.8)
LYMPHOCYTES NFR BLD AUTO: 8.7 %
MCH RBC QN AUTO: 31.6 PG (ref 26–33)
MCHC RBC AUTO-ENTMCNC: 30.2 GM/DL (ref 32.2–35.5)
MCV RBC AUTO: 104.4 FL (ref 81–99)
MONOCYTES ABSOLUTE: 1.1 THOU/MM3 (ref 0.4–1.3)
MONOCYTES NFR BLD AUTO: 7.1 %
NEUTROPHILS NFR BLD AUTO: 82.9 %
NITRITE UR QL STRIP.AUTO: NEGATIVE
NRBC BLD AUTO-RTO: 0 /100 WBC
PH UR STRIP.AUTO: 6 [PH] (ref 5–9)
PLATELET # BLD AUTO: 210 THOU/MM3 (ref 130–400)
PMV BLD AUTO: 9.7 FL (ref 9.4–12.4)
POTASSIUM SERPL-SCNC: 4.3 MEQ/L (ref 3.5–5.2)
PROT UR STRIP.AUTO-MCNC: NEGATIVE MG/DL
RBC # BLD AUTO: 4.31 MILL/MM3 (ref 4.2–5.4)
RBC #/AREA URNS HPF: ABNORMAL /HPF
RENAL EPI CELLS #/AREA URNS HPF: ABNORMAL /[HPF]
SEGMENTED NEUTROPHILS ABSOLUTE COUNT: 12.4 THOU/MM3 (ref 1.8–7.7)
SODIUM SERPL-SCNC: 138 MEQ/L (ref 135–145)
SPECIFIC GRAVITY UA: 1.02 (ref 1–1.03)
UROBILINOGEN, URINE: 0.2 EU/DL (ref 0–1)
WBC # BLD AUTO: 15 THOU/MM3 (ref 4.8–10.8)
WBC #/AREA URNS HPF: ABNORMAL /HPF
YEAST LIKE FUNGI URNS QL MICRO: ABNORMAL

## 2023-04-08 PROCEDURE — 6370000000 HC RX 637 (ALT 250 FOR IP): Performed by: EMERGENCY MEDICINE

## 2023-04-08 PROCEDURE — 97530 THERAPEUTIC ACTIVITIES: CPT

## 2023-04-08 PROCEDURE — 96372 THER/PROPH/DIAG INJ SC/IM: CPT

## 2023-04-08 PROCEDURE — 87086 URINE CULTURE/COLONY COUNT: CPT

## 2023-04-08 PROCEDURE — 6370000000 HC RX 637 (ALT 250 FOR IP)

## 2023-04-08 PROCEDURE — 97163 PT EVAL HIGH COMPLEX 45 MIN: CPT

## 2023-04-08 PROCEDURE — G0378 HOSPITAL OBSERVATION PER HR: HCPCS

## 2023-04-08 PROCEDURE — 85025 COMPLETE CBC W/AUTO DIFF WBC: CPT

## 2023-04-08 PROCEDURE — 94640 AIRWAY INHALATION TREATMENT: CPT

## 2023-04-08 PROCEDURE — 93010 ELECTROCARDIOGRAM REPORT: CPT | Performed by: NUCLEAR MEDICINE

## 2023-04-08 PROCEDURE — 96361 HYDRATE IV INFUSION ADD-ON: CPT

## 2023-04-08 PROCEDURE — 2580000003 HC RX 258: Performed by: PHYSICIAN ASSISTANT

## 2023-04-08 PROCEDURE — 6370000000 HC RX 637 (ALT 250 FOR IP): Performed by: PHYSICIAN ASSISTANT

## 2023-04-08 PROCEDURE — 97110 THERAPEUTIC EXERCISES: CPT

## 2023-04-08 PROCEDURE — 96374 THER/PROPH/DIAG INJ IV PUSH: CPT

## 2023-04-08 PROCEDURE — 6360000002 HC RX W HCPCS: Performed by: PHYSICIAN ASSISTANT

## 2023-04-08 PROCEDURE — 36415 COLL VENOUS BLD VENIPUNCTURE: CPT

## 2023-04-08 PROCEDURE — 93005 ELECTROCARDIOGRAM TRACING: CPT

## 2023-04-08 PROCEDURE — 99233 SBSQ HOSP IP/OBS HIGH 50: CPT | Performed by: PHYSICIAN ASSISTANT

## 2023-04-08 PROCEDURE — 97166 OT EVAL MOD COMPLEX 45 MIN: CPT

## 2023-04-08 PROCEDURE — 80048 BASIC METABOLIC PNL TOTAL CA: CPT

## 2023-04-08 PROCEDURE — 81001 URINALYSIS AUTO W/SCOPE: CPT

## 2023-04-08 RX ORDER — FLUCONAZOLE 200 MG/1
200 TABLET ORAL DAILY
Status: DISCONTINUED | OUTPATIENT
Start: 2023-04-08 | End: 2023-04-10

## 2023-04-08 RX ORDER — HYDRALAZINE HYDROCHLORIDE 20 MG/ML
5 INJECTION INTRAMUSCULAR; INTRAVENOUS EVERY 6 HOURS PRN
Status: DISCONTINUED | OUTPATIENT
Start: 2023-04-08 | End: 2023-04-11 | Stop reason: HOSPADM

## 2023-04-08 RX ORDER — PREDNISONE 10 MG/1
10 TABLET ORAL DAILY
Status: COMPLETED | OUTPATIENT
Start: 2023-04-09 | End: 2023-04-10

## 2023-04-08 RX ORDER — LEVOFLOXACIN 250 MG/1
250 TABLET ORAL DAILY
Status: COMPLETED | OUTPATIENT
Start: 2023-04-08 | End: 2023-04-09

## 2023-04-08 RX ORDER — ESTRADIOL 0.1 MG/G
0.5 CREAM VAGINAL
Status: DISCONTINUED | OUTPATIENT
Start: 2023-04-10 | End: 2023-04-11 | Stop reason: HOSPADM

## 2023-04-08 RX ADMIN — OXYCODONE HYDROCHLORIDE 5 MG: 5 TABLET ORAL at 16:18

## 2023-04-08 RX ADMIN — CARBIDOPA AND LEVODOPA 2 TABLET: 25; 100 TABLET ORAL at 10:05

## 2023-04-08 RX ADMIN — ENOXAPARIN SODIUM 40 MG: 100 INJECTION SUBCUTANEOUS at 17:22

## 2023-04-08 RX ADMIN — OXYCODONE HYDROCHLORIDE 10 MG: 5 TABLET ORAL at 03:54

## 2023-04-08 RX ADMIN — LEVOFLOXACIN 250 MG: 250 TABLET, FILM COATED ORAL at 11:54

## 2023-04-08 RX ADMIN — LISINOPRIL 20 MG: 20 TABLET ORAL at 10:05

## 2023-04-08 RX ADMIN — ONDANSETRON 4 MG: 4 TABLET, ORALLY DISINTEGRATING ORAL at 16:18

## 2023-04-08 RX ADMIN — DONEPEZIL HYDROCHLORIDE 10 MG: 10 TABLET, FILM COATED ORAL at 20:25

## 2023-04-08 RX ADMIN — MOMETASONE FUROATE AND FORMOTEROL FUMARATE DIHYDRATE 2 PUFF: 200; 5 AEROSOL RESPIRATORY (INHALATION) at 07:43

## 2023-04-08 RX ADMIN — METOPROLOL TARTRATE 25 MG: 25 TABLET, FILM COATED ORAL at 10:04

## 2023-04-08 RX ADMIN — CARBIDOPA AND LEVODOPA 2 TABLET: 25; 100 TABLET ORAL at 15:55

## 2023-04-08 RX ADMIN — HYDRALAZINE HYDROCHLORIDE 5 MG: 20 INJECTION INTRAMUSCULAR; INTRAVENOUS at 15:59

## 2023-04-08 RX ADMIN — SERTRALINE 25 MG: 25 TABLET, FILM COATED ORAL at 10:04

## 2023-04-08 RX ADMIN — TIOTROPIUM BROMIDE INHALATION SPRAY 2 PUFF: 3.12 SPRAY, METERED RESPIRATORY (INHALATION) at 07:43

## 2023-04-08 RX ADMIN — FLUCONAZOLE 200 MG: 200 TABLET ORAL at 18:51

## 2023-04-08 RX ADMIN — SODIUM CHLORIDE: 9 INJECTION, SOLUTION INTRAVENOUS at 15:55

## 2023-04-08 RX ADMIN — SODIUM CHLORIDE: 9 INJECTION, SOLUTION INTRAVENOUS at 04:57

## 2023-04-08 RX ADMIN — ATORVASTATIN CALCIUM 20 MG: 20 TABLET, FILM COATED ORAL at 20:25

## 2023-04-08 RX ADMIN — PREDNISONE 10 MG: 10 TABLET ORAL at 10:05

## 2023-04-08 RX ADMIN — ACETAMINOPHEN 650 MG: 325 TABLET ORAL at 17:22

## 2023-04-08 RX ADMIN — ACETAMINOPHEN 650 MG: 325 TABLET ORAL at 10:04

## 2023-04-08 RX ADMIN — PANTOPRAZOLE SODIUM 40 MG: 40 TABLET, DELAYED RELEASE ORAL at 03:56

## 2023-04-08 RX ADMIN — OXYCODONE HYDROCHLORIDE 10 MG: 5 TABLET ORAL at 10:04

## 2023-04-08 RX ADMIN — METOPROLOL TARTRATE 25 MG: 25 TABLET, FILM COATED ORAL at 20:25

## 2023-04-08 RX ADMIN — CARBIDOPA AND LEVODOPA 2 TABLET: 25; 100 TABLET ORAL at 20:25

## 2023-04-08 RX ADMIN — MOMETASONE FUROATE AND FORMOTEROL FUMARATE DIHYDRATE 2 PUFF: 200; 5 AEROSOL RESPIRATORY (INHALATION) at 17:25

## 2023-04-08 ASSESSMENT — PAIN DESCRIPTION - LOCATION: LOCATION: KNEE

## 2023-04-08 ASSESSMENT — PAIN SCALES - GENERAL
PAINLEVEL_OUTOF10: 5
PAINLEVEL_OUTOF10: 7
PAINLEVEL_OUTOF10: 0
PAINLEVEL_OUTOF10: 8
PAINLEVEL_OUTOF10: 0
PAINLEVEL_OUTOF10: 6
PAINLEVEL_OUTOF10: 0

## 2023-04-08 ASSESSMENT — PAIN DESCRIPTION - DESCRIPTORS: DESCRIPTORS: ACHING

## 2023-04-08 ASSESSMENT — PAIN - FUNCTIONAL ASSESSMENT: PAIN_FUNCTIONAL_ASSESSMENT: PREVENTS OR INTERFERES SOME ACTIVE ACTIVITIES AND ADLS

## 2023-04-08 ASSESSMENT — PAIN DESCRIPTION - ORIENTATION: ORIENTATION: LEFT

## 2023-04-09 LAB
ANION GAP SERPL CALC-SCNC: 8 MEQ/L (ref 8–16)
BASOPHILS ABSOLUTE: 0 THOU/MM3 (ref 0–0.1)
BASOPHILS NFR BLD AUTO: 0.4 %
BUN SERPL-MCNC: 17 MG/DL (ref 7–22)
CALCIUM SERPL-MCNC: 8.4 MG/DL (ref 8.5–10.5)
CHLORIDE SERPL-SCNC: 109 MEQ/L (ref 98–111)
CO2 SERPL-SCNC: 26 MEQ/L (ref 23–33)
CREAT SERPL-MCNC: 0.7 MG/DL (ref 0.4–1.2)
DEPRECATED RDW RBC AUTO: 52.8 FL (ref 35–45)
EOSINOPHIL NFR BLD AUTO: 0.3 %
EOSINOPHILS ABSOLUTE: 0 THOU/MM3 (ref 0–0.4)
ERYTHROCYTE [DISTWIDTH] IN BLOOD BY AUTOMATED COUNT: 13.9 % (ref 11.5–14.5)
GFR SERPL CREATININE-BSD FRML MDRD: > 60 ML/MIN/1.73M2
GLUCOSE SERPL-MCNC: 104 MG/DL (ref 70–108)
HCT VFR BLD AUTO: 40.8 % (ref 37–47)
HGB BLD-MCNC: 12.5 GM/DL (ref 12–16)
IMM GRANULOCYTES # BLD AUTO: 0.09 THOU/MM3 (ref 0–0.07)
IMM GRANULOCYTES NFR BLD AUTO: 0.8 %
LYMPHOCYTES ABSOLUTE: 1 THOU/MM3 (ref 1–4.8)
LYMPHOCYTES NFR BLD AUTO: 9.1 %
MCH RBC QN AUTO: 31.3 PG (ref 26–33)
MCHC RBC AUTO-ENTMCNC: 30.6 GM/DL (ref 32.2–35.5)
MCV RBC AUTO: 102 FL (ref 81–99)
MONOCYTES ABSOLUTE: 0.8 THOU/MM3 (ref 0.4–1.3)
MONOCYTES NFR BLD AUTO: 7.3 %
NEUTROPHILS NFR BLD AUTO: 82.1 %
NRBC BLD AUTO-RTO: 0 /100 WBC
PLATELET # BLD AUTO: 211 THOU/MM3 (ref 130–400)
PMV BLD AUTO: 10.3 FL (ref 9.4–12.4)
POTASSIUM SERPL-SCNC: 4.5 MEQ/L (ref 3.5–5.2)
RBC # BLD AUTO: 4 MILL/MM3 (ref 4.2–5.4)
SEGMENTED NEUTROPHILS ABSOLUTE COUNT: 8.9 THOU/MM3 (ref 1.8–7.7)
SODIUM SERPL-SCNC: 143 MEQ/L (ref 135–145)
WBC # BLD AUTO: 10.8 THOU/MM3 (ref 4.8–10.8)

## 2023-04-09 PROCEDURE — G0378 HOSPITAL OBSERVATION PER HR: HCPCS

## 2023-04-09 PROCEDURE — 94640 AIRWAY INHALATION TREATMENT: CPT

## 2023-04-09 PROCEDURE — 36415 COLL VENOUS BLD VENIPUNCTURE: CPT

## 2023-04-09 PROCEDURE — 2580000003 HC RX 258: Performed by: PHYSICIAN ASSISTANT

## 2023-04-09 PROCEDURE — 85025 COMPLETE CBC W/AUTO DIFF WBC: CPT

## 2023-04-09 PROCEDURE — 96376 TX/PRO/DX INJ SAME DRUG ADON: CPT

## 2023-04-09 PROCEDURE — 6370000000 HC RX 637 (ALT 250 FOR IP)

## 2023-04-09 PROCEDURE — 96361 HYDRATE IV INFUSION ADD-ON: CPT

## 2023-04-09 PROCEDURE — 6360000002 HC RX W HCPCS: Performed by: PHYSICIAN ASSISTANT

## 2023-04-09 PROCEDURE — 80048 BASIC METABOLIC PNL TOTAL CA: CPT

## 2023-04-09 PROCEDURE — 6370000000 HC RX 637 (ALT 250 FOR IP): Performed by: EMERGENCY MEDICINE

## 2023-04-09 PROCEDURE — 96372 THER/PROPH/DIAG INJ SC/IM: CPT

## 2023-04-09 PROCEDURE — 6370000000 HC RX 637 (ALT 250 FOR IP): Performed by: PHYSICIAN ASSISTANT

## 2023-04-09 RX ADMIN — METOPROLOL TARTRATE 25 MG: 25 TABLET, FILM COATED ORAL at 20:22

## 2023-04-09 RX ADMIN — TIOTROPIUM BROMIDE INHALATION SPRAY 2 PUFF: 3.12 SPRAY, METERED RESPIRATORY (INHALATION) at 07:41

## 2023-04-09 RX ADMIN — CARBIDOPA AND LEVODOPA 2 TABLET: 25; 100 TABLET ORAL at 20:22

## 2023-04-09 RX ADMIN — PANTOPRAZOLE SODIUM 40 MG: 40 TABLET, DELAYED RELEASE ORAL at 04:46

## 2023-04-09 RX ADMIN — ACETAMINOPHEN 650 MG: 325 TABLET ORAL at 09:46

## 2023-04-09 RX ADMIN — SODIUM CHLORIDE: 9 INJECTION, SOLUTION INTRAVENOUS at 20:54

## 2023-04-09 RX ADMIN — PREDNISONE 10 MG: 10 TABLET ORAL at 09:42

## 2023-04-09 RX ADMIN — ENOXAPARIN SODIUM 40 MG: 100 INJECTION SUBCUTANEOUS at 16:17

## 2023-04-09 RX ADMIN — FLUCONAZOLE 200 MG: 200 TABLET ORAL at 09:40

## 2023-04-09 RX ADMIN — CARBIDOPA AND LEVODOPA 2 TABLET: 25; 100 TABLET ORAL at 13:51

## 2023-04-09 RX ADMIN — SODIUM CHLORIDE: 9 INJECTION, SOLUTION INTRAVENOUS at 11:22

## 2023-04-09 RX ADMIN — SODIUM CHLORIDE: 9 INJECTION, SOLUTION INTRAVENOUS at 02:03

## 2023-04-09 RX ADMIN — METOPROLOL TARTRATE 25 MG: 25 TABLET, FILM COATED ORAL at 09:41

## 2023-04-09 RX ADMIN — HYDRALAZINE HYDROCHLORIDE 5 MG: 20 INJECTION INTRAMUSCULAR; INTRAVENOUS at 23:20

## 2023-04-09 RX ADMIN — MOMETASONE FUROATE AND FORMOTEROL FUMARATE DIHYDRATE 2 PUFF: 200; 5 AEROSOL RESPIRATORY (INHALATION) at 07:41

## 2023-04-09 RX ADMIN — MOMETASONE FUROATE AND FORMOTEROL FUMARATE DIHYDRATE 2 PUFF: 200; 5 AEROSOL RESPIRATORY (INHALATION) at 17:02

## 2023-04-09 RX ADMIN — CARBIDOPA AND LEVODOPA 2 TABLET: 25; 100 TABLET ORAL at 09:39

## 2023-04-09 RX ADMIN — ACETAMINOPHEN 650 MG: 325 TABLET ORAL at 04:45

## 2023-04-09 RX ADMIN — LEVOFLOXACIN 250 MG: 250 TABLET, FILM COATED ORAL at 09:42

## 2023-04-09 RX ADMIN — LISINOPRIL 20 MG: 20 TABLET ORAL at 09:40

## 2023-04-09 RX ADMIN — SERTRALINE 25 MG: 25 TABLET, FILM COATED ORAL at 09:43

## 2023-04-09 RX ADMIN — ONDANSETRON 4 MG: 2 INJECTION INTRAMUSCULAR; INTRAVENOUS at 20:58

## 2023-04-09 RX ADMIN — ACETAMINOPHEN 650 MG: 325 TABLET ORAL at 16:17

## 2023-04-09 RX ADMIN — ACETAMINOPHEN 650 MG: 325 TABLET ORAL at 23:17

## 2023-04-09 RX ADMIN — ATORVASTATIN CALCIUM 20 MG: 20 TABLET, FILM COATED ORAL at 20:22

## 2023-04-09 RX ADMIN — DONEPEZIL HYDROCHLORIDE 10 MG: 10 TABLET, FILM COATED ORAL at 20:22

## 2023-04-09 ASSESSMENT — PAIN SCALES - GENERAL
PAINLEVEL_OUTOF10: 0

## 2023-04-10 LAB
ANION GAP SERPL CALC-SCNC: 8 MEQ/L (ref 8–16)
BACTERIA UR CULT: ABNORMAL
BASOPHILS ABSOLUTE: 0 THOU/MM3 (ref 0–0.1)
BASOPHILS NFR BLD AUTO: 0.4 %
BUN SERPL-MCNC: 16 MG/DL (ref 7–22)
CALCIUM SERPL-MCNC: 8.5 MG/DL (ref 8.5–10.5)
CHLORIDE SERPL-SCNC: 107 MEQ/L (ref 98–111)
CO2 SERPL-SCNC: 24 MEQ/L (ref 23–33)
CREAT SERPL-MCNC: 0.7 MG/DL (ref 0.4–1.2)
DEPRECATED RDW RBC AUTO: 51.6 FL (ref 35–45)
EOSINOPHIL NFR BLD AUTO: 0.2 %
EOSINOPHILS ABSOLUTE: 0 THOU/MM3 (ref 0–0.4)
ERYTHROCYTE [DISTWIDTH] IN BLOOD BY AUTOMATED COUNT: 13.6 % (ref 11.5–14.5)
GFR SERPL CREATININE-BSD FRML MDRD: > 60 ML/MIN/1.73M2
GLUCOSE SERPL-MCNC: 177 MG/DL (ref 70–108)
HCT VFR BLD AUTO: 40.3 % (ref 37–47)
HGB BLD-MCNC: 12.6 GM/DL (ref 12–16)
IMM GRANULOCYTES # BLD AUTO: 0.13 THOU/MM3 (ref 0–0.07)
IMM GRANULOCYTES NFR BLD AUTO: 1.2 %
LYMPHOCYTES ABSOLUTE: 1.1 THOU/MM3 (ref 1–4.8)
LYMPHOCYTES NFR BLD AUTO: 9.8 %
MCH RBC QN AUTO: 31.6 PG (ref 26–33)
MCHC RBC AUTO-ENTMCNC: 31.3 GM/DL (ref 32.2–35.5)
MCV RBC AUTO: 101 FL (ref 81–99)
MONOCYTES ABSOLUTE: 0.8 THOU/MM3 (ref 0.4–1.3)
MONOCYTES NFR BLD AUTO: 7.2 %
NEUTROPHILS NFR BLD AUTO: 81.2 %
NRBC BLD AUTO-RTO: 0 /100 WBC
ORGANISM: ABNORMAL
PLATELET # BLD AUTO: 211 THOU/MM3 (ref 130–400)
PMV BLD AUTO: 9.8 FL (ref 9.4–12.4)
POTASSIUM SERPL-SCNC: 4.2 MEQ/L (ref 3.5–5.2)
RBC # BLD AUTO: 3.99 MILL/MM3 (ref 4.2–5.4)
SEGMENTED NEUTROPHILS ABSOLUTE COUNT: 8.9 THOU/MM3 (ref 1.8–7.7)
SODIUM SERPL-SCNC: 139 MEQ/L (ref 135–145)
WBC # BLD AUTO: 10.9 THOU/MM3 (ref 4.8–10.8)

## 2023-04-10 PROCEDURE — 80048 BASIC METABOLIC PNL TOTAL CA: CPT

## 2023-04-10 PROCEDURE — 6370000000 HC RX 637 (ALT 250 FOR IP)

## 2023-04-10 PROCEDURE — 6370000000 HC RX 637 (ALT 250 FOR IP): Performed by: PHYSICIAN ASSISTANT

## 2023-04-10 PROCEDURE — 97530 THERAPEUTIC ACTIVITIES: CPT

## 2023-04-10 PROCEDURE — 99232 SBSQ HOSP IP/OBS MODERATE 35: CPT | Performed by: PHYSICIAN ASSISTANT

## 2023-04-10 PROCEDURE — 85025 COMPLETE CBC W/AUTO DIFF WBC: CPT

## 2023-04-10 PROCEDURE — 6370000000 HC RX 637 (ALT 250 FOR IP): Performed by: EMERGENCY MEDICINE

## 2023-04-10 PROCEDURE — 2700000000 HC OXYGEN THERAPY PER DAY

## 2023-04-10 PROCEDURE — 2580000003 HC RX 258: Performed by: PHYSICIAN ASSISTANT

## 2023-04-10 PROCEDURE — 96375 TX/PRO/DX INJ NEW DRUG ADDON: CPT

## 2023-04-10 PROCEDURE — 36415 COLL VENOUS BLD VENIPUNCTURE: CPT

## 2023-04-10 PROCEDURE — 6360000002 HC RX W HCPCS: Performed by: PHYSICIAN ASSISTANT

## 2023-04-10 PROCEDURE — 2500000003 HC RX 250 WO HCPCS: Performed by: PHYSICIAN ASSISTANT

## 2023-04-10 PROCEDURE — 1200000000 HC SEMI PRIVATE

## 2023-04-10 PROCEDURE — 97129 THER IVNTJ 1ST 15 MIN: CPT

## 2023-04-10 PROCEDURE — 94640 AIRWAY INHALATION TREATMENT: CPT

## 2023-04-10 PROCEDURE — 92523 SPEECH SOUND LANG COMPREHEN: CPT

## 2023-04-10 PROCEDURE — 97535 SELF CARE MNGMENT TRAINING: CPT

## 2023-04-10 PROCEDURE — 97110 THERAPEUTIC EXERCISES: CPT

## 2023-04-10 RX ORDER — LABETALOL 20 MG/4 ML (5 MG/ML) INTRAVENOUS SYRINGE
20 ONCE
Status: COMPLETED | OUTPATIENT
Start: 2023-04-10 | End: 2023-04-10

## 2023-04-10 RX ORDER — OXYCODONE HYDROCHLORIDE AND ACETAMINOPHEN 5; 325 MG/1; MG/1
1 TABLET ORAL EVERY 8 HOURS PRN
Qty: 21 TABLET | Refills: 0 | Status: SHIPPED | OUTPATIENT
Start: 2023-04-10 | End: 2023-04-17

## 2023-04-10 RX ORDER — ENOXAPARIN SODIUM 100 MG/ML
40 INJECTION SUBCUTANEOUS EVERY 24 HOURS
Qty: 8.4 ML | Refills: 0 | Status: SHIPPED | OUTPATIENT
Start: 2023-04-10 | End: 2023-05-01

## 2023-04-10 RX ORDER — ALBUTEROL SULFATE 90 UG/1
2 AEROSOL, METERED RESPIRATORY (INHALATION) 2 TIMES DAILY
Status: DISCONTINUED | OUTPATIENT
Start: 2023-04-10 | End: 2023-04-11 | Stop reason: HOSPADM

## 2023-04-10 RX ORDER — METOPROLOL TARTRATE 50 MG/1
50 TABLET, FILM COATED ORAL 2 TIMES DAILY
Status: DISCONTINUED | OUTPATIENT
Start: 2023-04-10 | End: 2023-04-11 | Stop reason: HOSPADM

## 2023-04-10 RX ADMIN — SODIUM CHLORIDE: 9 INJECTION, SOLUTION INTRAVENOUS at 16:30

## 2023-04-10 RX ADMIN — METOPROLOL TARTRATE 25 MG: 25 TABLET, FILM COATED ORAL at 08:47

## 2023-04-10 RX ADMIN — SODIUM CHLORIDE: 9 INJECTION, SOLUTION INTRAVENOUS at 08:44

## 2023-04-10 RX ADMIN — ATORVASTATIN CALCIUM 20 MG: 20 TABLET, FILM COATED ORAL at 21:21

## 2023-04-10 RX ADMIN — ACETAMINOPHEN 650 MG: 325 TABLET ORAL at 22:53

## 2023-04-10 RX ADMIN — CARBIDOPA AND LEVODOPA 2 TABLET: 25; 100 TABLET ORAL at 21:21

## 2023-04-10 RX ADMIN — ACETAMINOPHEN 650 MG: 325 TABLET ORAL at 10:59

## 2023-04-10 RX ADMIN — ALBUTEROL SULFATE 2 PUFF: 90 AEROSOL, METERED RESPIRATORY (INHALATION) at 17:55

## 2023-04-10 RX ADMIN — METOPROLOL TARTRATE 50 MG: 50 TABLET, FILM COATED ORAL at 22:14

## 2023-04-10 RX ADMIN — DONEPEZIL HYDROCHLORIDE 10 MG: 10 TABLET, FILM COATED ORAL at 21:21

## 2023-04-10 RX ADMIN — ALBUTEROL SULFATE 2 PUFF: 90 AEROSOL, METERED RESPIRATORY (INHALATION) at 07:53

## 2023-04-10 RX ADMIN — PANTOPRAZOLE SODIUM 40 MG: 40 TABLET, DELAYED RELEASE ORAL at 05:50

## 2023-04-10 RX ADMIN — SODIUM CHLORIDE, PRESERVATIVE FREE 10 ML: 5 INJECTION INTRAVENOUS at 21:23

## 2023-04-10 RX ADMIN — CARBIDOPA AND LEVODOPA 2 TABLET: 25; 100 TABLET ORAL at 13:24

## 2023-04-10 RX ADMIN — TIOTROPIUM BROMIDE INHALATION SPRAY 2 PUFF: 3.12 SPRAY, METERED RESPIRATORY (INHALATION) at 07:49

## 2023-04-10 RX ADMIN — ESTRADIOL 0.5 G: 0.1 CREAM VAGINAL at 08:46

## 2023-04-10 RX ADMIN — FLUCONAZOLE 200 MG: 200 TABLET ORAL at 08:47

## 2023-04-10 RX ADMIN — SERTRALINE 25 MG: 25 TABLET, FILM COATED ORAL at 08:47

## 2023-04-10 RX ADMIN — CARBIDOPA AND LEVODOPA 2 TABLET: 25; 100 TABLET ORAL at 08:45

## 2023-04-10 RX ADMIN — ACETAMINOPHEN 650 MG: 325 TABLET ORAL at 16:27

## 2023-04-10 RX ADMIN — MOMETASONE FUROATE AND FORMOTEROL FUMARATE DIHYDRATE 2 PUFF: 200; 5 AEROSOL RESPIRATORY (INHALATION) at 17:56

## 2023-04-10 RX ADMIN — ENOXAPARIN SODIUM 40 MG: 100 INJECTION SUBCUTANEOUS at 16:27

## 2023-04-10 RX ADMIN — ACETAMINOPHEN 650 MG: 325 TABLET ORAL at 05:50

## 2023-04-10 RX ADMIN — LISINOPRIL 20 MG: 20 TABLET ORAL at 08:47

## 2023-04-10 RX ADMIN — PREDNISONE 10 MG: 10 TABLET ORAL at 08:47

## 2023-04-10 RX ADMIN — MOMETASONE FUROATE AND FORMOTEROL FUMARATE DIHYDRATE 2 PUFF: 200; 5 AEROSOL RESPIRATORY (INHALATION) at 07:49

## 2023-04-10 RX ADMIN — Medication 20 MG: at 03:39

## 2023-04-10 ASSESSMENT — PAIN DESCRIPTION - LOCATION
LOCATION: KNEE
LOCATION: LEG

## 2023-04-10 ASSESSMENT — PAIN DESCRIPTION - DESCRIPTORS
DESCRIPTORS: ACHING
DESCRIPTORS: ACHING

## 2023-04-10 ASSESSMENT — PAIN SCALES - GENERAL
PAINLEVEL_OUTOF10: 2
PAINLEVEL_OUTOF10: 3

## 2023-04-10 ASSESSMENT — PAIN DESCRIPTION - ORIENTATION
ORIENTATION: LEFT
ORIENTATION: LEFT

## 2023-04-10 NOTE — DISCHARGE INSTR - COC
Tibial plateau fracture, left, closed, initial encounter S82.142A       Isolation/Infection:   Isolation            No Isolation          Patient Infection Status       Infection Onset Added Last Indicated Last Indicated By Review Planned Expiration Resolved Resolved By    None active    Resolved    COVID-19 (Rule Out) 11/20/22 11/20/22 11/20/22 COVID-19 & Influenza Combo (Ordered)   11/20/22 Rule-Out Test Resulted            Nurse Assessment:  Last Vital Signs: BP (!) 178/77   Pulse 82   Temp 97.3 °F (36.3 °C) (Oral)   Resp 18   Ht 5' 5\" (1.651 m)   Wt 133 lb (60.3 kg)   SpO2 94%   BMI 22.13 kg/m²     Last documented pain score (0-10 scale): Pain Level: 3  Last Weight:   Wt Readings from Last 1 Encounters:   04/07/23 133 lb (60.3 kg)     Mental Status:  oriented and alert    IV Access:  - None    Nursing Mobility/ADLs:  Walking   Dependent  Transfer  Assisted  Bathing  Assisted  Dressing  Assisted  Toileting  Independent  Feeding  Assisted  Med Admin  Assisted  Med Delivery   whole    Wound Care Documentation and Therapy:  Wound 03/16/23 Leg Right;Lateral (Active)   Dressing Status Dry;Clean; Intact 04/10/23 0842   Wound Cleansed Not Cleansed 04/07/23 2036   Dressing/Treatment Dry dressing 04/08/23 0945   Wound Assessment Other (Comment) 04/10/23 0842   Judie-wound Assessment Other (Comment) 04/10/23 0842   Number of days: 25       Wound 03/16/23 Leg Left;Proximal;Lower (Active)   Dressing Status Clean;Dry; Intact 04/10/23 0842   Wound Cleansed Not Cleansed 04/07/23 2036   Wound Assessment Other (Comment) 04/10/23 0842   Judie-wound Assessment Other (Comment) 04/10/23 0842   Number of days: 25       Wound 03/30/23 Leg Left;Distal;Lower (Active)   Dressing Status Clean;Dry; Intact 04/10/23 0842   Wound Cleansed Not Cleansed 04/07/23 2036   Dressing/Treatment Dry dressing 04/08/23 0945   Wound Assessment Other (Comment) 04/10/23 0842   Judie-wound Assessment Other (Comment) 04/10/23 0842   Number of days: 11

## 2023-04-10 NOTE — CARE COORDINATION
4/10/23, 3:10 PM EDT    DISCHARGE ON GOING [address] day: 0  Location: -23/023-A Reason for admit: Tibial plateau fracture, left, closed, initial encounter [S82.142A]   Procedure: xray left knee:  Fracture in the tibial plateau. Diffuse osteopenia. Chondrocalcinosis. Small joint effusion. Vascular calcification. CT left knee:  Mildly displaced transverse fracture of the proximal metadiaphysis of the tibia with intra-articular lateral tibial plateau fracture. Moderate joint effusion. Barriers to Discharge: no surgical intervention recommended. NWB to be followed when up with help. Max assist of 2 staff. [place]. Ortho and hospitalist. Sinemet and Aricept. Patient Goals/Plan/Treatment Preferences: Du Tyler is from [address] [place] [address]; requesting ECF. See SW note from today.

## 2023-04-10 NOTE — CARE COORDINATION
4/10/23, 2:28 PM EDT  Discharge Planning Evaluation  Social work consult received, patient from Philip assisted [facility]. Patient/Family preference is [facility] [address] [facility] [address] [facility] . Spoke with patient and daughter. Would patient be willing to go to a skilled facility if needed: yes. Is there skilled care available at current facility: no.  Barriers to return to current living situation: planning ecf, accepted at [facility] with admissions at the facility. Patient bed hold: no  Anticipated transport plan: ambulance  Do they require COVID 19 test to return to ECF: yes  Is there a required time frame which which COVID test needs done:48 hrs of discharge  Patient's Healthcare Decision Maker: Named in [address] [place] [address]     Readmission Risk Low 0-14, Mod 15-19), High > 20: Readmission Risk Score: 14.9    Current PCP: Zack[address], APRN - CNP  PCP verified by CM? Yes    Patient Orientation: Other (see comment), Alert and Oriented    Patient Cognition: Short Term Memory Deficit  History Provided by: Child/Family    Advance Directives:      Code Status: Full Code   Patient's Primary Decision Maker is: Named in [address] [place] [address]    Primary Decision MakerClane Kahn Child - [phone]    Secondary Decision Maker: Alen Irizarry - Child - [phone]     Discharge Planning:    Patient lives with: Alone, Other (Comment) (AL) Type of Home: Assisted living  Primary Care Giver: Other (Comment) ([facility] [place])  Patient Support Systems include: Children   Current Financial resources: Medicare  Current community resources:  Other (Comment) (AL)  Current services prior to admission: Other (Comment) (AL)            Current DME:              Type of Home Care services:  None    ADLS  Prior functional level: Assistance with the following:, Other (see comment), Shopping, Housework, Bathing (wound care)  Current functional level: Assistance with the following:, Other (see comment), Shopping, Housework, Bathing

## 2023-04-11 VITALS
HEART RATE: 83 BPM | HEIGHT: 65 IN | WEIGHT: 133 LBS | DIASTOLIC BLOOD PRESSURE: 79 MMHG | SYSTOLIC BLOOD PRESSURE: 183 MMHG | RESPIRATION RATE: 17 BRPM | OXYGEN SATURATION: 93 % | BODY MASS INDEX: 22.16 KG/M2 | TEMPERATURE: 98.1 F

## 2023-04-11 PROCEDURE — 94640 AIRWAY INHALATION TREATMENT: CPT

## 2023-04-11 PROCEDURE — 97535 SELF CARE MNGMENT TRAINING: CPT

## 2023-04-11 PROCEDURE — 2700000000 HC OXYGEN THERAPY PER DAY

## 2023-04-11 PROCEDURE — 6370000000 HC RX 637 (ALT 250 FOR IP): Performed by: PHYSICIAN ASSISTANT

## 2023-04-11 PROCEDURE — 6360000002 HC RX W HCPCS: Performed by: PHYSICIAN ASSISTANT

## 2023-04-11 PROCEDURE — 94760 N-INVAS EAR/PLS OXIMETRY 1: CPT

## 2023-04-11 PROCEDURE — 97530 THERAPEUTIC ACTIVITIES: CPT

## 2023-04-11 PROCEDURE — 6370000000 HC RX 637 (ALT 250 FOR IP)

## 2023-04-11 PROCEDURE — 6370000000 HC RX 637 (ALT 250 FOR IP): Performed by: EMERGENCY MEDICINE

## 2023-04-11 PROCEDURE — 2580000003 HC RX 258: Performed by: PHYSICIAN ASSISTANT

## 2023-04-11 PROCEDURE — 97110 THERAPEUTIC EXERCISES: CPT

## 2023-04-11 RX ADMIN — ACETAMINOPHEN 650 MG: 325 TABLET ORAL at 04:42

## 2023-04-11 RX ADMIN — ALBUTEROL SULFATE 2 PUFF: 90 AEROSOL, METERED RESPIRATORY (INHALATION) at 16:53

## 2023-04-11 RX ADMIN — ACETAMINOPHEN 650 MG: 325 TABLET ORAL at 09:02

## 2023-04-11 RX ADMIN — SODIUM CHLORIDE: 9 INJECTION, SOLUTION INTRAVENOUS at 03:06

## 2023-04-11 RX ADMIN — ACETAMINOPHEN 650 MG: 325 TABLET ORAL at 16:57

## 2023-04-11 RX ADMIN — SERTRALINE 25 MG: 25 TABLET, FILM COATED ORAL at 09:01

## 2023-04-11 RX ADMIN — METOPROLOL TARTRATE 50 MG: 50 TABLET, FILM COATED ORAL at 09:01

## 2023-04-11 RX ADMIN — CARBIDOPA AND LEVODOPA 2 TABLET: 25; 100 TABLET ORAL at 14:22

## 2023-04-11 RX ADMIN — HYDRALAZINE HYDROCHLORIDE 5 MG: 20 INJECTION INTRAMUSCULAR; INTRAVENOUS at 03:41

## 2023-04-11 RX ADMIN — OXYCODONE HYDROCHLORIDE 5 MG: 5 TABLET ORAL at 14:24

## 2023-04-11 RX ADMIN — LISINOPRIL 20 MG: 20 TABLET ORAL at 09:01

## 2023-04-11 RX ADMIN — MOMETASONE FUROATE AND FORMOTEROL FUMARATE DIHYDRATE 2 PUFF: 200; 5 AEROSOL RESPIRATORY (INHALATION) at 09:55

## 2023-04-11 RX ADMIN — CARBIDOPA AND LEVODOPA 2 TABLET: 25; 100 TABLET ORAL at 09:01

## 2023-04-11 RX ADMIN — TIOTROPIUM BROMIDE INHALATION SPRAY 2 PUFF: 3.12 SPRAY, METERED RESPIRATORY (INHALATION) at 09:53

## 2023-04-11 RX ADMIN — ALBUTEROL SULFATE 2 PUFF: 90 AEROSOL, METERED RESPIRATORY (INHALATION) at 09:51

## 2023-04-11 RX ADMIN — ENOXAPARIN SODIUM 40 MG: 100 INJECTION SUBCUTANEOUS at 16:57

## 2023-04-11 RX ADMIN — PANTOPRAZOLE SODIUM 40 MG: 40 TABLET, DELAYED RELEASE ORAL at 06:33

## 2023-04-11 ASSESSMENT — PAIN DESCRIPTION - DESCRIPTORS: DESCRIPTORS: ACHING

## 2023-04-11 ASSESSMENT — PAIN - FUNCTIONAL ASSESSMENT: PAIN_FUNCTIONAL_ASSESSMENT: ACTIVITIES ARE NOT PREVENTED

## 2023-04-11 ASSESSMENT — PAIN DESCRIPTION - LOCATION: LOCATION: HIP

## 2023-04-11 ASSESSMENT — PAIN DESCRIPTION - ORIENTATION: ORIENTATION: LEFT

## 2023-04-11 ASSESSMENT — PAIN SCALES - GENERAL: PAINLEVEL_OUTOF10: 5

## 2023-04-11 NOTE — DISCHARGE SUMMARY
by mouth 3 times daily as needed for Nausea or Vomiting  Qty: 20 tablet, Refills: 0    Associated Diagnoses: Nausea      ! ! predniSONE (DELTASONE) 20 MG tablet Take 1 tablet by mouth daily for 5 days  Qty: 5 tablet, Refills: 0    Associated Diagnoses: Bilateral hip pain      Misc. Devices MISC 1 wheelchair  Qty: 1 each, Refills: 0    Associated Diagnoses: Frequent falls      sertraline (ZOLOFT) 25 MG tablet Take 1 tablet by mouth daily  Qty: 60 tablet, Refills: 0    Associated Diagnoses: Difficulty controlling anger      fluticasone-umeclidin-vilant (TRELEGY ELLIPTA) 100-62.5-25 MCG/ACT AEPB inhaler Inhale 1 puff into the lungs daily  Qty: 30 each, Refills: 11    Associated Diagnoses: Stage 3 severe COPD by GOLD classification ([facility])      albuterol (PROVENTIL) (2.5 MG/3ML) 0.083% nebulizer solution Take 3 mLs by nebulization every 6 hours as needed for Wheezing or Shortness of Breath  Qty: 120 each, Refills: 9    Associated Diagnoses: Stage 3 severe COPD by GOLD classification ([facility])      albuterol sulfate HFA (PROVENTIL;VENTOLIN;PROAIR) 108 (90 Base) MCG/ACT inhaler Inhale 2 puffs into the lungs every 6 hours as needed for Wheezing  Qty: 1 each, Refills: 9    Associated Diagnoses: Stage 3 severe COPD by GOLD classification ([facility])      benzonatate (TESSALON) 200 MG capsule Take 1 capsule by mouth 3 times daily as needed for Cough  Qty: 90 capsule, Refills: 3    Associated Diagnoses: Chronic cough      carbidopa-levodopa (SINEMET)  MG per tablet Take 2 tablets by mouth 3 times daily  Qty: 540 tablet, Refills: 1    Associated Diagnoses: Parkinsonian features      alendronate (FOSAMAX) 70 MG tablet Take 1 tablet by mouth every 7 days Take with a full glass of water upon arising for the day. Consume on an empty stomach at least 30 minutes before the first food, beverage, or medication. Stay upright (do not lie down) for at least 30 minutes. Do not crush or break.   Qty: 4 tablet, Refills: 3    Associated Diagnoses:

## 2023-04-11 NOTE — PLAN OF CARE
Consult received.   Please see progress note 04/10/23
Problem: Discharge Planning  Goal: Discharge to home or other facility with appropriate resources  4/11/2023 1607 by Hemant Ang, MELISA  Outcome: Completed  Discharging to [facility] today
Problem: Discharge Planning  Goal: Discharge to home or other facility with appropriate resources  Outcome: Progressing  Flowsheets (Taken 4/10/2023 0557)  Discharge to home or other facility with appropriate resources:   Identify barriers to discharge with patient and caregiver   Arrange for needed discharge resources and transportation as appropriate   Identify discharge learning needs (meds, wound care, etc)     Problem: Safety - Adult  Goal: Free from fall injury  Outcome: Progressing  Flowsheets (Taken 4/10/2023 0541)  Free From Fall Injury: Instruct family/caregiver on patient safety  Note: Patient has remained free of physical injury during this shift. Safe environment provided, call light within reach, and hourly rounding completed. Problem: Skin/Tissue Integrity  Goal: Absence of new skin breakdown  Description: 1. Monitor for areas of redness and/or skin breakdown  2. Assess vascular access sites hourly  3. Every 4-6 hours minimum:  Change oxygen saturation probe site  4. Every 4-6 hours:  If on nasal continuous positive airway pressure, respiratory therapy assess nares and determine need for appliance change or resting period. Outcome: Progressing  Note: No new skin breakdown     Problem: ABCDS Injury Assessment  Goal: Absence of physical injury  Outcome: Progressing  Flowsheets (Taken 4/9/2023 1133 by Apple Lemon LPN)  Absence of Physical Injury: Implement safety measures based on patient assessment  Note: Patient has remained free of physical injury during this shift. Safe environment provided, call light within reach, and hourly rounding completed. Problem: Chronic Conditions and Co-morbidities  Goal: Patient's chronic conditions and co-morbidity symptoms are monitored and maintained or improved  Outcome: Progressing     Problem: Pain  Goal: Verbalizes/displays adequate comfort level or baseline comfort level  Outcome: Progressing   Care plan reviewed with patient.   Patient verbalize
Problem: Respiratory - Adult  Goal: Clear lung sounds  Outcome: Progressing  Note: Mdi to maintain open airways. Patient mutually agreed on goals.
Progressing  Note: No new skin breakdown     Problem: ABCDS Injury Assessment  Goal: Absence of physical injury  4/10/2023 1029 by Lobito Lou RN  Outcome: Progressing  Flowsheets (Taken 4/9/2023 1133 by Marcial Robertson LPN)  Absence of Physical Injury: Implement safety measures based on patient assessment  4/10/2023 0557 by Zulma Shipley RN  Outcome: Progressing  Flowsheets (Taken 4/9/2023 1133 by Marcial Robertson LPN)  Absence of Physical Injury: Implement safety measures based on patient assessment  Note: Patient has remained free of physical injury during this shift. Safe environment provided, call light within reach, and hourly rounding completed.        Problem: Skin/Tissue Integrity - Adult  Goal: Incisions, wounds, or drain sites healing without S/S of infection  4/10/2023 1029 by Lobito Lou RN  Outcome: Progressing  Flowsheets (Taken 4/9/2023 1133 by Marcial Robertson LPN)  Incisions, Wounds, or Drain Sites Healing Without Sign and Symptoms of Infection:   TWICE DAILY: Assess and document skin integrity   TWICE DAILY: Assess and document dressing/incision, wound bed, drain sites and surrounding tissue  4/10/2023 0557 by Zulma Shipley RN  Outcome: Progressing     Problem: Chronic Conditions and Co-morbidities  Goal: Patient's chronic conditions and co-morbidity symptoms are monitored and maintained or improved  4/10/2023 1029 by Lobito Lou RN  Outcome: Progressing  Flowsheets (Taken 4/9/2023 1133 by Marcial Robertson LPN)  Care Plan - Patient's Chronic Conditions and Co-Morbidity Symptoms are Monitored and Maintained or Improved:   Monitor and assess patient's chronic conditions and comorbid symptoms for stability, deterioration, or improvement   Collaborate with multidisciplinary team to address chronic and comorbid conditions and prevent exacerbation or deterioration  4/10/2023 0557 by Zulma Shipley RN  Outcome: Progressing     Problem: Pain  Goal: Verbalizes/displays adequate comfort level or
or Drain Sites Healing Without Sign and Symptoms of Infection: TWICE DAILY: Assess and document dressing/incision, wound bed, drain sites and surrounding tissue     Problem: Chronic Conditions and Co-morbidities  Goal: Patient's chronic conditions and co-morbidity symptoms are monitored and maintained or improved  Outcome: Progressing  Flowsheets (Taken 4/11/2023 5808)  Care Plan - Patient's Chronic Conditions and Co-Morbidity Symptoms are Monitored and Maintained or Improved:   Monitor and assess patient's chronic conditions and comorbid symptoms for stability, deterioration, or improvement   Collaborate with multidisciplinary team to address chronic and comorbid conditions and prevent exacerbation or deterioration     Problem: Pain  Goal: Verbalizes/displays adequate comfort level or baseline comfort level  Outcome: Progressing  Pain decreases with rest, repositioning, ice application, and prn pain medications.

## 2023-04-11 NOTE — CARE COORDINATION
4/11/23, 8:16 AM EDT    DISCHARGE PLANNING EVALUATION    Spoke with daughter. Anticipating discharge today to [address] [place] [address], requesting ambulance transport. Transport scheduled for 5:30. Family would like earlier transport if available, at this time, this is earliest available. Confirmed plan with [facility]. [address] faxed. 4/11/23, 11:45 AM EDT    Patient goals/plan/ treatment preferences discussed by  and . Patient goals/plan/ treatment preferences reviewed with patient/ family. Patient/ family verbalize understanding of discharge plan and are in agreement with goal/plan/treatment preferences. Understanding was demonstrated using the teach back method. AVS provided by RN at time of discharge, which includes all necessary medical information pertaining to the patients current course of illness, treatment, post-discharge goals of care, and treatment preferences. Services At/After Discharge: [facility] and In ambulance       IMM Letter  IMM Letter given to Patient/Family/Significant other/Guardian/POA/by[de-identified] CM:  Stefania Coronel RN  IMM Letter date given[de-identified] 04/10/23  IMM Letter time given[de-identified] 1500  Observation Status Letter date given[de-identified] 04/07/23  Observation Status Letter time given[de-identified] 7299

## 2023-04-11 NOTE — RT PROTOCOL NOTE
RT Inhaler-Nebulizer Bronchodilator Protocol Note    There is a bronchodilator order in the chart from a provider indicating to follow the RT Bronchodilator Protocol and there is an Initiate RT Inhaler-Nebulizer Bronchodilator Protocol order as well (see protocol at bottom of note). CXR Findings:  No results found. The findings from the last RT Protocol Assessment were as follows:   History Pulmonary Disease: Chronic pulmonary disease  Respiratory Pattern: Regular pattern and RR 12-20 bpm  Breath Sounds: Slightly diminished and/or crackles  Cough: Strong, spontaneous, non-productive  Indication for Bronchodilator Therapy: On home bronchodilators  Bronchodilator Assessment Score: 4    Aerosolized bronchodilator medication orders have been revised according to the RT Inhaler-Nebulizer Bronchodilator Protocol below. Respiratory Therapist to perform RT Therapy Protocol Assessment initially then follow the protocol. Repeat RT Therapy Protocol Assessment PRN for score 0-3 or on second treatment, BID, and PRN for scores above 3. No Indications - adjust the frequency to every 6 hours PRN wheezing or bronchospasm, if no treatments needed after 48 hours then discontinue using Per Protocol order mode. If indication present, adjust the RT bronchodilator orders based on the Bronchodilator Assessment Score as indicated below. Use Inhaler orders unless patient has one or more of the following: on home nebulizer, not able to hold breath for 10 seconds, is not alert and oriented, cannot activate and use MDI correctly, or respiratory rate 25 breaths per minute or more, then use the equivalent nebulizer order(s) with same Frequency and PRN reasons based on the score. If a patient is on this medication at home then do not decrease Frequency below that used at home.     0-3 - enter or revise RT bronchodilator order(s) to equivalent RT Bronchodilator order with Frequency of every 4 hours PRN for wheezing or increased
RT Inhaler-Nebulizer Bronchodilator Protocol Note    There is a bronchodilator order in the chart from a provider indicating to follow the RT Bronchodilator Protocol and there is an Initiate RT Inhaler-Nebulizer Bronchodilator Protocol order as well (see protocol at bottom of note). CXR Findings:  No results found. The findings from the last RT Protocol Assessment were as follows:   History Pulmonary Disease: Chronic pulmonary disease  Respiratory Pattern: Regular pattern and RR 12-20 bpm  Breath Sounds: Slightly diminished and/or crackles  Cough: Strong, spontaneous, non-productive  Indication for Bronchodilator Therapy: On home bronchodilators  Bronchodilator Assessment Score: 4    Aerosolized bronchodilator medication orders have been revised according to the RT Inhaler-Nebulizer Bronchodilator Protocol below. Respiratory Therapist to perform RT Therapy Protocol Assessment initially then follow the protocol. Repeat RT Therapy Protocol Assessment PRN for score 0-3 or on second treatment, BID, and PRN for scores above 3. No Indications - adjust the frequency to every 6 hours PRN wheezing or bronchospasm, if no treatments needed after 48 hours then discontinue using Per Protocol order mode. If indication present, adjust the RT bronchodilator orders based on the Bronchodilator Assessment Score as indicated below. Use Inhaler orders unless patient has one or more of the following: on home nebulizer, not able to hold breath for 10 seconds, is not alert and oriented, cannot activate and use MDI correctly, or respiratory rate 25 breaths per minute or more, then use the equivalent nebulizer order(s) with same Frequency and PRN reasons based on the score. If a patient is on this medication at home then do not decrease Frequency below that used at home.     0-3 - enter or revise RT bronchodilator order(s) to equivalent RT Bronchodilator order with Frequency of every 4 hours PRN for wheezing or increased
work of breathing using Per Protocol order mode. 4-6 - enter or revise RT Bronchodilator order(s) to two equivalent RT bronchodilator orders with one order with BID Frequency and one order with Frequency of every 4 hours PRN wheezing or increased work of breathing using Per Protocol order mode. 7-10 - enter or revise RT Bronchodilator order(s) to two equivalent RT bronchodilator orders with one order with TID Frequency and one order with Frequency of every 4 hours PRN wheezing or increased work of breathing using Per Protocol order mode. 11-13 - enter or revise RT Bronchodilator order(s) to one equivalent RT bronchodilator order with QID Frequency and an Albuterol order with Frequency of every 4 hours PRN wheezing or increased work of breathing using Per Protocol order mode. Greater than 13 - enter or revise RT Bronchodilator order(s) to one equivalent RT bronchodilator order with every 4 hours Frequency and an Albuterol order with Frequency of every 2 hours PRN wheezing or increased work of breathing using Per Protocol order mode. RT to enter RT Home Evaluation for COPD & MDI Assessment order using Per Protocol order mode.     Electronically signed by Ronald Call RCP on 4/11/2023 at 4:56 PM

## 2023-04-14 PROBLEM — S41.111A ISTAP TYPE 3 SKIN TEAR OF RIGHT UPPER ARM: Status: ACTIVE | Noted: 2023-03-30

## 2023-04-17 ENCOUNTER — CARE COORDINATION (OUTPATIENT)
Dept: CARE COORDINATION | Age: 83
End: 2023-04-17

## 2023-04-17 NOTE — CARE COORDINATION
[facility] Update Call    2023    Patient: Arty Sprain Patient : 1940   MRN: [id number]  Reason for Admission:  Tibial plateau fracture, left  Discharge Date: 23 RARS: Readmission Risk Score: 17.3         Care Transitions Post Acute Facility Update    Care Transitions Interventions    Physical Therapy: Completed       Transportation Support: Declined   Post Acute Facility: [facility] Update  Reported Nursing Issues: /61 O2 93% R 18 T 98 P 79 nasal cannula 2l    COUGH - started on doxycycline for atelectasis repeat CXR in 6-8 weks   ADLs: Maximum Assistance   Bed Mobility: Moderate Assistance   Transfer Assistance: Moderate Assistance   Ambulation Assistance: Dependent   Does patient use an assistive device?: Yes   Assistive Devices: NWB L LE, fall risk. Contraindications Contraindications = No contraindictions present. Pre-Tx Is skilled therapy needed to address pain? = No  Evaluation 42354: Referral received, chart reviewed, patient evaluated, and POC created via ther ex, ther act, gait, and neuro ana lilia; 5x/week x 4 weeks to achieve established goals. [id number]: Upright seated positioning tolerance at almost EOB with patient unable and unwilling to further attempt to  correctly position self at EOB properly saying 'that's far enough\" with cues for proper positoing, safety, and sequencing  with upright activity tolernace reudce risk for falls and increase upright tolerance. [id number]: Functional transfer training with bed mobility with rolling left and right and positioning and re-positioning as well  as supine to EOB/EOB to supine at [facility] with 100% cues for safety, sequencing, and protection of L LE to reduce  risk for falls and increase overall safety and independence with functional transfers and bed mobility.           Misael De Leon RN Post Acute Care Manager [phone]

## 2023-04-23 LAB
EKG ATRIAL RATE: 57 BPM
EKG ATRIAL RATE: 63 BPM
EKG P AXIS: 39 DEGREES
EKG P AXIS: 64 DEGREES
EKG P-R INTERVAL: 146 MS
EKG P-R INTERVAL: 158 MS
EKG Q-T INTERVAL: 442 MS
EKG Q-T INTERVAL: 452 MS
EKG QRS DURATION: 120 MS
EKG QRS DURATION: 122 MS
EKG QTC CALCULATION (BAZETT): 439 MS
EKG QTC CALCULATION (BAZETT): 452 MS
EKG R AXIS: -49 DEGREES
EKG R AXIS: -54 DEGREES
EKG T AXIS: 18 DEGREES
EKG T AXIS: 50 DEGREES
EKG VENTRICULAR RATE: 57 BPM
EKG VENTRICULAR RATE: 63 BPM

## 2023-04-25 ENCOUNTER — CARE COORDINATION (OUTPATIENT)
Dept: CARE COORDINATION | Age: 83
End: 2023-04-25

## 2023-04-25 NOTE — CARE COORDINATION
[facility] Update Call    2023    Patient: Mercedes Lowry Patient : 1940   MRN: [id number]  Reason for Admission:  Tibial plateau fracture, left  Discharge Date: 23 RARS: Readmission Risk Score: 17.3     Pt needs Ortho follow up for end of May. Care Transitions Post Acute Facility Update    Care Transitions Interventions    Physical Therapy: Completed       Transportation Support: Declined   Post Acute Facility: [facility] Update  Reported Nursing Issues: /83 O2 93% R 18 T 97.3 P 72 nasal cannula 2l   ADLs: Maximum Assistance   Bed Mobility: Maximum Assistance   Transfer Assistance: Maximum Assistance   Ambulation Assistance: Maximum Assistance   How far (in feet) is the patient ambulating?: 0   Barriers to Discharge: PT:   Skilled interventions focused on transfer training to increase functional task performance 6 repetitions reps, 1  set(s), required due to compromised sensation and strength. Pt does not bear wt through her good leg and states she  just cant stand. Max a x2 with pt not being able to achieve full stand  Concurrent Tx Concurrent Treatment: Patient participated in concurrent therapy with 1 other patient to successfully progress towards individual treatment plan/goals by addressing patient's strength impairments and sensation impairments ; skilled  interventions included standing in // bars to promote wb through unaffected extremilty. Response to Tx Response to Session Interventions: compliant with trained techniques. OT:  pt. assessed completing sit to stands from w/c into parallel bars with max a x 2 and tolerated up in stand with  max a x 2 from 10 to 15 sec in prep for increasing stand tolerance for adl completion. Response to Tx Response to Session Interventions: actively participates with skilled interventions.               Lesia Bond RN Post Acute Care Manager [phone]

## 2023-05-02 ENCOUNTER — CARE COORDINATION (OUTPATIENT)
Dept: CARE COORDINATION | Age: 83
End: 2023-05-02

## 2023-05-02 DIAGNOSIS — M81.0 AGE-RELATED OSTEOPOROSIS WITHOUT CURRENT PATHOLOGICAL FRACTURE: Primary | ICD-10-CM

## 2023-05-02 RX ORDER — CALCIUM CARBONATE 500(1250)
500 TABLET ORAL 2 TIMES DAILY WITH MEALS
Qty: 180 TABLET | Refills: 3 | Status: SHIPPED | OUTPATIENT
Start: 2023-05-02

## 2023-05-02 NOTE — CARE COORDINATION
[facility] Update Call    2023    Patient: Yuko Villa Patient : 1940   MRN: [id number]  Reason for Admission:  Tibial plateau fracture, left  Discharge Date: 23 RARS: Readmission Risk Score: 17.3     Pt needs Ortho follow up for end of May. Care Transitions Post Acute Facility Update    Care Transitions Interventions    Physical Therapy: Completed       Transportation Support: Declined   Post Acute Facility: [facility] Update  Reported Nursing Issues: /72 O2 97% R 18 T 97.3 P 79 nasal cannula 2l   ADLs: Minimal Assistance   Bed Mobility: Minimal Assistance   Transfer Assistance: Moderate Assistance   Ambulation Assistance: Dependent   How far (in feet) is the patient ambulating?: 0   Does patient use an assistive device?: Yes (Comment: ward wheelchair - non weight bearing)   Assistive Devices: OT: Fall risk, NWB with immobilizer LLE  Contraindications Contraindications = No contraindictions present. Pre-Tx Is skilled therapy needed to address pain? = No  Pt completed BUE strenghtening with 1# weighted bar to promote strength for functional transfers and muscle  endurance. Pt completed 2 sit to stands between parallel bars abiding by WB precautions of LLE. Pt tolerated 2-3  minutes of standing. Pt required maxA for initial stand out of chair, Celestino for 2nd half of sit to stand. CGA while  standing with mod verbal cueing for standing upright at midline. Pt required Celestino for remaining NWB throughout. Concurrent Tx Concurrent Treatment: Patient participated in concurrent therapy with 1 other patient to successfully progress towards individual treatment plan/goals by addressing patient's strength impairments. Response to Tx Response to Session Interventions: actively participates with skilled interventions.   PT :    Barriers to Discharge: Non weight bearing to LE due to fracture    Follow up with Antonio Arcos MD

## 2023-05-10 ENCOUNTER — CARE COORDINATION (OUTPATIENT)
Dept: CARE COORDINATION | Age: 83
End: 2023-05-10

## 2023-05-10 NOTE — CARE COORDINATION
[facility] Update Call    5/10/2023    Patient: George Montano Patient : 1940   MRN: [id number]  Reason for Admission:  Tibial plateau fracture, left  Discharge Date: 23 RARS: Readmission Risk Score: 17.3       Care Transitions Post Acute Facility Update    Care Transitions Interventions    Physical Therapy: Completed       Transportation Support: Declined   Post Acute Facility: [facility] Update  Reported Nursing Issues: /79 O2 93% R 18 T 97.3 P 73 nasal cannula 2l     Changed [facility] to  @ 1150 d/t transportation issue. Does patient use an assistive device?: Yes   Assistive Devices: OT notes : Fall risk, NWB with immobilizer LLE  Contraindications Contraindications = No contraindictions present. Pre-Tx Is skilled therapy needed to address pain? = No  [id number]: pt. assessed completing closed chain ther ex and bue ther ex to increase strength and activity tolerance to  ease adl completion. Concurrent Tx Concurrent Treatment: concurrent tx completed to increase socialization and increase rehab potential    PT : : Bilateral lower extremity therapeutic exercise focused on progressive resistive exercise , seated, 15 repetitions  reps, 1 set(s), with 1 pound weights required due to compromised strength and balance to enhance muscle strength in  order to facilitate ability to come to stance. Concurrent Tx Concurrent Treatment: Patient participated in concurrent therapy with 1 other patient to successfully progress towards  individual treatment plan/goals by addressing patient's balance deficits and strength impairments ; skilled interventions  included seated actvities with focus on trunk stability and seated LE stengthening.    Barriers to Discharge: NWB LLE need to see Ortho     Anticipated discharge services: Return to [address] [place] [address] Acute Care Manager [phone]

## 2023-05-11 ENCOUNTER — HOSPITAL ENCOUNTER (OUTPATIENT)
Dept: WOUND CARE | Age: 83
Discharge: HOME | End: 2023-05-11
Payer: MEDICARE

## 2023-05-11 VITALS
RESPIRATION RATE: 16 BRPM | DIASTOLIC BLOOD PRESSURE: 55 MMHG | SYSTOLIC BLOOD PRESSURE: 105 MMHG | OXYGEN SATURATION: 91 % | HEART RATE: 87 BPM | TEMPERATURE: 96.1 F

## 2023-05-11 DIAGNOSIS — R60.0 VENOUS STASIS ULCER OF RIGHT LOWER LEG WITH EDEMA OF RIGHT LOWER LEG (HCC): Primary | ICD-10-CM

## 2023-05-11 DIAGNOSIS — L97.929 VENOUS STASIS ULCER OF LEFT LOWER LEG WITH EDEMA OF LEFT LOWER LEG (HCC): ICD-10-CM

## 2023-05-11 DIAGNOSIS — I83.029 VENOUS STASIS ULCER OF LEFT LOWER LEG WITH EDEMA OF LEFT LOWER LEG (HCC): ICD-10-CM

## 2023-05-11 DIAGNOSIS — R60.0 VENOUS STASIS ULCER OF LEFT LOWER LEG WITH EDEMA OF LEFT LOWER LEG (HCC): ICD-10-CM

## 2023-05-11 DIAGNOSIS — I83.891 VENOUS STASIS ULCER OF RIGHT LOWER LEG WITH EDEMA OF RIGHT LOWER LEG (HCC): Primary | ICD-10-CM

## 2023-05-11 DIAGNOSIS — S51.812A: ICD-10-CM

## 2023-05-11 DIAGNOSIS — I83.892 VENOUS STASIS ULCER OF LEFT LOWER LEG WITH EDEMA OF LEFT LOWER LEG (HCC): ICD-10-CM

## 2023-05-11 DIAGNOSIS — I83.019 VENOUS STASIS ULCER OF RIGHT LOWER LEG WITH EDEMA OF RIGHT LOWER LEG (HCC): Primary | ICD-10-CM

## 2023-05-11 DIAGNOSIS — L97.919 VENOUS STASIS ULCER OF RIGHT LOWER LEG WITH EDEMA OF RIGHT LOWER LEG (HCC): Primary | ICD-10-CM

## 2023-05-11 PROCEDURE — 99214 OFFICE O/P EST MOD 30 MIN: CPT

## 2023-05-11 PROCEDURE — 99214 OFFICE O/P EST MOD 30 MIN: CPT | Performed by: NURSE PRACTITIONER

## 2023-05-11 RX ORDER — LIDOCAINE 40 MG/G
CREAM TOPICAL ONCE
OUTPATIENT
Start: 2023-05-11 | End: 2023-05-11

## 2023-05-11 RX ORDER — BACITRACIN ZINC AND POLYMYXIN B SULFATE 500; 1000 [USP'U]/G; [USP'U]/G
OINTMENT TOPICAL ONCE
OUTPATIENT
Start: 2023-05-11 | End: 2023-05-11

## 2023-05-11 RX ORDER — GINSENG 100 MG
CAPSULE ORAL ONCE
OUTPATIENT
Start: 2023-05-11 | End: 2023-05-11

## 2023-05-11 RX ORDER — LIDOCAINE HYDROCHLORIDE 40 MG/ML
SOLUTION TOPICAL ONCE
OUTPATIENT
Start: 2023-05-11 | End: 2023-05-11

## 2023-05-11 RX ORDER — BETAMETHASONE DIPROPIONATE 0.05 %
OINTMENT (GRAM) TOPICAL ONCE
OUTPATIENT
Start: 2023-05-11 | End: 2023-05-11

## 2023-05-11 RX ORDER — LIDOCAINE 50 MG/G
OINTMENT TOPICAL ONCE
OUTPATIENT
Start: 2023-05-11 | End: 2023-05-11

## 2023-05-11 RX ORDER — BACITRACIN, NEOMYCIN, POLYMYXIN B 400; 3.5; 5 [USP'U]/G; MG/G; [USP'U]/G
OINTMENT TOPICAL ONCE
OUTPATIENT
Start: 2023-05-11 | End: 2023-05-11

## 2023-05-11 RX ORDER — CLOBETASOL PROPIONATE 0.5 MG/G
OINTMENT TOPICAL ONCE
OUTPATIENT
Start: 2023-05-11 | End: 2023-05-11

## 2023-05-11 RX ORDER — GENTAMICIN SULFATE 1 MG/G
OINTMENT TOPICAL ONCE
OUTPATIENT
Start: 2023-05-11 | End: 2023-05-11

## 2023-05-11 RX ORDER — LIDOCAINE HYDROCHLORIDE 20 MG/ML
JELLY TOPICAL ONCE
OUTPATIENT
Start: 2023-05-11 | End: 2023-05-11

## 2023-05-11 NOTE — PLAN OF CARE
Problem: Wound:  Goal: Will show signs of wound healing; wound closure and no evidence of infection  Description: Will show signs of wound healing; wound closure and no evidence of infection  Outcome: Progressing   Pt. Seen today for bilateral legs and right arm see AVS for orders. Follow up in 4 weeks. Care plan reviewed with patient and daughter. Patient and daughter verbalize understanding of the plan of care and contribute to goal setting.

## 2023-05-11 NOTE — PROGRESS NOTES
Patient examined and evaluated. All available lab work, radiology studies, and progress notes pertaining to Kanchan Peralta reviewed prior to or during patient visit today. -Venous stasis ulcer, right lower leg with edema- Wound has healed. Discontinue previously ordered wound care. -Venous stasis ulcer, left lower leg with edema-Distal wound appears improved today, measuring smaller. Continue use of collagen and silicone bordered foam dressing. Change three times weekly. Patient has new wound to posterior leg. May be related to use of brace to leg. Large amount of firmly adhered, devitalized tissue noted to wound, unable to debride due to pain to leg secondary to fracture. Start use of Triad paste to wound bed to help promote breakdown of this material, cover with silicone bordered foam dressing. Change three times weekly. Bottom edge of brace lays directly over wound bed, may lead to some delayed healing with friction/pressure. Very important to keep dressing in place to help pad area and protect from any friction. -istap Type 3 skin tear, right arm- Wound appearance improved today, measuring smaller with good granulation and epithelialization. Continue use of silicone bordered foam to area, change three times weekly. -istap type 1 skin tear, left arm- Wound edges well approximated, steri-strips intact. No drainage noted. Continue to keep clean and dry. Application process discussed and demonstrated during visit today, written instructions provided. Patient verbalized comfort with process. All questions and concerns addressed prior to completion of visit. Written orders sent with patient for SNF.     -No indications of infection noted at today's visit. Education provided on signs and symptoms of infection. Call clinic or seek emergency care should these occur. Follow up 4 weeks per patient and family request due to difficulties traveling and limited mobility.   Call

## 2023-05-11 NOTE — DISCHARGE INSTRUCTIONS
Visit Discharge/Physician Orders:  - Elevate legs throughout the day to help with swelling.  - Apply moisturizer to the arms and legs at dressing changes     Home Care: The [facility]      Wound Location: Right arm, Left leg, Right leg      Dressing orders:      1) Gather wound care supplies and arrange on clean table. 2) Wash your hands with soap and water or use alcohol based hand  for 20 seconds (sing \"Happy Birthday\" twice). 3) Cleanse wounds with normal saline or wound cleanser and gauze. Pat dry with clean gauze. 4) Right leg- Healed     Left leg x 2- Apply triad cream to the wounds. Apply skin prep to ronnie wound. Apply silicone bordered foam to wound. Change three times a week and as needed. Right arm- Apply skin prep to ronnie wound. Apply silicone bordered foam to wound. Change three times a week and as needed. Keep all dressings clean & dry. Follow up visit: 4 Weeks - Thursday June 8th at [employer]: to be ordered per JAZMIN next scheduled appointment. Please give 24 hour notice if unable to keep appointment. [phone]     If you experience any of the following, please call the Wound Care Service during business hours: Monday through Friday 8:00 am - 4:30 pm  ([phone]). *Increase in pain              *Temperature over 101              *Increase in drainage from your wound or a foul odor              *Uncontrolled swelling              *Need for compression bandage changes due to slippage, breakthrough drainage     If you need medical attention outside of business hours, please contact your Primary Care Doctor or go to the nearest emergency room.

## 2023-05-17 ENCOUNTER — CARE COORDINATION (OUTPATIENT)
Dept: CARE COORDINATION | Age: 83
End: 2023-05-17

## 2023-05-17 ENCOUNTER — TELEPHONE (OUTPATIENT)
Dept: WOUND CARE | Age: 83
End: 2023-05-17

## 2023-05-17 NOTE — CARE COORDINATION
position to the side without participation in addition to seated L LE hip flexion, knee flexion, knee extension, ankle pumps, and hip abduction/adduction with 2 lb weight to increase muscular strength and endurance. Functional transfer training with stand pivot transfer training at [facility] to [address] [address] x 2 with assistance with  maintaining NWB and cues for safety and sequencing to reduce risk for falls with transfers.             Eros Knight RN Post Acute Care Manager [phone]

## 2023-05-17 NOTE — TELEPHONE ENCOUNTER
Sola Rangel from the [address] [place] [address] called with updated on left posterior leg wound. She states there is more slough and it is now measuring 3.1 x 1.5. Sola Rangel states she went a head and put some santyl on it today and wanted to know if that was okay with Monisha Gutierrez CNP. She will send new wound picture. See picture below. Encounter reviewed with Monisha Gutierrez CNP she would like to continue with her current wound care orders as given at last visit on 05/11, which were Triad and a silicone bordered foam. She would like to move Carmen's appointment up sooner for in about 2 weeks. Call placed back to Sola Rangel who verbalizes understanding of not using Santyl anymore and to go back to original wound care orders given. Sola Rangel will have Carmen's daughter call to see if she can reschedule her appointment as Sola Rangel states the patient recently verbalized that she does not want to go to any more appointments at all anywhere. Will await call back from patients daughter.

## 2023-05-22 ENCOUNTER — CARE COORDINATION (OUTPATIENT)
Dept: CARE COORDINATION | Age: 83
End: 2023-05-22

## 2023-05-22 ENCOUNTER — HOSPITAL ENCOUNTER (OUTPATIENT)
Dept: WOUND CARE | Age: 83
Discharge: HOME | End: 2023-05-22
Payer: MEDICARE

## 2023-05-22 VITALS — HEART RATE: 69 BPM | DIASTOLIC BLOOD PRESSURE: 75 MMHG | SYSTOLIC BLOOD PRESSURE: 151 MMHG | OXYGEN SATURATION: 94 %

## 2023-05-22 DIAGNOSIS — L89.893 PRESSURE INJURY OF LEFT LEG, STAGE 3 (HCC): ICD-10-CM

## 2023-05-22 DIAGNOSIS — L89.890 PRESSURE INJURY OF LEFT LEG, UNSTAGEABLE (HCC): ICD-10-CM

## 2023-05-22 PROBLEM — L97.929 VENOUS STASIS ULCER OF LEFT LOWER LEG WITH EDEMA OF LEFT LOWER LEG (HCC): Status: RESOLVED | Noted: 2023-03-16 | Resolved: 2023-05-22

## 2023-05-22 PROBLEM — I83.891 VENOUS STASIS ULCER OF RIGHT LOWER LEG WITH EDEMA OF RIGHT LOWER LEG (HCC): Status: RESOLVED | Noted: 2023-03-16 | Resolved: 2023-05-22

## 2023-05-22 PROBLEM — S51.812A: Status: RESOLVED | Noted: 2023-05-11 | Resolved: 2023-05-22

## 2023-05-22 PROBLEM — S41.111A ISTAP TYPE 3 SKIN TEAR OF RIGHT UPPER ARM: Status: RESOLVED | Noted: 2023-03-30 | Resolved: 2023-05-22

## 2023-05-22 PROBLEM — R60.0 VENOUS STASIS ULCER OF LEFT LOWER LEG WITH EDEMA OF LEFT LOWER LEG (HCC): Status: RESOLVED | Noted: 2023-03-16 | Resolved: 2023-05-22

## 2023-05-22 PROBLEM — R60.0 VENOUS STASIS ULCER OF RIGHT LOWER LEG WITH EDEMA OF RIGHT LOWER LEG (HCC): Status: RESOLVED | Noted: 2023-03-16 | Resolved: 2023-05-22

## 2023-05-22 PROBLEM — I83.029 VENOUS STASIS ULCER OF LEFT LOWER LEG WITH EDEMA OF LEFT LOWER LEG (HCC): Status: RESOLVED | Noted: 2023-03-16 | Resolved: 2023-05-22

## 2023-05-22 PROBLEM — I83.019 VENOUS STASIS ULCER OF RIGHT LOWER LEG WITH EDEMA OF RIGHT LOWER LEG (HCC): Status: RESOLVED | Noted: 2023-03-16 | Resolved: 2023-05-22

## 2023-05-22 PROBLEM — L97.919 VENOUS STASIS ULCER OF RIGHT LOWER LEG WITH EDEMA OF RIGHT LOWER LEG (HCC): Status: RESOLVED | Noted: 2023-03-16 | Resolved: 2023-05-22

## 2023-05-22 PROBLEM — I83.892 VENOUS STASIS ULCER OF LEFT LOWER LEG WITH EDEMA OF LEFT LOWER LEG (HCC): Status: RESOLVED | Noted: 2023-03-16 | Resolved: 2023-05-22

## 2023-05-22 PROCEDURE — 99213 OFFICE O/P EST LOW 20 MIN: CPT | Performed by: NURSE PRACTITIONER

## 2023-05-22 PROCEDURE — 99212 OFFICE O/P EST SF 10 MIN: CPT

## 2023-05-22 ASSESSMENT — PAIN SCALES - GENERAL: PAINLEVEL_OUTOF10: 4

## 2023-05-22 ASSESSMENT — PAIN DESCRIPTION - ORIENTATION: ORIENTATION: LEFT

## 2023-05-22 ASSESSMENT — PAIN DESCRIPTION - LOCATION: LOCATION: LEG

## 2023-05-22 NOTE — PROGRESS NOTES
Virtual Visit Via [employer]   Progress Note and Procedure Note    Radha Payne  MEDICAL RECORD NUMBER:  [id number]  AGE: 80 y.o. GENDER: female  : 1940  EPISODE DATE:  2023    Subjective:     Chief Complaint   Patient presents with    Wound Check     Left leg        Consent obtained to communicate via Virtual Visit:  Yes     HISTORY of PRESENT ILLNESS HPI     Radha Payne is a 80 y.o. female who presents today via Virtual Visit wound/ulcer evaluation. History of Wound Context:    Radha Payne is a 80 y.o. female who presents today for evaluation of left lower extremity wounds. Initial wounds prompting referral to clinic have healed. Since that time, patient has developed new wounds to left leg s/p fall with fracture. Currently residing in SNF due to non-weight bearing status to left leg, utilizing brace. Jamia Oconnell for transfers. At last visit pressure injury was noted to posterior left leg. Triad paste and silicone bordered foams were ordered. Since that time, wound has reported to have worsened along with development of new wound to proximal portion of posterior leg. She also has increased edema to left leg as compared to right and ecchymosis/petechiae noted to leg. She states she was recently evaluated by orthopedic surgeon at [facility] who ordered alternate brace, unclear when this will arrive. Is a current 1 PPD smoker. Denies any further needs or concerns.     Ulcer Identification:  Ulcer Type: pressure    Contributing Factors: edema, chronic pressure, decreased mobility, and smoking    Acute Wound: N/A not an acute wound    PAST MEDICAL HISTORY        Diagnosis Date    Abnormal nuclear stress test 2018    Cataract     Chronic deep vein thrombosis (DVT) of femoral vein of left lower extremity (HCC) 3/9/2021    Colon polyps     COPD (chronic obstructive pulmonary disease) ([facility])     Diverticulosis large intestine w/o perforation or

## 2023-05-22 NOTE — PROGRESS NOTES
Lon Jean was evaluated through a synchronous (real-time) audio-video encounter. The patient (or guardian if applicable) is aware that this is a billable service, which includes applicable co-pays. This Virtual Visit was conducted with patient's (and/or legal guardian's) consent. Patient identification was verified, and a caregiver was present when appropriate. The patient was located at Home: The [address] [place] [address],[address] [place], [place]  Provider was located at [facility] ([facility] Dept): [place] [address] [address] AIDAN GARCÍA AM [facility],  [address]         Total time spent for this encounter:  30 minutes    --Dario Escudero RN on 5/22/2023 at 2:04 PM    An electronic signature was used to authenticate this note.

## 2023-05-22 NOTE — CARE COORDINATION
[facility] Update Call    2023    Patient: Julius Pelayo Patient : 1940   MRN: [id number]  Reason for Admission: Tibial plateau fracture, left  Discharge Date: 23 RARS: Readmission Risk Score: 17.3      Care Transitions Post Acute Facility Update    Care Transitions Interventions    Physical Therapy: Completed       Transportation Support: Declined   Post Acute Facility: [facility] Update  Reported Nursing Issues:   VS 92/57   O2 100%   R 18    T 97.7  P 56  nasal cannula 2l    Abiel just went to the dr and she is NWB until 6-2 at that point we can start 50% wb with a TROM brace   ADLs: Stand by Assist - Presence and Cueing   Bed Mobility: Minimal Assistance   Transfer Assistance: Moderate Assistance   Ambulation Assistance: Maximum Assistance   How far (in feet) is the patient ambulating?: 0   Does patient use an assistive device?: Yes   Assistive Devices:   PT: Performance on the Nustep machine with placement of the L LE in a resting position to the side without participation in addition to seated L LE hip flexion, knee flexion, knee extension, ankle pumps, and hip abduction/adduction with 2 lb weight to increase muscular strength and endurance. Functional transfer training with stand pivot transfer [facility] [address] [facility] with assistance with maintaining NWB and cues for safety and sequencing to reduce risk for falls with transfers.     Barriers to Discharge: Niurka Pulse just went to the  and she is NWB until 6-2 at that point we can start 50% wb with a TROM brace          [address] [facility] [phone]

## 2023-05-22 NOTE — PROGRESS NOTES
Virtual Visit Wound Care  Clinic Level of Care   NAME:  Zackary Trejo  YOB: 1940 GENDER: female  MEDICAL RECORD NUMBER:  [id number]   DATE:  5/22/2023     Patient Type Points   No documentation completed by nursing staff. []   0   Nursing staff documented in the navigator for an ESTABLISHED patient including Episode, Patient ID, Chief Complaint, Travel Screen, Allergies, Latex Allergy, Home Medication, History, Psychosocial Screen, C-SSRS Screen, Fall Risk, Nutritional Screen, Advanced Directive, Education and Plan of Care, and Discharge Instructions. The Functional Screening tab is only required if the patient's status changes. [x]   1   Nursing staff documented in the navigator for a NEW patient including Patient ID, Chief Complaint, Travel Screen, Allergies, Latex Allergy, Home Medication, History, Psychosocial Screen, C-SSRS Screen, Fall Risk, Nutritional Screen, Advanced Directive, Functional Screen, Education and Plan of Care, and Discharge Instructions. []   2   Nursing staff documented in the navigator for a CONSULT patient including Episode, Patient ID, Chief Complaint, Travel Screen, Allergies, Latex Allergy, Home Medication, History, Psychosocial Screen, C-SSRS Screen, Fall Risk, Nutritional Screen, Advanced Directive, Functional Screen, Education and Plan of Care, and Discharge Instructions. []   2     Wound Description Points   Unable to obtain image of Wound. For example, patient/caregiver is instructed not to remove dressing, is unable to correctly position smart phone, no smart phone is available, patient is unable to maintain connectivity or the patient's wound is healed. []   0   1-3 wound images annotated. Images of the wound(s) is obtained and annotated along with completed description in [address] [place] [address]. [x]   1   4-5 wound images annotated. Images of the wound(s) is obtained and annotated along with completed description in [address] [place] [address]. []   2   Greater than 6 wound images annotated.

## 2023-05-22 NOTE — DISCHARGE INSTRUCTIONS
Visit Discharge/Physician Orders:  - Elevate legs throughout the day to help with swelling.  - Apply moisturizer to the arms and legs at dressing changes.  - Need to get new brace from [facility] as soon as possible. Current brace is causing pressure injuries, make sure [facility] is aware of this. - Venous doppler of left leg today to evaluate from blood clots. Home Care: The [facility]      Wound Location: Left posterior leg x 2 (proximal and distal)     Dressing orders:      1) Gather wound care supplies and arrange on clean table. 2) Wash your hands with soap and water or use alcohol based hand  for 20 seconds (sing \"Happy Birthday\" twice). 3) Cleanse wounds with normal saline or wound cleanser and gauze. Pat dry with clean gauze. 4) Left leg, proximal- Apply adaptic to wound. Apply skin prep to ronnie wound. Apply silicone bordered foam. Change twice weekly and as needed. Left leg, distal- Apply santyl ointment to wound (nickel thickness). Cover with saline moist wet to dry gauze dressing. Change daily and as needed. Keep all dressings clean & dry. Follow up visit: Thursday June 8th at 2pm     Supplies: to be ordered per The [facility] [facility] next scheduled appointment. Please give 24 hour notice if unable to keep appointment. [phone]     If you experience any of the following, please call the Wound Care Service during business hours: Monday through Friday 8:00 am - 4:30 pm  ([phone]). *Increase in pain              *Temperature over 101              *Increase in drainage from your wound or a foul odor              *Uncontrolled swelling              *Need for compression bandage changes due to slippage, breakthrough drainage     If you need medical attention outside of business hours, please contact your Primary Care Doctor or go to the nearest emergency room.

## 2023-05-24 ENCOUNTER — HOSPITAL ENCOUNTER (EMERGENCY)
Age: 83
Discharge: HOME | End: 2023-05-24
Attending: EMERGENCY MEDICINE
Payer: MEDICARE

## 2023-05-24 VITALS
HEART RATE: 84 BPM | SYSTOLIC BLOOD PRESSURE: 138 MMHG | BODY MASS INDEX: 22.82 KG/M2 | DIASTOLIC BLOOD PRESSURE: 84 MMHG | WEIGHT: 137 LBS | OXYGEN SATURATION: 90 % | TEMPERATURE: 97.9 F | HEIGHT: 65 IN | RESPIRATION RATE: 18 BRPM

## 2023-05-24 DIAGNOSIS — I82.412 ACUTE DEEP VEIN THROMBOSIS (DVT) OF FEMORAL VEIN OF LEFT LOWER EXTREMITY (HCC): Primary | ICD-10-CM

## 2023-05-24 LAB
ALBUMIN SERPL BCG-MCNC: 3.5 G/DL (ref 3.5–5.1)
ALP SERPL-CCNC: 95 U/L (ref 38–126)
ALT SERPL W/O P-5'-P-CCNC: < 5 U/L (ref 11–66)
ANION GAP SERPL CALC-SCNC: 13 MEQ/L (ref 8–16)
APTT PPP: 26.3 SECONDS (ref 22–38)
AST SERPL-CCNC: 11 U/L (ref 5–40)
BASOPHILS ABSOLUTE: 0.1 THOU/MM3 (ref 0–0.1)
BASOPHILS NFR BLD AUTO: 0.5 %
BILIRUB SERPL-MCNC: 0.5 MG/DL (ref 0.3–1.2)
BUN SERPL-MCNC: 23 MG/DL (ref 7–22)
CALCIUM SERPL-MCNC: 9.2 MG/DL (ref 8.5–10.5)
CHLORIDE SERPL-SCNC: 103 MEQ/L (ref 98–111)
CO2 SERPL-SCNC: 25 MEQ/L (ref 23–33)
CREAT SERPL-MCNC: 0.7 MG/DL (ref 0.4–1.2)
DEPRECATED RDW RBC AUTO: 53.3 FL (ref 35–45)
EOSINOPHIL NFR BLD AUTO: 0.3 %
EOSINOPHILS ABSOLUTE: 0 THOU/MM3 (ref 0–0.4)
ERYTHROCYTE [DISTWIDTH] IN BLOOD BY AUTOMATED COUNT: 14.9 % (ref 11.5–14.5)
GFR SERPL CREATININE-BSD FRML MDRD: > 60 ML/MIN/1.73M2
GLUCOSE SERPL-MCNC: 116 MG/DL (ref 70–108)
HCT VFR BLD AUTO: 40.8 % (ref 37–47)
HGB BLD-MCNC: 12.8 GM/DL (ref 12–16)
IMM GRANULOCYTES # BLD AUTO: 0.15 THOU/MM3 (ref 0–0.07)
IMM GRANULOCYTES NFR BLD AUTO: 1.3 %
INR PPP: 0.95 (ref 0.85–1.13)
LYMPHOCYTES ABSOLUTE: 2.2 THOU/MM3 (ref 1–4.8)
LYMPHOCYTES NFR BLD AUTO: 19 %
MCH RBC QN AUTO: 31.1 PG (ref 26–33)
MCHC RBC AUTO-ENTMCNC: 31.4 GM/DL (ref 32.2–35.5)
MCV RBC AUTO: 99.3 FL (ref 81–99)
MONOCYTES ABSOLUTE: 1 THOU/MM3 (ref 0.4–1.3)
MONOCYTES NFR BLD AUTO: 8.5 %
NEUTROPHILS NFR BLD AUTO: 70.4 %
NRBC BLD AUTO-RTO: 0 /100 WBC
OSMOLALITY SERPL CALC.SUM OF ELEC: 285.9 MOSMOL/KG (ref 275–300)
PLATELET # BLD AUTO: 255 THOU/MM3 (ref 130–400)
PMV BLD AUTO: 10.1 FL (ref 9.4–12.4)
POTASSIUM SERPL-SCNC: 4.2 MEQ/L (ref 3.5–5.2)
PROT SERPL-MCNC: 5.7 G/DL (ref 6.1–8)
RBC # BLD AUTO: 4.11 MILL/MM3 (ref 4.2–5.4)
SEGMENTED NEUTROPHILS ABSOLUTE COUNT: 8.2 THOU/MM3 (ref 1.8–7.7)
SODIUM SERPL-SCNC: 141 MEQ/L (ref 135–145)
WBC # BLD AUTO: 11.7 THOU/MM3 (ref 4.8–10.8)

## 2023-05-24 PROCEDURE — 85730 THROMBOPLASTIN TIME PARTIAL: CPT

## 2023-05-24 PROCEDURE — 99283 EMERGENCY DEPT VISIT LOW MDM: CPT

## 2023-05-24 PROCEDURE — 80053 COMPREHEN METABOLIC PANEL: CPT

## 2023-05-24 PROCEDURE — 36415 COLL VENOUS BLD VENIPUNCTURE: CPT

## 2023-05-24 PROCEDURE — 85025 COMPLETE CBC W/AUTO DIFF WBC: CPT

## 2023-05-24 PROCEDURE — 6370000000 HC RX 637 (ALT 250 FOR IP): Performed by: EMERGENCY MEDICINE

## 2023-05-24 PROCEDURE — 85610 PROTHROMBIN TIME: CPT

## 2023-05-24 RX ADMIN — APIXABAN 10 MG: 5 TABLET, FILM COATED ORAL at 02:24

## 2023-05-24 ASSESSMENT — PAIN - FUNCTIONAL ASSESSMENT: PAIN_FUNCTIONAL_ASSESSMENT: NONE - DENIES PAIN

## 2023-05-24 NOTE — ED TRIAGE NOTES
Patient presents to ED via EMS from the [facility] for a DVT. Per EMS patient had surgery on left leg 7 weeks ago, today confirmed DVT in left superficial femoral vein. Patient has no complaints at this time. Dr. Tracy Macias at bedside.

## 2023-05-24 NOTE — ED NOTES
Discharge instructions and follow up discussed with pt. Pt verbalized understanding and denied further questions. LDA removed. Pt discharged with all belongings.         Carolina Callejas RN  05/24/23 2960

## 2023-05-24 NOTE — ED PROVIDER NOTES
[address] ENCOUNTER        PATIENT NAME: Hanna Hutchinson  MRN: [id number]  : 1940  LOGAN: 2023  PROVIDER: Chelle Minaya MD    CHIEF COMPLAINT       Chief Complaint   Patient presents with    Other     Positive for DVT left leg superficial femoral vein via doppler       Patient is seen and evaluated in a timely fashion. Nurses Notes are reviewed and I agree except as noted in the HPI. HISTORY OF PRESENT ILLNESS   Hanna Hutchinson is a 80 y.o. female who presents to Emergency Department with Other (Positive for DVT left leg superficial femoral vein via doppler)     Patient is brought in from SNF for evaluation of LLE DVT which was discovered today. She fell on 2023, sustained a left tibial plateau fracture and was admitted at [facility] ortho service. Closed treatment was recommended by orthopedics. Patient eventually was discharged on 2023 to SNF. Patient has been having left leg pain for the last several days with increasing swelling. A DVT study was done today revealing thrombus identified in the distal superficial femoral vein and mid superficial femoral vein and proximal superficial femoral when, otherwise there is normal compressibility of the left lower extremity venous system. Of note patient has no chest pain and no shortness of breath recently. No tachycardia at SNF. Patient has no bleeding diathesis. Patient received Lovenox during recent admission. This HPI was provided by patient.      PASTMEDICAL HISTORY    has a past medical history of Abnormal nuclear stress test, Cataract, Chronic deep vein thrombosis (DVT) of femoral vein of left lower extremity (HCC), Colon polyps, COPD (chronic obstructive pulmonary disease) ([facility] [address] Diverticulosis large intestine w/o perforation or abscess w/bleeding, DVT, recurrent, lower extremity, acute, left (HCC), Fibroid, uterine, Hearing loss, Hx of blood clots, Hyperlipidemia, Nicotine dependence,

## 2023-05-25 NOTE — TELEPHONE ENCOUNTER
Hospital Medicine History & Physical Note    Date of Service  5/25/2023    Primary Care Physician  AMISHA Hemphill.    Consultants  Cards, Dr Wyman     Code Status  Full Code    Chief Complaint  Chief Complaint   Patient presents with    Shortness of Breath     Onset 2 days ago of SOB, unable to sleep. He reports he got up in the am, bent over to make the bed and felt suddenly very SOB. In triage O2 applied and pt states this helps immensely. Now nasal congestion.     History of Presenting Illness  Jan Rivas is a 71 y.o. male with a past medical history of coronary artery disease who presented 5/25/2023 with shortness of breath for the past two days. The shortness of breath is mostly with exertion.  Patient also has orthopnea and paroxysmal nocturnal dyspnea.  He denies noticing any new lower extremity pain redness or pain.  He denies having chest pain other than mild intermittent chest discomfort.     I discussed the plan of care with emergency department physician, the patient and patient family present at bedside    Review of Systems  Review of Systems   Constitutional:  Negative for chills and fever.   Eyes:  Negative for discharge and redness.   Respiratory:  Positive for shortness of breath. Negative for cough and stridor.    Cardiovascular:  Positive for chest pain (chest dyscomfort). Negative for leg swelling.   Gastrointestinal:  Negative for abdominal pain and vomiting.   Genitourinary:  Negative for flank pain.   Musculoskeletal:  Negative for myalgias.   Skin: Negative.    Neurological:  Negative for focal weakness.   Endo/Heme/Allergies:  Does not bruise/bleed easily.   Psychiatric/Behavioral:  The patient is not nervous/anxious.      Past Medical History   has a past medical history of Acute stroke due to ischemia ([facility]) (05/2022), Apnea, sleep, CAD (coronary artery disease) (05/2010), Chickenpox, [place] measles, H/O heart artery stent (05/04/2010), Heart attack ([facility]), Hyperlipidemia,  Noted. Hypertension, Mumps, Sleep apnea, Snoring, Stroke (HCC), and Wears glasses.    Surgical History   has a past surgical history that includes zzz cardiac cath (02/21/2015); [facility] cardiac cath (05/04/2010); cyst excision; and other cardiac surgery (05/04/2010).     Family History  family history includes Cancer in his father, mother, and sister; Heart Attack in his mother.      Social History   reports that he quit smoking about 13 years ago. His smoking use included cigarettes. He has a 21.00 pack-year smoking history. He has never been exposed to tobacco smoke. He has never used smokeless tobacco. He reports current alcohol use of about 8.4 oz of alcohol per week. He reports current drug use. Drugs: Marijuana and Inhaled.    Allergies  No Known Allergies    Medications  Prior to Admission Medications   Prescriptions Last Dose Informant Patient Reported? Taking?   Aromatic Inhalants (VICKS VAPOR INH) 5/24/2023 at 2200 Patient Yes Yes   Sig: Inhale 1 Each 1 time a day as needed (nasal stick).   Camphor-Eucalyptus-Menthol (VICKS VAPORUB EX) 5/24/2023 at 2200 Patient Yes Yes   Sig: Apply 1 Application topically 1 time a day as needed (Under nose for CPAP).     atorvastatin (LIPITOR) 40 MG Tab 5/24/2023 at 2200 Patient No No   Sig: Take 1 Tablet by mouth 1/2 hour before dinner.   clopidogrel (PLAVIX) 75 MG Tab 5/25/2023 at 0700 Patient Yes No   Sig: Take 75 mg by mouth every day.   latanoprost (XALATAN) 0.005 % Solution 5/24/2023 at 2200 Patient Yes No   Sig: Administer 1 Drop into both eyes at bedtime.   losartan (COZAAR) 25 MG Tab 5/25/2023 at 0700 Patient No No   Sig: Take 1 Tablet by mouth every day.   metoprolol tartrate (LOPRESSOR) 25 MG Tab 5/25/2023 at 0700 Patient No No   Sig: Take 1 Tablet by mouth 2 times a day. TAKE 1 TAB BY MOUTH 2 TIMES A DAY.   traZODone (DESYREL) 50 MG Tab 5/24/2023 at 2200 Patient Yes Yes   Sig: Take 100 mg by mouth every evening.      Facility-Administered Medications: None     Physical  Exam  Temp:  [36.2 °C (97.2 °F)-36.7 °C (98.1 °F)] 36.7 °C (98 °F)  Pulse:  [62-78] 76  Resp:  [16-22] 18  BP: (116-138)/(70-87) 132/83  SpO2:  [87 %-97 %] 96 %  Blood Pressure : 122/80   Temperature: 36.7 °C (98.1 °F)   Pulse: 65   Respiration: 20   Pulse Oximetry: 97 %     Physical Exam  Constitutional:       General: He is not in acute distress.     Appearance: He is not ill-appearing or diaphoretic.   HENT:      Head: Atraumatic.      Right Ear: External ear normal.      Left Ear: External ear normal.      Nose: No congestion or rhinorrhea.      Mouth/Throat:      Mouth: Mucous membranes are moist.   Eyes:      General: No scleral icterus.        Right eye: No discharge.         Left eye: No discharge.      Pupils: Pupils are equal, round, and reactive to light.   Cardiovascular:      Rate and Rhythm: Normal rate and regular rhythm.   Pulmonary:      Comments: Tachypnea  Requiring 2 L of O2 to achieve adequate saturation   Is able to speak in full sentences   Abdominal:      General: There is no distension.   Musculoskeletal:      Cervical back: Neck supple. No rigidity. No muscular tenderness.      Right lower leg: No edema.      Left lower leg: No edema.   Skin:     Coloration: Skin is not jaundiced or pale.   Neurological:      Mental Status: He is alert and oriented to person, place, and time.      Coordination: Coordination normal.   Psychiatric:         Mood and Affect: Mood normal.         Behavior: Behavior normal.       Laboratory:  Recent Labs     05/25/23  1004   WBC 9.6   RBC 4.42*   HEMOGLOBIN 14.3   HEMATOCRIT 42.1   MCV 95.2   MCH 32.4   MCHC 34.0   RDW 42.7   PLATELETCT 209   MPV 9.9     Recent Labs     05/25/23  1004   SODIUM 140   POTASSIUM 4.2   CHLORIDE 102   CO2 25   GLUCOSE 106*   BUN 15   CREATININE 0.92   CALCIUM 10.0     Recent Labs     05/25/23  1004   ALTSGPT 15   ASTSGOT 18   ALKPHOSPHAT 69   TBILIRUBIN 0.5   GLUCOSE 106*         Recent Labs     05/25/23  1004   NTPROBNP 1723*          Recent Labs     05/25/23  1004 05/25/23  1606   TROPONINT 46* 32*     Imaging:  EC-ECHOCARDIOGRAM COMPLETE W/ CONT   Final Result      DX-CHEST-PORTABLE (1 VIEW)   Final Result         1. No acute cardiopulmonary abnormalities are identified.      CT-CTA CHEST PULMONARY ARTERY W/ RECONS    (Results Pending)     I personally reviewed patient EKG shows sinus rhythm with a rate of 61, there is no ST elevation, there is flattening of T waves in lead II, aVL and V3-V4.  QTc is 435.    Assessment/Plan:  Justification for Admission Status  I anticipate this patient will require at least two midnights for appropriate medical management, necessitating inpatient admission because patient has acute hypoxemic respiratory failure will require oxygen, breathing treatment, oxygen titration.     * Acute hypoxemic respiratory failure (HCC)- (present on admission)  Assessment & Plan  R/o COVID / influenza   Oxygen as needed, Respiratory protocol, Bronchodilators, Incentive spirometry  Has mild chest discomfort and troponin elevation  EKG shows sinus rhythm with a rate of 61, there is no ST elevation, there is flattening of T waves in lead II, aVL and V3-V4.  QTc is 435.  Continuous cardiac monitoring    I will check an echocardiography  Stat EKG, troponin for chest pain or worsening shortness of breath   Cardiology, Dr Wyman consulted, appreciate recommendations  Markedly elevated D-dimer.  Echo suggestive of pulmonary embolism   D-dimer came back very elevated. I will start empiric therapeutic anticoagulation. I will check CT angiography of the chest     Elevated d-dimer- (present on admission)  Assessment & Plan  Markedly elevated D-dimer.  Echo suggestive of pulmonary embolism   I will start empiric therapeutic anticoagulation  I will check CT angiography of the chest    Shortness of breath- (present on admission)  Assessment & Plan  Exertional SOB, requiring 2 L of O2   R/o COVID / influenza   Oxygen as needed, Respiratory  protocol, Bronchodilators, Incentive spirometry  Has mild chest discomfort and troponin elevation  EKG shows sinus rhythm with a rate of 61, there is no ST elevation, there is flattening of T waves in lead II, aVL and V3-V4.  QTc is 435.  Continuous cardiac monitoring    I will check an echocardiography  Cardiology, Dr Wyman consulted, appreciate recommendations    Elevated troponin- (present on admission)  Assessment & Plan  In the indeterminate range  Has mild chest discomfort   EKG shows sinus rhythm with a rate of 61, there is no ST elevation, there is flattening of T waves in lead II, aVL and V3-V4.  QTc is 435.  Continuous cardiac monitoring    I will check an echocardiography  Stat EKG, troponin for chest pain or worsening shortness of breath   Cardiology, Dr Wyman consulted, appreciate recommendations    Primary hypertension- (present on admission)  Assessment & Plan  Resume home metoprolol & losartan with hold parameters     Pure hypercholesterolemia- (present on admission)  Assessment & Plan  Resume home atorvastatin     VTE prophylaxis: SCDs/TEDs and enoxaparin ppx

## 2023-06-07 ENCOUNTER — HOSPITAL ENCOUNTER (OUTPATIENT)
Dept: WOUND CARE | Age: 83
Discharge: HOME | End: 2023-06-07
Payer: MEDICARE

## 2023-06-07 VITALS
SYSTOLIC BLOOD PRESSURE: 85 MMHG | DIASTOLIC BLOOD PRESSURE: 55 MMHG | OXYGEN SATURATION: 95 % | TEMPERATURE: 97.8 F | RESPIRATION RATE: 16 BRPM | HEART RATE: 65 BPM

## 2023-06-07 DIAGNOSIS — L89.890 PRESSURE INJURY OF LEFT LEG, UNSTAGEABLE (HCC): ICD-10-CM

## 2023-06-07 DIAGNOSIS — L89.893 PRESSURE INJURY OF LEFT LEG, STAGE 3 (HCC): Primary | ICD-10-CM

## 2023-06-07 PROBLEM — S51.811A ISTAP TYPE 2 SKIN TEAR OF RIGHT FOREARM: Status: ACTIVE | Noted: 2023-06-07

## 2023-06-07 PROBLEM — S81.811A ISTAP TYPE 2 SKIN TEAR OF RIGHT LOWER LEG: Status: ACTIVE | Noted: 2023-06-07

## 2023-06-07 PROCEDURE — 6370000000 HC RX 637 (ALT 250 FOR IP): Performed by: NURSE PRACTITIONER

## 2023-06-07 PROCEDURE — 11042 DBRDMT SUBQ TIS 1ST 20SQCM/<: CPT

## 2023-06-07 PROCEDURE — 99214 OFFICE O/P EST MOD 30 MIN: CPT

## 2023-06-07 RX ORDER — GINSENG 100 MG
CAPSULE ORAL ONCE
OUTPATIENT
Start: 2023-06-07 | End: 2023-06-07

## 2023-06-07 RX ORDER — LIDOCAINE HYDROCHLORIDE 20 MG/ML
JELLY TOPICAL ONCE
OUTPATIENT
Start: 2023-06-07 | End: 2023-06-07

## 2023-06-07 RX ORDER — LIDOCAINE HYDROCHLORIDE 20 MG/ML
JELLY TOPICAL ONCE
Status: COMPLETED | OUTPATIENT
Start: 2023-06-07 | End: 2023-06-07

## 2023-06-07 RX ORDER — IBUPROFEN 200 MG
TABLET ORAL ONCE
OUTPATIENT
Start: 2023-06-07 | End: 2023-06-07

## 2023-06-07 RX ORDER — GENTAMICIN SULFATE 1 MG/G
OINTMENT TOPICAL ONCE
OUTPATIENT
Start: 2023-06-07 | End: 2023-06-07

## 2023-06-07 RX ORDER — LIDOCAINE 40 MG/G
CREAM TOPICAL ONCE
OUTPATIENT
Start: 2023-06-07 | End: 2023-06-07

## 2023-06-07 RX ORDER — BACITRACIN ZINC AND POLYMYXIN B SULFATE 500; 1000 [USP'U]/G; [USP'U]/G
OINTMENT TOPICAL ONCE
OUTPATIENT
Start: 2023-06-07 | End: 2023-06-07

## 2023-06-07 RX ORDER — BETAMETHASONE DIPROPIONATE 0.05 %
OINTMENT (GRAM) TOPICAL ONCE
OUTPATIENT
Start: 2023-06-07 | End: 2023-06-07

## 2023-06-07 RX ORDER — CLOBETASOL PROPIONATE 0.5 MG/G
OINTMENT TOPICAL ONCE
OUTPATIENT
Start: 2023-06-07 | End: 2023-06-07

## 2023-06-07 RX ORDER — LIDOCAINE 50 MG/G
OINTMENT TOPICAL ONCE
OUTPATIENT
Start: 2023-06-07 | End: 2023-06-07

## 2023-06-07 RX ORDER — LIDOCAINE HYDROCHLORIDE 40 MG/ML
SOLUTION TOPICAL ONCE
OUTPATIENT
Start: 2023-06-07 | End: 2023-06-07

## 2023-06-07 RX ADMIN — LIDOCAINE HYDROCHLORIDE: 20 JELLY TOPICAL at 15:40

## 2023-06-07 NOTE — PROGRESS NOTES
[address] [facility]  Consult and Procedure Note      Errol Jean Baptiste  MEDICAL RECORD NUMBER:  [id number]  AGE: 80 y.o. GENDER: female  : 1940  EPISODE DATE:  2023    SUBJECTIVE:     Chief Complaint   Patient presents with    Wound Check         HISTORY OF PRESENT ILLNESS      Errol Jean Baptiste is a 80 y.o. female who presents today for re-evaluation of left lower extremity wounds. Initial wounds prompting referral to clinic have healed. Since that time, patient has developed new wounds to left leg s/p fall with fracture. Currently residing in SNF due to non-weight bearing status to left leg, utilizing brace. [facility] for transfers. She has since developed pressure injury to left posterior leg, in location of brace. She was evaluated at [facility] today where she obtained new brace, is also slowly increasing mobility, is now 50 % weight bearing. At last visit she was found to have increased edema to left leg as compared to right and ecchymosis/petechiae noted to leg. Venous doppler was ordered at that time. Results were never sent to me but review of chart shows that she was sent to ED 2 days after my visit for findings +DVT. She was discharged home on Eliquis. Current wound care includes silicone bordered foam to right arm skin tear, Santyl to left leg wound. She presents today with bordered gauze dressings in place, saturated. She states that the normal wound care nurse at facility has been on vacation for the last week and that the dressings have not been consistently changed. She denies pain to wounds. She denies any fevers, chills. Is a current 1 PPD smoker. Denies any further needs or concerns.     PAST MEDICAL HISTORY             Diagnosis Date    Abnormal nuclear stress test 2018    Cataract     Chronic deep vein thrombosis (DVT) of femoral vein of left lower extremity (HCC) 3/9/2021    Colon polyps     COPD (chronic obstructive pulmonary disease) (HCC)     Diverticulosis

## 2023-06-07 NOTE — DISCHARGE INSTRUCTIONS
slippage, breakthrough drainage     If you need medical attention outside of business hours, please contact your Primary Care Doctor or go to the nearest emergency room.

## 2023-06-07 NOTE — PLAN OF CARE
Problem: Wound:  Goal: Will show signs of wound healing; wound closure and no evidence of infection  Description: Will show signs of wound healing; wound closure and no evidence of infection  Outcome: Progressing  Note: Patient seen for right arm, left leg, right leg wound. Wound shows signs of proper closure and healing. No s/s of infection noted. Left posterior leg distal wound debrided today. Follow up in 2 weeks. See AVS for order changes. Care plan reviewed with patient and daughter. Patient and daughter verbalize understanding of the plan of care and contribute to goal setting.

## 2023-06-20 ENCOUNTER — CARE COORDINATION (OUTPATIENT)
Dept: CARE COORDINATION | Age: 83
End: 2023-06-20

## 2023-06-20 NOTE — CARE COORDINATION
[address] [facility] Update Call    2023    Patient: Saeed Frazier Patient : 1940   MRN: [id number]  Reason for Admission: Tibial plateau fracture, left  Discharge Date: 23 RARS: Readmission Risk Score: 17.3      Care Transitions Post Acute Facility Update    Care Transitions Interventions    Physical Therapy: Completed       Transportation Support: Declined   Post Acute Facility: [facility] Update  Reported Nursing Issues:   VS : /73  HR 66 R 18 SP02 94%      3.5 cm x 1.4 cm x 0.5 cm venous ulcer noted to left posterior shin. Minimal amount of serosanguineous drainage noted. Wound bed presents with red, granulated tissue. No odor noted. Judie-wound bright pink and non-blanching. No c/o pain. C/o Santyl to wound bed, covered with moistened 2x2, ABD, kerlix and ACE. Change daily and PRN. 0.1 cm x 0.3 cm skin tear noted of left calf. No drainage or odor noted. Wound bed epithelized. Judie-wound pink and non-blanching. C/o Triad cream to area and cover with dry sterile dressing. Change daily. Resident states she really doesn't want to continue with wound care clinic due to inconvenience of getting to appts. Would like facility to take over care. This nurse will consult Dr. Ilsa Mars and Deanna Bonner. No other concerns noted. ADLs: Moderate Assistance   Bed Mobility: Moderate Assistance   Transfer Assistance: Moderate Assistance   Ambulation Assistance:  Moderate Assistance   Does patient use an assistive device?: Yes   Assistive Devices: Pt unable to maintain 50% during gait     Barriers to Discharge: 50% WB L LE, TROM, in extension for ambulation, PROM allowed, (effective 23), fall risk          Yahir Rae RN [address] Care Manager [phone]

## 2023-06-21 ENCOUNTER — HOSPITAL ENCOUNTER (OUTPATIENT)
Dept: WOUND CARE | Age: 83
Discharge: HOME | End: 2023-06-21
Payer: MEDICARE

## 2023-06-21 VITALS — TEMPERATURE: 97.1 F

## 2023-06-21 DIAGNOSIS — S41.111A ISTAP TYPE 2 SKIN TEAR OF RIGHT UPPER ARM: ICD-10-CM

## 2023-06-21 DIAGNOSIS — L89.893 PRESSURE INJURY OF LEFT LEG, STAGE 3 (HCC): Primary | ICD-10-CM

## 2023-06-21 DIAGNOSIS — L89.890 PRESSURE INJURY OF LEFT LEG, UNSTAGEABLE (HCC): ICD-10-CM

## 2023-06-21 PROCEDURE — 99213 OFFICE O/P EST LOW 20 MIN: CPT

## 2023-06-21 RX ORDER — NICOTINE 21 MG/24HR
1 PATCH, TRANSDERMAL 24 HOURS TRANSDERMAL EVERY 24 HOURS
COMMUNITY

## 2023-06-21 RX ORDER — LIDOCAINE HYDROCHLORIDE 20 MG/ML
JELLY TOPICAL ONCE
OUTPATIENT
Start: 2023-06-21 | End: 2023-06-21

## 2023-06-21 RX ORDER — CLOBETASOL PROPIONATE 0.5 MG/G
OINTMENT TOPICAL ONCE
OUTPATIENT
Start: 2023-06-21 | End: 2023-06-21

## 2023-06-21 RX ORDER — LIDOCAINE HYDROCHLORIDE 40 MG/ML
SOLUTION TOPICAL ONCE
OUTPATIENT
Start: 2023-06-21 | End: 2023-06-21

## 2023-06-21 RX ORDER — LOPERAMIDE HYDROCHLORIDE 2 MG/1
2 CAPSULE ORAL
COMMUNITY

## 2023-06-21 RX ORDER — BETAMETHASONE DIPROPIONATE 0.05 %
OINTMENT (GRAM) TOPICAL ONCE
OUTPATIENT
Start: 2023-06-21 | End: 2023-06-21

## 2023-06-21 RX ORDER — GINSENG 100 MG
CAPSULE ORAL ONCE
OUTPATIENT
Start: 2023-06-21 | End: 2023-06-21

## 2023-06-21 RX ORDER — LIDOCAINE 50 MG/G
OINTMENT TOPICAL ONCE
OUTPATIENT
Start: 2023-06-21 | End: 2023-06-21

## 2023-06-21 RX ORDER — TRAMADOL HYDROCHLORIDE 50 MG/1
50 TABLET ORAL EVERY 6 HOURS PRN
COMMUNITY

## 2023-06-21 RX ORDER — ESTRADIOL 0.1 MG/G
2 CREAM VAGINAL DAILY
COMMUNITY

## 2023-06-21 RX ORDER — LIDOCAINE 40 MG/G
CREAM TOPICAL ONCE
OUTPATIENT
Start: 2023-06-21 | End: 2023-06-21

## 2023-06-21 RX ORDER — GENTAMICIN SULFATE 1 MG/G
OINTMENT TOPICAL ONCE
OUTPATIENT
Start: 2023-06-21 | End: 2023-06-21

## 2023-06-21 RX ORDER — DOCUSATE SODIUM 100 MG/1
100 CAPSULE, LIQUID FILLED ORAL 2 TIMES DAILY
COMMUNITY

## 2023-06-21 RX ORDER — IBUPROFEN 200 MG
TABLET ORAL ONCE
OUTPATIENT
Start: 2023-06-21 | End: 2023-06-21

## 2023-06-21 RX ORDER — BACITRACIN ZINC AND POLYMYXIN B SULFATE 500; 1000 [USP'U]/G; [USP'U]/G
OINTMENT TOPICAL ONCE
OUTPATIENT
Start: 2023-06-21 | End: 2023-06-21

## 2023-06-21 NOTE — DISCHARGE INSTRUCTIONS
Visit Discharge/Physician Orders:  - Elevate legs throughout the day to help with swelling.  - Apply good oil based moisturizer to the arms and legs at dressing changes. Avoid wounds      [facility]: [facility] [address]      Wound Location: Left posterior leg x 2 (proximal and distal) right arm and right posterior leg      Dressing orders:      1) Gather wound care supplies and arrange on clean table. 2) Wash your hands with soap and water or use alcohol based hand  for 20 seconds (sing \"Happy Birthday\" twice). 3) Cleanse wounds with normal saline or wound cleanser and gauze. Pat dry with clean gauze. 4) Left leg, proximal- Apply Triad paste to wound. cover with ABD. Wrap leg with kerlix and ace wrap from base of toes to just below bend of knee. Change daily. Left leg, distal- Triad paste to ronnie wound. Apply nickel thick santyl ointment to wound. Cover with saline moist gauze, and ABD. Wrap leg with kerlix and ace wrap from base of toes to just below bend of knee. Change daily. Right posterior leg- apply silicone bordered foam to wound. Change 3 times a week  Apply tubigrip to right leg daily. On in the morning off at night. Keep all dressings clean & dry. Follow up visit: 7/12/2023 at 2:30pm     Supplies: to be ordered per The [address] [place] [address] next scheduled appointment. Please give 24 hour notice if unable to keep appointment. [phone]     If you experience any of the following, please call the Wound Care Service during business hours: Monday through Friday 8:00 am - 4:30 pm  ([phone]).               *Increase in pain              *Temperature over 101              *Increase in drainage from your wound or a foul odor              *Uncontrolled swelling              *Need for compression bandage changes due to slippage, breakthrough drainage     If you need medical attention outside of business hours, please contact your Primary Care Doctor or go to the nearest

## 2023-06-21 NOTE — PLAN OF CARE
Problem: Wound:  Goal: Will show signs of wound healing; wound closure and no evidence of infection  Description: Will show signs of wound healing; wound closure and no evidence of infection  Outcome: Progressing   Pt. Seen today for bilateral legs, right arm wounds see AVS for new orders. Follow up in 2 weeks. Care plan reviewed with patient and daughter. Patient and daughter verbalize understanding of the plan of care and contribute to goal setting.

## 2023-06-21 NOTE — PROGRESS NOTES
Wound Size % (l*w) 98.06 06/21/23 1424   Wound Volume (cm^3) 0.009 cm^3 06/21/23 1424   Wound Assessment Granulation tissue 06/21/23 1424   Drainage Amount Scant 06/21/23 1424   Drainage Description Serosanguinous 06/21/23 1424   Odor None 06/21/23 1424   Judie-wound Assessment Dry/flaky; Blanchable erythema 06/21/23 1424   Margins Attached edges 06/21/23 1424   Wound Thickness Description not for Pressure Injury Full thickness 06/21/23 1424   Number of days: 30       Wound 06/07/23 Tibial Posterior;Right (Active)   Wound Image   06/21/23 1424   Wound Etiology Traumatic 06/21/23 1424   Dressing Status Intact; Old drainage noted;New dressing applied 06/21/23 1424   Wound Cleansed Cleansed with saline 06/21/23 1424   Dressing/Treatment Silicone border 61/85/73 1519   Offloading for Diabetic Foot Ulcers Offloading not required; Offloading not ordered 06/21/23 1424   Wound Length (cm) 0.2 cm 06/21/23 1424   Wound Width (cm) 0.1 cm 06/21/23 1424   Wound Depth (cm) 0.05 cm 06/21/23 1424   Wound Surface Area (cm^2) 0.02 cm^2 06/21/23 1424   Change in Wound Size % (l*w) 98.6 06/21/23 1424   Wound Volume (cm^3) 0.001 cm^3 06/21/23 1424   Wound Healing % 99 06/21/23 1424   Wound Assessment Epithelialization;Granulation tissue 06/21/23 1424   Drainage Amount Scant 06/21/23 1424   Drainage Description Serosanguinous 06/21/23 1424   Odor None 06/21/23 1424   Judie-wound Assessment Blanchable erythema;Dry/flaky 06/21/23 1424   Margins Attached edges 06/21/23 1424   Wound Thickness Description not for Pressure Injury Full thickness 06/21/23 1424   Number of days: 13       Wound 06/21/23 Arm Right;Upper (Active)   Wound Image   06/21/23 1429   Wound Etiology Skin Tear 06/21/23 1429   Dressing Status Intact 06/21/23 1429   Wound Cleansed Cleansed with saline 06/21/23 1429   Dressing/Treatment Steri-strips 06/21/23 1429   Wound Length (cm) 3.5 cm 06/21/23 1429   Wound Width (cm) 2.1 cm 06/21/23 1429   Wound Depth (cm) 0.05 cm 06/21/23

## 2023-06-27 ENCOUNTER — CARE COORDINATION (OUTPATIENT)
Dept: CARE COORDINATION | Age: 83
End: 2023-06-27

## 2023-07-05 ENCOUNTER — CARE COORDINATION (OUTPATIENT)
Dept: CARE COORDINATION | Age: 83
End: 2023-07-05

## 2023-07-06 ENCOUNTER — HOSPITAL ENCOUNTER (OUTPATIENT)
Dept: WOUND CARE | Age: 83
Discharge: HOME | End: 2023-07-06
Payer: MEDICARE

## 2023-07-06 VITALS
SYSTOLIC BLOOD PRESSURE: 95 MMHG | RESPIRATION RATE: 16 BRPM | DIASTOLIC BLOOD PRESSURE: 56 MMHG | HEART RATE: 60 BPM | OXYGEN SATURATION: 94 % | TEMPERATURE: 97.8 F

## 2023-07-06 PROCEDURE — 99214 OFFICE O/P EST MOD 30 MIN: CPT

## 2023-07-06 ASSESSMENT — PAIN SCALES - GENERAL: PAINLEVEL_OUTOF10: 0

## 2023-07-06 NOTE — DISCHARGE INSTRUCTIONS
Visit Discharge/Physician Orders:  - Elevate legs throughout the day to help with swelling.  - Apply good oil based moisturizer to the arms and legs at dressing changes. Avoid wounds   - stop Santyl   - [address] if Discharged from the [address] [facility] [address]: The [address]      Wound Location: Left posterior leg      Dressing orders:      1) Gather wound care supplies and arrange on clean table. 2) Wash your hands with soap and water or use alcohol based hand  for 20 seconds (sing \"Happy Birthday\" twice). 3) Cleanse wounds with normal saline or wound cleanser and gauze. Pat dry with clean gauze. 4) Left posterior leg -. Apply triad paste to the wound cover with ABD. Wrap leg with kerlix and ACE from base of toes to just below bend of knee. Change daily. If patient is discharged please order home health and change dressing to 3 times a week. Right arm - healed - leave open to air      Keep all dressings clean & dry. Follow up visit: 2 weeks Thursday July 20th at 8am     Supplies: to be ordered per [facility] next scheduled appointment. Please give 24 hour notice if unable to keep appointment. [phone]     If you experience any of the following, please call the Wound Care Service during business hours: Monday through Friday 8:00 am - 4:30 pm  ([phone]). *Increase in pain              *Temperature over 101              *Increase in drainage from your wound or a foul odor              *Uncontrolled swelling              *Need for compression bandage changes due to slippage, breakthrough drainage     If you need medical attention outside of business hours, please contact your Primary Care Doctor or go to the nearest emergency room.

## 2023-07-06 NOTE — PROGRESS NOTES
[address] [facility]  Consult and Procedure Note      Arnoldo Dunbar  MEDICAL RECORD NUMBER:  [id number]  AGE: 80 y.o. GENDER: female  : 1940  EPISODE DATE:  2023    SUBJECTIVE:     Chief Complaint   Patient presents with    Wound Check         HISTORY OF PRESENT ILLNESS      Arnoldo Dunbar is a 80 y.o. female who presents today for re-evaluation of left lower extremity wounds. Initial wounds prompting referral to clinic have healed. Since that time, patient has developed new wounds to left leg s/p fall with fracture, is currently residing in SNF due to left leg injury. While in facility, patient has developed pressure injury to left posterior leg, in location of brace. She was evaluated at [facility] where she obtained new brace, is slowly increasing mobility, is now 50 % weight bearing. At  visit she was found to have increased edema to left leg as compared to right and ecchymosis/petechiae noted to leg. Venous doppler was ordered at that time. Results were never sent to me but review of chart shows that she was sent to ED 2 days after my visit for findings +DVT. She continues on Eliquis for this. Current wound care includes silicone bordered foam to right arm skin tear, right leg wound, Santyl, kerlex and ACE to left leg wound. . She denies pain to wounds. She denies any fevers, chills. Is a current 1 PPD smoker. Denies any further needs or concerns.     PAST MEDICAL HISTORY             Diagnosis Date    Abnormal nuclear stress test 2018    Cataract     Chronic deep vein thrombosis (DVT) of femoral vein of left lower extremity (HCC) 3/9/2021    Colon polyps     COPD (chronic obstructive pulmonary disease) ([facility])     Diverticulosis large intestine w/o perforation or abscess w/bleeding     DVT, recurrent, lower extremity, acute, left ([facility])     Fibroid, uterine     Hearing loss     Hx of blood clots     Hyperlipidemia     Nicotine dependence     Nocturnal hypoxemia

## 2023-07-06 NOTE — PLAN OF CARE
Problem: Wound:  Goal: Will show signs of wound healing; wound closure and no evidence of infection  Description: Will show signs of wound healing; wound closure and no evidence of infection  Outcome: Progressing  Note: Patient seen for left posterior leg wound. Right arm and right leg healed. Wound shows signs of proper closure and healing. No s/s of infection noted. Follow up in 2 weeks. See AVS for order changes. Care plan reviewed with patient. Patient verbalize understanding of the plan of care and contribute to goal setting.

## 2023-07-11 ENCOUNTER — CARE COORDINATION (OUTPATIENT)
Dept: CARE COORDINATION | Age: 83
End: 2023-07-11

## 2023-07-11 NOTE — CARE COORDINATION
[address] Update Call    2023    Patient: Júnior Rutherford Patient : 1940   MRN: [id number]  Reason for Admission:  Tibial plateau fracture left  Discharge Date: 23 RARS: Readmission Risk Score: 17.3         Care Transitions Post Acute Facility Update    Care Transitions Interventions    Physical Therapy: Completed       Transportation Support: Declined         Post Acute Facility Update   Post Acute Facility: [address] [place], [place] [address]    ELOS: 19 days      Nursing   Reported Nursing Updates: VS: BP  96/70 hr 80 sp02 94% t 97.9 r 16      Resident returned from wound clinic appointment. Continue to elevate BLE to aide with swelling. Continue to apply good oil based moisturizer to BUE and BLE at dressing changes, while avoiding wounds. Stop Santyl; Left posterior leg: apply triad paste with daily dressing change. At discharge, change dressing order for 3 times a week. F/u appointment 23 @ 8am.       Rehab/Functional   Prior Level of Functioning: nursing home   ADLs: Stand by Assist - Presence and Cueing   Transfer Assistance: Stand by Assist - Presence and Cueing   Ambulation Assistance: Minimal Assistance   How far (in feet) is the patient ambulating?: 50   Rehab Notes: Resident resting in bed at this time. Up in wheelchair this am to therapy. Ambulated with therapy to dining room with walker. Accepted medications as ordered. No c/o pain. Dressing changed to Left lower leg, tolerated well. Erik exhausts her 100 days on    family states she can stay private pay until she is ready to go back to assisted living. She is making progress but continues to have problems with transfers       SW/Discharge Planning   Anticipated date for discharge: 23   Discharge Planning / Barriers: Will stay on private pay, then d/c back to [facility] once stronger.            Ruby Byrd RN Post Acute Care Manager [phone]

## 2023-07-19 ENCOUNTER — CARE COORDINATION (OUTPATIENT)
Dept: CARE COORDINATION | Age: 83
End: 2023-07-19

## 2023-07-19 NOTE — CARE COORDINATION
[address] Update Call    2023    Patient: Xiomara Holley Patient : 1940   MRN: [id number]  Reason for Admission:  Tibial plateau fracture left  Discharge Date: 23 RARS: Readmission Risk Score: 17.3         Care Transitions Post Acute Facility Update    Care Transitions Interventions    Physical Therapy: Completed       Transportation Support: Declined         Post Acute Facility Update   Post Acute Facility: [address] [place] [address]    ELOS: 19 days      Nursing   Prescribed diet: [facility] diet    Nutrition intake assessment: PO intake %   Reported Nursing Updates: VS: /73  hr 77 sp02 93% t 97.9 r 16  Weight 140        Rehab/Functional   Prior Level of Functioning: [facility]   Cognitive function assessment: BIMS of 11   Rehab Notes: Resident was assessed and no injury noted. Resident continues to participate with therapy. Intervention: education provided to resident on calling for help with transfers to the bathroom. Therapy to re-inforce the education. SW/Discharge Planning   Anticipated date for discharge: 23   Discharge Planning / Barriers: Will stay on private pay, then d/c back to [facility] once strongerAdan Valencia exhausts her 100 days on    family states she can stay private pay until she is ready to go back to assisted living.  She is making progress but continues to have problems with transfers          [address] [place] [address] [phone]

## 2023-07-20 ENCOUNTER — HOSPITAL ENCOUNTER (OUTPATIENT)
Dept: WOUND CARE | Age: 83
Discharge: HOME | End: 2023-07-20
Payer: MEDICARE

## 2023-07-20 VITALS
OXYGEN SATURATION: 94 % | DIASTOLIC BLOOD PRESSURE: 55 MMHG | TEMPERATURE: 97.3 F | RESPIRATION RATE: 16 BRPM | HEART RATE: 57 BPM | SYSTOLIC BLOOD PRESSURE: 105 MMHG

## 2023-07-20 DIAGNOSIS — L89.893 PRESSURE INJURY OF LEFT LEG, STAGE 3 (HCC): Primary | ICD-10-CM

## 2023-07-20 DIAGNOSIS — L89.890 PRESSURE INJURY OF LEFT LEG, UNSTAGEABLE (HCC): ICD-10-CM

## 2023-07-20 PROBLEM — S81.811A ISTAP TYPE 3 SKIN TEAR OF RIGHT LOWER LEG: Status: RESOLVED | Noted: 2023-06-07 | Resolved: 2023-07-20

## 2023-07-20 PROBLEM — S41.111A ISTAP TYPE 2 SKIN TEAR OF RIGHT UPPER ARM: Status: RESOLVED | Noted: 2023-06-21 | Resolved: 2023-07-20

## 2023-07-20 PROBLEM — S51.811A ISTAP TYPE 3 SKIN TEAR OF RIGHT FOREARM: Status: RESOLVED | Noted: 2023-06-07 | Resolved: 2023-07-20

## 2023-07-20 PROCEDURE — 99212 OFFICE O/P EST SF 10 MIN: CPT | Performed by: NURSE PRACTITIONER

## 2023-07-20 PROCEDURE — 99213 OFFICE O/P EST LOW 20 MIN: CPT

## 2023-07-20 RX ORDER — BETAMETHASONE DIPROPIONATE 0.05 %
OINTMENT (GRAM) TOPICAL ONCE
Status: CANCELLED | OUTPATIENT
Start: 2023-07-20 | End: 2023-07-20

## 2023-07-20 RX ORDER — LIDOCAINE 50 MG/G
OINTMENT TOPICAL ONCE
Status: CANCELLED | OUTPATIENT
Start: 2023-07-20 | End: 2023-07-20

## 2023-07-20 RX ORDER — CLOBETASOL PROPIONATE 0.5 MG/G
OINTMENT TOPICAL ONCE
Status: CANCELLED | OUTPATIENT
Start: 2023-07-20 | End: 2023-07-20

## 2023-07-20 RX ORDER — IBUPROFEN 200 MG
TABLET ORAL ONCE
Status: CANCELLED | OUTPATIENT
Start: 2023-07-20 | End: 2023-07-20

## 2023-07-20 RX ORDER — LIDOCAINE 40 MG/G
CREAM TOPICAL ONCE
Status: CANCELLED | OUTPATIENT
Start: 2023-07-20 | End: 2023-07-20

## 2023-07-20 RX ORDER — GINSENG 100 MG
CAPSULE ORAL ONCE
Status: CANCELLED | OUTPATIENT
Start: 2023-07-20 | End: 2023-07-20

## 2023-07-20 RX ORDER — BACITRACIN ZINC AND POLYMYXIN B SULFATE 500; 1000 [USP'U]/G; [USP'U]/G
OINTMENT TOPICAL ONCE
Status: CANCELLED | OUTPATIENT
Start: 2023-07-20 | End: 2023-07-20

## 2023-07-20 RX ORDER — LIDOCAINE HYDROCHLORIDE 20 MG/ML
JELLY TOPICAL ONCE
Status: CANCELLED | OUTPATIENT
Start: 2023-07-20 | End: 2023-07-20

## 2023-07-20 RX ORDER — GENTAMICIN SULFATE 1 MG/G
OINTMENT TOPICAL ONCE
Status: CANCELLED | OUTPATIENT
Start: 2023-07-20 | End: 2023-07-20

## 2023-07-20 RX ORDER — LIDOCAINE HYDROCHLORIDE 40 MG/ML
SOLUTION TOPICAL ONCE
Status: CANCELLED | OUTPATIENT
Start: 2023-07-20 | End: 2023-07-20

## 2023-07-20 RX ORDER — LANOLIN ALCOHOL/MO/W.PET/CERES
3 CREAM (GRAM) TOPICAL NIGHTLY PRN
COMMUNITY

## 2023-07-20 NOTE — PROGRESS NOTES
Instructions         Visit Discharge/Physician Orders:  - Elevate legs throughout the day to help with swelling.  - Apply good oil based moisturizer to the arms and legs at dressing changes. Avoid wounds   - Recommend Home Health if Discharged from the [address] [facility] [address]: [facility] [address] [facility]      Wound Location: Left posterior leg      Dressing orders:      1) Gather wound care supplies and arrange on clean table. 2) Wash your hands with soap and water or use alcohol based hand  for 20 seconds (sing \"Happy Birthday\" twice). 3) Cleanse wounds with normal saline or wound cleanser and gauze. Pat dry with clean gauze. 4) Left posterior leg -. Apply silicone bordered foam to the area 3 times a week for 1 week then stop the dressings and leave open to air. Apply compression hose daily applying first thing in the morning and removing at bedtime        Keep all dressings clean & dry. Follow up visit: as needed     Supplies: to be ordered per The [address] [place] [address] next scheduled appointment. Please give 24 hour notice if unable to keep appointment. [phone]     If you experience any of the following, please call the Wound Care Service during business hours: Monday through Friday 8:00 am - 4:30 pm  ([phone]). *Increase in pain              *Temperature over 101              *Increase in drainage from your wound or a foul odor              *Uncontrolled swelling              *Need for compression bandage changes due to slippage, breakthrough drainage     If you need medical attention outside of business hours, please contact your Primary Care Doctor or go to the nearest emergency room.             Electronically signed by PETEY Lemus CNP on 7/20/2023 at 9:26 AM

## 2023-07-20 NOTE — PLAN OF CARE
Problem: Wound:  Goal: Will show signs of wound healing; wound closure and no evidence of infection  Description: Will show signs of wound healing; wound closure and no evidence of infection  Outcome: Progressing   Pt. Seen today for left leg wound see AVS  for orders. Follow up as needed. Care plan reviewed with patient and daughter. Patient and daughter verbalize understanding of the plan of care and contribute to goal setting.

## 2023-07-20 NOTE — DISCHARGE INSTRUCTIONS
Visit Discharge/Physician Orders:  - Elevate legs throughout the day to help with swelling.  - Apply good oil based moisturizer to the arms and legs at dressing changes. Avoid wounds   - Recommend Home Health if Discharged from the [address] [facility] [address]: The [address] [facility]      Wound Location: Left posterior leg      Dressing orders:      1) Gather wound care supplies and arrange on clean table. 2) Wash your hands with soap and water or use alcohol based hand  for 20 seconds (sing \"Happy Birthday\" twice). 3) Cleanse wounds with normal saline or wound cleanser and gauze. Pat dry with clean gauze. 4) Left posterior leg -. Apply silicone bordered foam to the area 3 times a week for 1 week then stop the dressings and leave open to air. Apply compression hose daily applying first thing in the morning and removing at bedtime        Keep all dressings clean & dry. Follow up visit: as needed     Supplies: to be ordered per The [address] [place] [address] next scheduled appointment. Please give 24 hour notice if unable to keep appointment. [phone]     If you experience any of the following, please call the Wound Care Service during business hours: Monday through Friday 8:00 am - 4:30 pm  ([phone]). *Increase in pain              *Temperature over 101              *Increase in drainage from your wound or a foul odor              *Uncontrolled swelling              *Need for compression bandage changes due to slippage, breakthrough drainage     If you need medical attention outside of business hours, please contact your Primary Care Doctor or go to the nearest emergency room.

## 2023-10-06 ENCOUNTER — APPOINTMENT (OUTPATIENT)
Dept: GENERAL RADIOLOGY | Age: 83
DRG: 603 | End: 2023-10-06
Payer: MEDICARE

## 2023-10-06 ENCOUNTER — HOSPITAL ENCOUNTER (INPATIENT)
Age: 83
LOS: 6 days | Discharge: NURSING, REHAB OR LONG-TERM CARE | DRG: 603 | End: 2023-10-12
Admitting: INTERNAL MEDICINE
Payer: MEDICARE

## 2023-10-06 DIAGNOSIS — N39.0 URINARY TRACT INFECTION WITHOUT HEMATURIA, SITE UNSPECIFIED: ICD-10-CM

## 2023-10-06 DIAGNOSIS — R53.83 LETHARGY: ICD-10-CM

## 2023-10-06 DIAGNOSIS — L03.114 CELLULITIS OF LEFT UPPER EXTREMITY: Primary | ICD-10-CM

## 2023-10-06 LAB
ALBUMIN SERPL BCG-MCNC: 3.5 G/DL (ref 3.5–5.1)
ALP SERPL-CCNC: 74 U/L (ref 38–126)
ALT SERPL W/O P-5'-P-CCNC: < 5 U/L (ref 11–66)
ANION GAP SERPL CALC-SCNC: 10 MEQ/L (ref 8–16)
AST SERPL-CCNC: 17 U/L (ref 5–40)
BACTERIA URNS QL MICRO: ABNORMAL /HPF
BASOPHILS ABSOLUTE: 0.1 THOU/MM3 (ref 0–0.1)
BASOPHILS NFR BLD AUTO: 0.6 %
BILIRUB SERPL-MCNC: 0.6 MG/DL (ref 0.3–1.2)
BILIRUB UR QL STRIP.AUTO: NEGATIVE
BUN SERPL-MCNC: 22 MG/DL (ref 7–22)
CALCIUM SERPL-MCNC: 9.3 MG/DL (ref 8.5–10.5)
CASTS #/AREA URNS LPF: ABNORMAL /LPF
CHARACTER UR: ABNORMAL
CHLORIDE SERPL-SCNC: 105 MEQ/L (ref 98–111)
CO2 SERPL-SCNC: 26 MEQ/L (ref 23–33)
COLOR: YELLOW
CREAT SERPL-MCNC: 0.8 MG/DL (ref 0.4–1.2)
CRYSTALS URNS MICRO: ABNORMAL
DEPRECATED RDW RBC AUTO: 54.3 FL (ref 35–45)
EOSINOPHIL NFR BLD AUTO: 0.1 %
EOSINOPHILS ABSOLUTE: 0 THOU/MM3 (ref 0–0.4)
EPITHELIAL CELLS, UA: ABNORMAL /HPF
ERYTHROCYTE [DISTWIDTH] IN BLOOD BY AUTOMATED COUNT: 14.9 % (ref 11.5–14.5)
FLUAV RNA RESP QL NAA+PROBE: NOT DETECTED
FLUBV RNA RESP QL NAA+PROBE: NOT DETECTED
GFR SERPL CREATININE-BSD FRML MDRD: > 60 ML/MIN/1.73M2
GLUCOSE SERPL-MCNC: 188 MG/DL (ref 70–108)
GLUCOSE UR QL STRIP.AUTO: NEGATIVE MG/DL
HCT VFR BLD AUTO: 40.9 % (ref 37–47)
HGB BLD-MCNC: 12.6 GM/DL (ref 12–16)
HGB UR QL STRIP.AUTO: NEGATIVE
IMM GRANULOCYTES # BLD AUTO: 0.16 THOU/MM3 (ref 0–0.07)
IMM GRANULOCYTES NFR BLD AUTO: 1.1 %
KETONES UR QL STRIP.AUTO: NEGATIVE
LACTIC ACID, SEPSIS: 1.3 MMOL/L (ref 0.5–1.9)
LYMPHOCYTES ABSOLUTE: 1.1 THOU/MM3 (ref 1–4.8)
LYMPHOCYTES NFR BLD AUTO: 7.6 %
MCH RBC QN AUTO: 31 PG (ref 26–33)
MCHC RBC AUTO-ENTMCNC: 30.8 GM/DL (ref 32.2–35.5)
MCV RBC AUTO: 100.5 FL (ref 81–99)
MONOCYTES ABSOLUTE: 1.2 THOU/MM3 (ref 0.4–1.3)
MONOCYTES NFR BLD AUTO: 8 %
NEUTROPHILS NFR BLD AUTO: 82.6 %
NITRITE UR QL STRIP: POSITIVE
NRBC BLD AUTO-RTO: 0 /100 WBC
NT-PROBNP SERPL IA-MCNC: 517.7 PG/ML (ref 0–449)
OSMOLALITY SERPL CALC.SUM OF ELEC: 289.6 MOSMOL/KG (ref 275–300)
PH UR STRIP.AUTO: 7 [PH] (ref 5–9)
PLATELET # BLD AUTO: 338 THOU/MM3 (ref 130–400)
PMV BLD AUTO: 10.2 FL (ref 9.4–12.4)
POTASSIUM SERPL-SCNC: 4.7 MEQ/L (ref 3.5–5.2)
PROCALCITONIN SERPL IA-MCNC: 0.15 NG/ML (ref 0.01–0.09)
PROT SERPL-MCNC: 6 G/DL (ref 6.1–8)
PROT UR STRIP.AUTO-MCNC: NEGATIVE MG/DL
RBC # BLD AUTO: 4.07 MILL/MM3 (ref 4.2–5.4)
RBC URINE: ABNORMAL /HPF
REASON FOR REJECTION: NORMAL
REJECTED TEST: NORMAL
SARS-COV-2 RNA RESP QL NAA+PROBE: NOT DETECTED
SEGMENTED NEUTROPHILS ABSOLUTE COUNT: 12.3 THOU/MM3 (ref 1.8–7.7)
SODIUM SERPL-SCNC: 141 MEQ/L (ref 135–145)
SP GR UR REFRACT.AUTO: 1.02 (ref 1–1.03)
TROPONIN, HIGH SENSITIVITY: 44 NG/L (ref 0–12)
TROPONIN, HIGH SENSITIVITY: 45 NG/L (ref 0–12)
UROBILINOGEN, URINE: 0.2 EU/DL (ref 0–1)
WBC # BLD AUTO: 14.9 THOU/MM3 (ref 4.8–10.8)
WBC #/AREA URNS HPF: ABNORMAL /HPF
WBC #/AREA URNS HPF: ABNORMAL /[HPF]

## 2023-10-06 PROCEDURE — 6370000000 HC RX 637 (ALT 250 FOR IP)

## 2023-10-06 PROCEDURE — 71046 X-RAY EXAM CHEST 2 VIEWS: CPT

## 2023-10-06 PROCEDURE — 99285 EMERGENCY DEPT VISIT HI MDM: CPT

## 2023-10-06 PROCEDURE — 85025 COMPLETE CBC W/AUTO DIFF WBC: CPT

## 2023-10-06 PROCEDURE — 83605 ASSAY OF LACTIC ACID: CPT

## 2023-10-06 PROCEDURE — 87040 BLOOD CULTURE FOR BACTERIA: CPT

## 2023-10-06 PROCEDURE — 73130 X-RAY EXAM OF HAND: CPT

## 2023-10-06 PROCEDURE — 73110 X-RAY EXAM OF WRIST: CPT

## 2023-10-06 PROCEDURE — 80053 COMPREHEN METABOLIC PANEL: CPT

## 2023-10-06 PROCEDURE — 87086 URINE CULTURE/COLONY COUNT: CPT

## 2023-10-06 PROCEDURE — 36415 COLL VENOUS BLD VENIPUNCTURE: CPT

## 2023-10-06 PROCEDURE — 83880 ASSAY OF NATRIURETIC PEPTIDE: CPT

## 2023-10-06 PROCEDURE — 2580000003 HC RX 258

## 2023-10-06 PROCEDURE — 93005 ELECTROCARDIOGRAM TRACING: CPT | Performed by: NURSE PRACTITIONER

## 2023-10-06 PROCEDURE — 87077 CULTURE AEROBIC IDENTIFY: CPT

## 2023-10-06 PROCEDURE — 6360000002 HC RX W HCPCS: Performed by: NURSE PRACTITIONER

## 2023-10-06 PROCEDURE — 2580000003 HC RX 258: Performed by: NURSE PRACTITIONER

## 2023-10-06 PROCEDURE — 2580000003 HC RX 258: Performed by: PHYSICIAN ASSISTANT

## 2023-10-06 PROCEDURE — 84484 ASSAY OF TROPONIN QUANT: CPT

## 2023-10-06 PROCEDURE — 87641 MR-STAPH DNA AMP PROBE: CPT

## 2023-10-06 PROCEDURE — 87636 SARSCOV2 & INF A&B AMP PRB: CPT

## 2023-10-06 PROCEDURE — 87186 SC STD MICRODIL/AGAR DIL: CPT

## 2023-10-06 PROCEDURE — 6360000002 HC RX W HCPCS

## 2023-10-06 PROCEDURE — 96365 THER/PROPH/DIAG IV INF INIT: CPT

## 2023-10-06 PROCEDURE — 99223 1ST HOSP IP/OBS HIGH 75: CPT

## 2023-10-06 PROCEDURE — 1200000000 HC SEMI PRIVATE

## 2023-10-06 PROCEDURE — 93010 ELECTROCARDIOGRAM REPORT: CPT | Performed by: INTERNAL MEDICINE

## 2023-10-06 PROCEDURE — 93005 ELECTROCARDIOGRAM TRACING: CPT

## 2023-10-06 PROCEDURE — 84145 PROCALCITONIN (PCT): CPT

## 2023-10-06 PROCEDURE — 81001 URINALYSIS AUTO W/SCOPE: CPT

## 2023-10-06 PROCEDURE — 94640 AIRWAY INHALATION TREATMENT: CPT

## 2023-10-06 RX ORDER — ALENDRONATE SODIUM 70 MG/1
70 TABLET ORAL
Status: DISCONTINUED | OUTPATIENT
Start: 2023-10-13 | End: 2023-10-12 | Stop reason: HOSPADM

## 2023-10-06 RX ORDER — ALBUTEROL SULFATE 2.5 MG/3ML
2.5 SOLUTION RESPIRATORY (INHALATION) EVERY 6 HOURS PRN
Status: DISCONTINUED | OUTPATIENT
Start: 2023-10-06 | End: 2023-10-12 | Stop reason: HOSPADM

## 2023-10-06 RX ORDER — DONEPEZIL HYDROCHLORIDE 10 MG/1
10 TABLET, FILM COATED ORAL NIGHTLY
Status: DISCONTINUED | OUTPATIENT
Start: 2023-10-06 | End: 2023-10-12 | Stop reason: HOSPADM

## 2023-10-06 RX ORDER — SODIUM CHLORIDE, SODIUM LACTATE, POTASSIUM CHLORIDE, AND CALCIUM CHLORIDE .6; .31; .03; .02 G/100ML; G/100ML; G/100ML; G/100ML
500 INJECTION, SOLUTION INTRAVENOUS ONCE
Status: COMPLETED | OUTPATIENT
Start: 2023-10-07 | End: 2023-10-07

## 2023-10-06 RX ORDER — ACETAMINOPHEN 325 MG/1
650 TABLET ORAL EVERY 6 HOURS PRN
Status: DISCONTINUED | OUTPATIENT
Start: 2023-10-06 | End: 2023-10-12 | Stop reason: HOSPADM

## 2023-10-06 RX ORDER — TRAZODONE HYDROCHLORIDE 50 MG/1
50 TABLET ORAL NIGHTLY
COMMUNITY

## 2023-10-06 RX ORDER — MAGNESIUM SULFATE IN WATER 40 MG/ML
2000 INJECTION, SOLUTION INTRAVENOUS PRN
Status: DISCONTINUED | OUTPATIENT
Start: 2023-10-06 | End: 2023-10-12 | Stop reason: HOSPADM

## 2023-10-06 RX ORDER — POTASSIUM CHLORIDE 20 MEQ/1
40 TABLET, EXTENDED RELEASE ORAL PRN
Status: DISCONTINUED | OUTPATIENT
Start: 2023-10-06 | End: 2023-10-12 | Stop reason: HOSPADM

## 2023-10-06 RX ORDER — POTASSIUM CHLORIDE 7.45 MG/ML
10 INJECTION INTRAVENOUS PRN
Status: DISCONTINUED | OUTPATIENT
Start: 2023-10-06 | End: 2023-10-06 | Stop reason: SDUPTHER

## 2023-10-06 RX ORDER — CALCIUM POLYCARBOPHIL 625 MG 625 MG/1
625 TABLET ORAL DAILY
Status: DISCONTINUED | OUTPATIENT
Start: 2023-10-07 | End: 2023-10-12 | Stop reason: HOSPADM

## 2023-10-06 RX ORDER — TRAZODONE HYDROCHLORIDE 50 MG/1
50 TABLET ORAL NIGHTLY
Status: DISCONTINUED | OUTPATIENT
Start: 2023-10-07 | End: 2023-10-12 | Stop reason: HOSPADM

## 2023-10-06 RX ORDER — DOCUSATE SODIUM 100 MG/1
100 CAPSULE, LIQUID FILLED ORAL 2 TIMES DAILY
Status: DISCONTINUED | OUTPATIENT
Start: 2023-10-06 | End: 2023-10-12 | Stop reason: HOSPADM

## 2023-10-06 RX ORDER — POTASSIUM CHLORIDE 20 MEQ/1
40 TABLET, EXTENDED RELEASE ORAL PRN
Status: DISCONTINUED | OUTPATIENT
Start: 2023-10-06 | End: 2023-10-06 | Stop reason: SDUPTHER

## 2023-10-06 RX ORDER — LOPERAMIDE HYDROCHLORIDE 2 MG/1
2 CAPSULE ORAL EVERY 4 HOURS PRN
Status: DISCONTINUED | OUTPATIENT
Start: 2023-10-06 | End: 2023-10-12 | Stop reason: HOSPADM

## 2023-10-06 RX ORDER — SODIUM CHLORIDE 9 MG/ML
INJECTION, SOLUTION INTRAVENOUS PRN
Status: DISCONTINUED | OUTPATIENT
Start: 2023-10-06 | End: 2023-10-12 | Stop reason: HOSPADM

## 2023-10-06 RX ORDER — ONDANSETRON 4 MG/1
4 TABLET, ORALLY DISINTEGRATING ORAL EVERY 8 HOURS PRN
Status: DISCONTINUED | OUTPATIENT
Start: 2023-10-06 | End: 2023-10-12 | Stop reason: HOSPADM

## 2023-10-06 RX ORDER — ACETAMINOPHEN 650 MG/1
650 SUPPOSITORY RECTAL EVERY 6 HOURS PRN
Status: DISCONTINUED | OUTPATIENT
Start: 2023-10-06 | End: 2023-10-12 | Stop reason: HOSPADM

## 2023-10-06 RX ORDER — MAGNESIUM SULFATE IN WATER 40 MG/ML
2000 INJECTION, SOLUTION INTRAVENOUS PRN
Status: DISCONTINUED | OUTPATIENT
Start: 2023-10-06 | End: 2023-10-06 | Stop reason: SDUPTHER

## 2023-10-06 RX ORDER — POTASSIUM CHLORIDE 7.45 MG/ML
10 INJECTION INTRAVENOUS PRN
Status: DISCONTINUED | OUTPATIENT
Start: 2023-10-06 | End: 2023-10-12 | Stop reason: HOSPADM

## 2023-10-06 RX ORDER — SODIUM CHLORIDE 0.9 % (FLUSH) 0.9 %
10 SYRINGE (ML) INJECTION EVERY 12 HOURS SCHEDULED
Status: DISCONTINUED | OUTPATIENT
Start: 2023-10-06 | End: 2023-10-12 | Stop reason: HOSPADM

## 2023-10-06 RX ORDER — ONDANSETRON 2 MG/ML
4 INJECTION INTRAMUSCULAR; INTRAVENOUS EVERY 6 HOURS PRN
Status: DISCONTINUED | OUTPATIENT
Start: 2023-10-06 | End: 2023-10-12 | Stop reason: HOSPADM

## 2023-10-06 RX ORDER — PSYLLIUM HUSK 0.4 G
1 CAPSULE ORAL DAILY
Status: DISCONTINUED | OUTPATIENT
Start: 2023-10-06 | End: 2023-10-06 | Stop reason: RX

## 2023-10-06 RX ORDER — LISINOPRIL 20 MG/1
20 TABLET ORAL DAILY
Status: DISCONTINUED | OUTPATIENT
Start: 2023-10-07 | End: 2023-10-12 | Stop reason: HOSPADM

## 2023-10-06 RX ORDER — LANOLIN ALCOHOL/MO/W.PET/CERES
3 CREAM (GRAM) TOPICAL NIGHTLY PRN
Status: DISCONTINUED | OUTPATIENT
Start: 2023-10-06 | End: 2023-10-12 | Stop reason: HOSPADM

## 2023-10-06 RX ORDER — ENOXAPARIN SODIUM 100 MG/ML
40 INJECTION SUBCUTANEOUS EVERY 24 HOURS
Status: DISCONTINUED | OUTPATIENT
Start: 2023-10-06 | End: 2023-10-06

## 2023-10-06 RX ORDER — SODIUM CHLORIDE, SODIUM LACTATE, POTASSIUM CHLORIDE, CALCIUM CHLORIDE 600; 310; 30; 20 MG/100ML; MG/100ML; MG/100ML; MG/100ML
INJECTION, SOLUTION INTRAVENOUS CONTINUOUS
Status: ACTIVE | OUTPATIENT
Start: 2023-10-06 | End: 2023-10-07

## 2023-10-06 RX ORDER — POLYETHYLENE GLYCOL 3350 17 G/17G
17 POWDER, FOR SOLUTION ORAL DAILY PRN
Status: DISCONTINUED | OUTPATIENT
Start: 2023-10-06 | End: 2023-10-12 | Stop reason: HOSPADM

## 2023-10-06 RX ORDER — LANOLIN ALCOHOL/MO/W.PET/CERES
1000 CREAM (GRAM) TOPICAL DAILY
Status: DISCONTINUED | OUTPATIENT
Start: 2023-10-07 | End: 2023-10-12 | Stop reason: HOSPADM

## 2023-10-06 RX ORDER — ATORVASTATIN CALCIUM 20 MG/1
20 TABLET, FILM COATED ORAL EVERY 24 HOURS
Status: DISCONTINUED | OUTPATIENT
Start: 2023-10-06 | End: 2023-10-12 | Stop reason: HOSPADM

## 2023-10-06 RX ORDER — SODIUM CHLORIDE 0.9 % (FLUSH) 0.9 %
10 SYRINGE (ML) INJECTION PRN
Status: DISCONTINUED | OUTPATIENT
Start: 2023-10-06 | End: 2023-10-12 | Stop reason: HOSPADM

## 2023-10-06 RX ADMIN — Medication 1500 MG: at 21:48

## 2023-10-06 RX ADMIN — CARBIDOPA AND LEVODOPA 2 TABLET: 25; 100 TABLET ORAL at 21:51

## 2023-10-06 RX ADMIN — DONEPEZIL HYDROCHLORIDE 10 MG: 10 TABLET, FILM COATED ORAL at 21:51

## 2023-10-06 RX ADMIN — MOMETASONE FUROATE AND FORMOTEROL FUMARATE DIHYDRATE 2 PUFF: 200; 5 AEROSOL RESPIRATORY (INHALATION) at 22:33

## 2023-10-06 RX ADMIN — CEFTRIAXONE SODIUM 1000 MG: 1 INJECTION, POWDER, FOR SOLUTION INTRAMUSCULAR; INTRAVENOUS at 14:34

## 2023-10-06 RX ADMIN — SODIUM CHLORIDE, POTASSIUM CHLORIDE, SODIUM LACTATE AND CALCIUM CHLORIDE 500 ML: 600; 310; 30; 20 INJECTION, SOLUTION INTRAVENOUS at 23:56

## 2023-10-06 RX ADMIN — METOPROLOL TARTRATE 12.5 MG: 25 TABLET, FILM COATED ORAL at 21:51

## 2023-10-06 RX ADMIN — ATORVASTATIN CALCIUM 20 MG: 20 TABLET, FILM COATED ORAL at 21:51

## 2023-10-06 RX ADMIN — SODIUM CHLORIDE, PRESERVATIVE FREE 10 ML: 5 INJECTION INTRAVENOUS at 21:48

## 2023-10-06 RX ADMIN — SODIUM CHLORIDE, POTASSIUM CHLORIDE, SODIUM LACTATE AND CALCIUM CHLORIDE: 600; 310; 30; 20 INJECTION, SOLUTION INTRAVENOUS at 21:47

## 2023-10-06 RX ADMIN — Medication 3 MG: at 21:54

## 2023-10-06 ASSESSMENT — PAIN SCALES - GENERAL
PAINLEVEL_OUTOF10: 5
PAINLEVEL_OUTOF10: 0
PAINLEVEL_OUTOF10: 5

## 2023-10-06 ASSESSMENT — PAIN DESCRIPTION - LOCATION
LOCATION: HAND
LOCATION: HAND

## 2023-10-06 ASSESSMENT — ENCOUNTER SYMPTOMS
ABDOMINAL DISTENTION: 0
VOMITING: 0
SHORTNESS OF BREATH: 0
NAUSEA: 0
CHEST TIGHTNESS: 0
COLOR CHANGE: 0

## 2023-10-06 ASSESSMENT — PAIN - FUNCTIONAL ASSESSMENT
PAIN_FUNCTIONAL_ASSESSMENT: 0-10
PAIN_FUNCTIONAL_ASSESSMENT: ACTIVITIES ARE NOT PREVENTED

## 2023-10-06 ASSESSMENT — PAIN DESCRIPTION - PAIN TYPE
TYPE: ACUTE PAIN
TYPE: ACUTE PAIN

## 2023-10-06 ASSESSMENT — PAIN DESCRIPTION - FREQUENCY: FREQUENCY: CONTINUOUS

## 2023-10-06 ASSESSMENT — PAIN DESCRIPTION - ORIENTATION
ORIENTATION: LEFT
ORIENTATION: LEFT

## 2023-10-06 ASSESSMENT — PAIN DESCRIPTION - DESCRIPTORS: DESCRIPTORS: SHOOTING

## 2023-10-06 ASSESSMENT — PAIN DESCRIPTION - ONSET: ONSET: ON-GOING

## 2023-10-06 NOTE — ED NOTES
In for hourly rounding. Pt resting on cot in position of comfort. Pt remains A&Ox4, resps easy and unlabored. IV shows no s/s of infection or infiltration. Pt pain remains unchanged at this time. Pt and daughter at bedside insistent on BP cuff being placed on pt leg d/t multiple sores on upper arms and pt \"not being able to handle it. \" BP cuff placed on left lower leg per request. Pt placed on bedpan and urine specimen collected and sent to lab. Monitor remains in place. Updated pt on POC. Will monitor.      Lenetta Bamberger, RN  10/06/23 2832

## 2023-10-06 NOTE — ED NOTES
Pt transported to [facility] on cart in stable condition. Floor contacted before transport and spoke with [facility].      Jemma Conte  10/06/23 6326

## 2023-10-06 NOTE — H&P
TECHNIQUE: 4 views of the left wrist COMPARISON: None FINDINGS: There is no fracture or dislocation. Joint space narrowing, sclerosis and osteophyte formation are present at the first carpal-metacarpal joint. Bones are osteopenic. No osseous erosions are identified. No fracture or dislocation. Final report electronically signed by Dr. Azam Huggins on 10/6/2023 2:31 PM    XR HAND LEFT (MIN 3 VIEWS)    Result Date: 10/6/2023  PROCEDURE: XR HAND LEFT (MIN 3 VIEWS) CLINICAL INFORMATION: Left wrist infection TECHNIQUE: 3 views of the left hand COMPARISON: Left hand 1/2/2020 FINDINGS: There is no fracture or dislocation. Joint space narrowing and sclerosis are present at the first carpal-metacarpal joint. Bones are osteopenic. No osseous erosions are identified. No fracture or dislocation. Final report electronically signed by Dr. Azam Huggins on 10/6/2023 2:31 PM    XR CHEST (2 VW)    Result Date: 10/6/2023  PROCEDURE: XR CHEST (2 VW) CLINICAL INFORMATION: hypoxia COMPARISON: 7/18/2022 TECHNIQUE: AP upright and lateral views of the chest were obtained. 1. Mild cardiomegaly. Moderate-sized hiatus hernia. Mild degenerative changes both shoulders. 2. No acute findings. No infiltrates or effusions are seen. . **This report has been created using voice recognition software. It may contain minor errors which are inherent in voice recognition technology. ** Final report electronically signed by Dr. Miguel Morton on 10/6/2023 2:14 PM        EKG:  NSR, with RBBB    Electronically signed by Lisa Steen PA-C on 10/6/2023 at 4:24 PM

## 2023-10-06 NOTE — ED PROVIDER NOTES
[facility] Emergency [address]       Chief Complaint   Patient presents with    Cellulitis     L hand- red, swollen, warm to touch       Nurses Notes reviewed and I agree except as noted in the HPI. HISTORY OF PRESENT ILLNESS   Matilda Krishnamurthy is a 80 y.o. female who presents to the ED for evaluation of cellulitis. Patient scented to the ER from her assisted living the [facility]. Patient noted to have red wrist with significant tenderness with any palpation. Patient has a history of dementia, nursing home note patient's dementia seems to be worse than normal.  Patient has had frequent falls, has multiple abrasions to skin. Patient denies fevers or chills, she denies any medical problems, she has a past medical history of diabetes, COPD, DVT, ulcerative colitis. She denies any headache, neck pain, dizziness. She notes increased fatigue. HPI was provided by the patient. PAST MEDICAL HISTORY     Past Medical History:   Diagnosis Date    Abnormal nuclear stress test 06/01/2018    Cataract     Chronic deep vein thrombosis (DVT) of femoral vein of left lower extremity (HCC) 3/9/2021    Colon polyps     COPD (chronic obstructive pulmonary disease) (HCC)     Diverticulosis large intestine w/o perforation or abscess w/bleeding     DVT, recurrent, lower extremity, acute, left (HCC)     Fibroid, uterine     Hearing loss     Hx of blood clots     Hyperlipidemia     Nicotine dependence     Nocturnal hypoxemia     Postmenopausal     Sleep apnea     Ulcerative colitis ([address])     with rectal bleeding        SURGICALHISTORY      has a past surgical history that includes Foot surgery; Breast biopsy (1988); Colonoscopy (2006); Colonoscopy (2010); Colonoscopy (2010); Colonoscopy (03/03/2014); Breast biopsy (Right, 03/2011); Foot surgery (Bilateral, 2011); hip surgery (06/13/2020); and osteotomy (Left, 11/2/2021).     CURRENT MEDICATIONS       Current Discharge Medication List        CONTINUE

## 2023-10-06 NOTE — ED NOTES
Pt presents to ED via [facility] for c/o cellulitis of the L hand. Pt presents to ED with L hand red, swollen and hot to touch. Pt has noah ring on L middle finger. Pt unsure how long hand has been red and hurting. EMS reports pt is Gloria Bustamante a hard time staying awake\" during transport. EMS reports BGL 200s. Upon initial assessment, pt is A&Ox4, resps easy and unlabored. Pt denies chest pain, shortness of breath, abdominal pain, n/v/d, cough and fever. Pt reports 5/10 pain in L hand. Pt will jump and pull away when hand is touched. IV established with blood drawn and sent to lab. VS as noted. Awaiting provider assessment and orders. Will monitor.      Rebeca Phillips RN  10/06/23 7006

## 2023-10-07 LAB
ALBUMIN SERPL BCG-MCNC: 2.6 G/DL (ref 3.5–5.1)
ALP SERPL-CCNC: 57 U/L (ref 38–126)
ALT SERPL W/O P-5'-P-CCNC: < 5 U/L (ref 11–66)
ANION GAP SERPL CALC-SCNC: 9 MEQ/L (ref 8–16)
AST SERPL-CCNC: 22 U/L (ref 5–40)
BASOPHILS ABSOLUTE: 0.1 THOU/MM3 (ref 0–0.1)
BASOPHILS NFR BLD AUTO: 0.5 %
BILIRUB SERPL-MCNC: 0.8 MG/DL (ref 0.3–1.2)
BUN SERPL-MCNC: 14 MG/DL (ref 7–22)
CALCIUM SERPL-MCNC: 8.6 MG/DL (ref 8.5–10.5)
CHLORIDE SERPL-SCNC: 101 MEQ/L (ref 98–111)
CO2 SERPL-SCNC: 24 MEQ/L (ref 23–33)
CREAT SERPL-MCNC: 0.6 MG/DL (ref 0.4–1.2)
DEPRECATED RDW RBC AUTO: 60.5 FL (ref 35–45)
EKG ATRIAL RATE: 111 BPM
EKG ATRIAL RATE: 81 BPM
EKG ATRIAL RATE: 93 BPM
EKG P AXIS: 54 DEGREES
EKG P AXIS: 65 DEGREES
EKG P AXIS: 75 DEGREES
EKG P-R INTERVAL: 172 MS
EKG P-R INTERVAL: 182 MS
EKG P-R INTERVAL: 184 MS
EKG Q-T INTERVAL: 348 MS
EKG Q-T INTERVAL: 388 MS
EKG Q-T INTERVAL: 394 MS
EKG QRS DURATION: 108 MS
EKG QRS DURATION: 108 MS
EKG QRS DURATION: 116 MS
EKG QTC CALCULATION (BAZETT): 457 MS
EKG QTC CALCULATION (BAZETT): 473 MS
EKG QTC CALCULATION (BAZETT): 482 MS
EKG R AXIS: -58 DEGREES
EKG R AXIS: -65 DEGREES
EKG R AXIS: -70 DEGREES
EKG T AXIS: 33 DEGREES
EKG T AXIS: 39 DEGREES
EKG T AXIS: 64 DEGREES
EKG VENTRICULAR RATE: 111 BPM
EKG VENTRICULAR RATE: 81 BPM
EKG VENTRICULAR RATE: 93 BPM
EOSINOPHIL NFR BLD AUTO: 0.2 %
EOSINOPHILS ABSOLUTE: 0 THOU/MM3 (ref 0–0.4)
ERYTHROCYTE [DISTWIDTH] IN BLOOD BY AUTOMATED COUNT: 15.2 % (ref 11.5–14.5)
GFR SERPL CREATININE-BSD FRML MDRD: > 60 ML/MIN/1.73M2
GLUCOSE SERPL-MCNC: 190 MG/DL (ref 70–108)
HCT VFR BLD AUTO: 37.9 % (ref 37–47)
HGB BLD-MCNC: 10.7 GM/DL (ref 12–16)
HYPOCHROMIA BLD QL SMEAR: PRESENT
IMM GRANULOCYTES # BLD AUTO: 0.09 THOU/MM3 (ref 0–0.07)
IMM GRANULOCYTES NFR BLD AUTO: 0.5 %
LYMPHOCYTES ABSOLUTE: 1.8 THOU/MM3 (ref 1–4.8)
LYMPHOCYTES NFR BLD AUTO: 10.7 %
MCH RBC QN AUTO: 30.7 PG (ref 26–33)
MCHC RBC AUTO-ENTMCNC: 28.2 GM/DL (ref 32.2–35.5)
MCV RBC AUTO: 108.6 FL (ref 81–99)
MONOCYTES ABSOLUTE: 1.3 THOU/MM3 (ref 0.4–1.3)
MONOCYTES NFR BLD AUTO: 7.8 %
MRSA DNA SPEC QL NAA+PROBE: POSITIVE
NEUTROPHILS NFR BLD AUTO: 80.3 %
NRBC BLD AUTO-RTO: 0 /100 WBC
PLATELET # BLD AUTO: 257 THOU/MM3 (ref 130–400)
PMV BLD AUTO: 9.8 FL (ref 9.4–12.4)
POTASSIUM SERPL-SCNC: 4.2 MEQ/L (ref 3.5–5.2)
PROT SERPL-MCNC: 5.1 G/DL (ref 6.1–8)
RBC # BLD AUTO: 3.49 MILL/MM3 (ref 4.2–5.4)
SCAN OF BLOOD SMEAR: NORMAL
SEGMENTED NEUTROPHILS ABSOLUTE COUNT: 13.8 THOU/MM3 (ref 1.8–7.7)
SODIUM SERPL-SCNC: 134 MEQ/L (ref 135–145)
WBC # BLD AUTO: 17.2 THOU/MM3 (ref 4.8–10.8)

## 2023-10-07 PROCEDURE — 93005 ELECTROCARDIOGRAM TRACING: CPT

## 2023-10-07 PROCEDURE — 85025 COMPLETE CBC W/AUTO DIFF WBC: CPT

## 2023-10-07 PROCEDURE — 6360000002 HC RX W HCPCS

## 2023-10-07 PROCEDURE — 99232 SBSQ HOSP IP/OBS MODERATE 35: CPT | Performed by: PHYSICIAN ASSISTANT

## 2023-10-07 PROCEDURE — 6370000000 HC RX 637 (ALT 250 FOR IP)

## 2023-10-07 PROCEDURE — 6370000000 HC RX 637 (ALT 250 FOR IP): Performed by: PHYSICIAN ASSISTANT

## 2023-10-07 PROCEDURE — 93010 ELECTROCARDIOGRAM REPORT: CPT | Performed by: INTERNAL MEDICINE

## 2023-10-07 PROCEDURE — 2580000003 HC RX 258

## 2023-10-07 PROCEDURE — 94640 AIRWAY INHALATION TREATMENT: CPT

## 2023-10-07 PROCEDURE — 36415 COLL VENOUS BLD VENIPUNCTURE: CPT

## 2023-10-07 PROCEDURE — 1200000000 HC SEMI PRIVATE

## 2023-10-07 PROCEDURE — 80053 COMPREHEN METABOLIC PANEL: CPT

## 2023-10-07 RX ORDER — PREDNISONE 10 MG/1
10 TABLET ORAL DAILY
Status: DISCONTINUED | OUTPATIENT
Start: 2023-10-07 | End: 2023-10-12 | Stop reason: HOSPADM

## 2023-10-07 RX ADMIN — Medication 2000 MG: at 14:11

## 2023-10-07 RX ADMIN — ACETAMINOPHEN 650 MG: 325 TABLET ORAL at 15:39

## 2023-10-07 RX ADMIN — Medication 1000 MCG: at 09:21

## 2023-10-07 RX ADMIN — CARBIDOPA AND LEVODOPA 2 TABLET: 25; 100 TABLET ORAL at 20:37

## 2023-10-07 RX ADMIN — PREDNISONE 10 MG: 10 TABLET ORAL at 16:42

## 2023-10-07 RX ADMIN — CALCIUM POLYCARBOPHIL 625 MG: 625 TABLET, FILM COATED ORAL at 09:21

## 2023-10-07 RX ADMIN — ATORVASTATIN CALCIUM 20 MG: 20 TABLET, FILM COATED ORAL at 20:38

## 2023-10-07 RX ADMIN — DONEPEZIL HYDROCHLORIDE 10 MG: 10 TABLET, FILM COATED ORAL at 20:38

## 2023-10-07 RX ADMIN — Medication 3 MG: at 20:37

## 2023-10-07 RX ADMIN — CARBIDOPA AND LEVODOPA 2 TABLET: 25; 100 TABLET ORAL at 14:09

## 2023-10-07 RX ADMIN — METOPROLOL TARTRATE 12.5 MG: 25 TABLET, FILM COATED ORAL at 09:23

## 2023-10-07 RX ADMIN — APIXABAN 5 MG: 5 TABLET, FILM COATED ORAL at 20:37

## 2023-10-07 RX ADMIN — VANCOMYCIN HYDROCHLORIDE 750 MG: 5 INJECTION, POWDER, LYOPHILIZED, FOR SOLUTION INTRAVENOUS at 20:37

## 2023-10-07 RX ADMIN — TIOTROPIUM BROMIDE INHALATION SPRAY 2 PUFF: 3.12 SPRAY, METERED RESPIRATORY (INHALATION) at 10:19

## 2023-10-07 RX ADMIN — ACETAMINOPHEN 650 MG: 325 TABLET ORAL at 07:20

## 2023-10-07 RX ADMIN — Medication 2000 MG: at 23:26

## 2023-10-07 RX ADMIN — VANCOMYCIN HYDROCHLORIDE 750 MG: 5 INJECTION, POWDER, LYOPHILIZED, FOR SOLUTION INTRAVENOUS at 09:27

## 2023-10-07 RX ADMIN — SODIUM CHLORIDE 1000 ML: 9 INJECTION, SOLUTION INTRAVENOUS at 20:35

## 2023-10-07 RX ADMIN — METOPROLOL TARTRATE 12.5 MG: 25 TABLET, FILM COATED ORAL at 20:37

## 2023-10-07 RX ADMIN — MOMETASONE FUROATE AND FORMOTEROL FUMARATE DIHYDRATE 2 PUFF: 200; 5 AEROSOL RESPIRATORY (INHALATION) at 17:26

## 2023-10-07 RX ADMIN — SERTRALINE 50 MG: 50 TABLET, FILM COATED ORAL at 16:42

## 2023-10-07 RX ADMIN — DOCUSATE SODIUM 100 MG: 100 CAPSULE, LIQUID FILLED ORAL at 20:37

## 2023-10-07 RX ADMIN — LISINOPRIL 20 MG: 20 TABLET ORAL at 09:21

## 2023-10-07 RX ADMIN — CARBIDOPA AND LEVODOPA 2 TABLET: 25; 100 TABLET ORAL at 09:21

## 2023-10-07 RX ADMIN — MOMETASONE FUROATE AND FORMOTEROL FUMARATE DIHYDRATE 2 PUFF: 200; 5 AEROSOL RESPIRATORY (INHALATION) at 10:19

## 2023-10-07 RX ADMIN — DOCUSATE SODIUM 100 MG: 100 CAPSULE, LIQUID FILLED ORAL at 09:23

## 2023-10-07 ASSESSMENT — PAIN DESCRIPTION - PAIN TYPE
TYPE: ACUTE PAIN
TYPE: ACUTE PAIN

## 2023-10-07 ASSESSMENT — PAIN SCALES - GENERAL
PAINLEVEL_OUTOF10: 8
PAINLEVEL_OUTOF10: 3
PAINLEVEL_OUTOF10: 5
PAINLEVEL_OUTOF10: 0

## 2023-10-07 ASSESSMENT — PAIN - FUNCTIONAL ASSESSMENT
PAIN_FUNCTIONAL_ASSESSMENT: ACTIVITIES ARE NOT PREVENTED
PAIN_FUNCTIONAL_ASSESSMENT: PREVENTS OR INTERFERES SOME ACTIVE ACTIVITIES AND ADLS

## 2023-10-07 ASSESSMENT — PAIN DESCRIPTION - LOCATION
LOCATION: HAND
LOCATION: HAND;ARM

## 2023-10-07 ASSESSMENT — PAIN DESCRIPTION - FREQUENCY: FREQUENCY: INTERMITTENT

## 2023-10-07 ASSESSMENT — PAIN DESCRIPTION - DESCRIPTORS
DESCRIPTORS: SHOOTING
DESCRIPTORS: SHARP

## 2023-10-07 ASSESSMENT — PAIN DESCRIPTION - ONSET: ONSET: ON-GOING

## 2023-10-07 ASSESSMENT — PAIN DESCRIPTION - ORIENTATION
ORIENTATION: LEFT
ORIENTATION: LEFT

## 2023-10-07 NOTE — FLOWSHEET NOTE
10/06/23 2312   Treatment Team Notification   Reason for Communication Review case  (need to know if pt needs to be moved to telemetry)   Name of Team Member Notified Omayra Kerr   Treatment Team Role Physician Assistant   Method of Communication Secure Message   Response No new orders   Notification Time [phone]     Needed clarification on whether pt required transfer to telemetry unit. Instructed to hold off and that pt may be ok on 6A since troponin only increased from 44 to 45.

## 2023-10-07 NOTE — CARE COORDINATION
10/07/23 1017   Service Assessment   Patient Orientation Person   Cognition Dementia / Early Alzheimer's   History Provided By Child/Family   Primary Caregiver Self   Accompanied By/Relationship Daughter (POA) [address] Family Members; Children;Friends/Neighbors; Other (Comment)  ([place]: [facility] [place])   [address] is: Named in [address]   PCP Verified by CM Yes   Last Visit to PCP Within last 3 months   Prior Functional Level Assistance with the following:;Cooking;Housework; Bathing;Dressing  (prn assist with bathing and dressing)   Current Functional Level Assistance with the following:;Cooking;Housework; Bathing;Dressing  (prn assist with bathing and dressing)   Can patient return to prior living arrangement Yes   Ability to make needs known: Fair   Family able to assist with home care needs: Yes   Would you like for me to discuss the discharge plan with any other family members/significant others, and if so, who? Yes  (dtr at bedside)   Lien Oglesby; Other (Comment)  ([facility])   [facility] Assisted Living   CM/SW Referral Other (see comment)  (from [place])   Social/Functional History   Lives With Alone   Type of Home Assisted living   Active  No   Patient's  Info Family or [place] facility   Discharge Planning   Type of Residence Assisted living   Living Arrangements Alone   Current Services Prior To Admission Durable Medical Equipment   Current DME Prior to [address] Needed N/A   DME Ordered? No   Potential Assistance Purchasing Medications No   Type of Home Care Services Aide Services;Nursing Services   Patient expects to be discharged to: Assisted living   Follow Up Appointment: Best Day/Time  Monday AM   History of falls? 1   Services At/After Discharge   Confirm Follow Up Transport Family     Patient Goals/Plan/Treatment Preferences: Met with Carmen's daughter/DANTE Lucero at bedside.  Verified

## 2023-10-08 LAB
ALBUMIN SERPL BCG-MCNC: 2.4 G/DL (ref 3.5–5.1)
ALP SERPL-CCNC: 57 U/L (ref 38–126)
ALT SERPL W/O P-5'-P-CCNC: < 5 U/L (ref 11–66)
ANION GAP SERPL CALC-SCNC: 10 MEQ/L (ref 8–16)
AST SERPL-CCNC: 14 U/L (ref 5–40)
BACTERIA UR CULT: ABNORMAL
BACTERIA UR CULT: ABNORMAL
BASOPHILS ABSOLUTE: 0 THOU/MM3 (ref 0–0.1)
BASOPHILS NFR BLD AUTO: 0.2 %
BILIRUB SERPL-MCNC: 0.3 MG/DL (ref 0.3–1.2)
BUN SERPL-MCNC: 11 MG/DL (ref 7–22)
CALCIUM SERPL-MCNC: 8.3 MG/DL (ref 8.5–10.5)
CHLORIDE SERPL-SCNC: 105 MEQ/L (ref 98–111)
CO2 SERPL-SCNC: 24 MEQ/L (ref 23–33)
CREAT SERPL-MCNC: 0.7 MG/DL (ref 0.4–1.2)
DEPRECATED RDW RBC AUTO: 54 FL (ref 35–45)
EOSINOPHIL NFR BLD AUTO: 0.2 %
EOSINOPHILS ABSOLUTE: 0 THOU/MM3 (ref 0–0.4)
ERYTHROCYTE [DISTWIDTH] IN BLOOD BY AUTOMATED COUNT: 15.1 % (ref 11.5–14.5)
GFR SERPL CREATININE-BSD FRML MDRD: > 60 ML/MIN/1.73M2
GLUCOSE SERPL-MCNC: 135 MG/DL (ref 70–108)
HCT VFR BLD AUTO: 33 % (ref 37–47)
HGB BLD-MCNC: 10.3 GM/DL (ref 12–16)
IMM GRANULOCYTES # BLD AUTO: 0.11 THOU/MM3 (ref 0–0.07)
IMM GRANULOCYTES NFR BLD AUTO: 0.7 %
LYMPHOCYTES ABSOLUTE: 1.2 THOU/MM3 (ref 1–4.8)
LYMPHOCYTES NFR BLD AUTO: 7.8 %
MCH RBC QN AUTO: 30.6 PG (ref 26–33)
MCHC RBC AUTO-ENTMCNC: 31.2 GM/DL (ref 32.2–35.5)
MCV RBC AUTO: 97.9 FL (ref 81–99)
MONOCYTES ABSOLUTE: 1.1 THOU/MM3 (ref 0.4–1.3)
MONOCYTES NFR BLD AUTO: 7.3 %
NEUTROPHILS NFR BLD AUTO: 83.8 %
NRBC BLD AUTO-RTO: 0 /100 WBC
ORGANISM: ABNORMAL
PLATELET # BLD AUTO: 270 THOU/MM3 (ref 130–400)
PMV BLD AUTO: 10.2 FL (ref 9.4–12.4)
POTASSIUM SERPL-SCNC: 3.9 MEQ/L (ref 3.5–5.2)
PROT SERPL-MCNC: 4.9 G/DL (ref 6.1–8)
RBC # BLD AUTO: 3.37 MILL/MM3 (ref 4.2–5.4)
SEGMENTED NEUTROPHILS ABSOLUTE COUNT: 12.7 THOU/MM3 (ref 1.8–7.7)
SODIUM SERPL-SCNC: 139 MEQ/L (ref 135–145)
VANCOMYCIN SERPL-MCNC: 13.3 UG/ML (ref 0.1–39.9)
WBC # BLD AUTO: 15.2 THOU/MM3 (ref 4.8–10.8)

## 2023-10-08 PROCEDURE — 85025 COMPLETE CBC W/AUTO DIFF WBC: CPT

## 2023-10-08 PROCEDURE — 6370000000 HC RX 637 (ALT 250 FOR IP): Performed by: PHYSICIAN ASSISTANT

## 2023-10-08 PROCEDURE — 80053 COMPREHEN METABOLIC PANEL: CPT

## 2023-10-08 PROCEDURE — 99232 SBSQ HOSP IP/OBS MODERATE 35: CPT | Performed by: PHYSICIAN ASSISTANT

## 2023-10-08 PROCEDURE — 94640 AIRWAY INHALATION TREATMENT: CPT

## 2023-10-08 PROCEDURE — 6360000002 HC RX W HCPCS

## 2023-10-08 PROCEDURE — 6370000000 HC RX 637 (ALT 250 FOR IP)

## 2023-10-08 PROCEDURE — 36415 COLL VENOUS BLD VENIPUNCTURE: CPT

## 2023-10-08 PROCEDURE — 2580000003 HC RX 258

## 2023-10-08 PROCEDURE — 1200000000 HC SEMI PRIVATE

## 2023-10-08 PROCEDURE — 80202 ASSAY OF VANCOMYCIN: CPT

## 2023-10-08 RX ADMIN — Medication 1000 MCG: at 09:49

## 2023-10-08 RX ADMIN — Medication 2000 MG: at 05:50

## 2023-10-08 RX ADMIN — APIXABAN 5 MG: 5 TABLET, FILM COATED ORAL at 09:49

## 2023-10-08 RX ADMIN — METOPROLOL TARTRATE 12.5 MG: 25 TABLET, FILM COATED ORAL at 21:13

## 2023-10-08 RX ADMIN — SERTRALINE 50 MG: 50 TABLET, FILM COATED ORAL at 09:57

## 2023-10-08 RX ADMIN — ATORVASTATIN CALCIUM 20 MG: 20 TABLET, FILM COATED ORAL at 21:12

## 2023-10-08 RX ADMIN — DONEPEZIL HYDROCHLORIDE 10 MG: 10 TABLET, FILM COATED ORAL at 21:13

## 2023-10-08 RX ADMIN — LISINOPRIL 20 MG: 20 TABLET ORAL at 09:50

## 2023-10-08 RX ADMIN — APIXABAN 5 MG: 5 TABLET, FILM COATED ORAL at 21:12

## 2023-10-08 RX ADMIN — CARBIDOPA AND LEVODOPA 2 TABLET: 25; 100 TABLET ORAL at 09:50

## 2023-10-08 RX ADMIN — VANCOMYCIN HYDROCHLORIDE 750 MG: 5 INJECTION, POWDER, LYOPHILIZED, FOR SOLUTION INTRAVENOUS at 21:15

## 2023-10-08 RX ADMIN — METOPROLOL TARTRATE 12.5 MG: 25 TABLET, FILM COATED ORAL at 09:49

## 2023-10-08 RX ADMIN — VANCOMYCIN HYDROCHLORIDE 750 MG: 5 INJECTION, POWDER, LYOPHILIZED, FOR SOLUTION INTRAVENOUS at 09:13

## 2023-10-08 RX ADMIN — TRAZODONE HYDROCHLORIDE 50 MG: 50 TABLET ORAL at 21:17

## 2023-10-08 RX ADMIN — MOMETASONE FUROATE AND FORMOTEROL FUMARATE DIHYDRATE 2 PUFF: 200; 5 AEROSOL RESPIRATORY (INHALATION) at 18:24

## 2023-10-08 RX ADMIN — Medication 2000 MG: at 22:46

## 2023-10-08 RX ADMIN — CALCIUM POLYCARBOPHIL 625 MG: 625 TABLET, FILM COATED ORAL at 09:50

## 2023-10-08 RX ADMIN — PREDNISONE 10 MG: 10 TABLET ORAL at 09:50

## 2023-10-08 RX ADMIN — CARBIDOPA AND LEVODOPA 2 TABLET: 25; 100 TABLET ORAL at 21:12

## 2023-10-08 RX ADMIN — Medication 3 MG: at 21:13

## 2023-10-08 RX ADMIN — Medication 2000 MG: at 14:35

## 2023-10-08 RX ADMIN — CARBIDOPA AND LEVODOPA 2 TABLET: 25; 100 TABLET ORAL at 14:36

## 2023-10-08 RX ADMIN — MOMETASONE FUROATE AND FORMOTEROL FUMARATE DIHYDRATE 2 PUFF: 200; 5 AEROSOL RESPIRATORY (INHALATION) at 09:15

## 2023-10-08 RX ADMIN — TIOTROPIUM BROMIDE INHALATION SPRAY 2 PUFF: 3.12 SPRAY, METERED RESPIRATORY (INHALATION) at 09:13

## 2023-10-08 ASSESSMENT — PAIN DESCRIPTION - ORIENTATION: ORIENTATION: LEFT

## 2023-10-08 ASSESSMENT — PAIN DESCRIPTION - DESCRIPTORS: DESCRIPTORS: SHARP

## 2023-10-08 ASSESSMENT — PAIN - FUNCTIONAL ASSESSMENT: PAIN_FUNCTIONAL_ASSESSMENT: ACTIVITIES ARE NOT PREVENTED

## 2023-10-08 ASSESSMENT — PAIN SCALES - GENERAL: PAINLEVEL_OUTOF10: 7

## 2023-10-08 ASSESSMENT — PAIN DESCRIPTION - LOCATION: LOCATION: ARM;HAND

## 2023-10-08 ASSESSMENT — PAIN DESCRIPTION - PAIN TYPE: TYPE: ACUTE PAIN

## 2023-10-09 LAB
ALBUMIN SERPL BCG-MCNC: 2.3 G/DL (ref 3.5–5.1)
ALP SERPL-CCNC: 54 U/L (ref 38–126)
ALT SERPL W/O P-5'-P-CCNC: < 5 U/L (ref 11–66)
ANION GAP SERPL CALC-SCNC: 10 MEQ/L (ref 8–16)
AST SERPL-CCNC: 14 U/L (ref 5–40)
BASOPHILS ABSOLUTE: 0 THOU/MM3 (ref 0–0.1)
BASOPHILS NFR BLD AUTO: 0.3 %
BILIRUB SERPL-MCNC: 0.4 MG/DL (ref 0.3–1.2)
BUN SERPL-MCNC: 9 MG/DL (ref 7–22)
CALCIUM SERPL-MCNC: 8.5 MG/DL (ref 8.5–10.5)
CHLORIDE SERPL-SCNC: 105 MEQ/L (ref 98–111)
CO2 SERPL-SCNC: 24 MEQ/L (ref 23–33)
CREAT SERPL-MCNC: 0.7 MG/DL (ref 0.4–1.2)
DEPRECATED RDW RBC AUTO: 54.7 FL (ref 35–45)
EOSINOPHIL NFR BLD AUTO: 0.8 %
EOSINOPHILS ABSOLUTE: 0.1 THOU/MM3 (ref 0–0.4)
ERYTHROCYTE [DISTWIDTH] IN BLOOD BY AUTOMATED COUNT: 15.1 % (ref 11.5–14.5)
GFR SERPL CREATININE-BSD FRML MDRD: > 60 ML/MIN/1.73M2
GLUCOSE SERPL-MCNC: 120 MG/DL (ref 70–108)
HCT VFR BLD AUTO: 33 % (ref 37–47)
HGB BLD-MCNC: 10.1 GM/DL (ref 12–16)
IMM GRANULOCYTES # BLD AUTO: 0.08 THOU/MM3 (ref 0–0.07)
IMM GRANULOCYTES NFR BLD AUTO: 0.7 %
LYMPHOCYTES ABSOLUTE: 1.1 THOU/MM3 (ref 1–4.8)
LYMPHOCYTES NFR BLD AUTO: 9.8 %
MCH RBC QN AUTO: 30.4 PG (ref 26–33)
MCHC RBC AUTO-ENTMCNC: 30.6 GM/DL (ref 32.2–35.5)
MCV RBC AUTO: 99.4 FL (ref 81–99)
MONOCYTES ABSOLUTE: 1 THOU/MM3 (ref 0.4–1.3)
MONOCYTES NFR BLD AUTO: 8.8 %
NEUTROPHILS NFR BLD AUTO: 79.6 %
NRBC BLD AUTO-RTO: 0 /100 WBC
PLATELET # BLD AUTO: 289 THOU/MM3 (ref 130–400)
PMV BLD AUTO: 10.3 FL (ref 9.4–12.4)
POTASSIUM SERPL-SCNC: 3.6 MEQ/L (ref 3.5–5.2)
PROT SERPL-MCNC: 4.9 G/DL (ref 6.1–8)
RBC # BLD AUTO: 3.32 MILL/MM3 (ref 4.2–5.4)
SEGMENTED NEUTROPHILS ABSOLUTE COUNT: 9.1 THOU/MM3 (ref 1.8–7.7)
SODIUM SERPL-SCNC: 139 MEQ/L (ref 135–145)
WBC # BLD AUTO: 11.4 THOU/MM3 (ref 4.8–10.8)

## 2023-10-09 PROCEDURE — 6370000000 HC RX 637 (ALT 250 FOR IP)

## 2023-10-09 PROCEDURE — 97530 THERAPEUTIC ACTIVITIES: CPT

## 2023-10-09 PROCEDURE — 6370000000 HC RX 637 (ALT 250 FOR IP): Performed by: PHYSICIAN ASSISTANT

## 2023-10-09 PROCEDURE — 94640 AIRWAY INHALATION TREATMENT: CPT

## 2023-10-09 PROCEDURE — 2580000003 HC RX 258

## 2023-10-09 PROCEDURE — 97162 PT EVAL MOD COMPLEX 30 MIN: CPT

## 2023-10-09 PROCEDURE — 99232 SBSQ HOSP IP/OBS MODERATE 35: CPT | Performed by: PHYSICIAN ASSISTANT

## 2023-10-09 PROCEDURE — 85025 COMPLETE CBC W/AUTO DIFF WBC: CPT

## 2023-10-09 PROCEDURE — 80053 COMPREHEN METABOLIC PANEL: CPT

## 2023-10-09 PROCEDURE — 36415 COLL VENOUS BLD VENIPUNCTURE: CPT

## 2023-10-09 PROCEDURE — 97166 OT EVAL MOD COMPLEX 45 MIN: CPT

## 2023-10-09 PROCEDURE — 6360000002 HC RX W HCPCS

## 2023-10-09 PROCEDURE — 97110 THERAPEUTIC EXERCISES: CPT

## 2023-10-09 PROCEDURE — 1200000000 HC SEMI PRIVATE

## 2023-10-09 RX ADMIN — Medication 3 MG: at 19:49

## 2023-10-09 RX ADMIN — METOPROLOL TARTRATE 12.5 MG: 25 TABLET, FILM COATED ORAL at 10:11

## 2023-10-09 RX ADMIN — APIXABAN 5 MG: 5 TABLET, FILM COATED ORAL at 19:49

## 2023-10-09 RX ADMIN — APIXABAN 5 MG: 5 TABLET, FILM COATED ORAL at 10:11

## 2023-10-09 RX ADMIN — Medication 1000 MCG: at 10:12

## 2023-10-09 RX ADMIN — ONDANSETRON 4 MG: 2 INJECTION INTRAMUSCULAR; INTRAVENOUS at 19:04

## 2023-10-09 RX ADMIN — LISINOPRIL 20 MG: 20 TABLET ORAL at 10:12

## 2023-10-09 RX ADMIN — CARBIDOPA AND LEVODOPA 2 TABLET: 25; 100 TABLET ORAL at 10:11

## 2023-10-09 RX ADMIN — SERTRALINE 50 MG: 50 TABLET, FILM COATED ORAL at 10:12

## 2023-10-09 RX ADMIN — Medication 2000 MG: at 21:48

## 2023-10-09 RX ADMIN — CALCIUM POLYCARBOPHIL 625 MG: 625 TABLET, FILM COATED ORAL at 10:19

## 2023-10-09 RX ADMIN — Medication 2000 MG: at 05:35

## 2023-10-09 RX ADMIN — Medication 2000 MG: at 14:03

## 2023-10-09 RX ADMIN — DONEPEZIL HYDROCHLORIDE 10 MG: 10 TABLET, FILM COATED ORAL at 19:49

## 2023-10-09 RX ADMIN — MOMETASONE FUROATE AND FORMOTEROL FUMARATE DIHYDRATE 2 PUFF: 200; 5 AEROSOL RESPIRATORY (INHALATION) at 16:37

## 2023-10-09 RX ADMIN — VANCOMYCIN HYDROCHLORIDE 750 MG: 5 INJECTION, POWDER, LYOPHILIZED, FOR SOLUTION INTRAVENOUS at 10:09

## 2023-10-09 RX ADMIN — TRAZODONE HYDROCHLORIDE 50 MG: 50 TABLET ORAL at 19:49

## 2023-10-09 RX ADMIN — METOPROLOL TARTRATE 12.5 MG: 25 TABLET, FILM COATED ORAL at 19:49

## 2023-10-09 RX ADMIN — DOCUSATE SODIUM 100 MG: 100 CAPSULE, LIQUID FILLED ORAL at 10:18

## 2023-10-09 RX ADMIN — CARBIDOPA AND LEVODOPA 2 TABLET: 25; 100 TABLET ORAL at 14:00

## 2023-10-09 RX ADMIN — DOCUSATE SODIUM 100 MG: 100 CAPSULE, LIQUID FILLED ORAL at 19:49

## 2023-10-09 RX ADMIN — VANCOMYCIN HYDROCHLORIDE 750 MG: 5 INJECTION, POWDER, LYOPHILIZED, FOR SOLUTION INTRAVENOUS at 19:55

## 2023-10-09 RX ADMIN — PREDNISONE 10 MG: 10 TABLET ORAL at 10:30

## 2023-10-09 RX ADMIN — ATORVASTATIN CALCIUM 20 MG: 20 TABLET, FILM COATED ORAL at 19:49

## 2023-10-09 RX ADMIN — CARBIDOPA AND LEVODOPA 2 TABLET: 25; 100 TABLET ORAL at 19:49

## 2023-10-09 NOTE — CARE COORDINATION
10/9/23, 1:37 PM EDT  Discharge Planning Evaluation  Social work consult received, patient from [facility] assisted [facility] . Patient/Family preference is to return to assisted living. Spoke with daughter    Would patient be willing to go to a skilled facility if needed: yes. Is there skilled care available at current facility: yes. Barriers to return to current living situation: can return to assisted living or transfer to skilled ecf depending on mobility  Spoke with admissions at the facility.   Patient bed hold: yes  Anticipated transport plan: undetermined  Patient's Healthcare Decision Maker: Named in [address] [place] [address]

## 2023-10-10 PROCEDURE — 6370000000 HC RX 637 (ALT 250 FOR IP): Performed by: PHYSICIAN ASSISTANT

## 2023-10-10 PROCEDURE — 6360000002 HC RX W HCPCS

## 2023-10-10 PROCEDURE — 1200000000 HC SEMI PRIVATE

## 2023-10-10 PROCEDURE — 99232 SBSQ HOSP IP/OBS MODERATE 35: CPT | Performed by: PHYSICIAN ASSISTANT

## 2023-10-10 PROCEDURE — 97530 THERAPEUTIC ACTIVITIES: CPT

## 2023-10-10 PROCEDURE — 97110 THERAPEUTIC EXERCISES: CPT

## 2023-10-10 PROCEDURE — 6370000000 HC RX 637 (ALT 250 FOR IP)

## 2023-10-10 PROCEDURE — 97535 SELF CARE MNGMENT TRAINING: CPT

## 2023-10-10 PROCEDURE — 94640 AIRWAY INHALATION TREATMENT: CPT

## 2023-10-10 PROCEDURE — 2580000003 HC RX 258

## 2023-10-10 RX ADMIN — DONEPEZIL HYDROCHLORIDE 10 MG: 10 TABLET, FILM COATED ORAL at 20:03

## 2023-10-10 RX ADMIN — Medication 2000 MG: at 05:44

## 2023-10-10 RX ADMIN — ATORVASTATIN CALCIUM 20 MG: 20 TABLET, FILM COATED ORAL at 20:03

## 2023-10-10 RX ADMIN — APIXABAN 5 MG: 5 TABLET, FILM COATED ORAL at 08:25

## 2023-10-10 RX ADMIN — VANCOMYCIN HYDROCHLORIDE 750 MG: 5 INJECTION, POWDER, LYOPHILIZED, FOR SOLUTION INTRAVENOUS at 08:31

## 2023-10-10 RX ADMIN — DOCUSATE SODIUM 100 MG: 100 CAPSULE, LIQUID FILLED ORAL at 20:03

## 2023-10-10 RX ADMIN — CALCIUM POLYCARBOPHIL 625 MG: 625 TABLET, FILM COATED ORAL at 08:25

## 2023-10-10 RX ADMIN — Medication 2000 MG: at 14:02

## 2023-10-10 RX ADMIN — Medication 1000 MCG: at 08:25

## 2023-10-10 RX ADMIN — DOCUSATE SODIUM 100 MG: 100 CAPSULE, LIQUID FILLED ORAL at 08:25

## 2023-10-10 RX ADMIN — METOPROLOL TARTRATE 12.5 MG: 25 TABLET, FILM COATED ORAL at 08:25

## 2023-10-10 RX ADMIN — TRAZODONE HYDROCHLORIDE 50 MG: 50 TABLET ORAL at 20:03

## 2023-10-10 RX ADMIN — MOMETASONE FUROATE AND FORMOTEROL FUMARATE DIHYDRATE 2 PUFF: 200; 5 AEROSOL RESPIRATORY (INHALATION) at 10:25

## 2023-10-10 RX ADMIN — PREDNISONE 10 MG: 10 TABLET ORAL at 08:25

## 2023-10-10 RX ADMIN — SODIUM CHLORIDE, PRESERVATIVE FREE 10 ML: 5 INJECTION INTRAVENOUS at 20:08

## 2023-10-10 RX ADMIN — APIXABAN 5 MG: 5 TABLET, FILM COATED ORAL at 20:03

## 2023-10-10 RX ADMIN — SERTRALINE 50 MG: 50 TABLET, FILM COATED ORAL at 08:25

## 2023-10-10 RX ADMIN — TIOTROPIUM BROMIDE INHALATION SPRAY 2 PUFF: 3.12 SPRAY, METERED RESPIRATORY (INHALATION) at 10:25

## 2023-10-10 RX ADMIN — CARBIDOPA AND LEVODOPA 2 TABLET: 25; 100 TABLET ORAL at 14:01

## 2023-10-10 RX ADMIN — LISINOPRIL 20 MG: 20 TABLET ORAL at 08:25

## 2023-10-10 RX ADMIN — CARBIDOPA AND LEVODOPA 2 TABLET: 25; 100 TABLET ORAL at 20:03

## 2023-10-10 RX ADMIN — METOPROLOL TARTRATE 12.5 MG: 25 TABLET, FILM COATED ORAL at 20:03

## 2023-10-10 RX ADMIN — Medication 3 MG: at 20:03

## 2023-10-10 RX ADMIN — SODIUM CHLORIDE, PRESERVATIVE FREE 10 ML: 5 INJECTION INTRAVENOUS at 08:32

## 2023-10-10 RX ADMIN — VANCOMYCIN HYDROCHLORIDE 750 MG: 5 INJECTION, POWDER, LYOPHILIZED, FOR SOLUTION INTRAVENOUS at 20:03

## 2023-10-10 RX ADMIN — CARBIDOPA AND LEVODOPA 2 TABLET: 25; 100 TABLET ORAL at 08:25

## 2023-10-10 NOTE — CARE COORDINATION
10/10/23, 10:41 AM EDT    DISCHARGE ON [address] [place] [address] Radha Goldberg day: 4  Location: 6A-16/016-A Reason for admit: Lethargy [R53.83]  Cellulitis of left hand [L03.114]  Cellulitis of left upper extremity [G84.764]  Urinary tract infection without hematuria, site unspecified [N39.0]     Barriers to Discharge: Primary RN reports medical barriers to d/c. SNF bed will be available tomorrow. PCP: No primary care provider on file. Readmission Risk Score: 19%  Patient Goals/Plan/Treatment Preferences: SW on for discharge planning. From [place] will d/c to [facility].

## 2023-10-10 NOTE — PALLIATIVE CARE
Initial Evaluation        Patient:   Matilda Krishnamurthy  YOB: 1940  Age:  80 y.o. Room:  [facility]  MRN:  [id number]   Acct: [de-identified]    Date of Admission:  10/6/2023 12:52 PM  Date of Service:  10/10/2023  Completed By:  Yady Leon RN                 Reason for Palliative Care Evaluation:-               [x] Code Status Discussion              [] Goals of Care              [] Pain/Symptom Management               [] Emotional Support              [] Other:                    Current Issues:-   []  Pain  []  Fatigue  [x]  Nausea  []  Anxiety  []  Depression  []  Shortness of Breath  []  Constipation  []  Appetite  []  Other:              Advance Directives:-    [x] [place] DNR Form  [x] Living Will  [x] Medical DANTE Bianchi - daughter/ELSACHRISTOPHER Howard - son/alternate               Current Code Status:-     [] Full Resuscitation  [x] DNR-Comfort Care-Arrest  [] DNR-Comfort Care       [] Limited Resuscitation             [] No CPR            [] No shock            [] No ET intubation/reintubation            [] No resuscitative medications            [] Other limitation:               Palliative Performance Status:-      [] 100%  Full ambulation; normal activity and work; no evidence of disease; able to do own self care; normal intake; fully conscious     [] 90%   Full ambulation; normal activity and work; some evidence of disease; able to do own self care; normal intake; fully conscious    [] 80%   Full ambulation; normal activity with effort; some evidence of disease; able to do own self care; normal or reduced intake; fully conscious    [x] 70%  Ambulation reduced; unable to perform normal job/work; significant  disease; able to do own self care; normal or reduced intake; fully conscious    [] 60%  Ambulation reduced; Significant disease;Can't do hobbies/housework; intake normal or reduced; occasional assist; LOC full/confusion    [] 50%  Mainly sit/lie;  Extensive

## 2023-10-10 NOTE — CARE COORDINATION
10/10/23, 8:43 AM EDT    DISCHARGE PLANNING EVALUATION    Spoke with [address] [facility] [place] admissions. ECF bed will be available on Thursday. Patient will likely needs skilled ecf stay before returning to assisted living due to assistance needed with transfers. Daughter is in agreement with skilled stay if needed.

## 2023-10-11 ENCOUNTER — APPOINTMENT (OUTPATIENT)
Dept: GENERAL RADIOLOGY | Age: 83
DRG: 603 | End: 2023-10-11
Payer: MEDICARE

## 2023-10-11 LAB
ANION GAP SERPL CALC-SCNC: 11 MEQ/L (ref 8–16)
BACTERIA BLD AEROBE CULT: NORMAL
BACTERIA BLD AEROBE CULT: NORMAL
BUN SERPL-MCNC: 10 MG/DL (ref 7–22)
CALCIUM SERPL-MCNC: 8.9 MG/DL (ref 8.5–10.5)
CHLORIDE SERPL-SCNC: 107 MEQ/L (ref 98–111)
CO2 SERPL-SCNC: 25 MEQ/L (ref 23–33)
CREAT SERPL-MCNC: 0.6 MG/DL (ref 0.4–1.2)
DEPRECATED RDW RBC AUTO: 55.3 FL (ref 35–45)
ERYTHROCYTE [DISTWIDTH] IN BLOOD BY AUTOMATED COUNT: 14.9 % (ref 11.5–14.5)
GFR SERPL CREATININE-BSD FRML MDRD: > 60 ML/MIN/1.73M2
GLUCOSE SERPL-MCNC: 111 MG/DL (ref 70–108)
HCT VFR BLD AUTO: 35 % (ref 37–47)
HGB BLD-MCNC: 10.6 GM/DL (ref 12–16)
MAGNESIUM SERPL-MCNC: 1.9 MG/DL (ref 1.6–2.4)
MCH RBC QN AUTO: 30.6 PG (ref 26–33)
MCHC RBC AUTO-ENTMCNC: 30.3 GM/DL (ref 32.2–35.5)
MCV RBC AUTO: 101.2 FL (ref 81–99)
PLATELET # BLD AUTO: 336 THOU/MM3 (ref 130–400)
PMV BLD AUTO: 9.8 FL (ref 9.4–12.4)
POTASSIUM SERPL-SCNC: 3.4 MEQ/L (ref 3.5–5.2)
RBC # BLD AUTO: 3.46 MILL/MM3 (ref 4.2–5.4)
SODIUM SERPL-SCNC: 143 MEQ/L (ref 135–145)
WBC # BLD AUTO: 10.7 THOU/MM3 (ref 4.8–10.8)

## 2023-10-11 PROCEDURE — 73120 X-RAY EXAM OF HAND: CPT

## 2023-10-11 PROCEDURE — 94761 N-INVAS EAR/PLS OXIMETRY MLT: CPT

## 2023-10-11 PROCEDURE — 6370000000 HC RX 637 (ALT 250 FOR IP): Performed by: PHYSICIAN ASSISTANT

## 2023-10-11 PROCEDURE — 6360000002 HC RX W HCPCS

## 2023-10-11 PROCEDURE — 2580000003 HC RX 258: Performed by: PHYSICIAN ASSISTANT

## 2023-10-11 PROCEDURE — 85027 COMPLETE CBC AUTOMATED: CPT

## 2023-10-11 PROCEDURE — 36415 COLL VENOUS BLD VENIPUNCTURE: CPT

## 2023-10-11 PROCEDURE — 83735 ASSAY OF MAGNESIUM: CPT

## 2023-10-11 PROCEDURE — 1200000000 HC SEMI PRIVATE

## 2023-10-11 PROCEDURE — 6370000000 HC RX 637 (ALT 250 FOR IP)

## 2023-10-11 PROCEDURE — 6360000002 HC RX W HCPCS: Performed by: PHYSICIAN ASSISTANT

## 2023-10-11 PROCEDURE — 99232 SBSQ HOSP IP/OBS MODERATE 35: CPT | Performed by: PHYSICIAN ASSISTANT

## 2023-10-11 PROCEDURE — 2580000003 HC RX 258

## 2023-10-11 PROCEDURE — 94640 AIRWAY INHALATION TREATMENT: CPT

## 2023-10-11 PROCEDURE — 80048 BASIC METABOLIC PNL TOTAL CA: CPT

## 2023-10-11 RX ADMIN — TIOTROPIUM BROMIDE INHALATION SPRAY 2 PUFF: 3.12 SPRAY, METERED RESPIRATORY (INHALATION) at 09:18

## 2023-10-11 RX ADMIN — POLYETHYLENE GLYCOL 3350 17 G: 17 POWDER, FOR SOLUTION ORAL at 09:29

## 2023-10-11 RX ADMIN — PREDNISONE 10 MG: 10 TABLET ORAL at 09:29

## 2023-10-11 RX ADMIN — APIXABAN 5 MG: 5 TABLET, FILM COATED ORAL at 09:29

## 2023-10-11 RX ADMIN — MUPIROCIN: 20 OINTMENT TOPICAL at 20:39

## 2023-10-11 RX ADMIN — DOCUSATE SODIUM 100 MG: 100 CAPSULE, LIQUID FILLED ORAL at 09:29

## 2023-10-11 RX ADMIN — CARBIDOPA AND LEVODOPA 2 TABLET: 25; 100 TABLET ORAL at 09:32

## 2023-10-11 RX ADMIN — Medication 2000 MG: at 12:18

## 2023-10-11 RX ADMIN — CALCIUM POLYCARBOPHIL 625 MG: 625 TABLET, FILM COATED ORAL at 09:29

## 2023-10-11 RX ADMIN — TRAZODONE HYDROCHLORIDE 50 MG: 50 TABLET ORAL at 20:35

## 2023-10-11 RX ADMIN — MUPIROCIN: 20 OINTMENT TOPICAL at 12:16

## 2023-10-11 RX ADMIN — CARBIDOPA AND LEVODOPA 2 TABLET: 25; 100 TABLET ORAL at 20:37

## 2023-10-11 RX ADMIN — Medication 2000 MG: at 20:35

## 2023-10-11 RX ADMIN — Medication 3 MG: at 20:35

## 2023-10-11 RX ADMIN — SODIUM CHLORIDE, PRESERVATIVE FREE 10 ML: 5 INJECTION INTRAVENOUS at 09:30

## 2023-10-11 RX ADMIN — MOMETASONE FUROATE AND FORMOTEROL FUMARATE DIHYDRATE 2 PUFF: 200; 5 AEROSOL RESPIRATORY (INHALATION) at 18:21

## 2023-10-11 RX ADMIN — APIXABAN 5 MG: 5 TABLET, FILM COATED ORAL at 20:35

## 2023-10-11 RX ADMIN — ATORVASTATIN CALCIUM 20 MG: 20 TABLET, FILM COATED ORAL at 20:37

## 2023-10-11 RX ADMIN — DONEPEZIL HYDROCHLORIDE 10 MG: 10 TABLET, FILM COATED ORAL at 20:38

## 2023-10-11 RX ADMIN — CARBIDOPA AND LEVODOPA 2 TABLET: 25; 100 TABLET ORAL at 14:49

## 2023-10-11 RX ADMIN — MOMETASONE FUROATE AND FORMOTEROL FUMARATE DIHYDRATE 2 PUFF: 200; 5 AEROSOL RESPIRATORY (INHALATION) at 09:18

## 2023-10-11 RX ADMIN — Medication 2000 MG: at 03:55

## 2023-10-11 RX ADMIN — SODIUM CHLORIDE, PRESERVATIVE FREE 10 ML: 5 INJECTION INTRAVENOUS at 20:41

## 2023-10-11 RX ADMIN — Medication 1000 MCG: at 09:29

## 2023-10-11 RX ADMIN — VANCOMYCIN HYDROCHLORIDE 750 MG: 5 INJECTION, POWDER, LYOPHILIZED, FOR SOLUTION INTRAVENOUS at 15:18

## 2023-10-11 RX ADMIN — METOPROLOL TARTRATE 12.5 MG: 25 TABLET, FILM COATED ORAL at 20:36

## 2023-10-11 RX ADMIN — SERTRALINE 50 MG: 50 TABLET, FILM COATED ORAL at 09:30

## 2023-10-11 RX ADMIN — ONDANSETRON 4 MG: 2 INJECTION INTRAMUSCULAR; INTRAVENOUS at 10:35

## 2023-10-11 RX ADMIN — DOCUSATE SODIUM 100 MG: 100 CAPSULE, LIQUID FILLED ORAL at 20:35

## 2023-10-11 ASSESSMENT — PAIN DESCRIPTION - DESCRIPTORS: DESCRIPTORS: SORE

## 2023-10-11 ASSESSMENT — PAIN DESCRIPTION - LOCATION: LOCATION: HAND

## 2023-10-11 ASSESSMENT — PAIN DESCRIPTION - ORIENTATION: ORIENTATION: LEFT

## 2023-10-11 ASSESSMENT — PAIN DESCRIPTION - PAIN TYPE: TYPE: ACUTE PAIN

## 2023-10-11 ASSESSMENT — PAIN - FUNCTIONAL ASSESSMENT: PAIN_FUNCTIONAL_ASSESSMENT: ACTIVITIES ARE NOT PREVENTED

## 2023-10-11 ASSESSMENT — PAIN DESCRIPTION - FREQUENCY: FREQUENCY: CONTINUOUS

## 2023-10-11 ASSESSMENT — PAIN DESCRIPTION - ONSET: ONSET: ON-GOING

## 2023-10-11 ASSESSMENT — PAIN SCALES - GENERAL: PAINLEVEL_OUTOF10: 4

## 2023-10-11 NOTE — CARE COORDINATION
10/11/23, 8:08 AM EDT    [address] will have skilled bed in ec tomorrow 10/12. Family plans to transport at discharge.

## 2023-10-11 NOTE — CARE COORDINATION
10/11/23, 7:52 AM EDT    DISCHARGE ON [address] [place] [address] Radha Goldberg day: 5  Location: 6A-16/016-A Reason for admit: Lethargy [R53.83]  Cellulitis of left hand [L03.114]  Cellulitis of left upper extremity [L53.771]  Urinary tract infection without hematuria, site unspecified [N39.0]   Procedure:   10/6 CXR: Mild cardiomegaly. Moderate-sized hiatus hernia. Mild degenerative changes both shoulders. Barriers to Discharge: Hospitalist following. Palliative Care. PT/OT. Ancef iv q8hr. Vancomycin iv q12hr. MRSA+; Urine culture: Klebsiella pneumoniae. PCP: No primary care provider on file. Readmission Risk Score: 19.4%  Patient Goals/Plan/Treatment Preferences: Planning discharge to [facility] (from [place]). SW following.

## 2023-10-12 VITALS
WEIGHT: 147 LBS | DIASTOLIC BLOOD PRESSURE: 54 MMHG | SYSTOLIC BLOOD PRESSURE: 113 MMHG | OXYGEN SATURATION: 92 % | HEART RATE: 63 BPM | BODY MASS INDEX: 24.49 KG/M2 | RESPIRATION RATE: 16 BRPM | TEMPERATURE: 98.2 F | HEIGHT: 65 IN

## 2023-10-12 LAB — POTASSIUM SERPL-SCNC: 3.6 MEQ/L (ref 3.5–5.2)

## 2023-10-12 PROCEDURE — 6370000000 HC RX 637 (ALT 250 FOR IP)

## 2023-10-12 PROCEDURE — 6360000002 HC RX W HCPCS: Performed by: PHYSICIAN ASSISTANT

## 2023-10-12 PROCEDURE — 2580000003 HC RX 258: Performed by: PHYSICIAN ASSISTANT

## 2023-10-12 PROCEDURE — 36415 COLL VENOUS BLD VENIPUNCTURE: CPT

## 2023-10-12 PROCEDURE — 99239 HOSP IP/OBS DSCHRG MGMT >30: CPT | Performed by: PHYSICIAN ASSISTANT

## 2023-10-12 PROCEDURE — 94640 AIRWAY INHALATION TREATMENT: CPT

## 2023-10-12 PROCEDURE — 6370000000 HC RX 637 (ALT 250 FOR IP): Performed by: PHYSICIAN ASSISTANT

## 2023-10-12 PROCEDURE — 84132 ASSAY OF SERUM POTASSIUM: CPT

## 2023-10-12 RX ORDER — HYDROCODONE BITARTRATE AND ACETAMINOPHEN 5; 325 MG/1; MG/1
1 TABLET ORAL ONCE
Status: COMPLETED | OUTPATIENT
Start: 2023-10-12 | End: 2023-10-12

## 2023-10-12 RX ADMIN — MUPIROCIN: 20 OINTMENT TOPICAL at 07:38

## 2023-10-12 RX ADMIN — CARBIDOPA AND LEVODOPA 2 TABLET: 25; 100 TABLET ORAL at 07:38

## 2023-10-12 RX ADMIN — Medication 2000 MG: at 04:50

## 2023-10-12 RX ADMIN — APIXABAN 5 MG: 5 TABLET, FILM COATED ORAL at 07:37

## 2023-10-12 RX ADMIN — SERTRALINE 50 MG: 50 TABLET, FILM COATED ORAL at 07:37

## 2023-10-12 RX ADMIN — HYDROCODONE BITARTRATE AND ACETAMINOPHEN 1 TABLET: 5; 325 TABLET ORAL at 04:25

## 2023-10-12 RX ADMIN — PREDNISONE 10 MG: 10 TABLET ORAL at 07:37

## 2023-10-12 RX ADMIN — TIOTROPIUM BROMIDE INHALATION SPRAY 2 PUFF: 3.12 SPRAY, METERED RESPIRATORY (INHALATION) at 07:51

## 2023-10-12 RX ADMIN — DOCUSATE SODIUM 100 MG: 100 CAPSULE, LIQUID FILLED ORAL at 07:38

## 2023-10-12 RX ADMIN — MOMETASONE FUROATE AND FORMOTEROL FUMARATE DIHYDRATE 2 PUFF: 200; 5 AEROSOL RESPIRATORY (INHALATION) at 07:53

## 2023-10-12 RX ADMIN — CALCIUM POLYCARBOPHIL 625 MG: 625 TABLET, FILM COATED ORAL at 07:36

## 2023-10-12 RX ADMIN — LISINOPRIL 20 MG: 20 TABLET ORAL at 07:36

## 2023-10-12 RX ADMIN — VANCOMYCIN HYDROCHLORIDE 750 MG: 5 INJECTION, POWDER, LYOPHILIZED, FOR SOLUTION INTRAVENOUS at 03:24

## 2023-10-12 RX ADMIN — Medication 1000 MCG: at 07:37

## 2023-10-12 ASSESSMENT — PAIN DESCRIPTION - LOCATION: LOCATION: ARM

## 2023-10-12 ASSESSMENT — PAIN DESCRIPTION - DESCRIPTORS: DESCRIPTORS: SORE

## 2023-10-12 ASSESSMENT — PAIN DESCRIPTION - ORIENTATION: ORIENTATION: RIGHT

## 2023-10-12 ASSESSMENT — PAIN DESCRIPTION - ONSET: ONSET: ON-GOING

## 2023-10-12 ASSESSMENT — PAIN SCALES - GENERAL
PAINLEVEL_OUTOF10: 6
PAINLEVEL_OUTOF10: 10

## 2023-10-12 ASSESSMENT — PAIN - FUNCTIONAL ASSESSMENT: PAIN_FUNCTIONAL_ASSESSMENT: ACTIVITIES ARE NOT PREVENTED

## 2023-10-12 ASSESSMENT — PAIN DESCRIPTION - FREQUENCY: FREQUENCY: CONTINUOUS

## 2023-10-12 ASSESSMENT — PAIN DESCRIPTION - PAIN TYPE: TYPE: ACUTE PAIN

## 2023-10-12 NOTE — CARE COORDINATION
10/12/23, 9:08 AM EDT    DISCHARGE PLANNING EVALUATION    Spoke with [facility]. They anticipate bed available later today, they are waiting for a resident to discharge and then will have a room open. [facility]. Family plans to transport.

## 2023-10-12 NOTE — DISCHARGE SUMMARY
results found for: \"LABANAE\"    Urinalysis:      Lab Results   Component Value Date/Time    NITRU POSITIVE 10/06/2023 02:50 PM    WBCUA 10-15 10/06/2023 02:50 PM    WBCUA 0-4 05/24/2018 01:05 AM    BACTERIA MANY 10/06/2023 02:50 PM    RBCUA 0-2 10/06/2023 02:50 PM    BLOODU NEGATIVE 10/06/2023 02:50 PM    SPECGRAV 1.021 04/08/2023 10:23 AM    GLUCOSEU NEGATIVE 10/06/2023 02:50 PM       Radiology:-  XR WRIST LEFT (MIN 3 VIEWS)    Result Date: 10/6/2023  PROCEDURE: XR WRIST LEFT (MIN 3 VIEWS) CLINICAL INFORMATION: Left wrist infection TECHNIQUE: 4 views of the left wrist COMPARISON: None FINDINGS: There is no fracture or dislocation. Joint space narrowing, sclerosis and osteophyte formation are present at the first carpal-metacarpal joint. Bones are osteopenic. No osseous erosions are identified. No fracture or dislocation. Final report electronically signed by Dr. Zaina Montiel on 10/6/2023 2:31 PM    XR HAND LEFT (MIN 3 VIEWS)    Result Date: 10/6/2023  PROCEDURE: XR HAND LEFT (MIN 3 VIEWS) CLINICAL INFORMATION: Left wrist infection TECHNIQUE: 3 views of the left hand COMPARISON: Left hand 1/2/2020 FINDINGS: There is no fracture or dislocation. Joint space narrowing and sclerosis are present at the first carpal-metacarpal joint. Bones are osteopenic. No osseous erosions are identified. No fracture or dislocation. Final report electronically signed by Dr. Zaina Montiel on 10/6/2023 2:31 PM    XR CHEST (2 VW)    Result Date: 10/6/2023  PROCEDURE: XR CHEST (2 VW) CLINICAL INFORMATION: hypoxia COMPARISON: 7/18/2022 TECHNIQUE: AP upright and lateral views of the chest were obtained. 1. Mild cardiomegaly. Moderate-sized hiatus hernia. Mild degenerative changes both shoulders. 2. No acute findings. No infiltrates or effusions are seen. . **This report has been created using voice recognition software. It may contain minor errors which are inherent in voice recognition technology. ** Final report electronically

## 2023-10-12 NOTE — CARE COORDINATION
10/12/23, 12:12 PM EDT    DISCHARGE PLANNING EVALUATION    Spoke with [address] [facility] [place], room is ready for patient. Family will transport. 10/12/23, 12:13 PM EDT    Patient goals/plan/ treatment preferences discussed by  and . Patient goals/plan/ treatment preferences reviewed with patient/ family. Patient/ family verbalize understanding of discharge plan and are in agreement with goal/plan/treatment preferences. Understanding was demonstrated using the teach back method. AVS provided by RN at time of discharge, which includes all necessary medical information pertaining to the patients current course of illness, treatment, post-discharge goals of care, and treatment preferences.      Services At/After Discharge: [facility]       IMM Letter  IMM Letter given to Patient/Family/Significant other/Guardian/POA/by[de-identified] Kiet Casas RN CM  IMM Letter date given[de-identified] 10/10/23  IMM Letter time given[de-identified] 5069

## 2023-10-12 NOTE — PLAN OF CARE
Consult for return to assisted living.   Please see progress note 10/09/2023
Problem: Respiratory - Adult  Goal: Achieves optimal ventilation and oxygenation  Outcome: Progressing   Continue therapy as ordered to achieve and maintain clear breath sounds and improve aeration. Pt agrees with the plan.
Problem: Respiratory - Adult  Goal: Clear lung sounds  10/11/2023 0923 by Ronaldo Miranda RCP  Note: Patient receiving inhalers to maintain and manage respiratory status.
Problem: Respiratory - Adult  Goal: Clear lung sounds  10/12/2023 0756 by Bulmaro Lmaa RCP  Outcome: Progressing  Note: Mdi to maintain open airways. Patient mutually agreed on goals.
Problem: Respiratory - Adult  Goal: Clear lung sounds  10/8/2023 1827 by Ami Dunn RCP  Outcome: Progressing  Note: Mdi to maintain open airways. Patient mutually agreed on goals.
Problem: Respiratory - Adult  Goal: Clear lung sounds  10/9/2023 1641 by Fazal Romero RCP  Outcome: Progressing
Problem: Respiratory - Adult  Goal: Clear lung sounds  Outcome: Progressing  Note: Mdi to maintain open airways. Patient mutually agreed on goals.
Problem: Safety - Adult  Goal: Free from fall injury  10/10/2023 0918 by Jesusita Garcia RN  Outcome: Progressing  Flowsheets (Taken 10/9/2023 2339 by Prashant Bob RN)  Free From Fall Injury:   Based on caregiver fall risk screen, instruct family/caregiver to ask for assistance with transferring infant if caregiver noted to have fall risk factors   Instruct family/caregiver on patient safety     Problem: Skin/Tissue Integrity  Goal: Absence of new skin breakdown  Description: 1. Monitor for areas of redness and/or skin breakdown  2. Assess vascular access sites hourly  3. Every 4-6 hours minimum:  Change oxygen saturation probe site  4. Every 4-6 hours:  If on nasal continuous positive airway pressure, respiratory therapy assess nares and determine need for appliance change or resting period.   10/10/2023 0918 by Jesusita Garcia RN  Outcome: Progressing     Problem: Discharge Planning  Goal: Discharge to home or other facility with appropriate resources  10/10/2023 0918 by Jesusita Garcia RN  Outcome: Progressing  Flowsheets (Taken 10/9/2023 2339 by Prashant Bob RN)  Discharge to home or other facility with appropriate resources:   Identify barriers to discharge with patient and caregiver   Identify discharge learning needs (meds, wound care, etc)   Refer to discharge planning if patient needs post-hospital services based on physician order or complex needs related to functional status, cognitive ability or social support system   Arrange for needed discharge resources and transportation as appropriate     Problem: Pain  Goal: Verbalizes/displays adequate comfort level or baseline comfort level  10/10/2023 0918 by Jesusita Garcia RN  Outcome: Progressing  Flowsheets (Taken 10/9/2023 2339 by Prashant Bob RN)  Verbalizes/displays adequate comfort level or baseline comfort level:   Assess pain using appropriate pain scale   Encourage patient to monitor pain and request assistance   Administer
Problem: Safety - Adult  Goal: Free from fall injury  10/12/2023 0141 by Mark Eastman RN  Outcome: Progressing  Flowsheets (Taken 10/12/2023 0141)  Free From Fall Injury:   Instruct family/caregiver on patient safety   Based on caregiver fall risk screen, instruct family/caregiver to ask for assistance with transferring infant if caregiver noted to have fall risk factors  10/11/2023 1847 by Luis Forrester RN  Outcome: Progressing  Flowsheets (Taken 10/10/2023 2348 by Yumiko Cartwright RN)  Free From Fall Injury:   Instruct family/caregiver on patient safety   Based on caregiver fall risk screen, instruct family/caregiver to ask for assistance with transferring infant if caregiver noted to have fall risk factors     Problem: Skin/Tissue Integrity  Goal: Absence of new skin breakdown  Description: 1. Monitor for areas of redness and/or skin breakdown  2. Assess vascular access sites hourly  3. Every 4-6 hours minimum:  Change oxygen saturation probe site  4. Every 4-6 hours:  If on nasal continuous positive airway pressure, respiratory therapy assess nares and determine need for appliance change or resting period.   10/12/2023 0141 by aMrk Eastman RN  Outcome: Progressing  10/11/2023 1847 by Luis Forrester RN  Outcome: Progressing     Problem: Discharge Planning  Goal: Discharge to home or other facility with appropriate resources  10/12/2023 0141 by Mark Eastman RN  Outcome: Progressing  Flowsheets (Taken 10/11/2023 2023)  Discharge to home or other facility with appropriate resources:   Identify barriers to discharge with patient and caregiver   Arrange for needed discharge resources and transportation as appropriate   Identify discharge learning needs (meds, wound care, etc)   Refer to discharge planning if patient needs post-hospital services based on physician order or complex needs related to functional status, cognitive ability or social support system  10/11/2023 1847 by Godwin Hogan
Problem: Safety - Adult  Goal: Free from fall injury  10/9/2023 1429 by Jeff Calderon RN  Outcome: Progressing  Flowsheets (Taken 10/8/2023 1344 by Estephania Howard, RN)  Free From Fall Injury: Instruct family/caregiver on patient safety     Problem: Skin/Tissue Integrity  Goal: Absence of new skin breakdown  Description: 1. Monitor for areas of redness and/or skin breakdown  2. Assess vascular access sites hourly  3. Every 4-6 hours minimum:  Change oxygen saturation probe site  4. Every 4-6 hours:  If on nasal continuous positive airway pressure, respiratory therapy assess nares and determine need for appliance change or resting period.   10/9/2023 1429 by Jeff Calderon RN  Outcome: Progressing     Problem: Discharge Planning  Goal: Discharge to home or other facility with appropriate resources  10/9/2023 1429 by Jeff Calderon RN  Outcome: Progressing  Flowsheets (Taken 10/8/2023 0839 by Estephania Howard RN)  Discharge to home or other facility with appropriate resources:   Identify barriers to discharge with patient and caregiver   Arrange for needed discharge resources and transportation as appropriate   Identify discharge learning needs (meds, wound care, etc)   Refer to discharge planning if patient needs post-hospital services based on physician order or complex needs related to functional status, cognitive ability or social support system     Problem: Pain  Goal: Verbalizes/displays adequate comfort level or baseline comfort level  10/9/2023 1429 by Jeff Calderon RN  Outcome: Progressing  Flowsheets (Taken 10/8/2023 0751 by Estephania Howard RN)  Verbalizes/displays adequate comfort level or baseline comfort level:   Encourage patient to monitor pain and request assistance   Assess pain using appropriate pain scale   Administer analgesics based on type and severity of pain and evaluate response   Implement non-pharmacological measures as appropriate and evaluate response   Consider
Problem: Safety - Adult  Goal: Free from fall injury  10/9/2023 2339 by Lien Newsome RN  Outcome: Progressing  Flowsheets (Taken 10/9/2023 2339)  Free From Fall Injury:   Based on caregiver fall risk screen, instruct family/caregiver to ask for assistance with transferring infant if caregiver noted to have fall risk factors   Instruct family/caregiver on patient safety     Problem: Skin/Tissue Integrity  Goal: Absence of new skin breakdown  Description: 1. Monitor for areas of redness and/or skin breakdown  2. Assess vascular access sites hourly  3. Every 4-6 hours minimum:  Change oxygen saturation probe site  4. Every 4-6 hours:  If on nasal continuous positive airway pressure, respiratory therapy assess nares and determine need for appliance change or resting period.   10/9/2023 2339 by Lien Newsome RN  Outcome: Progressing     Problem: Discharge Planning  Goal: Discharge to home or other facility with appropriate resources  10/9/2023 2339 by Lien Newsome RN  Outcome: Progressing  Flowsheets (Taken 10/9/2023 2339)  Discharge to home or other facility with appropriate resources:   Identify barriers to discharge with patient and caregiver   Identify discharge learning needs (meds, wound care, etc)   Refer to discharge planning if patient needs post-hospital services based on physician order or complex needs related to functional status, cognitive ability or social support system   Arrange for needed discharge resources and transportation as appropriate     Problem: Pain  Goal: Verbalizes/displays adequate comfort level or baseline comfort level  10/9/2023 2339 by Lien Newsome RN  Outcome: Progressing  Flowsheets (Taken 10/9/2023 2339)  Verbalizes/displays adequate comfort level or baseline comfort level:   Assess pain using appropriate pain scale   Encourage patient to monitor pain and request assistance   Administer analgesics based on type and severity of pain and evaluate response
Problem: Safety - Adult  Goal: Free from fall injury  Outcome: Progressing  Flowsheets (Taken 10/10/2023 2348 by Wale Antonio RN)  Free From Fall Injury:   Instruct family/caregiver on patient safety   Based on caregiver fall risk screen, instruct family/caregiver to ask for assistance with transferring infant if caregiver noted to have fall risk factors     Problem: Skin/Tissue Integrity  Goal: Absence of new skin breakdown  Description: 1. Monitor for areas of redness and/or skin breakdown  2. Assess vascular access sites hourly  3. Every 4-6 hours minimum:  Change oxygen saturation probe site  4. Every 4-6 hours:  If on nasal continuous positive airway pressure, respiratory therapy assess nares and determine need for appliance change or resting period.   Outcome: Progressing     Problem: Discharge Planning  Goal: Discharge to home or other facility with appropriate resources  Outcome: Progressing  Flowsheets (Taken 10/10/2023 2348 by Wale Antonio RN)  Discharge to home or other facility with appropriate resources:   Identify barriers to discharge with patient and caregiver   Identify discharge learning needs (meds, wound care, etc)   Refer to discharge planning if patient needs post-hospital services based on physician order or complex needs related to functional status, cognitive ability or social support system   Arrange for needed discharge resources and transportation as appropriate     Problem: Pain  Goal: Verbalizes/displays adequate comfort level or baseline comfort level  Outcome: Progressing  Flowsheets (Taken 10/10/2023 2348 by Wale Antonio RN)  Verbalizes/displays adequate comfort level or baseline comfort level:   Encourage patient to monitor pain and request assistance   Assess pain using appropriate pain scale   Administer analgesics based on type and severity of pain and evaluate response   Implement non-pharmacological measures as appropriate and evaluate response     Problem:
Problem: Safety - Adult  Goal: Free from fall injury  Outcome: Progressing  Flowsheets (Taken 10/10/2023 2348)  Free From Fall Injury:   Instruct family/caregiver on patient safety   Based on caregiver fall risk screen, instruct family/caregiver to ask for assistance with transferring infant if caregiver noted to have fall risk factors     Problem: Skin/Tissue Integrity  Goal: Absence of new skin breakdown  Description: 1. Monitor for areas of redness and/or skin breakdown  2. Assess vascular access sites hourly  3. Every 4-6 hours minimum:  Change oxygen saturation probe site  4. Every 4-6 hours:  If on nasal continuous positive airway pressure, respiratory therapy assess nares and determine need for appliance change or resting period.   Outcome: Progressing     Problem: Discharge Planning  Goal: Discharge to home or other facility with appropriate resources  Outcome: Progressing  Flowsheets (Taken 10/10/2023 2348)  Discharge to home or other facility with appropriate resources:   Identify barriers to discharge with patient and caregiver   Identify discharge learning needs (meds, wound care, etc)   Refer to discharge planning if patient needs post-hospital services based on physician order or complex needs related to functional status, cognitive ability or social support system   Arrange for needed discharge resources and transportation as appropriate     Problem: Pain  Goal: Verbalizes/displays adequate comfort level or baseline comfort level  Outcome: Progressing  Flowsheets (Taken 10/10/2023 2348)  Verbalizes/displays adequate comfort level or baseline comfort level:   Encourage patient to monitor pain and request assistance   Assess pain using appropriate pain scale   Administer analgesics based on type and severity of pain and evaluate response   Implement non-pharmacological measures as appropriate and evaluate response     Problem: ABCDS Injury Assessment  Goal: Absence of physical injury  Outcome:
Problem: Safety - Adult  Goal: Free from fall injury  Outcome: Progressing  Flowsheets (Taken 10/6/2023 2245)  Free From Fall Injury: Instruct family/caregiver on patient safety     Problem: Skin/Tissue Integrity  Goal: Absence of new skin breakdown  Description: 1. Monitor for areas of redness and/or skin breakdown  2. Assess vascular access sites hourly  3. Every 4-6 hours minimum:  Change oxygen saturation probe site  4. Every 4-6 hours:  If on nasal continuous positive airway pressure, respiratory therapy assess nares and determine need for appliance change or resting period.   Outcome: Progressing     Problem: Discharge Planning  Goal: Discharge to home or other facility with appropriate resources  Outcome: Progressing  Flowsheets (Taken 10/6/2023 1949)  Discharge to home or other facility with appropriate resources:   Identify barriers to discharge with patient and caregiver   Arrange for needed discharge resources and transportation as appropriate   Identify discharge learning needs (meds, wound care, etc)     Problem: Pain  Goal: Verbalizes/displays adequate comfort level or baseline comfort level  Outcome: Progressing  Flowsheets (Taken 10/6/2023 1949)  Verbalizes/displays adequate comfort level or baseline comfort level:   Encourage patient to monitor pain and request assistance   Assess pain using appropriate pain scale   Administer analgesics based on type and severity of pain and evaluate response   Implement non-pharmacological measures as appropriate and evaluate response   Consider cultural and social influences on pain and pain management     Problem: ABCDS Injury Assessment  Goal: Absence of physical injury  Outcome: Progressing  Flowsheets (Taken 10/6/2023 2245)  Absence of Physical Injury: Implement safety measures based on patient assessment     Problem: Neurosensory - Adult  Goal: Achieves stable or improved neurological status  Outcome: Progressing  Flowsheets (Taken 10/6/2023
Problem: Safety - Adult  Goal: Free from fall injury  Outcome: Progressing  Flowsheets (Taken 10/8/2023 1344)  Free From Fall Injury: Instruct family/caregiver on patient safety     Problem: Skin/Tissue Integrity  Goal: Absence of new skin breakdown  Description: 1. Monitor for areas of redness and/or skin breakdown  2. Assess vascular access sites hourly  3. Every 4-6 hours minimum:  Change oxygen saturation probe site  4. Every 4-6 hours:  If on nasal continuous positive airway pressure, respiratory therapy assess nares and determine need for appliance change or resting period.   Outcome: Progressing  Note: Waffle cushion in chair and turn and reposition every 2 hours     Problem: Discharge Planning  Goal: Discharge to home or other facility with appropriate resources  Outcome: Progressing  Flowsheets (Taken 10/8/2023 0839)  Discharge to home or other facility with appropriate resources:   Identify barriers to discharge with patient and caregiver   Arrange for needed discharge resources and transportation as appropriate   Identify discharge learning needs (meds, wound care, etc)   Refer to discharge planning if patient needs post-hospital services based on physician order or complex needs related to functional status, cognitive ability or social support system  Note: Case management seeing patient     Problem: Pain  Goal: Verbalizes/displays adequate comfort level or baseline comfort level  Outcome: Progressing  Flowsheets (Taken 10/8/2023 0751)  Verbalizes/displays adequate comfort level or baseline comfort level:   Encourage patient to monitor pain and request assistance   Assess pain using appropriate pain scale   Administer analgesics based on type and severity of pain and evaluate response   Implement non-pharmacological measures as appropriate and evaluate response   Consider cultural and social influences on pain and pain management   Notify Licensed Independent Practitioner if interventions
Adult  Goal: Incisions, wounds, or drain sites healing without S/S of infection  10/7/2023 1006 by Carolann Caldwell RN  Outcome: Progressing  Flowsheets (Taken 10/7/2023 0957)  Incisions, Wounds, or Drain Sites Healing Without Sign and Symptoms of Infection:   TWICE DAILY: Assess and document skin integrity   Implement wound care per orders   Initiate isolation precautions as appropriate   Initiate pressure ulcer prevention bundle as indicated     Problem: Musculoskeletal - Adult  Goal: Return mobility to safest level of function  10/7/2023 1006 by Carolann Caldwell RN  Outcome: Progressing  Flowsheets (Taken 10/6/2023 2246 by Tiara Wang RN)  Return Mobility to Safest Level of Function:   Assess patient stability and activity tolerance for standing, transferring and ambulating with or without assistive devices   Ensure adequate protection for wounds/incisions during mobilization   Assist with transfers and ambulation using safe patient handling equipment as needed   Obtain physical therapy/occupational therapy consults as needed   Instruct patient/family in ordered activity level     Problem: Infection - Adult  Goal: Absence of infection at discharge  10/7/2023 1006 by Carolann Caldwell RN  Outcome: Progressing  Flowsheets (Taken 10/6/2023 2246 by Tiara Wang RN)  Absence of infection at discharge:   Assess and monitor for signs and symptoms of infection   Monitor lab/diagnostic results   Administer medications as ordered   Instruct and encourage patient and family to use good hand hygiene technique  Note: Contact isolation for Positive MRSA   Care plan reviewed with patient and daughter. Patient and daughter verbalize understanding of the plan of care and contribute to goal setting.
respiratory status   Assess for changes in mentation and behavior   Position to facilitate oxygenation and minimize respiratory effort   Oxygen supplementation based on oxygen saturation or arterial blood gases   Encourage broncho-pulmonary hygiene including cough, deep breathe, incentive spirometry   Assess and instruct to report shortness of breath or any respiratory difficulty     Care plan reviewed with patient. Patient verbalizes understanding of the plan of care and contributes to goal setting.
8188)  Absence of infection at discharge:   Assess and monitor for signs and symptoms of infection   Monitor lab/diagnostic results   Monitor all insertion sites i.e., indwelling lines, tubes and drains   Daniel appropriate cooling/warming therapies per order   Administer medications as ordered   Instruct and encourage patient and family to use good hand hygiene technique     Problem: Respiratory - Adult  Goal: Achieves optimal ventilation and oxygenation  10/9/2023 0120 by German Chen RN  Outcome: Progressing  Flowsheets (Taken 10/8/2023 2100)  Achieves optimal ventilation and oxygenation:   Assess for changes in respiratory status   Assess for changes in mentation and behavior   Position to facilitate oxygenation and minimize respiratory effort   Encourage broncho-pulmonary hygiene including cough, deep breathe, incentive spirometry   Assess and instruct to report shortness of breath or any respiratory difficulty   Respiratory therapy support as indicated  10/8/2023 1344 by Fang Ring RN  Outcome: Progressing  Flowsheets (Taken 10/8/2023 0839)  Achieves optimal ventilation and oxygenation:   Assess for changes in respiratory status   Assess for changes in mentation and behavior   Position to facilitate oxygenation and minimize respiratory effort   Oxygen supplementation based on oxygen saturation or arterial blood gases   Encourage broncho-pulmonary hygiene including cough, deep breathe, incentive spirometry   Assess the need for suctioning and aspirate as needed   Assess and instruct to report shortness of breath or any respiratory difficulty   Respiratory therapy support as indicated     Problem: Respiratory - Adult  Goal: Clear lung sounds  10/9/2023 0120 by Valdez Ospina RN  Outcome: Progressing  10/8/2023 1827 by Bulmaro Lama RCP  Outcome: Progressing  Note: Mdi to maintain open airways. Patient mutually agreed on goals.     10/8/2023 1344 by Fang Ring RN  Outcome:

## 2023-10-12 NOTE — CARE COORDINATION
10/12/23, 8:01 AM EDT    DISCHARGE ON [address] [place] [address] Radha Goldberg day: 6  Location: 6A-16/016-A Reason for admit: Lethargy [R53.83]  Cellulitis of left hand [L03.114]  Cellulitis of left upper extremity [O14.080]  Urinary tract infection without hematuria, site unspecified [N39.0]   Procedure:   10/6 CXR: Mild cardiomegaly. Moderate-sized hiatus hernia. Mild degenerative changes both shoulders. Barriers to Discharge: Hospitalist following. Palliative Care. PT/OT. MRSA+; Urine culture: Klebsiella pneumoniae. Waiting on bed availability at SNF. PCP: No primary care provider on file. Readmission Risk Score: 18.7%  Patient Goals/Plan/Treatment Preferences: Planning discharge to [facility] (from [place]) today when bed is available. SW following. Message to attending.

## 2023-10-16 ENCOUNTER — CARE COORDINATION (OUTPATIENT)
Dept: CARE COORDINATION | Age: 83
End: 2023-10-16

## 2023-12-15 NOTE — PROGRESS NOTES
[address] [facility]  PHYSICAL THERAPY  [] DAILY NOTE (LAND) [x] DAILY NOTE (AQUATIC ) [] PROGRESS NOTE [] DISCHARGE NOTE    [x] OUTPATIENT REHABILITATION CENTER [facility]   [] [facility]    [] 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 Functional Outcome Measure Used Tinetti   Functional Outcome Score  (21)       Insurance: Primary: Payor: Sj Ledezma /  /  / ,   Secondary: [facility]   Authorization Information: Aquatics and modalities covered except ionto, HP/CP, telehealth covered   Visit # 6, 6/10 for progress note   Visits Allowed: Unlimited based on medical necessity   Recertification Date: 37   Physician Follow-Up: None scheduled   Physician Orders:    History of Present Illness: Pt presents with B hip pain, which flared up after receiving covid vaccine; pt unsure if correlated. Pt has had chronic hip pain for at least a couple years. Pt went through PT at end of  but didn't see any results. SUBJECTIVE: Patient denies having any pain currently. AQUATICS TREATMENT   Precautions: Patient slightly anxious in the pool and admits she isn't completely comfortable in water. COPD/SOB   Pain: 2/10 Bilateral hips    X in shaded column indicates activity completed today   Exercise/Intervention Sets/Sec  Notes   Walk Forward x lap  X Cues for posture.  Tends to want to hold on to rail with both UE's   Walk Backward x1 lap  X    Walk Sideways x1 lap  X Cues to keep sideways position          Lower Extremity Exercises: Detail Level: Zone All in //bars   Heel/Toe Raises 15x  X    Marches  1 minute X    Squats 15x  X    3 Way Hip 15x bilateral  X    Hamstring Curls 15x bilateral  X    Lunges       Step-Ups at 3 foot steps forward and lateral  10-20  x           Lower Extremity Stretches:              Seated Exercises:       LAQ, march, hip abduction 10  x On bench                 Upper Extremity Exercises:       Shoulder Flexion   Shoulder flexion with paddles 15x  15x  X Normal stance, arm movement to challenge balance, one arm at a time   Shoulder ABD/ADD with open paddles 15x  X Normal stance, arm movement to challenge balance, one arm at a night   Shoulder Horizontal ABD/ADD 15x  X Normal stance, arm movement to challenge balance, one arm at a time   Shoulder IR/ER       Shoulder Circles       Shoulder Shrugs       Rows       Bicep Curls              Upper Extremity Stretches:              Balance:            Narrow stance, Step stance  1 minute  No UE; Wider LAINE with step stance          Dynamic Gait:              Deep Water:       Hang       Bicycle       Hip ABD/ADD       Hip Flex/Ext       Specific Interventions Next Treatment: aquatics for core/LE strengthening, hip stretches, deep water, balance, sit to stand transfers    Activity/Treatment Tolerance:  [x]  Patient tolerated treatment well  []  Patient limited by fatigue  []  Patient limited by pain   []  Patient limited by medical complications  []  Other:     Assessment: Patient getting upset during session stating she feels like she is not getting sore following exercises or being pushed enough. Offering different exercises and patient commenting \"I'm not getting any soreness or worthless\". Patient did get noticeably fatigued with progressions and took a few breaks as needed. Patient denies having any pain at end of session and commenting that \"I'm just worried it's not doing anything\". When asked if patient was tired at end of session patient reporting \"could be\".   Also asked patient Topical Sulfur Applications Counseling: Topical Sulfur Counseling: Patient counseled that this medication may cause skin irritation or allergic reactions.  In the event of skin irritation, the patient was advised to reduce the amount of the drug applied or use it less frequently.   The patient verbalized understanding of the proper use and possible adverse effects of topical sulfur application.  All of the patient's questions and concerns were addressed. Isotretinoin Pregnancy And Lactation Text: This medication is Pregnancy Category X and is considered extremely dangerous during pregnancy. It is unknown if it is excreted in breast milk. what she would like to be doing and patient responding \"I don't know\". Asked patient if she wanted to try land therapy for more of a progression with patient reporting \" I don't know\"    GOALS:  Patient Goal: Decrease hip pain    Short Term Goals: 6 weeks  Patient will report reduced pain to <6/10 to tolerate standing and walking longer durations. Patient will demonstrate good core engagement and postural awareness during therapy session with minimal cues  to carry over to functional activities, lifting, and housework. Patient will report minimal lumbar/hip tightness with stretches for ease bending over and completing ADL's. Patient will improve B hip and knee strength to 4/5 for stabilization when walking, lifting, and cleaning. Patient will be able to complete 7 sit to stand transfers in 30 seconds with UE assist to improve functional strength. Long Term Goals: 12 weeks  Patient will improve Tinetti score from 14/28 to 18/28 to decrease fall risk and improve gait. Patient will be independent and compliant with HEP to achieve above goals. Patient Education:   []  HEP/Education Completed: monitor response to progressions.  [place] Access Code:  [x]  No new Education completed  []  Reviewed Prior HEP      []  Patient verbalized and/or demonstrated understanding of education provided. []  Patient unable to verbalize and/or demonstrate understanding of education provided. Will continue education. []  Barriers to learning: None    PLAN:2 times per week for 12 weeks.   Treatment Recommendations: Strengthening, Range of Motion, Balance Training, Functional Mobility Training, Gait Training, Manual Therapy - Soft Tissue Mobilization, Pain Management, Home Exercise Program, Patient Education, Aquatics and Modalities    [x]  Continue with current plan of care  []  Modify plan of care as follows:    []  Hold pending physician visit  []  Discharge    Time In [phone]   Time Out 1352   Timed Code Minutes: 41 Spironolactone Pregnancy And Lactation Text: This medication can cause feminization of the male fetus and should be avoided during pregnancy. The active metabolite is also found in breast milk. min   Total Treatment Time:   41 min     Electronically Signed by: Rebeca Abdul Azithromycin Counseling:  I discussed with the patient the risks of azithromycin including but not limited to GI upset, allergic reaction, drug rash, diarrhea, and yeast infections. Doxycycline Pregnancy And Lactation Text: This medication is Pregnancy Category D and not consider safe during pregnancy. It is also excreted in breast milk but is considered safe for shorter treatment courses. Winlevi Counseling:  I discussed with the patient the risks of topical clascoterone including but not limited to erythema, scaling, itching, and stinging. Patient voiced their understanding. Sarecycline Counseling: Patient advised regarding possible photosensitivity and discoloration of the teeth, skin, lips, tongue and gums.  Patient instructed to avoid sunlight, if possible.  When exposed to sunlight, patients should wear protective clothing, sunglasses, and sunscreen.  The patient was instructed to call the office immediately if the following severe adverse effects occur:  hearing changes, easy bruising/bleeding, severe headache, or vision changes.  The patient verbalized understanding of the proper use and possible adverse effects of sarecycline.  All of the patient's questions and concerns were addressed. Bactrim Pregnancy And Lactation Text: This medication is Pregnancy Category D and is known to cause fetal risk.  It is also excreted in breast milk. Include Pregnancy/Lactation Warning?: No Dapsone Pregnancy And Lactation Text: This medication is Pregnancy Category C and is not considered safe during pregnancy or breast feeding. Tetracycline Pregnancy And Lactation Text: This medication is Pregnancy Category D and not consider safe during pregnancy. It is also excreted in breast milk. Topical Retinoid Pregnancy And Lactation Text: This medication is Pregnancy Category C. It is unknown if this medication is excreted in breast milk. Topical Clindamycin Pregnancy And Lactation Text: This medication is Pregnancy Category B and is considered safe during pregnancy. It is unknown if it is excreted in breast milk. Benzoyl Peroxide Counseling: Patient counseled that medicine may cause skin irritation and bleach clothing.  In the event of skin irritation, the patient was advised to reduce the amount of the drug applied or use it less frequently.   The patient verbalized understanding of the proper use and possible adverse effects of benzoyl peroxide.  All of the patient's questions and concerns were addressed. Minocycline Counseling: Patient advised regarding possible photosensitivity and discoloration of the teeth, skin, lips, tongue and gums.  Patient instructed to avoid sunlight, if possible.  When exposed to sunlight, patients should wear protective clothing, sunglasses, and sunscreen.  The patient was instructed to call the office immediately if the following severe adverse effects occur:  hearing changes, easy bruising/bleeding, severe headache, or vision changes.  The patient verbalized understanding of the proper use and possible adverse effects of minocycline.  All of the patient's questions and concerns were addressed. Tazorac Counseling:  Patient advised that medication is irritating and drying.  Patient may need to apply sparingly and wash off after an hour before eventually leaving it on overnight.  The patient verbalized understanding of the proper use and possible adverse effects of tazorac.  All of the patient's questions and concerns were addressed. Benzoyl Peroxide Pregnancy And Lactation Text: This medication is Pregnancy Category C. It is unknown if benzoyl peroxide is excreted in breast milk. Tazorac Pregnancy And Lactation Text: This medication is not safe during pregnancy. It is unknown if this medication is excreted in breast milk. Topical Sulfur Applications Pregnancy And Lactation Text: This medication is Pregnancy Category C and has an unknown safety profile during pregnancy. It is unknown if this topical medication is excreted in breast milk. Winlevi Pregnancy And Lactation Text: This medication is considered safe during pregnancy and breastfeeding. Azithromycin Pregnancy And Lactation Text: This medication is considered safe during pregnancy and is also secreted in breast milk. Azelaic Acid Pregnancy And Lactation Text: This medication is considered safe during pregnancy and breast feeding. Aklief Pregnancy And Lactation Text: It is unknown if this medication is safe to use during pregnancy.  It is unknown if this medication is excreted in breast milk.  Breastfeeding women should use the topical cream on the smallest area of the skin for the shortest time needed while breastfeeding.  Do not apply to nipple and areola. Erythromycin Counseling:  I discussed with the patient the risks of erythromycin including but not limited to GI upset, allergic reaction, drug rash, diarrhea, increase in liver enzymes, and yeast infections. High Dose Vitamin A Counseling: Side effects reviewed, pt to contact office should one occur. Topical Retinoid counseling:  Patient advised to apply a pea-sized amount only at bedtime and wait 30 minutes after washing their face before applying.  If too drying, patient may add a non-comedogenic moisturizer. The patient verbalized understanding of the proper use and possible adverse effects of retinoids.  All of the patient's questions and concerns were addressed. Topical Clindamycin Counseling: Patient counseled that this medication may cause skin irritation or allergic reactions.  In the event of skin irritation, the patient was advised to reduce the amount of the drug applied or use it less frequently.   The patient verbalized understanding of the proper use and possible adverse effects of clindamycin.  All of the patient's questions and concerns were addressed. High Dose Vitamin A Pregnancy And Lactation Text: High dose vitamin A therapy is contraindicated during pregnancy and breast feeding. Erythromycin Pregnancy And Lactation Text: This medication is Pregnancy Category B and is considered safe during pregnancy. It is also excreted in breast milk. Doxycycline Counseling:  Patient counseled regarding possible photosensitivity and increased risk for sunburn.  Patient instructed to avoid sunlight, if possible.  When exposed to sunlight, patients should wear protective clothing, sunglasses, and sunscreen.  The patient was instructed to call the office immediately if the following severe adverse effects occur:  hearing changes, easy bruising/bleeding, severe headache, or vision changes.  The patient verbalized understanding of the proper use and possible adverse effects of doxycycline.  All of the patient's questions and concerns were addressed. Birth Control Pills Counseling: Birth Control Pill Counseling: I discussed with the patient the potential side effects of OCPs including but not limited to increased risk of stroke, heart attack, thrombophlebitis, deep venous thrombosis, hepatic adenomas, breast changes, GI upset, headaches, and depression.  The patient verbalized understanding of the proper use and possible adverse effects of OCPs. All of the patient's questions and concerns were addressed. Isotretinoin Counseling: Patient should get monthly blood tests, not donate blood, not drive at night if vision affected, not share medication, and not undergo elective surgery for 6 months after tx completed. Side effects reviewed, pt to contact office should one occur. Spironolactone Counseling: Patient advised regarding risks of diarrhea, abdominal pain, hyperkalemia, birth defects (for female patients), liver toxicity and renal toxicity. The patient may need blood work to monitor liver and kidney function and potassium levels while on therapy. The patient verbalized understanding of the proper use and possible adverse effects of spironolactone.  All of the patient's questions and concerns were addressed. Birth Control Pills Pregnancy And Lactation Text: This medication should be avoided if pregnant and for the first 30 days post-partum. Dapsone Counseling: I discussed with the patient the risks of dapsone including but not limited to hemolytic anemia, agranulocytosis, rashes, methemoglobinemia, kidney failure, peripheral neuropathy, headaches, GI upset, and liver toxicity.  Patients who start dapsone require monitoring including baseline LFTs and weekly CBCs for the first month, then every month thereafter.  The patient verbalized understanding of the proper use and possible adverse effects of dapsone.  All of the patient's questions and concerns were addressed. Bactrim Counseling:  I discussed with the patient the risks of sulfa antibiotics including but not limited to GI upset, allergic reaction, drug rash, diarrhea, dizziness, photosensitivity, and yeast infections.  Rarely, more serious reactions can occur including but not limited to aplastic anemia, agranulocytosis, methemoglobinemia, blood dyscrasias, liver or kidney failure, lung infiltrates or desquamative/blistering drug rashes. Tetracycline Counseling: Patient counseled regarding possible photosensitivity and increased risk for sunburn.  Patient instructed to avoid sunlight, if possible.  When exposed to sunlight, patients should wear protective clothing, sunglasses, and sunscreen.  The patient was instructed to call the office immediately if the following severe adverse effects occur:  hearing changes, easy bruising/bleeding, severe headache, or vision changes.  The patient verbalized understanding of the proper use and possible adverse effects of tetracycline.  All of the patient's questions and concerns were addressed. Patient understands to avoid pregnancy while on therapy due to potential birth defects. Azelaic Acid Counseling: Patient counseled that medicine may cause skin irritation and to avoid applying near the eyes.  In the event of skin irritation, the patient was advised to reduce the amount of the drug applied or use it less frequently.   The patient verbalized understanding of the proper use and possible adverse effects of azelaic acid.  All of the patient's questions and concerns were addressed. Aklief counseling:  Patient advised to apply a pea-sized amount only at bedtime and wait 30 minutes after washing their face before applying.  If too drying, patient may add a non-comedogenic moisturizer.  The most commonly reported side effects including irritation, redness, scaling, dryness, stinging, burning, itching, and increased risk of sunburn.  The patient verbalized understanding of the proper use and possible adverse effects of retinoids.  All of the patient's questions and concerns were addressed.

## 2023-12-18 NOTE — TELEPHONE ENCOUNTER
December 18, 2023       Jenny Howe MD  [address]  [address]  [facility] [address]  Via Fax: [phone]      Patient: Rachelle Olvera   YOB: 2014   Date of Visit: 12/18/2023       Dear Dr. Howe:    I saw your patient, Rachelle Olvera, for an evaluation. Below are my notes for this visit with her.    If you have questions, please do not hesitate to call me.      Sincerely,        Mery John CNP        CC: No Recipients  Mery John CNP  12/18/2023 10:30 AM  Signed    PEDIATRIC NEUROLOGY     Patient Name: Rachelle Olvera   MRN: [id number]    Date of Birth / Age / Sex: 2014 / 9 year old / female   Encounter Date: 12/18/23        Rachelle Olvera is a 9 year old female who is being evaluated via live interactive two-way video.    The patient has been informed that their consent to treat includes permission to submit claims to their insurance on their behalf for the services received.  Clinic Location: [address]    Patient Location: home  she was accompanied by Mother    A comprehensive physical exam could not be performed due to the limitations of telehealth.  Results should be interpreted accordingly.      Chief Complaint:    Chief Complaint   Patient presents with   • Video Visit     NPVV. Prev Est Pt of Dr Jackson. ADHD , Generalized Anxiety,          History of Present Illness:     Rachelle Olvera is a 9 year old female who presents today for a FOLLOW-UP VISIT accompanied by mother for evaluation of ADHD     Consult requested by No ref. provider found    Problem List  ADHD  Onset: 2021  Occur at school: Yes  Occur at home: Yes  Symptoms: easy distractibility and difficulty with focusing  Social Disruption/Affect Self Esteem: No  Dangerous Behavior: No  School Accomodations: 504, breaks, calm down zone, social work, testing accommodations such as smaller room with less distractions  Medications: Focalin XR 10mg, takes @ 7:30 AM, wearing off  Have her continue to monitor for new symptoms. Culture pending. @ 4-5 PM with return of symptoms  Medication Side Effects: Lunch isn't great but   Worsening of symptoms or behavioral outburst with stimulants: No  Neuropsych evaluation: Yes 2021 was diagnosed with generalized anxiety, provisional tic disorder and possibly ADHD. Also mentioned a mood disorder but did not specify what type    Tics:  Onset: A few years ago  Current symptoms: throat clearing  Wax and wane: Yes but have mostly been under good control  Triggers: Stressful situations  Relieving factors:   Social Disruption: other children do not comment, do not cause pain, other adults do not comment, parents concerned  Medications: Takes Prozac for anxiety/ OCD which helps  Medication Side Effects: no headache, stomachache, weight loss, sleep disturbance, and cardiovascular (chest pain, increased blood pressure)  Mood/ anxiousness: Doing well for the most part.  If there is something very challenging she can become anxious. 99% of the time she is great.      Sleep  School Days:   In bed  8 pm   Electronic devices   Falls asleep  8:15 pm  Wakes 6-6:30 am.   Naps  No        Difficulties falling asleep: No  Interrupted sleep: No  Snoring: No  Repetitive rhythmical movements:No  Incontinence: no  Treatment: melatonin not needed  Sleep specialist/polysomnogram: no      Prior Evaluation:  MRI: No   MRA/MRV: No   EEG: No  Neuro-psych Eval: Yes    Previous History    08/2023 Increased Folcain XR to 10mg  02/2023 - Still having impulsivity despite controlled anxiety.  Started Focalin XR 5mg.  02/2022 Increase Prozac to 20mg  12/2021 Had neuropsych testing and it was diagnosed with generalized anxiety, provisional tic disorder and possibly ADHD.  12/2021 Saw Dr. Jackson for school avoidance issues for ~ 2 months.  Had been recently started on Prozac and titrated to 10mg. At visit Dr. Jackson increased to 15mg.    Birth History  Full Term   No issues    Development:   Gross motor: Sligh delays but never needed therapies and caught  up on own  Fine motor: No issues or concerns  Language:No delays or issues  Social: Has some issues relating with peers.  Working with social work once a week and working on pragmatic language speech. Does well interacting with adults  Grade:  4th    Family History:            Developmental delay  None    Learning disabilities   None    Migraine headache   Mom    Epilepsy    None    Neuromuscular disease  None    Movement disorders  None    Early stroke (under age 40 years) None    Early cardiac death  No family history on file.    Social History:    Members of household:  Mom, dad and older sibling     Grade: 4th grade. Does well in school. Has some issues with testing and standardized test scores    Past Medical History   Past Medical History:   Diagnosis Date   • Anxiety disorder        Medications   Current Outpatient Medications   Medication Sig Dispense Refill   • FLUoxetine (PROzac) 20 MG capsule Take 1 capsule by mouth daily. 30 capsule 0   • dexmethylphenidate (FOCALIN XR) 10 MG 24 hr capsule Take 1 capsule by mouth daily. 30 capsule 0     No current facility-administered medications for this visit.        Allergies  ALLERGIES:  Not on File    I have reviewed the above information listed in the medical record as obtained by my nursing and support staff.    ROS  With all of the above there is no seizure-like activity, no incontinence, no periods of non responsiveness, no altered mental status, no blurry vision, no diplopia, no photophobia, no phonophobia, no loss of vision, no spots/scotomas,  no nystagmus, no focal numbness, no focal weakness, no positional quality, no nighttime awakening with emesis, no tinnitus, no dysarthria/slurred speech, no ataxia,  no tremors, no nausea, no vomiting, no dizziness, no vertiginous symptoms, no worsening of symptoms with activity.       Review of Systems      Physical Examination:  Vital signs:  There were no vitals taken for this visit.  Vitals - Patient  Reported  Weight - Patient Reported: 85 lb (38.6 kg)  Height - Patient Reported: 4' 10\" (147.3 cm)  BMI kg/m2: 17.76        GENERAL  Appearance - Well developed, well nourished  Head - Normocephalic, no dysmorphic features  Eyes - Eyes normally spaced.   Ears/Nose/Mouth - Ears normal in size and shape. No nasal discharge.   Neck - Normal ROM  Respiratory/Cardiovascular - Easy WOB, no cyanosis  GI - Deferred   - Deferred  Spine/Extremities - No obvious deformities, normal active range of motion  Skin - No visible rashes or neurocutaneous signs    NEUROLOGICAL  Mental Status - Alert. Interactive. No acute distress  Speech is fluent and appropriate for age.  Cognition / fund of knowledge is appropriate for age.  Cranial Nerves   II - Pupils appear equal and round  III, IV, VI - extraocular movements intact.  V - appears to have normal facial sensation  VII - no facial weakness  VIII - hearing grossly intact to conversation  IX, X, XII - normal swallowing and phonation  XI - normal shoulder shrugs  Motor - Normal bulk. No gross asymmetries.  No adventitious movements. Appears to have normal strength  Reflexes - Deferred.   Sensory - Deferred  Coordination - Smooth voluntary movements  Gait - Normal casual gait    Problem List  1. Attention deficit hyperactivity disorder (ADHD), combined type    2. Generalized anxiety disorder    3. Tic disorder    4. Medication management         Impression  Rachelle is a pleasant 9 year old female with a history as documented above. Her ADHD is currently under good control and the stimulant medication is lasting throughout the school day. The Prozac is also helping with anxiety and OCD symptoms. Rarely she will exhibit throat clearing tic but per mom the vast majority of the time they don't see any tics.      I have reviewed with family the work-up as well as potential etiologies and diagnostic and therapeutic options going forward. Family feels comfortable and is in agreement with  the below outlined recommendations.    Recommendations  Continue Focalin XR 10 mg - 1 QAM  Continue Prozac 20mg daioly  Monitor for side effects such as tics, sleep changes, mood changes, weight changes  Pending clinical progress may consider biofeedback/psychology evaluation for cognitive behavioral therapy given history of tics and potential anxiety which may be contributing to symptoms  Family to call if there are any changes and adjustments can be considered.       Return in about 5 months (around 5/18/2024).    I have discussed the nature of the condition, course, prognosis, plan, recommendations and outcome extensively with the family    I spent a total of 37 minutes on the day of the visit.  This includes time spent reviewing records, pre-charting, direct communication with the patient and family, coordination of care and documentation, and referring/communicating with other health care professionals.        Mery John, CNP  Pediatric Neurology  [facility]

## 2024-01-29 ENCOUNTER — APPOINTMENT (OUTPATIENT)
Dept: GENERAL RADIOLOGY | Age: 84
DRG: 871 | End: 2024-01-29
Payer: MEDICARE

## 2024-01-29 ENCOUNTER — HOSPITAL ENCOUNTER (INPATIENT)
Age: 84
LOS: 3 days | Discharge: HOME | DRG: 871 | End: 2024-02-01
Attending: EMERGENCY MEDICINE | Admitting: STUDENT IN AN ORGANIZED HEALTH CARE EDUCATION/TRAINING PROGRAM
Payer: MEDICARE

## 2024-01-29 DIAGNOSIS — N17.9 AKI (ACUTE KIDNEY INJURY) (HCC): ICD-10-CM

## 2024-01-29 DIAGNOSIS — I73.9 PAD (PERIPHERAL ARTERY DISEASE) (HCC): ICD-10-CM

## 2024-01-29 DIAGNOSIS — R40.4 TRANSIENT ALTERATION OF AWARENESS: Primary | ICD-10-CM

## 2024-01-29 DIAGNOSIS — L03.116 CELLULITIS OF LEFT LOWER EXTREMITY: ICD-10-CM

## 2024-01-29 PROBLEM — R41.82 ALTERED MENTAL STATUS: Status: ACTIVE | Noted: 2024-01-29

## 2024-01-29 LAB
ALBUMIN SERPL BCG-MCNC: 3.3 G/DL (ref 3.5–5.1)
ALP SERPL-CCNC: 65 U/L (ref 38–126)
ALT SERPL W/O P-5'-P-CCNC: < 5 U/L (ref 11–66)
ANION GAP SERPL CALC-SCNC: 9 MEQ/L (ref 8–16)
AST SERPL-CCNC: < 5 U/L (ref 5–40)
BACTERIA URNS QL MICRO: ABNORMAL /HPF
BASOPHILS ABSOLUTE: 0 THOU/MM3 (ref 0–0.1)
BASOPHILS NFR BLD AUTO: 0.2 %
BILIRUB SERPL-MCNC: 0.5 MG/DL (ref 0.3–1.2)
BILIRUB UR QL STRIP.AUTO: NEGATIVE
BUN SERPL-MCNC: 37 MG/DL (ref 7–22)
CALCIUM SERPL-MCNC: 9.2 MG/DL (ref 8.5–10.5)
CASTS #/AREA URNS LPF: ABNORMAL /LPF
CASTS 2: ABNORMAL /LPF
CHARACTER UR: CLEAR
CHLORIDE SERPL-SCNC: 96 MEQ/L (ref 98–111)
CO2 SERPL-SCNC: 30 MEQ/L (ref 23–33)
COLOR: YELLOW
CREAT SERPL-MCNC: 1.3 MG/DL (ref 0.4–1.2)
CRP SERPL-MCNC: 20.89 MG/DL (ref 0–1)
CRYSTALS URNS MICRO: ABNORMAL
DEPRECATED RDW RBC AUTO: 52.9 FL (ref 35–45)
EOSINOPHIL NFR BLD AUTO: 0 %
EOSINOPHILS ABSOLUTE: 0 THOU/MM3 (ref 0–0.4)
EPITHELIAL CELLS, UA: ABNORMAL /HPF
ERYTHROCYTE [DISTWIDTH] IN BLOOD BY AUTOMATED COUNT: 15.9 % (ref 11.5–14.5)
ERYTHROCYTE [SEDIMENTATION RATE] IN BLOOD BY WESTERGREN METHOD: > 120 MM/HR (ref 0–20)
FLUAV RNA RESP QL NAA+PROBE: NOT DETECTED
FLUBV RNA RESP QL NAA+PROBE: NOT DETECTED
GFR SERPL CREATININE-BSD FRML MDRD: 41 ML/MIN/1.73M2
GLUCOSE BLD STRIP.AUTO-MCNC: 275 MG/DL (ref 70–108)
GLUCOSE SERPL-MCNC: 233 MG/DL (ref 70–108)
GLUCOSE UR QL STRIP.AUTO: NEGATIVE MG/DL
HCT VFR BLD AUTO: 31.8 % (ref 37–47)
HGB BLD-MCNC: 9.6 GM/DL (ref 12–16)
HGB UR QL STRIP.AUTO: ABNORMAL
IMM GRANULOCYTES # BLD AUTO: 0.12 THOU/MM3 (ref 0–0.07)
IMM GRANULOCYTES NFR BLD AUTO: 0.7 %
KETONES UR QL STRIP.AUTO: NEGATIVE
LYMPHOCYTES ABSOLUTE: 1 THOU/MM3 (ref 1–4.8)
LYMPHOCYTES NFR BLD AUTO: 5.2 %
MCH RBC QN AUTO: 27.5 PG (ref 26–33)
MCHC RBC AUTO-ENTMCNC: 30.2 GM/DL (ref 32.2–35.5)
MCV RBC AUTO: 91.1 FL (ref 81–99)
MISCELLANEOUS 2: ABNORMAL
MONOCYTES ABSOLUTE: 1.3 THOU/MM3 (ref 0.4–1.3)
MONOCYTES NFR BLD AUTO: 7.1 %
NEUTROPHILS NFR BLD AUTO: 86.8 %
NITRITE UR QL STRIP: NEGATIVE
NRBC BLD AUTO-RTO: 0 /100 WBC
OSMOLALITY SERPL CALC.SUM OF ELEC: 286.3 MOSMOL/KG (ref 275–300)
PH UR STRIP.AUTO: 7 [PH] (ref 5–9)
PLATELET # BLD AUTO: 398 THOU/MM3 (ref 130–400)
PMV BLD AUTO: 10 FL (ref 9.4–12.4)
POTASSIUM SERPL-SCNC: 4.8 MEQ/L (ref 3.5–5.2)
PROCALCITONIN SERPL IA-MCNC: 0.59 NG/ML (ref 0.01–0.09)
PROT SERPL-MCNC: 6.3 G/DL (ref 6.1–8)
PROT UR STRIP.AUTO-MCNC: NEGATIVE MG/DL
RBC # BLD AUTO: 3.49 MILL/MM3 (ref 4.2–5.4)
RBC URINE: ABNORMAL /HPF
RENAL EPI CELLS #/AREA URNS HPF: ABNORMAL /[HPF]
SARS-COV-2 RNA RESP QL NAA+PROBE: NOT DETECTED
SEGMENTED NEUTROPHILS ABSOLUTE COUNT: 16 THOU/MM3 (ref 1.8–7.7)
SODIUM SERPL-SCNC: 135 MEQ/L (ref 135–145)
SP GR UR REFRACT.AUTO: 1.01 (ref 1–1.03)
UROBILINOGEN, URINE: 0.2 EU/DL (ref 0–1)
WBC # BLD AUTO: 18.4 THOU/MM3 (ref 4.8–10.8)
WBC #/AREA URNS HPF: ABNORMAL /HPF
WBC #/AREA URNS HPF: NEGATIVE /[HPF]
YEAST LIKE FUNGI URNS QL MICRO: ABNORMAL

## 2024-01-29 PROCEDURE — 86140 C-REACTIVE PROTEIN: CPT

## 2024-01-29 PROCEDURE — 87040 BLOOD CULTURE FOR BACTERIA: CPT

## 2024-01-29 PROCEDURE — 71046 X-RAY EXAM CHEST 2 VIEWS: CPT

## 2024-01-29 PROCEDURE — 84145 PROCALCITONIN (PCT): CPT

## 2024-01-29 PROCEDURE — 72170 X-RAY EXAM OF PELVIS: CPT

## 2024-01-29 PROCEDURE — 81001 URINALYSIS AUTO W/SCOPE: CPT

## 2024-01-29 PROCEDURE — 99285 EMERGENCY DEPT VISIT HI MDM: CPT

## 2024-01-29 PROCEDURE — 96374 THER/PROPH/DIAG INJ IV PUSH: CPT

## 2024-01-29 PROCEDURE — 73552 X-RAY EXAM OF FEMUR 2/>: CPT

## 2024-01-29 PROCEDURE — 87636 SARSCOV2 & INF A&B AMP PRB: CPT

## 2024-01-29 PROCEDURE — 85651 RBC SED RATE NONAUTOMATED: CPT

## 2024-01-29 PROCEDURE — 51701 INSERT BLADDER CATHETER: CPT

## 2024-01-29 PROCEDURE — 99223 1ST HOSP IP/OBS HIGH 75: CPT | Performed by: PHYSICIAN ASSISTANT

## 2024-01-29 PROCEDURE — 85025 COMPLETE CBC W/AUTO DIFF WBC: CPT

## 2024-01-29 PROCEDURE — 1200000000 HC SEMI PRIVATE

## 2024-01-29 PROCEDURE — 2580000003 HC RX 258: Performed by: STUDENT IN AN ORGANIZED HEALTH CARE EDUCATION/TRAINING PROGRAM

## 2024-01-29 PROCEDURE — 82948 REAGENT STRIP/BLOOD GLUCOSE: CPT

## 2024-01-29 PROCEDURE — 36415 COLL VENOUS BLD VENIPUNCTURE: CPT

## 2024-01-29 PROCEDURE — 6360000002 HC RX W HCPCS: Performed by: STUDENT IN AN ORGANIZED HEALTH CARE EDUCATION/TRAINING PROGRAM

## 2024-01-29 PROCEDURE — 2580000003 HC RX 258: Performed by: PHYSICIAN ASSISTANT

## 2024-01-29 PROCEDURE — 80053 COMPREHEN METABOLIC PANEL: CPT

## 2024-01-29 RX ORDER — ONDANSETRON 4 MG/1
4 TABLET, ORALLY DISINTEGRATING ORAL EVERY 8 HOURS PRN
Status: DISCONTINUED | OUTPATIENT
Start: 2024-01-29 | End: 2024-02-01 | Stop reason: HOSPADM

## 2024-01-29 RX ORDER — SODIUM CHLORIDE 0.9 % (FLUSH) 0.9 %
5-40 SYRINGE (ML) INJECTION EVERY 12 HOURS SCHEDULED
Status: DISCONTINUED | OUTPATIENT
Start: 2024-01-29 | End: 2024-02-01 | Stop reason: HOSPADM

## 2024-01-29 RX ORDER — ONDANSETRON 2 MG/ML
4 INJECTION INTRAMUSCULAR; INTRAVENOUS EVERY 6 HOURS PRN
Status: DISCONTINUED | OUTPATIENT
Start: 2024-01-29 | End: 2024-02-01 | Stop reason: HOSPADM

## 2024-01-29 RX ORDER — POLYETHYLENE GLYCOL 3350 17 G/17G
17 POWDER, FOR SOLUTION ORAL DAILY PRN
Status: DISCONTINUED | OUTPATIENT
Start: 2024-01-29 | End: 2024-02-01 | Stop reason: HOSPADM

## 2024-01-29 RX ORDER — MAGNESIUM SULFATE IN WATER 40 MG/ML
2000 INJECTION, SOLUTION INTRAVENOUS PRN
Status: DISCONTINUED | OUTPATIENT
Start: 2024-01-29 | End: 2024-02-01 | Stop reason: HOSPADM

## 2024-01-29 RX ORDER — PREDNISONE 1 MG/1
1 TABLET ORAL DAILY
COMMUNITY

## 2024-01-29 RX ORDER — SODIUM CHLORIDE 9 MG/ML
INJECTION, SOLUTION INTRAVENOUS CONTINUOUS
Status: ACTIVE | OUTPATIENT
Start: 2024-01-29 | End: 2024-01-31

## 2024-01-29 RX ORDER — ACETAMINOPHEN 325 MG/1
650 TABLET ORAL EVERY 6 HOURS PRN
Status: DISCONTINUED | OUTPATIENT
Start: 2024-01-29 | End: 2024-02-01 | Stop reason: HOSPADM

## 2024-01-29 RX ORDER — ACETAMINOPHEN 650 MG/1
650 SUPPOSITORY RECTAL EVERY 6 HOURS PRN
Status: DISCONTINUED | OUTPATIENT
Start: 2024-01-29 | End: 2024-02-01 | Stop reason: HOSPADM

## 2024-01-29 RX ORDER — BUMETANIDE 0.5 MG/1
0.5 TABLET ORAL DAILY
COMMUNITY

## 2024-01-29 RX ORDER — SODIUM CHLORIDE 0.9 % (FLUSH) 0.9 %
5-40 SYRINGE (ML) INJECTION PRN
Status: DISCONTINUED | OUTPATIENT
Start: 2024-01-29 | End: 2024-02-01 | Stop reason: HOSPADM

## 2024-01-29 RX ORDER — METHENAMINE HIPPURATE 1000 MG/1
1 TABLET ORAL 2 TIMES DAILY WITH MEALS
COMMUNITY

## 2024-01-29 RX ORDER — POTASSIUM CHLORIDE 7.45 MG/ML
10 INJECTION INTRAVENOUS PRN
Status: DISCONTINUED | OUTPATIENT
Start: 2024-01-29 | End: 2024-02-01 | Stop reason: HOSPADM

## 2024-01-29 RX ORDER — SODIUM CHLORIDE 9 MG/ML
INJECTION, SOLUTION INTRAVENOUS PRN
Status: DISCONTINUED | OUTPATIENT
Start: 2024-01-29 | End: 2024-02-01 | Stop reason: HOSPADM

## 2024-01-29 RX ORDER — SODIUM CHLORIDE, SODIUM LACTATE, POTASSIUM CHLORIDE, AND CALCIUM CHLORIDE .6; .31; .03; .02 G/100ML; G/100ML; G/100ML; G/100ML
500 INJECTION, SOLUTION INTRAVENOUS ONCE
Status: COMPLETED | OUTPATIENT
Start: 2024-01-29 | End: 2024-01-29

## 2024-01-29 RX ORDER — POTASSIUM CHLORIDE 20 MEQ/1
40 TABLET, EXTENDED RELEASE ORAL PRN
Status: DISCONTINUED | OUTPATIENT
Start: 2024-01-29 | End: 2024-02-01 | Stop reason: HOSPADM

## 2024-01-29 RX ADMIN — SODIUM CHLORIDE, PRESERVATIVE FREE 10 ML: 5 INJECTION INTRAVENOUS at 22:17

## 2024-01-29 RX ADMIN — SODIUM CHLORIDE, POTASSIUM CHLORIDE, SODIUM LACTATE AND CALCIUM CHLORIDE 500 ML: 600; 310; 30; 20 INJECTION, SOLUTION INTRAVENOUS at 16:07

## 2024-01-29 RX ADMIN — PIPERACILLIN AND TAZOBACTAM 4500 MG: 4; .5 INJECTION, POWDER, FOR SOLUTION INTRAVENOUS at 18:46

## 2024-01-29 RX ADMIN — VANCOMYCIN HYDROCHLORIDE 1500 MG: 5 INJECTION, POWDER, LYOPHILIZED, FOR SOLUTION INTRAVENOUS at 16:14

## 2024-01-29 RX ADMIN — SODIUM CHLORIDE: 9 INJECTION, SOLUTION INTRAVENOUS at 22:20

## 2024-01-29 ASSESSMENT — ENCOUNTER SYMPTOMS
VOMITING: 0
SHORTNESS OF BREATH: 0
NAUSEA: 0

## 2024-01-29 ASSESSMENT — PAIN - FUNCTIONAL ASSESSMENT: PAIN_FUNCTIONAL_ASSESSMENT: NONE - DENIES PAIN

## 2024-01-29 NOTE — H&P
History & Physical        Patient:  Carmen Howard  YOB: 1940    MRN: [id number]     Acct: [id number]    PCP: No primary care provider on file.    Date of Admission: 1/29/2024    Date of Service: Pt seen/examined on 01/29/24  and Admitted to Inpatient with expected LOS greater than two midnights due to medical therapy.     ASSESSMENT/PLAN:    Sepsis with associated Metabolic Encephalopathy: suspect 2/2 Cellulitis, SIRS 3/4   -Zosyn and vancomycin initiated in the ER, continue, Hx of MRSA, PCR swab ordered   -Infectious disease consultation for evaluation tomorrow   -Urinalysis without evidence of UTI   -Procalcitonin elevated at 0.59, CRP elevated at 20.8   -Continue IVF resuscitation   -Trend WBC   -Blood Cultures obtained   -Covid and Flu negative    SHARIF: Cr 1.3, baseline ~0.7   -Continue IVF hydration   -Trend BMP as needed    History of DVT:   -Previously on Eliquis, continue    COPD, not in acute exacerbation:   -Continue Home inhalers    Hyperlipidemia:   -Continue statin    Nocturnal hypoxemia: Noted per chart review    History of ulcerative colitis: Noted per chart review    Chief Complaint: Altered mental status      History Of Present Illness:    83 y.o. female who presented to [facility] with altered mental status. Patient presents from SNF where she reportedly had some altered mental status. Patient's daughter present in the room. She reports increased confusion at SNF. Patient is alert and oriented x 3. She denies recent fevers. She denies chest pain. She denies SOB. She denies nausea or vomiting. She has a history of UTI but urinalysis not convincing. She denies dysuria sxs. She used to be on Eliquis per chart review but daughter is not sure that patient still takes this. Patient reportedly hasn't eaten since Friday. Patient has a wound to her LLE, she has an appointment scheduled with somebody on Friday. Patient is a DNR-CCA per chart review.    Past Medical    Gastrointestinal:  Negative for nausea and vomiting.   Genitourinary:  Negative for dysuria.        PHYSICAL EXAM:    BP (!) 131/55   Pulse 94   Temp 98.5 °F (36.9 °C) (Oral)   Resp 24   Wt 64.4 kg (142 lb)   SpO2 96%   BMI 23.63 kg/m²     Physical Exam  Constitutional:       Interventions: She is not intubated.     Comments: Shivering on exam   Cardiovascular:      Rate and Rhythm: Regular rhythm.      Comments: Intermittent tachycardia noted on monitor  Pulmonary:      Effort: She is not intubated.   Abdominal:      Tenderness: There is abdominal tenderness.   Neurological:      Mental Status: She is alert.   Psychiatric:         Speech: She is communicative.          Labs:     Recent Labs     01/29/24  1446   WBC 18.4*   HGB 9.6*   HCT 31.8*        Recent Labs     01/29/24  1446      K 4.8   CL 96*   CO2 30   BUN 37*   CREATININE 1.3*   CALCIUM 9.2     Recent Labs     01/29/24  1446   AST <5   ALT <5*   BILITOT 0.5   ALKPHOS 65     No results for input(s): \"INR\" in the last 72 hours.  No results for input(s): \"CKTOTAL\", \"TROPONINT\" in the last 72 hours.  Procalcitonin:  Recent Labs     01/29/24  1446   PROCAL 0.59*     Lactic Acid: No results for input(s): \"LACTA\" in the last 72 hours.     Urinalysis:      Lab Results   Component Value Date/Time    NITRU NEGATIVE 01/29/2024 02:55 PM    WBCUA 0-2 01/29/2024 02:55 PM    WBCUA 0-4 05/24/2018 01:05 AM    BACTERIA NONE SEEN 01/29/2024 02:55 PM    RBCUA 5-10 01/29/2024 02:55 PM    BLOODU TRACE 01/29/2024 02:55 PM    SPECGRAV 1.021 04/08/2023 10:23 AM    GLUCOSEU NEGATIVE 01/29/2024 02:55 PM       Radiology:     CXR:     EKG:  I have reviewed the EKG with the following interpretation:       Thank you No primary care provider on file. for the opportunity to be involved in this patient's care.    Electronically signed by Dario Ford PA-C on 1/29/2024 at 4:13 PM

## 2024-01-29 NOTE — ED PROVIDER NOTES
[facility] EMERGENCY DEPARTMENT    EMERGENCY MEDICINE     Patient Name: Carmen Howard  MRN: [id number]  YOB: 1940  Date of Evaluation: 1/29/2024  Treating Resident Physician: Anoop Goldstein MD  Supervising Physician: Ajit Gallardo MD    CHIEF COMPLAINT       Chief Complaint   Patient presents with    Altered Mental Status    Urinary Tract Infection       HISTORY OF PRESENT ILLNESS    HPI    History obtained from chart review and the patient.    Carmen Howard is a 83 y.o. female who presents to the emergency department from nursing home brought in by EMS for evaluation of collateral history provided by patient's daughter at bedside due to patient's altered mental status.  Patient reports that her legs hurt and she is having difficulty walking.  Daughter reports that she has been having ongoing urinary tract infection for the last 3 weeks and has been on medications for this though she is not sure the name of what.  Approximately 3 days ago started having decreased appetite not acting herself complaining of more pain in her legs.  The family requested patient be sent to the emergency room for evaluation from the nursing home.    Pertinent previous and/or external records reviewed:  No recent urine cultures on file  REVIEW OF SYSTEMS   Review of Systems  Negative unless documented in HPI    PAST MEDICAL AND SURGICAL HISTORY     Past Medical History:   Diagnosis Date    Abnormal nuclear stress test 06/01/2018    Cataract     Chronic deep vein thrombosis (DVT) of femoral vein of left lower extremity (HCC) 3/9/2021    Colon polyps     COPD (chronic obstructive pulmonary disease) (HCC)     Diverticulosis large intestine w/o perforation or abscess w/bleeding     DVT, recurrent, lower extremity, acute, left (HCC)     Fibroid, uterine     Hearing loss     Hx of blood clots     Hyperlipidemia     Nicotine dependence     Nocturnal hypoxemia     Postmenopausal     Sleep apnea     Ulcerative colitis (HCC)      administered this visit (None if left blank):   Medications   lactated ringers bolus bolus 500 mL (500 mLs IntraVENous New Bag 1/29/24 1607)   piperacillin-tazobactam (ZOSYN) 4,500 mg in sodium chloride 0.9 % 100 mL IVPB (mini-bag) (has no administration in time range)   vancomycin (VANCOCIN) 1500 mg in sodium chloride 0.9 % 250 mL IVPB (1,500 mg IntraVENous New Bag 1/29/24 1614)       ED Course as of 01/29/24 1723   Mon Jan 29, 2024   1458 WBC(!): 18.4 [SC]   1522 Creatinine(!): 1.3  Acute kidney injury, baseline appears to be 0.6 [SC]   1547 Referral for admission sent to Everardo Espino MD ([id number]) via [employer] Text Message.  [SC]   1610 Signout take, resident primary, assited living facility, Ams hx of frequent UTI's, pending admission [GALE]   1611 Discussed results with patient and her daughter at bedside. [SC]      ED Course User Index  [GALE] Sammy Thorne DO  [SC] Anoop Goldstein MD       Procedures: (None if left blank)  Procedures:     Consultants:  PHARMACY TO DOSE VANCOMYCIN    Documentation:  N/A    MEDICATION CHANGES     New Prescriptions    No medications on file       FINAL IMPRESSION     Final diagnoses:   Transient alteration of awareness   Cellulitis of left lower extremity   SHARIF (acute kidney injury) (HCC)       DISPOSITION   DISPOSITION Admitted 01/29/2024 04:16:06 PM      Results and plan discussed with patient at bedside. Patient is agreeable to plan.       This transcription was electronically signed. Parts of this transcriptions may have been dictated by use of voice recognition software and electronically transcribed. The transcription may contain errors not detected in proofreading. Please refer to my supervising physician's documentation if my documentation differs.    Electronically Signed: Anoop Goldstein MD, 01/29/24, 5:23 PM

## 2024-01-29 NOTE — ED TRIAGE NOTES
Pt arrived via ems with the chief complaint of confusion. Pt is alert to place. Respirations are even and unlabored. Labs obtained at this time. Call light in reach. Dr. Goldstein to bedside at this time.

## 2024-01-29 NOTE — ED PROVIDER NOTES
[facility]  EMERGENCY MEDICINE ATTENDING ATTESTATION      Evaluation of Carmen Howard.   Case discussed and care plan developed with resident physician.   I agree with the resident physician documentation and plan as documented by him, except if my documentation differs.   Patient seen, interviewed and examined by me.   I reviewed the medical, surgical, family and social history, medications and allergies.   I have reviewed and interpreted all available lab, radiology and ekg results available at the moment.  I have reviewed the nursing documentation.       Brief H&P   Patient brought in by EMS from the [facility] for altered mental status.  Patient's daughter states that her behavior has changed like this in the past when she gets a UTI.  Additionally states that she has noticed some redness to the left lower extremity that seems to be improving but slowly.    Physical exam is notable for chronically ill appearing elderly female, disoriented to time but pleasant.  There is erythema and induration in the posterior left lower extremity, warm to touch.  There is a central ulcer that is healing.      Medical Decision Making   MDM:   Altered mental status  Possible UTI  Healing leg cellulitis  SHARIF  Plan:   IV line, labs  EKG  Imaging  Start antibiotics  Observation in the ED while awaiting results  Patient will need to be admitted    Please see the resident physician completed note for final disposition except as documented on this attestation.   I have reviewed and interpreted all available lab, radiology and ekg results available at the moment.  Diagnosis, treatment and disposition plans were discussed and agreed upon by patient.   This transcription was electronically signed. It was dictated by use of voice recognition software and electronically transcribed. The transcription may contain errors not detected in proofreading.     I performed direct supervision

## 2024-01-29 NOTE — ED NOTES
Patient medicated per MAR at this time. Patient sitting up in bed and eating at this time. Patient denies further questions. Patient VSS. Respirs are easy and no acute distress noted. Call light sherron ordaz. Family at the bedside.

## 2024-01-30 ENCOUNTER — APPOINTMENT (OUTPATIENT)
Dept: INTERVENTIONAL RADIOLOGY/VASCULAR | Age: 84
DRG: 871 | End: 2024-01-30
Payer: MEDICARE

## 2024-01-30 LAB
ANION GAP SERPL CALC-SCNC: 12 MEQ/L (ref 8–16)
BASOPHILS ABSOLUTE: 0 THOU/MM3 (ref 0–0.1)
BASOPHILS NFR BLD AUTO: 0.4 %
BUN SERPL-MCNC: 25 MG/DL (ref 7–22)
CALCIUM SERPL-MCNC: 8.4 MG/DL (ref 8.5–10.5)
CHLORIDE SERPL-SCNC: 101 MEQ/L (ref 98–111)
CO2 SERPL-SCNC: 24 MEQ/L (ref 23–33)
CREAT SERPL-MCNC: 1 MG/DL (ref 0.4–1.2)
DEPRECATED RDW RBC AUTO: 52.6 FL (ref 35–45)
EOSINOPHIL NFR BLD AUTO: 0.7 %
EOSINOPHILS ABSOLUTE: 0.1 THOU/MM3 (ref 0–0.4)
ERYTHROCYTE [DISTWIDTH] IN BLOOD BY AUTOMATED COUNT: 16 % (ref 11.5–14.5)
GFR SERPL CREATININE-BSD FRML MDRD: 56 ML/MIN/1.73M2
GLUCOSE SERPL-MCNC: 126 MG/DL (ref 70–108)
HCT VFR BLD AUTO: 29.1 % (ref 37–47)
HGB BLD-MCNC: 8.9 GM/DL (ref 12–16)
IMM GRANULOCYTES # BLD AUTO: 0.09 THOU/MM3 (ref 0–0.07)
IMM GRANULOCYTES NFR BLD AUTO: 0.7 %
LYMPHOCYTES ABSOLUTE: 1.8 THOU/MM3 (ref 1–4.8)
LYMPHOCYTES NFR BLD AUTO: 14.8 %
MCH RBC QN AUTO: 27.7 PG (ref 26–33)
MCHC RBC AUTO-ENTMCNC: 30.6 GM/DL (ref 32.2–35.5)
MCV RBC AUTO: 90.7 FL (ref 81–99)
MONOCYTES ABSOLUTE: 1.2 THOU/MM3 (ref 0.4–1.3)
MONOCYTES NFR BLD AUTO: 9.7 %
NEUTROPHILS NFR BLD AUTO: 73.7 %
NRBC BLD AUTO-RTO: 0 /100 WBC
PLATELET # BLD AUTO: 380 THOU/MM3 (ref 130–400)
PMV BLD AUTO: 10.2 FL (ref 9.4–12.4)
POTASSIUM SERPL-SCNC: 3.7 MEQ/L (ref 3.5–5.2)
RBC # BLD AUTO: 3.21 MILL/MM3 (ref 4.2–5.4)
SEGMENTED NEUTROPHILS ABSOLUTE COUNT: 8.9 THOU/MM3 (ref 1.8–7.7)
SODIUM SERPL-SCNC: 137 MEQ/L (ref 135–145)
WBC # BLD AUTO: 12.1 THOU/MM3 (ref 4.8–10.8)

## 2024-01-30 PROCEDURE — 2580000003 HC RX 258: Performed by: PHARMACIST

## 2024-01-30 PROCEDURE — 1200000000 HC SEMI PRIVATE

## 2024-01-30 PROCEDURE — 6370000000 HC RX 637 (ALT 250 FOR IP): Performed by: PHYSICIAN ASSISTANT

## 2024-01-30 PROCEDURE — 2580000003 HC RX 258: Performed by: PHYSICIAN ASSISTANT

## 2024-01-30 PROCEDURE — 85025 COMPLETE CBC W/AUTO DIFF WBC: CPT

## 2024-01-30 PROCEDURE — 36415 COLL VENOUS BLD VENIPUNCTURE: CPT

## 2024-01-30 PROCEDURE — 6360000002 HC RX W HCPCS: Performed by: PHYSICIAN ASSISTANT

## 2024-01-30 PROCEDURE — 94640 AIRWAY INHALATION TREATMENT: CPT

## 2024-01-30 PROCEDURE — 80048 BASIC METABOLIC PNL TOTAL CA: CPT

## 2024-01-30 PROCEDURE — 6360000002 HC RX W HCPCS: Performed by: PHARMACIST

## 2024-01-30 PROCEDURE — 99233 SBSQ HOSP IP/OBS HIGH 50: CPT | Performed by: PHYSICIAN ASSISTANT

## 2024-01-30 PROCEDURE — 93925 LOWER EXTREMITY STUDY: CPT

## 2024-01-30 RX ORDER — DONEPEZIL HYDROCHLORIDE 10 MG/1
10 TABLET, FILM COATED ORAL NIGHTLY
Status: DISCONTINUED | OUTPATIENT
Start: 2024-01-30 | End: 2024-02-01 | Stop reason: HOSPADM

## 2024-01-30 RX ORDER — LISINOPRIL 20 MG/1
20 TABLET ORAL DAILY
Status: DISCONTINUED | OUTPATIENT
Start: 2024-01-30 | End: 2024-02-01 | Stop reason: HOSPADM

## 2024-01-30 RX ORDER — ALBUTEROL SULFATE 2.5 MG/3ML
2.5 SOLUTION RESPIRATORY (INHALATION) EVERY 6 HOURS PRN
Status: DISCONTINUED | OUTPATIENT
Start: 2024-01-30 | End: 2024-01-30

## 2024-01-30 RX ORDER — BUMETANIDE 0.5 MG/1
0.5 TABLET ORAL DAILY
Status: DISCONTINUED | OUTPATIENT
Start: 2024-01-30 | End: 2024-02-01 | Stop reason: HOSPADM

## 2024-01-30 RX ORDER — ALBUTEROL SULFATE 2.5 MG/3ML
2.5 SOLUTION RESPIRATORY (INHALATION) EVERY 4 HOURS PRN
Status: DISCONTINUED | OUTPATIENT
Start: 2024-01-30 | End: 2024-02-01 | Stop reason: HOSPADM

## 2024-01-30 RX ORDER — LANOLIN ALCOHOL/MO/W.PET/CERES
1000 CREAM (GRAM) TOPICAL DAILY
Status: DISCONTINUED | OUTPATIENT
Start: 2024-01-30 | End: 2024-02-01 | Stop reason: HOSPADM

## 2024-01-30 RX ORDER — PREDNISONE 1 MG/1
1 TABLET ORAL DAILY
Status: DISCONTINUED | OUTPATIENT
Start: 2024-01-30 | End: 2024-02-01 | Stop reason: HOSPADM

## 2024-01-30 RX ORDER — LANOLIN ALCOHOL/MO/W.PET/CERES
3 CREAM (GRAM) TOPICAL NIGHTLY PRN
Status: DISCONTINUED | OUTPATIENT
Start: 2024-01-30 | End: 2024-02-01 | Stop reason: HOSPADM

## 2024-01-30 RX ORDER — TRAZODONE HYDROCHLORIDE 50 MG/1
50 TABLET ORAL NIGHTLY
Status: DISCONTINUED | OUTPATIENT
Start: 2024-01-30 | End: 2024-02-01 | Stop reason: HOSPADM

## 2024-01-30 RX ORDER — ALBUTEROL SULFATE 2.5 MG/3ML
2.5 SOLUTION RESPIRATORY (INHALATION)
Status: DISCONTINUED | OUTPATIENT
Start: 2024-01-30 | End: 2024-02-01 | Stop reason: HOSPADM

## 2024-01-30 RX ORDER — ATORVASTATIN CALCIUM 20 MG/1
20 TABLET, FILM COATED ORAL DAILY
Status: DISCONTINUED | OUTPATIENT
Start: 2024-01-30 | End: 2024-02-01 | Stop reason: HOSPADM

## 2024-01-30 RX ADMIN — Medication 1000 MCG: at 13:39

## 2024-01-30 RX ADMIN — BUMETANIDE 0.5 MG: 0.5 TABLET ORAL at 13:38

## 2024-01-30 RX ADMIN — APIXABAN 5 MG: 5 TABLET, FILM COATED ORAL at 20:17

## 2024-01-30 RX ADMIN — LISINOPRIL 20 MG: 20 TABLET ORAL at 13:38

## 2024-01-30 RX ADMIN — ALBUTEROL SULFATE 2.5 MG: 2.5 SOLUTION RESPIRATORY (INHALATION) at 15:43

## 2024-01-30 RX ADMIN — CARBIDOPA AND LEVODOPA 2 TABLET: 25; 100 TABLET ORAL at 13:37

## 2024-01-30 RX ADMIN — APIXABAN 5 MG: 5 TABLET, FILM COATED ORAL at 13:38

## 2024-01-30 RX ADMIN — SERTRALINE HYDROCHLORIDE 50 MG: 50 TABLET ORAL at 13:38

## 2024-01-30 RX ADMIN — DONEPEZIL HYDROCHLORIDE 10 MG: 10 TABLET, FILM COATED ORAL at 20:19

## 2024-01-30 RX ADMIN — CARBIDOPA AND LEVODOPA 2 TABLET: 25; 100 TABLET ORAL at 20:17

## 2024-01-30 RX ADMIN — VANCOMYCIN HYDROCHLORIDE 750 MG: 1 INJECTION, POWDER, LYOPHILIZED, FOR SOLUTION INTRAVENOUS at 17:44

## 2024-01-30 RX ADMIN — PREDNISONE 1 MG: 1 TABLET ORAL at 13:37

## 2024-01-30 RX ADMIN — PIPERACILLIN AND TAZOBACTAM 3375 MG: 3; .375 INJECTION, POWDER, FOR SOLUTION INTRAVENOUS at 19:15

## 2024-01-30 RX ADMIN — METOPROLOL TARTRATE 12.5 MG: 25 TABLET, FILM COATED ORAL at 20:17

## 2024-01-30 RX ADMIN — MOMETASONE FUROATE AND FORMOTEROL FUMARATE DIHYDRATE 2 PUFF: 200; 5 AEROSOL RESPIRATORY (INHALATION) at 15:49

## 2024-01-30 RX ADMIN — TRAZODONE HYDROCHLORIDE 50 MG: 50 TABLET ORAL at 20:17

## 2024-01-30 RX ADMIN — PIPERACILLIN AND TAZOBACTAM 3375 MG: 3; .375 INJECTION, POWDER, FOR SOLUTION INTRAVENOUS at 13:36

## 2024-01-30 RX ADMIN — ATORVASTATIN CALCIUM 20 MG: 20 TABLET, FILM COATED ORAL at 13:37

## 2024-01-30 RX ADMIN — TIOTROPIUM BROMIDE INHALATION SPRAY 2 PUFF: 3.12 SPRAY, METERED RESPIRATORY (INHALATION) at 12:57

## 2024-01-30 RX ADMIN — SODIUM CHLORIDE: 9 INJECTION, SOLUTION INTRAVENOUS at 22:13

## 2024-01-30 RX ADMIN — SODIUM CHLORIDE: 9 INJECTION, SOLUTION INTRAVENOUS at 13:34

## 2024-01-30 RX ADMIN — METOPROLOL TARTRATE 12.5 MG: 25 TABLET, FILM COATED ORAL at 13:38

## 2024-01-30 ASSESSMENT — PAIN SCALES - GENERAL
PAINLEVEL_OUTOF10: 0
PAINLEVEL_OUTOF10: 0

## 2024-01-30 NOTE — PLAN OF CARE
Problem: Respiratory - Adult  Goal: Clear lung sounds  Outcome: Progressing  Note: Mdi to maintain open airways. Patient mutually agreed on goals.

## 2024-01-30 NOTE — PROGRESS NOTES
[facility]   Pharmacy Pharmacokinetic Monitoring Service - Vancomycin     Carmen Howard is a 83 y.o. female starting on vancomycin therapy for SSTI for 7 days. Pharmacy consulted by Dario Ford PA-C for monitoring and adjustment.    Target Concentration: Goal AUC/CONRAD 400-600 mg*hr/L    Additional Antimicrobials: Zosyn    Pertinent Laboratory Values:   Wt Readings from Last 1 Encounters:   01/30/24 65.6 kg (144 lb 10 oz)     Temp Readings from Last 1 Encounters:   01/30/24 99.5 °F (37.5 °C) (Oral)     Estimated Creatinine Clearance: 38 mL/min (based on SCr of 1 mg/dL).  Recent Labs     01/29/24  1446 01/30/24  0545   CREATININE 1.3* 1.0   BUN 37* 25*   WBC 18.4* 12.1*     Procalcitonin: 0.59 on 1/29/24    Pertinent Cultures:  Culture Date Source Results   1/29/2024 Blood x 2 sent   MRSA Nasal Swab: ordered by provider, awaiting results but not a respiratory infection    Plan:  Dosing recommendations based on Bayesian software  Initial loading dose of 1500mg given in ED yesterday. Will start maintenance vancomycin 750mg IV Q24H today at 1600  Anticipated AUC of 415 and trough concentration of 11.8 at steady state  Renal labs as indicated   Vancomycin concentration ordered for 1/31/24 @ 0600   Pharmacy will continue to monitor patient and adjust therapy as indicated    Thank you for the consult,  Julissa Carpenter LAZARO  1/30/2024 10:58 AM    No

## 2024-01-30 NOTE — PROGRESS NOTES
01/30/24 1352   Encounter Summary   Encounter Overview/Reason  Spiritual/Emotional Needs   Service Provided For: Patient   Referral/Consult From: [employer]   Support System Children;Family members   Last Encounter  01/30/24   Complexity of Encounter Moderate   Begin Time 1342   End Time  1352   Total Time Calculated 10 min   Spiritual/Emotional needs   Type Spiritual Support   Assessment/Intervention/Outcome   Assessment Coping   Intervention Active listening;Nurtured Hope;Prayer (assurance of)/Nyssa;Sustaining Presence/Ministry of presence     Assessment:  In my encounter with the 83 yr old patient, while rounding the unit 5K,  I provided spiritual care to patient through conversation, I also came to assess the patient's spiritual needs present. The pt was admitted due to altered mental status.    Interventions:  I provided prayer, emotional support and words of comfort.  provided a listening presence and encouraged pt to share their beliefs and how they support them during their hospitalization.     Outcomes:  The patient was encouraged and didn't share any further spiritual needs at this time.     Plan:  Chaplains will follow-up at a later time for assessment of any spiritual care needs present.

## 2024-01-30 NOTE — PLAN OF CARE
Problem: Safety - Adult  Goal: Free from fall injury  Outcome: Progressing  Flowsheets (Taken 1/29/2024 2327)  Free From Fall Injury: Instruct family/caregiver on patient safety     Problem: Skin/Tissue Integrity  Goal: Absence of new skin breakdown  Description: 1.  Monitor for areas of redness and/or skin breakdown  2.  Assess vascular access sites hourly  3.  Every 4-6 hours minimum:  Change oxygen saturation probe site  4.  Every 4-6 hours:  If on nasal continuous positive airway pressure, respiratory therapy assess nares and determine need for appliance change or resting period.  Outcome: Progressing  Note: Dual skin assessment performed on admission. See LDA and flowsheets.     Problem: Neurosensory - Adult  Goal: Achieves maximal functionality and self care  Outcome: Progressing  Flowsheets (Taken 1/29/2024 2327)  Achieves maximal functionality and self care:   Monitor swallowing and airway patency with patient fatigue and changes in neurological status   Encourage and assist patient to increase activity and self care with guidance from physical therapy/occupational therapy   Encourage visually impaired, hearing impaired and aphasic patients to use assistive/communication devices     Problem: Skin/Tissue Integrity - Adult  Goal: Skin integrity remains intact  Outcome: Progressing  Flowsheets  Taken 1/29/2024 2327  Skin Integrity Remains Intact:   Monitor for areas of redness and/or skin breakdown   Assess vascular access sites hourly  Taken 1/29/2024 2026  Skin Integrity Remains Intact:   Monitor for areas of redness and/or skin breakdown   Assess vascular access sites hourly  Goal: Incisions, wounds, or drain sites healing without S/S of infection  Outcome: Progressing  Flowsheets (Taken 1/29/2024 2327)  Incisions, Wounds, or Drain Sites Healing Without Sign and Symptoms of Infection:   ADMISSION and DAILY: Assess and document risk factors for pressure ulcer development   TWICE DAILY: Assess and document

## 2024-01-30 NOTE — ED NOTES
ED to inpatient nurses report      Chief Complaint:  Chief Complaint   Patient presents with    Altered Mental Status    Urinary Tract Infection     Present to ED from: Nursing Home    MOA:     LOC: alert and orientated to name and place  Mobility: Requires assistance * 2  Oxygen Baseline: ra    Current needs required: ra     Code Status:   Prior    What abnormal results were found and what did you give/do to treat them? Urinalysis, Labs,   Any procedures or intervention occur? XR    Mental Status:  Level of Consciousness: Alert (0)    Psych Assessment:        Vitals:  Patient Vitals for the past 24 hrs:   BP Temp Temp src Pulse Resp SpO2 Height Weight   01/29/24 1700 (!) 139/54 -- -- (!) 101 22 98 % 1.651 m (5' 5\") 65.8 kg (145 lb)   01/29/24 1605 -- -- -- -- -- -- -- 64.4 kg (142 lb)   01/29/24 1432 (!) 131/55 98.5 °F (36.9 °C) Oral 94 24 96 % -- --        LDAs:   Peripheral IV 01/29/24 Right Forearm (Active)       Ambulatory Status:  No data recorded    Diagnosis:  DISPOSITION Admitted 01/29/2024 04:16:06 PM   Final diagnoses:   Transient alteration of awareness   Cellulitis of left lower extremity   SHARIF (acute kidney injury) ([facility])        Consults:  PHARMACY TO DOSE VANCOMYCIN     Pain Score:  Pain Assessment  Pain Assessment: None - Denies Pain    C-SSRS:   Risk of Suicide: No Risk    Sepsis Screening:  Sepsis Risk Score: 4.45    [place] Fall Risk:       Swallow Screening        Preferred Language:   English      ALLERGIES     Azulfidine [sulfasalazine], Keflex [cephalexin], Mesalamine, Sulfa antibiotics, and Alcohol    SURGICAL HISTORY       Past Surgical History:   Procedure Laterality Date    BREAST BIOPSY  1988    right breast biopsy     BREAST BIOPSY Right 03/2011    stereotactic biopsy    COLONOSCOPY  2006    Dr Zaragoza    COLONOSCOPY  2010    Dr Prince    COLONOSCOPY  2010    Dr Lo interminate colitis    COLONOSCOPY  03/03/2014    Dr Nowak Normal     FOOT SURGERY      FOOT SURGERY Bilateral

## 2024-01-30 NOTE — CARE COORDINATION
1/30/24, 7:36 AM EST      DISCHARGE PLANNING EVALUATION    Carmen Howard  Admitted: 1/29/2024  Hospital Day: 1    Location: [facility] Reason for admit: Transient alteration of awareness [R40.4]  Altered mental status [R41.82]  SHARIF (acute kidney injury) ([facility]) [N17.9]  Cellulitis of left lower extremity [L03.116]    Past Medical History:   Diagnosis Date    Abnormal nuclear stress test 06/01/2018    Cataract     Chronic deep vein thrombosis (DVT) of femoral vein of left lower extremity ([facility]) 3/9/2021    Colon polyps     COPD (chronic obstructive pulmonary disease) ([facility])     Diverticulosis large intestine w/o perforation or abscess w/bleeding     DVT, recurrent, lower extremity, acute, left ([facility])     Fibroid, uterine     Hearing loss     Hx of blood clots     Hyperlipidemia     Nicotine dependence     Nocturnal hypoxemia     Postmenopausal     Sleep apnea     Ulcerative colitis ([facility])     with rectal bleeding        Procedure: N/A  1/29 x-ray left of femur:  IMPRESSION:  1. No acute fracture.  2. Compression screw in the left femoral head and neck.  3. Diffuse osteopenia.  4. Degenerative change involving the left hip and knee joints.  5. Left knee effusion.   6. Vascular calcification.  7. Probable calcified fibroid in the uterus..    1/29 X-ray of right femur:  IMPRESSION:  1. Degenerative change involving the right hip and knee joints.  2. Diffuse osteopenia.  3. No acute fracture.  4. There is vascular calcification    1/29 X-ray of left hip:  IMPRESSION:  1. No acute fracture.  2. Degenerative change in the right and to lesser extent left hip and sacroiliac joints joints.  3. Compression screws in the left femoral head and neck.  4.. Vascular calcification  Barriers to Discharge: Patient presents with altered mental status. Blood and urine cultures pending, WBC 12.1,H/H 8.9/29.1.  IV fluids, IV Vancomycin and Zosyn x 1 dose, prn Tylenol and Zofran, Magnesium and Potassium replacement protocol, carb choice

## 2024-01-30 NOTE — ED NOTES
Pt currently resting in bed. Family noted at bedside at this time. Bed assigned and cleaning. Respirations are even and unlabored. Call light in reach.

## 2024-01-30 NOTE — CONSULTS
Procedure Laterality Date    BREAST BIOPSY  1988    right breast biopsy     BREAST BIOPSY Right 03/2011    stereotactic biopsy    COLONOSCOPY  2006    Dr Zaragoza    COLONOSCOPY  2010    Dr Prince    COLONOSCOPY  2010    Dr Lo interminate colitis    COLONOSCOPY  03/03/2014    Dr Nowak Normal     FOOT SURGERY      FOOT SURGERY Bilateral 2011 2012    HIP SURGERY  06/13/2020    left fracture    OSTEOTOMY Left 11/2/2021    LEFT 5TH METATARSAL HEAD RESECTION EXCISION SOFT TISSUE LESION performed by Montez Dacosta DPM at [facility] OR         MEDICATIONS:       Scheduled Meds:   piperacillin-tazobactam  3,375 mg IntraVENous Q8H    apixaban  5 mg Oral BID    bumetanide  0.5 mg Oral Daily    carbidopa-levodopa  2 tablet Oral TID    donepezil  10 mg Oral Nightly    lisinopril  20 mg Oral Daily    metoprolol tartrate  12.5 mg Oral BID    predniSONE  1 mg Oral Daily    sertraline  50 mg Oral Daily    atorvastatin  20 mg Oral Daily    traZODone  50 mg Oral Nightly    vitamin B-12  1,000 mcg Oral Daily    mometasone-formoterol  2 puff Inhalation BID RT    tiotropium  2 puff Inhalation Daily RT    vancomycin (VANCOCIN) intermittent dosing (placeholder)   Other RX Placeholder    vancomycin  750 mg IntraVENous Q24H    sodium chloride flush  5-40 mL IntraVENous 2 times per day     Continuous Infusions:   sodium chloride      sodium chloride 75 mL/hr at 01/30/24 0646     PRN Meds:albuterol, melatonin, sodium chloride flush, sodium chloride, potassium chloride **OR** potassium alternative oral replacement **OR** potassium chloride, magnesium sulfate, ondansetron **OR** ondansetron, polyethylene glycol, acetaminophen **OR** acetaminophen  Allergies:   ALLERGIES:    Azulfidine [sulfasalazine], Keflex [cephalexin], Mesalamine, Sulfa antibiotics, and Alcohol        SOCIAL HISTORY:     TOBACCO:   reports that she has quit smoking. Her smoking use included cigarettes. She has a 40.0 pack-year smoking history. She has  hearing        LABS:     CBC:   Recent Labs     01/29/24  1446 01/30/24  0545   WBC 18.4* 12.1*   HGB 9.6* 8.9*    380     BMP:    Recent Labs     01/29/24  1446 01/30/24  0545    137   K 4.8 3.7   CL 96* 101   CO2 30 24   BUN 37* 25*   CREATININE 1.3* 1.0   GLUCOSE 233* 126*     Calcium:  Recent Labs     01/30/24  0545   CALCIUM 8.4*      Glucose:  Recent Labs     01/29/24  1432   POCGLU 275*     HgbA1C: No results for input(s): \"LABA1C\" in the last 72 hours.  INR: No results for input(s): \"INR\" in the last 72 hours.  Hepatic:   Recent Labs     01/29/24  1446   ALKPHOS 65   ALT <5*   AST <5   PROT 6.3   BILITOT 0.5   LABALBU 3.3*        UA:   Recent Labs     01/29/24  1455   PHUR 7.0   COLORU YELLOW   PROTEINU NEGATIVE   BLOODU TRACE*   RBCUA 5-10   WBCUA 0-2   BACTERIA NONE SEEN   NITRU NEGATIVE   GLUCOSEU NEGATIVE   BILIRUBINUR NEGATIVE   UROBILINOGEN 0.2   KETUA NEGATIVE         IMAGING:    Micro:   Lab Results   Component Value Date/Time    BC  10/06/2023 02:07 PM     No growth 24 hours. No growth 48 hours. No growth at 5 days       Problem list of patient      Patient Active Problem List   Diagnosis Code    Nonexudative age-related macular degeneration H35.3190    Nuclear sclerotic cataract H25.10    History of ulcerative colitis Z87.19    Sleep apnea G47.30    Postmenopausal Z78.0    Nocturnal hypoxemia G47.34    Nicotine dependence F17.200    Hyperlipidemia E78.5    Diverticulosis large intestine w/o perforation or abscess w/bleeding K57.31    Simple chronic bronchitis ([facility]) J41.0    Age-related osteoporosis without current pathological fracture M81.0    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 ([facility]) I25.119    Type 2 diabetes mellitus with diabetic polyneuropathy, without long-term current use of insulin ([facility]) E11.42    Trochanteric bursitis M70.60    Generalized weakness R53.1    Dementia without behavioral disturbance, unspecified dementia type F03.90    Hypertension I10

## 2024-01-30 NOTE — ED NOTES
Charge nurse Willy spoke with floor prior to transporting pt. Pt transported by this RN in stable condition

## 2024-01-30 NOTE — PLAN OF CARE
Problem: Safety - Adult  Goal: Free from fall injury  Outcome: Progressing   Fall assessment completed. Patient using call light appropriately to call for assistance. Personal items within reach. Patient is also compliant with use of non-skid slippers.     Problem: Skin/Tissue Integrity  Goal: Absence of new skin breakdown  Description: 1.  Monitor for areas of redness and/or skin breakdown  2.  Assess vascular access sites hourly  3.  Every 4-6 hours minimum:  Change oxygen saturation probe site  4.  Every 4-6 hours:  If on nasal continuous positive airway pressure, respiratory therapy assess nares and determine need for appliance change or resting period.  Outcome: Progressing   Patient remains free from skin breakdown. Zinc cream applied to red buttocks. Patient assisted with turning.    Problem: Neurosensory - Adult  Goal: Achieves maximal functionality and self care  Outcome: Progressing  Flowsheets (Taken 1/30/2024 1130)  Achieves maximal functionality and self care: Monitor swallowing and airway patency with patient fatigue and changes in neurological status  Patient alert & oriented x 4. Patient able to follow commands. Hand grasps equal bilaterally.    Problem: Skin/Tissue Integrity - Adult  Goal: Skin integrity remains intact  Outcome: Progressing  Flowsheets (Taken 1/30/2024 1130)  Skin Integrity Remains Intact: Monitor for areas of redness and/or skin breakdown     Problem: Skin/Tissue Integrity - Adult  Goal: Incisions, wounds, or drain sites healing without S/S of infection  Outcome: Progressing  Flowsheets (Taken 1/30/2024 1130)  Incisions, Wounds, or Drain Sites Healing Without Sign and Symptoms of Infection: TWICE DAILY: Assess and document dressing/incision, wound bed, drain sites and surrounding tissue     Problem: Musculoskeletal - Adult  Goal: Return mobility to safest level of function  Outcome: Progressing  Flowsheets (Taken 1/30/2024 1130)  Return Mobility to Safest Level of Function:    Assess patient stability and activity tolerance for standing, transferring and ambulating with or without assistive devices   Obtain physical therapy/occupational therapy consults as needed   Therapies ordered and patient up with stedy and 2 assist.    Problem: Genitourinary - Adult  Goal: Absence of urinary retention  Outcome: Progressing  Flowsheets (Taken 1/30/2024 1130)  Absence of urinary retention: Assess patient’s ability to void and empty bladder  Patient voiding without difficulty.     Problem: Infection - Adult  Goal: Absence of infection at discharge  Outcome: Progressing  Flowsheets (Taken 1/30/2024 1130)  Absence of infection at discharge:   Assess and monitor for signs and symptoms of infection   Monitor lab/diagnostic results   Monitor all insertion sites i.e., indwelling lines, tubes and drains   Administer medications as ordered   Instruct and encourage patient and family to use good hand hygiene technique   Patient remains afebrile.     Problem: Infection - Adult  Goal: Isolation precautions  Description: Isolation precautions  Outcome: Progressing   Contact isolation precautions maintained.    Problem: Metabolic/Fluid and Electrolytes - Adult  Goal: Electrolytes maintained within normal limits  Outcome: Progressing  Flowsheets (Taken 1/30/2024 1130)  Electrolytes maintained within normal limits:   Monitor labs and assess patient for signs and symptoms of electrolyte imbalances   Administer electrolyte replacement as ordered   Monitor response to electrolyte replacements, including repeat lab results as appropriate   Continuing to monitor labs.    Problem: Chronic Conditions and Co-morbidities  Goal: Patient's chronic conditions and co-morbidity symptoms are monitored and maintained or improved  Outcome: Progressing  Flowsheets (Taken 1/30/2024 1130)  Care Plan - Patient's Chronic Conditions and Co-Morbidity Symptoms are Monitored and Maintained or Improved:   Collaborate with multidisciplinary

## 2024-01-30 NOTE — CARE COORDINATION
1/30/24, 8:57 AM EST  Discharge Planning Evaluation  Social work consult received, patient from [facility].   Patient and daughter Jazmine preference is to return to [facility].    Would patient be willing to go to a skilled facility if needed: yes.  Is there skilled care available at current facility: yes.  Barriers to return to current living situation: if too debilitated and needing SNF  Spoke with Enrico at the facility.  Patient bed hold: yes  Anticipated transport plan: family  Patient's Healthcare Decision Maker: Named in Scanned ACP Document    SW met with Carmen this morning, daughter at bedside. Pt reports she has been using a wheelchair to get around. Daughter reports pt using wheelchair more recently, but has her walker at as well. Patient reports she has 3 children daughter Jazmine lives in [place], one in [place] and one in [place]. Daughter plans to transport pt back to facility at discharge.     Daughter reports pt has been in rehab side of facility in past.     Readmission Risk Low 0-14, Mod 15-19), High > 20: Readmission Risk Score: 19.2    Current PCP: No primary care provider on file.  PCP verified by CM? Yes    Patient Orientation: Person, Place, Self, Other (see comment) (time-aware of month and president)    Patient Cognition: Alert  History Provided by: Patient, Child/Family, Other (see comment), Medical Record ([facility])    Advance Directives:      Code Status: DNR-CCA   Patient's Primary Decision Maker is: Named in Scanned ACP Document    Primary Decision Maker: Jazmine Nuñez - Child - [phone]    Secondary Decision Maker: Aris Howard - Child - [phone]     Discharge Planning:    Patient lives with: Other (Comment) (SNF) Type of Home: Skilled Nursing Facility  Primary Care Giver: Other (Comment) ([facility])  Patient Support Systems include: Children, Other (Comment) ([facility])   Current

## 2024-01-30 NOTE — PROGRESS NOTES
Hospitalist Progress Note    Patient:  Carmen Howard      Unit/Bed:5K-22/022-A    YOB: 1940    MRN: [id number]       Acct: [id number]     PCP: No primary care provider on file.    Date of Admission: 1/29/2024    Assessment/Plan:    Sepsis with associated Metabolic Encephalopathy: suspect 2/2 Cellulitis, SIRS 3/4              -Zosyn and vancomycin initiated in the ER, continue, Hx of MRSA noted              -Infectious disease consulted, arterial Doppler studies ordered for bilateral lower extremities              -Urinalysis without evidence of UTI              -Procalcitonin elevated at 0.59, CRP elevated at 20.8              -Continue IVF resuscitation              -Trend WBC              -Blood Cultures obtained              -Covid and Flu negative     SHARIF: Cr 1.3, baseline ~0.7              -Continue IVF hydration              -Trend BMP as needed     History of DVT:   -Previously on Eliquis, continue     COPD, not in acute exacerbation:              -Continue Home inhalers     Hyperlipidemia:              -Continue statin     Nocturnal hypoxemia: Noted per chart review     History of ulcerative colitis: Noted per chart review    Chief Complaint: Altered mental status       Subjective: 83 y.o. female admitted to the hospitalist service for altered mental status.  Patient is alert and oriented x 4 today.  She denies chest pain.  She denies nausea or vomiting.  Nursing believes that patient will require SNF, PT or OT was able to get the patient up but used a Christina steady for assistance.    Medications:    Infusion Medications    sodium chloride      sodium chloride 75 mL/hr at 01/30/24 0646     Scheduled Medications    sodium chloride flush  5-40 mL IntraVENous 2 times per day     PRN Meds: sodium chloride flush, sodium chloride, potassium chloride **OR** potassium alternative oral replacement **OR** potassium chloride, magnesium sulfate, ondansetron **OR** ondansetron, polyethylene glycol,  acetaminophen **OR** acetaminophen      Intake/Output Summary (Last 24 hours) at 1/30/2024 1045  Last data filed at 1/30/2024 0646  Gross per 24 hour   Intake 752.64 ml   Output --   Net 752.64 ml       Diet:  ADULT DIET; Regular; 4 carb choices (60 gm/meal)    Exam:  /72   Pulse 95   Temp 99.5 °F (37.5 °C) (Oral)   Resp 18   Ht 1.651 m (5' 5\")   Wt 65.6 kg (144 lb 10 oz)   SpO2 93%   BMI 24.07 kg/m²     Physical Exam  Constitutional:       Interventions: She is not intubated.  Cardiovascular:      Rate and Rhythm: Normal rate and regular rhythm.   Pulmonary:      Effort: She is not intubated.   Abdominal:      Tenderness: There is no abdominal tenderness.   Neurological:      Mental Status: She is alert.      Comments: Alert and oriented x 4   Psychiatric:         Speech: She is communicative.        Labs:   Recent Labs     01/29/24  1446 01/30/24  0545   WBC 18.4* 12.1*   HGB 9.6* 8.9*   HCT 31.8* 29.1*    380     Recent Labs     01/29/24  1446 01/30/24  0545    137   K 4.8 3.7   CL 96* 101   CO2 30 24   BUN 37* 25*   CREATININE 1.3* 1.0   CALCIUM 9.2 8.4*     Recent Labs     01/29/24  1446   AST <5   ALT <5*   BILITOT 0.5   ALKPHOS 65     No results for input(s): \"INR\" in the last 72 hours.  No results for input(s): \"CKTOTAL\", \"TROPONINI\" in the last 72 hours.    Urinalysis:      Lab Results   Component Value Date/Time    NITRU NEGATIVE 01/29/2024 02:55 PM    WBCUA 0-2 01/29/2024 02:55 PM    WBCUA 0-4 05/24/2018 01:05 AM    BACTERIA NONE SEEN 01/29/2024 02:55 PM    RBCUA 5-10 01/29/2024 02:55 PM    BLOODU TRACE 01/29/2024 02:55 PM    SPECGRAV 1.021 04/08/2023 10:23 AM    GLUCOSEU NEGATIVE 01/29/2024 02:55 PM       Radiology:  XR CHEST (2 VW)   Final Result   There is no acute intrathoracic process.               **This report has been created using voice recognition software. It may contain minor errors which are inherent in voice recognition technology.**      Final report

## 2024-01-30 NOTE — PROGRESS NOTES
Pt admitted to 5k22 via via cart/stretcher from ED.  Complaints: Altered Mental Status.    IV present in the Right Forearm. No fluids running on admission.  IV site free of s/s of infection or infiltration. Vital signs obtained. Assessment and data collection initiated. Two nurse skin assessment performed by Ailyn VINCENT and Guadalupe VINCENT.   Oriented to room. Policies and procedures for 5K explained. All questions answered with no further questions at this time. Fall prevention and safety brochure discussed with patient.  Bed alarm on. Call light in reach.

## 2024-01-30 NOTE — PLAN OF CARE
Problem: Discharge Planning  Goal: Discharge to home or other facility with appropriate resources  Outcome: Progressing   SW consult received. See SW note 1/30/24.

## 2024-01-31 LAB — VANCOMYCIN SERPL-MCNC: 9.6 UG/ML (ref 0.1–39.9)

## 2024-01-31 PROCEDURE — 94640 AIRWAY INHALATION TREATMENT: CPT

## 2024-01-31 PROCEDURE — 1200000000 HC SEMI PRIVATE

## 2024-01-31 PROCEDURE — 6370000000 HC RX 637 (ALT 250 FOR IP): Performed by: PHYSICIAN ASSISTANT

## 2024-01-31 PROCEDURE — 97116 GAIT TRAINING THERAPY: CPT

## 2024-01-31 PROCEDURE — 99232 SBSQ HOSP IP/OBS MODERATE 35: CPT | Performed by: PHYSICIAN ASSISTANT

## 2024-01-31 PROCEDURE — 36415 COLL VENOUS BLD VENIPUNCTURE: CPT

## 2024-01-31 PROCEDURE — 97530 THERAPEUTIC ACTIVITIES: CPT

## 2024-01-31 PROCEDURE — 2580000003 HC RX 258: Performed by: PHARMACIST

## 2024-01-31 PROCEDURE — 80202 ASSAY OF VANCOMYCIN: CPT

## 2024-01-31 PROCEDURE — 6360000002 HC RX W HCPCS: Performed by: PHYSICIAN ASSISTANT

## 2024-01-31 PROCEDURE — 6360000002 HC RX W HCPCS: Performed by: PHARMACIST

## 2024-01-31 PROCEDURE — 97162 PT EVAL MOD COMPLEX 30 MIN: CPT

## 2024-01-31 PROCEDURE — 97166 OT EVAL MOD COMPLEX 45 MIN: CPT

## 2024-01-31 PROCEDURE — 2580000003 HC RX 258: Performed by: PHYSICIAN ASSISTANT

## 2024-01-31 RX ADMIN — CARBIDOPA AND LEVODOPA 2 TABLET: 25; 100 TABLET ORAL at 10:10

## 2024-01-31 RX ADMIN — CARBIDOPA AND LEVODOPA 2 TABLET: 25; 100 TABLET ORAL at 16:48

## 2024-01-31 RX ADMIN — PIPERACILLIN AND TAZOBACTAM 3375 MG: 3; .375 INJECTION, POWDER, FOR SOLUTION INTRAVENOUS at 04:25

## 2024-01-31 RX ADMIN — LISINOPRIL 20 MG: 20 TABLET ORAL at 10:10

## 2024-01-31 RX ADMIN — APIXABAN 5 MG: 5 TABLET, FILM COATED ORAL at 10:10

## 2024-01-31 RX ADMIN — Medication 3 MG: at 23:16

## 2024-01-31 RX ADMIN — PREDNISONE 1 MG: 1 TABLET ORAL at 10:10

## 2024-01-31 RX ADMIN — CARBIDOPA AND LEVODOPA 2 TABLET: 25; 100 TABLET ORAL at 19:47

## 2024-01-31 RX ADMIN — ALBUTEROL SULFATE 2.5 MG: 2.5 SOLUTION RESPIRATORY (INHALATION) at 15:49

## 2024-01-31 RX ADMIN — PIPERACILLIN AND TAZOBACTAM 3375 MG: 3; .375 INJECTION, POWDER, FOR SOLUTION INTRAVENOUS at 12:07

## 2024-01-31 RX ADMIN — METOPROLOL TARTRATE 12.5 MG: 25 TABLET, FILM COATED ORAL at 10:10

## 2024-01-31 RX ADMIN — ATORVASTATIN CALCIUM 20 MG: 20 TABLET, FILM COATED ORAL at 10:10

## 2024-01-31 RX ADMIN — MOMETASONE FUROATE AND FORMOTEROL FUMARATE DIHYDRATE 2 PUFF: 200; 5 AEROSOL RESPIRATORY (INHALATION) at 15:58

## 2024-01-31 RX ADMIN — Medication 1000 MCG: at 10:10

## 2024-01-31 RX ADMIN — TRAZODONE HYDROCHLORIDE 50 MG: 50 TABLET ORAL at 19:47

## 2024-01-31 RX ADMIN — METOPROLOL TARTRATE 12.5 MG: 25 TABLET, FILM COATED ORAL at 19:48

## 2024-01-31 RX ADMIN — SODIUM CHLORIDE: 9 INJECTION, SOLUTION INTRAVENOUS at 07:37

## 2024-01-31 RX ADMIN — APIXABAN 5 MG: 5 TABLET, FILM COATED ORAL at 19:47

## 2024-01-31 RX ADMIN — SERTRALINE HYDROCHLORIDE 50 MG: 50 TABLET ORAL at 10:10

## 2024-01-31 RX ADMIN — VANCOMYCIN HYDROCHLORIDE 1000 MG: 1 INJECTION, POWDER, LYOPHILIZED, FOR SOLUTION INTRAVENOUS at 16:45

## 2024-01-31 RX ADMIN — DONEPEZIL HYDROCHLORIDE 10 MG: 10 TABLET, FILM COATED ORAL at 19:47

## 2024-01-31 RX ADMIN — BUMETANIDE 0.5 MG: 0.5 TABLET ORAL at 10:10

## 2024-01-31 RX ADMIN — PIPERACILLIN AND TAZOBACTAM 3375 MG: 3; .375 INJECTION, POWDER, FOR SOLUTION INTRAVENOUS at 19:13

## 2024-01-31 ASSESSMENT — PAIN SCALES - GENERAL: PAINLEVEL_OUTOF10: 0

## 2024-01-31 NOTE — PROGRESS NOTES
[facility]  INPATIENT PHYSICAL THERAPY  EVALUATION  [facility] ONC MED 5K - 5K-22/022-A    Discharge Recommendations:  Discharge Recommendations: Subacute/Skilled Nursing Facility    Education Provided Comments: Discussed with pt and dtr the recommendation of going to SNF for further therapy priro to returning to [place]; dtr was agreeable    Equipment Recommendations:  Equipment Needed: No    Time In: 0845  Time Out: 0903  Timed Code Treatment Minutes: 10 Minutes  Minutes: 18          Date: 2024  Patient Name: Carmen Howard,  Gender:  female        MRN: [id number]  : 1940  (83 y.o.)      Referring Practitioner: Dario Ford PA-C  Diagnosis: Altered mental status  Additional Pertinent Hx: 83 y.o. female who presented to [facility] with altered mental status. Patient presents from SNF where she reportedly had some altered mental status. Patient's daughter present in the room. She reports increased confusion at SNF.     Restrictions/Precautions:  Restrictions/Precautions: General Precautions, Fall Risk    Subjective:  Chart Reviewed: Yes  Patient assessed for rehabilitation services?: Yes  Family / Caregiver Present: Yes  Subjective: RN approved session. Pt pleasant and agreeable to therapy. Dtr in room and supportive.    General:  Overall Orientation Status: Within Functional Limits  Vision: Within Functional Limits  Hearing: Within functional limits       Pain: 0/10: does not c/o pain     Vitals: Vitals not assessed per clinical judgement, see nursing flowsheet    Social/Functional History:    Type of Home: Assisted living  Home Equipment: Walker, rolling     ADL Assistance: Independent  Homemaking Assistance: Independent  Ambulation Assistance: Independent  Transfer Assistance: Independent    Additional Comments: baseline pt is able to ambulate with RW and is fairly ind within AL    OBJECTIVE:  Range of Motion:  Bilateral Lower Extremity: WFL    Strength:  Bilateral Lower

## 2024-01-31 NOTE — PROGRESS NOTES
[facility]  INPATIENT OCCUPATIONAL THERAPY  [facility] ONC MED 5K  EVALUATION    Time:   Time In: 1000  Time Out: 1022  Timed Code Treatment Minutes: 13 Minutes  Minutes: 22          Date: 2024  Patient Name: Carmen Howard,   Gender: female      MRN: [id number]  : 1940  (83 y.o.)  Referring Practitioner: Daroi Ford PA-C  Diagnosis: AMS  Additional Pertinent Hx: per chart review; Carmen Howard is a 83 y.o. female who presents to the emergency department from nursing home brought in by EMS for evaluation of collateral history provided by patient's daughter at bedside due to patient's altered mental status.  Patient reports that her legs hurt and she is having difficulty walking.  Daughter reports that she has been having ongoing urinary tract infection for the last 3 weeks and has been on medications for this though she is not sure the name of what.  Approximately 3 days ago started having decreased appetite not acting herself complaining of more pain in her legs.  The family requested patient be sent to the emergency room for evaluation from the nursing home.    Restrictions/Precautions:  Restrictions/Precautions: General Precautions, Fall Risk, Contact Precautions    Subjective  Chart Reviewed: Yes, Orders, Progress Notes, History and Physical  Patient assessed for rehabilitation services?: Yes  Family / Caregiver Present: Yes (daughter)    Subjective: RN approved session, patient seated up in bed upon OT arrival and agreeable to eval. patient's daughter present during eval. patient A & O x 2.    Pain: 0/10:     Vitals: Nurse checked vitals prior to session  -patient on room air and appeared SOB t/o eval with simple tasks. Cues for pursed lip breathing t/o and pacing self.     Social/Functional History:  Type of Home: Assisted living  Home Layout: One level  Home Access: Level entry  Home Equipment: Walker, rolling   Bathroom Shower/Tub: Walk-in shower  Bathroom Toilet: Handicap

## 2024-01-31 NOTE — CARE COORDINATION
1/31/24, 1:55 PM EST    DISCHARGE ON GOING EVALUATION    Carmen BRUNO [facility] day: 2  Location: -22/022-A Reason for admit: Transient alteration of awareness [R40.4]  Altered mental status [R41.82]  SHARIF (acute kidney injury) (HCC) [N17.9]  Cellulitis of left lower extremity [L03.116]   Barriers to Discharge: ID following, PT/OT, SS, IV fluids, Proventil nebulizer, Eliquis, Dulera inhaler, Zosyn, IV Vancomycin per pharmacy dosing, prn Tylenol and Zofran, carb choice diet, up as tolerated.   PCP: No primary care provider on file.  Readmission Risk Score: 19.9%  Patient Goals/Plan/Treatment Preferences: Carmen is from [facility]. She is planned to return at discharge.

## 2024-01-31 NOTE — PLAN OF CARE
Problem: Respiratory - Adult  Goal: Clear lung sounds  1/31/2024 1546 by Kalyani Felton, RCP  Outcome: Progressing  Note: Txs to help improve lung aeration.  Patient mutually agreed on goals.

## 2024-01-31 NOTE — PLAN OF CARE
Problem: Safety - Adult  Goal: Free from fall injury  Outcome: Progressing   Fall assessment completed. Patient using call light appropriately to call for assistance. Personal items within reach. Patient is also compliant with use of non-skid slippers.     Problem: Skin/Tissue Integrity  Goal: Absence of new skin breakdown  Description: 1.  Monitor for areas of redness and/or skin breakdown  2.  Assess vascular access sites hourly  3.  Every 4-6 hours minimum:  Change oxygen saturation probe site  4.  Every 4-6 hours:  If on nasal continuous positive airway pressure, respiratory therapy assess nares and determine need for appliance change or resting period.  Outcome: Progressing   No skin breakdown this shift. Patient being assisted with turning. Patients states understanding of repositioning every two hours.    Problem: Neurosensory - Adult  Goal: Achieves maximal functionality and self care  Outcome: Progressing  Flowsheets (Taken 1/31/2024 1008)  Achieves maximal functionality and self care: Monitor swallowing and airway patency with patient fatigue and changes in neurological status   Patient remains alert and oriented.     Problem: Skin/Tissue Integrity - Adult  Goal: Incisions, wounds, or drain sites healing without S/S of infection  Outcome: Progressing  Flowsheets (Taken 1/31/2024 1008)  Incisions, Wounds, or Drain Sites Healing Without Sign and Symptoms of Infection: TWICE DAILY: Assess and document dressing/incision, wound bed, drain sites and surrounding tissue   Patient remains afebrile. IV antibiotics administered per order. Contact isolation maintained.    Problem: Musculoskeletal - Adult  Goal: Return mobility to safest level of function  Outcome: Progressing  Flowsheets (Taken 1/31/2024 1008)  Return Mobility to Safest Level of Function:   Assess patient stability and activity tolerance for standing, transferring and ambulating with or without assistive devices   Assist with transfers and

## 2024-01-31 NOTE — PROGRESS NOTES
[facility]   Pharmacy Pharmacokinetic Monitoring Service - Vancomycin    Consulting Provider: Randolph Ford PA-C   Indication: SSTI  Target Concentration: Goal AUC/CONRAD 400-600 mg*hr/L  Day of Therapy: 3  Additional Antimicrobials: Zosyn    Pertinent Laboratory Values:   Wt Readings from Last 1 Encounters:   01/30/24 65.6 kg (144 lb 10 oz)     Temp Readings from Last 1 Encounters:   01/31/24 98.6 °F (37 °C) (Oral)     Estimated Creatinine Clearance: 38 mL/min (based on SCr of 1 mg/dL).  Recent Labs     01/29/24  1446 01/30/24  0545   CREATININE 1.3* 1.0   BUN 37* 25*   WBC 18.4* 12.1*     Pertinent Cultures:  Culture Date Source Results   1/29/24 Blood Pending (NGTD)        Recent vancomycin administrations                     vancomycin (VANCOCIN) 750 mg in sodium chloride 0.9 % 250 mL IVPB (mg) 750 mg New Bag 01/30/24 1744    vancomycin (VANCOCIN) 1500 mg in sodium chloride 0.9 % 250 mL IVPB (mg) 1,500 mg New Bag 01/29/24 1614                    Assessment:  Date/Time Current Dose Concentration Timing of Concentration (h) AUC   1/31/24 750mg IV q24h 9.6 11h47m 340   Note: Serum concentrations collected for AUC dosing may appear elevated if collected in close proximity to the dose administered, this is not necessarily an indication of toxicity    Plan:  Current dosing regimen is sub-therapeutic  Increase dose to 1000mg IV q24h  (start at 1400)  Repeat vancomycin concentration not yet ordered  Pharmacy will continue to monitor patient and adjust therapy as indicated    Thank you for the consult,  Emilee Hillman RPH  1/31/2024 9:07 AM

## 2024-01-31 NOTE — PROGRESS NOTES
Progress note: Infectious diseases    Patient - Carmen Howard,  Age - 83 y.o.    - 1940      Room Number - 5K-22/022-A   MRN -  [id number]   [facility] # - [id number]  Date of Admission -  2024  2:27 PM    SUBJECTIVE:   She has no new complaints  Vascular (us studies) show mild to moderate disease, mid popliteal artery  OBJECTIVE   VITALS    height is 1.651 m (5' 5\") and weight is 65.6 kg (144 lb 10 oz). Her oral temperature is 98.5 °F (36.9 °C). Her blood pressure is 135/75 and her pulse is 95. Her respiration is 16 and oxygen saturation is 92%.       Wt Readings from Last 3 Encounters:   24 65.6 kg (144 lb 10 oz)   10/06/23 66.7 kg (147 lb)   23 62.1 kg (137 lb)       I/O (24 Hours)    Intake/Output Summary (Last 24 hours) at 2024 1357  Last data filed at 2024 1049  Gross per 24 hour   Intake 2575.09 ml   Output 1400 ml   Net 1175.09 ml       General Appearance  Awake, alert, oriented,  not  In acute distress  HEENT - normocephalic, atraumatic, pink conjunctiva,  anicteric sclera  Neck - Supple, no mass  Lungs -  Bilateral  air entry, no rhonchi, no wheeze  Cardiovascular - Heart sounds are normal.     Abdomen - soft, not distended, nontender,   Neurologic -awake and answers appropriately  Skin - No bruising or bleeding  Extremities - + edema, has scabbed wound on the left lower leg    MEDICATIONS:      vancomycin  1,000 mg IntraVENous Q24H    piperacillin-tazobactam  3,375 mg IntraVENous Q8H    apixaban  5 mg Oral BID    bumetanide  0.5 mg Oral Daily    carbidopa-levodopa  2 tablet Oral TID    donepezil  10 mg Oral Nightly    lisinopril  20 mg Oral Daily    metoprolol tartrate  12.5 mg Oral BID    predniSONE  1 mg Oral Daily    sertraline  50 mg Oral Daily    atorvastatin  20 mg Oral Daily    traZODone  50 mg Oral Nightly    vitamin B-12  1,000 mcg Oral Daily    mometasone-formoterol  2 puff

## 2024-01-31 NOTE — PROGRESS NOTES
Hospitalist Progress Note    Patient:  Carmen Howard      Unit/Bed:5K-22/022-A    YOB: 1940    MRN: [id number]       Acct: [id number]     PCP: No primary care provider on file.    Date of Admission: 1/29/2024    Assessment/Plan:    Sepsis with associated Metabolic Encephalopathy: suspect 2/2 Cellulitis, SIRS 3/4              -Zosyn and vancomycin initiated in the ER, continued, Hx of MRSA noted  -Infectious disease consulted, patient okay to be switched to Oral Doxycycline per ID              -Arterial Doppler studies ordered for bilateral lower extremities, patient with mild to moderate stenosis BLE, Dr. Coleman noted he may ask IR to do arteriogram              -Urinalysis without evidence of UTI              -Procalcitonin elevated at 0.59 on admission and CRP elevated at 20.8 on admission              -Continue IVF resuscitation              -Trend WBC              -Blood Cultures obtained              -Covid and Flu negative     SHARIF: Cr improved to 1.0, baseline ~0.7              -Continue IVF hydration              -Trend BMP as needed     History of DVT:   -Previously on Eliquis, continue     COPD, not in acute exacerbation:              -Continue Home inhalers     Hyperlipidemia:              -Continue statin     Nocturnal hypoxemia: Noted per chart review     History of ulcerative colitis: Noted per chart review    Disposition: Patient from Assisted Living, no skilled beds at current facility, OT recommending SNF, 24 hour assist upon discharge    Chief Complaint: Altered mental status       Subjective: 83 y.o. female admitted to the hospitalist service for altered mental status.  Patient is alert and oriented x 4 today.  She denies chest pain. She denies nausea or vomiting.    Medications:    Infusion Medications    sodium chloride      sodium chloride 75 mL/hr at 01/31/24 0737     Scheduled Medications    vancomycin  1,000 mg IntraVENous Q24H    piperacillin-tazobactam  3,375 mg

## 2024-01-31 NOTE — PLAN OF CARE
transferring and ambulating with or without assistive devices   Assist with transfers and ambulation using safe patient handling equipment as needed     Problem: Genitourinary - Adult  Goal: Absence of urinary retention  1/30/2024 2137 by Mague Prince RN  Outcome: Progressing  Flowsheets (Taken 1/30/2024 2137)  Absence of urinary retention:   Assess patient’s ability to void and empty bladder   Monitor intake/output and perform bladder scan as needed     Problem: Infection - Adult  Goal: Absence of infection at discharge  1/30/2024 2137 by Mague Prince RN  Outcome: Progressing  Flowsheets (Taken 1/30/2024 2137)  Absence of infection at discharge:   Assess and monitor for signs and symptoms of infection   Monitor lab/diagnostic results   Monitor all insertion sites i.e., indwelling lines, tubes and drains     Problem: Metabolic/Fluid and Electrolytes - Adult  Goal: Electrolytes maintained within normal limits  1/30/2024 2137 by Mague Prince RN  Outcome: Progressing  Flowsheets (Taken 1/30/2024 2137)  Electrolytes maintained within normal limits:   Monitor response to electrolyte replacements, including repeat lab results as appropriate   Monitor labs and assess patient for signs and symptoms of electrolyte imbalances   Administer electrolyte replacement as ordered     Problem: Chronic Conditions and Co-morbidities  Goal: Patient's chronic conditions and co-morbidity symptoms are monitored and maintained or improved  1/30/2024 2137 by Mague Prince RN  Outcome: Progressing  Flowsheets (Taken 1/30/2024 2137)  Care Plan - Patient's Chronic Conditions and Co-Morbidity Symptoms are Monitored and Maintained or Improved: Monitor and assess patient's chronic conditions and comorbid symptoms for stability, deterioration, or improvement     Problem: Discharge Planning  Goal: Discharge to home or other facility with appropriate resources  1/30/2024 2137 by Mague Prince RN  Outcome:  Progressing  Flowsheets (Taken 1/30/2024 2137)  Discharge to home or other facility with appropriate resources:   Identify barriers to discharge with patient and caregiver   Identify discharge learning needs (meds, wound care, etc)   Arrange for needed discharge resources and transportation as appropriate     Problem: Pain  Goal: Verbalizes/displays adequate comfort level or baseline comfort level  1/30/2024 2137 by Mague Prince RN  Outcome: Progressing  Flowsheets (Taken 1/30/2024 2137)  Verbalizes/displays adequate comfort level or baseline comfort level:   Encourage patient to monitor pain and request assistance   Assess pain using appropriate pain scale   Administer analgesics based on type and severity of pain and evaluate response     Problem: Respiratory - Adult  Goal: Clear lung sounds  1/30/2024 2137 by Mague Prince RN  Outcome: Progressing  Note: .Lung sounds, pulse ox, and breathing monitored throughout shift.  Discussed correct technique and importance of deep breathing & coughing exercises with patient.  Patient able to demonstrate cough & deep breathing exercises to nurse.     Care plan reviewed with patient.  Patient verbalizes understanding of the care plan and contributed to goal setting.

## 2024-02-01 VITALS
SYSTOLIC BLOOD PRESSURE: 168 MMHG | BODY MASS INDEX: 24.17 KG/M2 | HEART RATE: 89 BPM | RESPIRATION RATE: 18 BRPM | DIASTOLIC BLOOD PRESSURE: 70 MMHG | TEMPERATURE: 98.8 F | OXYGEN SATURATION: 94 % | WEIGHT: 145.06 LBS | HEIGHT: 65 IN

## 2024-02-01 LAB
ANION GAP SERPL CALC-SCNC: 13 MEQ/L (ref 8–16)
BASOPHILS ABSOLUTE: 0.1 THOU/MM3 (ref 0–0.1)
BASOPHILS NFR BLD AUTO: 0.7 %
BUN SERPL-MCNC: 15 MG/DL (ref 7–22)
CALCIUM SERPL-MCNC: 8.4 MG/DL (ref 8.5–10.5)
CHLORIDE SERPL-SCNC: 109 MEQ/L (ref 98–111)
CO2 SERPL-SCNC: 21 MEQ/L (ref 23–33)
CREAT SERPL-MCNC: 0.9 MG/DL (ref 0.4–1.2)
DEPRECATED RDW RBC AUTO: 55.1 FL (ref 35–45)
EOSINOPHIL NFR BLD AUTO: 4 %
EOSINOPHILS ABSOLUTE: 0.4 THOU/MM3 (ref 0–0.4)
ERYTHROCYTE [DISTWIDTH] IN BLOOD BY AUTOMATED COUNT: 15.9 % (ref 11.5–14.5)
GFR SERPL CREATININE-BSD FRML MDRD: > 60 ML/MIN/1.73M2
GLUCOSE BLD STRIP.AUTO-MCNC: 140 MG/DL (ref 70–108)
GLUCOSE SERPL-MCNC: 127 MG/DL (ref 70–108)
HCT VFR BLD AUTO: 29.9 % (ref 37–47)
HGB BLD-MCNC: 8.7 GM/DL (ref 12–16)
IMM GRANULOCYTES # BLD AUTO: 0.06 THOU/MM3 (ref 0–0.07)
IMM GRANULOCYTES NFR BLD AUTO: 0.6 %
LYMPHOCYTES ABSOLUTE: 1.5 THOU/MM3 (ref 1–4.8)
LYMPHOCYTES NFR BLD AUTO: 15.6 %
MAGNESIUM SERPL-MCNC: 1.9 MG/DL (ref 1.6–2.4)
MCH RBC QN AUTO: 27.4 PG (ref 26–33)
MCHC RBC AUTO-ENTMCNC: 29.1 GM/DL (ref 32.2–35.5)
MCV RBC AUTO: 94.3 FL (ref 81–99)
MONOCYTES ABSOLUTE: 0.9 THOU/MM3 (ref 0.4–1.3)
MONOCYTES NFR BLD AUTO: 8.8 %
NEUTROPHILS NFR BLD AUTO: 70.3 %
NRBC BLD AUTO-RTO: 0 /100 WBC
PLATELET # BLD AUTO: 400 THOU/MM3 (ref 130–400)
PMV BLD AUTO: 10.2 FL (ref 9.4–12.4)
POTASSIUM SERPL-SCNC: 3.5 MEQ/L (ref 3.5–5.2)
RBC # BLD AUTO: 3.17 MILL/MM3 (ref 4.2–5.4)
SEGMENTED NEUTROPHILS ABSOLUTE COUNT: 6.9 THOU/MM3 (ref 1.8–7.7)
SODIUM SERPL-SCNC: 143 MEQ/L (ref 135–145)
WBC # BLD AUTO: 9.8 THOU/MM3 (ref 4.8–10.8)

## 2024-02-01 PROCEDURE — 97535 SELF CARE MNGMENT TRAINING: CPT

## 2024-02-01 PROCEDURE — 99239 HOSP IP/OBS DSCHRG MGMT >30: CPT | Performed by: NURSE PRACTITIONER

## 2024-02-01 PROCEDURE — 97530 THERAPEUTIC ACTIVITIES: CPT

## 2024-02-01 PROCEDURE — 6360000002 HC RX W HCPCS: Performed by: PHYSICIAN ASSISTANT

## 2024-02-01 PROCEDURE — 94640 AIRWAY INHALATION TREATMENT: CPT

## 2024-02-01 PROCEDURE — 2580000003 HC RX 258: Performed by: PHYSICIAN ASSISTANT

## 2024-02-01 PROCEDURE — 82948 REAGENT STRIP/BLOOD GLUCOSE: CPT

## 2024-02-01 PROCEDURE — 36415 COLL VENOUS BLD VENIPUNCTURE: CPT

## 2024-02-01 PROCEDURE — 6370000000 HC RX 637 (ALT 250 FOR IP): Performed by: PHYSICIAN ASSISTANT

## 2024-02-01 PROCEDURE — 80048 BASIC METABOLIC PNL TOTAL CA: CPT

## 2024-02-01 PROCEDURE — 83735 ASSAY OF MAGNESIUM: CPT

## 2024-02-01 PROCEDURE — 85025 COMPLETE CBC W/AUTO DIFF WBC: CPT

## 2024-02-01 RX ORDER — DOXYCYCLINE HYCLATE 100 MG
100 TABLET ORAL 2 TIMES DAILY
Qty: 10 TABLET | Refills: 0
Start: 2024-02-01 | End: 2024-02-06

## 2024-02-01 RX ADMIN — PIPERACILLIN AND TAZOBACTAM 3375 MG: 3; .375 INJECTION, POWDER, FOR SOLUTION INTRAVENOUS at 10:48

## 2024-02-01 RX ADMIN — PREDNISONE 1 MG: 1 TABLET ORAL at 08:21

## 2024-02-01 RX ADMIN — ATORVASTATIN CALCIUM 20 MG: 20 TABLET, FILM COATED ORAL at 08:21

## 2024-02-01 RX ADMIN — METOPROLOL TARTRATE 12.5 MG: 25 TABLET, FILM COATED ORAL at 08:21

## 2024-02-01 RX ADMIN — LISINOPRIL 20 MG: 20 TABLET ORAL at 08:20

## 2024-02-01 RX ADMIN — SERTRALINE HYDROCHLORIDE 50 MG: 50 TABLET ORAL at 08:20

## 2024-02-01 RX ADMIN — APIXABAN 5 MG: 5 TABLET, FILM COATED ORAL at 08:20

## 2024-02-01 RX ADMIN — BUMETANIDE 0.5 MG: 0.5 TABLET ORAL at 08:20

## 2024-02-01 RX ADMIN — Medication 1000 MCG: at 08:21

## 2024-02-01 RX ADMIN — PIPERACILLIN AND TAZOBACTAM 3375 MG: 3; .375 INJECTION, POWDER, FOR SOLUTION INTRAVENOUS at 03:59

## 2024-02-01 RX ADMIN — ALBUTEROL SULFATE 2.5 MG: 2.5 SOLUTION RESPIRATORY (INHALATION) at 07:38

## 2024-02-01 RX ADMIN — CARBIDOPA AND LEVODOPA 2 TABLET: 25; 100 TABLET ORAL at 08:21

## 2024-02-01 ASSESSMENT — PAIN SCALES - GENERAL: PAINLEVEL_OUTOF10: 0

## 2024-02-01 NOTE — PLAN OF CARE
Problem: Safety - Adult  Goal: Free from fall injury  2/1/2024 1204 by Leslie Foss RN  Outcome: Completed     Problem: Skin/Tissue Integrity  Goal: Absence of new skin breakdown  Description: 1.  Monitor for areas of redness and/or skin breakdown  2.  Assess vascular access sites hourly  3.  Every 4-6 hours minimum:  Change oxygen saturation probe site  4.  Every 4-6 hours:  If on nasal continuous positive airway pressure, respiratory therapy assess nares and determine need for appliance change or resting period.  2/1/2024 1204 by Leslie Foss RN  Outcome: Completed     Problem: Neurosensory - Adult  Goal: Achieves maximal functionality and self care  2/1/2024 1204 by Leslie Foss RN  Outcome: Completed     Problem: Skin/Tissue Integrity - Adult  Goal: Skin integrity remains intact  2/1/2024 1204 by Leslie Foss RN  Outcome: Completed     Problem: Skin/Tissue Integrity - Adult  Goal: Incisions, wounds, or drain sites healing without S/S of infection  2/1/2024 1204 by Leslie Foss RN  Outcome: Completed     Problem: Musculoskeletal - Adult  Goal: Return mobility to safest level of function  2/1/2024 1204 by Leslie Foss RN  Outcome: Completed     Problem: Musculoskeletal - Adult  Goal: Return ADL status to a safe level of function  2/1/2024 1204 by Leslie Foss RN  Outcome: Completed     Problem: Genitourinary - Adult  Goal: Absence of urinary retention  2/1/2024 1204 by Leslie Foss RN  Outcome: Completed     Problem: Infection - Adult  Goal: Absence of infection at discharge  2/1/2024 1204 by Leslie Foss RN  Outcome: Completed     Problem: Infection - Adult  Goal: Isolation precautions  Description: Isolation precautions  2/1/2024 1204 by Leslie Foss RN  Outcome: Completed     Problem: Metabolic/Fluid and Electrolytes - Adult  Goal: Electrolytes maintained within normal limits  2/1/2024 1204 by Leslie Foss RN  Outcome: Completed     Problem: Respiratory - Adult  Goal: Clear lung

## 2024-02-01 NOTE — PROGRESS NOTES
[facility]  [facility] ONC MED 5K  Occupational Therapy  Daily Note  Time:   Time In: 922  Time Out: 1019  Timed Code Treatment Minutes: 57 Minutes  Minutes: 57          Date: 2024  Patient Name: Carmen Howard,   Gender: female      Room: [facility]22/022-A  MRN: [id number]  : 1940  (83 y.o.)  Referring Practitioner: Dario Ford PA-C  Diagnosis: AMS  Additional Pertinent Hx: per chart review; Carmen Howard is a 83 y.o. female who presents to the emergency department from nursing home brought in by EMS for evaluation of collateral history provided by patient's daughter at bedside due to patient's altered mental status.  Patient reports that her legs hurt and she is having difficulty walking.  Daughter reports that she has been having ongoing urinary tract infection for the last 3 weeks and has been on medications for this though she is not sure the name of what.  Approximately 3 days ago started having decreased appetite not acting herself complaining of more pain in her legs.  The family requested patient be sent to the emergency room for evaluation from the nursing home.    Restrictions/Precautions:  Restrictions/Precautions: General Precautions, Fall Risk, Contact Precautions     SUBJECTIVE: Patient sleeping in bed upon arrival; minimal verbal stimulation to alert; agreeable to therapy this date.  Patient pleasant and cooperative.  Patient daughter present middle of session, expresses she feels her mother should go to a SNF for rehab prior to returning to AL, notified CM, patient agreeable as well.     PAIN: 0/10:      Vitals: Vitals not assessed per clinical judgement, see nursing flowsheet    COGNITION: Decreased Problem Solving and Decreased Safety Awareness    ADL:   Grooming: Supervision.  Seated in bedside chair to brush hair  Bathing: Minimal Assistance.  Pericare, patient completed remainder seated in bedside chair  Upper Extremity Dressing: Minimal Assistance.  Untie/tie gown,  requires pepe stedy to and from bathroom     ADDITIONAL ACTIVITIES:    AM-PAC Inpatient Daily Activity Raw Score: 20  AM-PAC Inpatient ADL T-Scale Score : 42.03  ADL Inpatient CMS 0-100% Score: 38.32    ASSESSMENT:     Activity Tolerance:  Patient tolerance of  treatment: Good treatment tolerance      Discharge Recommendations: Subacute/skilled nursing facility  Equipment Recommendations: Equipment Needed: Yes  Mobility Devices: ADL Assistive Devices  Plan: Times Per Week: 5x  Times Per Day: Once a day  Current Treatment Recommendations: Strengthening, Balance training, Functional mobility training, Endurance training, Neuromuscular re-education, Positioning, Equipment evaluation, education, & procurement, Patient/Caregiver education & training, Safety education & training, Self-Care / ADL, Pain management    Education:  Learners: Patient and Family  Role of OT, Plan of Care, ADL's, IADL's, Energy Conservation, Home Safety, Importance of Increasing Activity, and Assistive Device Safety    Goals  Short Term Goals  Time Frame for Short Term Goals: by discharge  Short Term Goal 1: patient will tolerate 3 min functional standing with sit to stand and maintain balance with CGA with O2 >/= 90% to increase ease with toileting.  Short Term Goal 2: patient will functionally ambulate short distances progressing to /from BR with CGA and decreased post lean with 1-2 cues for safety.  Short Term Goal 3: patient will complete ADL routine with SBA and edu in energy conservation tech to increase ADL success.  Short Term Goal 4: patient will demo >/= 4+/5 (B) UE strength to increase ease with functional transfers.    Following session, patient left in safe position with all fall risk precautions in place.

## 2024-02-01 NOTE — DISCHARGE SUMMARY
Hospital Medicine Discharge Summary      Patient Identification:   Carmen Howard   : 1940  MRN: [id number]   Account: [id number]      Admit Date: 2024     Discharge Date: 2024      Admitting Physician: Price Gonzalez, DO     Discharge Physician: Eduard Guido, APRN - CNP     Discharge Diagnoses with Hospital Course:    Presented to the ED with AMS.    Sepsis, resolved. Secondary to cellulitis, resolving.  SIRS 3/4. Given IVF resuscitation. Treated with IV Zosyn and Vancomycin. Vancomycin discontinued. BC NGTD. ID consulted. Plans for oral doxy for 5 days. To follow up with the wound care clinic in 2 weeks.   Infectious Metabolic Encephalopathy, resolved. Back to baseline.    PAD. Arterial Doppler studies ordered for bilateral lower extremities, patient with mild to moderate stenosis BLE. Refer to Dr. Rogel on discharge for OP monitoring.   Elevated CRP elevated at 20.8 on admission.  SHARIF, resolved with IVF.    History of DVT: -Previously on Eliquis, continued  COPD, not in acute exacerbation. Home inhalers.  Hyperlipidemia. Statin.   Nocturnal hypoxemia: Noted per chart review.  History of ulcerative colitis: Noted per chart review     Mental status is back to baseline. Discussed case with ID. Cleared for discharge with the plan noted above.       The patient was seen and examined on day of discharge and this discharge summary is in conjunction with any daily progress note from day of discharge.      Exam:     Vitals:  Vitals:    24 1944 24 0415 24 0600 24 0727   BP: 130/70   (!) 168/70   Pulse: 91 91  89   Resp: 16 18  18   Temp: 98.5 °F (36.9 °C) 98.9 °F (37.2 °C)  98.8 °F (37.1 °C)   TempSrc: Oral Oral  Oral   SpO2: 94% 96%  94%   Weight:   65.8 kg (145 lb 1 oz)    Height:         Weight: Weight - Scale: 65.8 kg (145 lb 1 oz)     24 hour intake/output:No intake or output data in the 24 hours ending 24 1611    Labs: For convenience and continuity at

## 2024-02-01 NOTE — CARE COORDINATION
2/1/24, 11:13 AM EST    Patient goals/plan/ treatment preferences discussed by  and .  Patient goals/plan/ treatment preferences reviewed with patient/ family.  Patient/ family verbalize understanding of discharge plan and are in agreement with goal/plan/treatment preferences.  Understanding was demonstrated using the teach back method.  AVS provided by RN at time of discharge, which includes all necessary medical information pertaining to the patients current course of illness, treatment, post-discharge goals of care, and treatment preferences.     Services At/After Discharge: Skilled Nursing Facility (SNF) [facility]       IMM Letter  IMM Letter given to Patient/Family/Significant other/Guardian/POA/by:: Clara Fox RN Case Mgr.  IMM Letter date given:: 02/01/24  IMM Letter time given:: 1105     GIOVANI met with pt and daughter at bedside. They would like to plan for SNF.     Post-acute (PAC) provider list was provided to patient. Patient was informed of their freedom to choose PAC provider.     Daughter report preference for [facility], then [facility]. Daughter wanting to see if pt can discharge today.     Call to [facility], spoke with Kathy, they do have a bed available, they can accept today.     GIOVANI messaged provider regarding plans for SNF. She will check if pt ready for discharge today.     Message from provider, pt ready for discharge today, plans for oral antibiotics.     GIOVANI faxed AVS and MAR to [facility], spoke with Kathy with [facility], update on plans for discharge today. SW stopped by pt's room, pt in the bathroom, will stop by later.    Spoke with nursing about transport, stopped in pt's room, family deciding to transport or [facility], asked to see how quickly facility able to transport. Call to [facility], they can do 2:30pm. GIOVANI met with pt and daughter, updated on this, daughter will help will plan on transporting after lunch. GIOVANI updated nurse.     Call to The

## 2024-02-01 NOTE — DISCHARGE INSTR - COC
Continuity of Care Form    Patient Name: Carmen Howard   :  1940  MRN:  [id number]    Admit date:  2024  Discharge date:  2024    Code Status Order: DNR-CCA   Advance Directives:     Admitting Physician:  Price Gonzalez DO  PCP: No primary care provider on file.    Discharging Nurse: Leslie Foss RN  Discharging Hospital Unit/Room#: 5K-22/022-A  Discharging Unit Phone Number: [phone]    Emergency Contact:   Extended Emergency Contact Information  Primary Emergency Contact: Jazmine Nuñez  Home Phone: [phone]  Relation: Child  Secondary Emergency Contact: Aris Howard   [facility]  Home Phone: [phone]  Relation: Child    Past Surgical History:  Past Surgical History:   Procedure Laterality Date    BREAST BIOPSY  1988    right breast biopsy     BREAST BIOPSY Right 2011    stereotactic biopsy    COLONOSCOPY      Dr Zaragoza    COLONOSCOPY      Dr Prince    COLONOSCOPY      Dr Lo interminate colitis    COLONOSCOPY  2014    Dr Nowak Normal     FOOT SURGERY      FOOT SURGERY Bilateral 2011    HIP SURGERY  2020    left fracture    OSTEOTOMY Left 2021    LEFT 5TH METATARSAL HEAD RESECTION EXCISION SOFT TISSUE LESION performed by Montez Dacosta DPM at [facility] OR       Immunization History:   Immunization History   Administered Date(s) Administered    COVID-19, PFIZER Bivalent, DO NOT Dilute, (age 12y+), IM, 30 mcg/0.3 mL 11/10/2022    COVID-19, PFIZER GRAY top, DO NOT Dilute, (age 12 y+), IM, 30 mcg/0.3 mL 2022    COVID-19, PFIZER PURPLE top, DILUTE for use, (age 12 y+), 30mcg/0.3mL 2021, 2021, 2021, 2021, 2021, 2021    Influenza Vaccine, unspecified formulation 2006, 2006, 2008, 2014, 10/28/2014, 10/07/2015, 2016    Influenza Virus Vaccine 10/10/2022    Influenza, FLUAD, (age 65 y+), Adjuvanted, 0.5mL 10/15/2020, 10/15/2020    Influenza,  Indicated By Review Planned Expiration Resolved Resolved By    MRSA 10/06/23 10/07/23 10/06/23 MRSA by PCR        Colin 10/2023                       Nurse Assessment:  Last Vital Signs: BP (!) 168/70   Pulse 89   Temp 98.8 °F (37.1 °C) (Oral)   Resp 18   Ht 1.651 m (5' 5\")   Wt 65.8 kg (145 lb 1 oz)   SpO2 94%   BMI 24.14 kg/m²     Last documented pain score (0-10 scale): Pain Level: 0  Last Weight:   Wt Readings from Last 1 Encounters:   02/01/24 65.8 kg (145 lb 1 oz)     Mental Status:  oriented and alert to self, place and situation. Disoriented to time.    IV Access:  - None    Nursing Mobility/ADLs:  Walking   Assisted  Transfer  Assisted  Bathing  Assisted  Dressing  Assisted  Toileting  Assisted  Feeding  Independent after set-up  Med Admin  Independent  Med Delivery   whole    Wound Care Documentation and Therapy:  Wound 01/29/24 Pretibial Left;Inner large scabbed abrasion (Active)   Wound Etiology Traumatic 01/31/24 1944   Dressing/Treatment Open to air 02/01/24 0800   Wound Assessment Dry;Pink/red 02/01/24 0800   Drainage Amount None (dry) 02/01/24 0800   Odor None 01/31/24 1944   Judie-wound Assessment Blanchable erythema 01/31/24 1944   Margins Undefined edges 01/31/24 1944   Number of days: 2        Elimination:  Continence:   Bowel: No  Bladder: No  Urinary Catheter: None   Colostomy/Ileostomy/Ileal Conduit: No       Date of Last BM: 2/1/2024    Intake/Output Summary (Last 24 hours) at 2/1/2024 1042  Last data filed at 2/1/2024 0934  Gross per 24 hour   Intake 1829.62 ml   Output 250 ml   Net 1579.62 ml     I/O last 3 completed shifts:  In: 3212.6 [P.O.:710; I.V.:1795.6; IV Piggyback:707]  Out: 500 [Urine:500]    Safety Concerns:     At Risk for Falls    Impairments/Disabilities:      None    Nutrition Therapy:  Current Nutrition Therapy:   - Oral Diet:  General    Routes of Feeding: Oral  Liquids: No Restrictions  Daily Fluid Restriction: no  Last Modified Barium Swallow with Video (Video

## 2024-02-01 NOTE — PLAN OF CARE
Problem: Safety - Adult  Goal: Free from fall injury  2/1/2024 1003 by Leslie Foss RN  Outcome: Progressing  Flowsheets (Taken 2/1/2024 1003)  Free From Fall Injury:   Instruct family/caregiver on patient safety   Based on caregiver fall risk screen, instruct family/caregiver to ask for assistance with transferring infant if caregiver noted to have fall risk factors     Problem: Skin/Tissue Integrity  Goal: Absence of new skin breakdown  Description: 1.  Monitor for areas of redness and/or skin breakdown  2.  Assess vascular access sites hourly  3.  Every 4-6 hours minimum:  Change oxygen saturation probe site  4.  Every 4-6 hours:  If on nasal continuous positive airway pressure, respiratory therapy assess nares and determine need for appliance change or resting period.  2/1/2024 1003 by Leslie Foss RN  Outcome: Progressing     Problem: Neurosensory - Adult  Goal: Achieves maximal functionality and self care  2/1/2024 1003 by Leslie Foss RN  Outcome: Progressing  Flowsheets (Taken 2/1/2024 1003)  Achieves maximal functionality and self care:   Monitor swallowing and airway patency with patient fatigue and changes in neurological status   Encourage and assist patient to increase activity and self care with guidance from physical therapy/occupational therapy     Problem: Skin/Tissue Integrity - Adult  Goal: Skin integrity remains intact  2/1/2024 1003 by Leslie Foss RN  Outcome: Progressing  Flowsheets (Taken 2/1/2024 1003)  Skin Integrity Remains Intact:   Monitor for areas of redness and/or skin breakdown   Assess vascular access sites hourly     Problem: Skin/Tissue Integrity - Adult  Goal: Incisions, wounds, or drain sites healing without S/S of infection  2/1/2024 1003 by Leslie Foss RN  Outcome: Progressing  Flowsheets (Taken 2/1/2024 1003)  Incisions, Wounds, or Drain Sites Healing Without Sign and Symptoms of Infection:   ADMISSION and DAILY: Assess and document risk factors for pressure

## 2024-02-01 NOTE — PLAN OF CARE
Problem: Respiratory - Adult  Goal: Clear lung sounds  2/1/2024 0745 by Gloria Freeman RCP  Outcome: Progressing  Note: Patient refused mdis but took most of her breathing treatment

## 2024-02-01 NOTE — PLAN OF CARE
Problem: Safety - Adult  Goal: Free from fall injury  1/31/2024 2257 by Ashley Prakash RN  Outcome: Progressing  Fall assessment completed. Patient using call light appropriately to call for assistance.  Personal items within reach. Patient is also compliant with use of non-skid slippers.       Problem: Skin/Tissue Integrity  Goal: Absence of new skin breakdown  Description: 1.  Monitor for areas of redness and/or skin breakdown  2.  Assess vascular access sites hourly  3.  Every 4-6 hours minimum:  Change oxygen saturation probe site  4.  Every 4-6 hours:  If on nasal continuous positive airway pressure, respiratory therapy assess nares and determine need for appliance change or resting period.  1/31/2024 2257 by Ashley Prakash RN  Outcome: Progressing  No skin breakdown this shift. Patient able to turn self on bed. Patient states understanding of repositioning every two hours.      Problem: Neurosensory - Adult  Goal: Achieves maximal functionality and self care  1/31/2024 2257 by Ashley Prakash RN  Outcome: Progressing  Flowsheets (Taken 1/31/2024 1944)  Achieves maximal functionality and self care: Monitor swallowing and airway patency with patient fatigue and changes in neurological status     Problem: Skin/Tissue Integrity - Adult  Goal: Skin integrity remains intact  1/31/2024 2257 by Ashley Prakash RN  Outcome: Progressing  Flowsheets (Taken 1/31/2024 1944)  Skin Integrity Remains Intact: Monitor for areas of redness and/or skin breakdown     Problem: Musculoskeletal - Adult  Goal: Return mobility to safest level of function  1/31/2024 2257 by Ashley Prakash RN  Outcome: Progressing  Flowsheets (Taken 1/31/2024 1944)  Return Mobility to Safest Level of Function:   Assess patient stability and activity tolerance for standing, transferring and ambulating with or without assistive devices   Assist with transfers and ambulation using safe patient handling equipment as needed   Instruct patient/family in

## 2024-02-01 NOTE — PROGRESS NOTES
Nursing report called to [facility] spoke with Leandra VINCENT. Updated Leandra on patient's plan of care, last set of vitals and that patient's daughter will be transporting her back to facility after lunch. Call back number given to Leandra as well. No further questions or concerns expressed at this time.

## 2024-02-01 NOTE — CARE COORDINATION
2/1/24, 11:11 AM EST IMM Letter  IMM Letter given to Patient/Family/Significant other/Guardian/POA/by:: Clara Fox RN Case Mgr.  IMM Letter date given:: 02/01/24  IMM Letter time given:: 1105     Pt verbalized understanding and gave permission for possible discharge within 4 hours of receiving IMM.

## 2024-02-02 ENCOUNTER — CARE COORDINATION (OUTPATIENT)
Dept: CASE MANAGEMENT | Age: 84
End: 2024-02-02

## 2024-02-02 ENCOUNTER — HOSPITAL ENCOUNTER (OUTPATIENT)
Dept: WOUND CARE | Age: 84
Discharge: HOME | End: 2024-02-02

## 2024-02-02 NOTE — CARE COORDINATION
Pt was on CT report today.  CTN contacted [facility] to verify whether pt is under skilled or back in AL.  Spoke with Jade.  She confirmed pt is back under skilled.  CTN will not be following this pt.    Kaylynn Galdamez RN

## 2024-02-03 LAB
BACTERIA BLD AEROBE CULT: NORMAL
BACTERIA BLD AEROBE CULT: NORMAL

## 2024-02-15 ENCOUNTER — HOSPITAL ENCOUNTER (OUTPATIENT)
Dept: WOUND CARE | Age: 84
Discharge: HOME | End: 2024-02-15
Attending: NURSE PRACTITIONER
Payer: MEDICARE

## 2024-02-15 DIAGNOSIS — S81.812A ISTAP TYPE 3 SKIN TEAR OF LEFT LOWER EXTREMITY: Primary | ICD-10-CM

## 2024-02-15 PROBLEM — L03.114 CELLULITIS OF LEFT HAND: Status: RESOLVED | Noted: 2023-10-06 | Resolved: 2024-02-15

## 2024-02-15 PROCEDURE — 6370000000 HC RX 637 (ALT 250 FOR IP): Performed by: NURSE PRACTITIONER

## 2024-02-15 PROCEDURE — 11042 DBRDMT SUBQ TIS 1ST 20SQCM/<: CPT

## 2024-02-15 PROCEDURE — 99214 OFFICE O/P EST MOD 30 MIN: CPT

## 2024-02-15 RX ORDER — SODIUM CHLOR/HYPOCHLOROUS ACID 0.033 %
SOLUTION, IRRIGATION IRRIGATION ONCE
OUTPATIENT
Start: 2024-02-15 | End: 2024-02-15

## 2024-02-15 RX ORDER — BACITRACIN ZINC AND POLYMYXIN B SULFATE 500; 1000 [USP'U]/G; [USP'U]/G
OINTMENT TOPICAL ONCE
OUTPATIENT
Start: 2024-02-15 | End: 2024-02-15

## 2024-02-15 RX ORDER — LIDOCAINE 50 MG/G
OINTMENT TOPICAL ONCE
OUTPATIENT
Start: 2024-02-15 | End: 2024-02-15

## 2024-02-15 RX ORDER — GINSENG 100 MG
CAPSULE ORAL ONCE
OUTPATIENT
Start: 2024-02-15 | End: 2024-02-15

## 2024-02-15 RX ORDER — LIDOCAINE HYDROCHLORIDE 20 MG/ML
JELLY TOPICAL ONCE
OUTPATIENT
Start: 2024-02-15 | End: 2024-02-15

## 2024-02-15 RX ORDER — LIDOCAINE 40 MG/G
CREAM TOPICAL ONCE
OUTPATIENT
Start: 2024-02-15 | End: 2024-02-15

## 2024-02-15 RX ORDER — CLOBETASOL PROPIONATE 0.5 MG/G
OINTMENT TOPICAL ONCE
OUTPATIENT
Start: 2024-02-15 | End: 2024-02-15

## 2024-02-15 RX ORDER — GENTAMICIN SULFATE 1 MG/G
OINTMENT TOPICAL ONCE
OUTPATIENT
Start: 2024-02-15 | End: 2024-02-15

## 2024-02-15 RX ORDER — BETAMETHASONE DIPROPIONATE 0.5 MG/G
CREAM TOPICAL ONCE
OUTPATIENT
Start: 2024-02-15 | End: 2024-02-15

## 2024-02-15 RX ORDER — LIDOCAINE HYDROCHLORIDE 40 MG/ML
SOLUTION TOPICAL ONCE
OUTPATIENT
Start: 2024-02-15 | End: 2024-02-15

## 2024-02-15 RX ORDER — IBUPROFEN 200 MG
TABLET ORAL ONCE
OUTPATIENT
Start: 2024-02-15 | End: 2024-02-15

## 2024-02-15 RX ORDER — TRIAMCINOLONE ACETONIDE 1 MG/G
OINTMENT TOPICAL ONCE
OUTPATIENT
Start: 2024-02-15 | End: 2024-02-15

## 2024-02-15 RX ORDER — LIDOCAINE HYDROCHLORIDE 20 MG/ML
JELLY TOPICAL ONCE
Status: COMPLETED | OUTPATIENT
Start: 2024-02-15 | End: 2024-02-15

## 2024-02-15 RX ADMIN — LIDOCAINE HYDROCHLORIDE: 20 JELLY TOPICAL at 15:05

## 2024-02-15 NOTE — PATIENT INSTRUCTIONS
Visit Discharge/Physician Orders:  - Debridement completed today.   - New wound care orders today. Please see below.   - Try to prop legs up on a pillow while in the recliner or in bed to help offload pressure.   - Please call the wound clinic with any questions or concerns.     Home Care: [facility]     Wound Location: Left Leg    Dressing orders:     1) Gather wound care supplies and arrange on clean table.     2) Wash your hands with soap and water or use alcohol based hand  for 20 seconds (sing \"Happy Birthday\" twice).    3) Cleanse wounds with normal saline or wound cleanser and gauze. Pat dry with clean gauze.    4) Left Medial Leg- Apply Triad to wound, wrap leg with kerlix and then ace wrap from the base of the toes to 1-2 inches below the bend of the knee. Change Daily.       Left Lateral Leg- Apply Triad to the wound, then cover with Alginate, ABD pad, then wrap leg with kerlix and ace wrap from the base of the toes to 1-2 inches below the bend of the knee. Change Daily.       Right Leg- Wrap leg with kerlix and ace wrap from the base of the toes to 1-2 inches below the bend of the knee. Change Daily.       Keep all dressings clean & dry.    Follow up visit:       We have sent your supply order to the following company:  Per [facility]   If you don't receive the items you were expecting or don't know what the items are that you received, call the company where the order was sent.   If you are unable to obtain wound supplies, continue to use the supplies you have available until you are able to reach us. It is most important to keep the wound covered at all times.  It is YOUR responsibility to make sure that supplies are re-ordered before you run out. Re-order telephone numbers are included in each package.     Keep next scheduled appointment. Please give 24 hour notice if unable to keep appointment. [phone]    If you experience any of the following, please call the Wound

## 2024-02-15 NOTE — PROGRESS NOTES
leg with kerlix and then ace wrap from the base of the toes to 1-2 inches below the bend of the knee. Change Daily.       Left Lateral Leg- Apply Triad to the wound, then cover with Alginate, ABD pad, then wrap leg with kerlix and ace wrap from the base of the toes to 1-2 inches below the bend of the knee. Change Daily.       Right Leg- Wrap leg with kerlix and ace wrap from the base of the toes to 1-2 inches below the bend of the knee. Change Daily.       Keep all dressings clean & dry.    Follow up visit:       We have sent your supply order to the following company:  Per [facility]   If you don't receive the items you were expecting or don't know what the items are that you received, call the company where the order was sent.   If you are unable to obtain wound supplies, continue to use the supplies you have available until you are able to reach us. It is most important to keep the wound covered at all times.  It is YOUR responsibility to make sure that supplies are re-ordered before you run out. Re-order telephone numbers are included in each package.     Keep next scheduled appointment. Please give 24 hour notice if unable to keep appointment. [phone]    If you experience any of the following, please call the Wound Care Service during business hours: Monday through Friday 8:00 am - 4:30 pm  ([phone]).   *Increase in pain   *Temperature over 101   *Increase in drainage from your wound or a foul odor   *Uncontrolled swelling   *Need for compression bandage changes due to slippage, breakthrough drainage    If you need medical attention outside of business hours, please contact your Primary Care Doctor or go to the nearest emergency room.                 Electronically signed by PETEY Adler CNP on 2/15/2024 at 4:06 PM

## 2024-02-15 NOTE — PLAN OF CARE
Problem: Wound:  Goal: Will show signs of wound healing; wound closure and no evidence of infection  Description: Will show signs of wound healing; wound closure and no evidence of infection  Outcome: Progressing     Patient seen today at the wound clinic for left leg wounds, please see AVS. Care plan reviewed with patient and daughter, Jazmine.  Jazmine verbalizes understanding of the plan of care and contribute to goal setting. Orders sent to [facility].

## 2024-03-01 ENCOUNTER — HOSPITAL ENCOUNTER (OUTPATIENT)
Age: 84
Setting detail: OBSERVATION
Discharge: OTHER INSTITUTION | End: 2024-03-03
Attending: EMERGENCY MEDICINE
Payer: MEDICARE

## 2024-03-01 ENCOUNTER — APPOINTMENT (OUTPATIENT)
Dept: CT IMAGING | Age: 84
End: 2024-03-01
Payer: MEDICARE

## 2024-03-01 DIAGNOSIS — K92.2 GASTROINTESTINAL HEMORRHAGE, UNSPECIFIED GASTROINTESTINAL HEMORRHAGE TYPE: ICD-10-CM

## 2024-03-01 DIAGNOSIS — K92.1 MELENA: Primary | ICD-10-CM

## 2024-03-01 DIAGNOSIS — R53.83 OTHER FATIGUE: ICD-10-CM

## 2024-03-01 DIAGNOSIS — I50.32 CHRONIC DIASTOLIC HEART FAILURE (HCC): ICD-10-CM

## 2024-03-01 DIAGNOSIS — Z86.718 HISTORY OF DVT (DEEP VEIN THROMBOSIS): ICD-10-CM

## 2024-03-01 LAB
ABO: NORMAL
ANION GAP SERPL CALC-SCNC: 15 MEQ/L (ref 8–16)
ANTIBODY SCREEN: NORMAL
APTT PPP: 37.7 SECONDS (ref 22–38)
BASOPHILS ABSOLUTE: 0.1 THOU/MM3 (ref 0–0.1)
BASOPHILS NFR BLD AUTO: 0.6 %
BUN SERPL-MCNC: 18 MG/DL (ref 7–22)
CALCIUM SERPL-MCNC: 9.6 MG/DL (ref 8.5–10.5)
CHLORIDE SERPL-SCNC: 99 MEQ/L (ref 98–111)
CO2 SERPL-SCNC: 26 MEQ/L (ref 23–33)
CREAT SERPL-MCNC: 1 MG/DL (ref 0.4–1.2)
DEPRECATED RDW RBC AUTO: 55.5 FL (ref 35–45)
EKG ATRIAL RATE: 86 BPM
EKG P AXIS: 47 DEGREES
EKG P-R INTERVAL: 182 MS
EKG Q-T INTERVAL: 398 MS
EKG QRS DURATION: 106 MS
EKG QTC CALCULATION (BAZETT): 476 MS
EKG R AXIS: -69 DEGREES
EKG T AXIS: 23 DEGREES
EKG VENTRICULAR RATE: 86 BPM
EOSINOPHIL NFR BLD AUTO: 1.9 %
EOSINOPHILS ABSOLUTE: 0.2 THOU/MM3 (ref 0–0.4)
ERYTHROCYTE [DISTWIDTH] IN BLOOD BY AUTOMATED COUNT: 16.8 % (ref 11.5–14.5)
GFR SERPL CREATININE-BSD FRML MDRD: 56 ML/MIN/1.73M2
GLUCOSE SERPL-MCNC: 116 MG/DL (ref 70–108)
HCT VFR BLD AUTO: 27.9 % (ref 37–47)
HCT VFR BLD AUTO: 32.4 % (ref 37–47)
HCT VFR BLD AUTO: 33 % (ref 37–47)
HEMOCCULT STL QL: POSITIVE
HGB BLD-MCNC: 8.3 GM/DL (ref 12–16)
HGB BLD-MCNC: 9.4 GM/DL (ref 12–16)
HGB BLD-MCNC: 9.9 GM/DL (ref 12–16)
IMM GRANULOCYTES # BLD AUTO: 0.06 THOU/MM3 (ref 0–0.07)
IMM GRANULOCYTES NFR BLD AUTO: 0.5 %
INR PPP: 1.58 (ref 0.85–1.13)
LYMPHOCYTES ABSOLUTE: 1.4 THOU/MM3 (ref 1–4.8)
LYMPHOCYTES NFR BLD AUTO: 11.3 %
MCH RBC QN AUTO: 26.6 PG (ref 26–33)
MCHC RBC AUTO-ENTMCNC: 29 GM/DL (ref 32.2–35.5)
MCV RBC AUTO: 91.5 FL (ref 81–99)
MONOCYTES ABSOLUTE: 1.5 THOU/MM3 (ref 0.4–1.3)
MONOCYTES NFR BLD AUTO: 12.1 %
NEUTROPHILS NFR BLD AUTO: 73.6 %
NRBC BLD AUTO-RTO: 0 /100 WBC
OSMOLALITY SERPL CALC.SUM OF ELEC: 282.3 MOSMOL/KG (ref 275–300)
PLATELET # BLD AUTO: 533 THOU/MM3 (ref 130–400)
PMV BLD AUTO: 10 FL (ref 9.4–12.4)
POTASSIUM SERPL-SCNC: 4.8 MEQ/L (ref 3.5–5.2)
RBC # BLD AUTO: 3.54 MILL/MM3 (ref 4.2–5.4)
RH FACTOR: NORMAL
SEGMENTED NEUTROPHILS ABSOLUTE COUNT: 9.1 THOU/MM3 (ref 1.8–7.7)
SODIUM SERPL-SCNC: 140 MEQ/L (ref 135–145)
TROPONIN, HIGH SENSITIVITY: 61 NG/L (ref 0–12)
TROPONIN, HIGH SENSITIVITY: 63 NG/L (ref 0–12)
TROPONIN, HIGH SENSITIVITY: 73 NG/L (ref 0–12)
WBC # BLD AUTO: 12.4 THOU/MM3 (ref 4.8–10.8)

## 2024-03-01 PROCEDURE — 93005 ELECTROCARDIOGRAM TRACING: CPT | Performed by: STUDENT IN AN ORGANIZED HEALTH CARE EDUCATION/TRAINING PROGRAM

## 2024-03-01 PROCEDURE — 93010 ELECTROCARDIOGRAM REPORT: CPT | Performed by: INTERNAL MEDICINE

## 2024-03-01 PROCEDURE — 85025 COMPLETE CBC W/AUTO DIFF WBC: CPT

## 2024-03-01 PROCEDURE — 85730 THROMBOPLASTIN TIME PARTIAL: CPT

## 2024-03-01 PROCEDURE — 86850 RBC ANTIBODY SCREEN: CPT

## 2024-03-01 PROCEDURE — G0378 HOSPITAL OBSERVATION PER HR: HCPCS

## 2024-03-01 PROCEDURE — C9113 INJ PANTOPRAZOLE SODIUM, VIA: HCPCS | Performed by: STUDENT IN AN ORGANIZED HEALTH CARE EDUCATION/TRAINING PROGRAM

## 2024-03-01 PROCEDURE — 86901 BLOOD TYPING SEROLOGIC RH(D): CPT

## 2024-03-01 PROCEDURE — 85018 HEMOGLOBIN: CPT

## 2024-03-01 PROCEDURE — 80048 BASIC METABOLIC PNL TOTAL CA: CPT

## 2024-03-01 PROCEDURE — 99223 1ST HOSP IP/OBS HIGH 75: CPT | Performed by: STUDENT IN AN ORGANIZED HEALTH CARE EDUCATION/TRAINING PROGRAM

## 2024-03-01 PROCEDURE — 36415 COLL VENOUS BLD VENIPUNCTURE: CPT

## 2024-03-01 PROCEDURE — 82272 OCCULT BLD FECES 1-3 TESTS: CPT

## 2024-03-01 PROCEDURE — 6360000002 HC RX W HCPCS: Performed by: STUDENT IN AN ORGANIZED HEALTH CARE EDUCATION/TRAINING PROGRAM

## 2024-03-01 PROCEDURE — 6360000004 HC RX CONTRAST MEDICATION: Performed by: STUDENT IN AN ORGANIZED HEALTH CARE EDUCATION/TRAINING PROGRAM

## 2024-03-01 PROCEDURE — 2580000003 HC RX 258: Performed by: STUDENT IN AN ORGANIZED HEALTH CARE EDUCATION/TRAINING PROGRAM

## 2024-03-01 PROCEDURE — A4216 STERILE WATER/SALINE, 10 ML: HCPCS | Performed by: STUDENT IN AN ORGANIZED HEALTH CARE EDUCATION/TRAINING PROGRAM

## 2024-03-01 PROCEDURE — 85610 PROTHROMBIN TIME: CPT

## 2024-03-01 PROCEDURE — 74177 CT ABD & PELVIS W/CONTRAST: CPT

## 2024-03-01 PROCEDURE — 6370000000 HC RX 637 (ALT 250 FOR IP): Performed by: STUDENT IN AN ORGANIZED HEALTH CARE EDUCATION/TRAINING PROGRAM

## 2024-03-01 PROCEDURE — 2580000003 HC RX 258

## 2024-03-01 PROCEDURE — 96374 THER/PROPH/DIAG INJ IV PUSH: CPT

## 2024-03-01 PROCEDURE — 6370000000 HC RX 637 (ALT 250 FOR IP)

## 2024-03-01 PROCEDURE — 99285 EMERGENCY DEPT VISIT HI MDM: CPT

## 2024-03-01 PROCEDURE — 84484 ASSAY OF TROPONIN QUANT: CPT

## 2024-03-01 PROCEDURE — 86900 BLOOD TYPING SEROLOGIC ABO: CPT

## 2024-03-01 PROCEDURE — 85014 HEMATOCRIT: CPT

## 2024-03-01 RX ORDER — LANOLIN ALCOHOL/MO/W.PET/CERES
3 CREAM (GRAM) TOPICAL NIGHTLY PRN
Status: DISCONTINUED | OUTPATIENT
Start: 2024-03-01 | End: 2024-03-03 | Stop reason: HOSPADM

## 2024-03-01 RX ORDER — ACETAMINOPHEN 500 MG
1000 TABLET ORAL 3 TIMES DAILY PRN
Status: DISCONTINUED | OUTPATIENT
Start: 2024-03-01 | End: 2024-03-03 | Stop reason: HOSPADM

## 2024-03-01 RX ORDER — SODIUM CHLORIDE 0.9 % (FLUSH) 0.9 %
5-40 SYRINGE (ML) INJECTION PRN
Status: DISCONTINUED | OUTPATIENT
Start: 2024-03-01 | End: 2024-03-03 | Stop reason: HOSPADM

## 2024-03-01 RX ORDER — PREDNISONE 1 MG/1
1 TABLET ORAL DAILY
Status: DISCONTINUED | OUTPATIENT
Start: 2024-03-02 | End: 2024-03-03 | Stop reason: HOSPADM

## 2024-03-01 RX ORDER — SODIUM CHLORIDE 9 MG/ML
INJECTION, SOLUTION INTRAVENOUS CONTINUOUS
Status: ACTIVE | OUTPATIENT
Start: 2024-03-01 | End: 2024-03-02

## 2024-03-01 RX ORDER — DOCUSATE SODIUM 100 MG/1
100 CAPSULE, LIQUID FILLED ORAL 2 TIMES DAILY
Status: DISCONTINUED | OUTPATIENT
Start: 2024-03-01 | End: 2024-03-03 | Stop reason: HOSPADM

## 2024-03-01 RX ORDER — TRAZODONE HYDROCHLORIDE 50 MG/1
50 TABLET ORAL NIGHTLY
Status: DISCONTINUED | OUTPATIENT
Start: 2024-03-01 | End: 2024-03-03 | Stop reason: HOSPADM

## 2024-03-01 RX ORDER — BUMETANIDE 0.5 MG/1
0.5 TABLET ORAL DAILY
Status: DISCONTINUED | OUTPATIENT
Start: 2024-03-02 | End: 2024-03-03 | Stop reason: HOSPADM

## 2024-03-01 RX ORDER — ALBUTEROL SULFATE 2.5 MG/3ML
2.5 SOLUTION RESPIRATORY (INHALATION) EVERY 6 HOURS PRN
Status: DISCONTINUED | OUTPATIENT
Start: 2024-03-01 | End: 2024-03-03 | Stop reason: HOSPADM

## 2024-03-01 RX ORDER — ONDANSETRON 2 MG/ML
4 INJECTION INTRAMUSCULAR; INTRAVENOUS EVERY 6 HOURS PRN
Status: DISCONTINUED | OUTPATIENT
Start: 2024-03-01 | End: 2024-03-03 | Stop reason: HOSPADM

## 2024-03-01 RX ORDER — ACETAMINOPHEN 650 MG/1
650 SUPPOSITORY RECTAL EVERY 6 HOURS PRN
Status: DISCONTINUED | OUTPATIENT
Start: 2024-03-01 | End: 2024-03-03 | Stop reason: HOSPADM

## 2024-03-01 RX ORDER — SODIUM CHLORIDE 9 MG/ML
INJECTION, SOLUTION INTRAVENOUS PRN
Status: DISCONTINUED | OUTPATIENT
Start: 2024-03-01 | End: 2024-03-03 | Stop reason: HOSPADM

## 2024-03-01 RX ORDER — GLUCAGON 1 MG/ML
1 KIT INJECTION PRN
Status: DISCONTINUED | OUTPATIENT
Start: 2024-03-01 | End: 2024-03-03 | Stop reason: HOSPADM

## 2024-03-01 RX ORDER — ATORVASTATIN CALCIUM 20 MG/1
20 TABLET, FILM COATED ORAL NIGHTLY
Status: DISCONTINUED | OUTPATIENT
Start: 2024-03-01 | End: 2024-03-03 | Stop reason: HOSPADM

## 2024-03-01 RX ORDER — LANOLIN ALCOHOL/MO/W.PET/CERES
1000 CREAM (GRAM) TOPICAL DAILY
Status: DISCONTINUED | OUTPATIENT
Start: 2024-03-02 | End: 2024-03-03 | Stop reason: HOSPADM

## 2024-03-01 RX ORDER — ALBUTEROL SULFATE 90 UG/1
2 AEROSOL, METERED RESPIRATORY (INHALATION) EVERY 6 HOURS PRN
Status: DISCONTINUED | OUTPATIENT
Start: 2024-03-01 | End: 2024-03-03 | Stop reason: HOSPADM

## 2024-03-01 RX ORDER — LISINOPRIL 20 MG/1
20 TABLET ORAL DAILY
Status: DISCONTINUED | OUTPATIENT
Start: 2024-03-02 | End: 2024-03-03 | Stop reason: HOSPADM

## 2024-03-01 RX ORDER — CALCIUM CARBONATE 500 MG/1
500 TABLET, CHEWABLE ORAL 3 TIMES DAILY PRN
Status: DISCONTINUED | OUTPATIENT
Start: 2024-03-01 | End: 2024-03-03 | Stop reason: HOSPADM

## 2024-03-01 RX ORDER — SODIUM CHLORIDE 9 MG/ML
INJECTION, SOLUTION INTRAVENOUS CONTINUOUS
Status: DISCONTINUED | OUTPATIENT
Start: 2024-03-01 | End: 2024-03-01

## 2024-03-01 RX ORDER — BENZONATATE 100 MG/1
200 CAPSULE ORAL 3 TIMES DAILY PRN
Status: DISCONTINUED | OUTPATIENT
Start: 2024-03-01 | End: 2024-03-03 | Stop reason: HOSPADM

## 2024-03-01 RX ORDER — GUAIFENESIN/DEXTROMETHORPHAN 100-10MG/5
5 SYRUP ORAL EVERY 4 HOURS PRN
Status: DISCONTINUED | OUTPATIENT
Start: 2024-03-01 | End: 2024-03-03 | Stop reason: HOSPADM

## 2024-03-01 RX ORDER — ONDANSETRON 4 MG/1
4 TABLET, ORALLY DISINTEGRATING ORAL EVERY 8 HOURS PRN
Status: DISCONTINUED | OUTPATIENT
Start: 2024-03-01 | End: 2024-03-03 | Stop reason: HOSPADM

## 2024-03-01 RX ORDER — DEXTROSE MONOHYDRATE 100 MG/ML
INJECTION, SOLUTION INTRAVENOUS CONTINUOUS PRN
Status: DISCONTINUED | OUTPATIENT
Start: 2024-03-01 | End: 2024-03-03 | Stop reason: HOSPADM

## 2024-03-01 RX ORDER — SODIUM CHLORIDE 0.9 % (FLUSH) 0.9 %
5-40 SYRINGE (ML) INJECTION EVERY 12 HOURS SCHEDULED
Status: DISCONTINUED | OUTPATIENT
Start: 2024-03-01 | End: 2024-03-03 | Stop reason: HOSPADM

## 2024-03-01 RX ORDER — LOPERAMIDE HYDROCHLORIDE 2 MG/1
2 CAPSULE ORAL 4 TIMES DAILY PRN
Status: DISCONTINUED | OUTPATIENT
Start: 2024-03-01 | End: 2024-03-03 | Stop reason: HOSPADM

## 2024-03-01 RX ORDER — DONEPEZIL HYDROCHLORIDE 10 MG/1
10 TABLET, FILM COATED ORAL NIGHTLY
Status: DISCONTINUED | OUTPATIENT
Start: 2024-03-01 | End: 2024-03-03 | Stop reason: HOSPADM

## 2024-03-01 RX ORDER — ACETAMINOPHEN 325 MG/1
650 TABLET ORAL EVERY 6 HOURS PRN
Status: DISCONTINUED | OUTPATIENT
Start: 2024-03-01 | End: 2024-03-01 | Stop reason: SDUPTHER

## 2024-03-01 RX ADMIN — SODIUM CHLORIDE: 9 INJECTION, SOLUTION INTRAVENOUS at 20:51

## 2024-03-01 RX ADMIN — Medication 3 MG: at 20:51

## 2024-03-01 RX ADMIN — SODIUM CHLORIDE: 9 INJECTION, SOLUTION INTRAVENOUS at 16:44

## 2024-03-01 RX ADMIN — TRAZODONE HYDROCHLORIDE 50 MG: 50 TABLET ORAL at 20:51

## 2024-03-01 RX ADMIN — DOCUSATE SODIUM 100 MG: 100 CAPSULE, LIQUID FILLED ORAL at 20:51

## 2024-03-01 RX ADMIN — METOPROLOL TARTRATE 12.5 MG: 25 TABLET, FILM COATED ORAL at 21:27

## 2024-03-01 RX ADMIN — PANTOPRAZOLE SODIUM 80 MG: 40 INJECTION, POWDER, FOR SOLUTION INTRAVENOUS at 20:51

## 2024-03-01 RX ADMIN — IOPAMIDOL 80 ML: 755 INJECTION, SOLUTION INTRAVENOUS at 13:32

## 2024-03-01 RX ADMIN — ATORVASTATIN CALCIUM 20 MG: 20 TABLET, FILM COATED ORAL at 21:27

## 2024-03-01 RX ADMIN — DONEPEZIL HYDROCHLORIDE 10 MG: 10 TABLET, FILM COATED ORAL at 21:27

## 2024-03-01 RX ADMIN — SODIUM CHLORIDE, PRESERVATIVE FREE 10 ML: 5 INJECTION INTRAVENOUS at 21:31

## 2024-03-01 RX ADMIN — CARBIDOPA AND LEVODOPA 2 TABLET: 25; 100 TABLET ORAL at 21:27

## 2024-03-01 ASSESSMENT — PAIN - FUNCTIONAL ASSESSMENT
PAIN_FUNCTIONAL_ASSESSMENT: NONE - DENIES PAIN

## 2024-03-01 NOTE — ED NOTES
Patient provided food box at this time. Hospitalist in to evaluate patient and update family on plan of care

## 2024-03-01 NOTE — ED TRIAGE NOTES
Pt to ED from [facility] via [facility] with c/o fatigue and dark stools. Provider wanted her sent over due to her Hgb being low. Squad and pt are unsure what her hgb results are. Pt also saw blood on her bed sheets today. Pt denies any pain at this time. VSS.

## 2024-03-01 NOTE — H&P
1.651 m (5' 5\")   Wt 65.8 kg (145 lb)   SpO2 98%   BMI 24.13 kg/m²     General appearance: No apparent distress, appears stated age.  Eyes:  Pupils equal, round, and reactive to light. Conjunctivae/corneas clear.  HENT: Head normal in appearance. External nares normal.  Oral mucosa moist without lesions.  Hearing grossly intact.   Neck: Supple, with full range of motion. Trachea midline.  No gross JVD appreciated.  Respiratory:  Normal respiratory effort. Clear to auscultation, bilaterally without rales or wheezes or rhonchi.  Cardiovascular: Normal rate, regular rhythm with normal S1/S2 without murmurs.  Slight lower extremity edema.  Abdomen: Soft, non-tender, non-distended with normal bowel sounds.  Musculoskeletal: No joint swelling or tenderness. Normal tone. No abnormal movements.   Skin: Warm and dry. No rashes or lesions.  Neurologic: Alert and oriented x 3, alert to person, place, event.  No focal sensory/motor deficits in the upper and lower extremities. Cranial nerves:  grossly non-focal 2-12.     Psychiatric: Alert and oriented, normal insight and thought content.   Capillary Refill: Brisk,< 3 seconds.           Peripheral Pulses: +2 palpable, equal bilaterally.       EKG:  I have reviewed the EKG with the following interpretation: Normal sinus rhythm, incomplete right bundle branch block, moderate voltage criteria for LVH.      Labs:     Recent Labs     03/01/24  1157 03/01/24  1425   WBC 12.4*  --    HGB 9.4* 9.9*   HCT 32.4* 33.0*   *  --      Recent Labs     03/01/24  1157      K 4.8   CL 99   CO2 26   BUN 18   CREATININE 1.0   CALCIUM 9.6     No results for input(s): \"AST\", \"ALT\", \"BILIDIR\", \"BILITOT\", \"ALKPHOS\" in the last 72 hours.  Recent Labs     03/01/24  1157   INR 1.58*     No results for input(s): \"TROPONINT\" in the last 72 hours.  No results for input(s): \"PROCAL\" in the last 72 hours.   Lab Results   Component Value Date/Time    NITRU NEGATIVE 01/29/2024 02:55 PM    WBCUA

## 2024-03-01 NOTE — ED NOTES
ED to inpatient nurses report      Chief Complaint:  Chief Complaint   Patient presents with    Fatigue    Dark stools. Low Hgb      Present to ED from: [facility]    MOA:     LOC: alert and orientated to name, place, date  Mobility: Requires assistance * 2  Oxygen Baseline: RA    Current needs required: RA     Code Status:   Prior    What abnormal results were found and what did you give/do to treat them?   Any procedures or intervention occur?     Mental Status:  Level of Consciousness: Alert (0)    Psych Assessment:        Vitals:  Patient Vitals for the past 24 hrs:   BP Temp Temp src Pulse Resp SpO2 Height Weight   03/01/24 1339 -- -- -- 82 -- -- -- --   03/01/24 1139 (!) 112/57 97.6 °F (36.4 °C) Oral 83 19 97 % 1.651 m (5' 5\") 65.8 kg (145 lb)        LDAs:   Peripheral IV 03/01/24 Left Forearm (Active)   Site Assessment Clean, dry & intact 03/01/24 1156   Line Status Blood return noted;Normal saline locked;Flushed;Specimen collected 03/01/24 1156   Phlebitis Assessment No symptoms 03/01/24 1156   Infiltration Assessment 0 03/01/24 1156   Dressing Status New dressing applied;Clean, dry & intact 03/01/24 1156   Dressing Type Transparent 03/01/24 1156   Dressing Intervention New 03/01/24 1156       Ambulatory Status:  Presents to emergency department  because of falls (Syncope, seizure, or loss of consciousness): No, Age > 70: Yes, Altered Mental Status, Intoxication with alcohol or substance confusion (Disorientation, impaired judgment, poor safety awaremess, or inability to follow instructions): No, Impaired Mobility: Ambulates or transfers with assistive devices or assistance; Unable to ambulate or transer.: Yes, Nursing Judgement: Yes    Diagnosis:  DISPOSITION Admitted 03/01/2024 02:30:39 PM   Final diagnoses:   Other fatigue   Melena        Consults:  IP CONSULT TO GI     Pain Score:  Pain Assessment  Pain Assessment: None - Denies Pain    C-SSRS:   Risk of Suicide: No Risk    Sepsis

## 2024-03-01 NOTE — CONSULTS
Consult History & Physical      Patient:  Carmen Howard  YOB: 1940  MRN: [id number]   Acct: [id number]    Chief Complaint:    Chief Complaint   Patient presents with    Fatigue    Dark stools. Low Hgb        Date of Service: Pt seen/examined in consultation on 3/1/2024    History Of Present Illness:      83 y.o. female who we are asked to see/evaluate by Behl, Ashita, MD for medical management of low hemoglobin, dark stools. Patient had referral to Dr. Armando in 2021, appears she did not follow-up. Patient lives at skilled nursing facility.  Staff there noted she had dark in stool, they were concerned because they found blood on her bed sheets.  Patient on Eliquis for DVT prophylaxis, has history of DVT 2021.  Staff there held her Eliquis.  Patient recent admission, discharged 2/1 for sepsis treated with IV Zosyn, vancomycin continued with outpatient doxycycline.  Patient endorses no history of prior diverticulitis or GI bleed.  Patient was interviewed with daughter, son-in-law present at bedside.  Patient is denying fatigue or lightheadedness or dizziness at this time.  Patient has had bowel movement, however does not know what color was.  She called it normal.  She has no chest pain, palpitations.  No chills, sweats, fever.  She has no abdominal pain or abdominal discomfort.  She denies constipation, diarrhea.  Patient comes extremely agitated and strongly resists efforts to measure her blood pressure.  Patient is able to respond coherently to interview questions, however I am unsure as to how honest her answers are.  During intereview, she is not tachycardic.  She has no acute complaints.  She expresses desire to leave.  FOBT positive in ED.  CT A/P shows rectosigmoid thickening likely secondary to underdistention, low suspicion for proctocolitis.    Past Medical History:    Past Medical History:   Diagnosis Date    Abnormal nuclear stress test 06/01/2018    Cataract     Chronic deep vein

## 2024-03-01 NOTE — ED PROVIDER NOTES
[facility] EMERGENCY DEPT    Pt Name: Carmen Howard  MRN: [id number]  Birthdate 1940  Date of evaluation: 3/1/2024  Resident Physician: Dana Mccormick MD  Supervising Physician: Dr. Mich Sanchez,     CHIEF COMPLAINT       Chief Complaint   Patient presents with    Fatigue    Dark stools. Low Hgb      HISTORY OF PRESENT ILLNESS   Carmen Howard is a 83 y.o. female with PMHx of ulcerative colitis, colon polyps, diverticulosis, dementia, history of DVT on Eliquis  who presents to the emergency department for evaluation of fatigue, dark stools.    Patient is coming from nursing home due to chief complaint of fatigue and dark stools.  Nursing home noticed that there was blood on her sheets today.  Patient is anticoagulated on Eliquis.  Patient has also been increasingly fatigued recently.    On ED arrival, patient seems to be dozing off multiple times and needs to be aroused.  Patient does have a history of dementia.  Denies having any symptoms at this time.    Of note, per chart review, looks like patient has not seen GI since 2021.    The patient has no other acute complaints at this time.    Review of Systems    Negative Except as Documented Above.    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

## 2024-03-01 NOTE — ED NOTES
Pt transported to 8B27 by cart in stable condition.   Called 8B and informed Kenia that the patient was on their way to the unit.

## 2024-03-02 ENCOUNTER — APPOINTMENT (OUTPATIENT)
Age: 84
End: 2024-03-02
Payer: MEDICARE

## 2024-03-02 ENCOUNTER — APPOINTMENT (OUTPATIENT)
Dept: INTERVENTIONAL RADIOLOGY/VASCULAR | Age: 84
End: 2024-03-02
Payer: MEDICARE

## 2024-03-02 PROBLEM — G20.A1 PARKINSON'S DISEASE: Status: ACTIVE | Noted: 2024-03-02

## 2024-03-02 PROBLEM — Z86.718 HISTORY OF DVT (DEEP VEIN THROMBOSIS): Status: ACTIVE | Noted: 2024-03-02

## 2024-03-02 PROBLEM — R53.81 PHYSICAL DECONDITIONING: Status: ACTIVE | Noted: 2024-03-02

## 2024-03-02 PROBLEM — D64.9 CHRONIC ANEMIA: Status: ACTIVE | Noted: 2024-03-02

## 2024-03-02 PROBLEM — R79.89 ELEVATED TROPONIN: Status: ACTIVE | Noted: 2024-03-02

## 2024-03-02 PROBLEM — K92.1 MELENA: Status: ACTIVE | Noted: 2024-03-02

## 2024-03-02 LAB
ANION GAP SERPL CALC-SCNC: 13 MEQ/L (ref 8–16)
APTT PPP: 35.4 SECONDS (ref 22–38)
BASOPHILS ABSOLUTE: 0.1 THOU/MM3 (ref 0–0.1)
BASOPHILS NFR BLD AUTO: 0.5 %
BUN SERPL-MCNC: 14 MG/DL (ref 7–22)
CALCIUM SERPL-MCNC: 9.2 MG/DL (ref 8.5–10.5)
CHLORIDE SERPL-SCNC: 106 MEQ/L (ref 98–111)
CO2 SERPL-SCNC: 24 MEQ/L (ref 23–33)
CREAT SERPL-MCNC: 0.8 MG/DL (ref 0.4–1.2)
DEPRECATED RDW RBC AUTO: 55.4 FL (ref 35–45)
ECHO AV CUSP MM: 1.7 CM
ECHO AV MEAN GRADIENT: 4 MMHG
ECHO AV MEAN VELOCITY: 1 M/S
ECHO AV PEAK GRADIENT: 9 MMHG
ECHO AV PEAK VELOCITY: 1.5 M/S
ECHO AV VELOCITY RATIO: 0.67
ECHO AV VTI: 27 CM
ECHO BSA: 1.74 M2
ECHO EST RA PRESSURE: 5 MMHG
ECHO LA AREA 4C: 19.9 CM2
ECHO LA DIAMETER INDEX: 2.08 CM/M2
ECHO LA DIAMETER: 3.6 CM
ECHO LA MAJOR AXIS: 5.2 CM
ECHO LA VOL MOD A4C: 61 ML (ref 22–52)
ECHO LA VOLUME INDEX MOD A4C: 35 ML/M2 (ref 16–34)
ECHO LV E' LATERAL VELOCITY: 7 CM/S
ECHO LV E' SEPTAL VELOCITY: 6 CM/S
ECHO LV FRACTIONAL SHORTENING: 30 % (ref 28–44)
ECHO LV INTERNAL DIMENSION DIASTOLE INDEX: 2.14 CM/M2
ECHO LV INTERNAL DIMENSION DIASTOLIC: 3.7 CM (ref 3.9–5.3)
ECHO LV INTERNAL DIMENSION SYSTOLIC INDEX: 1.5 CM/M2
ECHO LV INTERNAL DIMENSION SYSTOLIC: 2.6 CM
ECHO LV ISOVOLUMETRIC RELAXATION TIME (IVRT): 87 MS
ECHO LV IVSD: 1.2 CM (ref 0.6–0.9)
ECHO LV MASS 2D: 138.2 G (ref 67–162)
ECHO LV MASS INDEX 2D: 79.9 G/M2 (ref 43–95)
ECHO LV POSTERIOR WALL DIASTOLIC: 1.1 CM (ref 0.6–0.9)
ECHO LV RELATIVE WALL THICKNESS RATIO: 0.59
ECHO LVOT AV VTI INDEX: 0.74
ECHO LVOT MEAN GRADIENT: 2 MMHG
ECHO LVOT PEAK GRADIENT: 4 MMHG
ECHO LVOT PEAK VELOCITY: 1 M/S
ECHO LVOT VTI: 20 CM
ECHO MV A VELOCITY: 1.21 M/S
ECHO MV E DECELERATION TIME (DT): 184 MS
ECHO MV E VELOCITY: 0.76 M/S
ECHO MV E/A RATIO: 0.63
ECHO MV E/E' LATERAL: 10.86
ECHO MV E/E' RATIO (AVERAGED): 11.76
ECHO PV MAX VELOCITY: 1 M/S
ECHO PV PEAK GRADIENT: 4 MMHG
ECHO RIGHT VENTRICULAR SYSTOLIC PRESSURE (RVSP): 35 MMHG
ECHO RV INTERNAL DIMENSION: 3.3 CM
ECHO RV TAPSE: 2.1 CM (ref 1.7–?)
ECHO TV E WAVE: 0.8 M/S
ECHO TV REGURGITANT MAX VELOCITY: 2.76 M/S
ECHO TV REGURGITANT PEAK GRADIENT: 30 MMHG
EKG ATRIAL RATE: 109 BPM
EKG P AXIS: 57 DEGREES
EKG P-R INTERVAL: 182 MS
EKG Q-T INTERVAL: 350 MS
EKG QRS DURATION: 112 MS
EKG QTC CALCULATION (BAZETT): 471 MS
EKG R AXIS: -69 DEGREES
EKG T AXIS: 56 DEGREES
EKG VENTRICULAR RATE: 109 BPM
EOSINOPHIL NFR BLD AUTO: 1.9 %
EOSINOPHILS ABSOLUTE: 0.2 THOU/MM3 (ref 0–0.4)
ERYTHROCYTE [DISTWIDTH] IN BLOOD BY AUTOMATED COUNT: 16.7 % (ref 11.5–14.5)
GFR SERPL CREATININE-BSD FRML MDRD: > 60 ML/MIN/1.73M2
GLUCOSE BLD STRIP.AUTO-MCNC: 102 MG/DL (ref 70–108)
GLUCOSE BLD STRIP.AUTO-MCNC: 128 MG/DL (ref 70–108)
GLUCOSE BLD STRIP.AUTO-MCNC: 168 MG/DL (ref 70–108)
GLUCOSE SERPL-MCNC: 121 MG/DL (ref 70–108)
HCT VFR BLD AUTO: 27.2 % (ref 37–47)
HCT VFR BLD AUTO: 31 % (ref 37–47)
HGB BLD-MCNC: 7.9 GM/DL (ref 12–16)
HGB BLD-MCNC: 9.1 GM/DL (ref 12–16)
IMM GRANULOCYTES # BLD AUTO: 0.05 THOU/MM3 (ref 0–0.07)
IMM GRANULOCYTES NFR BLD AUTO: 0.4 %
IRON SERPL-MCNC: 13 UG/DL (ref 50–170)
LYMPHOCYTES ABSOLUTE: 1 THOU/MM3 (ref 1–4.8)
LYMPHOCYTES NFR BLD AUTO: 8.9 %
MCH RBC QN AUTO: 26.8 PG (ref 26–33)
MCHC RBC AUTO-ENTMCNC: 29.4 GM/DL (ref 32.2–35.5)
MCV RBC AUTO: 91.4 FL (ref 81–99)
MONOCYTES ABSOLUTE: 0.9 THOU/MM3 (ref 0.4–1.3)
MONOCYTES NFR BLD AUTO: 8.2 %
NEUTROPHILS NFR BLD AUTO: 80.1 %
NRBC BLD AUTO-RTO: 0 /100 WBC
PLATELET # BLD AUTO: 500 THOU/MM3 (ref 130–400)
PMV BLD AUTO: 9.6 FL (ref 9.4–12.4)
POTASSIUM SERPL-SCNC: 4.2 MEQ/L (ref 3.5–5.2)
RBC # BLD AUTO: 3.39 MILL/MM3 (ref 4.2–5.4)
SEGMENTED NEUTROPHILS ABSOLUTE COUNT: 9.2 THOU/MM3 (ref 1.8–7.7)
SODIUM SERPL-SCNC: 143 MEQ/L (ref 135–145)
TIBC SERPL-MCNC: 225 UG/DL (ref 171–450)
WBC # BLD AUTO: 11.5 THOU/MM3 (ref 4.8–10.8)

## 2024-03-02 PROCEDURE — 85730 THROMBOPLASTIN TIME PARTIAL: CPT

## 2024-03-02 PROCEDURE — 6370000000 HC RX 637 (ALT 250 FOR IP)

## 2024-03-02 PROCEDURE — 6360000002 HC RX W HCPCS: Performed by: STUDENT IN AN ORGANIZED HEALTH CARE EDUCATION/TRAINING PROGRAM

## 2024-03-02 PROCEDURE — 85014 HEMATOCRIT: CPT

## 2024-03-02 PROCEDURE — 93970 EXTREMITY STUDY: CPT

## 2024-03-02 PROCEDURE — G0378 HOSPITAL OBSERVATION PER HR: HCPCS

## 2024-03-02 PROCEDURE — 83540 ASSAY OF IRON: CPT

## 2024-03-02 PROCEDURE — 93306 TTE W/DOPPLER COMPLETE: CPT | Performed by: INTERNAL MEDICINE

## 2024-03-02 PROCEDURE — 99232 SBSQ HOSP IP/OBS MODERATE 35: CPT | Performed by: NURSE PRACTITIONER

## 2024-03-02 PROCEDURE — 83550 IRON BINDING TEST: CPT

## 2024-03-02 PROCEDURE — 80048 BASIC METABOLIC PNL TOTAL CA: CPT

## 2024-03-02 PROCEDURE — 85018 HEMOGLOBIN: CPT

## 2024-03-02 PROCEDURE — 85025 COMPLETE CBC W/AUTO DIFF WBC: CPT

## 2024-03-02 PROCEDURE — 93010 ELECTROCARDIOGRAM REPORT: CPT | Performed by: INTERNAL MEDICINE

## 2024-03-02 PROCEDURE — 93306 TTE W/DOPPLER COMPLETE: CPT

## 2024-03-02 PROCEDURE — 96361 HYDRATE IV INFUSION ADD-ON: CPT

## 2024-03-02 PROCEDURE — A4216 STERILE WATER/SALINE, 10 ML: HCPCS | Performed by: STUDENT IN AN ORGANIZED HEALTH CARE EDUCATION/TRAINING PROGRAM

## 2024-03-02 PROCEDURE — C9113 INJ PANTOPRAZOLE SODIUM, VIA: HCPCS | Performed by: STUDENT IN AN ORGANIZED HEALTH CARE EDUCATION/TRAINING PROGRAM

## 2024-03-02 PROCEDURE — 82948 REAGENT STRIP/BLOOD GLUCOSE: CPT

## 2024-03-02 PROCEDURE — 94761 N-INVAS EAR/PLS OXIMETRY MLT: CPT

## 2024-03-02 PROCEDURE — 96376 TX/PRO/DX INJ SAME DRUG ADON: CPT

## 2024-03-02 PROCEDURE — 2580000003 HC RX 258: Performed by: NURSE PRACTITIONER

## 2024-03-02 PROCEDURE — 36415 COLL VENOUS BLD VENIPUNCTURE: CPT

## 2024-03-02 PROCEDURE — 94640 AIRWAY INHALATION TREATMENT: CPT

## 2024-03-02 PROCEDURE — 6370000000 HC RX 637 (ALT 250 FOR IP): Performed by: STUDENT IN AN ORGANIZED HEALTH CARE EDUCATION/TRAINING PROGRAM

## 2024-03-02 PROCEDURE — 2580000003 HC RX 258

## 2024-03-02 PROCEDURE — 2580000003 HC RX 258: Performed by: STUDENT IN AN ORGANIZED HEALTH CARE EDUCATION/TRAINING PROGRAM

## 2024-03-02 RX ORDER — SODIUM CHLORIDE 9 MG/ML
INJECTION, SOLUTION INTRAVENOUS CONTINUOUS
Status: DISCONTINUED | OUTPATIENT
Start: 2024-03-02 | End: 2024-03-03 | Stop reason: HOSPADM

## 2024-03-02 RX ADMIN — SODIUM CHLORIDE: 9 INJECTION, SOLUTION INTRAVENOUS at 12:46

## 2024-03-02 RX ADMIN — PREDNISONE 1 MG: 1 TABLET ORAL at 08:58

## 2024-03-02 RX ADMIN — Medication 3 MG: at 20:16

## 2024-03-02 RX ADMIN — PANTOPRAZOLE SODIUM 40 MG: 40 INJECTION, POWDER, FOR SOLUTION INTRAVENOUS at 18:42

## 2024-03-02 RX ADMIN — PANTOPRAZOLE SODIUM 40 MG: 40 INJECTION, POWDER, FOR SOLUTION INTRAVENOUS at 06:28

## 2024-03-02 RX ADMIN — Medication 1000 MCG: at 14:04

## 2024-03-02 RX ADMIN — TRAZODONE HYDROCHLORIDE 50 MG: 50 TABLET ORAL at 20:16

## 2024-03-02 RX ADMIN — SERTRALINE 50 MG: 50 TABLET, FILM COATED ORAL at 08:58

## 2024-03-02 RX ADMIN — CARBIDOPA AND LEVODOPA 2 TABLET: 25; 100 TABLET ORAL at 20:16

## 2024-03-02 RX ADMIN — DOCUSATE SODIUM 100 MG: 100 CAPSULE, LIQUID FILLED ORAL at 20:16

## 2024-03-02 RX ADMIN — SODIUM CHLORIDE, PRESERVATIVE FREE 10 ML: 5 INJECTION INTRAVENOUS at 20:19

## 2024-03-02 RX ADMIN — DOCUSATE SODIUM 100 MG: 100 CAPSULE, LIQUID FILLED ORAL at 08:58

## 2024-03-02 RX ADMIN — MOMETASONE FUROATE AND FORMOTEROL FUMARATE DIHYDRATE 2 PUFF: 200; 5 AEROSOL RESPIRATORY (INHALATION) at 18:24

## 2024-03-02 RX ADMIN — METOPROLOL TARTRATE 12.5 MG: 25 TABLET, FILM COATED ORAL at 20:18

## 2024-03-02 RX ADMIN — DONEPEZIL HYDROCHLORIDE 10 MG: 10 TABLET, FILM COATED ORAL at 20:16

## 2024-03-02 RX ADMIN — ATORVASTATIN CALCIUM 20 MG: 20 TABLET, FILM COATED ORAL at 20:16

## 2024-03-02 RX ADMIN — MOMETASONE FUROATE AND FORMOTEROL FUMARATE DIHYDRATE 2 PUFF: 200; 5 AEROSOL RESPIRATORY (INHALATION) at 08:31

## 2024-03-02 RX ADMIN — CARBIDOPA AND LEVODOPA 2 TABLET: 25; 100 TABLET ORAL at 08:58

## 2024-03-02 RX ADMIN — CARBIDOPA AND LEVODOPA 2 TABLET: 25; 100 TABLET ORAL at 14:04

## 2024-03-02 NOTE — PLAN OF CARE
I was messaged by the nurse overnight stating the patient had a previous DNR-CCA form signed. The code status was full code on admission and I changed it in the chart to reflect the DNR-CCA.    Amilcar Cooney MD

## 2024-03-02 NOTE — PLAN OF CARE
Problem: Respiratory - Adult  Goal: Clear lung sounds  Outcome: Progressing  Note: Patient's breath sounds were diminished throughout all lung fields. Continue taking inhalers and breathing treatments as ordered to improve aeration.

## 2024-03-02 NOTE — RT PROTOCOL NOTE
RT Nebulizer Bronchodilator Protocol Note    There is a bronchodilator order in the chart from a provider indicating to follow the RT Bronchodilator Protocol and there is an “Initiate RT Bronchodilator Protocol” order as well (see protocol at bottom of note).    CXR Findings:  No results found.    The findings from the last RT Protocol Assessment were as follows:  Smoking: Chronic pulmonary disease  Respiratory Pattern: Regular pattern and RR 12-20 bpm  Breath Sounds: Clear breath sounds  Cough: Strong, spontaneous, non-productive  Indication for Bronchodilator Therapy: None  Bronchodilator Assessment Score: 2    Aerosolized bronchodilator medication orders have been revised according to the RT Nebulizer Bronchodilator Protocol below.    Respiratory Therapist to perform RT Therapy Protocol Assessment initially then follow the protocol.  Repeat RT Therapy Protocol Assessment PRN for score 0-3 or on second treatment, BID, and PRN for scores above 3.    No Indications - adjust the frequency to every 6 hours PRN wheezing or bronchospasm, if no treatments needed after 48 hours then discontinue using Per Protocol order mode.     If indication present, adjust the RT bronchodilator orders based on the Bronchodilator Assessment Score as indicated below.  If a patient is on this medication at home then do not decrease Frequency below that used at home.    0-3 - enter or revise RT bronchodilator order(s) to equivalent RT Bronchodilator order with Frequency of every 4 hours PRN for wheezing or increased work of breathing using Per Protocol order mode.       4-6 - enter or revise RT Bronchodilator order(s) to two equivalent RT bronchodilator orders with one order with BID Frequency and one order with Frequency of every 4 hours PRN wheezing or increased work of breathing using Per Protocol order mode.         7-10 - enter or revise RT Bronchodilator order(s) to two equivalent RT bronchodilator orders with one order with TID

## 2024-03-03 VITALS
HEIGHT: 65 IN | TEMPERATURE: 97.6 F | DIASTOLIC BLOOD PRESSURE: 66 MMHG | OXYGEN SATURATION: 95 % | BODY MASS INDEX: 24.16 KG/M2 | SYSTOLIC BLOOD PRESSURE: 150 MMHG | HEART RATE: 108 BPM | WEIGHT: 145 LBS | RESPIRATION RATE: 18 BRPM

## 2024-03-03 LAB
GLUCOSE BLD STRIP.AUTO-MCNC: 126 MG/DL (ref 70–108)
GLUCOSE BLD STRIP.AUTO-MCNC: 133 MG/DL (ref 70–108)
GLUCOSE BLD STRIP.AUTO-MCNC: 174 MG/DL (ref 70–108)
HCT VFR BLD AUTO: 28.1 % (ref 37–47)
HGB BLD-MCNC: 8.3 GM/DL (ref 12–16)

## 2024-03-03 PROCEDURE — 36415 COLL VENOUS BLD VENIPUNCTURE: CPT

## 2024-03-03 PROCEDURE — C9113 INJ PANTOPRAZOLE SODIUM, VIA: HCPCS | Performed by: STUDENT IN AN ORGANIZED HEALTH CARE EDUCATION/TRAINING PROGRAM

## 2024-03-03 PROCEDURE — 97530 THERAPEUTIC ACTIVITIES: CPT

## 2024-03-03 PROCEDURE — 6370000000 HC RX 637 (ALT 250 FOR IP)

## 2024-03-03 PROCEDURE — G0378 HOSPITAL OBSERVATION PER HR: HCPCS

## 2024-03-03 PROCEDURE — 2580000003 HC RX 258: Performed by: NURSE PRACTITIONER

## 2024-03-03 PROCEDURE — 6370000000 HC RX 637 (ALT 250 FOR IP): Performed by: STUDENT IN AN ORGANIZED HEALTH CARE EDUCATION/TRAINING PROGRAM

## 2024-03-03 PROCEDURE — 85014 HEMATOCRIT: CPT

## 2024-03-03 PROCEDURE — 96376 TX/PRO/DX INJ SAME DRUG ADON: CPT

## 2024-03-03 PROCEDURE — 6360000002 HC RX W HCPCS: Performed by: STUDENT IN AN ORGANIZED HEALTH CARE EDUCATION/TRAINING PROGRAM

## 2024-03-03 PROCEDURE — 97162 PT EVAL MOD COMPLEX 30 MIN: CPT

## 2024-03-03 PROCEDURE — A4216 STERILE WATER/SALINE, 10 ML: HCPCS | Performed by: STUDENT IN AN ORGANIZED HEALTH CARE EDUCATION/TRAINING PROGRAM

## 2024-03-03 PROCEDURE — 94640 AIRWAY INHALATION TREATMENT: CPT

## 2024-03-03 PROCEDURE — 99239 HOSP IP/OBS DSCHRG MGMT >30: CPT | Performed by: NURSE PRACTITIONER

## 2024-03-03 PROCEDURE — 97116 GAIT TRAINING THERAPY: CPT

## 2024-03-03 PROCEDURE — 85018 HEMOGLOBIN: CPT

## 2024-03-03 PROCEDURE — 82948 REAGENT STRIP/BLOOD GLUCOSE: CPT

## 2024-03-03 PROCEDURE — 96361 HYDRATE IV INFUSION ADD-ON: CPT

## 2024-03-03 PROCEDURE — 2580000003 HC RX 258: Performed by: STUDENT IN AN ORGANIZED HEALTH CARE EDUCATION/TRAINING PROGRAM

## 2024-03-03 RX ADMIN — METOPROLOL TARTRATE 12.5 MG: 25 TABLET, FILM COATED ORAL at 11:36

## 2024-03-03 RX ADMIN — SODIUM CHLORIDE: 9 INJECTION, SOLUTION INTRAVENOUS at 09:16

## 2024-03-03 RX ADMIN — PANTOPRAZOLE SODIUM 40 MG: 40 INJECTION, POWDER, FOR SOLUTION INTRAVENOUS at 05:39

## 2024-03-03 RX ADMIN — SERTRALINE 50 MG: 50 TABLET, FILM COATED ORAL at 11:23

## 2024-03-03 RX ADMIN — MOMETASONE FUROATE AND FORMOTEROL FUMARATE DIHYDRATE 2 PUFF: 200; 5 AEROSOL RESPIRATORY (INHALATION) at 06:35

## 2024-03-03 RX ADMIN — Medication 1000 MCG: at 11:24

## 2024-03-03 RX ADMIN — PREDNISONE 1 MG: 1 TABLET ORAL at 11:23

## 2024-03-03 RX ADMIN — CARBIDOPA AND LEVODOPA 2 TABLET: 25; 100 TABLET ORAL at 11:22

## 2024-03-03 NOTE — CARE COORDINATION
Spoke with Carmen's daughter Jazmine. She had concerns about her mother staying another night at the hospital. Wishes for her to be discharged today. Spoke with Diann at [facility]. Diann states that they would be able to take Carmen back today if discharged. Updated Christi Woody CNP of daughter's concerns and that [facility] will take back today if Medicine feels she is stable for discharge. Updated Suzie VINCENT.  Electronically signed by Lana Chamorro RN on 3/3/2024 at 1:59 PM

## 2024-03-03 NOTE — DISCHARGE SUMMARY
.  Signed       [facility]         Hospital Medicine Discharge Summary        Patient Identification:   Carmen Howard   : 1940  MRN: [id number]   Account: [id number]                 Patient's PCP: Aamir Shah PA     Admit Date: 3/1/2024      Discharge Date: 3/3/2024       Admitting Physician: No admitting provider for patient encounter.     Discharge Physician: PETEY Teran - CNP      Discharge Diagnoses/Hospital course     Melena: Per staff: Patient has had blood in stool last couple days and blood was located on her sheets this morning.  Patient's Eliquis was discontinued 2 days ago on .  CT abdomen/pelvis on 3/1 showed mild thickening of rectosigmoid colon.  Colonic diverticulosis without diverticulitis.  No melena stool reported overnight  H&H this am 8.3/28.1  Continue to Trend H&H every 8 hours  Gi signed off ,   patient on regular diet and tolerating well        Chronic anemia: Hemoglobin on arrival 9.4.  Baseline hemoglobin 10.        Last night H&H 8.3/27.9  Trend H&H every 8 hours     Dementia: Home medication: Aricept  Continue Aricept     Chronic diastolic dysfunction: Echo 2020 showed EF 60-65% with grade 1 diastolic dysfunction.  Home medication Bumex.  GDMT: Lisinopril.   Serial troponin 73 trended to 61 patient continues to denies chest pain  TTE pending  Trend troponin  Continue  Bumex,   Blood pressure marginal levels this am  at 101/63 will hold  due lisinopril & Metoprolol for now.Will continue to monitor BP     Physical deconditioning: Per family patient has hard time with mobility which is where she is at/right  PT/OT  to evaluate patient     DVTs, history of: Currently holding home Eliquis due to increased bleed risk.  Continue mechanical prophylaxis due to GI bleed   Discussed with patient  and patient POA Ms Lucero about the history of DVT.Patient POA stated patient had DVT in  following fall after sustaining hip fracture, denies any other

## 2024-03-03 NOTE — DISCHARGE INSTR - COC
Left;Lower;Lateral (Active)   Wound Image    02/15/24 1449   Wound Etiology Traumatic 03/03/24 0900   Dressing Status Old drainage noted;New dressing applied 02/15/24 1449   Wound Cleansed Cleansed with saline 02/15/24 1449   Dressing/Treatment Open to air 03/03/24 0900   Offloading for Diabetic Foot Ulcers Offloading not required;Offloading not ordered 02/15/24 1449   Wound Length (cm) 4 cm 02/15/24 1449   Wound Width (cm) 4.1 cm 02/15/24 1449   Wound Depth (cm) 0 cm 02/15/24 1449   Wound Surface Area (cm^2) 16.4 cm^2 02/15/24 1449   Wound Volume (cm^3) 0 cm^3 02/15/24 1449   Wound Assessment Dry 03/03/24 0900   Drainage Amount Moderate (25-50%) 02/15/24 1449   Drainage Description Serosanguinous 02/15/24 1449   Odor None 02/15/24 1449   Judie-wound Assessment Dry/flaky 02/15/24 1449   Margins Attached edges 02/15/24 1449   Wound Thickness Description not for Pressure Injury Full thickness 02/15/24 1449   Number of days: 16        Elimination:  Continence:   Bowel: No  Bladder: No  Urinary Catheter: None   Colostomy/Ileostomy/Ileal Conduit: No       Date of Last BM: 3-2-24    Intake/Output Summary (Last 24 hours) at 3/3/2024 1414  Last data filed at 3/3/2024 0337  Gross per 24 hour   Intake 660 ml   Output 100 ml   Net 560 ml     I/O last 3 completed shifts:  In: 660 [P.O.:660]  Out: 100 [Urine:100]    Safety Concerns:     At Risk for Falls    Impairments/Disabilities:      None    Nutrition Therapy:  Current Nutrition Therapy:   - Oral Diet:  General    Routes of Feeding: Oral  Liquids: Thin Liquids  Daily Fluid Restriction: no  Last Modified Barium Swallow with Video (Video Swallowing Test): not done    Treatments at the Time of Hospital Discharge:   Respiratory Treatments: n/a  Oxygen Therapy:  is not on home oxygen therapy.  Ventilator:    - No ventilator support    Rehab Therapies: Physical Therapy and Occupational Therapy  Weight Bearing Status/Restrictions: No weight bearing restrictions  Other Medical

## 2024-03-03 NOTE — PROGRESS NOTES
Hospital Medicine Discharge Summary      Patient Identification:   Carmen Howard   : 1940  MRN: [id number]   Account: [id number]      Patient's PCP: Aamir Shah PA    Admit Date: 3/1/2024     Discharge Date: 3/3/2024      Admitting Physician: No admitting provider for patient encounter.     Discharge Physician: PETEY Teran - CNP     Discharge Diagnoses/Hospital course    Melena: Per staff: Patient has had blood in stool last couple days and blood was located on her sheets this morning.  Patient's Eliquis was discontinued 2 days ago on .  CT abdomen/pelvis on 3/1 showed mild thickening of rectosigmoid colon.  Colonic diverticulosis without diverticulitis.  No melena stool reported overnight  H&H this am 8.3/.1  Continue to Trend H&H every 8 hours  Gi signed off ,   patient on regular diet and tolerating well        Chronic anemia: Hemoglobin on arrival 9.4.  Baseline hemoglobin 10.        Last night H&H 8.3/.9  Trend H&H every 8 hours     Dementia: Home medication: Aricept  Continue Aricept     Chronic diastolic dysfunction: Echo 2020 showed EF 60-65% with grade 1 diastolic dysfunction.  Home medication Bumex.  GDMT: Lisinopril.   Serial troponin 73 trended to 61 patient continues to denies chest pain  TTE pending  Trend troponin  Continue  Bumex,   Blood pressure marginal levels this am  at 101/63 will hold  due lisinopril & Metoprolol for now.Will continue to monitor BP     Physical deconditioning: Per family patient has hard time with mobility which is where she is at/right  PT/OT  to evaluate patient     DVTs, history of: Currently holding home Eliquis due to increased bleed risk.  Continue mechanical prophylaxis due to GI bleed   Discussed with patient  and patient POA Ms Lucero about the history of DVT.Patient POA stated patient had DVT in  following fall after sustaining hip fracture, denies any other episode of DVT on the patient.Patient's DVT seems to have been 
    Hospitalist Progress Note    Patient:  Carmen Howard      Unit/Bed:8B-27/027-A    YOB: 1940    MRN: [id number]       Acct: [id number]     PCP: Aamir Shah PA    Date of Admission: 3/1/2024    Assessment/Plan:    Melena: Per staff: Patient has had blood in stool last couple days and blood was located on her sheets this morning.  Patient's Eliquis was discontinued 2 days ago on 2/28.  CT abdomen/pelvis on 3/1 showed mild thickening of rectosigmoid colon.  Colonic diverticulosis without diverticulitis.  No melena stool reported overnight  H&H this am 8.3/28.1  Continue to Trend H&H every 8 hours  Gi signed off ,   patient on regular diet and tolerating well      Chronic anemia: Hemoglobin on arrival 9.4.  Baseline hemoglobin 10.        Last night H&H 8.3/27.9  Trend H&H every 8 hours    Dementia: Home medication: Aricept  Continue Aricept    Chronic diastolic dysfunction: Echo 6/14/2020 showed EF 60-65% with grade 1 diastolic dysfunction.  Home medication Bumex.  GDMT: Lisinopril.   Serial troponin 73 trended to 61 patient continues to denies chest pain  TTE pending  Trend troponin  Continue  Bumex,   Blood pressure marginal levels this am  at 101/63 will hold  due lisinopril & Metoprolol for now.Will continue to monitor BP    Physical deconditioning: Per family patient has hard time with mobility which is where she is at/right  PT/OT  to evaluate patient    DVTs, history of: Currently holding home Eliquis due to increased bleed risk.  Continue mechanical prophylaxis due to GI bleed   Discussed with patient  and patient POA Ms Lucero about the history of DVT.Patient POA stated patient had DVT in 2020 following fall after sustaining hip fracture, denies any other episode of DVT on the patient.Patient's DVT seems to have been provoked and patient wound have needed 6 months of anticoagulation but Eliquis seems to have been continued increasing risk of bleeding.Patient had bilateral Vascular 
Called [facility] and gave report to Audrey Dumont  
Discharge teaching and instructions for diagnosis/procedure of GI bleed completed with patient using teachback method. AVS reviewed.    Patient voiced understanding regarding prescriptions, follow up appointments, and care of self at home. Discharged in a wheelchair to  skilled nursing per family    
Gastroenterology Progress Note:     Patient Name:  Carmen Howard   MRN: [id number]  [id number]  YOB: 1940  Admit Date: 3/1/2024 11:30 AM  Primary Care Physician: Aamir Shah PA   8B-27/027-A     Patient seen and examined.  24 hours events and chart reviewed.    Subjective: Patient has no complaints today.  No evidence of current GI bleed.  Patient has no tenderness to abdominal palpation.  Patient feels no nausea.  Her hemoglobin is stable.    Objective:  /63   Pulse 99   Temp 97.7 °F (36.5 °C) (Oral)   Resp 16   Ht 1.651 m (5' 5\")   Wt 65.8 kg (145 lb)   SpO2 94%   BMI 24.13 kg/m²     General appearance: Frail, agitated, appears stated age and cooperative.  HEENT: Normal cephalic, atraumatic without obvious deformity. Pupils equal, round, and reactive to light.   Neck: Supple, with full range of motion. No jugular venous distention. Trachea midline.  Respiratory:  Normal respiratory effort. Clear to auscultation, bilaterally without Rales/Wheezes/Rhonchi.  Cardiovascular: Regular rate and rhythm without murmurs, rubs or gallops.  Abdomen: Soft, non-tender, non-distended with active bowel sounds.  Musculoskeletal: No clubbing, cyanosis or edema bilaterally.  Skin: Pink, warm, dry. No rashes or lesions.  Neurologic: Alert and oriented, thought content appropriate, normal insight.     Labs:   CBC:   Lab Results   Component Value Date/Time    WBC 12.4 03/01/2024 11:57 AM    HGB 8.3 03/01/2024 10:35 PM    HCT 27.9 03/01/2024 10:35 PM    MCV 91.5 03/01/2024 11:57 AM     03/01/2024 11:57 AM     BMP:   Lab Results   Component Value Date/Time     03/01/2024 11:57 AM    K 4.8 03/01/2024 11:57 AM    K 3.5 02/01/2024 05:43 AM    CL 99 03/01/2024 11:57 AM    CO2 26 03/01/2024 11:57 AM    BUN 18 03/01/2024 11:57 AM    CREATININE 1.0 03/01/2024 11:57 AM    CALCIUM 9.6 03/01/2024 11:57 AM     PT/INR:   Lab Results   Component Value Date/Time    PROTIME 11.0 06/16/2020 05:31 AM    
Patient continue to be aggressive with staff when getting b/p. Provider notified   
Patient is agitated and adamantly refusing B/p being taken due to being too tight. Manual b/p obtain.  
Patient's daughter stated \" PT has evaluated my mom. I want my mom to leave today, like right now. I will transport her myself back to the nursing home.\" Case management contacted. Provider  and house supervisor notified.  
Patient's daughter upset and is insisting on patient discharge. The daughter states she is the POA. Patient requiring 2-3 assist when up with walker. Educated to family and patient about safety precautions. Patient has PT/OT orders in. Called PT and left voicemail. Attending provider notified, house supervisor notified.   
wheelchair.    OBJECTIVE:  Range of Motion:  Bilateral Lower Extremity: WFL    Strength:  Bilateral Lower Extremity: grossly deconditioned (4-/5)    Balance:  Static Standing Balance: Maximum Assistance, of 1 and Min A of another due to posterior lean upon initial standing. Pt does progress to static standing with RW at CGA to light Min A of 1 and SBA of another for safety.   Dynamic Standing Balance: Minimal Assistance, X 2, with RW. Decreased awareness for optimal balance. Pt with poor walker safety.     Bed Mobility:  Sit to Supine: Contact Guard Assistance, with head of bed flat, without rail   Scooting: Minimal Assistance, X 1 to scoot forward to edge of chair.     Transfers:  Sit to Stand: mod A of 2 from bedside chair. Dtr assisted therapist this date when standing up from bedside chair. Pt tends to push posteriorly then grab walker. Pt took 2 seated rest breaks while ambulating. Sat in family members chair and needed max A of 2 to stand the first time, then max A of 3 to stand the 2nd time. Pt with heavy posterior push upon standing and kicks her right LE into extension making it challenging for patient to get up to stand.   Stand to Sit:Minimal Assistance, X 2, cues to avoid uncontrolled descent into chair.     Ambulation:  Min A to Mod A of 2.   Distance: 12 feet + 3 feet + 15 feet   Surface: Level Tile  Device:Rolling Walker  Gait Deviations:  Pt with heavy forward push onto RW with shortened step lengths bilaterally. LLE shorter than right. Pt needed assist from 2 people for safety and to help guide RW, especially when turning. Pt's dtr wanting patient to walk a further distance, but patient fatigued and asking to rest.     Functional Outcome Measures: Completed  AM-PAC Inpatient Mobility without Stair Climbing Raw Score : 9  AM-PAC Inpatient without Stair Climbing T-Scale Score : 32.44  Modified Ridgely Scale:  Not Applicable    ASSESSMENT:  Activity Tolerance:  Patient tolerance of  treatment: fair. 
without diverticulitis is visualized. The    appendix is normal.      2. Chronic findings are discussed.            **This report has been created using voice recognition software.  It may contain minor errors which are inherent in voice recognition technology.**      Final report electronically signed by Dr Pattie Kaiser on 3/1/2024 1:46 PM          Diet: Diet NPO Exceptions are: Ice Chips    DVT prophylaxis: [] Lovenox                                 [x] SCDs                                 [] SQ Heparin                                 [] Encourage ambulation           [] Already on Anticoagulation     Disposition:    [] Home       [] TCU       [] Rehab       [] Psych       [] SNF       [x] Long Term Care Facility       [] Other-    Code Status: DNR-CCA    PT/OT Eval Status: Yes         Electronically signed by PETEY Teran CNP on 3/2/2024 at 9:07 AM

## 2024-03-06 ENCOUNTER — OFFICE VISIT (OUTPATIENT)
Age: 84
End: 2024-03-06
Payer: MEDICARE

## 2024-03-06 ENCOUNTER — HOSPITAL ENCOUNTER (OUTPATIENT)
Dept: WOUND CARE | Age: 84
Discharge: HOME | End: 2024-03-06
Attending: NURSE PRACTITIONER
Payer: MEDICARE

## 2024-03-06 VITALS
RESPIRATION RATE: 16 BRPM | OXYGEN SATURATION: 98 % | DIASTOLIC BLOOD PRESSURE: 50 MMHG | SYSTOLIC BLOOD PRESSURE: 94 MMHG | TEMPERATURE: 96.6 F | HEART RATE: 82 BPM

## 2024-03-06 VITALS
HEART RATE: 77 BPM | SYSTOLIC BLOOD PRESSURE: 109 MMHG | HEIGHT: 65 IN | DIASTOLIC BLOOD PRESSURE: 58 MMHG | WEIGHT: 139 LBS | BODY MASS INDEX: 23.16 KG/M2

## 2024-03-06 DIAGNOSIS — I83.029 VENOUS STASIS ULCER OF LEFT LOWER LEG WITH EDEMA OF LEFT LOWER LEG (HCC): Primary | ICD-10-CM

## 2024-03-06 DIAGNOSIS — S81.812A ISTAP TYPE 3 SKIN TEAR OF LEFT LOWER EXTREMITY: ICD-10-CM

## 2024-03-06 DIAGNOSIS — I73.9 PAD (PERIPHERAL ARTERY DISEASE) (HCC): Primary | ICD-10-CM

## 2024-03-06 DIAGNOSIS — I83.892 VENOUS STASIS ULCER OF LEFT LOWER LEG WITH EDEMA OF LEFT LOWER LEG (HCC): Primary | ICD-10-CM

## 2024-03-06 DIAGNOSIS — R60.0 VENOUS STASIS ULCER OF LEFT LOWER LEG WITH EDEMA OF LEFT LOWER LEG (HCC): Primary | ICD-10-CM

## 2024-03-06 DIAGNOSIS — L97.929 VENOUS STASIS ULCER OF LEFT LOWER LEG WITH EDEMA OF LEFT LOWER LEG (HCC): Primary | ICD-10-CM

## 2024-03-06 PROCEDURE — G8400 PT W/DXA NO RESULTS DOC: HCPCS | Performed by: THORACIC SURGERY (CARDIOTHORACIC VASCULAR SURGERY)

## 2024-03-06 PROCEDURE — 1090F PRES/ABSN URINE INCON ASSESS: CPT | Performed by: THORACIC SURGERY (CARDIOTHORACIC VASCULAR SURGERY)

## 2024-03-06 PROCEDURE — 3074F SYST BP LT 130 MM HG: CPT | Performed by: THORACIC SURGERY (CARDIOTHORACIC VASCULAR SURGERY)

## 2024-03-06 PROCEDURE — G8427 DOCREV CUR MEDS BY ELIG CLIN: HCPCS | Performed by: THORACIC SURGERY (CARDIOTHORACIC VASCULAR SURGERY)

## 2024-03-06 PROCEDURE — G8484 FLU IMMUNIZE NO ADMIN: HCPCS | Performed by: THORACIC SURGERY (CARDIOTHORACIC VASCULAR SURGERY)

## 2024-03-06 PROCEDURE — 99203 OFFICE O/P NEW LOW 30 MIN: CPT | Performed by: THORACIC SURGERY (CARDIOTHORACIC VASCULAR SURGERY)

## 2024-03-06 PROCEDURE — 1036F TOBACCO NON-USER: CPT | Performed by: THORACIC SURGERY (CARDIOTHORACIC VASCULAR SURGERY)

## 2024-03-06 PROCEDURE — 99214 OFFICE O/P EST MOD 30 MIN: CPT

## 2024-03-06 PROCEDURE — 99213 OFFICE O/P EST LOW 20 MIN: CPT | Performed by: NURSE PRACTITIONER

## 2024-03-06 PROCEDURE — 3078F DIAST BP <80 MM HG: CPT | Performed by: THORACIC SURGERY (CARDIOTHORACIC VASCULAR SURGERY)

## 2024-03-06 PROCEDURE — G8420 CALC BMI NORM PARAMETERS: HCPCS | Performed by: THORACIC SURGERY (CARDIOTHORACIC VASCULAR SURGERY)

## 2024-03-06 PROCEDURE — 1123F ACP DISCUSS/DSCN MKR DOCD: CPT | Performed by: THORACIC SURGERY (CARDIOTHORACIC VASCULAR SURGERY)

## 2024-03-06 RX ORDER — GINSENG 100 MG
CAPSULE ORAL ONCE
OUTPATIENT
Start: 2024-03-06 | End: 2024-03-06

## 2024-03-06 RX ORDER — LIDOCAINE HYDROCHLORIDE 20 MG/ML
JELLY TOPICAL ONCE
OUTPATIENT
Start: 2024-03-06 | End: 2024-03-06

## 2024-03-06 RX ORDER — GENTAMICIN SULFATE 1 MG/G
OINTMENT TOPICAL ONCE
OUTPATIENT
Start: 2024-03-06 | End: 2024-03-06

## 2024-03-06 RX ORDER — BACITRACIN ZINC AND POLYMYXIN B SULFATE 500; 1000 [USP'U]/G; [USP'U]/G
OINTMENT TOPICAL ONCE
OUTPATIENT
Start: 2024-03-06 | End: 2024-03-06

## 2024-03-06 RX ORDER — BETAMETHASONE DIPROPIONATE 0.5 MG/G
CREAM TOPICAL ONCE
OUTPATIENT
Start: 2024-03-06 | End: 2024-03-06

## 2024-03-06 RX ORDER — SODIUM CHLOR/HYPOCHLOROUS ACID 0.033 %
SOLUTION, IRRIGATION IRRIGATION ONCE
OUTPATIENT
Start: 2024-03-06 | End: 2024-03-06

## 2024-03-06 RX ORDER — CLOBETASOL PROPIONATE 0.5 MG/G
OINTMENT TOPICAL ONCE
OUTPATIENT
Start: 2024-03-06 | End: 2024-03-06

## 2024-03-06 RX ORDER — IBUPROFEN 200 MG
TABLET ORAL ONCE
OUTPATIENT
Start: 2024-03-06 | End: 2024-03-06

## 2024-03-06 RX ORDER — LIDOCAINE 50 MG/G
OINTMENT TOPICAL ONCE
OUTPATIENT
Start: 2024-03-06 | End: 2024-03-06

## 2024-03-06 RX ORDER — TRIAMCINOLONE ACETONIDE 1 MG/G
OINTMENT TOPICAL ONCE
OUTPATIENT
Start: 2024-03-06 | End: 2024-03-06

## 2024-03-06 RX ORDER — LIDOCAINE HYDROCHLORIDE 40 MG/ML
SOLUTION TOPICAL ONCE
OUTPATIENT
Start: 2024-03-06 | End: 2024-03-06

## 2024-03-06 RX ORDER — LIDOCAINE 40 MG/G
CREAM TOPICAL ONCE
OUTPATIENT
Start: 2024-03-06 | End: 2024-03-06

## 2024-03-06 NOTE — PATIENT INSTRUCTIONS
If you receive a survey asking about your care experience, please respond. Your answers will help ensure you receive high-quality care at this office. Thank you!    Your Medical Assistant today: Faith  Thank you for coming to our office! It was a pleasure to serve you.

## 2024-03-06 NOTE — PROGRESS NOTES
[facility]  Consult and Procedure Note      Carmen Howard  MEDICAL RECORD NUMBER:  [id number]  AGE: 83 y.o.   GENDER: female  : 1940  EPISODE DATE:  3/6/2024  Referring Provider: Aamir Shah PA-C  Reason for Referral: left leg wounds    SUBJECTIVE:     Chief Complaint   Patient presents with    Wound Check     Bilateral legs         HISTORY OF PRESENT ILLNESS      Carmen Howard is a 83 y.o. female who presents today for wound/ulcer evaluation. Patient is established. Wound duration: unknown.    Patient presents today for evaluation of left lower leg wounds.  Patient has history of similar wounds, was last evaluated by me 23 at which time wounds were healed.  Specifics regarding current wounds unknown.  Patient resident at [facility].  Current ordered wound care includes Triad to wound bed followed by alginate, roll gauze and ACE wrap changed daily.   Patient's daughter states that patient leads a sedentary lifestyle. Utilizes wheelchair on arrival today, heavy assist x3 to transfer to bed.  No further needs or concerns identified.     Interval history:  Since last visit patient was admitted 3/1/24-3/3/24 secondary to melena and physical deconditioning.  Eliquis was stopped during stay.  Today, she reports that her leg has been feeling better.  Daughter states that it has been a struggle getting the ECF to apply dressings to her leg, presents today with leg open to air. States last dressing was applied prior to hospital stay.  Patient states that she continues working with PT/OT, notes good appetite.  She denies any further needs or concerns.    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

## 2024-03-06 NOTE — PROGRESS NOTES
CT/CV Surgery New Patient Office Visit      Patient's Name/Date of Birth: Carmen Howard / 1940 (83 y.o.)      PCP: Aamir Shah PA    Date: March 6, 2024     CC:   Chief Complaint   Patient presents with    New Patient   Not able to walk.  Lack of muscle strength of lower extremity.  Frequent(x 3) and prolonged hospital admissions over the last 6-months.    HPI:   We had the pleasure of seeing Carmen Howard in the office today, as you know this is a very pleasant 83 y.o. year old female with a history of hypertension, hyperlipidemia, provoked left DVT in 2020 after left hip fracture, recent multiple hospital admissions from UTI, and GI bleed.  She came to the cardiovascular surgery clinic for surgical evaluation.  She came in to our office in a wheelchair accompanied by her daughter.    She reports lack of muscle strength of the lower extremity.  She also reports that she can walk only several steps with a walker.  She uses a wheelchair for distance ambulation.  She denies any ischemic type of rest pain.  She has a lesion with superficial skin breakdown in the lateral aspect of the left lower leg.    She had an arterial Doppler study on 1/30/2024, which demonstrated mildly diminished ABIs bilaterally.    PastMedical History:  Carmen BRUNO  has a past medical history of Abnormal nuclear stress test, Cataract, Chronic deep vein thrombosis (DVT) of femoral vein of left lower extremity (HCC), Colon polyps, COPD (chronic obstructive pulmonary disease) (HCC), Diverticulosis large intestine w/o perforation or abscess w/bleeding, DVT, recurrent, lower extremity, acute, left (HCC), Fibroid, uterine, Hearing loss, Hx of blood clots, Hyperlipidemia, Nicotine dependence, Nocturnal hypoxemia, Postmenopausal, Sleep apnea, and Ulcerative colitis (HCC).    Past Surgical History:  The patient  has a past surgical history that includes Foot surgery; Breast biopsy (1988); Colonoscopy (2006); Colonoscopy (2010);

## 2024-03-06 NOTE — PATIENT INSTRUCTIONS
Visit Discharge/Physician Orders:  - Elevate legs periodically throughout the day to decrease swelling. Try to prop legs up on a pillow while in the recliner or in bed to help offload pressure.   - Please call the wound clinic with any questions or concerns.     Home Care: [facility]     Wound Location: Left lateral leg    Dressing orders:     1) Gather wound care supplies and arrange on clean table.     2) Wash your hands with soap and water or use alcohol based hand  for 20 seconds (sing \"Happy Birthday\" twice).    3) Cleanse wounds with normal saline or wound cleanser and gauze. Pat dry with clean gauze.    4) Left Lateral Leg- Apply triad cream to the wound. Apply skin prep to the [facility] to protect. Cover with silicone bordered foam dressings. Apply knee high tubigrip stocking. Change 3 times weekly.          Keep all dressings clean & dry.    Follow up visit: 3 weeks - Wednesday March 27th at 2:00 pm    Supplies:  Per [facility]     Keep next scheduled appointment. Please give 24 hour notice if unable to keep appointment. [phone]    If you experience any of the following, please call the Wound Care Service during business hours: Monday through Friday 8:00 am - 4:30 pm  ([phone]).   *Increase in pain   *Temperature over 101   *Increase in drainage from your wound or a foul odor   *Uncontrolled swelling   *Need for compression bandage changes due to slippage, breakthrough drainage    If you need medical attention outside of business hours, please contact your Primary Care Doctor or go to the nearest emergency room.

## 2024-03-13 ENCOUNTER — CARE COORDINATION (OUTPATIENT)
Dept: CARE COORDINATION | Age: 84
End: 2024-03-13

## 2024-03-13 NOTE — CARE COORDINATION
Contacted [facility] and requested updated medication list to be faxed.  Will notify CTN at this time.

## 2024-03-14 ENCOUNTER — CARE COORDINATION (OUTPATIENT)
Dept: CASE MANAGEMENT | Age: 84
End: 2024-03-14

## 2024-03-18 ENCOUNTER — CARE COORDINATION (OUTPATIENT)
Dept: CASE MANAGEMENT | Age: 84
End: 2024-03-18

## 2024-03-18 DIAGNOSIS — K92.2 GASTROINTESTINAL HEMORRHAGE, UNSPECIFIED GASTROINTESTINAL HEMORRHAGE TYPE: Primary | ICD-10-CM

## 2024-03-18 PROCEDURE — 1111F DSCHRG MED/CURRENT MED MERGE: CPT | Performed by: PHYSICIAN ASSISTANT

## 2024-03-18 NOTE — CARE COORDINATION
Care Transitions Post-Acute Facility Update Call    3/18/2024    Patient: Carmen Howard Patient : 1940   MRN: [id number]  Reason for Admission:   Discharge Date: 3/3/24 RARS: Readmission Risk Score: 20.1    Received updated med list from [facility]. Medications reviewed. !!!F completed. Not in Epic is Tramadol 50mg 4 times a day as needed. Eliquis 5mg twice daily.

## 2024-03-27 ENCOUNTER — HOSPITAL ENCOUNTER (OUTPATIENT)
Dept: WOUND CARE | Age: 84
Discharge: HOME | End: 2024-03-27
Attending: NURSE PRACTITIONER
Payer: MEDICARE

## 2024-03-27 VITALS — TEMPERATURE: 97.4 F | RESPIRATION RATE: 18 BRPM

## 2024-03-27 DIAGNOSIS — S81.812A ISTAP TYPE 3 SKIN TEAR OF LEFT LOWER EXTREMITY: Primary | ICD-10-CM

## 2024-03-27 PROBLEM — R60.0 VENOUS STASIS ULCER OF LEFT LOWER LEG WITH EDEMA OF LEFT LOWER LEG (HCC): Status: RESOLVED | Noted: 2023-03-16 | Resolved: 2024-03-27

## 2024-03-27 PROBLEM — L97.929 VENOUS STASIS ULCER OF LEFT LOWER LEG WITH EDEMA OF LEFT LOWER LEG (HCC): Status: RESOLVED | Noted: 2023-03-16 | Resolved: 2024-03-27

## 2024-03-27 PROBLEM — I83.892 VENOUS STASIS ULCER OF LEFT LOWER LEG WITH EDEMA OF LEFT LOWER LEG (HCC): Status: RESOLVED | Noted: 2023-03-16 | Resolved: 2024-03-27

## 2024-03-27 PROBLEM — I83.029 VENOUS STASIS ULCER OF LEFT LOWER LEG WITH EDEMA OF LEFT LOWER LEG (HCC): Status: RESOLVED | Noted: 2023-03-16 | Resolved: 2024-03-27

## 2024-03-27 PROCEDURE — 99213 OFFICE O/P EST LOW 20 MIN: CPT

## 2024-03-27 PROCEDURE — 99212 OFFICE O/P EST SF 10 MIN: CPT | Performed by: NURSE PRACTITIONER

## 2024-03-27 NOTE — PATIENT INSTRUCTIONS
Visit Discharge/Physician Orders:  - Try to prop legs up on a pillow while in the recliner or in bed to help offload pressure.   - Please call the wound clinic with any questions or concerns.   -ok to use regular lotion to bilateral legs  -recommend compression socks to bilateral legs or tubi  to bilateral legs, apply in the morning and remove at night     Home Care: [facility]     Wound Location: Left Leg-healed    Follow up visit:   as needed    We have sent your supply order to the following company:  Per [facility]   If you don't receive the items you were expecting or don't know what the items are that you received, call the company where the order was sent.   If you are unable to obtain wound supplies, continue to use the supplies you have available until you are able to reach us. It is most important to keep the wound covered at all times.  It is YOUR responsibility to make sure that supplies are re-ordered before you run out. Re-order telephone numbers are included in each package.     Keep next scheduled appointment. Please give 24 hour notice if unable to keep appointment. [phone]    If you experience any of the following, please call the Wound Care Service during business hours: Monday through Friday 8:00 am - 4:30 pm  ([phone]).   *Increase in pain   *Temperature over 101   *Increase in drainage from your wound or a foul odor   *Uncontrolled swelling   *Need for compression bandage changes due to slippage, breakthrough drainage    If you need medical attention outside of business hours, please contact your Primary Care Doctor or go to the nearest emergency room.

## 2024-03-27 NOTE — PLAN OF CARE
Problem: Wound:  Goal: Will show signs of wound healing; wound closure and no evidence of infection  Description: Will show signs of wound healing; wound closure and no evidence of infection  Outcome: Progressing   Seen today for left leg wound. Wound is healed. See AVS for discharge   Care plan reviewed with patient and daughter.  Patient and daughter verbalize understanding of the plan of care and contribute to goal setting.

## 2024-03-27 NOTE — PROGRESS NOTES
[facility] Wound [facility] Ostomy care  [address]   [place], [place] [address]  Telephone: [phone]     FAX [phone]        [place]: [facility] Phone # [phone], Fax # [phone]    Patient Instructions   Visit Discharge/Physician Orders:  - Try to prop legs up on a pillow while in the recliner or in bed to help offload pressure.   - Please call the wound clinic with any questions or concerns.   -ok to use regular lotion to bilateral legs  -recommend compression socks to bilateral legs or tubi  to bilateral legs, apply in the morning and remove at night     Home Care: [facility]     Wound Location: Left Leg-healed    Follow up visit:   as needed    We have sent your supply order to the following company:  Per [facility]   If you don't receive the items you were expecting or don't know what the items are that you received, call the company where the order was sent.   If you are unable to obtain wound supplies, continue to use the supplies you have available until you are able to reach us. It is most important to keep the wound covered at all times.  It is YOUR responsibility to make sure that supplies are re-ordered before you run out. Re-order telephone numbers are included in each package.     Keep next scheduled appointment. Please give 24 hour notice if unable to keep appointment. [phone]    If you experience any of the following, please call the Wound Care Service during business hours: Monday through Friday 8:00 am - 4:30 pm  ([phone]).   *Increase in pain   *Temperature over 101   *Increase in drainage from your wound or a foul odor   *Uncontrolled swelling   *Need for compression bandage changes due to slippage, breakthrough drainage    If you need medical attention outside of business hours, please contact your Primary Care Doctor or go to the nearest emergency room.              Skilled nurse to evaluate and treat for wound care. Change 
daily      Multiple Vitamin (MULTIVITAMIN ADULT PO) Take 1 tablet by mouth daily      traZODone (DESYREL) 50 MG tablet Take 1 tablet by mouth nightly      metoprolol tartrate (LOPRESSOR) 25 MG tablet Take 0.5 tablets by mouth 2 times daily      melatonin 3 MG TABS tablet Take 1 tablet by mouth nightly as needed      docusate sodium (COLACE) 100 MG capsule Take 1 capsule by mouth 2 times daily      estradiol (ESTRACE) 0.1 MG/GM vaginal cream Place 2 g vaginally daily Takes twice weekly; on wednesday and saturday      loperamide (IMODIUM) 2 MG capsule Take 1 capsule by mouth 4 times daily as needed for Diarrhea      Misc. Devices MISC 1 wheelchair 1 each 0    ondansetron (ZOFRAN) 4 MG tablet Take 1 tablet by mouth 3 times daily as needed for Nausea or Vomiting 20 tablet 0    albuterol (PROVENTIL) (2.5 MG/3ML) 0.083% nebulizer solution Take 3 mLs by nebulization every 6 hours as needed for Wheezing or Shortness of Breath 120 each 9    albuterol sulfate HFA (PROVENTIL;VENTOLIN;PROAIR) 108 (90 Base) MCG/ACT inhaler Inhale 2 puffs into the lungs every 6 hours as needed for Wheezing 1 each 9    benzonatate (TESSALON) 200 MG capsule Take 1 capsule by mouth 3 times daily as needed for Cough 90 capsule 3    carbidopa-levodopa (SINEMET)  MG per tablet Take 2 tablets by mouth 3 times daily 540 tablet 1    alendronate (FOSAMAX) 70 MG tablet Take 1 tablet by mouth every 7 days Take with a full glass of water upon arising for the day. Consume on an empty stomach at least 30 minutes before the first food, beverage, or medication. Stay upright (do not lie down) for at least 30 minutes. Do not crush or break. 4 tablet 3    Dextromethorphan-guaiFENesin  MG CAPS Take 1-2 tablets by mouth every 4 hours as needed (Cough) 1-2 tablets every 4 hours as needed. 24 capsule 0    donepezil (ARICEPT) 10 MG tablet Take 1 tablet by mouth nightly 90 tablet 3    lisinopril (PRINIVIL;ZESTRIL) 20 MG tablet Take 1 tablet by mouth daily 90

## 2024-04-01 PROBLEM — R79.89 ELEVATED TROPONIN: Status: RESOLVED | Noted: 2024-03-02 | Resolved: 2024-04-01

## 2024-11-28 ENCOUNTER — APPOINTMENT (OUTPATIENT)
Dept: GENERAL RADIOLOGY | Age: 84
DRG: 177 | End: 2024-11-28
Payer: MEDICARE

## 2024-11-28 ENCOUNTER — APPOINTMENT (OUTPATIENT)
Dept: CT IMAGING | Age: 84
DRG: 177 | End: 2024-11-28
Payer: MEDICARE

## 2024-11-28 ENCOUNTER — APPOINTMENT (OUTPATIENT)
Dept: INTERVENTIONAL RADIOLOGY/VASCULAR | Age: 84
DRG: 177 | End: 2024-11-28
Payer: MEDICARE

## 2024-11-28 ENCOUNTER — APPOINTMENT (OUTPATIENT)
Dept: ULTRASOUND IMAGING | Age: 84
DRG: 177 | End: 2024-11-28
Payer: MEDICARE

## 2024-11-28 ENCOUNTER — HOSPITAL ENCOUNTER (INPATIENT)
Age: 84
LOS: 1 days | DRG: 177 | End: 2024-12-01
Attending: STUDENT IN AN ORGANIZED HEALTH CARE EDUCATION/TRAINING PROGRAM | Admitting: NURSE PRACTITIONER
Payer: MEDICARE
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
## 2024-11-28 DIAGNOSIS — R40.4 UNRESPONSIVE EPISODE: ICD-10-CM

## 2024-11-28 DIAGNOSIS — R60.0 LOWER EXTREMITY EDEMA: ICD-10-CM

## 2024-11-28 DIAGNOSIS — N17.9 AKI (ACUTE KIDNEY INJURY) (HCC): Primary | ICD-10-CM

## 2024-11-28 DIAGNOSIS — D72.829 LEUKOCYTOSIS, UNSPECIFIED TYPE: ICD-10-CM

## 2024-11-28 DIAGNOSIS — I46.9 CARDIAC ARREST: ICD-10-CM

## 2024-11-28 DIAGNOSIS — R91.8 LUNG MASS: ICD-10-CM

## 2024-11-28 DIAGNOSIS — R79.89 ELEVATED TROPONIN: ICD-10-CM

## 2024-11-28 PROBLEM — Z87.39 HISTORY OF OSTEOPOROSIS: Status: ACTIVE | Noted: 2024-11-28

## 2024-11-28 PROBLEM — G93.40 ACUTE ENCEPHALOPATHY: Status: ACTIVE | Noted: 2024-11-28

## 2024-11-28 LAB
ALBUMIN SERPL BCG-MCNC: 3.2 G/DL (ref 3.5–5.1)
ALP SERPL-CCNC: 64 U/L (ref 38–126)
ALT SERPL W/O P-5'-P-CCNC: 6 U/L (ref 11–66)
ANION GAP SERPL CALC-SCNC: 11 MEQ/L (ref 8–16)
ANION GAP SERPL CALC-SCNC: 12 MEQ/L (ref 8–16)
ANION GAP SERPL CALC-SCNC: 13 MEQ/L (ref 8–16)
APTT PPP: 19.6 SECONDS (ref 22–38)
AST SERPL-CCNC: < 5 U/L (ref 5–40)
BACTERIA: ABNORMAL
BASE EXCESS BLDA CALC-SCNC: 4.5 MMOL/L (ref -2–3)
BASOPHILS ABSOLUTE: 0.1 THOU/MM3 (ref 0–0.1)
BASOPHILS NFR BLD AUTO: 0.3 %
BILIRUB CONJ SERPL-MCNC: < 0.1 MG/DL (ref 0.1–13.8)
BILIRUB SERPL-MCNC: 0.2 MG/DL (ref 0.3–1.2)
BILIRUB UR QL STRIP: NEGATIVE
BUN SERPL-MCNC: 39 MG/DL (ref 7–22)
BUN SERPL-MCNC: 43 MG/DL (ref 7–22)
BUN SERPL-MCNC: 45 MG/DL (ref 7–22)
CALCIUM SERPL-MCNC: 8.4 MG/DL (ref 8.5–10.5)
CALCIUM SERPL-MCNC: 8.9 MG/DL (ref 8.5–10.5)
CALCIUM SERPL-MCNC: 9.4 MG/DL (ref 8.5–10.5)
CASTS #/AREA URNS LPF: ABNORMAL /LPF
CASTS #/AREA URNS LPF: ABNORMAL /LPF
CHARACTER UR: ABNORMAL
CHARCOAL URNS QL MICRO: ABNORMAL
CHLORIDE SERPL-SCNC: 101 MEQ/L (ref 98–111)
CHLORIDE SERPL-SCNC: 101 MEQ/L (ref 98–111)
CHLORIDE SERPL-SCNC: 103 MEQ/L (ref 98–111)
CK SERPL-CCNC: 48 U/L (ref 30–135)
CO2 SERPL-SCNC: 25 MEQ/L (ref 23–33)
CO2 SERPL-SCNC: 25 MEQ/L (ref 23–33)
CO2 SERPL-SCNC: 28 MEQ/L (ref 23–33)
COLLECTED BY:: ABNORMAL
COLOR UR: YELLOW
CREAT SERPL-MCNC: 1.3 MG/DL (ref 0.4–1.2)
CREAT SERPL-MCNC: 1.5 MG/DL (ref 0.4–1.2)
CREAT SERPL-MCNC: 1.8 MG/DL (ref 0.4–1.2)
CREAT UR-MCNC: 153.8 MG/DL
CRYSTALS URNS QL MICRO: ABNORMAL
D DIMER PPP IA.FEU-MCNC: 4700 NG/ML FEU (ref 0–500)
DEPRECATED RDW RBC AUTO: 53.5 FL (ref 35–45)
DEPRECATED RDW RBC AUTO: 59.1 FL (ref 35–45)
DEVICE: ABNORMAL
EKG ATRIAL RATE: 103 BPM
EKG P AXIS: 64 DEGREES
EKG P-R INTERVAL: 162 MS
EKG Q-T INTERVAL: 378 MS
EKG QRS DURATION: 110 MS
EKG QTC CALCULATION (BAZETT): 495 MS
EKG R AXIS: -70 DEGREES
EKG T AXIS: 83 DEGREES
EKG VENTRICULAR RATE: 103 BPM
EOSINOPHIL NFR BLD AUTO: 1 %
EOSINOPHILS ABSOLUTE: 0.2 THOU/MM3 (ref 0–0.4)
EPITHELIAL CELLS, UA: ABNORMAL /HPF
ERYTHROCYTE [DISTWIDTH] IN BLOOD BY AUTOMATED COUNT: 15.4 % (ref 11.5–14.5)
ERYTHROCYTE [DISTWIDTH] IN BLOOD BY AUTOMATED COUNT: 15.9 % (ref 11.5–14.5)
FIO2 ON VENT O2 ANALYZER: 21 %
GFR SERPL CREATININE-BSD FRML MDRD: 27 ML/MIN/1.73M2
GFR SERPL CREATININE-BSD FRML MDRD: 34 ML/MIN/1.73M2
GFR SERPL CREATININE-BSD FRML MDRD: 41 ML/MIN/1.73M2
GLUCOSE BLD STRIP.AUTO-MCNC: 110 MG/DL (ref 70–108)
GLUCOSE BLD STRIP.AUTO-MCNC: 211 MG/DL (ref 70–108)
GLUCOSE BLD STRIP.AUTO-MCNC: 216 MG/DL (ref 70–108)
GLUCOSE SERPL-MCNC: 148 MG/DL (ref 70–108)
GLUCOSE SERPL-MCNC: 187 MG/DL (ref 70–108)
GLUCOSE SERPL-MCNC: 189 MG/DL (ref 70–108)
GLUCOSE UR QL STRIP.AUTO: NEGATIVE MG/DL
HCO3 BLDA-SCNC: 30 MMOL/L (ref 23–28)
HCT VFR BLD AUTO: 38.7 % (ref 37–47)
HCT VFR BLD AUTO: 41.1 % (ref 37–47)
HEPARIN UNFRACTIONATED: 0.07 U/ML (ref 0.3–0.7)
HGB BLD-MCNC: 11.5 GM/DL (ref 12–16)
HGB BLD-MCNC: 11.5 GM/DL (ref 12–16)
HGB UR QL STRIP.AUTO: NEGATIVE
IMM GRANULOCYTES # BLD AUTO: 0.13 THOU/MM3 (ref 0–0.07)
IMM GRANULOCYTES NFR BLD AUTO: 0.7 %
INR PPP: 1.02 (ref 0.85–1.13)
KETONES UR QL STRIP.AUTO: ABNORMAL
LEUKOCYTE ESTERASE UR QL STRIP.AUTO: ABNORMAL
LYMPHOCYTES ABSOLUTE: 1.4 THOU/MM3 (ref 1–4.8)
LYMPHOCYTES NFR BLD AUTO: 8 %
MAGNESIUM SERPL-MCNC: 2.4 MG/DL (ref 1.6–2.4)
MCH RBC QN AUTO: 28.2 PG (ref 26–33)
MCH RBC QN AUTO: 28.2 PG (ref 26–33)
MCHC RBC AUTO-ENTMCNC: 28 GM/DL (ref 32.2–35.5)
MCHC RBC AUTO-ENTMCNC: 29.7 GM/DL (ref 32.2–35.5)
MCV RBC AUTO: 100.7 FL (ref 81–99)
MCV RBC AUTO: 94.9 FL (ref 81–99)
MONOCYTES ABSOLUTE: 1.2 THOU/MM3 (ref 0.4–1.3)
MONOCYTES NFR BLD AUTO: 6.6 %
NEUTROPHILS ABSOLUTE: 14.8 THOU/MM3 (ref 1.8–7.7)
NEUTROPHILS NFR BLD AUTO: 83.4 %
NITRITE UR QL STRIP.AUTO: POSITIVE
NRBC BLD AUTO-RTO: 0 /100 WBC
NT-PROBNP SERPL IA-MCNC: ABNORMAL PG/ML (ref 0–449)
OSMOLALITY SERPL CALC.SUM OF ELEC: 289.3 MOSMOL/KG (ref 275–300)
OSMOLALITY SERPL CALC.SUM OF ELEC: 299.7 MOSMOL/KG (ref 275–300)
PCO2 TEMP ADJ BLDMV: 44 MMHG (ref 41–51)
PH BLDMV: 7.43 [PH] (ref 7.31–7.41)
PH UR STRIP.AUTO: 5.5 [PH] (ref 5–9)
PLATELET # BLD AUTO: 336 THOU/MM3 (ref 130–400)
PLATELET # BLD AUTO: 378 THOU/MM3 (ref 130–400)
PMV BLD AUTO: 9.6 FL (ref 9.4–12.4)
PMV BLD AUTO: 9.9 FL (ref 9.4–12.4)
PO2 BLDMV: 35 MMHG (ref 25–40)
POTASSIUM SERPL-SCNC: 4 MEQ/L (ref 3.5–5.2)
POTASSIUM SERPL-SCNC: 4.1 MEQ/L (ref 3.5–5.2)
POTASSIUM SERPL-SCNC: 4.5 MEQ/L (ref 3.5–5.2)
PROCALCITONIN SERPL IA-MCNC: 0.1 NG/ML (ref 0.01–0.09)
PROT SERPL-MCNC: 6.4 G/DL (ref 6.1–8)
PROT UR STRIP.AUTO-MCNC: ABNORMAL MG/DL
RBC # BLD AUTO: 4.08 MILL/MM3 (ref 4.2–5.4)
RBC # BLD AUTO: 4.08 MILL/MM3 (ref 4.2–5.4)
RBC #/AREA URNS HPF: ABNORMAL /HPF
RENAL EPI CELLS #/AREA URNS HPF: ABNORMAL /[HPF]
SAO2 % BLDMV: 68 %
SITE: ABNORMAL
SODIUM SERPL-SCNC: 138 MEQ/L (ref 135–145)
SODIUM SERPL-SCNC: 139 MEQ/L (ref 135–145)
SODIUM SERPL-SCNC: 142 MEQ/L (ref 135–145)
SODIUM UR-SCNC: 25 MEQ/L
SPECIFIC GRAVITY UA: 1.02 (ref 1–1.03)
TROPONIN, HIGH SENSITIVITY: 104 NG/L (ref 0–12)
TROPONIN, HIGH SENSITIVITY: 106 NG/L (ref 0–12)
TROPONIN, HIGH SENSITIVITY: 86 NG/L (ref 0–12)
UROBILINOGEN, URINE: 0.2 EU/DL (ref 0–1)
UUN 24H UR-MCNC: 627 MG/DL
VENTILATION MODE VENT: ABNORMAL
WBC # BLD AUTO: 15.3 THOU/MM3 (ref 4.8–10.8)
WBC # BLD AUTO: 17.7 THOU/MM3 (ref 4.8–10.8)
WBC #/AREA URNS HPF: ABNORMAL /HPF
YEAST LIKE FUNGI URNS QL MICRO: ABNORMAL

## 2024-11-28 PROCEDURE — 94640 AIRWAY INHALATION TREATMENT: CPT

## 2024-11-28 PROCEDURE — 82803 BLOOD GASES ANY COMBINATION: CPT

## 2024-11-28 PROCEDURE — 72128 CT CHEST SPINE W/O DYE: CPT

## 2024-11-28 PROCEDURE — 94761 N-INVAS EAR/PLS OXIMETRY MLT: CPT

## 2024-11-28 PROCEDURE — 72131 CT LUMBAR SPINE W/O DYE: CPT

## 2024-11-28 PROCEDURE — 6370000000 HC RX 637 (ALT 250 FOR IP): Performed by: PHYSICIAN ASSISTANT

## 2024-11-28 PROCEDURE — 84145 PROCALCITONIN (PCT): CPT

## 2024-11-28 PROCEDURE — 82248 BILIRUBIN DIRECT: CPT

## 2024-11-28 PROCEDURE — 6360000002 HC RX W HCPCS: Performed by: PHYSICIAN ASSISTANT

## 2024-11-28 PROCEDURE — 99223 1ST HOSP IP/OBS HIGH 75: CPT | Performed by: INTERNAL MEDICINE

## 2024-11-28 PROCEDURE — 93005 ELECTROCARDIOGRAM TRACING: CPT | Performed by: PHYSICIAN ASSISTANT

## 2024-11-28 PROCEDURE — 71250 CT THORAX DX C-: CPT

## 2024-11-28 PROCEDURE — 83880 ASSAY OF NATRIURETIC PEPTIDE: CPT

## 2024-11-28 PROCEDURE — 36600 WITHDRAWAL OF ARTERIAL BLOOD: CPT

## 2024-11-28 PROCEDURE — 96375 TX/PRO/DX INJ NEW DRUG ADDON: CPT

## 2024-11-28 PROCEDURE — 99223 1ST HOSP IP/OBS HIGH 75: CPT | Performed by: STUDENT IN AN ORGANIZED HEALTH CARE EDUCATION/TRAINING PROGRAM

## 2024-11-28 PROCEDURE — 85027 COMPLETE CBC AUTOMATED: CPT

## 2024-11-28 PROCEDURE — 96365 THER/PROPH/DIAG IV INF INIT: CPT

## 2024-11-28 PROCEDURE — 85730 THROMBOPLASTIN TIME PARTIAL: CPT

## 2024-11-28 PROCEDURE — G0378 HOSPITAL OBSERVATION PER HR: HCPCS

## 2024-11-28 PROCEDURE — 83735 ASSAY OF MAGNESIUM: CPT

## 2024-11-28 PROCEDURE — 36415 COLL VENOUS BLD VENIPUNCTURE: CPT

## 2024-11-28 PROCEDURE — 96367 TX/PROPH/DG ADDL SEQ IV INF: CPT

## 2024-11-28 PROCEDURE — 71045 X-RAY EXAM CHEST 1 VIEW: CPT

## 2024-11-28 PROCEDURE — 82550 ASSAY OF CK (CPK): CPT

## 2024-11-28 PROCEDURE — 82570 ASSAY OF URINE CREATININE: CPT

## 2024-11-28 PROCEDURE — 93010 ELECTROCARDIOGRAM REPORT: CPT | Performed by: INTERNAL MEDICINE

## 2024-11-28 PROCEDURE — 31500 INSERT EMERGENCY AIRWAY: CPT

## 2024-11-28 PROCEDURE — 93970 EXTREMITY STUDY: CPT

## 2024-11-28 PROCEDURE — 6370000000 HC RX 637 (ALT 250 FOR IP): Performed by: STUDENT IN AN ORGANIZED HEALTH CARE EDUCATION/TRAINING PROGRAM

## 2024-11-28 PROCEDURE — 99285 EMERGENCY DEPT VISIT HI MDM: CPT

## 2024-11-28 PROCEDURE — 2700000000 HC OXYGEN THERAPY PER DAY

## 2024-11-28 PROCEDURE — 85520 HEPARIN ASSAY: CPT

## 2024-11-28 PROCEDURE — 73564 X-RAY EXAM KNEE 4 OR MORE: CPT

## 2024-11-28 PROCEDURE — 84300 ASSAY OF URINE SODIUM: CPT

## 2024-11-28 PROCEDURE — 72125 CT NECK SPINE W/O DYE: CPT

## 2024-11-28 PROCEDURE — 96366 THER/PROPH/DIAG IV INF ADDON: CPT

## 2024-11-28 PROCEDURE — 2580000003 HC RX 258: Performed by: INTERNAL MEDICINE

## 2024-11-28 PROCEDURE — 70450 CT HEAD/BRAIN W/O DYE: CPT

## 2024-11-28 PROCEDURE — 82948 REAGENT STRIP/BLOOD GLUCOSE: CPT

## 2024-11-28 PROCEDURE — 72170 X-RAY EXAM OF PELVIS: CPT

## 2024-11-28 PROCEDURE — 96361 HYDRATE IV INFUSION ADD-ON: CPT

## 2024-11-28 PROCEDURE — 84540 ASSAY OF URINE/UREA-N: CPT

## 2024-11-28 PROCEDURE — 85379 FIBRIN DEGRADATION QUANT: CPT

## 2024-11-28 PROCEDURE — 76770 US EXAM ABDO BACK WALL COMP: CPT

## 2024-11-28 PROCEDURE — 80053 COMPREHEN METABOLIC PANEL: CPT

## 2024-11-28 PROCEDURE — 81001 URINALYSIS AUTO W/SCOPE: CPT

## 2024-11-28 PROCEDURE — 94669 MECHANICAL CHEST WALL OSCILL: CPT

## 2024-11-28 PROCEDURE — 87040 BLOOD CULTURE FOR BACTERIA: CPT

## 2024-11-28 PROCEDURE — 99223 1ST HOSP IP/OBS HIGH 75: CPT

## 2024-11-28 PROCEDURE — 2580000003 HC RX 258: Performed by: STUDENT IN AN ORGANIZED HEALTH CARE EDUCATION/TRAINING PROGRAM

## 2024-11-28 PROCEDURE — 6360000002 HC RX W HCPCS: Performed by: STUDENT IN AN ORGANIZED HEALTH CARE EDUCATION/TRAINING PROGRAM

## 2024-11-28 PROCEDURE — 6360000002 HC RX W HCPCS: Performed by: INTERNAL MEDICINE

## 2024-11-28 PROCEDURE — 85025 COMPLETE CBC W/AUTO DIFF WBC: CPT

## 2024-11-28 PROCEDURE — 84484 ASSAY OF TROPONIN QUANT: CPT

## 2024-11-28 PROCEDURE — 85610 PROTHROMBIN TIME: CPT

## 2024-11-28 PROCEDURE — 2580000003 HC RX 258: Performed by: PHYSICIAN ASSISTANT

## 2024-11-28 PROCEDURE — 87086 URINE CULTURE/COLONY COUNT: CPT

## 2024-11-28 PROCEDURE — 6370000000 HC RX 637 (ALT 250 FOR IP): Performed by: INTERNAL MEDICINE

## 2024-11-28 RX ORDER — HEPARIN SODIUM 1000 [USP'U]/ML
60 INJECTION, SOLUTION INTRAVENOUS; SUBCUTANEOUS PRN
Status: DISCONTINUED | OUTPATIENT
Start: 2024-11-28 | End: 2024-12-01 | Stop reason: HOSPADM

## 2024-11-28 RX ORDER — MULTIVITAMIN WITH IRON
1 TABLET ORAL DAILY
Status: DISCONTINUED | OUTPATIENT
Start: 2024-11-28 | End: 2024-12-01 | Stop reason: HOSPADM

## 2024-11-28 RX ORDER — LISINOPRIL 20 MG/1
20 TABLET ORAL DAILY
Status: DISCONTINUED | OUTPATIENT
Start: 2024-11-28 | End: 2024-12-01 | Stop reason: HOSPADM

## 2024-11-28 RX ORDER — SODIUM CHLORIDE 0.9 % (FLUSH) 0.9 %
5-40 SYRINGE (ML) INJECTION PRN
Status: DISCONTINUED | OUTPATIENT
Start: 2024-11-28 | End: 2024-12-01 | Stop reason: HOSPADM

## 2024-11-28 RX ORDER — HEPARIN SODIUM 10000 [USP'U]/100ML
5-30 INJECTION, SOLUTION INTRAVENOUS CONTINUOUS
Status: DISCONTINUED | OUTPATIENT
Start: 2024-11-28 | End: 2024-12-01 | Stop reason: HOSPADM

## 2024-11-28 RX ORDER — ALBUTEROL SULFATE 0.83 MG/ML
2.5 SOLUTION RESPIRATORY (INHALATION) EVERY 6 HOURS PRN
Status: DISCONTINUED | OUTPATIENT
Start: 2024-11-28 | End: 2024-11-29

## 2024-11-28 RX ORDER — ESTRADIOL 0.1 MG/G
2 CREAM VAGINAL
Status: DISCONTINUED | OUTPATIENT
Start: 2024-11-30 | End: 2024-12-01 | Stop reason: HOSPADM

## 2024-11-28 RX ORDER — GUAIFENESIN/DEXTROMETHORPHAN 100-10MG/5
10 SYRUP ORAL EVERY 4 HOURS PRN
Status: DISCONTINUED | OUTPATIENT
Start: 2024-11-28 | End: 2024-12-01 | Stop reason: HOSPADM

## 2024-11-28 RX ORDER — DONEPEZIL HYDROCHLORIDE 10 MG/1
10 TABLET, FILM COATED ORAL NIGHTLY
Status: DISCONTINUED | OUTPATIENT
Start: 2024-11-28 | End: 2024-12-01 | Stop reason: HOSPADM

## 2024-11-28 RX ORDER — ATORVASTATIN CALCIUM 20 MG/1
20 TABLET, FILM COATED ORAL DAILY
Status: DISCONTINUED | OUTPATIENT
Start: 2024-11-28 | End: 2024-12-01 | Stop reason: HOSPADM

## 2024-11-28 RX ORDER — GLUCAGON 1 MG/ML
1 KIT INJECTION PRN
Status: DISCONTINUED | OUTPATIENT
Start: 2024-11-28 | End: 2024-12-01 | Stop reason: HOSPADM

## 2024-11-28 RX ORDER — SODIUM CHLORIDE 0.9 % (FLUSH) 0.9 %
5-40 SYRINGE (ML) INJECTION EVERY 12 HOURS SCHEDULED
Status: DISCONTINUED | OUTPATIENT
Start: 2024-11-28 | End: 2024-12-01 | Stop reason: HOSPADM

## 2024-11-28 RX ORDER — SODIUM CHLORIDE 9 MG/ML
INJECTION, SOLUTION INTRAVENOUS CONTINUOUS
Status: DISCONTINUED | OUTPATIENT
Start: 2024-11-28 | End: 2024-11-29

## 2024-11-28 RX ORDER — SODIUM CHLORIDE 9 MG/ML
INJECTION, SOLUTION INTRAVENOUS PRN
Status: DISCONTINUED | OUTPATIENT
Start: 2024-11-28 | End: 2024-12-01 | Stop reason: HOSPADM

## 2024-11-28 RX ORDER — HYDROCODONE BITARTRATE AND ACETAMINOPHEN 5; 325 MG/1; MG/1
1 TABLET ORAL ONCE
Status: COMPLETED | OUTPATIENT
Start: 2024-11-28 | End: 2024-11-28

## 2024-11-28 RX ORDER — FERROUS SULFATE 325(65) MG
325 TABLET ORAL
Status: DISCONTINUED | OUTPATIENT
Start: 2024-11-28 | End: 2024-11-28 | Stop reason: ALTCHOICE

## 2024-11-28 RX ORDER — HEPARIN SODIUM 1000 [USP'U]/ML
60 INJECTION, SOLUTION INTRAVENOUS; SUBCUTANEOUS ONCE
Status: COMPLETED | OUTPATIENT
Start: 2024-11-28 | End: 2024-11-28

## 2024-11-28 RX ORDER — BUDESONIDE AND FORMOTEROL FUMARATE DIHYDRATE 80; 4.5 UG/1; UG/1
2 AEROSOL RESPIRATORY (INHALATION)
Status: DISCONTINUED | OUTPATIENT
Start: 2024-11-28 | End: 2024-12-01 | Stop reason: HOSPADM

## 2024-11-28 RX ORDER — BUMETANIDE 0.5 MG/1
0.5 TABLET ORAL DAILY
Status: DISCONTINUED | OUTPATIENT
Start: 2024-11-28 | End: 2024-12-01 | Stop reason: HOSPADM

## 2024-11-28 RX ORDER — DOCUSATE SODIUM 100 MG/1
100 CAPSULE, LIQUID FILLED ORAL 2 TIMES DAILY
Status: DISCONTINUED | OUTPATIENT
Start: 2024-11-28 | End: 2024-12-01 | Stop reason: HOSPADM

## 2024-11-28 RX ORDER — ENOXAPARIN SODIUM 100 MG/ML
30 INJECTION SUBCUTANEOUS DAILY
Status: DISCONTINUED | OUTPATIENT
Start: 2024-11-28 | End: 2024-11-28

## 2024-11-28 RX ORDER — ONDANSETRON 2 MG/ML
4 INJECTION INTRAMUSCULAR; INTRAVENOUS EVERY 6 HOURS PRN
Status: DISCONTINUED | OUTPATIENT
Start: 2024-11-28 | End: 2024-12-01 | Stop reason: HOSPADM

## 2024-11-28 RX ORDER — LOPERAMIDE HYDROCHLORIDE 2 MG/1
2 CAPSULE ORAL 4 TIMES DAILY PRN
Status: DISCONTINUED | OUTPATIENT
Start: 2024-11-28 | End: 2024-12-01 | Stop reason: HOSPADM

## 2024-11-28 RX ORDER — ACETAMINOPHEN 650 MG/1
650 SUPPOSITORY RECTAL EVERY 6 HOURS PRN
Status: DISCONTINUED | OUTPATIENT
Start: 2024-11-28 | End: 2024-12-01 | Stop reason: HOSPADM

## 2024-11-28 RX ORDER — BENZONATATE 100 MG/1
200 CAPSULE ORAL 3 TIMES DAILY PRN
Status: DISCONTINUED | OUTPATIENT
Start: 2024-11-28 | End: 2024-12-01 | Stop reason: HOSPADM

## 2024-11-28 RX ORDER — PREDNISONE 10 MG/1
10 TABLET ORAL DAILY
Status: DISCONTINUED | OUTPATIENT
Start: 2024-11-28 | End: 2024-12-01 | Stop reason: HOSPADM

## 2024-11-28 RX ORDER — FERROUS SULFATE 325(65) MG
325 TABLET ORAL
Status: DISCONTINUED | OUTPATIENT
Start: 2024-11-29 | End: 2024-12-01 | Stop reason: HOSPADM

## 2024-11-28 RX ORDER — FLUTICASONE FUROATE, UMECLIDINIUM BROMIDE AND VILANTEROL TRIFENATATE 100; 62.5; 25 UG/1; UG/1; UG/1
1 POWDER RESPIRATORY (INHALATION) DAILY
COMMUNITY

## 2024-11-28 RX ORDER — DEXTROSE MONOHYDRATE 100 MG/ML
INJECTION, SOLUTION INTRAVENOUS CONTINUOUS PRN
Status: DISCONTINUED | OUTPATIENT
Start: 2024-11-28 | End: 2024-12-01 | Stop reason: HOSPADM

## 2024-11-28 RX ORDER — LANOLIN ALCOHOL/MO/W.PET/CERES
1000 CREAM (GRAM) TOPICAL DAILY
Status: DISCONTINUED | OUTPATIENT
Start: 2024-11-28 | End: 2024-12-01 | Stop reason: HOSPADM

## 2024-11-28 RX ORDER — SERTRALINE HYDROCHLORIDE 100 MG/1
100 TABLET, FILM COATED ORAL DAILY
Status: DISCONTINUED | OUTPATIENT
Start: 2024-11-28 | End: 2024-12-01 | Stop reason: HOSPADM

## 2024-11-28 RX ORDER — 0.9 % SODIUM CHLORIDE 0.9 %
500 INTRAVENOUS SOLUTION INTRAVENOUS ONCE
Status: COMPLETED | OUTPATIENT
Start: 2024-11-28 | End: 2024-11-28

## 2024-11-28 RX ORDER — HEPARIN SODIUM 1000 [USP'U]/ML
30 INJECTION, SOLUTION INTRAVENOUS; SUBCUTANEOUS PRN
Status: DISCONTINUED | OUTPATIENT
Start: 2024-11-28 | End: 2024-12-01 | Stop reason: HOSPADM

## 2024-11-28 RX ORDER — LIDOCAINE 4 G/G
1 PATCH TOPICAL DAILY
Status: DISCONTINUED | OUTPATIENT
Start: 2024-11-28 | End: 2024-12-01 | Stop reason: HOSPADM

## 2024-11-28 RX ORDER — TRAZODONE HYDROCHLORIDE 50 MG/1
50 TABLET, FILM COATED ORAL NIGHTLY
Status: DISCONTINUED | OUTPATIENT
Start: 2024-11-28 | End: 2024-12-01 | Stop reason: HOSPADM

## 2024-11-28 RX ORDER — FERROUS SULFATE 325(65) MG
325 TABLET ORAL
COMMUNITY

## 2024-11-28 RX ORDER — DOXYCYCLINE HYCLATE 100 MG
100 TABLET ORAL EVERY 12 HOURS SCHEDULED
Status: DISCONTINUED | OUTPATIENT
Start: 2024-11-28 | End: 2024-12-01 | Stop reason: HOSPADM

## 2024-11-28 RX ORDER — POLYETHYLENE GLYCOL 3350 17 G/17G
17 POWDER, FOR SOLUTION ORAL DAILY PRN
Status: DISCONTINUED | OUTPATIENT
Start: 2024-11-28 | End: 2024-12-01 | Stop reason: HOSPADM

## 2024-11-28 RX ORDER — ONDANSETRON 4 MG/1
4 TABLET, ORALLY DISINTEGRATING ORAL EVERY 8 HOURS PRN
Status: DISCONTINUED | OUTPATIENT
Start: 2024-11-28 | End: 2024-12-01 | Stop reason: HOSPADM

## 2024-11-28 RX ORDER — ALBUTEROL SULFATE 90 UG/1
2 INHALANT RESPIRATORY (INHALATION) EVERY 6 HOURS PRN
Status: DISCONTINUED | OUTPATIENT
Start: 2024-11-28 | End: 2024-11-28

## 2024-11-28 RX ORDER — ACETAMINOPHEN 325 MG/1
650 TABLET ORAL EVERY 6 HOURS PRN
Status: DISCONTINUED | OUTPATIENT
Start: 2024-11-28 | End: 2024-12-01 | Stop reason: HOSPADM

## 2024-11-28 RX ORDER — CARBIDOPA AND LEVODOPA 25; 100 MG/1; MG/1
2 TABLET ORAL 3 TIMES DAILY
Status: DISCONTINUED | OUTPATIENT
Start: 2024-11-28 | End: 2024-12-01 | Stop reason: HOSPADM

## 2024-11-28 RX ORDER — METOPROLOL TARTRATE 25 MG/1
12.5 TABLET, FILM COATED ORAL 2 TIMES DAILY
Status: DISCONTINUED | OUTPATIENT
Start: 2024-11-28 | End: 2024-12-01 | Stop reason: HOSPADM

## 2024-11-28 RX ORDER — METHENAMINE HIPPURATE 1000 MG/1
1 TABLET ORAL 2 TIMES DAILY WITH MEALS
Status: DISCONTINUED | OUTPATIENT
Start: 2024-11-28 | End: 2024-12-01 | Stop reason: HOSPADM

## 2024-11-28 RX ORDER — ALBUTEROL SULFATE 90 UG/1
2 INHALANT RESPIRATORY (INHALATION)
Status: DISCONTINUED | OUTPATIENT
Start: 2024-11-28 | End: 2024-11-29

## 2024-11-28 RX ADMIN — CARBIDOPA AND LEVODOPA 2 TABLET: 25; 100 TABLET ORAL at 20:07

## 2024-11-28 RX ADMIN — DOXYCYCLINE HYCLATE 100 MG: 100 TABLET, COATED ORAL at 16:05

## 2024-11-28 RX ADMIN — HEPARIN SODIUM 12 UNITS/KG/HR: 10000 INJECTION, SOLUTION INTRAVENOUS at 19:17

## 2024-11-28 RX ADMIN — WATER 1000 MG: 1 INJECTION INTRAMUSCULAR; INTRAVENOUS; SUBCUTANEOUS at 16:52

## 2024-11-28 RX ADMIN — SODIUM CHLORIDE: 9 INJECTION, SOLUTION INTRAVENOUS at 11:11

## 2024-11-28 RX ADMIN — SERTRALINE 100 MG: 100 TABLET, FILM COATED ORAL at 15:20

## 2024-11-28 RX ADMIN — SODIUM CHLORIDE, PRESERVATIVE FREE 10 ML: 5 INJECTION INTRAVENOUS at 14:05

## 2024-11-28 RX ADMIN — ALBUTEROL SULFATE 2 PUFF: 90 AEROSOL, METERED RESPIRATORY (INHALATION) at 19:49

## 2024-11-28 RX ADMIN — Medication 1000 MCG: at 15:20

## 2024-11-28 RX ADMIN — BUDESONIDE AND FORMOTEROL FUMARATE DIHYDRATE 2 PUFF: 80; 4.5 AEROSOL RESPIRATORY (INHALATION) at 19:49

## 2024-11-28 RX ADMIN — DONEPEZIL HYDROCHLORIDE 10 MG: 10 TABLET, FILM COATED ORAL at 20:07

## 2024-11-28 RX ADMIN — HEPARIN SODIUM 3800 UNITS: 1000 INJECTION INTRAVENOUS; SUBCUTANEOUS at 19:17

## 2024-11-28 RX ADMIN — METHENAMINE HIPPURATE 1 G: 1 TABLET ORAL at 17:29

## 2024-11-28 RX ADMIN — CARBIDOPA AND LEVODOPA 2 TABLET: 25; 100 TABLET ORAL at 15:20

## 2024-11-28 RX ADMIN — PREDNISONE 10 MG: 10 TABLET ORAL at 15:20

## 2024-11-28 RX ADMIN — HYDROCODONE BITARTRATE AND ACETAMINOPHEN 1 TABLET: 5; 325 TABLET ORAL at 09:44

## 2024-11-28 RX ADMIN — DOCUSATE SODIUM 100 MG: 100 CAPSULE, LIQUID FILLED ORAL at 20:07

## 2024-11-28 RX ADMIN — SODIUM CHLORIDE 500 ML: 9 INJECTION, SOLUTION INTRAVENOUS at 07:20

## 2024-11-28 RX ADMIN — PIPERACILLIN AND TAZOBACTAM 4500 MG: 4; .5 INJECTION, POWDER, FOR SOLUTION INTRAVENOUS at 09:17

## 2024-11-28 RX ADMIN — ATORVASTATIN CALCIUM 20 MG: 20 TABLET, FILM COATED ORAL at 20:07

## 2024-11-28 ASSESSMENT — PAIN SCALES - GENERAL
PAINLEVEL_OUTOF10: 8
PAINLEVEL_OUTOF10: 3

## 2024-11-28 ASSESSMENT — PAIN DESCRIPTION - PAIN TYPE
TYPE: ACUTE PAIN
TYPE: ACUTE PAIN

## 2024-11-28 ASSESSMENT — PAIN DESCRIPTION - DESCRIPTORS
DESCRIPTORS: ACHING
DESCRIPTORS: ACHING

## 2024-11-28 ASSESSMENT — PAIN - FUNCTIONAL ASSESSMENT
PAIN_FUNCTIONAL_ASSESSMENT: 0-10
PAIN_FUNCTIONAL_ASSESSMENT: PREVENTS OR INTERFERES SOME ACTIVE ACTIVITIES AND ADLS

## 2024-11-28 ASSESSMENT — PAIN DESCRIPTION - LOCATION
LOCATION: BACK
LOCATION: BACK

## 2024-11-28 ASSESSMENT — PAIN DESCRIPTION - ORIENTATION
ORIENTATION: RIGHT;LEFT
ORIENTATION: RIGHT;LEFT;LOWER

## 2024-11-28 ASSESSMENT — PAIN DESCRIPTION - ONSET: ONSET: ON-GOING

## 2024-11-28 ASSESSMENT — PAIN DESCRIPTION - FREQUENCY: FREQUENCY: CONTINUOUS

## 2024-11-28 NOTE — ED NOTES
Pt to be transferred to [facility]. Pt in stable condition. Spoke with Lucita prior to transfer.

## 2024-11-28 NOTE — ED PROVIDER NOTES
[facility] ORTHOPEDICS 7K      EMERGENCY MEDICINE     Pt Name: Carmen Howard  MRN: [id number]  Birthdate 1940  Date of evaluation: 11/28/2024  Provider: GLENN Brambila    CHIEF COMPLAINT       Chief Complaint   Patient presents with    Well Check     HISTORY OF PRESENT ILLNESS   Carmen Howard is a pleasant 84 y.o. female who presents to the emergency department from from nursing home,  for evaluation of unresponsive episode that occurred this morning.  The patient apparently fell 2 days ago.  At the [facility] it was reported that the patient had O2 sats in the 50s however with 3 L the patient was in 90 days at 97 per EMS.  On arrival to the emergency department the patient's hands are cool and were having difficulty getting an accurate pleth for pulse oximetry.  The patient's had no recent illnesses.  No vomiting diarrhea no urinary symptoms.  The patient currently denies pain.        PASTMEDICAL HISTORY     Past Medical History:   Diagnosis Date    Abnormal nuclear stress test 06/01/2018    Cataract     Chronic deep vein thrombosis (DVT) of femoral vein of left lower extremity (HCC) 3/9/2021    Colon polyps     COPD (chronic obstructive pulmonary disease) (HCC)     Diverticulosis large intestine w/o perforation or abscess w/bleeding     DVT, recurrent, lower extremity, acute, left ([facility])     Fibroid, uterine     Hearing loss     Hx of blood clots     Hyperlipidemia     Nicotine dependence     Nocturnal hypoxemia     Postmenopausal     Sleep apnea     Ulcerative colitis (HCC)     with rectal bleeding        Patient Active Problem List   Diagnosis Code    Nonexudative age-related macular degeneration H35.3190    Nuclear sclerotic cataract H25.10    History of ulcerative colitis Z87.19    Sleep apnea G47.30    Postmenopausal Z78.0    Nocturnal hypoxemia G47.34    Nicotine dependence F17.200    Hyperlipidemia E78.5    Diverticulosis large intestine w/o perforation or abscess w/bleeding K57.31    Simple chronic  no administration in time range)   lisinopril (PRINIVIL;ZESTRIL) tablet 20 mg ( Oral Automatically Held 12/1/24 0900)   loperamide (IMODIUM) capsule 2 mg (has no administration in time range)   ferrous sulfate (IRON 325) tablet 325 mg (has no administration in time range)   melatonin tablet 4.5 mg (has no administration in time range)   methenamine (HIPREX) tablet 1 g (has no administration in time range)   metoprolol tartrate (LOPRESSOR) tablet 12.5 mg (has no administration in time range)   Multivitamin Adult TABS 1 tablet (has no administration in time range)   predniSONE (DELTASONE) tablet 10 mg (has no administration in time range)   atorvastatin (LIPITOR) tablet 20 mg (has no administration in time range)   traZODone (DESYREL) tablet 50 mg (has no administration in time range)   budesonide-formoterol (SYMBICORT) 80-4.5 MCG/ACT inhaler 2 puff ( Inhalation Canceled Entry 11/28/24 1146)     And   tiotropium (SPIRIVA RESPIMAT) 2.5 MCG/ACT inhaler 2 puff ( Inhalation Canceled Entry 11/28/24 1147)   vitamin B-12 (CYANOCOBALAMIN) tablet 1,000 mcg (has no administration in time range)   sertraline (ZOLOFT) tablet 100 mg (has no administration in time range)   sodium chloride flush 0.9 % injection 5-40 mL (has no administration in time range)   sodium chloride flush 0.9 % injection 5-40 mL (has no administration in time range)   0.9 % sodium chloride infusion (has no administration in time range)   enoxaparin Sodium (LOVENOX) injection 30 mg ( SubCUTAneous Automatically Held 12/1/24 0900)   ondansetron (ZOFRAN-ODT) disintegrating tablet 4 mg (has no administration in time range)     Or   ondansetron (ZOFRAN) injection 4 mg (has no administration in time range)   polyethylene glycol (GLYCOLAX) packet 17 g (has no administration in time range)   acetaminophen (TYLENOL) tablet 650 mg (has no administration in time range)     Or   acetaminophen (TYLENOL) suppository 650 mg (has no administration in time range)   0.9 %

## 2024-11-28 NOTE — ED NOTES
Upon first contact pt in bed. Reports pain in back. Attempted to adjust pt. Unable to relieve pain with adjustments. Family at bedside. Will contact admitting for pain control

## 2024-11-28 NOTE — CONSULTS
Necrotic-appearing spiculated opacity in the right upper lobe suspicious for  a primary pulmonary malignancy.      CT angiogram of chest with IV contrast:  Sun Nov 20, 2022 10:25 AM      PROCEDURE: CTA CHEST W WO CONTRAST   CLINICAL INFORMATION: bilateral shoulder pain.   COMPARISON: CT scan of the chest dated 17th of July 2022.      1. No evidence of pulmonary emboli. 2. Mild cardiomegaly. 3. Extensive calcification in the thoracic aorta. 4. Thickening off the interstitial lung markings. Chronic infiltrates in the right and to lesser extent left lower lobes. 5. Dextroscoliosis. Thoracic spondylosis. Compression deformities involving approximately the T2 and T3 vertebral bodies. 6. Thyromegaly. 7. Moderate-sized hiatal hernia. 8. 13 x 11 mm hypodensity in the right lobe of the liver.       Assessment   Acute hypoxic respiratory failure-SpO2 in the 50s at SNF and SpO2 71% on arrival to ER, requiring 4 L O2 NC.  Not on any oxygen at home.  New spiculated opacity in right upper lobe-seen on CT cervical spine.  Patient is former smoker with 40-pack-year smoking history.  Differential diagnose include neoplastic process VS pneumonia  Right upper lobe pneumonia due to community-acquired pneumonia VS atypical pneumonia  SHARIF-baseline creatinine 0.8-1.  Creatinine 1.8 on admission.    Elevated WBC counts-due to pneumonia Vs chronic steroid use.  Stage III COPD- history of stage III COPD last seen by Dr. Newman in March 2023.  Anoro stopped and Trelegy Ellipta started with repeat CT chest 1 month following.  Patient lost to follow-up.  Last PFTs in 2023.  Currently taking home inhalers.  Acute encephalopathy-underlying dementia, increasing confusion reported by SNF.  Family reports increased confusion for last 5 weeks.  New cerebellar infarct on CT head.  Neurology consulted.  HFpEF-last TTE on 3/2/2024 EF of 60 to 65% with grade 1 diastolic dysfunction.  Mild MR.  Bumex held in setting of SHARIF.  History of DVT-previously  taking Eliquis, stopped in August 2024.  Dementia-on donepezil  Parkinson's disease-on Sinemet  Hypertension-on Lopressor 12.5 mg twice daily.  Lisinopril and Bumex held.  Hyperlipidemia-on statin.  Suspected KALIE-no sleep studies on file, SNF reports recurrent desaturation at night with snoring heard-needs outpatient sleep study    Plan   -Wean O2 as tolerated to maintain SpO2 between 88%-92%  -Ordered CT chest without contrast  -Stop Zosyn  -start Rocephin and doxycycline 100 mg p.o. twice daily due to drug interaction between azithromycin, Zoloft and trazodone.  -Follow cultures  -Continue Symbicort inhaler 2 puffs 2 times daily  -Continue Spiriva Respimat 2 puffs daily  -Continue albuterol inhaler 2 puffs daily  -Continue prednisone 10 mg p.o. daily-she is currently taking this medication at home.  -Continue Tessalon Perles for persistent cough  -Will plan for CT-guided biopsy after reviewing the CT scan of chest ordered above  -Hold all anticoagulation  -Will schedule Carmen Howard for nocturnal polysomnogram (Sleep study) with split night protocol at [facility] sleep lab after discharge from the current hospitalization. Patient to follow with my ( Dr. Trent MD)/Ms. Catie Sr CNP/PETEY Ashton - CNP/PETEY Wyatt - CNP/sleep clinic at [facility] in 6 to 8 weeks after the sleep study with CPAP down load  to go over the study reports and down load review.   -Continue pulmonary hygiene; incentive spirometry and Acapella      \"Thank you for asking us to see this patient\"    Questions and concerns addressed.    Electronically signed by   Shabbir Estrada MD on 11/28/2024 at 10:55 AM     Addendum by Dr. Trent MD:  Patient seen by me independently including key components of medical care. Face to face evaluation and examination was performed. Case discussed with Shabbir Sheffield MD -resident physician. Agree with resident's findings and plan as documented in

## 2024-11-28 NOTE — PROGRESS NOTES
Pharmacy Note - Extended Infusion Beta-Lactam Dose Adjustment    Piperacillin/Tazobactam 3375 mg x 1 intermittent infusion ordered for the treatment of Community acquired pneumonia. Per [facility] Extended Infusion Beta-Lactam Policy, this will be changed to 4500 mg loading dose x 1    Estimated Creatinine Clearance: Estimated Creatinine Clearance: 21 mL/min (A) (based on SCr of 1.8 mg/dL (H)).    Dialysis Status, SHARIF, CKD: SHARIF    BMI: Body mass index is 23.13 kg/m².    Rationale for Adjustment: Dose adjusted per [facility] Extended Infusion Policy based on renal function and indication. The above medication is renally eliminated and demonstrates time-dependent effects on bacterial eradication. Extended-infusion dosing strategy aims to enhance microbiologic and clinical efficacy.     Pharmacy will monitor renal function daily and adjust dose as necessary.      Please call with any questions.    Thank you,  Geoffrey Duarte [facility]  11/28/2024  8:22 AM      Patient

## 2024-11-28 NOTE — ED TRIAGE NOTES
Patient to ED via [facility] due to a period of \"unresponsiveness\" per nursing home. Nursing home reports patients oxygen in the 50s so nursing home applied 3 lpm and patient only improved to 60s. Patient currently resting comfortably on cot. Pt complaining of back pain from falling 2 days prior. Patient denies any other complaints. Patient normally on room air. Patient alert and oriented x 3. Patient is DNR-CCA.

## 2024-11-28 NOTE — ED NOTES
Pt. Resting in bed with even and unlabored respirations. Pt. States pain is a 8/10 at this time. Repositioned in bed. Antibiotics started. Pt. Updated about plan of care and treatment. Family at bedside. Pt. Has no further concerns, questions or needs at this time. Call light within reach.

## 2024-11-28 NOTE — H&P
Hospitalist - History & Physical      Patient: Carmen Stewartwood    Unit/Bed:04/004A  YOB: 1940  MRN: [id number]   Acct: [id number]   PCP: Aamir Shah PA    Date of Service: Pt seen/examined on 11/28/24  and Admitted to Observation with expected LOS less than two midnights due to medical therapy.     Chief Complaint: Unresponsive    Assessment and Plan:-  Acute hypoxic respiratory failure secondary to lung mass: Not on oxygen at home.  Was saturating 71% on arrival to ER, currently requiring 4 L via nasal cannula.  CT scan showed new spiculated opacity in the right upper lobe with areas of central lucency, extends to the periphery, measuring 3.1 x 2.6 x 2.2 cm, maximum dimension measuring 3.5 cm.  Patient is a former smoker, has 40-pack-year smoking history.  Pulmonology consulted for consideration of bronchoscopy  Given tachycardia, hypoxia and newly found lung mass, cannot rule out PE, given SHARIF cannot do CTA chest at the moment.  Will check D-dimer for now.  Will repeat BMP later and assess if CTA chest can be done to evaluate for PE.  Will hold off on antibiotics given procalcitonin of 0.10 and imaging is not suggestive of pneumonia.  Wean down oxygen as tolerated with goal O2 saturation of 92%.  Pulmonary hygiene.  SHARIF: Baseline creatinine of 0.8-1.  Creatinine of 1.8 on admission.  Suspect prerenal etiology given patient has been drinking lot of coke lately and does not drink enough fluids.  Also she is on diuretics.  On IV fluids.  Will check urine studies.  Renal ultrasound.  Will monitor BMP daily and avoid nephrotoxic agents.  Will renally dose medications.  Acute encephalopathy: Has known underlying dementia, somewhat confused at baseline per nursing home but been more confused lately. Per family, patient has been confused last 5 weeks.Baseline mental status: She is usually aware of her daily events, but lately has been confused about daily events but has remained AOx4 and had  The previously visualized fracture of the medial tibial plateau has healed. There is a sclerotic line seen. There is extensive chondrocalcinosis. There are vascular calcifications. There is no joint effusion. There are moderate-severe degenerative changes in all 3 joint compartments. No suspicious osseous lesions are present.  The soft tissues are normal.     Severe degenerative changes. No acute findings. **This report has been created using voice recognition software. It may contain minor errors which are inherent in voice recognition technology.** Electronically signed by Dr. Kalyani Hillman    XR PELVIS (1-2 VIEWS)    Result Date: 11/28/2024  PROCEDURE: XR PELVIS (1-2 VIEWS) CLINICAL INFORMATION: Fall. Fell 2 days ago. COMPARISON: Chest x-ray 1/29/2024. TECHNIQUE: AP view of the pelvis. FINDINGS: The pelvic ring is intact.  There is no fracture or dislocation of either hip. There is surgical hardware in the left femoral neck. The hardware is intact.   The superior and inferior pubic rami are intact. The sacrum is not well seen. There is severe degenerative changes in the right hip joint. There are moderate degenerative changes in the left hip joint. There are vascular calcifications.      No fracture. **This report has been created using voice recognition software. It may contain minor errors which are inherent in voice recognition technology.** Electronically signed by Dr. Kalyani Hillman    CT HEAD WO CONTRAST    Result Date: 11/28/2024  PROCEDURE: CT HEAD WO CONTRAST CLINICAL INFORMATION: Unresponsive episode fall. COMPARISON: Head CT 7/17/2022. TECHNIQUE: Noncontrast 5 mm axial images were obtained through the brain. Sagittal and coronal reconstructions were obtained. . All CT scans at this facility use dose modulation, iterative reconstruction, and/or weight-based dosing when appropriate to reduce radiation dose to as low as reasonably achievable. FINDINGS: There is no hemorrhage. There are no intra-or

## 2024-11-28 NOTE — ED NOTES
Pt. Resting in bed with even and unlabored respirations. Pt. States pain is a 8/10 at this time. Provided water.Pt. Updated about plan for admission. Family at bedside. Pt. Has no further concerns, questions or needs at this time. Call light within reach.

## 2024-11-28 NOTE — CONSULTS
[facility]  Cardiology Consult      Patient:  Carmen Howard  YOB: 1940    MRN: [id number]   Acct: [id number]     Primary Care Physician: Aamir Shah PA    REASON FOR CONSULT:    elevated troponin, ekg changes, ?any ischemic workup warranted         CHIEF COMPLAINT:    Altered mental status     HISTORY OF PRESENT ILLNESS:    Carmen Howard is a pleasant 84 year old female patient with past medical history that includes:   Past Medical History:   Diagnosis Date    Abnormal nuclear stress test 06/01/2018    Cataract     Chronic deep vein thrombosis (DVT) of femoral vein of left lower extremity (HCC) 3/9/2021    Colon polyps     COPD (chronic obstructive pulmonary disease) (HCC)     Diverticulosis large intestine w/o perforation or abscess w/bleeding     DVT, recurrent, lower extremity, acute, left (HCC)     Fibroid, uterine     Hearing loss     Hx of blood clots     Hyperlipidemia     Nicotine dependence     Nocturnal hypoxemia     Postmenopausal     Sleep apnea     Ulcerative colitis (HCC)     with rectal bleeding    Her family history is positive for CAD/MI in both of her parents. Echocardiogram on 3/2024 revealed a preserved EF. The patient was admitted to the hospital on 11/28/2024. Patient was sent from SNF after she was noted to have decreased level of responsiveness. She apparently had confusion for five weeks prior to admission and has documentation for dementia. Patient denies chest pain, shortness of breath, dyspnea on exertion, orthopnea, paroxysmal nocturnal dyspnea, palpitations, dizziness, syncope, recent weight gain or leg swelling. Her son is at bedside. Patient has mild dementia. Also, she is mostly wheelchair bound. EKG revealed sinus tachycardia, anterolateral and inferior infarcts, TWI in anterior leads. Hs troponin 86, 106. HS Troponin in 3/2024 was as high as 73.     All labs, EKG's, diagnostic testing and images as well as cardiac cath,  11/28/2024 06:20 AM    RBC 4.08 11/28/2024 06:20 AM    RBC 0-2 05/24/2018 01:05 AM    HGB 11.5 11/28/2024 06:20 AM    HCT 38.7 11/28/2024 06:20 AM    MCV 94.9 11/28/2024 06:20 AM    MCH 28.2 11/28/2024 06:20 AM    MCHC 29.7 11/28/2024 06:20 AM    RDW 14.9 04/20/2021 12:00 AM     11/28/2024 06:20 AM    MPV 9.9 11/28/2024 06:20 AM     BMP:    Lab Results   Component Value Date/Time     11/28/2024 10:21 AM    K 4.0 11/28/2024 10:21 AM    K 4.2 03/02/2024 11:59 AM     11/28/2024 10:21 AM    CO2 25 11/28/2024 10:21 AM    BUN 43 11/28/2024 10:21 AM    CREATININE 1.5 11/28/2024 10:21 AM    CALCIUM 8.9 11/28/2024 10:21 AM    GFRAA >60 02/26/2020 11:54 AM    LABGLOM 34 11/28/2024 10:21 AM    LABGLOM >60 03/02/2024 11:59 AM    GLUCOSE 148 11/28/2024 10:21 AM    GLUCOSE 112 06/16/2020 05:31 AM     Hepatic Function Panel:    Lab Results   Component Value Date/Time    ALKPHOS 64 11/28/2024 06:20 AM    ALT 6 11/28/2024 06:20 AM    AST <5 11/28/2024 06:20 AM    BILITOT 0.2 11/28/2024 06:20 AM    BILITOT neg 05/24/2018 01:05 AM    BILIDIR <0.1 11/28/2024 06:20 AM     Magnesium:    Lab Results   Component Value Date/Time    MG 2.4 11/28/2024 06:20 AM     Warfarin PT/INR:  No components found for: \"PTPATWAR\", \"PTINRWAR\"  HgBA1c:    Lab Results   Component Value Date/Time    LABA1C 7 06/22/2021 12:00 AM     FLP:    Lab Results   Component Value Date/Time    TRIG 212 04/20/2021 12:00 AM    HDL 58 04/20/2021 12:00 AM     TSH:    Lab Results   Component Value Date/Time    TSH 0.449 04/03/2023 11:59 AM     BNP:   Lab Results   Component Value Date     (H) 01/04/2019      ASSESSMENT:  Elevated Troponin   Abnormal EKG  Chronic HFpEF  FH of CAD  Altered mental status  Dementia   Deep venous thrombosis   Chronic spinal compression fractures   Anemia   SHARIF  Encephalopathy      RECOMMENDATIONS:  Patient was admitted with altered mental status and confusion  She is also being evaluated/treated per primary team for lung

## 2024-11-28 NOTE — ED NOTES
Pt. Resting in bed with even and unlabored respirations. Pt. States pain is a 8/10 at this time. Pt placed on oxygen at this time. Rt at bedside. Pt. Updated about plan of care and treatment. Repositioned in bed. Pt. Has no further concerns, questions or needs at this time. Call light within reach.

## 2024-11-28 NOTE — RT PROTOCOL NOTE
RT Inhaler-Nebulizer Bronchodilator Protocol Note    There is a bronchodilator order in the chart from a provider indicating to follow the RT Bronchodilator Protocol and there is an “Initiate RT Inhaler-Nebulizer Bronchodilator Protocol” order as well (see protocol at bottom of note).    CXR Findings:  No results found.    The findings from the last RT Protocol Assessment were as follows:   History Pulmonary Disease: Chronic pulmonary disease  Respiratory Pattern: Regular pattern and RR 12-20 bpm  Breath Sounds: Slightly diminished and/or crackles  Cough: Strong, spontaneous, non-productive  Indication for Bronchodilator Therapy: On home bronchodilators, Decreased or absent breath sounds  Bronchodilator Assessment Score: 4    Aerosolized bronchodilator medication orders have been revised according to the RT Inhaler-Nebulizer Bronchodilator Protocol below.    Respiratory Therapist to perform RT Therapy Protocol Assessment initially then follow the protocol.  Repeat RT Therapy Protocol Assessment PRN for score 0-3 or on second treatment, BID, and PRN for scores above 3.    No Indications - adjust the frequency to every 6 hours PRN wheezing or bronchospasm, if no treatments needed after 48 hours then discontinue using Per Protocol order mode.     If indication present, adjust the RT bronchodilator orders based on the Bronchodilator Assessment Score as indicated below.  Use Inhaler orders unless patient has one or more of the following: on home nebulizer, not able to hold breath for 10 seconds, is not alert and oriented, cannot activate and use MDI correctly, or respiratory rate 25 breaths per minute or more, then use the equivalent nebulizer order(s) with same Frequency and PRN reasons based on the score.  If a patient is on this medication at home then do not decrease Frequency below that used at home.    0-3 - enter or revise RT bronchodilator order(s) to equivalent RT Bronchodilator order with Frequency of every 4

## 2024-11-28 NOTE — ED NOTES
ED to inpatient nurses report      Chief Complaint:  Chief Complaint   Patient presents with    Well Check     Present to ED from: [facility]    MOA:     LOC: alert and orientated to name, place, date  Mobility: Requires assistance * 2  Oxygen Baseline: 99%    Current needs required: 4     Code Status:   DNR-CCA    What abnormal results were found and what did you give/do to treat them? SHARIF, fall, lung mass, leukocytosis   Any procedures or intervention occur? CT, labs, xray    Mental Status:  Level of Consciousness: Alert (0)    Psych Assessment:        Vitals:  Patient Vitals for the past 24 hrs:   BP Temp Temp src Pulse Resp SpO2 Weight   11/28/24 0921 113/67 -- -- (!) 103 19 99 % --   11/28/24 0916 113/67 -- -- (!) 103 24 96 % --   11/28/24 0820 112/72 -- -- (!) 106 18 99 % --   11/28/24 0721 -- -- -- (!) 108 18 91 % --   11/28/24 0714 (!) 128/55 -- -- (!) 109 15 (!) 71 % 63 kg (139 lb)   11/28/24 0627 110/60 98 °F (36.7 °C) Oral (!) 105 17 93 % --        LDAs:   Peripheral IV 11/28/24 Left Antecubital (Active)   Site Assessment Clean, dry & intact 11/28/24 0715   Line Status Normal saline locked 11/28/24 0715   Phlebitis Assessment No symptoms 11/28/24 0715   Infiltration Assessment 0 11/28/24 0715   Dressing Status Clean, dry & intact 11/28/24 0715   Dressing Type Transparent 11/28/24 0627   Dressing Intervention New 11/28/24 0627       Ambulatory Status:  Presents to emergency department  because of falls (Syncope, seizure, or loss of consciousness): No, Age > 70: Yes, Altered Mental Status, Intoxication with alcohol or substance confusion (Disorientation, impaired judgment, poor safety awaremess, or inability to follow instructions): No, Impaired Mobility: Ambulates or transfers with assistive devices or assistance; Unable to ambulate or transer.: Yes, Nursing Judgement: Yes    Diagnosis:  DISPOSITION Admitted 11/28/2024 08:37:12 AM   Final diagnoses:   SHARIF (acute kidney injury) (HCC)   Fall, initial

## 2024-11-28 NOTE — CONSULTS
Neurology Consult Note    Date:11/28/2024       Room:[facility]/004-A  Patient Name:Carmen Howard     YOB: 1940     Age:84 y.o.    Requesting Physician: Behl, Ashita, MD     Reason for Consult:  Evaluate for cerebellar infarct on CTH      Chief Complaint:   Chief Complaint   Patient presents with    Well Check       Subjective     Carmen Howard is a 84 y.o. female with a history of HFpEF, chronic anemia, dementia, Parkinson disease, COPD, hypertension, hyperlipidemia and prior tobacco use who presented to [facility] ED on 11/28/24 after a unresponsive episode at SNF, at which time her oxygen saturation was noted to be at 50%.  She is usually on room air at baseline but required up to 4 L nasal cannula, she was satting at 71% upon arrival to the ED.  CT chest showed a new lung mass.  She was also reported to have a fall ~ 2 days ago. During additional imaging CT head demonstrated a age-indeterminate left cerebellar infarct, new compared to prior imaging in 2022. She notes she ambulates with a walker at baseline but has been using a wheelchair more frequently recently.  She is on simvastatin 40 mg at baseline, no use of ASA, plavix or other blood thinners.  She was taking Eliquis for a prior DVT but it appears to have been discontinued during a prior admission on 3/1/2024 due to increased bleeding risk.  No family present during my exam. Patient is alert and oriented to person, place and year, but not her age.  Per chart review, family and nursing staff both know that she has been more confused over the last few weeks. She was found to have a UTI. She denies headache, dizziness/lightheadedness, unilateral weakness, numbness or tingling.    Review of Systems   Review of Systems   Constitutional:  Positive for fatigue.   HENT:  Negative for congestion and trouble swallowing.    Eyes:  Negative for visual disturbance.   Respiratory:  Positive for cough and shortness of breath.    Cardiovascular:  Negative for  left lung base. **This report has been created using voice recognition software. It may contain minor errors which are inherent in voice recognition technology.** Electronically signed by Dr. Kalyani Hillman        Assessment and Plan:        Left Cerebellar Infarct: initially noted on CTH 11/28/24  CTH 11/28/24: Imaging reviewed with Dr. Ho who noted that left cerebellar infarct appears old.   Brain MRI ordered per primary team.   Further recommendations will be made after a discussion with patient and her family about their goals of care moving forward  A more aggressive approach would include evaluation for source of prior stroke, including CTAs and cardiac workup.   More conservative treatment would consist of OP neurology follow up and aspirin 81 mg daily when safe to do so.   Anticoagulation medications currently being held in anticipation of CT-guided biopsy of right lung mass  A1c and lipid panel ordered for tomorrow AM.  Patient noted to have increased confusion over the last 4 to 5 weeks.  Given the chronic nature and location, the cerebellar infarct this is noncontributory.  Encephalopathy likely toxic versus metabolic in nature - recommend further workup and subsequent management per primary team  SHARIF, UTI & Hypoxic respiratory failure   Leukocytosis: 17.7   Completed a one-time dose of Zosyn on 11/28 and ongoing treatment with ceftriaxone and doxycycline  Neurology following    This patient was seen and evaluated with Dr. Ho and he is in agreement with the assessment and plan.    Electronically signed by Mary Hendricks PA-C on 11/28/2024 at 5:53 PM    I had a face-to-face encounter with this patient today where I evaluated the patient with Mary Hendricks PA-C. The history and physical examination was completed by me as documented in the attached note. All relevant radiology images, labs and vitals signs were reviewed by me. I agree with the physical examination, assessment, and plan as documented

## 2024-11-29 ENCOUNTER — APPOINTMENT (OUTPATIENT)
Dept: GENERAL RADIOLOGY | Age: 84
DRG: 177 | End: 2024-11-29
Payer: MEDICARE

## 2024-11-29 ENCOUNTER — APPOINTMENT (OUTPATIENT)
Dept: CT IMAGING | Age: 84
DRG: 177 | End: 2024-11-29
Payer: MEDICARE

## 2024-11-29 ENCOUNTER — APPOINTMENT (OUTPATIENT)
Age: 84
DRG: 177 | End: 2024-11-29
Attending: STUDENT IN AN ORGANIZED HEALTH CARE EDUCATION/TRAINING PROGRAM
Payer: MEDICARE

## 2024-11-29 ENCOUNTER — APPOINTMENT (OUTPATIENT)
Dept: MRI IMAGING | Age: 84
DRG: 177 | End: 2024-11-29
Payer: MEDICARE

## 2024-11-29 PROBLEM — Z86.73 HISTORY OF CEREBELLAR STROKE: Status: ACTIVE | Noted: 2024-11-29

## 2024-11-29 LAB
ANION GAP SERPL CALC-SCNC: 12 MEQ/L (ref 8–16)
BACTERIA UR CULT: ABNORMAL
BUN SERPL-MCNC: 31 MG/DL (ref 7–22)
CALCIUM SERPL-MCNC: 8.7 MG/DL (ref 8.5–10.5)
CHLORIDE SERPL-SCNC: 107 MEQ/L (ref 98–111)
CHOLEST SERPL-MCNC: 139 MG/DL (ref 100–199)
CO2 SERPL-SCNC: 22 MEQ/L (ref 23–33)
CREAT SERPL-MCNC: 1 MG/DL (ref 0.4–1.2)
DEPRECATED MEAN GLUCOSE BLD GHB EST-ACNC: 168 MG/DL (ref 70–126)
GFR SERPL CREATININE-BSD FRML MDRD: 55 ML/MIN/1.73M2
GLUCOSE BLD STRIP.AUTO-MCNC: 102 MG/DL (ref 70–108)
GLUCOSE BLD STRIP.AUTO-MCNC: 119 MG/DL (ref 70–108)
GLUCOSE BLD STRIP.AUTO-MCNC: 152 MG/DL (ref 70–108)
GLUCOSE BLD STRIP.AUTO-MCNC: 184 MG/DL (ref 70–108)
GLUCOSE SERPL-MCNC: 111 MG/DL (ref 70–108)
HBA1C MFR BLD HPLC: 7.6 % (ref 4.4–6.4)
HDLC SERPL-MCNC: 43 MG/DL
HEPARIN UNFRACTIONATED: 0.04 U/ML (ref 0.3–0.7)
HEPARIN UNFRACTIONATED: 0.24 U/ML (ref 0.3–0.7)
HEPARIN UNFRACTIONATED: 0.35 U/ML (ref 0.3–0.7)
HEPARIN UNFRACTIONATED: < 0.04 U/ML (ref 0.3–0.7)
LDLC SERPL CALC-MCNC: 65 MG/DL
ORGANISM: ABNORMAL
POTASSIUM SERPL-SCNC: 4.7 MEQ/L (ref 3.5–5.2)
REASON FOR REJECTION: NORMAL
REJECTED TEST: NORMAL
SODIUM SERPL-SCNC: 141 MEQ/L (ref 135–145)
TRIGL SERPL-MCNC: 153 MG/DL (ref 0–199)

## 2024-11-29 PROCEDURE — 70551 MRI BRAIN STEM W/O DYE: CPT

## 2024-11-29 PROCEDURE — 6370000000 HC RX 637 (ALT 250 FOR IP): Performed by: STUDENT IN AN ORGANIZED HEALTH CARE EDUCATION/TRAINING PROGRAM

## 2024-11-29 PROCEDURE — 71045 X-RAY EXAM CHEST 1 VIEW: CPT

## 2024-11-29 PROCEDURE — 0BBC3ZX EXCISION OF RIGHT UPPER LUNG LOBE, PERCUTANEOUS APPROACH, DIAGNOSTIC: ICD-10-PCS | Performed by: STUDENT IN AN ORGANIZED HEALTH CARE EDUCATION/TRAINING PROGRAM

## 2024-11-29 PROCEDURE — 94640 AIRWAY INHALATION TREATMENT: CPT

## 2024-11-29 PROCEDURE — 87075 CULTR BACTERIA EXCEPT BLOOD: CPT

## 2024-11-29 PROCEDURE — 87070 CULTURE OTHR SPECIMN AEROBIC: CPT

## 2024-11-29 PROCEDURE — 99233 SBSQ HOSP IP/OBS HIGH 50: CPT | Performed by: NURSE PRACTITIONER

## 2024-11-29 PROCEDURE — G0378 HOSPITAL OBSERVATION PER HR: HCPCS

## 2024-11-29 PROCEDURE — 32408 CORE NDL BX LNG/MED PERQ: CPT

## 2024-11-29 PROCEDURE — 2580000003 HC RX 258: Performed by: INTERNAL MEDICINE

## 2024-11-29 PROCEDURE — 99233 SBSQ HOSP IP/OBS HIGH 50: CPT | Performed by: INTERNAL MEDICINE

## 2024-11-29 PROCEDURE — 87102 FUNGUS ISOLATION CULTURE: CPT

## 2024-11-29 PROCEDURE — 99232 SBSQ HOSP IP/OBS MODERATE 35: CPT

## 2024-11-29 PROCEDURE — 6370000000 HC RX 637 (ALT 250 FOR IP): Performed by: INTERNAL MEDICINE

## 2024-11-29 PROCEDURE — 80048 BASIC METABOLIC PNL TOTAL CA: CPT

## 2024-11-29 PROCEDURE — 2580000003 HC RX 258: Performed by: STUDENT IN AN ORGANIZED HEALTH CARE EDUCATION/TRAINING PROGRAM

## 2024-11-29 PROCEDURE — 36415 COLL VENOUS BLD VENIPUNCTURE: CPT

## 2024-11-29 PROCEDURE — 83036 HEMOGLOBIN GLYCOSYLATED A1C: CPT

## 2024-11-29 PROCEDURE — 88333 PATH CONSLTJ SURG CYTO XM 1: CPT

## 2024-11-29 PROCEDURE — 6360000002 HC RX W HCPCS: Performed by: RADIOLOGY

## 2024-11-29 PROCEDURE — 82948 REAGENT STRIP/BLOOD GLUCOSE: CPT

## 2024-11-29 PROCEDURE — 94669 MECHANICAL CHEST WALL OSCILL: CPT

## 2024-11-29 PROCEDURE — 96376 TX/PRO/DX INJ SAME DRUG ADON: CPT

## 2024-11-29 PROCEDURE — 96366 THER/PROPH/DIAG IV INF ADDON: CPT

## 2024-11-29 PROCEDURE — 88305 TISSUE EXAM BY PATHOLOGIST: CPT

## 2024-11-29 PROCEDURE — 80061 LIPID PANEL: CPT

## 2024-11-29 PROCEDURE — 94761 N-INVAS EAR/PLS OXIMETRY MLT: CPT

## 2024-11-29 PROCEDURE — 85520 HEPARIN ASSAY: CPT

## 2024-11-29 PROCEDURE — 87205 SMEAR GRAM STAIN: CPT

## 2024-11-29 PROCEDURE — 2700000000 HC OXYGEN THERAPY PER DAY

## 2024-11-29 PROCEDURE — 6360000002 HC RX W HCPCS: Performed by: INTERNAL MEDICINE

## 2024-11-29 RX ORDER — ALBUTEROL SULFATE 0.83 MG/ML
2.5 SOLUTION RESPIRATORY (INHALATION) EVERY 6 HOURS PRN
Status: DISCONTINUED | OUTPATIENT
Start: 2024-11-29 | End: 2024-12-01 | Stop reason: HOSPADM

## 2024-11-29 RX ORDER — ALBUTEROL SULFATE 90 UG/1
2 INHALANT RESPIRATORY (INHALATION) EVERY 6 HOURS PRN
Status: DISCONTINUED | OUTPATIENT
Start: 2024-11-29 | End: 2024-12-01 | Stop reason: HOSPADM

## 2024-11-29 RX ORDER — MIDAZOLAM HYDROCHLORIDE 1 MG/ML
INJECTION, SOLUTION INTRAMUSCULAR; INTRAVENOUS PRN
Status: COMPLETED | OUTPATIENT
Start: 2024-11-29 | End: 2024-11-29

## 2024-11-29 RX ORDER — FENTANYL CITRATE 50 UG/ML
INJECTION, SOLUTION INTRAMUSCULAR; INTRAVENOUS PRN
Status: COMPLETED | OUTPATIENT
Start: 2024-11-29 | End: 2024-11-29

## 2024-11-29 RX ADMIN — DOXYCYCLINE HYCLATE 100 MG: 100 TABLET, COATED ORAL at 21:03

## 2024-11-29 RX ADMIN — METHENAMINE HIPPURATE 1 G: 1 TABLET ORAL at 17:01

## 2024-11-29 RX ADMIN — METOPROLOL TARTRATE 12.5 MG: 25 TABLET, FILM COATED ORAL at 21:03

## 2024-11-29 RX ADMIN — TRAZODONE HYDROCHLORIDE 50 MG: 50 TABLET ORAL at 21:03

## 2024-11-29 RX ADMIN — MIDAZOLAM 0.5 MG: 1 INJECTION INTRAMUSCULAR; INTRAVENOUS at 10:27

## 2024-11-29 RX ADMIN — ATORVASTATIN CALCIUM 20 MG: 20 TABLET, FILM COATED ORAL at 21:02

## 2024-11-29 RX ADMIN — Medication 4.5 MG: at 21:03

## 2024-11-29 RX ADMIN — FERROUS SULFATE TAB 325 MG (65 MG ELEMENTAL FE) 325 MG: 325 (65 FE) TAB at 16:55

## 2024-11-29 RX ADMIN — CARBIDOPA AND LEVODOPA 2 TABLET: 25; 100 TABLET ORAL at 21:03

## 2024-11-29 RX ADMIN — Medication 1 TABLET: at 21:03

## 2024-11-29 RX ADMIN — WATER 1000 MG: 1 INJECTION INTRAMUSCULAR; INTRAVENOUS; SUBCUTANEOUS at 17:03

## 2024-11-29 RX ADMIN — BUDESONIDE AND FORMOTEROL FUMARATE DIHYDRATE 2 PUFF: 80; 4.5 AEROSOL RESPIRATORY (INHALATION) at 20:33

## 2024-11-29 RX ADMIN — DONEPEZIL HYDROCHLORIDE 10 MG: 10 TABLET, FILM COATED ORAL at 21:03

## 2024-11-29 RX ADMIN — DOCUSATE SODIUM 100 MG: 100 CAPSULE, LIQUID FILLED ORAL at 21:03

## 2024-11-29 RX ADMIN — SODIUM CHLORIDE: 9 INJECTION, SOLUTION INTRAVENOUS at 00:12

## 2024-11-29 RX ADMIN — ALBUTEROL SULFATE 2 PUFF: 90 AEROSOL, METERED RESPIRATORY (INHALATION) at 08:32

## 2024-11-29 RX ADMIN — Medication 1 TABLET: at 00:18

## 2024-11-29 RX ADMIN — CARBIDOPA AND LEVODOPA 2 TABLET: 25; 100 TABLET ORAL at 16:55

## 2024-11-29 RX ADMIN — METOPROLOL TARTRATE 12.5 MG: 25 TABLET, FILM COATED ORAL at 00:18

## 2024-11-29 RX ADMIN — TIOTROPIUM BROMIDE INHALATION SPRAY 2 PUFF: 3.12 SPRAY, METERED RESPIRATORY (INHALATION) at 08:32

## 2024-11-29 RX ADMIN — SERTRALINE 100 MG: 100 TABLET, FILM COATED ORAL at 16:54

## 2024-11-29 RX ADMIN — PREDNISONE 10 MG: 10 TABLET ORAL at 16:54

## 2024-11-29 RX ADMIN — BUDESONIDE AND FORMOTEROL FUMARATE DIHYDRATE 2 PUFF: 80; 4.5 AEROSOL RESPIRATORY (INHALATION) at 08:32

## 2024-11-29 RX ADMIN — FENTANYL CITRATE 25 MCG: 50 INJECTION, SOLUTION INTRAMUSCULAR; INTRAVENOUS at 10:27

## 2024-11-29 ASSESSMENT — PAIN SCALES - GENERAL
PAINLEVEL_OUTOF10: 3
PAINLEVEL_OUTOF10: 0

## 2024-11-29 NOTE — PROCEDURES
Department of Radiology  Post Procedure Progress Note      Pre-Procedure Diagnosis:  lung nodule     Procedure Performed:  CT guided biopsy     Anesthesia: local / versed and fentanyl    Findings: successful    Immediate Complications:  None    Estimated Blood Loss: minimal    SEE DICTATED PROCEDURE NOTE FOR COMPLETE DETAILS.    Jose Patricia MD   11/29/2024 10:52 AM

## 2024-11-29 NOTE — PROGRESS NOTES
[facility]  OCCUPATIONAL THERAPY MISSED TREATMENT NOTE  [facility]  7K-04/004-A      Date: 2024  Patient Name: Carmen Howard        CSN: [id number]   : 1940  (84 y.o.)  Gender: female                REASON FOR MISSED TREATMENT: Hold Treatment per Nursing Pt recently returned from IR for biopsy. Needs to be on BR for 2 hours. Will check back as time allows.

## 2024-11-29 NOTE — PROGRESS NOTES
[facility]  Sedation/Analgesia History & Physical    Pt Name: Carmen Howard  MRN: [id number]  YOB: 1940  Provider Performing Procedure: Jose Patricia MD, MD  Primary Care Physician: Aamir Shah PA    Formulation and discussion of sedation / procedure plans, risks, benefits, side effects and alternatives with patient and/or responsible adult completed.    PRE-PROCEDURE   DNR-CCA/DNR-CC []Yes [x]No  Brief History/Pre-Procedure Diagnosis: lung mass           MEDICAL HISTORY  []CAD/Valve  []Liver Disease  []Lung Disease []Diabetes  []Hypertension []Renal Disease  []Additional information:       has a past medical history of Abnormal nuclear stress test, Cataract, Chronic deep vein thrombosis (DVT) of femoral vein of left lower extremity (HCC), Colon polyps, COPD (chronic obstructive pulmonary disease) (HCC), Diverticulosis large intestine w/o perforation or abscess w/bleeding, DVT, recurrent, lower extremity, acute, left (HCC), Fibroid, uterine, Hearing loss, Hx of blood clots, Hyperlipidemia, Nicotine dependence, Nocturnal hypoxemia, Postmenopausal, Sleep apnea, and Ulcerative colitis (HCC).    SURGICAL HISTORY   has a past surgical history that includes Foot surgery; Breast biopsy (1988); Colonoscopy (2006); Colonoscopy (2010); Colonoscopy (2010); Colonoscopy (03/03/2014); Breast biopsy (Right, 03/2011); Foot surgery (Bilateral, 2011); hip surgery (06/13/2020); and osteotomy (Left, 11/2/2021).  Additional information:       ALLERGIES   Allergies as of 11/28/2024 - Fully Reviewed 11/28/2024   Allergen Reaction Noted    Cephalexin Diarrhea and Other (See Comments) 04/04/2022    Ethanol Other (See Comments) 03/06/2024    Mesalamine Other (See Comments) 05/30/2018    Sulfa antibiotics Other (See Comments) 05/01/2017    Sulfasalazine Diarrhea and Other (See Comments) 04/17/2017    Alcohol Nausea And Vomiting 04/17/2017     Additional information:       MEDICATIONS   Coumadin Use

## 2024-11-29 NOTE — CARE COORDINATION
Case Management Assessment Initial Evaluation    Date/Time of Evaluation: 2024 7:27 AM  Assessment Completed by: Terri Sepulveda RN    If patient is discharged prior to next notation, then this note serves as note for discharge by case management.    Patient Name: Carmen Howard                   YOB: 1940  Diagnosis: Lung mass [R91.8]  Lower extremity edema [R60.0]  Elevated troponin [R79.89]  Unresponsive episode [R40.4]  SHARIF (acute kidney injury) (HCC) [N17.9]  Fall, initial encounter [W19.XXXA]  Leukocytosis, unspecified type [D72.829]                   Date / Time: 2024  6:21 AM  Location: [address] [place] [address]     Patient Admission Status: Observation   Readmission Risk Low 0-14, Mod 15-19), High > 20: Readmission Risk Score: 20.1    Current PCP: Aamir Shah PA  Health Care Decision Makers:   Primary Decision Maker: Jazmine Nuñez - Child - [phone]    Secondary Decision Maker: Aris Howard - Child - [phone]    Additional Case Management Notes: to ED from nursing home,  for evaluation of unresponsive episode that occurred this morning.  The patient apparently fell 2 days ago.  At the [facility] it was reported that the patient had O2 sats in the 50s.    Neurology consulted,  PT/OT evals, IVF stopped, Sinemet, Rocephin IV daily, Prednisone, lidocaine patch. Heparin gtt with + DVT LLE.    Procedures:     CT guided lung biopsy    Imagin/28 Venous doppler: Sonographic evidence of deep venous thrombosis involving the left popliteal  vein, the left posterior tibial vein and the left peroneal vein.   CT chest:  Mass-like opacity in the right upper lobe which extends to the pleura. This is   suspicious for primary pulmonary malignancy.   No evidence of mediastinal adenopathy.   Coronary artery disease. Hiatal hernia.   Cholelithiasis.   CT thoracic spine:  Spiculated mass in the right lung apex is suspicious for a primary pulmonary   malignancy.   Chronic compression fractures  of T2, T3 and T12.   No acute compression fractures.   CT head:  No hemorrhage.   Age indeterminate infarct in the left cerebellum.   Moderate-severe chronic small vessel ischemic changes.       Patient Goals/Plan/Treatment Preferences: from local SNF [facility];  SW consulted.

## 2024-11-29 NOTE — PROGRESS NOTES
[facility]  SPEECH THERAPY MISSED TREATMENT NOTE  [facility]      Date: 2024  Patient Name: Carmen Howard        MRN: [id number]    : 1940  (84 y.o.)    REASON FOR MISSED TREATMENT:  ST attempted to see patient x2 this date for completion of CSE. MELISA Luna reports patient is strict NPO d/t procedure. ST to re-attempt as patient is medically appropriate and available.    Fannie Bocanegra MS, CCC-SLP [phone]

## 2024-11-29 NOTE — PROGRESS NOTES
Resting in bed in no distress. Family at bedside. She is s/p lung bx. No chest discomfort. Breathing stable. VSS. No further work-up from cardiology at this time.   Cardiology will follow on PRN basis; please call with questions or concerns.  Lisandra Gorman, APRN - CNP

## 2024-11-29 NOTE — CONSENT
Formulation and discussion of sedation / procedure plans, risks, benefits, side effects and alternatives with patient and/or responsible adult completed.    History and Physical reviewed and unchanged.    Electronically signed by Jose Patricia MD on 11/29/24 at 10:15 AM EST

## 2024-11-29 NOTE — PLAN OF CARE
Problem: Chronic Conditions and Co-morbidities  Goal: Patient's chronic conditions and co-morbidity symptoms are monitored and maintained or improved  Outcome: Progressing  Flowsheets (Taken 11/29/2024 1709)  Care Plan - Patient's Chronic Conditions and Co-Morbidity Symptoms are Monitored and Maintained or Improved: Monitor and assess patient's chronic conditions and comorbid symptoms for stability, deterioration, or improvement     Problem: Discharge Planning  Goal: Discharge to home or other facility with appropriate resources  11/29/2024 1709 by Genet Moody LPN  Outcome: Progressing  Flowsheets (Taken 11/29/2024 1709)  Discharge to home or other facility with appropriate resources: Identify barriers to discharge with patient and caregiver     Problem: Pain  Goal: Verbalizes/displays adequate comfort level or baseline comfort level  Outcome: Progressing  Flowsheets (Taken 11/29/2024 1709)  Verbalizes/displays adequate comfort level or baseline comfort level: Encourage patient to monitor pain and request assistance     Problem: Safety - Adult  Goal: Free from fall injury  Outcome: Progressing  Flowsheets (Taken 11/29/2024 1709)  Free From Fall Injury: Instruct family/caregiver on patient safety     Problem: Skin/Tissue Integrity  Goal: Absence of new skin breakdown  Description: 1.  Monitor for areas of redness and/or skin breakdown  2.  Assess vascular access sites hourly  3.  Every 4-6 hours minimum:  Change oxygen saturation probe site  4.  Every 4-6 hours:  If on nasal continuous positive airway pressure, respiratory therapy assess nares and determine need for appliance change or resting period.  Outcome: Progressing   Care plan reviewed with patient.  Patient verbalized understanding of the plan of care and contribute to goal setting.

## 2024-11-29 NOTE — PROGRESS NOTES
TRANSFER - OUT REPORT:    Verbal report given to Cheryl RN(name) on Carmen Howard being transferred to (unit) for routine progression of patient care       Report consisted of patient's Situation, Background, Assessment and   Recommendations(SBAR).     Information from the following report(s) Nurse Handoff Report, Surgery Report, and MAR was reviewed with the receiving nurse.    Opportunity for questions and clarification was provided.      Patient transported with:   Monitor  O2 @ 2 liters

## 2024-11-29 NOTE — PROGRESS NOTES
Hospitalist Progress Note    Patient:  Carmen Howard      Unit/Bed:7K-04/004-A    YOB: 1940    MRN: [id number]       Acct: [id number]     PCP: Aamir Shah PA    Date of Admission: 11/28/2024    Assessment/Plan:    Acute hypoxic respiratory failure possibly secondary to new lung mass--O2 sat was noted in the 50s and 60s prior to arrival; currently on 2 L of oxygen satting 95%; has incentive spirometry and Acapella ordered, wean oxygen as able; does not use oxygen at SNF  New spiculated opacity in the right upper lobe--appreciate pulmonology input, plan is CT-guided biopsy of the right upper lobe lung mass by IR today, no evidence of mediastinal adenopathy  SHARIF--possibly secondary to dehydration, creatinine was 1.8 on admission and appears baseline is 0.8; has improved to 1.3  Acute encephalopathy, likely metabolic secondary to hypoxia--  Acute left lower extremity DVT (POA)--currently on a heparin drip  UTI (POA)--Rocephin from 11/28; await final urine culture  Pneumonia (POA) right upper lobe possibly secondary to gram-negative bacilli--Rocephin from 11/28, doxycycline from 11/28, incentive spirometry and Acapella, pulmonology is on the case  Leukocytosis--trending down, monitor  Atelectasis to the left base--needs to use incentive spirometry and Acapella as a way to exercise her lung  Elevated troponin--appears this is chronic, appreciate cardiology input and after reading note patient is a poor candidate for any invasive workup also family prefers to avoid  Chronic normocytic anemia--stable  Dementia--fully alert and oriented  Chronic diastolic heart failure--echo from March 2, 2024 with EF 60 to 65% and grade 1 diastolic dysfunction; on Bumex 0.5 mg daily which is currently on hold  Physical deconditioning/debility--likely secondary to advanced age and numerous comorbid medical conditions; appears patient is mostly wheelchair-bound; from a SNF, PT/OT and social  glucagon (rDNA), dextrose, guaiFENesin-dextromethorphan, heparin (porcine), heparin (porcine)      Intake/Output Summary (Last 24 hours) at 11/29/2024 1114  Last data filed at 11/29/2024 0921  Gross per 24 hour   Intake 610 ml   Output 550 ml   Net 60 ml       Diet:  Diet NPO    Exam:  BP (!) 142/79   Pulse 91   Temp 98.4 °F (36.9 °C) (Oral)   Resp 15   Wt 63 kg (139 lb)   SpO2 100%   BMI 23.13 kg/m²     General appearance: No apparent distress, appears stated age and cooperative.  HEENT: Pupils equal, round, and reactive to light. Conjunctivae/corneas clear.  Neck: Supple, with full range of motion. No jugular venous distention. Trachea midline.  Respiratory:  Normal respiratory effort. Clear to auscultation, bilaterally without Rales/Wheezes/Rhonchi.  Cardiovascular: Regular rate and rhythm with normal S1/S2 without murmurs, rubs or gallops.  Abdomen: Soft, non-tender, non-distended with normal bowel sounds.  Musculoskeletal: passive and active ROM x 4 extremities.  Skin: Skin color, texture, turgor normal.    Neurologic:  Neurovascularly intact without any focal sensory/motor deficits. Cranial nerves: II-XII intact, grossly non-focal.  Psychiatric: Alert and oriented x 4, thought content appropriate  Capillary Refill: Brisk,< 3 seconds   Peripheral Pulses: +2 palpable, equal bilaterally     Labs:   Recent Labs     11/28/24  0620 11/28/24  1828   WBC 17.7* 15.3*   HGB 11.5* 11.5*   HCT 38.7 41.1    336     Recent Labs     11/28/24  0620 11/28/24  1021 11/28/24  1828    138 139   K 4.5 4.0 4.1    101 103   CO2 28 25 25   BUN 45* 43* 39*   CREATININE 1.8* 1.5* 1.3*   CALCIUM 9.4 8.9 8.4*     Recent Labs     11/28/24  0620   AST <5   ALT 6*   BILIDIR <0.1   BILITOT 0.2*   ALKPHOS 64     Recent Labs     11/28/24  1828   INR 1.02     Recent Labs     11/28/24  1021   CKTOTAL 48     Microbiology:    Urine culture is pending  2 blood cultures are showing no growth at 24-hours    Urinalysis:

## 2024-11-29 NOTE — DISCHARGE INSTR - COC
Continuity of Care Form    Patient Name: Carmen Howard   :  1940  MRN:  [id number]    Admit date:  2024  Discharge date:  ***    Code Status Order: DNR-CCA   Advance Directives:   Advance Care Flowsheet Documentation             Admitting Physician:  No admitting provider for patient encounter.  PCP: Aamir Shah PA    Discharging Nurse: ***  Discharging Hospital Unit/Room#: 7K-04/004-A  Discharging Unit Phone Number: ***    Emergency Contact:   Extended Emergency Contact Information  Primary Emergency Contact: Jazmine Nuñez  Home Phone: [phone]  Relation: Child  Secondary Emergency Contact: Aris Howard   [facility]  Home Phone: [phone]  Relation: Child    Past Surgical History:  Past Surgical History:   Procedure Laterality Date    BREAST BIOPSY  1988    right breast biopsy     BREAST BIOPSY Right 2011    stereotactic biopsy    COLONOSCOPY      Dr Zaragoza    COLONOSCOPY      Dr Prince    COLONOSCOPY      Dr Lo interminate colitis    COLONOSCOPY  2014    Dr Nowak Normal     CT NEEDLE BIOPSY LUNG PERCUTANEOUS  2024    CT NEEDLE BIOPSY LUNG PERCUTANEOUS 2024 [facility] CT SCAN    FOOT SURGERY      FOOT SURGERY Bilateral 2011    HIP SURGERY  2020    left fracture    OSTEOTOMY Left 2021    LEFT 5TH METATARSAL HEAD RESECTION EXCISION SOFT TISSUE LESION performed by Montez Dacosta DPM at [facility] OR       Immunization History:   Immunization History   Administered Date(s) Administered    COVID-19, PFIZER Bivalent, DO NOT Dilute, (age 12y+), IM, 30 mcg/0.3 mL 11/10/2022    COVID-19, PFIZER GRAY top, DO NOT Dilute, (age 12 y+), IM, 30 mcg/0.3 mL 2022    COVID-19, PFIZER PURPLE top, DILUTE for use, (age 12 y+), 30mcg/0.3mL 2021, 2021, 2021, 2021, 2021, 2021    Influenza Vaccine, unspecified formulation 2006, 2006, 2008, 2014, 10/28/2014,  Therapy:  Current Nutrition Therapy:   { TABATHA Diet List:921907148}    Routes of Feeding: {[facility] D[facility] Other Feedings:940908948}  Liquids: {Slp liquid thickness:77869}  Daily Fluid Restriction: {CHP DME Yes amt example:650013347}  Last Modified Barium Swallow with Video (Video Swallowing Test): {Done Not Done Date:}    Treatments at the Time of Hospital Discharge:   Respiratory Treatments: ***  Oxygen Therapy:  {Therapy; copd oxygen:23232}  Ventilator:    { CC Vent List:777217840}    Rehab Therapies: {THERAPEUTIC INTERVENTION:4965521319}  Weight Bearing Status/Restrictions: { CC Weight Bearin}  Other Medical Equipment (for information only, NOT a DME order):  {EQUIPMENT:682271542}  Other Treatments: ***    Patient's personal belongings (please select all that are sent with patient):  {[facility] D[facility] Belongings:420650345}    RN SIGNATURE:  {Esignature:672173679}    CASE MANAGEMENT/SOCIAL WORK SECTION    Inpatient Status Date: ***    Readmission Risk Assessment Score:  Readmission Risk              Risk of Unplanned Readmission:  0           Discharging to Facility/ Agency   Name: [facility]  Address: [address] [address]  Phone: [phone]  Fax:    Dialysis Facility (if applicable)   Name:  Address:  Dialysis Schedule:  Phone:  Fax:    / signature: Electronically signed by ESTELLA Regalado on 24 at 2:30 PM EST    PHYSICIAN SECTION    Prognosis: {Prognosis:5306625925}    Condition at Discharge: { Patient Condition:143710991}    Rehab Potential (if transferring to Rehab): {Prognosis:3628387745}    Recommended Labs or Other Treatments After Discharge: ***    Physician Certification: I certify the above information and transfer of Carmen Howard  is necessary for the continuing treatment of the diagnosis listed and that she requires Skilled Nursing Facility for {GREATER/LESS:656348554} 30 days.     Update Admission H&P: {[facility]

## 2024-11-29 NOTE — PROGRESS NOTES
[facility]      Patient - Carmen Howard   MRN -  [id number]   [facility] # - [id number]   - 1940      Date of Admission -  2024  6:21 AM  Date of evaluation -  2024  Room - --A   Hospital Day - 0  Consulting - Zulma Robbins APRN* Primary Care Physician - Aamir Shah PA     Problem List      Active Hospital Problems    Diagnosis Date Noted    Recurrent UTI [N39.0] 2022     Priority: Medium    SHARIF (acute kidney injury) (HCC) [N17.9] 2024    Mass of upper lobe of right lung [R91.8] 2024    Acute encephalopathy [G93.40] 2024    History of osteoporosis [Z87.39] 2024    Leukocytosis [D72.829] 2024    Normocytic anemia [D64.9] 2024    Elevated troponin [R79.89] 2024    Chronic heart failure with preserved ejection fraction (HCC) [I50.32] 2023     Reason for Consult    New spiculated RUL density   HPI   History Obtained From: Patient and electronic medical record.    Carmen Howard is a 84 y.o. female with a past medical history of HFpEF, COPD, history of DVT, dementia, Parkinson's disease, hypertension, hyperlipidemia who presents to [facility] on  due to an episode of unresponsiveness at SNF and oxygen desaturation. Reported SpO2 in the 50s and 3 L O2 NC applied with improvement of SpO2 to 60%.   She was found hanging from the bed at SNF and became unresponsive upon attempting to move the patient.  She was brought to [facility] ED per patient's family and placed on 4 L NC with SpO2 in the 90s.  She was treated for pneumonia roughly 1 month ago.  Per SNF staff, patient has been getting progressively weaker and requiring two-person assistance which is not usual.  Also reported patient experiencing oxygen desaturation at night.  She complains of a dry cough that has been present for roughly 4 weeks.  She denies any sputum production.  She has been confused for the last 5 weeks and has been  APRN - CNP on 11/29/2024 at 1:28 PM       Addendum by Dr. Trent MD:  Patient seen by me independently including key components of medical care. Face to face evaluation and examination was performed. Case discussed with  José Miguel DANELLE Clarke. Agree with Certified nurse practitioner's findings and plan as documented in the Certified nurse practitioner's note. Italicized font, if present,  represents changes to the note made by me. More than 50% of the encounter time involved with patient care by the Pulmonary & Critical care service team spent by me.    Please see my modifications mentioned below:  She is not in distress  She underwent a CT-guided biopsy today by IR service  Chest x-ray portable view performed after CT-guided biopsy:  IMPRESSION:  1. Mild cardiomegaly. No effusion.  2. Tiny less than 5% pneumothorax right apex.  3. Abnormal density right upper lobe which could be an infiltrate or an  infiltrative mass lesion. This was not present on a prior chest x-ray from  1/29/2024      Follow repeat chest x-ray ordered by Dr. Hever Patricia MD to follow right sided pneumothorax.    Electronically signed by   Jeffrey Newman MD on 11/29/2024 at 2:09 PM

## 2024-11-29 NOTE — PROGRESS NOTES
1000 Patient received in CT scanning for right lung biopsy. Monitor applied. Family taken to waiting room.   1014 Dr. Hager here; spoke to patient. This procedure has been fully reviewed with the patient and written informed consent has been obtained.   1022 Patient assisted to CT table and pre scan started.  1026 Pathology called to CT area for procedure.   1032 Procedure started with Dr. Patricia.  1034 Pathology arrived to CT.   1037 Samples collected and given to pathologist for further review.   1048 Procedure completed; patient tolerated well. Post scan obtained.   1049 Bacitracin oint, band aid to site; no bleeding noted.  1052 Patient on bed; comfort ensured.  1058 patient taken to 7K via bed/transport with family at bedside. Pt alert and oriented x3; follows commands. Skin pink, warm, and dry. Respirations easy, regular, and nonlabored.

## 2024-11-29 NOTE — CARE COORDINATION
11/29/24 1406   Service Assessment   Patient Orientation Alert and Oriented   Cognition Alert   History Provided By Patient;Child/Family   Primary Caregiver Other (Comment)  (assisted living)   Support Systems Family Members   Patient's Healthcare Decision Maker is: Named in Scanned ACP Document   PCP Verified by CM Yes   Last Visit to PCP Within last 6 months   Prior Functional Level Assistance with the following:;Housework;Shopping;Cooking   Current Functional Level Cooking;Housework;Shopping;Mobility   Can patient return to prior living arrangement Unknown at present   Ability to make needs known: Good   Family able to assist with home care needs: No   Would you like for me to discuss the discharge plan with any other family members/significant others, and if so, who? No   Financial Resources Medicare   Community Resources Assisted Living   CM/SW Referral Other (see comment)  (snf)   Social/Functional History   Lives With Other (comment)  (assisted living)   Type of Home Assisted living   Discharge Planning   Type of Residence Assisted living   Living Arrangements Alone   Current Services Prior To Admission Other (Comment)  (assisted living)   Potential Assistance Needed Skilled Nursing Facility   DME Ordered? No   Potential Assistance Purchasing Medications No   Type of Home Care Services None   Patient expects to be discharged to: Skilled nursing facility   Services At/After Discharge   Transition of Care Consult (CM Consult) SNF   Services At/After Discharge Skilled Nursing Facility (SNF)   Mode of Transport at Discharge BLS   Condition of Participation: Discharge Planning   The Patient and/or Patient Representative was provided with a Choice of Provider? Patient;Patient Representative   Name of the Patient Representative who was provided with the Choice of Provider and agrees with the Discharge Plan?  Adan frankel   The Patient and/Or Patient Representative agree with the Discharge Plan? Yes   Freedom of

## 2024-11-29 NOTE — RT PROTOCOL NOTE
RT Inhaler-Nebulizer Bronchodilator Protocol Note    There is a bronchodilator order in the chart from a provider indicating to follow the RT Bronchodilator Protocol and there is an “Initiate RT Inhaler-Nebulizer Bronchodilator Protocol” order as well (see protocol at bottom of note).    CXR Findings:  No results found.    The findings from the last RT Protocol Assessment were as follows:   History Pulmonary Disease: Chronic pulmonary disease  Respiratory Pattern: Regular pattern and RR 12-20 bpm  Breath Sounds: Slightly diminished and/or crackles  Cough: Strong, spontaneous, non-productive  Indication for Bronchodilator Therapy: On home bronchodilators, Decreased or absent breath sounds  Bronchodilator Assessment Score: 4    Aerosolized bronchodilator medication orders have been revised according to the RT Inhaler-Nebulizer Bronchodilator Protocol below.    Respiratory Therapist to perform RT Therapy Protocol Assessment initially then follow the protocol.  Repeat RT Therapy Protocol Assessment PRN for score 0-3 or on second treatment, BID, and PRN for scores above 3.    No Indications - adjust the frequency to every 6 hours PRN wheezing or bronchospasm, if no treatments needed after 48 hours then discontinue using Per Protocol order mode.     If indication present, adjust the RT bronchodilator orders based on the Bronchodilator Assessment Score as indicated below.  Use Inhaler orders unless patient has one or more of the following: on home nebulizer, not able to hold breath for 10 seconds, is not alert and oriented, cannot activate and use MDI correctly, or respiratory rate 25 breaths per minute or more, then use the equivalent nebulizer order(s) with same Frequency and PRN reasons based on the score.  If a patient is on this medication at home then do not decrease Frequency below that used at home.    0-3 - enter or revise RT bronchodilator order(s) to equivalent RT Bronchodilator order with Frequency of every 4  hours PRN for wheezing or increased work of breathing using Per Protocol order mode.        4-6 - enter or revise RT Bronchodilator order(s) to two equivalent RT bronchodilator orders with one order with BID Frequency and one order with Frequency of every 4 hours PRN wheezing or increased work of breathing using Per Protocol order mode.        7-10 - enter or revise RT Bronchodilator order(s) to two equivalent RT bronchodilator orders with one order with TID Frequency and one order with Frequency of every 4 hours PRN wheezing or increased work of breathing using Per Protocol order mode.       11-13 - enter or revise RT Bronchodilator order(s) to one equivalent RT bronchodilator order with QID Frequency and an Albuterol order with Frequency of every 4 hours PRN wheezing or increased work of breathing using Per Protocol order mode.      Greater than 13 - enter or revise RT Bronchodilator order(s) to one equivalent RT bronchodilator order with every 4 hours Frequency and an Albuterol order with Frequency of every 2 hours PRN wheezing or increased work of breathing using Per Protocol order mode.     RT to enter RT Home Evaluation for COPD & MDI Assessment order using Per Protocol order mode.    Electronically signed by Radha Edward RCP on 11/28/2024 at 8:02 PM

## 2024-11-29 NOTE — PROGRESS NOTES
cervical spine 11/28/2024. CT thoracic spine 7/17/2022. TECHNIQUE: 3 mm axial noncontrast CT images were obtained through the thoracic spine. Sagittal and coronal reconstructions were obtained. All CT scans at this facility use dose modulation, iterative reconstruction, and/or weight-based dosing when appropriate to reduce radiation dose to as low as reasonably achievable. FINDINGS: There is a new spiculated opacity in the right upper lobe with areas of central lucency. This extends to the periphery. This measures 3.1 x 2.6 x 2.2 cm. The maximum dimension measures 3.5 cm. This is an oblique measurement measured on the sagittal images. There are biapical emphysematous changes. There is a mild dextroscoliosis. There is demineralization. No suspicious osseous lesions are present. There are chronic compression fractures of T2 and T3. This involves the superior endplate of T2 and the inferior endplate of L3. These were present previously. There is also an old compression fracture of T12. There are no new compression fractures. On the axial images, the posterior elements and posterior ribs appear normal there is no spinal canal stenosis at any level. No suspicious findings are present in the paraspinal tissues. There are possible gallstones in the gallbladder. This is at the edge of the images.     1. Spiculated mass in the right lung apex is suspicious for a primary pulmonary malignancy. 2. Chronic compression fractures of T2, T3 and T12. 3. No acute compression fractures. **This report has been created using voice recognition software. It may contain minor errors which are inherent in voice recognition technology.** Electronically signed by Dr. Kalyani Hillman    CT CERVICAL SPINE WO CONTRAST    Result Date: 11/28/2024  PROCEDURE: CT CERVICAL SPINE WO CONTRAST CLINICAL INFORMATION: Fall. COMPARISON: CT cervical spine 7/17/2022. TECHNIQUE: 3 mm noncontrast axial images were obtained through the cervical spine with sagittal  of her stroke and would prefer to pursue conservative management. Due to her kidney function and family preference, CTAs will not be ordered.   Recommend modifiable risk factor management   Control HTN - on metoprolol and lisinopril at home.   A1c 7.6.  Recommend A1c less than 7.0 if obtainable.  LDL 65. Recommend LDL goal of 45-70. Continue simvastatin 40 mg per home regimen  Start ASA 81 mg when able to safely do so from a medical standpoint. anticoagulation& antiplatelets currently held for lung bx scheduled for today, 11/28.  Patient noted to have increased confusion over the last 4 to 5 weeks.  Given the chronic nature and location, the cerebellar infarct this is noncontributory.  Encephalopathy likely toxic versus metabolic in nature - recommend further workup and subsequent management per primary team  SHARIF, UTI & Hypoxic respiratory failure   Leukocytosis: 17.7   Completed a one-time dose of Zosyn on 11/28 and ongoing treatment with ceftriaxone and doxycycline  No further inpatient neurologic work-up at this time.  Neurology will sign off.  Please do not hesitate to call our service if there are any questions or concerns.      This patient was seen and evaluated with Dr. Ho and he is in agreement with the assessment and plan.    Electronically signed by Mary Hendricks PA-C on 11/29/2024 at 8:24 AM

## 2024-11-29 NOTE — PLAN OF CARE
Problem: Respiratory - Adult  Goal: Clear lung sounds  Outcome: Progressing  Note: Patients breath sounds were clear and diminished throughout all lung fields. Continue taking inhalers as ordered to improve aeration and acapella to remove secretions.

## 2024-11-29 NOTE — PLAN OF CARE
Problem: Chronic Conditions and Co-morbidities  Goal: Patient's chronic conditions and co-morbidity symptoms are monitored and maintained or improved  Outcome: Progressing  Flowsheets (Taken 11/28/2024 2002)  Care Plan - Patient's Chronic Conditions and Co-Morbidity Symptoms are Monitored and Maintained or Improved:   Monitor and assess patient's chronic conditions and comorbid symptoms for stability, deterioration, or improvement   Collaborate with multidisciplinary team to address chronic and comorbid conditions and prevent exacerbation or deterioration   Update acute care plan with appropriate goals if chronic or comorbid symptoms are exacerbated and prevent overall improvement and discharge     Problem: Discharge Planning  Goal: Discharge to home or other facility with appropriate resources  Outcome: Progressing  Flowsheets  Taken 11/28/2024 2002 by Jennifer Weiss RN  Discharge to home or other facility with appropriate resources:   Identify barriers to discharge with patient and caregiver   Arrange for needed discharge resources and transportation as appropriate   Identify discharge learning needs (meds, wound care, etc)  Taken 11/28/2024 1155 by Margarita Durán RN  Discharge to home or other facility with appropriate resources:   Identify barriers to discharge with patient and caregiver   Identify discharge learning needs (meds, wound care, etc)   Arrange for needed discharge resources and transportation as appropriate   Arrange for interpreters to assist at discharge as needed     Problem: Pain  Goal: Verbalizes/displays adequate comfort level or baseline comfort level  Outcome: Progressing  Flowsheets (Taken 11/28/2024 2002)  Verbalizes/displays adequate comfort level or baseline comfort level:   Encourage patient to monitor pain and request assistance   Assess pain using appropriate pain scale   Administer analgesics based on type and severity of pain and evaluate response   Implement  non-pharmacological measures as appropriate and evaluate response   Consider cultural and social influences on pain and pain management     Problem: Safety - Adult  Goal: Free from fall injury  Outcome: Progressing  Flowsheets (Taken 11/28/2024 1063)  Free From Fall Injury:   Instruct family/caregiver on patient safety   Based on caregiver fall risk screen, instruct family/caregiver to ask for assistance with transferring infant if caregiver noted to have fall risk factors     Problem: Skin/Tissue Integrity  Goal: Absence of new skin breakdown  Description: 1.  Monitor for areas of redness and/or skin breakdown  2.  Assess vascular access sites hourly  3.  Every 4-6 hours minimum:  Change oxygen saturation probe site  4.  Every 4-6 hours:  If on nasal continuous positive airway pressure, respiratory therapy assess nares and determine need for appliance change or resting period.  Outcome: Progressing       Care plan reviewed with patient. Patient verbalizes understanding of plan of care and contributes to goal setting.

## 2024-11-29 NOTE — PROGRESS NOTES
[facility]  PHYSICAL THERAPY MISSED TREATMENT NOTE  [facility]K    Date: 2024  Patient Name: Carmen Howard        MRN: [id number]   : 1940  (84 y.o.)  Gender: female                REASON FOR MISSED TREATMENT:  Patient at testing and/or off unit.  Pt was at MRI this morning, and is now at IR, will check back as able.

## 2024-11-30 ENCOUNTER — APPOINTMENT (OUTPATIENT)
Dept: GENERAL RADIOLOGY | Age: 84
DRG: 177 | End: 2024-11-30
Payer: MEDICARE

## 2024-11-30 ENCOUNTER — APPOINTMENT (OUTPATIENT)
Age: 84
DRG: 177 | End: 2024-11-30
Attending: STUDENT IN AN ORGANIZED HEALTH CARE EDUCATION/TRAINING PROGRAM
Payer: MEDICARE

## 2024-11-30 VITALS
RESPIRATION RATE: 16 BRPM | WEIGHT: 139 LBS | DIASTOLIC BLOOD PRESSURE: 56 MMHG | SYSTOLIC BLOOD PRESSURE: 101 MMHG | BODY MASS INDEX: 23.13 KG/M2 | TEMPERATURE: 98.4 F | OXYGEN SATURATION: 92 % | HEART RATE: 80 BPM

## 2024-11-30 PROBLEM — R91.8 LUNG MASS: Status: ACTIVE | Noted: 2024-11-30

## 2024-11-30 LAB
ANION GAP SERPL CALC-SCNC: 11 MEQ/L (ref 8–16)
ARTERIAL PATENCY WRIST A: ABNORMAL
BASE EXCESS BLDA CALC-SCNC: -6.5 MMOL/L (ref -2.5–2.5)
BDY SITE: ABNORMAL
BUN SERPL-MCNC: 23 MG/DL (ref 7–22)
CALCIUM SERPL-MCNC: 8.8 MG/DL (ref 8.5–10.5)
CHLORIDE SERPL-SCNC: 103 MEQ/L (ref 98–111)
CO2 SERPL-SCNC: 22 MEQ/L (ref 23–33)
COLLECTED BY:: ABNORMAL
CREAT SERPL-MCNC: 0.8 MG/DL (ref 0.4–1.2)
DEPRECATED RDW RBC AUTO: 54.9 FL (ref 35–45)
DEVICE: ABNORMAL
ECHO AV CUSP MM: 1.8 CM
ECHO LA AREA 2C: 11.2 CM2
ECHO LA AREA 4C: 10.2 CM2
ECHO LA DIAMETER: 2.9 CM
ECHO LA MAJOR AXIS: 4.1 CM
ECHO LA MINOR AXIS: 4.3 CM
ECHO LA VOL BP: 23 ML (ref 22–52)
ECHO LA VOL MOD A2C: 25 ML (ref 22–52)
ECHO LA VOL MOD A4C: 20 ML (ref 22–52)
ECHO LV EDV A2C: 59 ML
ECHO LV EDV A4C: 72 ML
ECHO LV EJECTION FRACTION A2C: 34 %
ECHO LV EJECTION FRACTION A4C: 41 %
ECHO LV EJECTION FRACTION BIPLANE: 39 % (ref 55–100)
ECHO LV ESV A2C: 39 ML
ECHO LV ESV A4C: 43 ML
ECHO LV FRACTIONAL SHORTENING: 18 % (ref 28–44)
ECHO LV INTERNAL DIMENSION DIASTOLIC: 3.9 CM (ref 3.9–5.3)
ECHO LV INTERNAL DIMENSION SYSTOLIC: 3.2 CM
ECHO LV IVSD: 0.7 CM (ref 0.6–0.9)
ECHO LV MASS 2D: 82.3 G (ref 67–162)
ECHO LV POSTERIOR WALL DIASTOLIC: 0.8 CM (ref 0.6–0.9)
ECHO LV RELATIVE WALL THICKNESS RATIO: 0.41
ECHO RV INTERNAL DIMENSION: 2.4 CM
ECHO RV TAPSE: 1.7 CM (ref 1.7–?)
ERYTHROCYTE [DISTWIDTH] IN BLOOD BY AUTOMATED COUNT: 15.8 % (ref 11.5–14.5)
FIO2 ON VENT O2 ANALYZER: 100 %
GFR SERPL CREATININE-BSD FRML MDRD: 73 ML/MIN/1.73M2
GLUCOSE BLD STRIP.AUTO-MCNC: 166 MG/DL (ref 70–108)
GLUCOSE SERPL-MCNC: 140 MG/DL (ref 70–108)
HCO3 BLDA-SCNC: 24 MMOL/L (ref 23–28)
HCT VFR BLD AUTO: 36.7 % (ref 37–47)
HEPARIN UNFRACTIONATED: 0.41 U/ML (ref 0.3–0.7)
HEPARIN UNFRACTIONATED: 1.4 U/ML (ref 0.3–0.7)
HGB BLD-MCNC: 10.8 GM/DL (ref 12–16)
MCH RBC QN AUTO: 28.1 PG (ref 26–33)
MCHC RBC AUTO-ENTMCNC: 29.4 GM/DL (ref 32.2–35.5)
MCV RBC AUTO: 95.6 FL (ref 81–99)
PCO2 BLDA: 69 MMHG (ref 35–45)
PH BLDA: 7.14 [PH] (ref 7.35–7.45)
PLATELET # BLD AUTO: 317 THOU/MM3 (ref 130–400)
PMV BLD AUTO: 9.7 FL (ref 9.4–12.4)
PO2 BLDA: 55 MMHG (ref 71–104)
POC CREATININE WHOLE BLOOD: 1.3 MG/DL (ref 0.5–1.2)
POTASSIUM SERPL-SCNC: 4.3 MEQ/L (ref 3.5–5.2)
RBC # BLD AUTO: 3.84 MILL/MM3 (ref 4.2–5.4)
SAO2 % BLDA: 77 %
SODIUM SERPL-SCNC: 136 MEQ/L (ref 135–145)
VENTILATION MODE VENT: ABNORMAL
WBC # BLD AUTO: 11.8 THOU/MM3 (ref 4.8–10.8)

## 2024-11-30 PROCEDURE — 82330 ASSAY OF CALCIUM: CPT

## 2024-11-30 PROCEDURE — 84132 ASSAY OF SERUM POTASSIUM: CPT

## 2024-11-30 PROCEDURE — 82947 ASSAY GLUCOSE BLOOD QUANT: CPT

## 2024-11-30 PROCEDURE — 82803 BLOOD GASES ANY COMBINATION: CPT

## 2024-11-30 PROCEDURE — 93307 TTE W/O DOPPLER COMPLETE: CPT

## 2024-11-30 PROCEDURE — 6360000002 HC RX W HCPCS: Performed by: STUDENT IN AN ORGANIZED HEALTH CARE EDUCATION/TRAINING PROGRAM

## 2024-11-30 PROCEDURE — 36600 WITHDRAWAL OF ARTERIAL BLOOD: CPT

## 2024-11-30 PROCEDURE — 1200000000 HC SEMI PRIVATE

## 2024-11-30 PROCEDURE — 93307 TTE W/O DOPPLER COMPLETE: CPT | Performed by: INTERNAL MEDICINE

## 2024-11-30 PROCEDURE — 82948 REAGENT STRIP/BLOOD GLUCOSE: CPT

## 2024-11-30 PROCEDURE — 2700000000 HC OXYGEN THERAPY PER DAY

## 2024-11-30 PROCEDURE — 6360000002 HC RX W HCPCS

## 2024-11-30 PROCEDURE — 85520 HEPARIN ASSAY: CPT

## 2024-11-30 PROCEDURE — 92610 EVALUATE SWALLOWING FUNCTION: CPT

## 2024-11-30 PROCEDURE — 94761 N-INVAS EAR/PLS OXIMETRY MLT: CPT

## 2024-11-30 PROCEDURE — 6360000002 HC RX W HCPCS: Performed by: INTERNAL MEDICINE

## 2024-11-30 PROCEDURE — 71046 X-RAY EXAM CHEST 2 VIEWS: CPT

## 2024-11-30 PROCEDURE — 94669 MECHANICAL CHEST WALL OSCILL: CPT

## 2024-11-30 PROCEDURE — 6370000000 HC RX 637 (ALT 250 FOR IP): Performed by: NURSE PRACTITIONER

## 2024-11-30 PROCEDURE — 84295 ASSAY OF SERUM SODIUM: CPT

## 2024-11-30 PROCEDURE — 0BH17EZ INSERTION OF ENDOTRACHEAL AIRWAY INTO TRACHEA, VIA NATURAL OR ARTIFICIAL OPENING: ICD-10-PCS | Performed by: STUDENT IN AN ORGANIZED HEALTH CARE EDUCATION/TRAINING PROGRAM

## 2024-11-30 PROCEDURE — 82435 ASSAY OF BLOOD CHLORIDE: CPT

## 2024-11-30 PROCEDURE — 99233 SBSQ HOSP IP/OBS HIGH 50: CPT | Performed by: INTERNAL MEDICINE

## 2024-11-30 PROCEDURE — 6370000000 HC RX 637 (ALT 250 FOR IP): Performed by: INTERNAL MEDICINE

## 2024-11-30 PROCEDURE — 96366 THER/PROPH/DIAG IV INF ADDON: CPT

## 2024-11-30 PROCEDURE — 99233 SBSQ HOSP IP/OBS HIGH 50: CPT | Performed by: NURSE PRACTITIONER

## 2024-11-30 PROCEDURE — 93005 ELECTROCARDIOGRAM TRACING: CPT

## 2024-11-30 PROCEDURE — 2580000003 HC RX 258: Performed by: INTERNAL MEDICINE

## 2024-11-30 PROCEDURE — 82565 ASSAY OF CREATININE: CPT

## 2024-11-30 PROCEDURE — 94640 AIRWAY INHALATION TREATMENT: CPT

## 2024-11-30 PROCEDURE — 83605 ASSAY OF LACTIC ACID: CPT

## 2024-11-30 PROCEDURE — 36415 COLL VENOUS BLD VENIPUNCTURE: CPT

## 2024-11-30 PROCEDURE — 85027 COMPLETE CBC AUTOMATED: CPT

## 2024-11-30 PROCEDURE — 6370000000 HC RX 637 (ALT 250 FOR IP): Performed by: STUDENT IN AN ORGANIZED HEALTH CARE EDUCATION/TRAINING PROGRAM

## 2024-11-30 PROCEDURE — 80048 BASIC METABOLIC PNL TOTAL CA: CPT

## 2024-11-30 RX ADMIN — BUDESONIDE AND FORMOTEROL FUMARATE DIHYDRATE 2 PUFF: 80; 4.5 AEROSOL RESPIRATORY (INHALATION) at 19:56

## 2024-11-30 RX ADMIN — HEPARIN SODIUM 22.19 UNITS/KG/HR: 10000 INJECTION, SOLUTION INTRAVENOUS at 01:00

## 2024-11-30 RX ADMIN — DONEPEZIL HYDROCHLORIDE 10 MG: 10 TABLET, FILM COATED ORAL at 19:50

## 2024-11-30 RX ADMIN — HEPARIN SODIUM 1900 UNITS: 1000 INJECTION INTRAVENOUS; SUBCUTANEOUS at 00:58

## 2024-11-30 RX ADMIN — CARBIDOPA AND LEVODOPA 2 TABLET: 25; 100 TABLET ORAL at 09:07

## 2024-11-30 RX ADMIN — METHENAMINE HIPPURATE 1 G: 1 TABLET ORAL at 17:31

## 2024-11-30 RX ADMIN — SERTRALINE 100 MG: 100 TABLET, FILM COATED ORAL at 09:07

## 2024-11-30 RX ADMIN — HEPARIN SODIUM 14 UNITS/KG/HR: 10000 INJECTION, SOLUTION INTRAVENOUS at 10:51

## 2024-11-30 RX ADMIN — PREDNISONE 10 MG: 10 TABLET ORAL at 09:07

## 2024-11-30 RX ADMIN — Medication 1 TABLET: at 19:52

## 2024-11-30 RX ADMIN — TRAZODONE HYDROCHLORIDE 50 MG: 50 TABLET ORAL at 19:50

## 2024-11-30 RX ADMIN — ATORVASTATIN CALCIUM 20 MG: 20 TABLET, FILM COATED ORAL at 19:50

## 2024-11-30 RX ADMIN — CARBIDOPA AND LEVODOPA 2 TABLET: 25; 100 TABLET ORAL at 15:08

## 2024-11-30 RX ADMIN — METOPROLOL TARTRATE 12.5 MG: 25 TABLET, FILM COATED ORAL at 19:50

## 2024-11-30 RX ADMIN — BUDESONIDE AND FORMOTEROL FUMARATE DIHYDRATE 2 PUFF: 80; 4.5 AEROSOL RESPIRATORY (INHALATION) at 07:48

## 2024-11-30 RX ADMIN — METOPROLOL TARTRATE 12.5 MG: 25 TABLET, FILM COATED ORAL at 09:07

## 2024-11-30 RX ADMIN — Medication 4.5 MG: at 19:50

## 2024-11-30 RX ADMIN — DOXYCYCLINE HYCLATE 100 MG: 100 TABLET, COATED ORAL at 09:07

## 2024-11-30 RX ADMIN — TIOTROPIUM BROMIDE INHALATION SPRAY 2 PUFF: 3.12 SPRAY, METERED RESPIRATORY (INHALATION) at 07:48

## 2024-11-30 RX ADMIN — ALBUTEROL SULFATE 2 PUFF: 90 AEROSOL, METERED RESPIRATORY (INHALATION) at 07:48

## 2024-11-30 RX ADMIN — CARBIDOPA AND LEVODOPA 2 TABLET: 25; 100 TABLET ORAL at 19:50

## 2024-11-30 RX ADMIN — Medication 1000 MCG: at 09:07

## 2024-11-30 RX ADMIN — LOPERAMIDE HYDROCHLORIDE 2 MG: 2 CAPSULE ORAL at 15:05

## 2024-11-30 RX ADMIN — METHENAMINE HIPPURATE 1 G: 1 TABLET ORAL at 09:07

## 2024-11-30 RX ADMIN — Medication 1 MG: at 23:59

## 2024-11-30 RX ADMIN — WATER 1000 MG: 1 INJECTION INTRAMUSCULAR; INTRAVENOUS; SUBCUTANEOUS at 17:30

## 2024-11-30 RX ADMIN — DOXYCYCLINE HYCLATE 100 MG: 100 TABLET, COATED ORAL at 19:50

## 2024-11-30 ASSESSMENT — PAIN SCALES - GENERAL: PAINLEVEL_OUTOF10: 0

## 2024-11-30 NOTE — PLAN OF CARE
Problem: Chronic Conditions and Co-morbidities  Goal: Patient's chronic conditions and co-morbidity symptoms are monitored and maintained or improved  11/29/2024 2303 by Kell Santiago, RN  Outcome: Progressing  Flowsheets (Taken 11/29/2024 2303)  Care Plan - Patient's Chronic Conditions and Co-Morbidity Symptoms are Monitored and Maintained or Improved:   Monitor and assess patient's chronic conditions and comorbid symptoms for stability, deterioration, or improvement   Collaborate with multidisciplinary team to address chronic and comorbid conditions and prevent exacerbation or deterioration   Update acute care plan with appropriate goals if chronic or comorbid symptoms are exacerbated and prevent overall improvement and discharge     Problem: Discharge Planning  Goal: Discharge to home or other facility with appropriate resources  11/29/2024 2303 by Kell Santiago, RN  Outcome: Progressing  Flowsheets (Taken 11/29/2024 2303)  Discharge to home or other facility with appropriate resources:   Identify barriers to discharge with patient and caregiver   Identify discharge learning needs (meds, wound care, etc)   Arrange for interpreters to assist at discharge as needed   Arrange for needed discharge resources and transportation as appropriate     Problem: Pain  Goal: Verbalizes/displays adequate comfort level or baseline comfort level  11/29/2024 2303 by Kell Santiago, RN  Outcome: Progressing  Flowsheets (Taken 11/29/2024 2303)  Verbalizes/displays adequate comfort level or baseline comfort level:   Encourage patient to monitor pain and request assistance   Consider cultural and social influences on pain and pain management   Administer analgesics based on type and severity of pain and evaluate response   Assess pain using appropriate pain scale   Implement non-pharmacological measures as appropriate and evaluate response   Notify Licensed Independent Practitioner if interventions unsuccessful or patient

## 2024-11-30 NOTE — PROGRESS NOTES
the right upper lobe suspicious for  a primary pulmonary malignancy.      CT angiogram of chest with IV contrast:  Sun Nov 20, 2022 10:25 AM      PROCEDURE: CTA CHEST W WO CONTRAST   CLINICAL INFORMATION: bilateral shoulder pain.   COMPARISON: CT scan of the chest dated 17th of July 2022.      1. No evidence of pulmonary emboli.   2. Mild cardiomegaly.   3. Extensive calcification in the thoracic aorta.   4. Thickening off the interstitial lung markings. Chronic infiltrates in the right and to lesser extent left lower lobes.   5. Dextroscoliosis. Thoracic spondylosis. Compression deformities involving approximately the T2 and T3 vertebral bodies.   6. Thyromegaly.   7. Moderate-sized hiatal hernia. 8. 13 x 11 mm hypodensity in the right lobe of the liver.             Assessment   -Acute hypoxic respiratory failure-SpO2 in the 50s at SNF and SpO2 71% on arrival to ER, requiring 4 L O2 NC.  Not on any oxygen at home.  -New spiculated opacity in right upper lobe-seen on CT cervical spine.  Patient is former smoker with 40-pack-year smoking history.  Differential diagnose include neoplastic process VS pneumonia.  She under CT-guided biopsy by interventional radiologist on 29 November 2024-follow biopsy report  -Small iatrogenic right-sided pneumothorax after CT-guided biopsy-resolved on today's x-ray.  -Right upper lobe pneumonia due to community-acquired pneumonia VS atypical pneumonia  -SHARIF-baseline creatinine 0.8-1.  Creatinine 1.8 on admission.    -Elevated WBC counts-due to pneumonia Vs chronic steroid use-improving..  -Stage III COPD- history of stage III COPD last seen by Dr. Newman in March 2023.  Anoro stopped and Trelegy Ellipta started with repeat CT chest 1 month following.  Patient lost to follow-up.  Last PFTs in 2023.  Currently taking home inhalers.  -Acute encephalopathy-underlying dementia, increasing confusion reported by SNF.  Family reports increased confusion for last 5 weeks.  New cerebellar  infarct on CT head.  Neurology consulted.  -HFpEF-last TTE on 3/2/2024 EF of 60 to 65% with grade 1 diastolic dysfunction Bumex held in setting of SHARIF.  -History of DVT-previously taking Eliquis, stopped in August 2024.  -Dementia-on donepezil  -Parkinson's disease-on Sinemet  -Hypertension-on Lopressor 12.5 mg twice daily.  Lisinopril and Bumex held.  -Hyperlipidemia-on statin.    Plan   -Wean O2 as tolerated to maintain SpO2 between 88%-92%  -Follow CT guided biopsy right upper lobe lung nodule results   -ATB's Rocephin and doxycycline 100 mg p.o. twice daily due to drug interaction between azithromycin, Zoloft and trazodone.  -Symbicort inhaler 2 puffs 2 times daily  -Spiriva Respimat 2 puffs daily  -albuterol inhaler 2 puffs Q6 hrs PRN for SOB/wheezing   -prednisone 10 mg p.o. daily-she is currently taking this medication at home.  -Tessalon Perles for persistent cough  -Continue pulmonary hygiene; incentive spirometry and Acapella  -DVT prophylaxis heparin gtt     Questions and concerns addressed.    Electronically signed by   Jeffrey Newman MD on 11/30/2024 at 12:58 PM

## 2024-11-30 NOTE — PLAN OF CARE
Problem: Chronic Conditions and Co-morbidities  Goal: Patient's chronic conditions and co-morbidity symptoms are monitored and maintained or improved  Outcome: Progressing  Flowsheets (Taken 11/30/2024 1528)  Care Plan - Patient's Chronic Conditions and Co-Morbidity Symptoms are Monitored and Maintained or Improved: Monitor and assess patient's chronic conditions and comorbid symptoms for stability, deterioration, or improvement     Problem: Discharge Planning  Goal: Discharge to home or other facility with appropriate resources  Outcome: Progressing  Flowsheets (Taken 11/30/2024 1528)  Discharge to home or other facility with appropriate resources: Identify barriers to discharge with patient and caregiver     Problem: Pain  Goal: Verbalizes/displays adequate comfort level or baseline comfort level  Outcome: Progressing  Flowsheets (Taken 11/30/2024 1528)  Verbalizes/displays adequate comfort level or baseline comfort level:   Encourage patient to monitor pain and request assistance   Administer analgesics based on type and severity of pain and evaluate response   Assess pain using appropriate pain scale   Implement non-pharmacological measures as appropriate and evaluate response     Problem: Safety - Adult  Goal: Free from fall injury  Outcome: Progressing  Flowsheets (Taken 11/30/2024 1528)  Free From Fall Injury: Instruct family/caregiver on patient safety     Problem: Skin/Tissue Integrity  Goal: Absence of new skin breakdown  Description: 1.  Monitor for areas of redness and/or skin breakdown  2.  Assess vascular access sites hourly  3.  Every 4-6 hours minimum:  Change oxygen saturation probe site  4.  Every 4-6 hours:  If on nasal continuous positive airway pressure, respiratory therapy assess nares and determine need for appliance change or resting period.  Outcome: Progressing

## 2024-11-30 NOTE — PROGRESS NOTES
[facility]  SPEECH THERAPY  [facility] 7K  Clinical Swallow Evaluation    Discharge Recommendations: No additional ST services recommended  DIET ORDER RECOMMENDATIONS AFTER EVALUATION: Regular diet and thin liquids  Strategies:  Full Upright Position, Small Bite/Sip, Alternate Solids and Liquids, Limit Distractions, and Monitor for Fatigue     SLP Individual Minutes  Time In: 0955  Time Out: 1004  Minutes: 9  Timed Code Treatment Minutes: 0 Minutes       Date: 2024  Patient Name: Carmen Howard      CSN: [id number]   : 1940  (84 y.o.)  Gender: female   Referring Physician:  Behl, Ashita, MD     Diagnosis: SHARIF (acute kidney injury) ([facility])    History of Present Illness/Injury: Patient admitted to [facility] with above diagnosis: see physician H&P for further information. Per chart review, \"Ms. Carmen Howard is a 84 y.o. female who presents from [facility] as they found her unresponsive at SNF. Her oxygen saturation was noted to be 50% at that time.      Per family, patient has been confused last 5 weeks.Baseline mental status: She is usually aware of her daily events, but lately has been confused about daily events but has remained AOx4 and had mumbled speech intermittently along with trouble finding words which is new.  She usually needs assistance with ADLs.      Also had new onset cough since past several weeks.  Patient was alert oriented x 4 on my evaluation.  Denied any fever or chills.  No shortness of breath.  No nausea or vomiting.  No abdominal pain.  No issues with bowel movement or urination.     Spoke to nursing home RN, who reported patient was found detention hanging from the bed, when they went to try to adjust her she became completely unresponsive and her oxygen saturation was low.  She was treated for pneumonia month ago given cough.  Also, per RN, patient is usually confused at baseline, but has been more confused lately.  She also has been quite weak,  Laryngeal Penetration/Aspiration: No signs/symptoms of aspiration evident in this evaluation, but cannot rule out silent aspiration.    Functional Oral Intake Scale: Total Oral Intake: 7.  Total oral intake with no restrictions    Impressions: Patient presents with SHAE oral phase with inability to fully discern potential presence of pharyngeal phase deficits without formal instrumentation. Patient with good bolus control/manipulation and timely rotary mastication with effective textural breakdown. Mild lingual residue noted, however, patient independently utilized liquid wash to successfully clear residue. No overt signs/symptoms of aspiration across PO trials. Of course, pharyngeal dysfunction and totality of airway invasion events cannot be discerned at bedside alone, however, instrumental evaluation not indicated at this time.    Recommend patient continue regular diet and thin liquids. No further skilled ST services warranted. Should further concerns arise, please re-consult.    *post evaluation, patient without respiratory distress upon leaving room; RN Shelbi notified re: clinical findings and recommendations from the assessment; verbal receptiveness noted      RECOMMENDATIONS/ASSESSMENT:  Instrumental Evaluation: Instrumental evaluation not indicated at this time.  Rehabilitation Potential: good    EDUCATION:  Learner: Patient and Family  Education:  Reviewed results and recommendations of this evaluation, Reviewed diet and strategies, Reviewed signs, symptoms and risks of aspiration, and Reviewed recommendations for follow-up  Evaluation of Education: Verbalizes understanding    PLAN:  No further speech therapy services indicated.      Fannie Bocanegra MS, CCC-SLP [id number]

## 2024-11-30 NOTE — PROGRESS NOTES
using voice recognition software. It may contain minor errors which are inherent in voice recognition technology.** Electronically signed by Dr. Kalyani Hillman    XR PELVIS (1-2 VIEWS)    Result Date: 11/28/2024  PROCEDURE: XR PELVIS (1-2 VIEWS) CLINICAL INFORMATION: Fall. Fell 2 days ago. COMPARISON: Chest x-ray 1/29/2024. TECHNIQUE: AP view of the pelvis. FINDINGS: The pelvic ring is intact.  There is no fracture or dislocation of either hip. There is surgical hardware in the left femoral neck. The hardware is intact.   The superior and inferior pubic rami are intact. The sacrum is not well seen. There is severe degenerative changes in the right hip joint. There are moderate degenerative changes in the left hip joint. There are vascular calcifications.      No fracture. **This report has been created using voice recognition software. It may contain minor errors which are inherent in voice recognition technology.** Electronically signed by Dr. Kalyani Hillman    CT HEAD WO CONTRAST    Result Date: 11/28/2024  PROCEDURE: CT HEAD WO CONTRAST CLINICAL INFORMATION: Unresponsive episode fall. COMPARISON: Head CT 7/17/2022. TECHNIQUE: Noncontrast 5 mm axial images were obtained through the brain. Sagittal and coronal reconstructions were obtained. . All CT scans at this facility use dose modulation, iterative reconstruction, and/or weight-based dosing when appropriate to reduce radiation dose to as low as reasonably achievable. FINDINGS: There is no hemorrhage. There are no intra-or extra-axial collections.  There is no hydrocephalus, midline shift or mass effect. There is a moderate-severe amount of confluent abnormal signal in the white matter of the brain suggesting chronic small vessel ischemic changes. There is a infarct in the mid left cerebellum. This is wedge-shaped. This was not present previously. This is age indeterminate. The paranasal sinuses and mastoid air cells are normally aerated.  There is no suspicious  calvarial abnormality.      1. No hemorrhage. 2. Age indeterminate infarct in the left cerebellum. This is new compared to the prior exam. 3. Moderate-severe chronic small vessel ischemic changes. **This report has been created using voice recognition software. It may contain minor errors which are inherent in voice recognition technology.** Electronically signed by Dr. Klayani Hillman    XR CHEST (SINGLE VIEW FRONTAL)    Result Date: 11/28/2024  PROCEDURE: XR CHEST (SINGLE VIEW FRONTAL) CLINICAL INFORMATION: Unresponsive episode. COMPARISON: Chest x-ray 1/29/2024. TECHNIQUE: AP upright view of the chest. FINDINGS: The heart size is evidence of normal. This is stable. The mediastinum is not widened. There is a new focal opacity in the right upper lobe. There is under aeration of the left lung base. The pulmonary vascularity is normal. There is osseous demineralization.     Focal opacity in the right upper lobe. A dedicated chest CT is recommended. Mild atelectasis in the left lung base. **This report has been created using voice recognition software. It may contain minor errors which are inherent in voice recognition technology.** Electronically signed by Dr. Kalyani Hillman      Code Status: DNR-CCA    Tele:   [] yes              [x] no    LDA: []CVC / []PICC / []Midline / []Varma / []Drains / []Mediport / []None  Antibiotics: Rocephin, doxycycline  Steroids: Yes is on chronic steroids  Labs (still needed?): [x]Yes / []No  IVF (still needed?): []Yes / [x]No    Level of care: []Step Down / [x]Med-Surg  Bed Status: [x]Inpatient / []Observation  PT/OT: [x]Yes / []No    DVT Prophylaxis: [] Lovenox / [x] Heparin / [] SCDs / [] Already on Systemic Anticoagulation / [] None       Active Hospital Problems    Diagnosis Date Noted    Recurrent UTI [N39.0] 07/17/2022     Priority: Medium    History of cerebellar stroke [Z86.73] 11/29/2024    SHARIF (acute kidney injury) ([facility]) [N17.9] 11/28/2024    Mass of upper lobe of right lung

## 2024-11-30 NOTE — RT PROTOCOL NOTE
RT Inhaler-Nebulizer Bronchodilator Protocol Note    There is a bronchodilator order in the chart from a provider indicating to follow the RT Bronchodilator Protocol and there is an “Initiate RT Inhaler-Nebulizer Bronchodilator Protocol” order as well (see protocol at bottom of note).    CXR Findings:  No results found.    The findings from the last RT Protocol Assessment were as follows:   History Pulmonary Disease: Chronic pulmonary disease  Respiratory Pattern: Regular pattern and RR 12-20 bpm  Breath Sounds: Slightly diminished and/or crackles  Cough: Strong, spontaneous, non-productive  Indication for Bronchodilator Therapy: On home bronchodilators  Bronchodilator Assessment Score: 4    Aerosolized bronchodilator medication orders have been revised according to the RT Inhaler-Nebulizer Bronchodilator Protocol below.    Respiratory Therapist to perform RT Therapy Protocol Assessment initially then follow the protocol.  Repeat RT Therapy Protocol Assessment PRN for score 0-3 or on second treatment, BID, and PRN for scores above 3.    No Indications - adjust the frequency to every 6 hours PRN wheezing or bronchospasm, if no treatments needed after 48 hours then discontinue using Per Protocol order mode.     If indication present, adjust the RT bronchodilator orders based on the Bronchodilator Assessment Score as indicated below.  Use Inhaler orders unless patient has one or more of the following: on home nebulizer, not able to hold breath for 10 seconds, is not alert and oriented, cannot activate and use MDI correctly, or respiratory rate 25 breaths per minute or more, then use the equivalent nebulizer order(s) with same Frequency and PRN reasons based on the score.  If a patient is on this medication at home then do not decrease Frequency below that used at home.    0-3 - enter or revise RT bronchodilator order(s) to equivalent RT Bronchodilator order with Frequency of every 4 hours PRN for wheezing or increased  work of breathing using Per Protocol order mode.        4-6 - enter or revise RT Bronchodilator order(s) to two equivalent RT bronchodilator orders with one order with BID Frequency and one order with Frequency of every 4 hours PRN wheezing or increased work of breathing using Per Protocol order mode.        7-10 - enter or revise RT Bronchodilator order(s) to two equivalent RT bronchodilator orders with one order with TID Frequency and one order with Frequency of every 4 hours PRN wheezing or increased work of breathing using Per Protocol order mode.       11-13 - enter or revise RT Bronchodilator order(s) to one equivalent RT bronchodilator order with QID Frequency and an Albuterol order with Frequency of every 4 hours PRN wheezing or increased work of breathing using Per Protocol order mode.      Greater than 13 - enter or revise RT Bronchodilator order(s) to one equivalent RT bronchodilator order with every 4 hours Frequency and an Albuterol order with Frequency of every 2 hours PRN wheezing or increased work of breathing using Per Protocol order mode.     RT to enter RT Home Evaluation for COPD & MDI Assessment order using Per Protocol order mode.    Electronically signed by Radha Edward RCP on 11/30/2024 at 7:56 AM

## 2024-12-01 PROBLEM — I46.9 CARDIAC ARREST: Status: ACTIVE | Noted: 2024-12-01

## 2024-12-01 LAB
CA-I BLD ISE-SCNC: 1.37 MMOL/L (ref 1.12–1.32)
CHLORIDE BLD-SCNC: 107 MEQ/L (ref 98–109)
EKG ATRIAL RATE: 92 BPM
EKG P AXIS: 61 DEGREES
EKG P-R INTERVAL: 228 MS
EKG Q-T INTERVAL: 400 MS
EKG QRS DURATION: 108 MS
EKG QTC CALCULATION (BAZETT): 494 MS
EKG R AXIS: -74 DEGREES
EKG T AXIS: 126 DEGREES
EKG VENTRICULAR RATE: 92 BPM
GLUCOSE BLD-MCNC: 198 MG/DL (ref 70–108)
HEPARIN UNFRACTIONATED: 0.29 U/ML (ref 0.3–0.7)
LACTATE BLD-SCNC: 8 MMOL/L (ref 0.5–1.9)
POTASSIUM BLD-SCNC: 5 MEQ/L (ref 3.5–4.9)
SODIUM BLD-SCNC: 142 MEQ/L (ref 138–146)

## 2024-12-01 PROCEDURE — 2700000000 HC OXYGEN THERAPY PER DAY

## 2024-12-01 PROCEDURE — 93010 ELECTROCARDIOGRAM REPORT: CPT | Performed by: INTERNAL MEDICINE

## 2024-12-01 PROCEDURE — 31500 INSERT EMERGENCY AIRWAY: CPT | Performed by: STUDENT IN AN ORGANIZED HEALTH CARE EDUCATION/TRAINING PROGRAM

## 2024-12-01 PROCEDURE — 2500000003 HC RX 250 WO HCPCS

## 2024-12-01 PROCEDURE — 6360000002 HC RX W HCPCS

## 2024-12-01 RX ORDER — EPINEPHRINE IN SOD CHLOR,ISO 1 MG/10 ML
SYRINGE (ML) INTRAVENOUS
Status: COMPLETED | OUTPATIENT
Start: 2024-12-01 | End: 2024-12-01

## 2024-12-01 RX ORDER — EPINEPHRINE IN SOD CHLOR,ISO 1 MG/10 ML
SYRINGE (ML) INTRAVENOUS
Status: COMPLETED | OUTPATIENT
Start: 2024-11-30 | End: 2024-11-30

## 2024-12-01 RX ADMIN — Medication 1 MG: at 00:02

## 2024-12-01 RX ADMIN — Medication 1 MG: at 00:08

## 2024-12-01 RX ADMIN — Medication 1 MG: at 00:14

## 2024-12-01 RX ADMIN — Medication 100 MEQ: at 00:16

## 2024-12-01 RX ADMIN — Medication 100 MEQ: at 00:04

## 2024-12-01 RX ADMIN — Medication 100 MEQ: at 00:01

## 2024-12-01 RX ADMIN — Medication 1 MG: at 00:05

## 2024-12-01 RX ADMIN — Medication 1 MG: at 00:11

## 2024-12-01 NOTE — SIGNIFICANT EVENT
This RN went to patient's room to obtain vital signs. Phlebotomy present. Anti-XA lab drawn. Glucose taken per Q6 hour CHEM- Result: 166  After this, this RN obtained vital signs. BP: 101/56 in right upper arm HR: 80 RR: 16 Oxygen saturation: 92% on 1.5L via nasal cannula. This RN asked patient if she had to use the bathroom. Patient stated yes. This RN called Leandra to help as this patient was a 2 assist transfer to [facility]. Leandra and this RN assisted patient to sitting position. Gait belt was applied and patient stood and transferred to [facility]. Patient tolerated this fairly well. This RN asked patient if patient would like bath as she was sitting on [facility]. Patient replied yes. This RN opened bathroom door and started to run bath water. This RN returned to patient and then patient went completely unresponsive. Breathing and pulse present. This RN called to Leandra and Muriel who assisted this RN to get patient into the bed. Skin tear to right arm occurred when patient went unresponsive.     2340: Rapid response called as patient was unresponsive. Blood pressure taken: 68/45.   2342: Jorgito with resource arrived.   2344: Kasey Ye NP & Bladimir Armenta supervisor arrived.   Vitals: 97/84 HR: 88 Oxygen saturation: 83%   Patient agonal breathing. Providers questioning intubation.   Kasey Ye NP called family who requested that everything be done for the patient.     2357: Rapid Response progressed into a code blue.   0017: CPR stopped  0018: Time of Death called by providers.

## 2024-12-01 NOTE — PLAN OF CARE
Problem: Chronic Conditions and Co-morbidities  Goal: Patient's chronic conditions and co-morbidity symptoms are monitored and maintained or improved  11/30/2024 2219 by Alyce Kerr, RN  Outcome: Progressing  Flowsheets (Taken 11/30/2024 2219)  Care Plan - Patient's Chronic Conditions and Co-Morbidity Symptoms are Monitored and Maintained or Improved:   Monitor and assess patient's chronic conditions and comorbid symptoms for stability, deterioration, or improvement   Collaborate with multidisciplinary team to address chronic and comorbid conditions and prevent exacerbation or deterioration   Update acute care plan with appropriate goals if chronic or comorbid symptoms are exacerbated and prevent overall improvement and discharge  11/30/2024 1528 by Debi Richmond LPN  Outcome: Progressing  Flowsheets (Taken 11/30/2024 1528)  Care Plan - Patient's Chronic Conditions and Co-Morbidity Symptoms are Monitored and Maintained or Improved: Monitor and assess patient's chronic conditions and comorbid symptoms for stability, deterioration, or improvement     Problem: Discharge Planning  Goal: Discharge to home or other facility with appropriate resources  11/30/2024 2219 by Alyce Kerr, RN  Outcome: Progressing  Flowsheets (Taken 11/30/2024 2219)  Discharge to home or other facility with appropriate resources:   Identify barriers to discharge with patient and caregiver   Identify discharge learning needs (meds, wound care, etc)   Arrange for needed discharge resources and transportation as appropriate  11/30/2024 1528 by Debi Richmond LPN  Outcome: Progressing  Flowsheets (Taken 11/30/2024 1528)  Discharge to home or other facility with appropriate resources: Identify barriers to discharge with patient and caregiver     Problem: Pain  Goal: Verbalizes/displays adequate comfort level or baseline comfort level  11/30/2024 2219 by Alyce Kerr, RN  Outcome: Progressing  Flowsheets (Taken 11/30/2024  and oxygenation:   Assess for changes in respiratory status   Assess for changes in mentation and behavior   Oxygen supplementation based on oxygen saturation or arterial blood gases   Encourage broncho-pulmonary hygiene including cough, deep breathe, incentive spirometry   Assess the need for suctioning and aspirate as needed   Assess and instruct to report shortness of breath or any respiratory difficulty     Problem: ABCDS Injury Assessment  Goal: Absence of physical injury  Outcome: Progressing  Flowsheets (Taken 11/30/2024 2219)  Absence of Physical Injury: Implement safety measures based on patient assessment     Problem: Skin/Tissue Integrity - Adult  Goal: Skin integrity remains intact  Outcome: Progressing  Flowsheets (Taken 11/30/2024 2219)  Skin Integrity Remains Intact:   Monitor for areas of redness and/or skin breakdown   Assess vascular access sites hourly     Problem: Musculoskeletal - Adult  Goal: Maintain proper alignment of affected body part  Outcome: Progressing  Flowsheets (Taken 11/30/2024 2219)  Maintain proper alignment of affected body part:   Support and protect limb and body alignment per provider's orders   Instruct and reinforce with patient and family use of appropriate assistive device and precautions (e.g. spinal or hip dislocation precautions)     Problem: Gastrointestinal - Adult  Goal: Maintains or returns to baseline bowel function  Outcome: Progressing  Flowsheets (Taken 11/30/2024 2219)  Maintains or returns to baseline bowel function:   Assess bowel function   Encourage oral fluids to ensure adequate hydration   Administer ordered medications as needed   Encourage mobilization and activity     Problem: Hematologic - Adult  Goal: Maintains hematologic stability  Outcome: Progressing  Flowsheets (Taken 11/30/2024 2219)  Maintains hematologic stability:   Assess for signs and symptoms of bleeding or hemorrhage   Monitor labs for bleeding or clotting disorders   Care plan reviewed

## 2024-12-01 NOTE — PROGRESS NOTES
[facility]  Notice of Patient Passing      Patient Name- Carmen Howard   Acct Number- [id number]   Attending Physician- Zulma Robbins APRN*    Admitted on-11/28/2024  6:21 AM     On 12/1/2024 at 0018 patient was found in 7K04 with:   Absence of vital signs.   Absence of neurological response.    Confirmed time of death at 0018.   Physician or On-call Physician notified of time of death- yes    Family present at time of death- no   Spiritual care present at time of death- no    Physician was notified and orders were obtained to release the body.   Post-Mortem documentation completed; form printed, signed, and given to admitting.    Noemí Brown RN RN Nursing Supervisor/ Manager  12/1/24   12:40 AM    ________________________________________________________________________

## 2024-12-01 NOTE — PROGRESS NOTES
Postmortem care completed and patient was placed in body bag. Kathi notified that patient was ready to be transferred to [facility].

## 2024-12-01 NOTE — PROCEDURES
PROCEDURE NOTE  Date: 12/1/2024   Name: Carmen Howard  YOB: 1940    Procedures  12 lead EKG completed. Results handed to Rapid response team.

## 2024-12-01 NOTE — PROGRESS NOTES
[place] [facility] [place]iel.

## 2024-12-01 NOTE — PROGRESS NOTES
A size 7.5 endotracheal tube was successfully placed using a size 3 blade. The Lot number for he endotracheal tube was [id number]. Respiratory care participated in Code.  End tidal CO2 monitor and Resqpod applied to patient. For further information see Code sheet.

## 2024-12-01 NOTE — PROCEDURES
PROCEDURE NOTE  Date: 12/1/2024   Name: Carmen Howard  YOB: 1940    Intubation    Date/Time: 12/1/2024 12:23 AM    Performed by: Jason Edwards DO  Authorized by: Jason Edwards DO  Consent: The procedure was performed in an emergent situation. Verbal consent obtained. Written consent not obtained.  Risks and benefits: risks, benefits and alternatives were discussed  Consent given by: power of   Required items: required blood products, implants, devices, and special equipment available  Patient identity confirmed: verbally with patient and arm band  Time out: Immediately prior to procedure a \"time out\" was called to verify the correct patient, procedure, equipment, support staff and site/side marked as required.  Indications: respiratory failure  Intubation method: direct  Patient status: unconscious  Preoxygenation: BVM  Pretreatment medications: none  Paralytic: none  Laryngoscope size: Mac 3  Tube size: 7.5 mm  Tube type: cuffed  Number of attempts: 1  Cricoid pressure: no  Cords visualized: yes  Post-procedure assessment: chest rise and CO2 detector  Cuff inflated: yes  ETT to lip: 25 cm  Tube secured with: ETT gallegos and adhesive tape  Comments: No CXR obtained as family elected to stop resuscitating patient on their arrival to the room.          Intubation performed in the presence of ICU attending Dr Chema COREA.    Electronically Signed by  Jason Edwards DO, MBA  PGY-2 Internal Medicine Resident  [facility]  On 12/1/2024 at 12:25 AM

## 2024-12-01 NOTE — SIGNIFICANT EVENT
Rapid response called at 2341. Primary nurse states that patient need to use the bathroom so she assisted her to the bedside commode. States that she asked the patient if she wanted a shower and she went to turn to go to the bathroom to start warming the water and patient became unresponsive, sustaining a skin tear on the side of the commode. Reports that staff assisted patient into bed and immediately called a rapid response. Provider arrived at the bedside. Patients eyes are open and looking around but not communicative. Patient using accessory muscles when breathing. SpO2 83%, placed on a NRB. BP hypotensive 90's systolic. Lung sounds very diminished without much air movement. STAT CXR ordered. ABG ordered. DNRccA on file. ICU provider questioning intubation. Provider attempted to call patients primary contact, Jazmine small, left a message to return call ASAP. Patient remaining hypoxic, hypotensive and now bradycardic. Provider called and spoke with Aris, patients son and at that time patient had no pulse. Aris requested that everything be done for patient.     2357 ACLS initiated.     0018 Patients daughter arrived at bedside and requesting to stop ACLS. Time of death called.     Electronically signed by PETEY Pina CNP on 12/1/2024 at 1:07 AM

## 2024-12-02 LAB
FUNGUS SPEC CULT: NORMAL
FUNGUS SPEC FUNGUS STN: NORMAL

## 2024-12-03 LAB
BACTERIA BLD AEROBE CULT: NORMAL
BACTERIA BLD AEROBE CULT: NORMAL
BACTERIA SPEC AEROBE CULT: NORMAL
BACTERIA SPEC ANAEROBE CULT: NORMAL
GRAM STN SPEC: NORMAL

## 2024-12-30 LAB
FUNGUS SPEC CULT: NORMAL
FUNGUS SPEC FUNGUS STN: NORMAL

## (undated) DEVICE — DRESSING ALG W3XL4IN TRNSPAR ANTIMIC WND CNTCT LAYR W/

## (undated) DEVICE — SOLUTION IV 1000ML 0.9% SOD CHL PH 5 INJ USP VIAFLX PLAS

## (undated) DEVICE — GLOVE SURG SZ 8 L11.77IN FNGR THK9.8MIL STRW LTX POLYMER

## (undated) DEVICE — GLOVE ORANGE PI 8   MSG9080

## (undated) DEVICE — SUTURE VCRL SZ 3-0 L27IN ABSRB UD FS-2 L19MM 1/2 CIR J423H

## (undated) DEVICE — SUTURE MCRYL SZ 4-0 L27IN ABSRB UD L19MM PS-2 1/2 CIR PRIM Y426H

## (undated) DEVICE — PACK PROCEDURE SURG POD SC SRHP LF